# Patient Record
Sex: MALE | Race: WHITE | NOT HISPANIC OR LATINO | Employment: FULL TIME | URBAN - METROPOLITAN AREA
[De-identification: names, ages, dates, MRNs, and addresses within clinical notes are randomized per-mention and may not be internally consistent; named-entity substitution may affect disease eponyms.]

---

## 2018-02-12 ENCOUNTER — OFFICE VISIT (OUTPATIENT)
Dept: FAMILY MEDICINE | Facility: CLINIC | Age: 63
End: 2018-02-12
Payer: COMMERCIAL

## 2018-02-12 VITALS
BODY MASS INDEX: 31.66 KG/M2 | DIASTOLIC BLOOD PRESSURE: 91 MMHG | HEIGHT: 66 IN | OXYGEN SATURATION: 93 % | TEMPERATURE: 98.2 F | SYSTOLIC BLOOD PRESSURE: 190 MMHG | HEART RATE: 108 BPM | WEIGHT: 197 LBS

## 2018-02-12 DIAGNOSIS — S40.822A: Primary | ICD-10-CM

## 2018-02-12 DIAGNOSIS — L08.9: Primary | ICD-10-CM

## 2018-02-12 DIAGNOSIS — Z00.00 ENCOUNTER FOR MEDICAL EXAMINATION TO ESTABLISH CARE: ICD-10-CM

## 2018-02-12 LAB
ANION GAP SERPL CALCULATED.3IONS-SCNC: 17 MMOL/L (ref 5–18)
BUN SERPL-MCNC: 8 MG/DL (ref 8–22)
CALCIUM SERPL-MCNC: 8.7 MG/DL (ref 8.5–10.5)
CHLORIDE SERPL-SCNC: 100 MMOL/L (ref 98–107)
CHOLEST SERPL-MCNC: 146 MG/DL
CO2 SERPL-SCNC: 21 MMOL/L (ref 22–31)
CREAT SERPL-MCNC: 0.68 MG/DL (ref 0.7–1.3)
FASTING?: NO
GLUCOSE SERPL-MCNC: 99 MG/DL (ref 70–125)
HCT VFR BLD AUTO: 41 % (ref 40–53)
HDLC SERPL-MCNC: 35 MG/DL
HEMOGLOBIN: 13 G/DL (ref 13.3–17.7)
LDLC SERPL CALC-MCNC: 96 MG/DL
MCH RBC QN AUTO: 32.7 PG (ref 26.5–35)
MCHC RBC AUTO-ENTMCNC: 31.7 G/DL (ref 32–36)
MCV RBC AUTO: 103.4 FL (ref 78–100)
PLATELET # BLD AUTO: 266 K/UL (ref 150–450)
POTASSIUM SERPL-SCNC: 3.5 MMOL/L (ref 3.5–5)
RBC # BLD AUTO: 3.97 M/UL (ref 4.4–5.9)
SODIUM SERPL-SCNC: 138 MMOL/L (ref 136–145)
TRIGL SERPL-MCNC: 76 MG/DL
WBC # BLD AUTO: 9.2 K/UL (ref 4–11)

## 2018-02-12 NOTE — MR AVS SNAPSHOT
After Visit Summary   2018    Aayush García    MRN: [id number]           Patient Information     Date Of Birth          1955        Visit Information        Provider Department      2018 3:40 PM Palla, Misbah Yousuf, MD [facility]        Today's Diagnoses     Blister of left upper arm with infection, initial encounter    -  1    Encounter for medical examination to establish care           Follow-ups after your visit        Your next 10 appointments already scheduled     2018  3:40 PM CST   Return Visit with Misbah Yousuf Palla, MD   [facility] ([facility])    [address] [place] [address]   [phone]              Who to contact     Please call your clinic at [phone] to:    Ask questions about your health    Make or cancel appointments    Discuss your medicines    Learn about your test results    Speak to your doctor            Additional Information About Your Visit        MyChart Information     [employer] is an electronic gateway that provides easy, online access to your medical records. With Joost, you can request a clinic appointment, read your test results, renew a prescription or communicate with your care team.     To sign up for [employer] visit the website at [email or web address]   You will be asked to enter the access code listed below, as well as some personal information. Please follow the directions to create your username and password.     Your access code is: [email or web address]  Expires: 2018  3:18 PM     Your access code will  in 90 days. If you need help or a new code, please contact your [facility] or call [phone] for assistance.        Care EveryWhere ID     This is your Care EveryWhere ID. This could be used by other organizations to access your [place] medical records  [id number]        Your Vitals Were     Pulse Temperature Height Pulse Oximetry BMI (Body Mass  "Index)       108 98.2  F (36.8  C) (Oral) 5' 6\" (167.6 cm) 93% 31.8 kg/m2        Blood Pressure from Last 3 Encounters:   02/12/18 (!) 190/91    Weight from Last 3 Encounters:   02/12/18 197 lb (89.4 kg)              We Performed the Following     Basic Metabolic Profile ([facility])     CBC with Plt (P )     Lipid [place] ([facility]) - Results > 1 hr        Primary Care Provider Office Phone #    Jade Xie,  [phone]       [address] [address]        Equal Access to Services     SOURAV GUTIÉRREZ : Hadii aad suhail hadasho Soomaali, waaxda luqadaha, qaybta kaalmada adeegyada, kathe gibbs . So [facility] [phone].    ATENCIÓN: Si habla español, tiene a srivastava disposición servicios gratuitos de asistencia lingüística. Llame al [phone].    We comply with applicable federal civil rights laws and [place] laws. We do not discriminate on the basis of race, color, national origin, age, disability, sex, sexual orientation, or gender identity.            Thank you!     Thank you for choosing [facility]  for your care. Our goal is always to provide you with excellent care. Hearing back from our patients is one way we can continue to improve our services. Please take a few minutes to complete the written survey that you may receive in the mail after your visit with us. Thank you!             Your Updated Medication List - Protect others around you: Learn how to safely use, store and throw away your medicines at www.disposemymeds.org.      Notice  As of 2/12/2018 11:59 PM    You have not been prescribed any medications.      "

## 2018-02-12 NOTE — PROGRESS NOTES
"HPI    Aayush García is 62 year old yo male/female presenting for:    1. Concern for \"infection in left elbow\"  - has noticed swelling on his left elbow for the last 3 days  - no recent trauma/injuries   - no fevers, chills, nor night sweats  - no bleeding from the area, no purulent discharge  - no change in range of motion  - minor pain, 3/10 at its worst  - patient does endorsed mildly increased redness in his elbow extending down to his left forearm, but no other complaints     Of note:  - patient works in a warehouse pushing carts frequently   - rests both his elbows on the railing of the cart for several hours a day     OBJECTIVE    Vitals  BP (!) 190/91  Pulse 108  Temp 98.2  F (36.8  C) (Oral)  Ht 5' 6\" (167.6 cm)  Wt 197 lb (89.4 kg)  SpO2 93%  BMI 31.8 kg/m2      Physical Exam  General: No acute distress  CV: RRR  Respiratory: CTA bilaterally, no wheezes/rhonchi/rhales appreciated, no respiratory distress  Chest wall: No chest wall tenderness  Abdomen: soft, non-distended, non-tender, normoactive bowel sounds  Extremities:  capillary refill <2 seconds, well perfused   LUE: slight erythema/redness extending from elbow to wrist; LUE swelling from elbow to wrist > than on right  Neuro: no focal deficits noted, BUE reflexes within normal limits; 3 cm blister noted on lateral aspect of elbow, no purulent/bloody discharge, no breaks in skin noted    ASSESSMENT/PLAN    # Skin infection, left elbow  - no purulent discharge, no fluctuant mass noted  - keflex 500 mg BID for 10 days  - if symptoms worsen/do not improve, return to clinic  - ER precautions given to patient and wife  - advised to refrain from resting his elbows on his cart at work as this leads to increase risk of pressure ulcers/infections     #Health maintenance  - poor follow up, has not been seen in clinic in more than a decade  - schedule establish care appointment  - BMP, lipid panel, and CBC pending  - colonoscopy briefly discussed - will " discuss more at next appointment       Precepted with Dr. Patel

## 2018-02-12 NOTE — PROGRESS NOTES
Preceptor Attestation:  Patient's case reviewed and discussed with Misbah Y. Palla, MD Patient seen and discussed with the resident.. I agree with assessment and plan of care.  Supervising Physician:  Aayush Patel MD  [facility]

## 2018-02-13 ENCOUNTER — TELEPHONE (OUTPATIENT)
Dept: FAMILY MEDICINE | Facility: CLINIC | Age: 63
End: 2018-02-13

## 2018-02-13 NOTE — TELEPHONE ENCOUNTER
P Family Medicine phone call message- medication clarification/question:    Question: Patients partner calling in stating that an antibiotic was supposed to have been sent to pharmacy yesterday but nothing was sent. States is unsure of the name of antibiotic but it was for his arm. Would like this sent asap.     Pharmacy confirmed as [id number] - [place], [place] - [address]: Yes    OK to leave a message on voice mail? Yes    Primary language: English      needed? No    Call taken on February 13, 2018 at 3:29 PM by Ksenia Trammell

## 2018-02-13 NOTE — TELEPHONE ENCOUNTER
Pt is calling upset that the medication was never sent, states that they need this medication by tomorrow. She asked for a call once it is sent. Please call, and advise.

## 2018-02-14 RX ORDER — CEPHALEXIN 500 MG/1
500 CAPSULE ORAL 2 TIMES DAILY
Qty: 20 CAPSULE | Refills: 0 | Status: CANCELLED | OUTPATIENT
Start: 2018-02-14

## 2018-02-14 NOTE — TELEPHONE ENCOUNTER
Per Dr Palla's instructions, I have phoned into [facility], [address]- Keflex 500mg #20, take one capsule twice daily x 10 days, no refills. Called and left a detailed voice mail informing Aayush of the completed action. Yuko DING

## 2018-02-20 ENCOUNTER — OFFICE VISIT (OUTPATIENT)
Dept: FAMILY MEDICINE | Facility: CLINIC | Age: 63
End: 2018-02-20
Payer: COMMERCIAL

## 2018-02-20 VITALS
TEMPERATURE: 98 F | HEART RATE: 93 BPM | DIASTOLIC BLOOD PRESSURE: 88 MMHG | OXYGEN SATURATION: 98 % | WEIGHT: 190.6 LBS | RESPIRATION RATE: 16 BRPM | SYSTOLIC BLOOD PRESSURE: 167 MMHG | BODY MASS INDEX: 30.76 KG/M2

## 2018-02-20 DIAGNOSIS — D64.9 ANEMIA, UNSPECIFIED TYPE: ICD-10-CM

## 2018-02-20 DIAGNOSIS — I10 BENIGN ESSENTIAL HYPERTENSION: Primary | ICD-10-CM

## 2018-02-20 DIAGNOSIS — Z12.11 ENCOUNTER FOR SCREENING COLONOSCOPY: ICD-10-CM

## 2018-02-20 LAB
FOLATE SERPL-MCNC: 10.9 NG/ML
VIT B12 SERPL-MCNC: 566 PG/ML (ref 213–816)

## 2018-02-20 RX ORDER — LISINOPRIL 10 MG/1
10 TABLET ORAL DAILY
Qty: 90 TABLET | Refills: 3 | Status: SHIPPED | OUTPATIENT
Start: 2018-02-20 | End: 2019-03-28

## 2018-02-20 NOTE — PROGRESS NOTES
Preceptor Attestation:  Patient's case reviewed and discussed with Misbah Y. Palla, MD.  Patient seen and discussed with the resident.  I agree with assessment and plan of care.  Supervising Physician:  Shania Birmingham MD  [facility]

## 2018-03-06 ENCOUNTER — TELEPHONE (OUTPATIENT)
Dept: FAMILY MEDICINE | Facility: CLINIC | Age: 63
End: 2018-03-06

## 2018-03-06 ENCOUNTER — OFFICE VISIT (OUTPATIENT)
Dept: FAMILY MEDICINE | Facility: CLINIC | Age: 63
End: 2018-03-06
Payer: COMMERCIAL

## 2018-03-06 VITALS
TEMPERATURE: 97.8 F | RESPIRATION RATE: 20 BRPM | BODY MASS INDEX: 29.7 KG/M2 | OXYGEN SATURATION: 99 % | HEART RATE: 91 BPM | DIASTOLIC BLOOD PRESSURE: 76 MMHG | HEIGHT: 67 IN | SYSTOLIC BLOOD PRESSURE: 152 MMHG | WEIGHT: 189.2 LBS

## 2018-03-06 DIAGNOSIS — I10 BENIGN ESSENTIAL HYPERTENSION: Primary | ICD-10-CM

## 2018-03-06 DIAGNOSIS — Z00.00 PREVENTATIVE HEALTH CARE: ICD-10-CM

## 2018-03-06 LAB
BUN SERPL-MCNC: 4 MG/DL (ref 7–30)
CALCIUM SERPL-MCNC: 8.7 MG/DL (ref 8.5–10.4)
CHLORIDE SERPLBLD-SCNC: 99 MMOL/L (ref 94–109)
CO2 SERPL-SCNC: 27 MMOL/L (ref 20–32)
CREAT SERPL-MCNC: 0.6 MG/DL (ref 0.8–1.5)
EGFR CALCULATED (BLACK REFERENCE): >90 ML/MIN
EGFR CALCULATED (NON BLACK REFERENCE): >90 ML/MIN
GLUCOSE SERPL-MCNC: 93 MG/DL (ref 60–109)
POTASSIUM SERPL-SCNC: 4 MMOL/L (ref 3.4–5.3)
SODIUM SERPL-SCNC: 143 MMOL/L (ref 133–144)

## 2018-03-06 RX ORDER — ATORVASTATIN CALCIUM 40 MG/1
40 TABLET, FILM COATED ORAL DAILY
Qty: 90 TABLET | Refills: 1 | Status: SHIPPED | OUTPATIENT
Start: 2018-03-06 | End: 2018-04-12 | Stop reason: SINTOL

## 2018-03-06 RX ORDER — ATORVASTATIN CALCIUM 20 MG/1
20 TABLET, FILM COATED ORAL DAILY
Qty: 30 TABLET | Refills: 1 | Status: SHIPPED | OUTPATIENT
Start: 2018-03-06 | End: 2018-03-06 | Stop reason: DRUGHIGH

## 2018-03-06 NOTE — PROGRESS NOTES
"HPI    Aayush García is 62 year old yo male presenting for:    1. BP follow up  - established care on 2/20/2018  - diagnosed with benign essential hypertension and was started on lisinopril   - BP home measurements   - 139/71   - 156/67   - 121/72   - 121/74   - 122/69   - 132/73   - 133/62   - 134/71  - no hypotensive episodes/symptoms at home      The 10-year ASCVD risk score (Torie TELLEZ Jr, et al., 2013) is: 14.2%    Values used to calculate the score:      Age: 62 years      Sex: Male      Is Non- : No      Diabetic: No      Tobacco smoker: No      Systolic Blood Pressure: 152 mmHg      Is BP treated: Yes      HDL Cholesterol: 35 mg/dL      Total Cholesterol: 146 mg/dL      OBJECTIVE    Vitals  /76 (BP Location: Right arm, Patient Position: Sitting, Cuff Size: Adult Regular)  Pulse 91  Temp 97.8  F (36.6  C) (Oral)  Resp 20  Ht 5' 6.5\" (168.9 cm)  Wt 189 lb 3.2 oz (85.8 kg)  SpO2 99%  BMI 30.08 kg/m2      Physical Exam  General: No acute distress  CV: RRR  Respiratory: CTA bilaterally, no wheezes/rhonchi/rhales appreciated, no respiratory distress  Back: no paraspinal tenderness, no spinal tenderness, no vertebral step offs appreciated, no CVA tenderneses  Abdomen: soft, non-distended, non-tender, normoactive bowel sounds  Extremities: no cyanosis, no edema, capillary refill <2 seconds, well perfused  Neuro: no focal deficits noted  Psych: appropriate affect    ASSESSMENT/PLAN  # Hypertension  - most values at home have been below 140/90  - BMP wnl today  - SBP elevated today, but patient gets nervous when coming to doctor (likely white coat htn)  - continue lisinopril 10 mg  - follow up in 1 month, continue to check daily BP at home    # Preventive care 2/2 elevated ASCVD  - ASCVD of 14%  - >10% ASCVD + CV risk factor (hypertension) warrants starting moderate dose statin for primary prevention of CVD  - atorvastatin 40 mg daily   - asa 81 mg   - follow up in 1 month for CMP " check  - colonoscopy referral placed on last visit - patient will schedule today       Precepted with Dr. Spears

## 2018-03-06 NOTE — MR AVS SNAPSHOT
"              After Visit Summary   3/6/2018    Aayush García    MRN: [id number]           Patient Information     Date Of Birth          1955        Visit Information        Provider Department      3/6/2018 9:00 AM Palla, Misbah Yousuf, MD [facility]        Today's Diagnoses     Benign essential hypertension    -  1    Preventative health care           Follow-ups after your visit        Your next 10 appointments already scheduled     2018  4:00 PM CDT   Return Visit with Misbah Yousuf Palla, MD   [facility] ([facility])    [address] [place] [address]   [phone]              Who to contact     Please call your clinic at [phone] to:    Ask questions about your health    Make or cancel appointments    Discuss your medicines    Learn about your test results    Speak to your doctor            Additional Information About Your Visit        MyChart Information     [employer] is an electronic gateway that provides easy, online access to your medical records. With Runteq, you can request a clinic appointment, read your test results, renew a prescription or communicate with your care team.     To sign up for [employer] visit the website at [email or web address]   You will be asked to enter the access code listed below, as well as some personal information. Please follow the directions to create your username and password.     Your access code is: [email or web address]  Expires: 2018  4:18 PM     Your access code will  in 90 days. If you need help or a new code, please contact your [facility] or call [phone] for assistance.        Care EveryWhere ID     This is your Care EveryWhere ID. This could be used by other organizations to access your [place] medical records  [id number]        Your Vitals Were     Pulse Temperature Respirations Height Pulse Oximetry BMI (Body Mass Index)    91 97.8  F (36.6  C) (Oral) 20 5' 6.5\" " (168.9 cm) 99% 30.08 kg/m2       Blood Pressure from Last 3 Encounters:   03/06/18 152/76   02/20/18 167/88   02/12/18 (!) 190/91    Weight from Last 3 Encounters:   03/06/18 189 lb 3.2 oz (85.8 kg)   02/20/18 190 lb 9.6 oz (86.5 kg)   02/12/18 197 lb (89.4 kg)              We Performed the Following     Basic Metabolic Panel ([facility]) - Results < 1 hr          Today's Medication Changes          These changes are accurate as of 3/6/18 11:59 PM.  If you have any questions, ask your nurse or doctor.               Start taking these medicines.        Dose/Directions    atorvastatin 40 MG tablet   Commonly known as:  LIPITOR   Used for:  Benign essential hypertension, Preventative health care   Started by:  Palla, Misbah Yousuf, MD        Dose:  40 mg   Take 1 tablet (40 mg) by mouth daily   Quantity:  90 tablet   Refills:  1            Where to get your medicines      These medications were sent to [id number] - [place], [place] - [address] [place] [address] [address]  [address] [facility] [address], [place] [address]     Phone:  [phone]     atorvastatin 40 MG tablet                Primary Care Provider Office Phone # Fax #    Misbah Yousuf Palla, [phone]       [address] [address]        Equal Access to Services     JUANA GUTIÉRREZ AH: Hadii aad ku hadasho Soyamil, waaxda luqadaha, qaybta kaalmada adesuryayamaura, kathe gibbs . So [facility] [phone].    ATENCIÓN: Si habla español, tiene a srivastava disposición servicios gratuitos de asistencia lingüística. Olivier al [phone].    We comply with applicable federal civil rights laws and [place] laws. We do not discriminate on the basis of race, color, national origin, age, disability, sex, sexual orientation, or gender identity.            Thank you!     Thank you for choosing [facility]  for your care. Our goal is always to provide you with excellent care. Hearing back from our  patients is one way we can continue to improve our services. Please take a few minutes to complete the written survey that you may receive in the mail after your visit with us. Thank you!             Your Updated Medication List - Protect others around you: Learn how to safely use, store and throw away your medicines at www.disposemymeds.org.          This list is accurate as of 3/6/18 11:59 PM.  Always use your most recent med list.                   Brand Name Dispense Instructions for use Diagnosis    atorvastatin 40 MG tablet    LIPITOR    90 tablet    Take 1 tablet (40 mg) by mouth daily    Benign essential hypertension, Preventative health care       lisinopril 10 MG tablet    PRINIVIL/ZESTRIL    90 tablet    Take 1 tablet (10 mg) by mouth daily    Benign essential hypertension, Encounter for screening colonoscopy, Anemia, unspecified type       order for DME     1 each    Equipment being ordered: Blood pressure instrument and cuff    Benign essential hypertension, Encounter for screening colonoscopy, Anemia, unspecified type

## 2018-03-06 NOTE — TELEPHONE ENCOUNTER
[facility] phone call message- medication clarification/question:    Full Medication Name: atorvastatin   Strength: 40 mg    Have you contacted your pharmacy about this refill request?     If  Yes,  which pharmacy?    When did you contact the pharmacy?    Additional comments/concerns from call to pharmacy:    Reason for call to clinic: Calling to clarify if Patient is suppose to really be on medication above 40 mg or 20 mg, she states she takes 20 mg but Patient is on 40 mg so she wants to make sure. She states he is taking it for his heart only not something else. I looked and the medication sent was 40 mg and notes said that told her that but she would like to confirm and to take a message to ask if it is really 40 mg wondering why the dose is so high? Please call and advise.       Pharmacy confirmed as [id number] - [place], [place] - [address]: Yes    OK to leave a message on voice mail?     Primary language: English      needed? No    Call taken on March 6, 2018 at 2:16 PM by Raudel Barton

## 2018-03-07 NOTE — TELEPHONE ENCOUNTER
"called patient to clarify, left message on unidentified vmail asking to call back to discuss. Looking at yesterdays visit with Dr. Palla he prescribed 40mg of atorvastatin daily.     Per notes of increase dose per visit notes by Dr Palla  \"# Preventive care 2/2 elevated ASCVD  - ASCVD of 14%  - >10% ASCVD + CV risk factor (hypertension) warrants starting moderate dose statin for primary prevention of CVD  - atorvastatin 40 mg daily\"  "

## 2018-03-12 NOTE — PROGRESS NOTES
Preceptor Attestation:  Patient's case reviewed and discussed with  Patient seen and discussed with the resident..  I agree with written assessment and plan of care.  Supervising Physician:  Genia Spears MD  [facility]

## 2018-03-19 ASSESSMENT — MIFFLIN-ST. JEOR: SCORE: 1596.85

## 2018-03-20 ENCOUNTER — SURGERY - HEALTHEAST (OUTPATIENT)
Dept: SURGERY | Facility: AMBULATORY SURGERY CENTER | Age: 63
End: 2018-03-20

## 2018-04-02 ENCOUNTER — OFFICE VISIT (OUTPATIENT)
Dept: FAMILY MEDICINE | Facility: CLINIC | Age: 63
End: 2018-04-02
Payer: COMMERCIAL

## 2018-04-02 VITALS
HEIGHT: 67 IN | OXYGEN SATURATION: 100 % | SYSTOLIC BLOOD PRESSURE: 199 MMHG | WEIGHT: 192.4 LBS | RESPIRATION RATE: 18 BRPM | DIASTOLIC BLOOD PRESSURE: 93 MMHG | TEMPERATURE: 98.1 F | BODY MASS INDEX: 30.2 KG/M2 | HEART RATE: 100 BPM

## 2018-04-02 DIAGNOSIS — I10 BENIGN ESSENTIAL HYPERTENSION: Primary | ICD-10-CM

## 2018-04-02 DIAGNOSIS — Z72.0 TOBACCO ABUSE: ICD-10-CM

## 2018-04-02 NOTE — PROGRESS NOTES
"HPI    Aayush García is 62 year old yo male presenting for:    1. Hypertension follow up  - last seen on 3/6/2018 - refer to note for detail  - checking BP at home:   - SBP: 120s, occasional low 130s   - DBP: 70 - 80s  - no hypotensive symptoms    - no light headedness, no dizziness  - today:   - hypertensive (does have history of white coat htn)   - smoked a cigarette right before coming to clinic   - manual recheck: 190/85   - asymptomatic    OBJECTIVE    Vitals  BP (!) 199/93  Pulse 100  Temp 98.1  F (36.7  C)  Resp 18  Ht 5' 6.5\" (168.9 cm)  Wt 192 lb 6.4 oz (87.3 kg)  SpO2 100%  BMI 30.59 kg/m2      Physical Exam  General: No acute distress  Eyes: PERRLA, EOMI, fundoscopic exam wnl  CV: RRR  Respiratory: CTA bilaterally, no wheezes/rhonchi/rhales appreciated, no respiratory distress  Abdomen: soft, non-distended, non-tender, normoactive bowel sounds  Extremities: no cyanosis, no edema, capillary refill <2 seconds, well perfused    ASSESSMENT/PLAN    # Hypertension  - patient is hypertensive today   - has h/o white oat hypertension   - asymptomatic   - patient smoked a cigarette right before coming in  - BP has been in 120s at home  - concerned about increasing his home meds based off today's reading because it may make him hypotensive at home  - advised of serious need to stop smoking  - will have patient continue checking daily BP and return for BP check in 1 week    #Tobacco use  - still at 3-5 cigarettes/day  - patient states he is able to quit without prescription meds if need be  - will cut down to 1-2/day by next appointment      Precepted with Dr. James    Preceptor Attestation:  I saw and evaluated the patient.  I reviewed the resident physician's history, exam, and treatment plan; and I agree with the documentation by the resident physician.  Supervising Physician:  Abelino James MD    "

## 2018-04-02 NOTE — MR AVS SNAPSHOT
"              After Visit Summary   2018    Aayush García    MRN: [id number]           Patient Information     Date Of Birth          1955        Visit Information        Provider Department      2018 4:00 PM Palla, Misbah Yousuf, MD [facility]         Follow-ups after your visit        Who to contact     Please call your clinic at [phone] to:    Ask questions about your health    Make or cancel appointments    Discuss your medicines    Learn about your test results    Speak to your doctor            Additional Information About Your Visit        MyChart Information     [employer] is an electronic gateway that provides easy, online access to your medical records. With [employer], you can request a clinic appointment, read your test results, renew a prescription or communicate with your care team.     To sign up for [employer] visit the website at [email or web address]   You will be asked to enter the access code listed below, as well as some personal information. Please follow the directions to create your username and password.     Your access code is: [email or web address]  Expires: 2018  4:18 PM     Your access code will  in 90 days. If you need help or a new code, please contact your [facility] or call [phone] for assistance.        Care EveryWhere ID     This is your Care EveryWhere ID. This could be used by other organizations to access your [place] medical records  [id number]        Your Vitals Were     Pulse Temperature Respirations Height Pulse Oximetry BMI (Body Mass Index)    100 98.1  F (36.7  C) 18 5' 6.5\" (168.9 cm) 100% 30.59 kg/m2       Blood Pressure from Last 3 Encounters:   18 (!) 199/93   18 152/76   18 167/88    Weight from Last 3 Encounters:   18 192 lb 6.4 oz (87.3 kg)   18 189 lb 3.2 oz (85.8 kg)   18 190 lb 9.6 oz (86.5 kg)              Today, you had the following     No orders found for " display       Primary Care Provider Office Phone # Fax #    Carmine Yousuf Palla, [phone]       [place] [address]        Equal Access to Services     JUANA GUTIÉRREZ : Hadmichelle mildred ku rayo Soomaali, waaxda luqadaha, qaybta kaalmada adeegyada, kathe rowelln grey morel laKartikkrishna conner. So [facility] [phone].    ATENCIÓN: Si habla español, tiene a srivastava disposición servicios gratuitos de asistencia lingüística. Llame al [phone].    We comply with applicable federal civil rights laws and [place] laws. We do not discriminate on the basis of race, color, national origin, age, disability, sex, sexual orientation, or gender identity.            Thank you!     Thank you for choosing [facility]  for your care. Our goal is always to provide you with excellent care. Hearing back from our patients is one way we can continue to improve our services. Please take a few minutes to complete the written survey that you may receive in the mail after your visit with us. Thank you!             Your Updated Medication List - Protect others around you: Learn how to safely use, store and throw away your medicines at www.disposemymeds.org.          This list is accurate as of 4/2/18  4:26 PM.  Always use your most recent med list.                   Brand Name Dispense Instructions for use Diagnosis    atorvastatin 40 MG tablet    LIPITOR    90 tablet    Take 1 tablet (40 mg) by mouth daily    Benign essential hypertension, Preventative health care       lisinopril 10 MG tablet    PRINIVIL/ZESTRIL    90 tablet    Take 1 tablet (10 mg) by mouth daily    Benign essential hypertension, Encounter for screening colonoscopy, Anemia, unspecified type       order for DME     1 each    Equipment being ordered: Blood pressure instrument and cuff    Benign essential hypertension, Encounter for screening colonoscopy, Anemia, unspecified type

## 2018-04-12 ENCOUNTER — OFFICE VISIT (OUTPATIENT)
Dept: FAMILY MEDICINE | Facility: CLINIC | Age: 63
End: 2018-04-12
Payer: COMMERCIAL

## 2018-04-12 VITALS
BODY MASS INDEX: 30.32 KG/M2 | SYSTOLIC BLOOD PRESSURE: 177 MMHG | HEIGHT: 67 IN | RESPIRATION RATE: 22 BRPM | TEMPERATURE: 97.4 F | HEART RATE: 81 BPM | DIASTOLIC BLOOD PRESSURE: 91 MMHG | WEIGHT: 193.2 LBS | OXYGEN SATURATION: 96 %

## 2018-04-12 DIAGNOSIS — I10 BENIGN ESSENTIAL HYPERTENSION: Primary | ICD-10-CM

## 2018-04-12 DIAGNOSIS — T50.905A ADVERSE EFFECT OF DRUG, INITIAL ENCOUNTER: ICD-10-CM

## 2018-04-12 PROBLEM — E66.811 CLASS 1 OBESITY: Status: ACTIVE | Noted: 2018-04-12

## 2018-04-12 RX ORDER — ROSUVASTATIN CALCIUM 10 MG/1
10 TABLET, COATED ORAL DAILY
Qty: 30 TABLET | Refills: 0 | Status: SHIPPED | OUTPATIENT
Start: 2018-04-12 | End: 2018-05-17

## 2018-04-12 NOTE — PATIENT INSTRUCTIONS
1. Continue lisinopril 10 mg daily  2. Change atorvastatin to rosuvastatin (Crestor) 10 mg daily. If still having itching, then please stop medication and call.   3. Continue to bring home blood pressure readings to your visits. I would also recommend bringing in cuff to be checked at next visit.   4. Continue multivitamin and aspirin for prevention.

## 2018-04-12 NOTE — PROGRESS NOTES
[facility] - Family Medicine    Aayush García is a 62 year old  male with past medical history significant of hypertension presenting for chief complaint of:     Patient presents with:  Hypertension: List from home in folder  Results: Would like colonoscopy results  Med Rec: Needs Attention ADD 81mg ASA patient not taking atrovastatin      SUBJECTIVE:        HPI:    HYPERTENSION -   Hypertension diagnosed 1 month ago. Started on lisinopril 10 mg po daily. Is checking home blood pressures, majority SBP < 140 and DBP < 80. Checking a few times daily. Did not bring home glucometer here, but is consistent for wife between doctor and home.     No chest pain, no shortness of breath, no lightheadedness, no dizziness, no lower extremity edema.     The 10-year ASCVD risk score (Toriebroderick TELLEZ Jr, et al., 2013) is: 19%    Values used to calculate the score:      Age: 62 years      Sex: Male      Is Non- : No      Diabetic: No      Tobacco smoker: No      Systolic Blood Pressure: 182 mmHg      Is BP treated: Yes      HDL Cholesterol: 35 mg/dL      Total Cholesterol: 146 mg/dL    ASCVD > 7.5%  Stopped taking atorvastatin due to itching.   Walks 10-15 miles a day at work.     History provided by Aayush.     ROS:   See HPI above.     HISTORY:     Patient Active Problem List   Diagnosis     Benign essential hypertension       Past Medical History:   Diagnosis Date     NO ACTIVE PROBLEMS        Past Surgical History:   Procedure Laterality Date     NO HISTORY OF SURGERY          No Known Allergies    Current Outpatient Prescriptions   Medication     Multiple Vitamins-Minerals (MULTIVITAMIN & MINERAL PO)     lisinopril (PRINIVIL/ZESTRIL) 10 MG tablet     atorvastatin (LIPITOR) 40 MG tablet     order for DME     No current facility-administered medications for this visit.        OBJECTIVE:      PHYSICAL EXAM:  VITALS: /84 (BP Location: Right arm, Patient Position: Sitting, Cuff Size: Adult Regular)   "Pulse 81  Temp 97.4  F (36.3  C) (Oral)  Resp 22  Ht 5' 6.5\" (168.9 cm)  Wt 193 lb 3.2 oz (87.6 kg)  SpO2 96%  BMI 30.72 kg/m2  GENERAL: Aayush is an alert, obese white male, in no acute distress. He is accompanied by his wife today.   HEART: Regular rate and rhythm, soft systolic murmur heard over RUSB.   LUNGS: Clear to auscultation bilaterally, unlabored.   DERM: Venkat appearance.     ASSESSMENT & PLAN:      Aayush García is a 62 year old  male who presented for blood pressure follow up.     1. Benign essential hypertension  BP grossly elevated here in clinic today, asymptomatic. Home readings are in normal range. Will continue lisinopril 10 mg po daily. Continue with home monitoring. Bring log to next appointment. Also bring in home BP cuff so we can compare to home dose.   - Continue lisinopril 10 mg po daily   - Added aspirin 81 MG to medication list, takes daily    2. Adverse effect of drug, initial encounter  Due to elevated BP, ASCVD risk is elevated. Reports itching when taking atorvastatin. Will trial Crestor. If tolerating without side effects, can call and will renew Rx.   - rosuvastatin (CRESTOR) 10 MG tablet; Take 1 tablet (10 mg) by mouth daily  Dispense: 30 tablet; Refill: 0     Health Maintenance: Declines TDaP today.      RTC: 3 months.      Options for treatment and follow-up care were reviewed with the patient and/or guardian. Aayush García and/or guardian engaged in the decision making process and verbalized understanding of the options discussed and agreed with the final plan.    Precepted today with: Dr. Sarwat Kaminski DO   [facility] Resident, PGY-3    "

## 2018-04-12 NOTE — MR AVS SNAPSHOT
After Visit Summary   2018    Aayush García    MRN: [id number]           Patient Information     Date Of Birth          1955        Visit Information        Provider Department      2018 3:00 PM Vicki Kaminski, DO [facility]        Today's Diagnoses     Benign essential hypertension    -  1      Care Instructions    1. Continue lisinopril 10 mg daily  2. Change atorvastatin to rosuvastatin (Crestor) 10 mg daily. If still having itching, then please stop medication and call.   3. Continue to bring home blood pressure readings to your visits. I would also recommend bringing in cuff to be checked at next visit.   4. Continue multivitamin and aspirin for prevention.           Follow-ups after your visit        Who to contact     Please call your clinic at [phone] to:    Ask questions about your health    Make or cancel appointments    Discuss your medicines    Learn about your test results    Speak to your doctor            Additional Information About Your Visit        MyChart Information     Playmysong is an electronic gateway that provides easy, online access to your medical records. With Playmysong, you can request a clinic appointment, read your test results, renew a prescription or communicate with your care team.     To sign up for Playmysong visit the website at [email or web address]   You will be asked to enter the access code listed below, as well as some personal information. Please follow the directions to create your username and password.     Your access code is: [email or web address]  Expires: 2018  4:18 PM     Your access code will  in 90 days. If you need help or a new code, please contact your [facility] or call [phone] for assistance.        Care EveryWhere ID     This is your Care EveryWhere ID. This could be used by other organizations to access your [facility] medical records  [id number]        Your Vitals Were      "Pulse Temperature Respirations Height Pulse Oximetry BMI (Body Mass Index)    81 97.4  F (36.3  C) (Oral) 22 5' 6.5\" (168.9 cm) 96% 30.72 kg/m2       Blood Pressure from Last 3 Encounters:   04/12/18 (!) 177/91   04/02/18 (!) 199/93   03/06/18 152/76    Weight from Last 3 Encounters:   04/12/18 193 lb 3.2 oz (87.6 kg)   04/02/18 192 lb 6.4 oz (87.3 kg)   03/06/18 189 lb 3.2 oz (85.8 kg)              Today, you had the following     No orders found for display         Today's Medication Changes          These changes are accurate as of 4/12/18  3:44 PM.  If you have any questions, ask your nurse or doctor.               Start taking these medicines.        Dose/Directions    rosuvastatin 10 MG tablet   Commonly known as:  CRESTOR   Used for:  Benign essential hypertension   Started by:  Vicki Kaminski DO        Dose:  10 mg   Take 1 tablet (10 mg) by mouth daily   Quantity:  30 tablet   Refills:  0         Stop taking these medicines if you haven't already. Please contact your care team if you have questions.     atorvastatin 40 MG tablet   Commonly known as:  LIPITOR   Stopped by:  Vicki Kaminski DO                Where to get your medicines      These medications were sent to [facility] [id number] [facility] - [place], [place] - [address]  [address], [place] [address]     Phone:  [phone]     rosuvastatin 10 MG tablet                Primary Care Provider Office Phone # Fax #    Ushnmn Yousuf Palla, [phone]       [address] [address]        Equal Access to Services     [facility] MARLA AH: Hadmichelle Li, waannieda luqadaha, qaybta kaalmada adesuryayamaura, kathe conner. So [facility] [phone].    ATENCIÓN: Si habla español, tiene a srivastava disposición servicios gratuitos de asistencia lingüística. Llame al [phone].    We comply with applicable federal civil rights laws and [place] laws. We " do not discriminate on the basis of race, color, national origin, age, disability, sex, sexual orientation, or gender identity.            Thank you!     Thank you for choosing [facility]  for your care. Our goal is always to provide you with excellent care. Hearing back from our patients is one way we can continue to improve our services. Please take a few minutes to complete the written survey that you may receive in the mail after your visit with us. Thank you!             Your Updated Medication List - Protect others around you: Learn how to safely use, store and throw away your medicines at www.disposemymeds.org.          This list is accurate as of 4/12/18  3:44 PM.  Always use your most recent med list.                   Brand Name Dispense Instructions for use Diagnosis    aspirin 81 MG EC tablet     90 tablet    Take 1 tablet (81 mg) by mouth daily    Benign essential hypertension       lisinopril 10 MG tablet    PRINIVIL/ZESTRIL    90 tablet    Take 1 tablet (10 mg) by mouth daily    Benign essential hypertension, Encounter for screening colonoscopy, Anemia, unspecified type       MULTIVITAMIN & MINERAL PO      Take 1 tablet by mouth daily        order for DME     1 each    Equipment being ordered: Blood pressure instrument and cuff    Benign essential hypertension, Encounter for screening colonoscopy, Anemia, unspecified type       rosuvastatin 10 MG tablet    CRESTOR    30 tablet    Take 1 tablet (10 mg) by mouth daily    Benign essential hypertension

## 2018-04-13 NOTE — PROGRESS NOTES
Preceptor Attestation:   Patient seen, evaluated and discussed with the resident. I have verified the content of the note, which accurately reflects my assessment of the patient and the plan of care.  Supervising Physician:Sarwat Rutledge MD  [facility]

## 2018-05-11 DIAGNOSIS — D37.4 POLYP OF COLON, VILLOUS ADENOMA: Primary | ICD-10-CM

## 2018-05-11 NOTE — PROGRESS NOTES
1. Polyp of colon, villous adenoma  Needs repeat to verify complete removal of TVA. Referral placed.  - GASTROENTEROLOGY ADULT REF PROCEDURE ONLY Other; (MSC with Dr. Nicole)    Adam Nicole III, MD, FAAFP  [facility] [place] [facility] Residency Faculty  05/11/18 9:25 AM

## 2018-05-15 ENCOUNTER — TELEPHONE (OUTPATIENT)
Dept: FAMILY MEDICINE | Facility: CLINIC | Age: 63
End: 2018-05-15

## 2018-05-15 NOTE — TELEPHONE ENCOUNTER
[facility] phone call message- medication clarification/question:    Full Medication Name: lisinopril (PRINIVIL/ZESTRIL) 10 MG tablet    Question: Called to request for refill.      Pharmacy confirmed as [id number] [facility] - [place], [place] - [address]: Yes    OK to leave a message on voice mail? Yes    Primary language: English      needed? No    Call taken on May 15, 2018 at 9:56 AM by Yudy Mejia

## 2018-05-15 NOTE — TELEPHONE ENCOUNTER
Last rx was written on 2/20/2018 #90 with 3 refills.   Called to inform patient but no answer. Left message to call me back tomorrow 5/16/2018.  ~ Sergei CALABRESE CMA ([place])

## 2018-05-17 DIAGNOSIS — I10 BENIGN ESSENTIAL HYPERTENSION: ICD-10-CM

## 2018-05-17 RX ORDER — ROSUVASTATIN CALCIUM 10 MG/1
10 TABLET, COATED ORAL DAILY
Qty: 30 TABLET | Refills: 3 | Status: SHIPPED | OUTPATIENT
Start: 2018-05-17 | End: 2019-03-28

## 2018-05-24 ENCOUNTER — OFFICE VISIT (OUTPATIENT)
Dept: FAMILY MEDICINE | Facility: CLINIC | Age: 63
End: 2018-05-24
Payer: COMMERCIAL

## 2018-05-24 VITALS
RESPIRATION RATE: 20 BRPM | HEART RATE: 109 BPM | SYSTOLIC BLOOD PRESSURE: 136 MMHG | DIASTOLIC BLOOD PRESSURE: 83 MMHG | WEIGHT: 187.8 LBS | OXYGEN SATURATION: 97 % | BODY MASS INDEX: 29.47 KG/M2 | TEMPERATURE: 98.4 F | HEIGHT: 67 IN

## 2018-05-24 DIAGNOSIS — I10 WHITE COAT SYNDROME WITH DIAGNOSIS OF HYPERTENSION: ICD-10-CM

## 2018-05-24 DIAGNOSIS — I10 BENIGN ESSENTIAL HYPERTENSION: Primary | ICD-10-CM

## 2018-05-24 NOTE — Clinical Note
Blaine Lucero,   Dr. Nicole placed a referral for repeat colonoscopy for this patient. Not sure if this is scheduled yet or not, as patient does not have a date. Seems Dr. Nicole wanted a repeat one done relatively soon, though his order doesn't state when.  - Derick Coates

## 2018-05-24 NOTE — PROGRESS NOTES
Preceptor Attestation:   Patient seen, evaluated and discussed with the resident, Dr. Derick Coates. I have verified the content of the note, which accurately reflects my assessment of the patient and the plan of care.  Supervising Physician:Shania Birmingham MD  [facility]

## 2018-05-24 NOTE — MR AVS SNAPSHOT
"              After Visit Summary   2018    Aayush García    MRN: [id number]           Patient Information     Date Of Birth          1955        Visit Information        Provider Department      2018 11:00 AM Ramiro Coates MD [facility]        Today's Diagnoses     Benign essential hypertension    -  1    White coat syndrome with diagnosis of hypertension           Follow-ups after your visit        Follow-up notes from your care team     Return in about 1 year (around 2019) for BP Recheck.      Who to contact     Please call your clinic at [phone] to:    Ask questions about your health    Make or cancel appointments    Discuss your medicines    Learn about your test results    Speak to your doctor            Additional Information About Your Visit        MyChart Information     Content360t is an electronic gateway that provides easy, online access to your medical records. With BuzzStarter, you can request a clinic appointment, read your test results, renew a prescription or communicate with your care team.     To sign up for Content360t visit the website at [email or web address]   You will be asked to enter the access code listed below, as well as some personal information. Please follow the directions to create your username and password.     Your access code is: [email or web address]  Expires: 2018  2:35 PM     Your access code will  in 90 days. If you need help or a new code, please contact your [facility] or call [phone] for assistance.        Care EveryWhere ID     This is your Care EveryWhere ID. This could be used by other organizations to access your [facility] medical records  [id number]        Your Vitals Were     Pulse Temperature Respirations Height Pulse Oximetry BMI (Body Mass Index)    109 98.4  F (36.9  C) 20 5' 6.5\" (168.9 cm) 97% 29.86 kg/m2       Blood Pressure from Last 3 Encounters:   18 136/83   18 (!) " 177/91   04/02/18 (!) 199/93    Weight from Last 3 Encounters:   05/24/18 187 lb 12.8 oz (85.2 kg)   04/12/18 193 lb 3.2 oz (87.6 kg)   04/02/18 192 lb 6.4 oz (87.3 kg)              Today, you had the following     No orders found for display       Primary Care Provider Office Phone # Fax #    Carmine Yousuf Palla, [phone]       [address] [address]        Equal Access to Services     [facility]: Hadii aad ku hadasho Soomaali, waaxda luqadaha, qaybta kaalmada adeyesica, kathe gibbs . So [facility] [phone].    ATENCIÓN: Si habla español, tiene a srivastava disposición servicios gratuitos de asistencia lingüística. [facility] [phone].    We comply with applicable federal civil rights laws and [place] laws. We do not discriminate on the basis of race, color, national origin, age, disability, sex, sexual orientation, or gender identity.            Thank you!     Thank you for choosing [facility]  for your care. Our goal is always to provide you with excellent care. Hearing back from our patients is one way we can continue to improve our services. Please take a few minutes to complete the written survey that you may receive in the mail after your visit with us. Thank you!             Your Updated Medication List - Protect others around you: Learn how to safely use, store and throw away your medicines at www.disposemymeds.org.          This list is accurate as of 5/24/18  2:35 PM.  Always use your most recent med list.                   Brand Name Dispense Instructions for use Diagnosis    aspirin 81 MG EC tablet     90 tablet    Take 1 tablet (81 mg) by mouth daily    Benign essential hypertension       lisinopril 10 MG tablet    PRINIVIL/ZESTRIL    90 tablet    Take 1 tablet (10 mg) by mouth daily    Benign essential hypertension, Encounter for screening colonoscopy, Anemia, unspecified type       MULTIVITAMIN &  MINERAL PO      Take 1 tablet by mouth daily        order for DME     1 each    Equipment being ordered: Blood pressure instrument and cuff    Benign essential hypertension, Encounter for screening colonoscopy, Anemia, unspecified type       rosuvastatin 10 MG tablet    CRESTOR    30 tablet    Take 1 tablet (10 mg) by mouth daily    Benign essential hypertension

## 2018-05-24 NOTE — PROGRESS NOTES
"       Subjective       Aayush García is a 62 year old  male with a significant past medical history of hypertension who presents for    Chief Complaint   Patient presents with     RECHECK     BP       BP Check  - Brought in home BP cuff to compare to our BP cuffs  - BP cuffs are similar in their readings here today  - Brings in list of BP's over the past 1 week   - Systolic ranges from 100-135 average with 1 at 140   - Diastolic ranges from 65-85 average  - Denies headaches, blurry vision, chest pain, SOB, or abdominal pain  - No issues with taking his crestor (no itchy skin)    Pertinent ROS: 5-Point Review of Systems Negative -- Except as noted above.    Social History:   History   Smoking Status     Former Smoker     Quit date: 2/20/2018   Smokeless Tobacco     Never Used       Current Medications:   Current Outpatient Prescriptions   Medication Sig Dispense Refill     aspirin 81 MG EC tablet Take 1 tablet (81 mg) by mouth daily 90 tablet 3     lisinopril (PRINIVIL/ZESTRIL) 10 MG tablet Take 1 tablet (10 mg) by mouth daily 90 tablet 3     Multiple Vitamins-Minerals (MULTIVITAMIN & MINERAL PO) Take 1 tablet by mouth daily       order for DME Equipment being ordered: Blood pressure instrument and cuff 1 each 0     rosuvastatin (CRESTOR) 10 MG tablet Take 1 tablet (10 mg) by mouth daily 30 tablet 3            Objective     Vitals:    05/24/18 1100   BP: 136/83   Pulse: 109   Resp: 20   Temp: 98.4  F (36.9  C)   SpO2: 97%   Weight: 187 lb 12.8 oz (85.2 kg)   Height: 5' 6.5\" (168.9 cm)     Body mass index is 29.86 kg/(m^2).    GENERAL APPEARANCE: healthy, alert and no distress  RESP: lungs clear to auscultation - no rales, rhonchi or wheezes  CV: regular rates and rhythm, normal S1 S2, no S3 or S4, no murmur, click or rub, no peripheral edema and peripheral pulses strong         Assessment/Plan       (I10) Benign essential hypertension  (primary encounter diagnosis)  Comment:   Plan:   (I10) White coat syndrome with " diagnosis of hypertension  Comment:   Plan: BP cuff is accurate compared to our BPs. Since he has historically had normal BPs at home on lisinopril 10mg daily, but elevated BPs here in clinic, likely added component of white coat syndrome to his benign essential HTN. Continue treatment with lisinopril 10mg daily, crestor, and ASA 81mg. ENcouraged healthy eating and exercise for his weight and BP control. Discussed checking BP at the same time of day when he does check. I recommend yearly exams for BP control.    Did have abnormal colonoscopy. Will try to set this up.    Options for treatment and follow-up care were reviewed with the patient and/or guardian. Aayush García and/or guardian engaged in the decision making process and verbalized understanding of the options discussed and agreed with the final plan.    Ramiro Coates MD      Precepted today with: Shania Birmingham MD    This note was created using Dragon Dictation software. Any grammatical errors or word substitutions are unintentional, despite proofreading.

## 2018-06-01 ENCOUNTER — OFFICE VISIT (OUTPATIENT)
Dept: FAMILY MEDICINE | Facility: CLINIC | Age: 63
End: 2018-06-01
Payer: COMMERCIAL

## 2018-06-01 VITALS
OXYGEN SATURATION: 100 % | BODY MASS INDEX: 29.26 KG/M2 | HEIGHT: 67 IN | WEIGHT: 186.4 LBS | RESPIRATION RATE: 16 BRPM | DIASTOLIC BLOOD PRESSURE: 76 MMHG | TEMPERATURE: 97.9 F | SYSTOLIC BLOOD PRESSURE: 143 MMHG | HEART RATE: 57 BPM

## 2018-06-01 DIAGNOSIS — R19.5 DARK STOOLS: Primary | ICD-10-CM

## 2018-06-01 LAB
HCT VFR BLD AUTO: 34.8 % (ref 40–53)
HEMOGLOBIN: 11.1 G/DL (ref 13.3–17.7)
INR PPP: 1.1
MCH RBC QN AUTO: 33.5 PG (ref 26.5–35)
MCHC RBC AUTO-ENTMCNC: 31.9 G/DL (ref 32–36)
MCV RBC AUTO: 105 FL (ref 78–100)
PLATELET # BLD AUTO: 313 K/UL (ref 150–450)
RBC # BLD AUTO: 3.31 M/UL (ref 4.4–5.9)
WBC # BLD AUTO: 12.9 K/UL (ref 4–11)

## 2018-06-01 RX ORDER — OMEPRAZOLE 40 MG/1
40 CAPSULE, DELAYED RELEASE ORAL DAILY
Qty: 30 CAPSULE | Refills: 1 | Status: SHIPPED | OUTPATIENT
Start: 2018-06-01 | End: 2018-07-30

## 2018-06-01 NOTE — PATIENT INSTRUCTIONS
- omeprazole 40 mg daily before breakfast  - hold aspirin  - stop taking advil, alcohol  - avoid smoking, caffeine  - GO TO THE ED if you experience multiple more dark stools, you experience dizziness/lightheadednes, heart racing/palpitations, weakness, or look pale  - we will see you back on Monday and schedule an EGD

## 2018-06-01 NOTE — MR AVS SNAPSHOT
After Visit Summary   2018    Aayush García    MRN: [id number]           Patient Information     Date Of Birth          1955        Visit Information        Provider Department      2018 4:00 PM Stewart, Haley, DO [facility]        Today's Diagnoses     Dark stools    -  1      Care Instructions    - omeprazole 40 mg daily before breakfast  - hold aspirin  - stop taking advil, alcohol  - avoid smoking, caffeine  - GO TO THE ED if you experience multiple more dark stools, you experience dizziness/lightheadednes, heart racing/palpitations, weakness, or look pale  - we will see you back on Monday and schedule an EGD          Follow-ups after your visit        Follow-up notes from your care team     Return in about 3 days (around 2018).      Who to contact     Please call your clinic at [phone] to:    Ask questions about your health    Make or cancel appointments    Discuss your medicines    Learn about your test results    Speak to your doctor            Additional Information About Your Visit        MyChart Information     [employer] is an electronic gateway that provides easy, online access to your medical records. With [employer], you can request a clinic appointment, read your test results, renew a prescription or communicate with your care team.     To sign up for [employer] visit the website at [email or web address]   You will be asked to enter the access code listed below, as well as some personal information. Please follow the directions to create your username and password.     Your access code is: [email or web address]  Expires: 2018  2:35 PM     Your access code will  in 90 days. If you need help or a new code, please contact your [facility] or call [phone] for assistance.        Care EveryWhere ID     This is your Care EveryWhere ID. This could be used by other organizations to access your [facility]  "records  [id number]        Your Vitals Were     Pulse Temperature Respirations Height Pulse Oximetry BMI (Body Mass Index)    57 97.9  F (36.6  C) (Oral) 16 5' 7\" (170.2 cm) 100% 29.19 kg/m2       Blood Pressure from Last 3 Encounters:   06/01/18 143/76   05/24/18 136/83   04/12/18 (!) 177/91    Weight from Last 3 Encounters:   06/01/18 186 lb 6.4 oz (84.6 kg)   05/24/18 187 lb 12.8 oz (85.2 kg)   04/12/18 193 lb 3.2 oz (87.6 kg)              We Performed the Following     CBC with Plt ([facility])     INR ([facility])          Today's Medication Changes          These changes are accurate as of 6/1/18  4:39 PM.  If you have any questions, ask your nurse or doctor.               Start taking these medicines.        Dose/Directions    omeprazole 40 MG capsule   Commonly known as:  priLOSEC   Used for:  Dark stools   Started by:  María Elena Renee DO        Dose:  40 mg   Take 1 capsule (40 mg) by mouth daily Take 30-60 minutes before a meal.   Quantity:  30 capsule   Refills:  1            Where to get your medicines      These medications were sent to [place] [address] [facility] - [place], [place] - [address]  [address], [place] [address]     Phone:  [phone]     omeprazole 40 MG capsule                Primary Care Provider Office Phone # Fax #    Oucjmo Yousuf Palla, [phone]       [address] [address]        Equal Access to Services     [facility] MARLA AH: Hadmichelle Li, waaxda luqadaha, qaybta kaalmada loyda, kathe conner. So [facility] [phone].    ATENCIÓN: Si habla español, tiene a srivastava disposición servicios gratuitos de asistencia lingüística. Llame al [phone].    We comply with applicable federal civil rights laws and [place] laws. We do not discriminate on the basis of race, color, national origin, age, disability, sex, sexual orientation, or gender identity.            Thank " you!     Thank you for choosing [facility]  for your care. Our goal is always to provide you with excellent care. Hearing back from our patients is one way we can continue to improve our services. Please take a few minutes to complete the written survey that you may receive in the mail after your visit with us. Thank you!             Your Updated Medication List - Protect others around you: Learn how to safely use, store and throw away your medicines at www.disposemymeds.org.          This list is accurate as of 6/1/18  4:39 PM.  Always use your most recent med list.                   Brand Name Dispense Instructions for use Diagnosis    aspirin 81 MG EC tablet     90 tablet    Take 1 tablet (81 mg) by mouth daily    Benign essential hypertension       lisinopril 10 MG tablet    PRINIVIL/ZESTRIL    90 tablet    Take 1 tablet (10 mg) by mouth daily    Benign essential hypertension, Encounter for screening colonoscopy, Anemia, unspecified type       MULTIVITAMIN & MINERAL PO      Take 1 tablet by mouth daily        omeprazole 40 MG capsule    priLOSEC    30 capsule    Take 1 capsule (40 mg) by mouth daily Take 30-60 minutes before a meal.    Dark stools       order for DME     1 each    Equipment being ordered: Blood pressure instrument and cuff    Benign essential hypertension, Encounter for screening colonoscopy, Anemia, unspecified type       rosuvastatin 10 MG tablet    CRESTOR    30 tablet    Take 1 tablet (10 mg) by mouth daily    Benign essential hypertension

## 2018-06-01 NOTE — PROGRESS NOTES
"       HPI:   Aayush García is a 62 year old  male with PMH of gastric ulcer, HTN, and obesity who presents with dark stools:    Dark stools:  - black in color  - 5 yesterday, 5 today  - water, diarrhea consistency  - no red blood  - some abdominal prior to defecation  - no history of anemia  - no dizziness, lightheadedness, CP, SOB, palpitations  - no GERD sxs  - history of gastric ulcer, 15 years ago. Was in ICU and had ulcer cauterized and placed on a short course of medication. Unsure of the name.  - takes 4-6 ibuprofen a day for hip and knee pain for \"months\"  - drinks 7-8 beers per week  - takes a daily baby aspirin  - no other blood thinners  - denies pepto bismol use, iron         PMHX:     Patient Active Problem List   Diagnosis     Benign essential hypertension     Class 1 obesity     White coat syndrome with diagnosis of hypertension       Current Outpatient Prescriptions   Medication Sig Dispense Refill     omeprazole (PRILOSEC) 40 MG capsule Take 1 capsule (40 mg) by mouth daily Take 30-60 minutes before a meal. 30 capsule 1     aspirin 81 MG EC tablet Take 1 tablet (81 mg) by mouth daily 90 tablet 3     lisinopril (PRINIVIL/ZESTRIL) 10 MG tablet Take 1 tablet (10 mg) by mouth daily 90 tablet 3     Multiple Vitamins-Minerals (MULTIVITAMIN & MINERAL PO) Take 1 tablet by mouth daily       order for DME Equipment being ordered: Blood pressure instrument and cuff 1 each 0     rosuvastatin (CRESTOR) 10 MG tablet Take 1 tablet (10 mg) by mouth daily 30 tablet 3     Social History   Substance Use Topics     Smoking status: Current Every Day Smoker     Last attempt to quit: 2/20/2018     Smokeless tobacco: Never Used      Comment: About 2 cig/day     Alcohol use Yes      Comment: About 7 drinks/week     Allergies   Allergen Reactions     No Known Allergies      Results for orders placed or performed in visit on 06/01/18 (from the past 24 hour(s))   CBC with Plt ([facility])   Result Value Ref Range    WBC 12.9 (H) " "4.0 - 11.0 K/uL    RBC 3.31 (L) 4.40 - 5.90 M/uL    Hemoglobin 11.1 (L) 13.3 - 17.7 g/dL    Hematocrit 34.8 (L) 40.0 - 53.0 %    .0 (H) 78.0 - 100.0 fL    MCH 33.5 26.5 - 35.0 pg    MCHC 31.9 (L) 32.0 - 36.0 g/dL    Platelets 313.0 150.0 - 450.0 K/uL   INR ([facility])   Result Value Ref Range    INR 1.1             Review of Systems:   See HPI.          Physical Exam:     Vitals:    06/01/18 1556   BP: 143/76   Pulse: 57   Resp: 16   Temp: 97.9  F (36.6  C)   TempSrc: Oral   SpO2: 100%   Weight: 186 lb 6.4 oz (84.6 kg)   Height: 5' 7\" (170.2 cm)     Body mass index is 29.19 kg/(m^2).    General: Alert, well-appearing male in NAD, walks with limp due to left hip pain  Pulm: CTA BL, no tachypnea  CV: RRR, 2/6 systolic murmur  Abd: obese, soft, NTND, no masses, unable to appreciate fluid wave  Ext: Warm, well perfused, 2+ BL radial pulses  Skin: No rash or jaundice  Psych: Mood appropriate to visit content, full affect, rational thought content and process    Assessment and Plan   1. Dark stools  Suspect secondary to upper GI bleed due to history and symptoms. Vitally stable without hypotension or tachycardia. Hemoglobin in February was 13, today 11.1. Normal platelets and INR. Had a colonoscopy performed in 03/2018  Which showed mild diverticulitis and two polyps. One which was only partially removed secondary to size. Recommended follow up colonoscopy for complete removal.  Patient remains asymptomatic without dizziness or lightheadedness.   - ordered CBC with Plt ([facility])  - ordered INR ([facility])  - hold aspirin  - stop taking advil, alcohol  - avoid smoking, caffeine  - patient agreed to go to the ED if he experiences multiple more dark stools, you experience dizziness/lightheadednes, heart racing/palpitations, weakness, or look pale  - omeprazole (PRILOSEC) 40 MG capsule; Take 1 capsule (40 mg) by mouth daily Take 30-60 minutes before a meal.  Dispense: 30 capsule; Refill: 1    Follow up: Monday, 6/4/2018 to " reassess, check hemoglobin, and schedule EGD  Options for treatment and follow-up care were reviewed with the patient and/or guardian. Aayush García and/or guardian engaged in the decision making process and verbalized understanding of the options discussed and agreed with the final plan.    María Elena Renee DO  [facility] Family [facility] Resident    Precepted with: Chery Bell MD

## 2018-06-03 NOTE — PROGRESS NOTES
Preceptor Attestation:  Patient's case reviewed and discussed with María Elena Renee DO resident and I evaluated the patient. I agree with written assessment and plan of care.  Supervising Physician:  TIMOTHY BUENO MD  [facility]

## 2018-06-04 ENCOUNTER — OFFICE VISIT (OUTPATIENT)
Dept: FAMILY MEDICINE | Facility: CLINIC | Age: 63
End: 2018-06-04
Payer: COMMERCIAL

## 2018-06-04 VITALS
RESPIRATION RATE: 18 BRPM | WEIGHT: 187 LBS | DIASTOLIC BLOOD PRESSURE: 61 MMHG | BODY MASS INDEX: 29.35 KG/M2 | SYSTOLIC BLOOD PRESSURE: 129 MMHG | HEIGHT: 67 IN | HEART RATE: 50 BPM | TEMPERATURE: 98.5 F | OXYGEN SATURATION: 100 %

## 2018-06-04 DIAGNOSIS — I10 BENIGN ESSENTIAL HYPERTENSION: Primary | ICD-10-CM

## 2018-06-04 DIAGNOSIS — K92.2 ACUTE UPPER GI HEMORRHAGE: ICD-10-CM

## 2018-06-04 DIAGNOSIS — K92.1 HEMATOCHEZIA: ICD-10-CM

## 2018-06-04 DIAGNOSIS — M25.561 CHRONIC PAIN OF RIGHT KNEE: ICD-10-CM

## 2018-06-04 DIAGNOSIS — G89.29 CHRONIC PAIN OF RIGHT KNEE: ICD-10-CM

## 2018-06-04 DIAGNOSIS — R07.9 CHEST PAIN: ICD-10-CM

## 2018-06-04 LAB
HCT VFR BLD AUTO: 28.2 % (ref 40–53)
HCT VFR BLD AUTO: 29.4 % (ref 40–53)
HEMOGLOBIN: 9.1 G/DL (ref 13.3–17.7)
HEMOGLOBIN: 9.6 G/DL (ref 13.3–17.7)
MCH RBC QN AUTO: 34.1 PG (ref 26.5–35)
MCH RBC QN AUTO: 34.2 PG (ref 26.5–35)
MCHC RBC AUTO-ENTMCNC: 32.3 G/DL (ref 32–36)
MCHC RBC AUTO-ENTMCNC: 32.7 G/DL (ref 32–36)
MCV RBC AUTO: 104.7 FL (ref 78–100)
MCV RBC AUTO: 105.7 FL (ref 78–100)
PLATELET # BLD AUTO: 257 K/UL (ref 150–450)
PLATELET # BLD AUTO: 274 K/UL (ref 150–450)
RBC # BLD AUTO: 2.67 M/UL (ref 4.4–5.9)
RBC # BLD AUTO: 2.81 M/UL (ref 4.4–5.9)
WBC # BLD AUTO: 10 K/UL (ref 4–11)
WBC # BLD AUTO: 10.9 K/UL (ref 4–11)

## 2018-06-04 NOTE — LETTER
June 19, 2018      Aayush García  [address] [place] [address]        Dear Aayush,    Endoscopy showed a deep peptic ulcer caused the bleeding. Please talk to your doctor about the results.     Please see below for your test results.    Resulted Orders   CBC with Plt ([facility])   Result Value Ref Range    WBC 10.9 4.0 - 11.0 K/uL    WBC 10.0 4.0 - 11.0 K/uL    RBC 2.81 (L) 4.40 - 5.90 M/uL    RBC 2.67 (L) 4.40 - 5.90 M/uL    Hemoglobin 9.6 (L) 13.3 - 17.7 g/dL    Hemoglobin 9.1 (L) 13.3 - 17.7 g/dL    Hematocrit 29.4 (L) 40.0 - 53.0 %    Hematocrit 28.2 (L) 40.0 - 53.0 %    .7 (H) 78.0 - 100.0 fL    .7 (H) 78.0 - 100.0 fL    MCH 34.2 26.5 - 35.0 pg    MCH 34.1 26.5 - 35.0 pg    MCHC 32.7 32.0 - 36.0 g/dL    MCHC 32.3 32.0 - 36.0 g/dL    Platelets 274.0 150.0 - 450.0 K/uL    Platelets 257.0 150.0 - 450.0 K/uL    Narrative    Verbally informed Dr. Reynoso of low hgb. LK, CMA       If you have any questions, please call the clinic to make an appointment.    Sincerely,    Ramin Reynoso MD

## 2018-06-04 NOTE — PROGRESS NOTES
HPI:       Aayush García is a 62 year old  male who presents for followup of hematochezia.   Patient started omeprazole on Friday. Stopped black stools on Saturday. Stools have now returned to normal color.      Patient had been taking 5-6 ibuprofen per day due to left knee pain. No abdominal pain. Has had a recent colonoscopy, with an abnormal polyp. Due for repeat colonoscopy in about 2 months.     Reports he was drinking about 2 drinks per day, but subsequently reports he was drinking perhaps 6-8 drink's per day. Has had painful left knee causing chronic pain. Is now taking tylenol.       No liver function tests noted, no lipid. Patient would like to hold with these right now, but agreed to CBC to evaluate extent of bleeding.       Patient smoking - but say he is trying to quit. Last cigarette this AM.        Has an irregular pulse.  Will check EKG. Needs EGD scheduled - was unable to do this on Friday since the clinic had closed.                      PMHX:   Current Medications:   Current Outpatient Prescriptions   Medication Sig Dispense Refill     aspirin 81 MG EC tablet Take 1 tablet (81 mg) by mouth daily 90 tablet 3     lisinopril (PRINIVIL/ZESTRIL) 10 MG tablet Take 1 tablet (10 mg) by mouth daily 90 tablet 3     Multiple Vitamins-Minerals (MULTIVITAMIN & MINERAL PO) Take 1 tablet by mouth daily       omeprazole (PRILOSEC) 40 MG capsule Take 1 capsule (40 mg) by mouth daily Take 30-60 minutes before a meal. 30 capsule 1     rosuvastatin (CRESTOR) 10 MG tablet Take 1 tablet (10 mg) by mouth daily 30 tablet 3     order for DME Equipment being ordered: Blood pressure instrument and cuff 1 each 0       Existing Problems  Patient Active Problem List   Diagnosis     Benign essential hypertension     Class 1 obesity     White coat syndrome with diagnosis of hypertension       Allergies:  Allergies   Allergen Reactions     No Known Allergies        Previous labs:  Lab Results   Component Value Date    HGB  "11.1 (L) 06/01/2018    HCT 34.8 (L) 06/01/2018    CHOL 146 02/12/2018    HDL 35 (L) 02/12/2018    .0 03/06/2018    BUN 4.0 (L) 03/06/2018    CO2 27.0 03/06/2018    INR 1.1 06/01/2018               Review of Systems:    CONSTITUTIONAL: no fatigue, no unexpected change in weight  SKIN: no worrisome rashes, no worrisome moles, no worrisome lesions  EYES: no acute vision problems or changes  ENT: no ear problems, no mouth problems, no throat problems  RESP: no significant cough, no shortness of breath  CV: no chest pain, no palpitations, no new or worsening peripheral edema  GI: as above          Physical Exam:     Vitals:    06/04/18 1601   BP: 129/61   Pulse: 50   Resp: 18   Temp: 98.5  F (36.9  C)   TempSrc: Oral   SpO2: 100%   Weight: 187 lb (84.8 kg)   Height: 5' 7\" (170.2 cm)     Body mass index is 29.29 kg/(m^2).    GENERAL:alert, well hydrated, no distress  EYES: Eyes grossly normal to inspection, extraocular movements - intact, and PERRL  HENT:  Nose- normal; Mouth- no ulcers, no lesions  NECK: no tenderness, no adenopathy, no asymmetry, no masses, no stiffness  RESP: lungs clear to auscultation - no rales, no rhonchi, no wheezes  CV: irregular rate. normal S1 S2, no S3 or S4 and no murmur, no click or rub -               Labs and Procedures     Office Visit on 06/01/2018   Component Date Value Ref Range Status     WBC 06/01/2018 12.9* 4.0 - 11.0 K/uL Final     RBC 06/01/2018 3.31* 4.40 - 5.90 M/uL Final     Hemoglobin 06/01/2018 11.1* 13.3 - 17.7 g/dL Final     Hematocrit 06/01/2018 34.8* 40.0 - 53.0 % Final     MCV 06/01/2018 105.0* 78.0 - 100.0 fL Final     MCH 06/01/2018 33.5  26.5 - 35.0 pg Final     MCHC 06/01/2018 31.9* 32.0 - 36.0 g/dL Final     Platelets 06/01/2018 313.0  150.0 - 450.0 K/uL Final     INR 06/01/2018 1.1   Final    Comment: Adult Normal Range: 0.90 - 1.10  Therapeutic Range: 2.0 - 3.5                Assessment and Plan     1.Reent GI bleed, appears to have resolved. Normal stool " color at this time.  Have scheduled EGD for evaluation of upper GI bleed.  INR normal.   2. Recheck hemoglobin.   3. EGG done today - normal sinus with PACs.  Lateral ST-T changes consistent with ischemic change.   4. Patient refused lipids at this time since he said he has had them recently.  Would consider getting Liver function tests, cholesterol.  5. Consider dobutamine stress test for evaluation.  6. Chronic knee pain.   7. Possible ETOH abuse with elevated MCV.  8. Return for further evaluation (regular doctor) after completion of the EGD.   9. Hemoglobin now 9.1, consistent with history of bleeds.  No evidence for active bleeding at this time.      Options for treatment and follow-up care were reviewed with the patient and/or guardian. Aayush García and/or guardian engaged in the decision making process and verbalized understanding of the options discussed and agreed with the final plan.    Ramin Reynoso MD

## 2018-06-04 NOTE — PROGRESS NOTES
Please call patient and send results letter  I tried calling after clinic on 6/4/2018, but couldn't get ahold of patient. The hemoglobin was 9.1, but there is no evidence for ongoing bleeding.  The drop suggests that he bleed quite a lot.  If bleeding like this recurs, he should go to the ER..

## 2018-06-04 NOTE — MR AVS SNAPSHOT
After Visit Summary   2018    Aayush García    MRN: [id number]           Patient Information     Date Of Birth          1955        Visit Information        Provider Department      2018 4:00 PM Ramin Reynoso MD [facility]        Today's Diagnoses     Benign essential hypertension    -  1    Chronic pain of right knee        Hematochezia          Care Instructions    Referral for (Test): [facility]  Location/Place/Provider: [facility] [place] [address]   Date/Time:  @ 9:15 AM arrival   Phone: [phone]  Fax: [phone]  Additional information/prep.: Starting at 11:45 the night prior to the procedure (), clear liquids only.  The day of Procedure, nothing to eat or drink including water After 7:00 AM  Scheduled by: Liliya TINAJERO CMA                 Follow-ups after your visit        Who to contact     Please call your clinic at [phone] to:    Ask questions about your health    Make or cancel appointments    Discuss your medicines    Learn about your test results    Speak to your doctor            Additional Information About Your Visit        MyChart Information     [employer] is an electronic gateway that provides easy, online access to your medical records. With [employer], you can request a clinic appointment, read your test results, renew a prescription or communicate with your care team.     To sign up for [employer] visit the website at [email or web address]   You will be asked to enter the access code listed below, as well as some personal information. Please follow the directions to create your username and password.     Your access code is: [email or web address]  Expires: 2018  2:35 PM     Your access code will  in 90 days. If you need help or a new code, please contact your [facility] or call [phone] for assistance.        Care EveryWhere ID     This is your Care EveryWhere  "ID. This could be used by other organizations to access your [facility] medical records  [id number]        Your Vitals Were     Pulse Temperature Respirations Height Pulse Oximetry BMI (Body Mass Index)    50 98.5  F (36.9  C) (Oral) 18 5' 7\" (170.2 cm) 100% 29.29 kg/m2       Blood Pressure from Last 3 Encounters:   06/04/18 129/61   06/01/18 143/76   05/24/18 136/83    Weight from Last 3 Encounters:   06/04/18 187 lb (84.8 kg)   06/01/18 186 lb 6.4 oz (84.6 kg)   05/24/18 187 lb 12.8 oz (85.2 kg)              Today, you had the following     No orders found for display       Primary Care Provider Office Phone # Fax #    Carmine Yousuf Palla, [phone]       [place] [address]        Equal Access to Services     SOURAV GUTIÉRREZ : Hadii aad ku hadasho Soomaali, waaxda luqadaha, qaybta kaalmada adeegyada, waxay idiin haydamionn grey gibbs . So [facility] [phone].    ATENCIÓN: Si habla español, tiene a srivastava disposición servicios gratuitos de asistencia lingüística. Llame al [phone].    We comply with applicable federal civil rights laws and [place] laws. We do not discriminate on the basis of race, color, national origin, age, disability, sex, sexual orientation, or gender identity.            Thank you!     Thank you for choosing [facility]  for your care. Our goal is always to provide you with excellent care. Hearing back from our patients is one way we can continue to improve our services. Please take a few minutes to complete the written survey that you may receive in the mail after your visit with us. Thank you!             Your Updated Medication List - Protect others around you: Learn how to safely use, store and throw away your medicines at www.disposemymeds.org.          This list is accurate as of 6/4/18  4:53 PM.  Always use your most recent med list.                   Brand Name Dispense Instructions for use Diagnosis    " aspirin 81 MG EC tablet     90 tablet    Take 1 tablet (81 mg) by mouth daily    Benign essential hypertension       lisinopril 10 MG tablet    PRINIVIL/ZESTRIL    90 tablet    Take 1 tablet (10 mg) by mouth daily    Benign essential hypertension, Encounter for screening colonoscopy, Anemia, unspecified type       MULTIVITAMIN & MINERAL PO      Take 1 tablet by mouth daily        omeprazole 40 MG capsule    priLOSEC    30 capsule    Take 1 capsule (40 mg) by mouth daily Take 30-60 minutes before a meal.    Dark stools       order for DME     1 each    Equipment being ordered: Blood pressure instrument and cuff    Benign essential hypertension, Encounter for screening colonoscopy, Anemia, unspecified type       rosuvastatin 10 MG tablet    CRESTOR    30 tablet    Take 1 tablet (10 mg) by mouth daily    Benign essential hypertension

## 2018-06-04 NOTE — Clinical Note
Please give the patient a call and let him know: The hemoglobin has dropped to 9.1. This is low, but there is no evidence for active bleeding.  If bleeding recurs, go to Emergency Room.

## 2018-06-04 NOTE — LETTER
June 6, 2018      Aayush Grijalva[address] [place] [address]        Dear Aayush,    The hemoglobin has dropped to 9.1. This is low, but there is no evidence for active bleeding.  If bleeding recurs, go to Emergency Room.     Please see below for your test results.    Resulted Orders   CBC with Plt ([facility])   Result Value Ref Range    WBC 10.9 4.0 - 11.0 K/uL    WBC 10.0 4.0 - 11.0 K/uL    RBC 2.81 (L) 4.40 - 5.90 M/uL    RBC 2.67 (L) 4.40 - 5.90 M/uL    Hemoglobin 9.6 (L) 13.3 - 17.7 g/dL    Hemoglobin 9.1 (L) 13.3 - 17.7 g/dL    Hematocrit 29.4 (L) 40.0 - 53.0 %    Hematocrit 28.2 (L) 40.0 - 53.0 %    .7 (H) 78.0 - 100.0 fL    .7 (H) 78.0 - 100.0 fL    MCH 34.2 26.5 - 35.0 pg    MCH 34.1 26.5 - 35.0 pg    MCHC 32.7 32.0 - 36.0 g/dL    MCHC 32.3 32.0 - 36.0 g/dL    Platelets 274.0 150.0 - 450.0 K/uL    Platelets 257.0 150.0 - 450.0 K/uL    Narrative    Verbally informed Dr. Reynoso of low hgb. LK, CMA       If you have any questions, please call the clinic to make an appointment.    Sincerely,    Ramin Reynoso MD

## 2018-06-04 NOTE — PATIENT INSTRUCTIONS
Referral for (Test): [facility]  Location/Place/Provider: [facility] location [address] [place] [address]   Date/Time: June 7th @ 9:15 AM arrival   Phone: [phone]  Fax: [phone]  Additional information/prep.: Starting at 11:45 the night prior to the procedure (June 6th), clear liquids only.  The day of Procedure, nothing to eat or drink including water After 7:00 AM  Scheduled by: Liliya TINAJERO CMA

## 2018-06-07 ENCOUNTER — TRANSFERRED RECORDS (OUTPATIENT)
Dept: HEALTH INFORMATION MANAGEMENT | Facility: CLINIC | Age: 63
End: 2018-06-07

## 2018-06-14 NOTE — PROGRESS NOTES
Please call patient and send results letter  The endoscopy showed a deep peptic ulcer caused the bleeding. Please talk to your doctor about the results.

## 2018-07-18 ENCOUNTER — OFFICE VISIT (OUTPATIENT)
Dept: FAMILY MEDICINE | Facility: CLINIC | Age: 63
End: 2018-07-18
Payer: COMMERCIAL

## 2018-07-18 VITALS
DIASTOLIC BLOOD PRESSURE: 48 MMHG | RESPIRATION RATE: 18 BRPM | OXYGEN SATURATION: 98 % | HEART RATE: 131 BPM | SYSTOLIC BLOOD PRESSURE: 97 MMHG
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
## 2018-07-18 DIAGNOSIS — R25.1 TREMOR: ICD-10-CM

## 2018-07-18 NOTE — PROGRESS NOTES
"HPI    Aayush García is 62 year old yo male presenting for:    1. Fall at work  - date of the fall was Monday July 2, 2018  - patient was walking on the side walk and hit his toe on a crack on the side walk and lost his balance  - patient fell on the right side of his body   - patient's right arm, knee, and hip hit the floor first, followed by a bump on his head  - patient did not lose consciousness, did not have any AMS/confusion after the fall   - ambulance was called by patient's supervisor   - paramedics evaluated patient's vital signs, your EKG, and a mental status examination which, per patient, was all reported to be wnl  - patient was offered to be taken to ER - \"they told me it was up to me\" but patient decided not to go to ER  - before the fall, patient did not endorse any chest pain, shortness of breath, light headedness, dizziness, nor motor weakness  - today:   - \"feeling pretty good\"   - no headaches, no blurry vision   - no right sided arm, leg, nor hip pain   - no recent history of confusion or AMS     OBJECTIVE    Vitals  BP 97/48  Pulse 131  Resp 18  SpO2 98%    Physical Exam  General: No acute distress  Eyes: EOMI, PERRLA  Nose: nasal mucosa moist, no rhinorrhea  Neck: good ROM, supple, no apparent tracheal deviation  CV: RRR  Respiratory: CTA bilaterally, no wheezes/rhonchi/rhales appreciated, no respiratory distress  Chest wall: No chest wall tenderness  Back: no paraspinal tenderness, no spinal tenderness, no vertebral step offs appreciated  Abdomen: soft, non-distended, non-tender, normoactive bowel sounds  Extremities: no cyanosis, no edema, capillary refill <2 seconds, well perfused  Neuro: no focal deficits, mildly tremulous at baseline, was tremulous with gait as well, required wife to assist   Psych: appropriate affect    ASSESSMENT/PLAN    # Follow up after mechanical fall  - no further work up needed    # Tremors  - per patient, \"this is a bad day, highly unusual\"  - dad does have " history of Parkinson's   - patient wants return to work note today - advised I am concerned about giving return to work note   - follow up in 1 week and discuss tremors in further detail at that time        Precepted with Dr. Harry

## 2018-07-18 NOTE — MR AVS SNAPSHOT
After Visit Summary   2018    Aayush García    MRN: [id number]           Patient Information     Date Of Birth          1955        Visit Information        Provider Department      2018 9:40 AM Palla, Misbah Yousuf, MD [facility]        Today's Diagnoses     Accident due to mechanical fall without injury, initial encounter    -  1    Tremor           Follow-ups after your visit        Who to contact     Please call your clinic at [phone] to:    Ask questions about your health    Make or cancel appointments    Discuss your medicines    Learn about your test results    Speak to your doctor            Additional Information About Your Visit        MyChart Information     SaySwap is an electronic gateway that provides easy, online access to your medical records. With SaySwap, you can request a clinic appointment, read your test results, renew a prescription or communicate with your care team.     To sign up for [employer] visit the website at [email or web address]   You will be asked to enter the access code listed below, as well as some personal information. Please follow the directions to create your username and password.     Your access code is: [email or web address]  Expires: 2018  2:35 PM     Your access code will  in 90 days. If you need help or a new code, please contact your [facility] or call [phone] for assistance.        Care EveryWhere ID     This is your Care EveryWhere ID. This could be used by other organizations to access your [place] medical records  [id number]        Your Vitals Were     Pulse Respirations Pulse Oximetry             131 18 98%          Blood Pressure from Last 3 Encounters:   18 97/48   18 129/61   18 143/76    Weight from Last 3 Encounters:   18 187 lb (84.8 kg)   18 186 lb 6.4 oz (84.6 kg)   18 187 lb 12.8 oz (85.2 kg)              Today, you had the following      No orders found for display       Primary Care Provider Office Phone # Fax #    Carmine Yousuf Palla, [phone]       [address] [address]        Equal Access to Services     JUANA GUTIÉRREZ : Hadii mildred ku hadericho Soomaali, waaxda luqadaha, qaybta kaalmada adeegyada, waxjuice kovacsin sorinn merysurya morel sai conner. So [facility] [phone].    ATENCIÓN: Si habla español, tiene a srivastava disposición servicios gratuitos de asistencia lingüística. Llame al [phone].    We comply with applicable federal civil rights laws and [place] laws. We do not discriminate on the basis of race, color, national origin, age, disability, sex, sexual orientation, or gender identity.            Thank you!     Thank you for choosing [facility]  for your care. Our goal is always to provide you with excellent care. Hearing back from our patients is one way we can continue to improve our services. Please take a few minutes to complete the written survey that you may receive in the mail after your visit with us. Thank you!             Your Updated Medication List - Protect others around you: Learn how to safely use, store and throw away your medicines at www.disposemymeds.org.          This list is accurate as of 7/18/18 11:59 PM.  Always use your most recent med list.                   Brand Name Dispense Instructions for use Diagnosis    aspirin 81 MG EC tablet     90 tablet    Take 1 tablet (81 mg) by mouth daily    Benign essential hypertension       lisinopril 10 MG tablet    PRINIVIL/ZESTRIL    90 tablet    Take 1 tablet (10 mg) by mouth daily    Benign essential hypertension, Encounter for screening colonoscopy, Anemia, unspecified type       MULTIVITAMIN & MINERAL PO      Take 1 tablet by mouth daily        order for DME     1 each    Equipment being ordered: Blood pressure instrument and cuff    Benign essential hypertension, Encounter for screening colonoscopy,  Anemia, unspecified type       rosuvastatin 10 MG tablet    CRESTOR    30 tablet    Take 1 tablet (10 mg) by mouth daily    Benign essential hypertension

## 2018-07-18 NOTE — LETTER
[facility] [address] [place] [address]  Phone: [phone]  Fax: [phone]    July 18, 2018        Aayush García  [address] [place] [id number]          To whom it may concern:    RE: Aayush García    Patient was seen and treated today at our clinic.    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 This restriction applies until 7/25/2018 at which point his mobility will be re-evaluated.     Please contact me for questions or concerns.      Sincerely,        Misbah Y. Palla, MD

## 2018-07-18 NOTE — LETTER
[facility] [address] [place] [address]  Phone: [phone]  Fax: [phone]    July 18, 2018        Aayush García  [address] [place] [address]          To whom it may concern:    RE: Aayush García    Patient was seen and treated today at our clinic.    Patient may return to work on 7/19/2018 with the following. 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 He may ambulate short distances as needed with his walker. These restrictions apply until 7/25/2018 at which point his mobility will be re-assessed.     Please contact me for questions or concerns.      Sincerely,        Misbah Y. Palla, MD

## 2018-07-24 ENCOUNTER — HOSPITAL ENCOUNTER (INPATIENT)
Dept: MEDSURG UNIT | Facility: HOSPITAL | Age: 63
Discharge: NURSING, REHAB OR LONG-TERM CARE | End: 2018-08-15
Attending: EMERGENCY MEDICINE | Admitting: INTERNAL MEDICINE
Payer: COMMERCIAL

## 2018-07-24 DIAGNOSIS — R65.21 SEPTIC SHOCK (H): ICD-10-CM

## 2018-07-24 DIAGNOSIS — G93.41 ENCEPHALOPATHY, METABOLIC: ICD-10-CM

## 2018-07-24 DIAGNOSIS — A41.9 SEPTIC SHOCK (H): ICD-10-CM

## 2018-07-24 LAB
ALBUMIN SERPL-MCNC: 1.8 G/DL (ref 3.5–5)
ALP SERPL-CCNC: 907 U/L (ref 45–120)
ALT SERPL W P-5'-P-CCNC: 62 U/L (ref 0–45)
ANION GAP SERPL CALCULATED.3IONS-SCNC: 25 MMOL/L (ref 5–18)
APAP SERPL-MCNC: 10.9 UG/ML (ref 10–20)
AST SERPL W P-5'-P-CCNC: 292 U/L (ref 0–40)
BASE EXCESS BLDV CALC-SCNC: -14.4 MMOL/L
BASOPHILS # BLD AUTO: 0 THOU/UL (ref 0–0.2)
BASOPHILS NFR BLD AUTO: 0 % (ref 0–2)
BILIRUB SERPL-MCNC: 2.9 MG/DL (ref 0–1)
BUN SERPL-MCNC: 60 MG/DL (ref 8–22)
CALCIUM SERPL-MCNC: 8 MG/DL (ref 8.5–10.5)
CHLORIDE BLD-SCNC: 88 MMOL/L (ref 98–107)
CO2 SERPL-SCNC: 7 MMOL/L (ref 22–31)
CREAT SERPL-MCNC: 10.49 MG/DL (ref 0.7–1.3)
EOSINOPHIL # BLD AUTO: 0 THOU/UL (ref 0–0.4)
EOSINOPHIL NFR BLD AUTO: 0 % (ref 0–6)
ERYTHROCYTE [DISTWIDTH] IN BLOOD BY AUTOMATED COUNT: 16.5 % (ref 11–14.5)
ETHANOL SERPL-MCNC: 65 MG/DL
GFR SERPL CREATININE-BSD FRML MDRD: 5 ML/MIN/1.73M2
GLUCOSE BLD-MCNC: 104 MG/DL (ref 70–125)
HCO3, VENOUS, CALC - HISTORICAL: 12.7 MMOL/L (ref 24–30)
HCT VFR BLD AUTO: 26.5 % (ref 40–54)
HGB BLD-MCNC: 8.9 G/DL (ref 14–18)
INR PPP: 1.35 (ref 0.9–1.1)
LACTATE SERPL-SCNC: 7.1 MMOL/L (ref 0.5–2.2)
LIPASE SERPL-CCNC: 313 U/L (ref 0–52)
LYMPHOCYTES # BLD AUTO: 1.6 THOU/UL (ref 0.8–4.4)
LYMPHOCYTES NFR BLD AUTO: 8 % (ref 20–40)
MAGNESIUM SERPL-MCNC: 1.8 MG/DL (ref 1.8–2.6)
MCH RBC QN AUTO: 34.8 PG (ref 27–34)
MCHC RBC AUTO-ENTMCNC: 33.6 G/DL (ref 32–36)
MCV RBC AUTO: 104 FL (ref 80–100)
MONOCYTES # BLD AUTO: 1.7 THOU/UL (ref 0–0.9)
MONOCYTES NFR BLD AUTO: 8 % (ref 2–10)
NEUTROPHILS # BLD AUTO: 16.7 THOU/UL (ref 2–7.7)
NEUTROPHILS NFR BLD AUTO: 84 % (ref 50–70)
OXYHEMOGLOBIN (VBG) - HISTORICAL: 31.5 % (ref 70–75)
OXYHGB MFR BLDV: 32.3 % (ref 70–75)
PCO2 BLDV: 27 MM HG (ref 35–50)
PH BLDV: 7.24 [PH] (ref 7.35–7.45)
PLATELET # BLD AUTO: 174 THOU/UL (ref 140–440)
PMV BLD AUTO: 11 FL (ref 8.5–12.5)
PO2 BLDV: 20 MM HG (ref 25–47)
POTASSIUM BLD-SCNC: 5.7 MMOL/L (ref 3.5–5)
PROCALCITONIN SERPL-MCNC: 60.23 NG/ML (ref 0–0.49)
PROT SERPL-MCNC: 7.1 G/DL (ref 6–8)
RBC # BLD AUTO: 2.56 MILL/UL (ref 4.4–6.2)
SALICYLATE LEVEL - HISTORICAL: <8 MG/DL (ref 2–25)
SODIUM SERPL-SCNC: 120 MMOL/L (ref 136–145)
TROPONIN I SERPL-MCNC: 0.11 NG/ML (ref 0–0.29)
TSH SERPL DL<=0.005 MIU/L-ACNC: 1.45 UIU/ML (ref 0.3–5)
WBC: 20.2 THOU/UL (ref 4–11)

## 2018-07-24 ASSESSMENT — MIFFLIN-ST. JEOR: SCORE: 1612.73

## 2018-07-25 ENCOUNTER — ANESTHESIA - HEALTHEAST (OUTPATIENT)
Dept: INTENSIVE CARE | Facility: HOSPITAL | Age: 63
End: 2018-07-25

## 2018-07-25 ENCOUNTER — RECORDS - HEALTHEAST (OUTPATIENT)
Dept: INTENSIVE CARE | Facility: HOSPITAL | Age: 63
End: 2018-07-25

## 2018-07-25 LAB
ABO/RH(D): NORMAL
ALBUMIN SERPL-MCNC: 1.5 G/DL (ref 3.5–5)
ALBUMIN SERPL-MCNC: 1.6 G/DL (ref 3.5–5)
ALBUMIN SERPL-MCNC: 1.7 G/DL (ref 3.5–5)
ALBUMIN SERPL-MCNC: 2.1 G/DL (ref 3.5–5)
ALBUMIN UR-MCNC: ABNORMAL MG/DL
ALP SERPL-CCNC: 771 U/L (ref 45–120)
ALP SERPL-CCNC: 799 U/L (ref 45–120)
ALP SERPL-CCNC: 800 U/L (ref 45–120)
ALP SERPL-CCNC: 825 U/L (ref 45–120)
ALT SERPL W P-5'-P-CCNC: 54 U/L (ref 0–45)
ALT SERPL W P-5'-P-CCNC: 56 U/L (ref 0–45)
ALT SERPL W P-5'-P-CCNC: 59 U/L (ref 0–45)
ALT SERPL W P-5'-P-CCNC: 60 U/L (ref 0–45)
AMPHETAMINES UR QL SCN: NORMAL
ANION GAP SERPL CALCULATED.3IONS-SCNC: 15 MMOL/L (ref 5–18)
ANION GAP SERPL CALCULATED.3IONS-SCNC: 16 MMOL/L (ref 5–18)
ANION GAP SERPL CALCULATED.3IONS-SCNC: 20 MMOL/L (ref 5–18)
ANION GAP SERPL CALCULATED.3IONS-SCNC: 21 MMOL/L (ref 5–18)
ANTIBODY SCREEN: NEGATIVE
AORTIC VALVE MEAN VELOCITY: 177 CM/S
APAP SERPL-MCNC: 3.7 UG/ML (ref 10–20)
APPEARANCE UR: ABNORMAL
AST SERPL W P-5'-P-CCNC: 265 U/L (ref 0–40)
AST SERPL W P-5'-P-CCNC: 273 U/L (ref 0–40)
AST SERPL W P-5'-P-CCNC: 294 U/L (ref 0–40)
AST SERPL W P-5'-P-CCNC: 301 U/L (ref 0–40)
ATRIAL RATE - MUSE: 111 BPM
ATRIAL RATE - MUSE: 98 BPM
AV DIMENSIONLESS INDEX VTI: 0.4
AV MEAN GRADIENT: 15 MMHG
AV PEAK GRADIENT: 24.4 MMHG
AV VALVE AREA: 1.6 CM2
AV VELOCITY RATIO: 0.4
BACTERIA #/AREA URNS HPF: ABNORMAL HPF
BARBITURATES UR QL: NORMAL
BASE EXCESS BLDA CALC-SCNC: -10.1 MMOL/L
BASE EXCESS BLDA CALC-SCNC: -13.5 MMOL/L
BASE EXCESS BLDA CALC-SCNC: 3.2 MMOL/L
BASE EXCESS BLDV CALC-SCNC: -16 MMOL/L
BASOPHILS # BLD AUTO: 0 THOU/UL (ref 0–0.2)
BASOPHILS NFR BLD AUTO: 0 % (ref 0–2)
BENZODIAZ UR QL: NORMAL
BILIRUB SERPL-MCNC: 2.4 MG/DL (ref 0–1)
BILIRUB SERPL-MCNC: 2.6 MG/DL (ref 0–1)
BILIRUB SERPL-MCNC: 2.7 MG/DL (ref 0–1)
BILIRUB SERPL-MCNC: 2.7 MG/DL (ref 0–1)
BILIRUB UR QL STRIP: NEGATIVE
BSA FOR ECHO PROCEDURE: 2.02 M2
BUN SERPL-MCNC: 20 MG/DL (ref 8–22)
BUN SERPL-MCNC: 22 MG/DL (ref 8–22)
BUN SERPL-MCNC: 53 MG/DL (ref 8–22)
BUN SERPL-MCNC: 55 MG/DL (ref 8–22)
BUN SERPL-MCNC: 57 MG/DL (ref 8–22)
BUN SERPL-MCNC: 59 MG/DL (ref 8–22)
BUN SERPL-MCNC: 59 MG/DL (ref 8–22)
CALCIUM SERPL-MCNC: 7.3 MG/DL (ref 8.5–10.5)
CALCIUM SERPL-MCNC: 7.4 MG/DL (ref 8.5–10.5)
CALCIUM SERPL-MCNC: 7.4 MG/DL (ref 8.5–10.5)
CALCIUM SERPL-MCNC: 7.5 MG/DL (ref 8.5–10.5)
CALCIUM SERPL-MCNC: 7.5 MG/DL (ref 8.5–10.5)
CALCIUM SERPL-MCNC: 7.6 MG/DL (ref 8.5–10.5)
CALCIUM SERPL-MCNC: 7.7 MG/DL (ref 8.5–10.5)
CALCIUM, IONIZED MEASURED: 0.9 MMOL/L (ref 1.11–1.3)
CALCIUM, IONIZED MEASURED: 0.97 MMOL/L (ref 1.11–1.3)
CANNABINOIDS UR QL SCN: NORMAL
CHLORIDE BLD-SCNC: 85 MMOL/L (ref 98–107)
CHLORIDE BLD-SCNC: 86 MMOL/L (ref 98–107)
CHLORIDE BLD-SCNC: 86 MMOL/L (ref 98–107)
CHLORIDE BLD-SCNC: 91 MMOL/L (ref 98–107)
CHLORIDE BLD-SCNC: 92 MMOL/L (ref 98–107)
CHLORIDE BLD-SCNC: 92 MMOL/L (ref 98–107)
CHLORIDE BLD-SCNC: 93 MMOL/L (ref 98–107)
CK SERPL-CCNC: 606 U/L (ref 30–190)
CK SERPL-CCNC: 649 U/L (ref 30–190)
CK-MB: 24 NG/ML (ref 0–7)
CO2 SERPL-SCNC: 14 MMOL/L (ref 22–31)
CO2 SERPL-SCNC: 21 MMOL/L (ref 22–31)
CO2 SERPL-SCNC: 22 MMOL/L (ref 22–31)
CO2 SERPL-SCNC: 9 MMOL/L (ref 22–31)
COCAINE UR QL: NORMAL
COHGB MFR BLD: 98.9 % (ref 96–97)
COHGB MFR BLD: 99.7 % (ref 96–97)
COHGB MFR BLD: 99.9 % (ref 96–97)
COLOR UR AUTO: YELLOW
CREAT SERPL-MCNC: 10.18 MG/DL (ref 0.7–1.3)
CREAT SERPL-MCNC: 3.65 MG/DL (ref 0.7–1.3)
CREAT SERPL-MCNC: 4.35 MG/DL (ref 0.7–1.3)
CREAT SERPL-MCNC: 8.76 MG/DL (ref 0.7–1.3)
CREAT SERPL-MCNC: 9.44 MG/DL (ref 0.7–1.3)
CREAT SERPL-MCNC: 9.44 MG/DL (ref 0.7–1.3)
CREAT SERPL-MCNC: 9.67 MG/DL (ref 0.7–1.3)
CREAT UR-MCNC: 17.7 MG/DL
CV BLOOD PRESSURE: NORMAL MMHG
CV ECHO HEIGHT: 68 IN
CV ECHO WEIGHT: 193 LBS
DIASTOLIC BLOOD PRESSURE - MUSE: NORMAL MMHG
DIASTOLIC BLOOD PRESSURE - MUSE: NORMAL MMHG
DOP CALC AO PEAK VEL: 247 CM/S
DOP CALC AO VTI: 42.2 CM
DOP CALC LVOT AREA: 3.8 CM2
DOP CALC LVOT DIAMETER: 2.2 CM
DOP CALC LVOT PEAK VEL: 86.5 CM/S
DOP CALC LVOT STROKE VOLUME: 66.1 CM3
DOP CALCLVOT PEAK VEL VTI: 17.4 CM
EJECTION FRACTION: 43 % (ref 55–75)
EOSINOPHIL # BLD AUTO: 0 THOU/UL (ref 0–0.4)
EOSINOPHIL NFR BLD AUTO: 0 % (ref 0–6)
ERYTHROCYTE [DISTWIDTH] IN BLOOD BY AUTOMATED COUNT: 16.2 % (ref 11–14.5)
ERYTHROCYTE [DISTWIDTH] IN BLOOD BY AUTOMATED COUNT: 16.2 % (ref 11–14.5)
ETHANOL UR CFM-MCNC: <10 MG/DL
FOLATE SERPL-MCNC: 7.9 NG/ML
FRACTIONAL SHORTENING: 19.7 % (ref 28–44)
GFR SERPL CREATININE-BSD FRML MDRD: 14 ML/MIN/1.73M2
GFR SERPL CREATININE-BSD FRML MDRD: 17 ML/MIN/1.73M2
GFR SERPL CREATININE-BSD FRML MDRD: 5 ML/MIN/1.73M2
GFR SERPL CREATININE-BSD FRML MDRD: 6 ML/MIN/1.73M2
GLUCOSE BLD-MCNC: 113 MG/DL (ref 70–125)
GLUCOSE BLD-MCNC: 116 MG/DL (ref 70–125)
GLUCOSE BLD-MCNC: 132 MG/DL (ref 70–125)
GLUCOSE BLD-MCNC: 290 MG/DL (ref 70–125)
GLUCOSE BLD-MCNC: 290 MG/DL (ref 70–125)
GLUCOSE BLD-MCNC: 307 MG/DL (ref 70–125)
GLUCOSE BLD-MCNC: 84 MG/DL (ref 70–125)
GLUCOSE BLDC GLUCOMTR-MCNC: 103 MG/DL
GLUCOSE BLDC GLUCOMTR-MCNC: 109 MG/DL
GLUCOSE BLDC GLUCOMTR-MCNC: 116 MG/DL
GLUCOSE BLDC GLUCOMTR-MCNC: 120 MG/DL
GLUCOSE BLDC GLUCOMTR-MCNC: 124 MG/DL
GLUCOSE BLDC GLUCOMTR-MCNC: 132 MG/DL
GLUCOSE BLDC GLUCOMTR-MCNC: 136 MG/DL
GLUCOSE BLDC GLUCOMTR-MCNC: 142 MG/DL
GLUCOSE BLDC GLUCOMTR-MCNC: 156 MG/DL
GLUCOSE BLDC GLUCOMTR-MCNC: 180 MG/DL
GLUCOSE BLDC GLUCOMTR-MCNC: 194 MG/DL
GLUCOSE BLDC GLUCOMTR-MCNC: 216 MG/DL
GLUCOSE BLDC GLUCOMTR-MCNC: 223 MG/DL
GLUCOSE BLDC GLUCOMTR-MCNC: 224 MG/DL
GLUCOSE BLDC GLUCOMTR-MCNC: 244 MG/DL
GLUCOSE BLDC GLUCOMTR-MCNC: 279 MG/DL
GLUCOSE BLDC GLUCOMTR-MCNC: 331 MG/DL
GLUCOSE BLDC GLUCOMTR-MCNC: 332 MG/DL
GLUCOSE UR STRIP-MCNC: ABNORMAL MG/DL
HAPTOGLOB SERPL-MCNC: 189 MG/DL (ref 33–171)
HBV SURFACE AG SERPL QL IA: NEGATIVE
HCO3, ARTERIAL CALC - HISTORICAL: 14.4 MMOL/L (ref 23–29)
HCO3, ARTERIAL CALC - HISTORICAL: 17.1 MMOL/L (ref 23–29)
HCO3, ARTERIAL CALC - HISTORICAL: 27.5 MMOL/L (ref 23–29)
HCO3, VENOUS, CALC - HISTORICAL: 11.4 MMOL/L (ref 24–30)
HCT VFR BLD AUTO: 24.2 % (ref 40–54)
HCT VFR BLD AUTO: 24.2 % (ref 40–54)
HGB BLD-MCNC: 7.7 G/DL (ref 14–18)
HGB BLD-MCNC: 7.7 G/DL (ref 14–18)
HGB BLD-MCNC: 8 G/DL (ref 14–18)
HGB BLD-MCNC: 8 G/DL (ref 14–18)
HGB UR QL STRIP: ABNORMAL
INR PPP: 1.48 (ref 0.9–1.1)
INR PPP: 1.64 (ref 0.9–1.1)
INTERPRETATION ECG - MUSE: NORMAL
INTERPRETATION ECG - MUSE: NORMAL
INTERVENTRICULAR SEPTUM IN END DIASTOLE: 1.51 CM (ref 0.6–1)
ION CA PH 7.4: 0.93 MMOL/L (ref 1.11–1.3)
ION CA PH 7.4: 0.94 MMOL/L (ref 1.11–1.3)
IVS/PW RATIO: 1.1
KETONES UR STRIP-MCNC: NEGATIVE MG/DL
LA AREA 1: 22 CM2
LA AREA 2: 24.6 CM2
LAB AP CHARGES (HE HISTORICAL CONVERSION): NORMAL
LACTATE SERPL-SCNC: 3.7 MMOL/L (ref 0.5–2.2)
LACTATE SERPL-SCNC: 3.8 MMOL/L (ref 0.5–2.2)
LACTATE SERPL-SCNC: 6.8 MMOL/L (ref 0.5–2.2)
LACTATE SERPL-SCNC: 6.9 MMOL/L (ref 0.5–2.2)
LACTATE SERPL-SCNC: 8 MMOL/L (ref 0.5–2.2)
LDH SERPL L TO P-CCNC: 639 U/L (ref 125–220)
LEFT ATRIUM LENGTH: 5.8 CM
LEFT ATRIUM VOLUME INDEX: 39.3 ML/M2
LEFT ATRIUM VOLUME: 79.3 ML
LEFT VENTRICLE CARDIAC INDEX: 3 L/MIN/M2
LEFT VENTRICLE CARDIAC OUTPUT: 6.1 L/MIN
LEFT VENTRICLE DIASTOLIC VOLUME INDEX: 99 CM3/M2 (ref 34–74)
LEFT VENTRICLE DIASTOLIC VOLUME: 200 CM3 (ref 62–150)
LEFT VENTRICLE HEART RATE: 92 BPM
LEFT VENTRICLE MASS INDEX: 180.3 G/M2
LEFT VENTRICLE SYSTOLIC VOLUME INDEX: 56.9 CM3/M2 (ref 11–31)
LEFT VENTRICLE SYSTOLIC VOLUME: 115 CM3 (ref 21–61)
LEFT VENTRICULAR INTERNAL DIMENSION IN DIASTOLE: 5.57 CM (ref 4.2–5.8)
LEFT VENTRICULAR INTERNAL DIMENSION IN SYSTOLE: 4.47 CM (ref 2.5–4)
LEFT VENTRICULAR MASS: 364.2 G
LEFT VENTRICULAR OUTFLOW TRACT MEAN GRADIENT: 2 MMHG
LEFT VENTRICULAR OUTFLOW TRACT MEAN VELOCITY: 60.6 CM/S
LEFT VENTRICULAR OUTFLOW TRACT PEAK GRADIENT: 3 MMHG
LEFT VENTRICULAR POSTERIOR WALL IN END DIASTOLE: 1.4 CM (ref 0.6–1)
LEUKOCYTE ESTERASE UR QL STRIP: ABNORMAL
LIPASE SERPL-CCNC: 256 U/L (ref 0–52)
LV STROKE VOLUME INDEX: 32.7 ML/M2
LYMPHOCYTES # BLD AUTO: 1.4 THOU/UL (ref 0.8–4.4)
LYMPHOCYTES NFR BLD AUTO: 7 % (ref 20–40)
MAGNESIUM SERPL-MCNC: 1.4 MG/DL (ref 1.8–2.6)
MAGNESIUM SERPL-MCNC: 1.5 MG/DL (ref 1.8–2.6)
MCH RBC QN AUTO: 34.6 PG (ref 27–34)
MCH RBC QN AUTO: 34.6 PG (ref 27–34)
MCHC RBC AUTO-ENTMCNC: 33.1 G/DL (ref 32–36)
MCHC RBC AUTO-ENTMCNC: 33.1 G/DL (ref 32–36)
MCV RBC AUTO: 105 FL (ref 80–100)
MCV RBC AUTO: 105 FL (ref 80–100)
METHADONE UR QL SCN: NORMAL
MITRAL VALVE E/A RATIO: 0.6
MONOCYTES # BLD AUTO: 1.8 THOU/UL (ref 0–0.9)
MONOCYTES NFR BLD AUTO: 8 % (ref 2–10)
MV AVERAGE E/E' RATIO: 9.6 CM/S
MV DECELERATION TIME: 208 MS
MV E'TISSUE VEL-LAT: 6.67 CM/S
MV E'TISSUE VEL-MED: 6.67 CM/S
MV LATERAL E/E' RATIO: 9.6
MV MEDIAL E/E' RATIO: 9.6
MV PEAK A VELOCITY: 99 CM/S
MV PEAK E VELOCITY: 64 CM/S
NEUTROPHILS # BLD AUTO: 18.4 THOU/UL (ref 2–7.7)
NEUTROPHILS NFR BLD AUTO: 85 % (ref 50–70)
NITRATE UR QL: NEGATIVE
NUC REST DIASTOLIC VOLUME INDEX: 3088 LBS
NUC REST SYSTOLIC VOLUME INDEX: 68 IN
O2/TOTAL GAS SETTING VFR VENT: 0.25 %
O2/TOTAL GAS SETTING VFR VENT: 30 %
O2/TOTAL GAS SETTING VFR VENT: 40 %
OPIATES UR QL SCN: NORMAL
OSMOLALITY SERPL: 285 MOSM/KG (ref 270–300)
OXYCODONE UR QL: NORMAL
OXYHEMOGLOBIN (VBG) - HISTORICAL: 32 % (ref 70–75)
OXYHEMOGLOBIN - HISTORICAL: 96.5 % (ref 96–97)
OXYHEMOGLOBIN - HISTORICAL: 96.8 % (ref 96–97)
OXYHEMOGLOBIN - HISTORICAL: 97.4 % (ref 96–97)
OXYHGB MFR BLDV: 32.6 % (ref 70–75)
P AXIS - MUSE: 18 DEGREES
P AXIS - MUSE: 40 DEGREES
PATH REPORT.COMMENTS IMP SPEC: NORMAL
PATH REPORT.COMMENTS IMP SPEC: NORMAL
PATH REPORT.FINAL DX SPEC: NORMAL
PATH REPORT.MICROSCOPIC SPEC OTHER STN: ABNORMAL
PATH REPORT.MICROSCOPIC SPEC OTHER STN: NORMAL
PATH REPORT.RELEVANT HX SPEC: NORMAL
PCO2 BLD: 28 MM HG (ref 35–45)
PCO2 BLD: 30 MM HG (ref 35–45)
PCO2 BLD: 37 MM HG (ref 35–45)
PCO2 BLDV: 27 MM HG (ref 35–50)
PCP UR QL SCN: NORMAL
PEEP: 5 CM H2O
PH BLD: 7.18 [PH] (ref 7.37–7.44)
PH BLD: 7.33 [PH] (ref 7.37–7.44)
PH BLD: 7.54 [PH] (ref 7.37–7.44)
PH BLDV: 7.2 [PH] (ref 7.35–7.45)
PH UR STRIP: 7.5 [PH] (ref 4.5–8)
PH: 7.29 (ref 7.35–7.45)
PH: 7.5 (ref 7.35–7.45)
PLATELET # BLD AUTO: 169 THOU/UL (ref 140–440)
PLATELET # BLD AUTO: 169 THOU/UL (ref 140–440)
PMV BLD AUTO: 10.7 FL (ref 8.5–12.5)
PMV BLD AUTO: 10.7 FL (ref 8.5–12.5)
PO2 BLD: 102 MM HG (ref 80–90)
PO2 BLD: 93 MM HG (ref 80–90)
PO2 BLD: 97 MM HG (ref 80–90)
PO2 BLDV: 21 MM HG (ref 25–47)
POTASSIUM BLD-SCNC: 3.3 MMOL/L (ref 3.5–5)
POTASSIUM BLD-SCNC: 3.6 MMOL/L (ref 3.5–5)
POTASSIUM BLD-SCNC: 4.6 MMOL/L (ref 3.5–5)
POTASSIUM BLD-SCNC: 5 MMOL/L (ref 3.5–5)
POTASSIUM BLD-SCNC: 5 MMOL/L (ref 3.5–5)
POTASSIUM BLD-SCNC: 5.3 MMOL/L (ref 3.5–5)
POTASSIUM BLD-SCNC: 5.5 MMOL/L (ref 3.5–5)
POTASSIUM BLD-SCNC: 6.5 MMOL/L (ref 3.5–5)
PR INTERVAL - MUSE: 138 MS
PR INTERVAL - MUSE: 160 MS
PROT SERPL-MCNC: 6 G/DL (ref 6–8)
PROT SERPL-MCNC: 6.2 G/DL (ref 6–8)
PROT SERPL-MCNC: 6.4 G/DL (ref 6–8)
PROT SERPL-MCNC: 6.4 G/DL (ref 6–8)
PSV (LAB BLOOD GAS): 10 CM H2O
QRS DURATION - MUSE: 106 MS
QRS DURATION - MUSE: 94 MS
QT - MUSE: 360 MS
QT - MUSE: 382 MS
QTC - MUSE: 487 MS
QTC - MUSE: 489 MS
R AXIS - MUSE: -32 DEGREES
R AXIS - MUSE: -37 DEGREES
RATE: 16 RR/MIN
RATE: 24 RR/MIN
RATE: 28 RR/MIN
RBC # BLD AUTO: 2.31 MILL/UL (ref 4.4–6.2)
RBC # BLD AUTO: 2.31 MILL/UL (ref 4.4–6.2)
RBC #/AREA URNS AUTO: ABNORMAL HPF
SALICYLATE LEVEL - HISTORICAL: <8 MG/DL (ref 2–25)
SODIUM SERPL-SCNC: 120 MMOL/L (ref 136–145)
SODIUM SERPL-SCNC: 121 MMOL/L (ref 136–145)
SODIUM SERPL-SCNC: 121 MMOL/L (ref 136–145)
SODIUM SERPL-SCNC: 122 MMOL/L (ref 136–145)
SODIUM SERPL-SCNC: 125 MMOL/L (ref 136–145)
SODIUM SERPL-SCNC: 128 MMOL/L (ref 136–145)
SODIUM SERPL-SCNC: 131 MMOL/L (ref 136–145)
SP GR UR STRIP: 1.01 (ref 1–1.03)
SQUAMOUS #/AREA URNS AUTO: ABNORMAL LPF
SYSTOLIC BLOOD PRESSURE - MUSE: NORMAL MMHG
SYSTOLIC BLOOD PRESSURE - MUSE: NORMAL MMHG
T AXIS - MUSE: 35 DEGREES
T AXIS - MUSE: 46 DEGREES
TEMPERATURE: 37 DEGREES C
TRICUSPID VALVE ANULAR PLANE SYSTOLIC EXCURSION: 2.5 CM
TROPONIN I SERPL-MCNC: 0.51 NG/ML (ref 0–0.29)
TROPONIN I SERPL-MCNC: 4.2 NG/ML (ref 0–0.29)
TROPONIN I SERPL-MCNC: 5.17 NG/ML (ref 0–0.29)
TROPONIN I SERPL-MCNC: 8.47 NG/ML (ref 0–0.29)
UROBILINOGEN UR STRIP-ACNC: ABNORMAL
VENTILATION MODE: ABNORMAL
VENTILATOR TIDAL VOLUME: 550 ML
VENTILATOR TIDAL VOLUME: 550 ML
VENTRICULAR RATE- MUSE: 111 BPM
VENTRICULAR RATE- MUSE: 98 BPM
VIT B12 SERPL-MCNC: >2000 PG/ML (ref 213–816)
WBC #/AREA URNS AUTO: ABNORMAL HPF
WBC: 21.4 THOU/UL (ref 4–11)
WBC: 21.4 THOU/UL (ref 4–11)

## 2018-07-26 LAB
ALBUMIN SERPL-MCNC: 2.2 G/DL (ref 3.5–5)
ALBUMIN SERPL-MCNC: 2.5 G/DL (ref 3.5–5)
ALP SERPL-CCNC: 628 U/L (ref 45–120)
ALP SERPL-CCNC: 654 U/L (ref 45–120)
ALT SERPL W P-5'-P-CCNC: 44 U/L (ref 0–45)
ALT SERPL W P-5'-P-CCNC: 44 U/L (ref 0–45)
ANION GAP SERPL CALCULATED.3IONS-SCNC: 13 MMOL/L (ref 5–18)
ANION GAP SERPL CALCULATED.3IONS-SCNC: 14 MMOL/L (ref 5–18)
ANION GAP SERPL CALCULATED.3IONS-SCNC: 15 MMOL/L (ref 5–18)
AST SERPL W P-5'-P-CCNC: 218 U/L (ref 0–40)
AST SERPL W P-5'-P-CCNC: 239 U/L (ref 0–40)
BASE EXCESS BLDA CALC-SCNC: -1.5 MMOL/L
BASE EXCESS BLDA CALC-SCNC: -1.7 MMOL/L
BASOPHILS # BLD AUTO: 0 THOU/UL (ref 0–0.2)
BASOPHILS NFR BLD AUTO: 0 % (ref 0–2)
BILIRUB DIRECT SERPL-MCNC: 1.6 MG/DL
BILIRUB SERPL-MCNC: 2.7 MG/DL (ref 0–1)
BILIRUB SERPL-MCNC: 2.7 MG/DL (ref 0–1)
BUN SERPL-MCNC: 12 MG/DL (ref 8–22)
BUN SERPL-MCNC: 23 MG/DL (ref 8–22)
BUN SERPL-MCNC: 24 MG/DL (ref 8–22)
CALCIUM SERPL-MCNC: 7.5 MG/DL (ref 8.5–10.5)
CALCIUM SERPL-MCNC: 7.6 MG/DL (ref 8.5–10.5)
CALCIUM SERPL-MCNC: 8 MG/DL (ref 8.5–10.5)
CALCIUM, IONIZED MEASURED: 0.98 MMOL/L (ref 1.11–1.3)
CALCIUM, IONIZED MEASURED: 1 MMOL/L (ref 1.11–1.3)
CHLORIDE BLD-SCNC: 100 MMOL/L (ref 98–107)
CHLORIDE BLD-SCNC: 90 MMOL/L (ref 98–107)
CHLORIDE BLD-SCNC: 91 MMOL/L (ref 98–107)
CK SERPL-CCNC: 762 U/L (ref 30–190)
CO2 SERPL-SCNC: 20 MMOL/L (ref 22–31)
COHGB MFR BLD: 98.6 % (ref 96–97)
COHGB MFR BLD: 99 % (ref 96–97)
CREAT SERPL-MCNC: 2.91 MG/DL (ref 0.7–1.3)
CREAT SERPL-MCNC: 4.92 MG/DL (ref 0.7–1.3)
CREAT SERPL-MCNC: 5.09 MG/DL (ref 0.7–1.3)
EOSINOPHIL # BLD AUTO: 0 THOU/UL (ref 0–0.4)
EOSINOPHIL NFR BLD AUTO: 0 % (ref 0–6)
ERYTHROCYTE [DISTWIDTH] IN BLOOD BY AUTOMATED COUNT: 15.6 % (ref 11–14.5)
FLOW: 40 LPM
GFR SERPL CREATININE-BSD FRML MDRD: 12 ML/MIN/1.73M2
GFR SERPL CREATININE-BSD FRML MDRD: 12 ML/MIN/1.73M2
GFR SERPL CREATININE-BSD FRML MDRD: 22 ML/MIN/1.73M2
GLUCOSE BLD-MCNC: 121 MG/DL (ref 70–125)
GLUCOSE BLD-MCNC: 171 MG/DL (ref 70–125)
GLUCOSE BLD-MCNC: 175 MG/DL (ref 70–125)
GLUCOSE BLDC GLUCOMTR-MCNC: 119 MG/DL
GLUCOSE BLDC GLUCOMTR-MCNC: 127 MG/DL
GLUCOSE BLDC GLUCOMTR-MCNC: 136 MG/DL
GLUCOSE BLDC GLUCOMTR-MCNC: 136 MG/DL
GLUCOSE BLDC GLUCOMTR-MCNC: 148 MG/DL
GLUCOSE BLDC GLUCOMTR-MCNC: 166 MG/DL
GLUCOSE BLDC GLUCOMTR-MCNC: 182 MG/DL
GLUCOSE BLDC GLUCOMTR-MCNC: 189 MG/DL
GLUCOSE BLDC GLUCOMTR-MCNC: 190 MG/DL
GLUCOSE BLDC GLUCOMTR-MCNC: 192 MG/DL
GLUCOSE BLDC GLUCOMTR-MCNC: 194 MG/DL
GLUCOSE BLDC GLUCOMTR-MCNC: 205 MG/DL
HCO3, ARTERIAL CALC - HISTORICAL: 23.6 MMOL/L (ref 23–29)
HCO3, ARTERIAL CALC - HISTORICAL: 23.8 MMOL/L (ref 23–29)
HCT VFR BLD AUTO: 20.3 % (ref 40–54)
HGB BLD-MCNC: 7 G/DL (ref 14–18)
HGB BLD-MCNC: 7.1 G/DL (ref 14–18)
HGB BLD-MCNC: 7.1 G/DL (ref 14–18)
INR PPP: 1.69 (ref 0.9–1.1)
INR PPP: 1.84 (ref 0.9–1.1)
ION CA PH 7.4: 1 MMOL/L (ref 1.11–1.3)
ION CA PH 7.4: 1.01 MMOL/L (ref 1.11–1.3)
LACTATE SERPL-SCNC: 2 MMOL/L (ref 0.5–2.2)
LACTATE SERPL-SCNC: 2.7 MMOL/L (ref 0.5–2.2)
LYMPHOCYTES # BLD AUTO: 1 THOU/UL (ref 0.8–4.4)
LYMPHOCYTES NFR BLD AUTO: 8 % (ref 20–40)
MAGNESIUM SERPL-MCNC: 1.7 MG/DL (ref 1.8–2.6)
MAGNESIUM SERPL-MCNC: 2.4 MG/DL (ref 1.8–2.6)
MCH RBC QN AUTO: 34.6 PG (ref 27–34)
MCHC RBC AUTO-ENTMCNC: 35 G/DL (ref 32–36)
MCV RBC AUTO: 99 FL (ref 80–100)
MONOCYTES # BLD AUTO: 1 THOU/UL (ref 0–0.9)
MONOCYTES NFR BLD AUTO: 7 % (ref 2–10)
NEUTROPHILS # BLD AUTO: 11.6 THOU/UL (ref 2–7.7)
NEUTROPHILS NFR BLD AUTO: 85 % (ref 50–70)
O2/TOTAL GAS SETTING VFR VENT: 35 %
OXYHEMOGLOBIN - HISTORICAL: 96.1 % (ref 96–97)
OXYHEMOGLOBIN - HISTORICAL: 96.7 % (ref 96–97)
PCO2 BLD: 31 MM HG (ref 35–45)
PCO2 BLD: 36 MM HG (ref 35–45)
PEEP: 5 CM H2O
PH BLD: 7.41 [PH] (ref 7.37–7.44)
PH BLD: 7.46 [PH] (ref 7.37–7.44)
PH: 7.43 (ref 7.35–7.45)
PH: 7.45 (ref 7.35–7.45)
PLATELET # BLD AUTO: 122 THOU/UL (ref 140–440)
PMV BLD AUTO: 10.9 FL (ref 8.5–12.5)
PO2 BLD: 72 MM HG (ref 80–90)
PO2 BLD: 75 MM HG (ref 80–90)
POTASSIUM BLD-SCNC: 4 MMOL/L (ref 3.5–5)
POTASSIUM BLD-SCNC: 4 MMOL/L (ref 3.5–5)
POTASSIUM BLD-SCNC: 4.4 MMOL/L (ref 3.5–5)
PROT SERPL-MCNC: 5.7 G/DL (ref 6–8)
PROT SERPL-MCNC: 6 G/DL (ref 6–8)
RATE: 20 RR/MIN
RBC # BLD AUTO: 2.05 MILL/UL (ref 4.4–6.2)
SODIUM SERPL-SCNC: 125 MMOL/L (ref 136–145)
SODIUM SERPL-SCNC: 133 MMOL/L (ref 136–145)
TEMPERATURE: 37 DEGREES C
TEMPERATURE: 37 DEGREES C
TROPONIN I SERPL-MCNC: 7.61 NG/ML (ref 0–0.29)
VANCOMYCIN SERPL-MCNC: 20.2 UG/ML
VENTILATION MODE: ABNORMAL
VENTILATION MODE: ABNORMAL
VENTILATOR TIDAL VOLUME: 550 ML
WBC: 13.7 THOU/UL (ref 4–11)

## 2018-07-26 ASSESSMENT — MIFFLIN-ST. JEOR: SCORE: 1727.5

## 2018-07-27 LAB
ALBUMIN SERPL-MCNC: 2.9 G/DL (ref 3.5–5)
ALP SERPL-CCNC: 594 U/L (ref 45–120)
ALT SERPL W P-5'-P-CCNC: 45 U/L (ref 0–45)
ANION GAP SERPL CALCULATED.3IONS-SCNC: 12 MMOL/L (ref 5–18)
AST SERPL W P-5'-P-CCNC: 231 U/L (ref 0–40)
ATRIAL RATE - MUSE: 81 BPM
BACTERIA SPEC CULT: NO GROWTH
BACTERIA SPEC CULT: NORMAL
BASE EXCESS BLDA CALC-SCNC: -2.6 MMOL/L
BILIRUB SERPL-MCNC: 2.9 MG/DL (ref 0–1)
BUN SERPL-MCNC: 21 MG/DL (ref 8–22)
CALCIUM SERPL-MCNC: 7.8 MG/DL (ref 8.5–10.5)
CHLORIDE BLD-SCNC: 99 MMOL/L (ref 98–107)
CK SERPL-CCNC: 1612 U/L (ref 30–190)
CO2 SERPL-SCNC: 21 MMOL/L (ref 22–31)
COHGB MFR BLD: 96.1 % (ref 96–97)
CREAT SERPL-MCNC: 4.01 MG/DL (ref 0.7–1.3)
DIASTOLIC BLOOD PRESSURE - MUSE: NORMAL MMHG
ERYTHROCYTE [DISTWIDTH] IN BLOOD BY AUTOMATED COUNT: 16.4 % (ref 11–14.5)
GFR SERPL CREATININE-BSD FRML MDRD: 15 ML/MIN/1.73M2
GLUCOSE BLD-MCNC: 124 MG/DL (ref 70–125)
GLUCOSE BLDC GLUCOMTR-MCNC: 105 MG/DL
GLUCOSE BLDC GLUCOMTR-MCNC: 113 MG/DL
GLUCOSE BLDC GLUCOMTR-MCNC: 124 MG/DL
GLUCOSE BLDC GLUCOMTR-MCNC: 128 MG/DL
GLUCOSE BLDC GLUCOMTR-MCNC: 149 MG/DL
GRAM STAIN RESULT: NORMAL
HCO3, ARTERIAL CALC - HISTORICAL: 22.9 MMOL/L (ref 23–29)
HCT VFR BLD AUTO: 20.1 % (ref 40–54)
HEMOCCULT STL QL: POSITIVE
HGB BLD-MCNC: 6.8 G/DL (ref 14–18)
HGB BLD-MCNC: 8.2 G/DL (ref 14–18)
INR PPP: 1.65 (ref 0.9–1.1)
INTERPRETATION ECG - MUSE: NORMAL
MAGNESIUM SERPL-MCNC: 2.4 MG/DL (ref 1.8–2.6)
MCH RBC QN AUTO: 34.5 PG (ref 27–34)
MCHC RBC AUTO-ENTMCNC: 33.8 G/DL (ref 32–36)
MCV RBC AUTO: 102 FL (ref 80–100)
O2/TOTAL GAS SETTING VFR VENT: 80 %
OXYHEMOGLOBIN - HISTORICAL: 93.3 % (ref 96–97)
P AXIS - MUSE: 20 DEGREES
PCO2 BLD: 40 MM HG (ref 35–45)
PH BLD: 7.36 [PH] (ref 7.37–7.44)
PHOSPHATE SERPL-MCNC: 3.5 MG/DL (ref 2.5–4.5)
PLATELET # BLD AUTO: 127 THOU/UL (ref 140–440)
PLATELET # BLD AUTO: 133 THOU/UL (ref 140–440)
PMV BLD AUTO: 11.6 FL (ref 8.5–12.5)
PO2 BLD: 66 MM HG (ref 80–90)
POTASSIUM BLD-SCNC: 4.6 MMOL/L (ref 3.5–5)
PR INTERVAL - MUSE: 134 MS
PROT SERPL-MCNC: 6 G/DL (ref 6–8)
QRS DURATION - MUSE: 94 MS
QT - MUSE: 442 MS
QTC - MUSE: 513 MS
R AXIS - MUSE: -28 DEGREES
RBC # BLD AUTO: 1.97 MILL/UL (ref 4.4–6.2)
SODIUM SERPL-SCNC: 132 MMOL/L (ref 136–145)
SYSTOLIC BLOOD PRESSURE - MUSE: NORMAL MMHG
T AXIS - MUSE: 117 DEGREES
TEMPERATURE: 37 DEGREES C
VENTILATION MODE: ABNORMAL
VENTRICULAR RATE- MUSE: 81 BPM
WBC: 15.1 THOU/UL (ref 4–11)

## 2018-07-27 ASSESSMENT — MIFFLIN-ST. JEOR: SCORE: 1717.5

## 2018-07-28 LAB
ALBUMIN SERPL-MCNC: 2.9 G/DL (ref 3.5–5)
ALP SERPL-CCNC: 771 U/L (ref 45–120)
ALT SERPL W P-5'-P-CCNC: 72 U/L (ref 0–45)
ANION GAP SERPL CALCULATED.3IONS-SCNC: 14 MMOL/L (ref 5–18)
AST SERPL W P-5'-P-CCNC: 337 U/L (ref 0–40)
ATRIAL RATE - MUSE: 94 BPM
BASOPHILS # BLD AUTO: 0 THOU/UL (ref 0–0.2)
BASOPHILS NFR BLD AUTO: 0 % (ref 0–2)
BILIRUB SERPL-MCNC: 4.6 MG/DL (ref 0–1)
BLD PROD TYP BPU: NORMAL
BLOOD EXPIRATION DATE: NORMAL
BLOOD TYPE: 6200
BUN SERPL-MCNC: 25 MG/DL (ref 8–22)
CALCIUM SERPL-MCNC: 8.1 MG/DL (ref 8.5–10.5)
CHLORIDE BLD-SCNC: 98 MMOL/L (ref 98–107)
CO2 SERPL-SCNC: 24 MMOL/L (ref 22–31)
CODING SYSTEM: NORMAL
COMPONENT (HISTORICAL CONVERSION): NORMAL
CREAT SERPL-MCNC: 3.49 MG/DL (ref 0.7–1.3)
CROSSMATCH: NORMAL
DIASTOLIC BLOOD PRESSURE - MUSE: NORMAL MMHG
EOSINOPHIL # BLD AUTO: 0 THOU/UL (ref 0–0.4)
EOSINOPHIL NFR BLD AUTO: 0 % (ref 0–6)
ERYTHROCYTE [DISTWIDTH] IN BLOOD BY AUTOMATED COUNT: 16.5 % (ref 11–14.5)
GFR SERPL CREATININE-BSD FRML MDRD: 18 ML/MIN/1.73M2
GLUCOSE BLD-MCNC: 95 MG/DL (ref 70–125)
GLUCOSE BLDC GLUCOMTR-MCNC: 100 MG/DL
GLUCOSE BLDC GLUCOMTR-MCNC: 112 MG/DL
GLUCOSE BLDC GLUCOMTR-MCNC: 79 MG/DL
GLUCOSE BLDC GLUCOMTR-MCNC: 83 MG/DL
GLUCOSE BLDC GLUCOMTR-MCNC: 90 MG/DL
GLUCOSE BLDC GLUCOMTR-MCNC: 92 MG/DL
HCT VFR BLD AUTO: 22.9 % (ref 40–54)
HGB BLD-MCNC: 7.8 G/DL (ref 14–18)
HGB BLD-MCNC: 8 G/DL (ref 14–18)
HGB BLD-MCNC: 8.3 G/DL (ref 14–18)
INR PPP: 1.47 (ref 0.9–1.1)
INTERPRETATION ECG - MUSE: NORMAL
ISSUE DATE AND TIME: NORMAL
LYMPHOCYTES # BLD AUTO: 2.1 THOU/UL (ref 0.8–4.4)
LYMPHOCYTES NFR BLD AUTO: 12 % (ref 20–40)
MAGNESIUM SERPL-MCNC: 2 MG/DL (ref 1.8–2.6)
MCH RBC QN AUTO: 34.2 PG (ref 27–34)
MCHC RBC AUTO-ENTMCNC: 34.1 G/DL (ref 32–36)
MCV RBC AUTO: 100 FL (ref 80–100)
MONOCYTES # BLD AUTO: 1.7 THOU/UL (ref 0–0.9)
MONOCYTES NFR BLD AUTO: 10 % (ref 2–10)
NEUTROPHILS # BLD AUTO: 13.7 THOU/UL (ref 2–7.7)
NEUTROPHILS NFR BLD AUTO: 79 % (ref 50–70)
P AXIS - MUSE: 20 DEGREES
PLATELET # BLD AUTO: 154 THOU/UL (ref 140–440)
PMV BLD AUTO: 11.2 FL (ref 8.5–12.5)
POTASSIUM BLD-SCNC: 3.6 MMOL/L (ref 3.5–5)
PR INTERVAL - MUSE: 132 MS
PROT SERPL-MCNC: 6.3 G/DL (ref 6–8)
QRS DURATION - MUSE: 92 MS
QT - MUSE: 412 MS
QTC - MUSE: 515 MS
R AXIS - MUSE: -33 DEGREES
RBC # BLD AUTO: 2.28 MILL/UL (ref 4.4–6.2)
SODIUM SERPL-SCNC: 136 MMOL/L (ref 136–145)
STATUS (HISTORICAL CONVERSION): NORMAL
SYSTOLIC BLOOD PRESSURE - MUSE: NORMAL MMHG
T AXIS - MUSE: 126 DEGREES
UNIT ABO/RH (HISTORICAL CONVERSION): NORMAL
UNIT NUMBER: NORMAL
VENTRICULAR RATE- MUSE: 94 BPM
WBC: 18.2 THOU/UL (ref 4–11)

## 2018-07-28 ASSESSMENT — MIFFLIN-ST. JEOR: SCORE: 1724.31

## 2018-07-29 LAB
AEROBIC BLOOD CULTURE BOTTLE: ABNORMAL
ALBUMIN SERPL-MCNC: 2.5 G/DL (ref 3.5–5)
ALP SERPL-CCNC: 812 U/L (ref 45–120)
ANAEROBIC BLOOD CULTURE BOTTLE: ABNORMAL
ANION GAP SERPL CALCULATED.3IONS-SCNC: 12 MMOL/L (ref 5–18)
AST SERPL W P-5'-P-CCNC: 336 U/L (ref 0–40)
BILIRUB SERPL-MCNC: 5.5 MG/DL (ref 0–1)
BLD PROD TYP BPU: NORMAL
BLD PROD TYP BPU: NORMAL
BLOOD EXPIRATION DATE: NORMAL
BLOOD EXPIRATION DATE: NORMAL
BLOOD TYPE: 6200
BLOOD TYPE: 6200
BUN SERPL-MCNC: 23 MG/DL (ref 8–22)
BUN SERPL-MCNC: 30 MG/DL (ref 8–22)
CALCIUM SERPL-MCNC: 8.1 MG/DL (ref 8.5–10.5)
CALCIUM SERPL-MCNC: 9 MG/DL (ref 8.5–10.5)
CHLORIDE BLD-SCNC: 102 MMOL/L (ref 98–107)
CHLORIDE BLD-SCNC: 104 MMOL/L (ref 98–107)
CO2 SERPL-SCNC: 25 MMOL/L (ref 22–31)
CO2 SERPL-SCNC: 25 MMOL/L (ref 22–31)
CODING SYSTEM: NORMAL
CODING SYSTEM: NORMAL
COMPONENT (HISTORICAL CONVERSION): NORMAL
COMPONENT (HISTORICAL CONVERSION): NORMAL
CREAT SERPL-MCNC: 3.28 MG/DL (ref 0.7–1.3)
CREAT SERPL-MCNC: 3.75 MG/DL (ref 0.7–1.3)
ERYTHROCYTE [DISTWIDTH] IN BLOOD BY AUTOMATED COUNT: 16.8 % (ref 11–14.5)
GFR SERPL CREATININE-BSD FRML MDRD: 17 ML/MIN/1.73M2
GFR SERPL CREATININE-BSD FRML MDRD: 19 ML/MIN/1.73M2
GLUCOSE BLD-MCNC: 80 MG/DL (ref 70–125)
GLUCOSE BLD-MCNC: 89 MG/DL (ref 70–125)
GLUCOSE BLDC GLUCOMTR-MCNC: 89 MG/DL
GLUCOSE BLDC GLUCOMTR-MCNC: 89 MG/DL
HCT VFR BLD AUTO: 25.2 % (ref 40–54)
HGB BLD-MCNC: 8.3 G/DL (ref 14–18)
HGB BLD-MCNC: 8.4 G/DL (ref 14–18)
INR PPP: 1.68 (ref 0.9–1.1)
ISSUE DATE AND TIME: NORMAL
ISSUE DATE AND TIME: NORMAL
MAGNESIUM SERPL-MCNC: 1.5 MG/DL (ref 1.8–2.6)
MCH RBC QN AUTO: 33.7 PG (ref 27–34)
MCHC RBC AUTO-ENTMCNC: 33.3 G/DL (ref 32–36)
MCV RBC AUTO: 101 FL (ref 80–100)
PHOSPHATE SERPL-MCNC: 2 MG/DL (ref 2.5–4.5)
PLATELET # BLD AUTO: 160 THOU/UL (ref 140–440)
PMV BLD AUTO: 10.9 FL (ref 8.5–12.5)
POTASSIUM BLD-SCNC: 3.4 MMOL/L (ref 3.5–5)
POTASSIUM BLD-SCNC: 3.8 MMOL/L (ref 3.5–5)
PREALB SERPL-MCNC: 6.8 MG/DL (ref 19–38)
PROT SERPL-MCNC: 5.4 G/DL (ref 6–8)
RBC # BLD AUTO: 2.49 MILL/UL (ref 4.4–6.2)
SODIUM SERPL-SCNC: 139 MMOL/L (ref 136–145)
SODIUM SERPL-SCNC: 141 MMOL/L (ref 136–145)
STATUS (HISTORICAL CONVERSION): NORMAL
STATUS (HISTORICAL CONVERSION): NORMAL
TRIGL SERPL-MCNC: 126 MG/DL
UNIT ABO/RH (HISTORICAL CONVERSION): NORMAL
UNIT ABO/RH (HISTORICAL CONVERSION): NORMAL
UNIT NUMBER: NORMAL
UNIT NUMBER: NORMAL
WBC: 17.4 THOU/UL (ref 4–11)

## 2018-07-29 ASSESSMENT — MIFFLIN-ST. JEOR: SCORE: 1706.17

## 2018-07-30 DIAGNOSIS — R19.5 DARK STOOLS: ICD-10-CM

## 2018-07-30 LAB
AEROBIC BLOOD CULTURE BOTTLE: ABNORMAL
ALBUMIN SERPL-MCNC: 2.2 G/DL (ref 3.5–5)
ALBUMIN SERPL-MCNC: 2.3 G/DL (ref 3.5–5)
ALP SERPL-CCNC: 805 U/L (ref 45–120)
ALT SERPL W P-5'-P-CCNC: 82 U/L (ref 0–45)
ANAEROBIC BLOOD CULTURE BOTTLE: NO GROWTH
ANION GAP SERPL CALCULATED.3IONS-SCNC: 11 MMOL/L (ref 5–18)
AST SERPL W P-5'-P-CCNC: 299 U/L (ref 0–40)
BACTERIA SPEC CULT: ABNORMAL
BACTERIA SPEC CULT: ABNORMAL
BILIRUB DIRECT SERPL-MCNC: 3.8 MG/DL
BILIRUB SERPL-MCNC: 5.9 MG/DL (ref 0–1)
BUN SERPL-MCNC: 38 MG/DL (ref 8–22)
CALCIUM SERPL-MCNC: 8.7 MG/DL (ref 8.5–10.5)
CHLORIDE BLD-SCNC: 97 MMOL/L (ref 98–107)
CO2 SERPL-SCNC: 25 MMOL/L (ref 22–31)
CREAT SERPL-MCNC: 4.67 MG/DL (ref 0.7–1.3)
ERYTHROCYTE [DISTWIDTH] IN BLOOD BY AUTOMATED COUNT: 17.2 % (ref 11–14.5)
GFR SERPL CREATININE-BSD FRML MDRD: 13 ML/MIN/1.73M2
GLUCOSE BLD-MCNC: 95 MG/DL (ref 70–125)
GRAM STAIN RESULT: ABNORMAL
GRAM STAIN RESULT: ABNORMAL
HCT VFR BLD AUTO: 25.1 % (ref 40–54)
HGB BLD-MCNC: 8.5 G/DL (ref 14–18)
INR PPP: 1.48 (ref 0.9–1.1)
MAGNESIUM SERPL-MCNC: 2.1 MG/DL (ref 1.8–2.6)
MCH RBC QN AUTO: 34.3 PG (ref 27–34)
MCHC RBC AUTO-ENTMCNC: 33.9 G/DL (ref 32–36)
MCV RBC AUTO: 101 FL (ref 80–100)
PHOSPHATE SERPL-MCNC: 2.9 MG/DL (ref 2.5–4.5)
PLATELET # BLD AUTO: 165 THOU/UL (ref 140–440)
PMV BLD AUTO: 11.2 FL (ref 8.5–12.5)
POTASSIUM BLD-SCNC: 3.7 MMOL/L (ref 3.5–5)
PROT SERPL-MCNC: 6.1 G/DL (ref 6–8)
RBC # BLD AUTO: 2.48 MILL/UL (ref 4.4–6.2)
SODIUM SERPL-SCNC: 133 MMOL/L (ref 136–145)
WBC: 20.8 THOU/UL (ref 4–11)

## 2018-07-30 ASSESSMENT — MIFFLIN-ST. JEOR: SCORE: 1728.39

## 2018-07-31 LAB
ALBUMIN SERPL-MCNC: 2.1 G/DL (ref 3.5–5)
ALBUMIN SERPL-MCNC: 2.1 G/DL (ref 3.5–5)
ALP SERPL-CCNC: 886 U/L (ref 45–120)
ALT SERPL W P-5'-P-CCNC: 79 U/L (ref 0–45)
ANION GAP SERPL CALCULATED.3IONS-SCNC: 11 MMOL/L (ref 5–18)
ANION GAP SERPL CALCULATED.3IONS-SCNC: 11 MMOL/L (ref 5–18)
ANION GAP SERPL CALCULATED.3IONS-SCNC: 15 MMOL/L (ref 5–18)
AST SERPL W P-5'-P-CCNC: 304 U/L (ref 0–40)
BASE EXCESS BLDA CALC-SCNC: -2.1 MMOL/L
BILIRUB SERPL-MCNC: 5.9 MG/DL (ref 0–1)
BSA FOR ECHO PROCEDURE: 2.02 M2
BUN SERPL-MCNC: 42 MG/DL (ref 8–22)
BUN SERPL-MCNC: 42 MG/DL (ref 8–22)
BUN SERPL-MCNC: 53 MG/DL (ref 8–22)
CALCIUM SERPL-MCNC: 8.4 MG/DL (ref 8.5–10.5)
CALCIUM SERPL-MCNC: 8.5 MG/DL (ref 8.5–10.5)
CALCIUM SERPL-MCNC: 8.5 MG/DL (ref 8.5–10.5)
CHLORIDE BLD-SCNC: 97 MMOL/L (ref 98–107)
CHLORIDE BLD-SCNC: 97 MMOL/L (ref 98–107)
CHLORIDE BLD-SCNC: 98 MMOL/L (ref 98–107)
CO2 SERPL-SCNC: 22 MMOL/L (ref 22–31)
CO2 SERPL-SCNC: 26 MMOL/L (ref 22–31)
CO2 SERPL-SCNC: 26 MMOL/L (ref 22–31)
COHGB MFR BLD: 95.9 % (ref 96–97)
CREAT SERPL-MCNC: 4.63 MG/DL (ref 0.7–1.3)
CREAT SERPL-MCNC: 4.63 MG/DL (ref 0.7–1.3)
CREAT SERPL-MCNC: 5.34 MG/DL (ref 0.7–1.3)
CV BLOOD PRESSURE: NORMAL MMHG
CV ECHO HEIGHT: 68 IN
CV ECHO WEIGHT: 213 LBS
EJECTION FRACTION: 61 % (ref 55–75)
ERYTHROCYTE [DISTWIDTH] IN BLOOD BY AUTOMATED COUNT: 17.5 % (ref 11–14.5)
FLOW: 1 LPM
GFR SERPL CREATININE-BSD FRML MDRD: 11 ML/MIN/1.73M2
GFR SERPL CREATININE-BSD FRML MDRD: 13 ML/MIN/1.73M2
GFR SERPL CREATININE-BSD FRML MDRD: 13 ML/MIN/1.73M2
GLUCOSE BLD-MCNC: 78 MG/DL (ref 70–125)
GLUCOSE BLD-MCNC: 87 MG/DL (ref 70–125)
GLUCOSE BLD-MCNC: 87 MG/DL (ref 70–125)
HCO3, ARTERIAL CALC - HISTORICAL: 23.2 MMOL/L (ref 23–29)
HCT VFR BLD AUTO: 26.1 % (ref 40–54)
HGB BLD-MCNC: 8.6 G/DL (ref 14–18)
INR PPP: 1.41 (ref 0.9–1.1)
LACTATE SERPL-SCNC: 1.4 MMOL/L (ref 0.5–2.2)
LEFT VENTRICLE DIASTOLIC VOLUME INDEX: 54 CM3/M2 (ref 34–74)
LEFT VENTRICLE DIASTOLIC VOLUME: 109 CM3 (ref 62–150)
LEFT VENTRICLE HEART RATE: 88 BPM
LEFT VENTRICLE SYSTOLIC VOLUME INDEX: 21 CM3/M2 (ref 11–31)
LEFT VENTRICLE SYSTOLIC VOLUME: 42.5 CM3 (ref 21–61)
MAGNESIUM SERPL-MCNC: 1.7 MG/DL (ref 1.8–2.6)
MCH RBC QN AUTO: 33.5 PG (ref 27–34)
MCHC RBC AUTO-ENTMCNC: 33 G/DL (ref 32–36)
MCV RBC AUTO: 102 FL (ref 80–100)
NUC REST DIASTOLIC VOLUME INDEX: 3400 LBS
NUC REST SYSTOLIC VOLUME INDEX: 68 IN
OXYHEMOGLOBIN - HISTORICAL: 92.6 % (ref 96–97)
PCO2 BLD: 35 MM HG (ref 35–45)
PH BLD: 7.41 [PH] (ref 7.37–7.44)
PHOSPHATE SERPL-MCNC: 3.5 MG/DL (ref 2.5–4.5)
PLATELET # BLD AUTO: 173 THOU/UL (ref 140–440)
PMV BLD AUTO: 11 FL (ref 8.5–12.5)
PO2 BLD: 62 MM HG (ref 80–90)
POTASSIUM BLD-SCNC: 3.5 MMOL/L (ref 3.5–5)
POTASSIUM BLD-SCNC: 3.5 MMOL/L (ref 3.5–5)
POTASSIUM BLD-SCNC: 3.6 MMOL/L (ref 3.5–5)
PROT SERPL-MCNC: 5.9 G/DL (ref 6–8)
RBC # BLD AUTO: 2.57 MILL/UL (ref 4.4–6.2)
SODIUM SERPL-SCNC: 134 MMOL/L (ref 136–145)
SODIUM SERPL-SCNC: 134 MMOL/L (ref 136–145)
SODIUM SERPL-SCNC: 135 MMOL/L (ref 136–145)
TEMPERATURE: 37 DEGREES C
TRICUSPID VALVE ANULAR PLANE SYSTOLIC EXCURSION: 1.6 CM
WBC: 18.5 THOU/UL (ref 4–11)

## 2018-08-01 LAB
ALBUMIN SERPL-MCNC: 1.8 G/DL (ref 3.5–5)
AMMONIA PLAS-SCNC: 37 UMOL/L (ref 11–35)
ANION GAP SERPL CALCULATED.3IONS-SCNC: 14 MMOL/L (ref 5–18)
ATRIAL RATE - MUSE: 96 BPM
BUN SERPL-MCNC: 56 MG/DL (ref 8–22)
CALCIUM SERPL-MCNC: 8.5 MG/DL (ref 8.5–10.5)
CHLORIDE BLD-SCNC: 98 MMOL/L (ref 98–107)
CO2 SERPL-SCNC: 22 MMOL/L (ref 22–31)
CREAT SERPL-MCNC: 5.57 MG/DL (ref 0.7–1.3)
DIASTOLIC BLOOD PRESSURE - MUSE: NORMAL MMHG
GFR SERPL CREATININE-BSD FRML MDRD: 10 ML/MIN/1.73M2
GLUCOSE BLD-MCNC: 77 MG/DL (ref 70–125)
INR PPP: 1.47 (ref 0.9–1.1)
INTERPRETATION ECG - MUSE: NORMAL
P AXIS - MUSE: 12 DEGREES
PHOSPHATE SERPL-MCNC: 5 MG/DL (ref 2.5–4.5)
POTASSIUM BLD-SCNC: 3.6 MMOL/L (ref 3.5–5)
PR INTERVAL - MUSE: 138 MS
QRS DURATION - MUSE: 92 MS
QT - MUSE: 420 MS
QTC - MUSE: 530 MS
R AXIS - MUSE: -23 DEGREES
SODIUM SERPL-SCNC: 134 MMOL/L (ref 136–145)
SYSTOLIC BLOOD PRESSURE - MUSE: NORMAL MMHG
T AXIS - MUSE: 65 DEGREES
VENTRICULAR RATE- MUSE: 96 BPM

## 2018-08-01 ASSESSMENT — MIFFLIN-ST. JEOR
SCORE: 1736.56
SCORE: 1736.5
SCORE: 1736.56
SCORE: 1736.5

## 2018-08-02 LAB
ALBUMIN SERPL-MCNC: 1.8 G/DL (ref 3.5–5)
ALP SERPL-CCNC: 846 U/L (ref 45–120)
ALT SERPL W P-5'-P-CCNC: 85 U/L (ref 0–45)
ANION GAP SERPL CALCULATED.3IONS-SCNC: 12 MMOL/L (ref 5–18)
AST SERPL W P-5'-P-CCNC: 287 U/L (ref 0–40)
BILIRUB DIRECT SERPL-MCNC: 3.7 MG/DL
BILIRUB SERPL-MCNC: 5.8 MG/DL (ref 0–1)
BUN SERPL-MCNC: 39 MG/DL (ref 8–22)
CALCIUM SERPL-MCNC: 8.7 MG/DL (ref 8.5–10.5)
CHLORIDE BLD-SCNC: 102 MMOL/L (ref 98–107)
CO2 SERPL-SCNC: 26 MMOL/L (ref 22–31)
CREAT SERPL-MCNC: 4.24 MG/DL (ref 0.7–1.3)
ERYTHROCYTE [DISTWIDTH] IN BLOOD BY AUTOMATED COUNT: 18.6 % (ref 11–14.5)
GFR SERPL CREATININE-BSD FRML MDRD: 14 ML/MIN/1.73M2
GLUCOSE BLD-MCNC: 88 MG/DL (ref 70–125)
HCT VFR BLD AUTO: 25.2 % (ref 40–54)
HGB BLD-MCNC: 8.4 G/DL (ref 14–18)
INR PPP: 1.43 (ref 0.9–1.1)
MCH RBC QN AUTO: 34 PG (ref 27–34)
MCHC RBC AUTO-ENTMCNC: 33.3 G/DL (ref 32–36)
MCV RBC AUTO: 102 FL (ref 80–100)
PLATELET # BLD AUTO: 219 THOU/UL (ref 140–440)
PMV BLD AUTO: 11.2 FL (ref 8.5–12.5)
POTASSIUM BLD-SCNC: 3.7 MMOL/L (ref 3.5–5)
PROT SERPL-MCNC: 5.8 G/DL (ref 6–8)
RBC # BLD AUTO: 2.47 MILL/UL (ref 4.4–6.2)
SODIUM SERPL-SCNC: 140 MMOL/L (ref 136–145)
WBC: 19.6 THOU/UL (ref 4–11)

## 2018-08-02 ASSESSMENT — MIFFLIN-ST. JEOR: SCORE: 1719.32

## 2018-08-03 LAB
ALBUMIN SERPL-MCNC: 1.8 G/DL (ref 3.5–5)
ALP SERPL-CCNC: 783 U/L (ref 45–120)
ALT SERPL W P-5'-P-CCNC: 87 U/L (ref 0–45)
ANION GAP SERPL CALCULATED.3IONS-SCNC: 14 MMOL/L (ref 5–18)
AST SERPL W P-5'-P-CCNC: 274 U/L (ref 0–40)
BILIRUB SERPL-MCNC: 5.6 MG/DL (ref 0–1)
BUN SERPL-MCNC: 49 MG/DL (ref 8–22)
CALCIUM SERPL-MCNC: 8.6 MG/DL (ref 8.5–10.5)
CHLORIDE BLD-SCNC: 103 MMOL/L (ref 98–107)
CO2 SERPL-SCNC: 22 MMOL/L (ref 22–31)
CREAT SERPL-MCNC: 5.51 MG/DL (ref 0.7–1.3)
ERYTHROCYTE [DISTWIDTH] IN BLOOD BY AUTOMATED COUNT: 19.4 % (ref 11–14.5)
GFR SERPL CREATININE-BSD FRML MDRD: 11 ML/MIN/1.73M2
GLUCOSE BLD-MCNC: 87 MG/DL (ref 70–125)
HCT VFR BLD AUTO: 25.5 % (ref 40–54)
HGB BLD-MCNC: 8.2 G/DL (ref 14–18)
INR PPP: 1.39 (ref 0.9–1.1)
LACTATE SERPL-SCNC: 0.9 MMOL/L (ref 0.5–2.2)
MCH RBC QN AUTO: 34.2 PG (ref 27–34)
MCHC RBC AUTO-ENTMCNC: 32.2 G/DL (ref 32–36)
MCV RBC AUTO: 106 FL (ref 80–100)
PLATELET # BLD AUTO: 235 THOU/UL (ref 140–440)
PMV BLD AUTO: 11.2 FL (ref 8.5–12.5)
POTASSIUM BLD-SCNC: 4 MMOL/L (ref 3.5–5)
PROT SERPL-MCNC: 5.7 G/DL (ref 6–8)
RBC # BLD AUTO: 2.4 MILL/UL (ref 4.4–6.2)
SODIUM SERPL-SCNC: 139 MMOL/L (ref 136–145)
WBC: 20.5 THOU/UL (ref 4–11)

## 2018-08-03 ASSESSMENT — MIFFLIN-ST. JEOR: SCORE: 1727.49

## 2018-08-04 LAB
ALBUMIN SERPL-MCNC: 1.9 G/DL (ref 3.5–5)
ALP SERPL-CCNC: 691 U/L (ref 45–120)
ALT SERPL W P-5'-P-CCNC: 76 U/L (ref 0–45)
ANION GAP SERPL CALCULATED.3IONS-SCNC: 10 MMOL/L (ref 5–18)
AST SERPL W P-5'-P-CCNC: 222 U/L (ref 0–40)
BILIRUB SERPL-MCNC: 6.3 MG/DL (ref 0–1)
BUN SERPL-MCNC: 31 MG/DL (ref 8–22)
CALCIUM SERPL-MCNC: 8.1 MG/DL (ref 8.5–10.5)
CHLORIDE BLD-SCNC: 103 MMOL/L (ref 98–107)
CO2 SERPL-SCNC: 27 MMOL/L (ref 22–31)
CREAT SERPL-MCNC: 4.15 MG/DL (ref 0.7–1.3)
ERYTHROCYTE [DISTWIDTH] IN BLOOD BY AUTOMATED COUNT: 20 % (ref 11–14.5)
GFR SERPL CREATININE-BSD FRML MDRD: 15 ML/MIN/1.73M2
GLUCOSE BLD-MCNC: 91 MG/DL (ref 70–125)
HCT VFR BLD AUTO: 23.7 % (ref 40–54)
HGB BLD-MCNC: 7.7 G/DL (ref 14–18)
INR PPP: 1.34 (ref 0.9–1.1)
MCH RBC QN AUTO: 34.1 PG (ref 27–34)
MCHC RBC AUTO-ENTMCNC: 32.5 G/DL (ref 32–36)
MCV RBC AUTO: 105 FL (ref 80–100)
PLATELET # BLD AUTO: 275 THOU/UL (ref 140–440)
PMV BLD AUTO: 10.9 FL (ref 8.5–12.5)
POTASSIUM BLD-SCNC: 3.6 MMOL/L (ref 3.5–5)
PROT SERPL-MCNC: 5.7 G/DL (ref 6–8)
RBC # BLD AUTO: 2.26 MILL/UL (ref 4.4–6.2)
SODIUM SERPL-SCNC: 140 MMOL/L (ref 136–145)
WBC: 19.2 THOU/UL (ref 4–11)

## 2018-08-05 LAB
ALBUMIN SERPL-MCNC: 1.6 G/DL (ref 3.5–5)
ALBUMIN UR-MCNC: ABNORMAL MG/DL
ALP SERPL-CCNC: 557 U/L (ref 45–120)
ALT SERPL W P-5'-P-CCNC: 62 U/L (ref 0–45)
AMMONIA PLAS-SCNC: 28 UMOL/L (ref 11–35)
ANION GAP SERPL CALCULATED.3IONS-SCNC: 13 MMOL/L (ref 5–18)
ANION GAP SERPL CALCULATED.3IONS-SCNC: 9 MMOL/L (ref 5–18)
APPEARANCE UR: ABNORMAL
AST SERPL W P-5'-P-CCNC: 165 U/L (ref 0–40)
BACTERIA #/AREA URNS HPF: ABNORMAL HPF
BASO STIPL BLD QL SMEAR: ABNORMAL
BASOPHILS # BLD AUTO: 0.1 THOU/UL (ref 0–0.2)
BASOPHILS NFR BLD AUTO: 1 % (ref 0–2)
BILIRUB DIRECT SERPL-MCNC: 3.6 MG/DL
BILIRUB SERPL-MCNC: 5.6 MG/DL (ref 0–1)
BILIRUB UR QL STRIP: ABNORMAL
BUN SERPL-MCNC: 35 MG/DL (ref 8–22)
BUN SERPL-MCNC: 39 MG/DL (ref 8–22)
CALCIUM SERPL-MCNC: 7.5 MG/DL (ref 8.5–10.5)
CALCIUM SERPL-MCNC: 8.3 MG/DL (ref 8.5–10.5)
CALCIUM, IONIZED MEASURED: 1.11 MMOL/L (ref 1.11–1.3)
CHLORIDE BLD-SCNC: 101 MMOL/L (ref 98–107)
CHLORIDE BLD-SCNC: 107 MMOL/L (ref 98–107)
CO2 SERPL-SCNC: 23 MMOL/L (ref 22–31)
CO2 SERPL-SCNC: 24 MMOL/L (ref 22–31)
COLOR UR AUTO: YELLOW
CORTIS SERPL-MCNC: 7.3 UG/DL
CREAT SERPL-MCNC: 4.87 MG/DL (ref 0.7–1.3)
CREAT SERPL-MCNC: 5.95 MG/DL (ref 0.7–1.3)
EOSINOPHIL # BLD AUTO: 0.1 THOU/UL (ref 0–0.4)
EOSINOPHIL NFR BLD AUTO: 1 % (ref 0–6)
ERYTHROCYTE [DISTWIDTH] IN BLOOD BY AUTOMATED COUNT: 20.8 % (ref 11–14.5)
GFR SERPL CREATININE-BSD FRML MDRD: 10 ML/MIN/1.73M2
GFR SERPL CREATININE-BSD FRML MDRD: 12 ML/MIN/1.73M2
GLUCOSE BLD-MCNC: 104 MG/DL (ref 70–125)
GLUCOSE BLD-MCNC: 79 MG/DL (ref 70–125)
GLUCOSE UR STRIP-MCNC: NEGATIVE MG/DL
HCT VFR BLD AUTO: 23.1 % (ref 40–54)
HGB BLD-MCNC: 7.3 G/DL (ref 14–18)
HGB BLD-MCNC: 7.6 G/DL (ref 14–18)
HGB UR QL STRIP: ABNORMAL
HYALINE CASTS #/AREA URNS LPF: ABNORMAL LPF
INR PPP: 1.39 (ref 0.9–1.1)
ION CA PH 7.4: 1.09 MMOL/L (ref 1.11–1.3)
KETONES UR STRIP-MCNC: NEGATIVE MG/DL
LACTATE SERPL-SCNC: 1 MMOL/L (ref 0.5–2.2)
LEUKOCYTE ESTERASE UR QL STRIP: ABNORMAL
LYMPHOCYTES # BLD AUTO: 2.7 THOU/UL (ref 0.8–4.4)
LYMPHOCYTES NFR BLD AUTO: 14 % (ref 20–40)
MAGNESIUM SERPL-MCNC: 1.4 MG/DL (ref 1.8–2.6)
MCH RBC QN AUTO: 34.4 PG (ref 27–34)
MCHC RBC AUTO-ENTMCNC: 32.9 G/DL (ref 32–36)
MCV RBC AUTO: 105 FL (ref 80–100)
MONOCYTES # BLD AUTO: 1.4 THOU/UL (ref 0–0.9)
MONOCYTES NFR BLD AUTO: 7 % (ref 2–10)
NEUTROPHILS # BLD AUTO: 14.8 THOU/UL (ref 2–7.7)
NEUTROPHILS NFR BLD AUTO: 78 % (ref 50–70)
NITRATE UR QL: NEGATIVE
PH UR STRIP: 6 [PH] (ref 4.5–8)
PH: 7.36 (ref 7.35–7.45)
PHOSPHATE SERPL-MCNC: 4.9 MG/DL (ref 2.5–4.5)
PLAT MORPH BLD: NORMAL
PLATELET # BLD AUTO: 274 THOU/UL (ref 140–440)
PMV BLD AUTO: 11 FL (ref 8.5–12.5)
POLYCHROMASIA BLD QL SMEAR: ABNORMAL
POTASSIUM BLD-SCNC: 3.4 MMOL/L (ref 3.5–5)
POTASSIUM BLD-SCNC: 3.7 MMOL/L (ref 3.5–5)
PROCALCITONIN SERPL-MCNC: 3.24 NG/ML (ref 0–0.49)
PROT SERPL-MCNC: 5.2 G/DL (ref 6–8)
RBC # BLD AUTO: 2.21 MILL/UL (ref 4.4–6.2)
RBC #/AREA URNS AUTO: >100 HPF
SODIUM SERPL-SCNC: 137 MMOL/L (ref 136–145)
SODIUM SERPL-SCNC: 140 MMOL/L (ref 136–145)
SP GR UR STRIP: 1.02 (ref 1–1.03)
SQUAMOUS #/AREA URNS AUTO: ABNORMAL LPF
TARGETS BLD QL SMEAR: ABNORMAL
TRANS CELLS #/AREA URNS HPF: ABNORMAL LPF
TROPONIN I SERPL-MCNC: 0.14 NG/ML (ref 0–0.29)
UROBILINOGEN UR STRIP-ACNC: ABNORMAL
WBC #/AREA URNS AUTO: >100 HPF
WBC CLUMPS #/AREA URNS HPF: PRESENT /[HPF]
WBC: 19.4 THOU/UL (ref 4–11)

## 2018-08-05 ASSESSMENT — MIFFLIN-ST. JEOR
SCORE: 1710.25
SCORE: 1690.75

## 2018-08-06 LAB
ABO/RH(D): NORMAL
ALBUMIN SERPL-MCNC: 2.1 G/DL (ref 3.5–5)
ANION GAP SERPL CALCULATED.3IONS-SCNC: 11 MMOL/L (ref 5–18)
ANTIBODY SCREEN: NEGATIVE
ATRIAL RATE - MUSE: 78 BPM
BASOPHILS # BLD AUTO: 0.1 THOU/UL (ref 0–0.2)
BASOPHILS NFR BLD AUTO: 0 % (ref 0–2)
BUN SERPL-MCNC: 41 MG/DL (ref 8–22)
CALCIUM SERPL-MCNC: 8.2 MG/DL (ref 8.5–10.5)
CHLORIDE BLD-SCNC: 104 MMOL/L (ref 98–107)
CO2 SERPL-SCNC: 21 MMOL/L (ref 22–31)
CREAT SERPL-MCNC: 5.87 MG/DL (ref 0.7–1.3)
DIASTOLIC BLOOD PRESSURE - MUSE: NORMAL MMHG
EOSINOPHIL # BLD AUTO: 0.1 THOU/UL (ref 0–0.4)
EOSINOPHIL NFR BLD AUTO: 1 % (ref 0–6)
ERYTHROCYTE [DISTWIDTH] IN BLOOD BY AUTOMATED COUNT: 20.9 % (ref 11–14.5)
GFR SERPL CREATININE-BSD FRML MDRD: 10 ML/MIN/1.73M2
GLUCOSE BLD-MCNC: 98 MG/DL (ref 70–125)
HCT VFR BLD AUTO: 21.3 % (ref 40–54)
HGB BLD-MCNC: 6.9 G/DL (ref 14–18)
INTERPRETATION ECG - MUSE: NORMAL
LYMPHOCYTES # BLD AUTO: 2.3 THOU/UL (ref 0.8–4.4)
LYMPHOCYTES NFR BLD AUTO: 13 % (ref 20–40)
MAGNESIUM SERPL-MCNC: 1.9 MG/DL (ref 1.8–2.6)
MCH RBC QN AUTO: 34.2 PG (ref 27–34)
MCHC RBC AUTO-ENTMCNC: 32.4 G/DL (ref 32–36)
MCV RBC AUTO: 105 FL (ref 80–100)
MONOCYTES # BLD AUTO: 1.4 THOU/UL (ref 0–0.9)
MONOCYTES NFR BLD AUTO: 8 % (ref 2–10)
NEUTROPHILS # BLD AUTO: 13.8 THOU/UL (ref 2–7.7)
NEUTROPHILS NFR BLD AUTO: 79 % (ref 50–70)
P AXIS - MUSE: 38 DEGREES
PHOSPHATE SERPL-MCNC: 5.3 MG/DL (ref 2.5–4.5)
PLATELET # BLD AUTO: 270 THOU/UL (ref 140–440)
PMV BLD AUTO: 10.9 FL (ref 8.5–12.5)
POTASSIUM BLD-SCNC: 3.6 MMOL/L (ref 3.5–5)
PR INTERVAL - MUSE: 140 MS
QRS DURATION - MUSE: 98 MS
QT - MUSE: 478 MS
QTC - MUSE: 544 MS
R AXIS - MUSE: -28 DEGREES
RBC # BLD AUTO: 2.02 MILL/UL (ref 4.4–6.2)
SODIUM SERPL-SCNC: 136 MMOL/L (ref 136–145)
SYSTOLIC BLOOD PRESSURE - MUSE: NORMAL MMHG
T AXIS - MUSE: 134 DEGREES
VENTRICULAR RATE- MUSE: 78 BPM
WBC: 17.9 THOU/UL (ref 4–11)

## 2018-08-06 ASSESSMENT — MIFFLIN-ST. JEOR
SCORE: 1670.5
SCORE: 1690.5

## 2018-08-07 LAB
ALBUMIN SERPL-MCNC: 2.4 G/DL (ref 3.5–5)
ALP SERPL-CCNC: 395 U/L (ref 45–120)
ALT SERPL W P-5'-P-CCNC: 47 U/L (ref 0–45)
ANION GAP SERPL CALCULATED.3IONS-SCNC: 12 MMOL/L (ref 5–18)
AST SERPL W P-5'-P-CCNC: 109 U/L (ref 0–40)
BACTERIA SPEC CULT: NO GROWTH
BILIRUB DIRECT SERPL-MCNC: 4.7 MG/DL
BILIRUB SERPL-MCNC: 7.4 MG/DL (ref 0–1)
BLD PROD TYP BPU: NORMAL
BLOOD EXPIRATION DATE: NORMAL
BLOOD TYPE: 6200
BUN SERPL-MCNC: 24 MG/DL (ref 8–22)
CALCIUM SERPL-MCNC: 8.4 MG/DL (ref 8.5–10.5)
CHLORIDE BLD-SCNC: 101 MMOL/L (ref 98–107)
CO2 SERPL-SCNC: 25 MMOL/L (ref 22–31)
CODING SYSTEM: NORMAL
COMPONENT (HISTORICAL CONVERSION): NORMAL
CREAT SERPL-MCNC: 4.39 MG/DL (ref 0.7–1.3)
CROSSMATCH: NORMAL
ERYTHROCYTE [DISTWIDTH] IN BLOOD BY AUTOMATED COUNT: 23.4 % (ref 11–14.5)
GFR SERPL CREATININE-BSD FRML MDRD: 14 ML/MIN/1.73M2
GLUCOSE BLD-MCNC: 91 MG/DL (ref 70–125)
HCT VFR BLD AUTO: 23.4 % (ref 40–54)
HGB BLD-MCNC: 7.8 G/DL (ref 14–18)
ISSUE DATE AND TIME: NORMAL
MAGNESIUM SERPL-MCNC: 1.6 MG/DL (ref 1.8–2.6)
MCH RBC QN AUTO: 33.5 PG (ref 27–34)
MCHC RBC AUTO-ENTMCNC: 33.3 G/DL (ref 32–36)
MCV RBC AUTO: 100 FL (ref 80–100)
PLATELET # BLD AUTO: 245 THOU/UL (ref 140–440)
PMV BLD AUTO: 10.6 FL (ref 8.5–12.5)
POTASSIUM BLD-SCNC: 3.7 MMOL/L (ref 3.5–5)
POTASSIUM BLD-SCNC: 3.8 MMOL/L (ref 3.5–5)
PROT SERPL-MCNC: 5.7 G/DL (ref 6–8)
RBC # BLD AUTO: 2.33 MILL/UL (ref 4.4–6.2)
SODIUM SERPL-SCNC: 138 MMOL/L (ref 136–145)
STATUS (HISTORICAL CONVERSION): NORMAL
UNIT ABO/RH (HISTORICAL CONVERSION): NORMAL
UNIT NUMBER: NORMAL
WBC: 18.1 THOU/UL (ref 4–11)

## 2018-08-07 ASSESSMENT — MIFFLIN-ST. JEOR: SCORE: 1687.57

## 2018-08-08 LAB
AEROBIC BLOOD CULTURE BOTTLE: NO GROWTH
AEROBIC BLOOD CULTURE BOTTLE: NO GROWTH
ALBUMIN SERPL-MCNC: 2.1 G/DL (ref 3.5–5)
ALP SERPL-CCNC: 394 U/L (ref 45–120)
ALT SERPL W P-5'-P-CCNC: 46 U/L (ref 0–45)
ANAEROBIC BLOOD CULTURE BOTTLE: NO GROWTH
ANAEROBIC BLOOD CULTURE BOTTLE: NO GROWTH
ANION GAP SERPL CALCULATED.3IONS-SCNC: 13 MMOL/L (ref 5–18)
AST SERPL W P-5'-P-CCNC: 100 U/L (ref 0–40)
BILIRUB DIRECT SERPL-MCNC: 4.2 MG/DL
BILIRUB SERPL-MCNC: 6.7 MG/DL (ref 0–1)
BUN SERPL-MCNC: 30 MG/DL (ref 8–22)
CALCIUM SERPL-MCNC: 8.4 MG/DL (ref 8.5–10.5)
CHLORIDE BLD-SCNC: 99 MMOL/L (ref 98–107)
CO2 SERPL-SCNC: 22 MMOL/L (ref 22–31)
CREAT SERPL-MCNC: 5.37 MG/DL (ref 0.7–1.3)
GFR SERPL CREATININE-BSD FRML MDRD: 11 ML/MIN/1.73M2
GLUCOSE BLD-MCNC: 87 MG/DL (ref 70–125)
HGB BLD-MCNC: 8.3 G/DL (ref 14–18)
INR PPP: 1.37 (ref 0.9–1.1)
MAGNESIUM SERPL-MCNC: 1.7 MG/DL (ref 1.8–2.6)
POTASSIUM BLD-SCNC: 3.7 MMOL/L (ref 3.5–5)
PROT SERPL-MCNC: 5.8 G/DL (ref 6–8)
SODIUM SERPL-SCNC: 134 MMOL/L (ref 136–145)

## 2018-08-08 ASSESSMENT — MIFFLIN-ST. JEOR: SCORE: 1695.73

## 2018-08-09 LAB
ALBUMIN SERPL-MCNC: 2.6 G/DL (ref 3.5–5)
ALP SERPL-CCNC: 368 U/L (ref 45–120)
ALT SERPL W P-5'-P-CCNC: 41 U/L (ref 0–45)
ANION GAP SERPL CALCULATED.3IONS-SCNC: 11 MMOL/L (ref 5–18)
AST SERPL W P-5'-P-CCNC: 90 U/L (ref 0–40)
BILIRUB DIRECT SERPL-MCNC: 4.4 MG/DL
BILIRUB SERPL-MCNC: 6.8 MG/DL (ref 0–1)
BUN SERPL-MCNC: 19 MG/DL (ref 8–22)
CALCIUM SERPL-MCNC: 8.2 MG/DL (ref 8.5–10.5)
CHLORIDE BLD-SCNC: 98 MMOL/L (ref 98–107)
CO2 SERPL-SCNC: 25 MMOL/L (ref 22–31)
CREAT SERPL-MCNC: 4.07 MG/DL (ref 0.7–1.3)
GFR SERPL CREATININE-BSD FRML MDRD: 15 ML/MIN/1.73M2
GLUCOSE BLD-MCNC: 104 MG/DL (ref 70–125)
MAGNESIUM SERPL-MCNC: 1.7 MG/DL (ref 1.8–2.6)
POTASSIUM BLD-SCNC: 3.3 MMOL/L (ref 3.5–5)
PROT SERPL-MCNC: 6.2 G/DL (ref 6–8)
SODIUM SERPL-SCNC: 134 MMOL/L (ref 136–145)

## 2018-08-09 ASSESSMENT — MIFFLIN-ST. JEOR: SCORE: 1677.59

## 2018-08-10 LAB
AEROBIC BLOOD CULTURE BOTTLE: NO GROWTH
AEROBIC BLOOD CULTURE BOTTLE: NO GROWTH
ALBUMIN SERPL-MCNC: 2.2 G/DL (ref 3.5–5)
ALP SERPL-CCNC: 325 U/L (ref 45–120)
ALT SERPL W P-5'-P-CCNC: 38 U/L (ref 0–45)
ANAEROBIC BLOOD CULTURE BOTTLE: NO GROWTH
ANAEROBIC BLOOD CULTURE BOTTLE: NO GROWTH
ANION GAP SERPL CALCULATED.3IONS-SCNC: 12 MMOL/L (ref 5–18)
AST SERPL W P-5'-P-CCNC: 83 U/L (ref 0–40)
BILIRUB DIRECT SERPL-MCNC: 3.5 MG/DL
BILIRUB SERPL-MCNC: 5.5 MG/DL (ref 0–1)
BUN SERPL-MCNC: 22 MG/DL (ref 8–22)
CALCIUM SERPL-MCNC: 8.3 MG/DL (ref 8.5–10.5)
CHLORIDE BLD-SCNC: 95 MMOL/L (ref 98–107)
CO2 SERPL-SCNC: 22 MMOL/L (ref 22–31)
CREAT SERPL-MCNC: 4.8 MG/DL (ref 0.7–1.3)
ERYTHROCYTE [DISTWIDTH] IN BLOOD BY AUTOMATED COUNT: 22.2 % (ref 11–14.5)
GFR SERPL CREATININE-BSD FRML MDRD: 12 ML/MIN/1.73M2
GLUCOSE BLD-MCNC: 95 MG/DL (ref 70–125)
HCT VFR BLD AUTO: 24.7 % (ref 40–54)
HGB BLD-MCNC: 8.1 G/DL (ref 14–18)
INR PPP: 1.4 (ref 0.9–1.1)
MAGNESIUM SERPL-MCNC: 1.6 MG/DL (ref 1.8–2.6)
MCH RBC QN AUTO: 33.1 PG (ref 27–34)
MCHC RBC AUTO-ENTMCNC: 32.8 G/DL (ref 32–36)
MCV RBC AUTO: 101 FL (ref 80–100)
PLATELET # BLD AUTO: 220 THOU/UL (ref 140–440)
PMV BLD AUTO: 10.2 FL (ref 8.5–12.5)
POTASSIUM BLD-SCNC: 3.4 MMOL/L (ref 3.5–5)
PROT SERPL-MCNC: 5.8 G/DL (ref 6–8)
RBC # BLD AUTO: 2.45 MILL/UL (ref 4.4–6.2)
SODIUM SERPL-SCNC: 129 MMOL/L (ref 136–145)
WBC: 15.6 THOU/UL (ref 4–11)

## 2018-08-10 RX ORDER — OMEPRAZOLE 40 MG/1
40 CAPSULE, DELAYED RELEASE ORAL DAILY
Qty: 30 CAPSULE | Refills: 0 | Status: SHIPPED | OUTPATIENT
Start: 2018-08-10 | End: 2018-10-29

## 2018-08-10 ASSESSMENT — MIFFLIN-ST. JEOR
SCORE: 1685.5
SCORE: 1685.5

## 2018-08-11 LAB
AEROBIC BLOOD CULTURE BOTTLE: NO GROWTH
ALBUMIN SERPL-MCNC: 2.5 G/DL (ref 3.5–5)
ALP SERPL-CCNC: 353 U/L (ref 45–120)
ALT SERPL W P-5'-P-CCNC: 33 U/L (ref 0–45)
ANAEROBIC BLOOD CULTURE BOTTLE: NO GROWTH
ANION GAP SERPL CALCULATED.3IONS-SCNC: 11 MMOL/L (ref 5–18)
AST SERPL W P-5'-P-CCNC: 79 U/L (ref 0–40)
BILIRUB DIRECT SERPL-MCNC: 3.6 MG/DL
BILIRUB SERPL-MCNC: 5.7 MG/DL (ref 0–1)
BUN SERPL-MCNC: 15 MG/DL (ref 8–22)
CALCIUM SERPL-MCNC: 8.6 MG/DL (ref 8.5–10.5)
CHLORIDE BLD-SCNC: 94 MMOL/L (ref 98–107)
CO2 SERPL-SCNC: 24 MMOL/L (ref 22–31)
CREAT SERPL-MCNC: 3.59 MG/DL (ref 0.7–1.3)
ERYTHROCYTE [DISTWIDTH] IN BLOOD BY AUTOMATED COUNT: 22.4 % (ref 11–14.5)
GFR SERPL CREATININE-BSD FRML MDRD: 17 ML/MIN/1.73M2
GLUCOSE BLD-MCNC: 94 MG/DL (ref 70–125)
GLUCOSE BLDC GLUCOMTR-MCNC: 109 MG/DL
HCT VFR BLD AUTO: 25.2 % (ref 40–54)
HGB BLD-MCNC: 8.2 G/DL (ref 14–18)
MAGNESIUM SERPL-MCNC: 1.3 MG/DL (ref 1.8–2.6)
MCH RBC QN AUTO: 33.1 PG (ref 27–34)
MCHC RBC AUTO-ENTMCNC: 32.5 G/DL (ref 32–36)
MCV RBC AUTO: 102 FL (ref 80–100)
PLATELET # BLD AUTO: 202 THOU/UL (ref 140–440)
PMV BLD AUTO: 10.3 FL (ref 8.5–12.5)
POTASSIUM BLD-SCNC: 3.1 MMOL/L (ref 3.5–5)
POTASSIUM BLD-SCNC: 3.5 MMOL/L (ref 3.5–5)
PROT SERPL-MCNC: 6.2 G/DL (ref 6–8)
RBC # BLD AUTO: 2.48 MILL/UL (ref 4.4–6.2)
SODIUM SERPL-SCNC: 129 MMOL/L (ref 136–145)
WBC: 14.6 THOU/UL (ref 4–11)

## 2018-08-11 ASSESSMENT — MIFFLIN-ST. JEOR
SCORE: 1677.59
SCORE: 1673.5

## 2018-08-12 LAB
ALBUMIN SERPL-MCNC: 2.4 G/DL (ref 3.5–5)
ALP SERPL-CCNC: 365 U/L (ref 45–120)
ALT SERPL W P-5'-P-CCNC: 34 U/L (ref 0–45)
ANION GAP SERPL CALCULATED.3IONS-SCNC: 12 MMOL/L (ref 5–18)
AST SERPL W P-5'-P-CCNC: 86 U/L (ref 0–40)
BILIRUB DIRECT SERPL-MCNC: 3.6 MG/DL
BILIRUB SERPL-MCNC: 5.6 MG/DL (ref 0–1)
BUN SERPL-MCNC: 20 MG/DL (ref 8–22)
C DIFF TOX B STL QL: NEGATIVE
CALCIUM SERPL-MCNC: 8.6 MG/DL (ref 8.5–10.5)
CHLORIDE BLD-SCNC: 91 MMOL/L (ref 98–107)
CO2 SERPL-SCNC: 21 MMOL/L (ref 22–31)
CREAT SERPL-MCNC: 4.12 MG/DL (ref 0.7–1.3)
ERYTHROCYTE [DISTWIDTH] IN BLOOD BY AUTOMATED COUNT: 20.9 % (ref 11–14.5)
GFR SERPL CREATININE-BSD FRML MDRD: 15 ML/MIN/1.73M2
GLUCOSE BLD-MCNC: 86 MG/DL (ref 70–125)
HCT VFR BLD AUTO: 25.5 % (ref 40–54)
HGB BLD-MCNC: 8.3 G/DL (ref 14–18)
MAGNESIUM SERPL-MCNC: 1.4 MG/DL (ref 1.8–2.6)
MCH RBC QN AUTO: 32.7 PG (ref 27–34)
MCHC RBC AUTO-ENTMCNC: 32.5 G/DL (ref 32–36)
MCV RBC AUTO: 100 FL (ref 80–100)
PLATELET # BLD AUTO: 205 THOU/UL (ref 140–440)
PMV BLD AUTO: 10.4 FL (ref 8.5–12.5)
POTASSIUM BLD-SCNC: 3.8 MMOL/L (ref 3.5–5)
POTASSIUM BLD-SCNC: 3.9 MMOL/L (ref 3.5–5)
PROT SERPL-MCNC: 6.3 G/DL (ref 6–8)
RBC # BLD AUTO: 2.54 MILL/UL (ref 4.4–6.2)
RIBOTYPE 027/NAP1/BI: NORMAL
SODIUM SERPL-SCNC: 123 MMOL/L (ref 136–145)
SODIUM SERPL-SCNC: 124 MMOL/L (ref 136–145)
WBC: 16.2 THOU/UL (ref 4–11)

## 2018-08-12 ASSESSMENT — MIFFLIN-ST. JEOR: SCORE: 1668.52

## 2018-08-13 LAB
ALBUMIN SERPL-MCNC: 2.4 G/DL (ref 3.5–5)
ALP SERPL-CCNC: 380 U/L (ref 45–120)
ALT SERPL W P-5'-P-CCNC: 33 U/L (ref 0–45)
ANION GAP SERPL CALCULATED.3IONS-SCNC: 11 MMOL/L (ref 5–18)
AST SERPL W P-5'-P-CCNC: 86 U/L (ref 0–40)
BILIRUB DIRECT SERPL-MCNC: 3.4 MG/DL
BILIRUB SERPL-MCNC: 5.3 MG/DL (ref 0–1)
BUN SERPL-MCNC: 24 MG/DL (ref 8–22)
CALCIUM SERPL-MCNC: 8.7 MG/DL (ref 8.5–10.5)
CHLORIDE BLD-SCNC: 88 MMOL/L (ref 98–107)
CO2 SERPL-SCNC: 22 MMOL/L (ref 22–31)
CREAT SERPL-MCNC: 4.26 MG/DL (ref 0.7–1.3)
ERYTHROCYTE [DISTWIDTH] IN BLOOD BY AUTOMATED COUNT: 20.3 % (ref 11–14.5)
GFR SERPL CREATININE-BSD FRML MDRD: 14 ML/MIN/1.73M2
GLUCOSE BLD-MCNC: 84 MG/DL (ref 70–125)
HCT VFR BLD AUTO: 25.5 % (ref 40–54)
HGB BLD-MCNC: 8.4 G/DL (ref 14–18)
MAGNESIUM SERPL-MCNC: 1.7 MG/DL (ref 1.8–2.6)
MCH RBC QN AUTO: 33.1 PG (ref 27–34)
MCHC RBC AUTO-ENTMCNC: 32.9 G/DL (ref 32–36)
MCV RBC AUTO: 100 FL (ref 80–100)
PLATELET # BLD AUTO: 214 THOU/UL (ref 140–440)
PMV BLD AUTO: 9.9 FL (ref 8.5–12.5)
POTASSIUM BLD-SCNC: 3.7 MMOL/L (ref 3.5–5)
PROT SERPL-MCNC: 6.4 G/DL (ref 6–8)
RBC # BLD AUTO: 2.54 MILL/UL (ref 4.4–6.2)
SODIUM SERPL-SCNC: 121 MMOL/L (ref 136–145)
WBC: 15.5 THOU/UL (ref 4–11)

## 2018-08-14 LAB
ALBUMIN SERPL-MCNC: 2.3 G/DL (ref 3.5–5)
ALP SERPL-CCNC: 376 U/L (ref 45–120)
ALT SERPL W P-5'-P-CCNC: 32 U/L (ref 0–45)
ANION GAP SERPL CALCULATED.3IONS-SCNC: 10 MMOL/L (ref 5–18)
AST SERPL W P-5'-P-CCNC: 86 U/L (ref 0–40)
BILIRUB DIRECT SERPL-MCNC: 2.9 MG/DL
BILIRUB SERPL-MCNC: 4.6 MG/DL (ref 0–1)
BUN SERPL-MCNC: 14 MG/DL (ref 8–22)
CALCIUM SERPL-MCNC: 8.3 MG/DL (ref 8.5–10.5)
CHLORIDE BLD-SCNC: 93 MMOL/L (ref 98–107)
CO2 SERPL-SCNC: 25 MMOL/L (ref 22–31)
CREAT SERPL-MCNC: 2.51 MG/DL (ref 0.7–1.3)
ERYTHROCYTE [DISTWIDTH] IN BLOOD BY AUTOMATED COUNT: 20.3 % (ref 11–14.5)
GFR SERPL CREATININE-BSD FRML MDRD: 26 ML/MIN/1.73M2
GGT SERPL-CCNC: 390 U/L (ref 0–50)
GLUCOSE BLD-MCNC: 84 MG/DL (ref 70–125)
HCT VFR BLD AUTO: 24 % (ref 40–54)
HGB BLD-MCNC: 7.9 G/DL (ref 14–18)
MAGNESIUM SERPL-MCNC: 1.4 MG/DL (ref 1.8–2.6)
MCH RBC QN AUTO: 32.9 PG (ref 27–34)
MCHC RBC AUTO-ENTMCNC: 32.9 G/DL (ref 32–36)
MCV RBC AUTO: 100 FL (ref 80–100)
PLATELET # BLD AUTO: 209 THOU/UL (ref 140–440)
PMV BLD AUTO: 9.8 FL (ref 8.5–12.5)
POTASSIUM BLD-SCNC: 3.1 MMOL/L (ref 3.5–5)
POTASSIUM BLD-SCNC: 3.5 MMOL/L (ref 3.5–5)
PROT SERPL-MCNC: 6.3 G/DL (ref 6–8)
RBC # BLD AUTO: 2.4 MILL/UL (ref 4.4–6.2)
SODIUM SERPL-SCNC: 128 MMOL/L (ref 136–145)
WBC: 15 THOU/UL (ref 4–11)

## 2018-08-14 ASSESSMENT — MIFFLIN-ST. JEOR
SCORE: 1697.1
SCORE: 1697.1

## 2018-08-15 LAB
ALBUMIN SERPL-MCNC: 2.4 G/DL (ref 3.5–5)
ALP SERPL-CCNC: 476 U/L (ref 45–120)
ALT SERPL W P-5'-P-CCNC: 30 U/L (ref 0–45)
ANION GAP SERPL CALCULATED.3IONS-SCNC: 9 MMOL/L (ref 5–18)
AST SERPL W P-5'-P-CCNC: 100 U/L (ref 0–40)
BILIRUB DIRECT SERPL-MCNC: 2.7 MG/DL
BILIRUB SERPL-MCNC: 4.4 MG/DL (ref 0–1)
BUN SERPL-MCNC: 10 MG/DL (ref 8–22)
CALCIUM SERPL-MCNC: 8.7 MG/DL (ref 8.5–10.5)
CHLORIDE BLD-SCNC: 98 MMOL/L (ref 98–107)
CO2 SERPL-SCNC: 27 MMOL/L (ref 22–31)
CREAT SERPL-MCNC: 1.81 MG/DL (ref 0.7–1.3)
ERYTHROCYTE [DISTWIDTH] IN BLOOD BY AUTOMATED COUNT: 20.6 % (ref 11–14.5)
GFR SERPL CREATININE-BSD FRML MDRD: 38 ML/MIN/1.73M2
GLUCOSE BLD-MCNC: 83 MG/DL (ref 70–125)
HCT VFR BLD AUTO: 24.7 % (ref 40–54)
HGB BLD-MCNC: 7.8 G/DL (ref 14–18)
MAGNESIUM SERPL-MCNC: 1.1 MG/DL (ref 1.8–2.6)
MCH RBC QN AUTO: 32 PG (ref 27–34)
MCHC RBC AUTO-ENTMCNC: 31.6 G/DL (ref 32–36)
MCV RBC AUTO: 101 FL (ref 80–100)
PLATELET # BLD AUTO: 207 THOU/UL (ref 140–440)
PMV BLD AUTO: 9.9 FL (ref 8.5–12.5)
POTASSIUM BLD-SCNC: 3.8 MMOL/L (ref 3.5–5)
POTASSIUM BLD-SCNC: 4 MMOL/L (ref 3.5–5)
PROT SERPL-MCNC: 6.5 G/DL (ref 6–8)
RBC # BLD AUTO: 2.44 MILL/UL (ref 4.4–6.2)
SODIUM SERPL-SCNC: 134 MMOL/L (ref 136–145)
WBC: 13.7 THOU/UL (ref 4–11)

## 2018-08-15 NOTE — PROGRESS NOTES
Preceptor Attestation:  Patient's case reviewed and discussed with Misbah Y. Palla, MD resident and I evaluated the patient. I agree with written assessment and plan of care.  Supervising Physician:  Anuj Harry MD MD  [facility]

## 2018-09-11 ENCOUNTER — OFFICE VISIT - HEALTHEAST (OUTPATIENT)
Dept: GERIATRICS | Facility: CLINIC | Age: 63
End: 2018-09-11

## 2018-09-11 DIAGNOSIS — R41.89 COGNITIVE AND BEHAVIORAL CHANGES: ICD-10-CM

## 2018-09-11 DIAGNOSIS — K76.82 HEPATIC ENCEPHALOPATHY (H): ICD-10-CM

## 2018-09-11 DIAGNOSIS — K70.11 ALCOHOLIC HEPATITIS WITH ASCITES (H): ICD-10-CM

## 2018-09-11 DIAGNOSIS — J96.01 ACUTE RESPIRATORY FAILURE WITH HYPOXIA (H): ICD-10-CM

## 2018-09-11 DIAGNOSIS — R46.89 COGNITIVE AND BEHAVIORAL CHANGES: ICD-10-CM

## 2018-09-11 DIAGNOSIS — G93.40 ACUTE ENCEPHALOPATHY: ICD-10-CM

## 2018-09-12 ENCOUNTER — OFFICE VISIT - HEALTHEAST (OUTPATIENT)
Dept: GERIATRICS | Facility: CLINIC | Age: 63
End: 2018-09-12

## 2018-09-12 DIAGNOSIS — J96.01 ACUTE RESPIRATORY FAILURE WITH HYPOXIA (H): ICD-10-CM

## 2018-09-12 DIAGNOSIS — K70.11 ALCOHOLIC HEPATITIS WITH ASCITES (H): ICD-10-CM

## 2018-09-12 DIAGNOSIS — I24.89 DEMAND ISCHEMIA (H): ICD-10-CM

## 2018-09-12 DIAGNOSIS — A41.9 SEPSIS (H): ICD-10-CM

## 2018-09-12 DIAGNOSIS — R78.81 BACTEREMIA DUE TO KLEBSIELLA PNEUMONIAE: ICD-10-CM

## 2018-09-12 DIAGNOSIS — E83.42 HYPOMAGNESEMIA: ICD-10-CM

## 2018-09-12 DIAGNOSIS — E87.1 HYPONATREMIA: ICD-10-CM

## 2018-09-12 DIAGNOSIS — N17.9 ACUTE RENAL FAILURE (ARF) (H): ICD-10-CM

## 2018-09-12 DIAGNOSIS — K76.82 HEPATIC ENCEPHALOPATHY (H): ICD-10-CM

## 2018-09-12 DIAGNOSIS — B96.1 BACTEREMIA DUE TO KLEBSIELLA PNEUMONIAE: ICD-10-CM

## 2018-09-13 ENCOUNTER — OFFICE VISIT - HEALTHEAST (OUTPATIENT)
Dept: GERIATRICS | Facility: CLINIC | Age: 63
End: 2018-09-13

## 2018-09-13 ENCOUNTER — RECORDS - HEALTHEAST (OUTPATIENT)
Dept: LAB | Facility: CLINIC | Age: 63
End: 2018-09-13

## 2018-09-13 DIAGNOSIS — R46.89 COGNITIVE AND BEHAVIORAL CHANGES: ICD-10-CM

## 2018-09-13 DIAGNOSIS — M25.562 LEFT KNEE PAIN: ICD-10-CM

## 2018-09-13 DIAGNOSIS — R41.89 COGNITIVE AND BEHAVIORAL CHANGES: ICD-10-CM

## 2018-09-13 DIAGNOSIS — E87.1 HYPONATREMIA: ICD-10-CM

## 2018-09-13 DIAGNOSIS — N17.9 ACUTE RENAL FAILURE, UNSPECIFIED ACUTE RENAL FAILURE TYPE (H): ICD-10-CM

## 2018-09-13 DIAGNOSIS — K72.00 ACUTE LIVER FAILURE WITHOUT HEPATIC COMA: ICD-10-CM

## 2018-09-13 LAB
ALBUMIN SERPL-MCNC: 2.9 G/DL (ref 3.5–5)
ALBUMIN SERPL-MCNC: 2.9 G/DL (ref 3.5–5)
ALP SERPL-CCNC: 219 U/L (ref 45–120)
ALT SERPL W P-5'-P-CCNC: 25 U/L (ref 0–45)
AMMONIA PLAS-SCNC: 55 UMOL/L (ref 11–35)
ANION GAP SERPL CALCULATED.3IONS-SCNC: 12 MMOL/L (ref 5–18)
AST SERPL W P-5'-P-CCNC: 42 U/L (ref 0–40)
BASOPHILS # BLD AUTO: 0.1 THOU/UL (ref 0–0.2)
BASOPHILS NFR BLD AUTO: 1 % (ref 0–2)
BILIRUB SERPL-MCNC: 1.5 MG/DL (ref 0–1)
BNP SERPL-MCNC: 167 PG/ML (ref 0–55)
BUN SERPL-MCNC: 45 MG/DL (ref 8–22)
CALCIUM SERPL-MCNC: 11.2 MG/DL (ref 8.5–10.5)
CHLORIDE BLD-SCNC: 97 MMOL/L (ref 98–107)
CO2 SERPL-SCNC: 24 MMOL/L (ref 22–31)
CREAT SERPL-MCNC: 1.53 MG/DL (ref 0.7–1.3)
EOSINOPHIL # BLD AUTO: 0.6 THOU/UL (ref 0–0.4)
EOSINOPHIL NFR BLD AUTO: 6 % (ref 0–6)
ERYTHROCYTE [DISTWIDTH] IN BLOOD BY AUTOMATED COUNT: 15.3 % (ref 11–14.5)
GFR SERPL CREATININE-BSD FRML MDRD: 46 ML/MIN/1.73M2
GLUCOSE BLD-MCNC: 88 MG/DL (ref 70–125)
HCT VFR BLD AUTO: 27.1 % (ref 40–54)
HGB BLD-MCNC: 9 G/DL (ref 14–18)
LYMPHOCYTES # BLD AUTO: 2.1 THOU/UL (ref 0.8–4.4)
LYMPHOCYTES NFR BLD AUTO: 20 % (ref 20–40)
MAGNESIUM SERPL-MCNC: 2.2 MG/DL (ref 1.8–2.6)
MCH RBC QN AUTO: 29.6 PG (ref 27–34)
MCHC RBC AUTO-ENTMCNC: 33.2 G/DL (ref 32–36)
MCV RBC AUTO: 89 FL (ref 80–100)
MONOCYTES # BLD AUTO: 1.4 THOU/UL (ref 0–0.9)
MONOCYTES NFR BLD AUTO: 13 % (ref 2–10)
NEUTROPHILS # BLD AUTO: 6.4 THOU/UL (ref 2–7.7)
NEUTROPHILS NFR BLD AUTO: 60 % (ref 50–70)
PLATELET # BLD AUTO: 425 THOU/UL (ref 140–440)
PMV BLD AUTO: 9.8 FL (ref 8.5–12.5)
POTASSIUM BLD-SCNC: 4.7 MMOL/L (ref 3.5–5)
PROT SERPL-MCNC: 9.1 G/DL (ref 6–8)
RBC # BLD AUTO: 3.04 MILL/UL (ref 4.4–6.2)
SODIUM SERPL-SCNC: 133 MMOL/L (ref 136–145)
WBC: 10.6 THOU/UL (ref 4–11)

## 2018-09-15 ENCOUNTER — RECORDS - HEALTHEAST (OUTPATIENT)
Dept: LAB | Facility: CLINIC | Age: 63
End: 2018-09-15

## 2018-09-15 LAB
ANION GAP SERPL CALCULATED.3IONS-SCNC: 10 MMOL/L (ref 5–18)
BUN SERPL-MCNC: 46 MG/DL (ref 8–22)
CALCIUM SERPL-MCNC: 11.6 MG/DL (ref 8.5–10.5)
CHLORIDE BLD-SCNC: 96 MMOL/L (ref 98–107)
CO2 SERPL-SCNC: 24 MMOL/L (ref 22–31)
CREAT SERPL-MCNC: 1.79 MG/DL (ref 0.7–1.3)
GFR SERPL CREATININE-BSD FRML MDRD: 39 ML/MIN/1.73M2
GLUCOSE BLD-MCNC: 81 MG/DL (ref 70–125)
POTASSIUM BLD-SCNC: 4.8 MMOL/L (ref 3.5–5)
SODIUM SERPL-SCNC: 130 MMOL/L (ref 136–145)

## 2018-09-18 ENCOUNTER — OFFICE VISIT - HEALTHEAST (OUTPATIENT)
Dept: GERIATRICS | Facility: CLINIC | Age: 63
End: 2018-09-18

## 2018-09-18 DIAGNOSIS — K72.00 ACUTE LIVER FAILURE WITHOUT HEPATIC COMA: ICD-10-CM

## 2018-09-18 DIAGNOSIS — K76.82 HEPATIC ENCEPHALOPATHY (H): ICD-10-CM

## 2018-09-18 DIAGNOSIS — R41.89 COGNITIVE AND BEHAVIORAL CHANGES: ICD-10-CM

## 2018-09-18 DIAGNOSIS — R46.89 COGNITIVE AND BEHAVIORAL CHANGES: ICD-10-CM

## 2018-09-18 DIAGNOSIS — M25.562 LEFT KNEE PAIN: ICD-10-CM

## 2018-09-19 ENCOUNTER — RECORDS - HEALTHEAST (OUTPATIENT)
Dept: LAB | Facility: CLINIC | Age: 63
End: 2018-09-19

## 2018-09-19 ENCOUNTER — OFFICE VISIT - HEALTHEAST (OUTPATIENT)
Dept: GERIATRICS | Facility: CLINIC | Age: 63
End: 2018-09-19

## 2018-09-19 DIAGNOSIS — K76.82 HEPATIC ENCEPHALOPATHY (H): ICD-10-CM

## 2018-09-19 DIAGNOSIS — K72.00 ACUTE LIVER FAILURE WITHOUT HEPATIC COMA: ICD-10-CM

## 2018-09-19 DIAGNOSIS — E83.52 HYPERCALCEMIA: ICD-10-CM

## 2018-09-19 DIAGNOSIS — N17.9 ACUTE RENAL FAILURE, UNSPECIFIED ACUTE RENAL FAILURE TYPE (H): ICD-10-CM

## 2018-09-19 LAB
AMMONIA PLAS-SCNC: 71 UMOL/L (ref 11–35)
ANION GAP SERPL CALCULATED.3IONS-SCNC: 10 MMOL/L (ref 5–18)
BUN SERPL-MCNC: 35 MG/DL (ref 8–22)
CALCIUM SERPL-MCNC: 11.9 MG/DL (ref 8.5–10.5)
CHLORIDE BLD-SCNC: 97 MMOL/L (ref 98–107)
CO2 SERPL-SCNC: 23 MMOL/L (ref 22–31)
CREAT SERPL-MCNC: 1.41 MG/DL (ref 0.7–1.3)
GFR SERPL CREATININE-BSD FRML MDRD: 51 ML/MIN/1.73M2
GLUCOSE BLD-MCNC: 91 MG/DL (ref 70–125)
POTASSIUM BLD-SCNC: 4.4 MMOL/L (ref 3.5–5)
SODIUM SERPL-SCNC: 130 MMOL/L (ref 136–145)

## 2018-09-25 ENCOUNTER — OFFICE VISIT - HEALTHEAST (OUTPATIENT)
Dept: GERIATRICS | Facility: CLINIC | Age: 63
End: 2018-09-25

## 2018-09-25 DIAGNOSIS — K72.00 ACUTE LIVER FAILURE WITHOUT HEPATIC COMA: ICD-10-CM

## 2018-09-25 DIAGNOSIS — N17.9 ACUTE RENAL FAILURE, UNSPECIFIED ACUTE RENAL FAILURE TYPE (H): ICD-10-CM

## 2018-09-25 DIAGNOSIS — R44.3 HALLUCINATIONS: ICD-10-CM

## 2018-09-25 DIAGNOSIS — F10.10 ALCOHOL ABUSE: ICD-10-CM

## 2018-09-26 ENCOUNTER — RECORDS - HEALTHEAST (OUTPATIENT)
Dept: LAB | Facility: CLINIC | Age: 63
End: 2018-09-26

## 2018-09-27 LAB
AMMONIA PLAS-SCNC: 33 UMOL/L (ref 11–35)
ANION GAP SERPL CALCULATED.3IONS-SCNC: 10 MMOL/L (ref 5–18)
BUN SERPL-MCNC: 25 MG/DL (ref 8–22)
CALCIUM SERPL-MCNC: 11.7 MG/DL (ref 8.5–10.5)
CHLORIDE BLD-SCNC: 95 MMOL/L (ref 98–107)
CO2 SERPL-SCNC: 25 MMOL/L (ref 22–31)
CREAT SERPL-MCNC: 1.13 MG/DL (ref 0.7–1.3)
GFR SERPL CREATININE-BSD FRML MDRD: >60 ML/MIN/1.73M2
GLUCOSE BLD-MCNC: 84 MG/DL (ref 70–125)
POTASSIUM BLD-SCNC: 4 MMOL/L (ref 3.5–5)
SODIUM SERPL-SCNC: 130 MMOL/L (ref 136–145)

## 2018-10-02 ENCOUNTER — OFFICE VISIT - HEALTHEAST (OUTPATIENT)
Dept: GERIATRICS | Facility: CLINIC | Age: 63
End: 2018-10-02

## 2018-10-02 ENCOUNTER — MEDICAL CORRESPONDENCE (OUTPATIENT)
Dept: HEALTH INFORMATION MANAGEMENT | Facility: CLINIC | Age: 63
End: 2018-10-02

## 2018-10-02 DIAGNOSIS — F10.10 ALCOHOL ABUSE: ICD-10-CM

## 2018-10-02 DIAGNOSIS — M25.562 LEFT KNEE PAIN: ICD-10-CM

## 2018-10-02 DIAGNOSIS — N17.9 ACUTE RENAL FAILURE, UNSPECIFIED ACUTE RENAL FAILURE TYPE (H): ICD-10-CM

## 2018-10-02 DIAGNOSIS — K72.00 ACUTE LIVER FAILURE WITHOUT HEPATIC COMA: ICD-10-CM

## 2018-10-03 ENCOUNTER — TELEPHONE (OUTPATIENT)
Dept: FAMILY MEDICINE | Facility: CLINIC | Age: 63
End: 2018-10-03

## 2018-10-03 ENCOUNTER — AMBULATORY - HEALTHEAST (OUTPATIENT)
Dept: GERIATRICS | Facility: CLINIC | Age: 63
End: 2018-10-03

## 2018-10-03 NOTE — TELEPHONE ENCOUNTER
Thank you. Message noted and records reviewed for visit tomorrow. Thank you!    Zachary Lewis MD  October 3, 2018  10:34 AM

## 2018-10-03 NOTE — TELEPHONE ENCOUNTER
Pt has been released from [facility] at [facility] and in need of labs- bmp, to measure levels    Also in need of a release for work.  Pt reports doing well and using a walker but able to navigate stairs and be at home now.      Scheduled appt for Thursday at 3:40pm with Dr Lewis, notes on his desk along with last med list for review    lbetz

## 2018-10-04 ENCOUNTER — OFFICE VISIT (OUTPATIENT)
Dept: FAMILY MEDICINE | Facility: CLINIC | Age: 63
End: 2018-10-04
Payer: COMMERCIAL

## 2018-10-04 VITALS
HEIGHT: 66 IN | TEMPERATURE: 97.5 F | WEIGHT: 169 LBS | DIASTOLIC BLOOD PRESSURE: 80 MMHG | RESPIRATION RATE: 16 BRPM | SYSTOLIC BLOOD PRESSURE: 128 MMHG | HEART RATE: 113 BPM | BODY MASS INDEX: 27.16 KG/M2 | OXYGEN SATURATION: 100 %

## 2018-10-04 DIAGNOSIS — K76.82 HEPATIC ENCEPHALOPATHY (H): Primary | ICD-10-CM

## 2018-10-04 DIAGNOSIS — D64.9 ANEMIA, UNSPECIFIED TYPE: ICD-10-CM

## 2018-10-04 DIAGNOSIS — Z23 NEEDS FLU SHOT: ICD-10-CM

## 2018-10-04 DIAGNOSIS — N17.9 ACUTE KIDNEY INJURY (H): ICD-10-CM

## 2018-10-04 DIAGNOSIS — Z23 IMMUNIZATION DUE: ICD-10-CM

## 2018-10-04 LAB
% GRANULOCYTES: 75 %G (ref 40–75)
ALBUMIN SERPL-MCNC: 3.3 G/DL (ref 3.2–4.5)
ALP SERPL-CCNC: 149 U/L (ref 40–150)
ALT SERPL-CCNC: 29 U/L (ref 0–45)
AST SERPL-CCNC: 48 U/L (ref 0–55)
BILIRUB SERPL-MCNC: 0.9 MG/DL (ref 0.2–1.3)
BUN SERPL-MCNC: 20 MG/DL (ref 7–30)
CALCIUM SERPL-MCNC: 11.7 MG/DL (ref 8.5–10.4)
CHLORIDE SERPLBLD-SCNC: 99 MMOL/L (ref 94–109)
CO2 SERPL-SCNC: 24 MMOL/L (ref 20–32)
CREAT SERPL-MCNC: 1.3 MG/DL (ref 0.8–1.5)
EGFR CALCULATED (BLACK REFERENCE): 71.9 ML/MIN
EGFR CALCULATED (NON BLACK REFERENCE): 59.5 ML/MIN
GLUCOSE SERPL-MCNC: 97 MG/DL (ref 60–109)
GRANULOCYTES #: 8.3 K/UL (ref 1.6–8.3)
HCT VFR BLD AUTO: 33.7 % (ref 40–53)
HEMOGLOBIN: 10.5 G/DL (ref 13.3–17.7)
LYMPHOCYTES # BLD AUTO: 1.9 K/UL (ref 0.8–5.3)
LYMPHOCYTES NFR BLD AUTO: 17.2 %L (ref 20–48)
MAGNESIUM SERPL-MCNC: 2.1 MG/DL (ref 1.8–2.6)
MCH RBC QN AUTO: 27.3 PG (ref 26.5–35)
MCHC RBC AUTO-ENTMCNC: 31.2 G/DL (ref 32–36)
MCV RBC AUTO: 87.5 FL (ref 78–100)
MID #: 0.9 K/UL (ref 0–2.2)
MID %: 7.8 %M (ref 0–20)
PHOSPHATE SERPL-MCNC: 4.4 MG/DL (ref 2.5–4.5)
PLATELET # BLD AUTO: 390 K/UL (ref 150–450)
POTASSIUM SERPL-SCNC: 5 MMOL/L (ref 3.4–5.3)
PROT SERPL-MCNC: 9.2 G/DL (ref 6.6–8.8)
RBC # BLD AUTO: 3.85 M/UL (ref 4.4–5.9)
SODIUM SERPL-SCNC: 135 MMOL/L (ref 133–144)
WBC # BLD AUTO: 11.1 K/UL (ref 4–11)

## 2018-10-04 NOTE — LETTER
RETURN TO WORK/SCHOOL FORM    10/4/2018    Re: Aayush García  1955      To Whom It May Concern:     Aayush García was seen in clinic today..  He may return to work with restrictions on 10/15/18.     Restrictions:  limited activities -> please allow to remain seated 45 min every hour with a maximum of 15 minutes of standing per hour.      Please allow an additional 1-2 unscheduled breaks of 10 minutes per day    Please contact me with any additional questions or concerns.         Zachary Lewis MD  October 4, 2018  4:24 PM

## 2018-10-04 NOTE — NURSING NOTE
Injectable influenza vaccine documentation    1. Has the patient received the information for the influenza vaccine? YES    2. Does the patient have a severe allergy to eggs (Patients with a severe egg allergy should be assessed by a medical provider, RN, or clinical pharmacist. If they receive the influenza vaccine, please have them observed for 15 minutes.)? No    3. Has the patient had an allergic reaction to previous influenza vaccines? No    4. Has the patient had any severe allergic reactions to past influenza vaccines ? No       5. Does patient have a history of Guillain-[place] syndrome? No      Based on responses above, I administered the influenza vaccine.  China Day MA     Ok with tdap

## 2018-10-04 NOTE — PROGRESS NOTES
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  Would like to return to work w/ restrictions -> needs to be sitting 45 min each hour; max 15 min standing per hr      Past Medical History:   Diagnosis Date     Chronic liver disease       Past Surgical History:   Procedure Laterality Date     NO HISTORY OF SURGERY        Current Outpatient Prescriptions   Medication Sig Dispense Refill     aspirin 81 MG EC tablet Take 1 tablet (81 mg) by mouth daily 90 tablet 3     lisinopril (PRINIVIL/ZESTRIL) 10 MG tablet Take 1 tablet (10 mg) by mouth daily (Patient not taking: Reported on 10/4/2018) 90 tablet 3     Multiple Vitamins-Minerals (MULTIVITAMIN & MINERAL PO) Take 1 tablet by mouth daily       omeprazole (PRILOSEC) 40 MG capsule Take 1 capsule (40 mg) by mouth daily Take 30-60 minutes before a meal. 30 capsule 0     order for DME Equipment being ordered: Blood pressure instrument and cuff 1 each 0     rosuvastatin (CRESTOR) 10 MG tablet Take 1 tablet (10 mg) by mouth daily 30 tablet 3      Allergies   Allergen Reactions     No Known Allergies       Social History   Substance Use Topics     Smoking status: Current Every Day Smoker     Last attempt to quit: 2/20/2018     Smokeless tobacco: Never Used      Comment: About 2 cig/day     Alcohol use Yes      Comment: About 7 drinks/week      Family History   Problem Relation Age of Onset     HEART DISEASE Father      Diabetes No family hx of      Coronary Artery Disease Early Onset No family hx of      Cancer No family hx of  " "     ROS:   Gen- no weight change, no fevers/chills   Cardiac - no palpitations, no chest pain   Respiratory - no shortness of breath , no wheezing   GI - no nausea, no vomiting, no diarrhea, no constipation   Urinary - no dysuria, no polyuria; no urinary retention  Neuro - no numbness, no tingling   Remainder of ROS negative.     Exam:   /80  Pulse 113  Temp 97.5  F (36.4  C) (Oral)  Resp 16  Ht 5' 5.95\" (167.5 cm)  Wt 169 lb (76.7 kg)  SpO2 100%  BMI 27.32 kg/m2   Alert and oriented x 3; No acute distress   Neck: no masses, no lymphadenopathy   Resp: clear to auscultation bilaterally, no wheezing/ronchi   CV: rate/rhythm regular, no murmurs/rubs/gallops   ABd: soft, + bowel sounds,+distended, no rebound/guarding   Neuro: no focal deficits         (K72.90) Hepatic encephalopathy (H)  (primary encounter diagnosis)  Comment: stable now s/p hosp; letter written to return to work w/ requested restrictions  Plan: Comprehensive Metabolic Panel ([facility]) -         results <1hr Protocol, Magnesium ([facility]),         Phosphorus ([facility])            (N17.9) Acute kidney injury (H)  Comment: will repeat labs  Plan: Comprehensive Metabolic Panel ([facility]) -         results <1hr Protocol, Magnesium ([facility]),         Phosphorus ([facility])            (D64.9) Anemia, unspecified type  Comment: will check CBC as last check was in 6/2018  Plan: CBC with Diff Plt ([facility])            Zachary Lewis MD  October 4, 2018  4:25 PM    "

## 2018-10-04 NOTE — MR AVS SNAPSHOT
"              After Visit Summary   10/4/2018    Aayush García    MRN: [id number]           Patient Information     Date Of Birth          1955        Visit Information        Provider Department      10/4/2018 3:40 PM Zachary Lewis MD [facility]        Today's Diagnoses     Hepatic encephalopathy (H)    -  1    Acute kidney injury (H)        Anemia, unspecified type           Follow-ups after your visit        Follow-up notes from your care team     Return in about 1 month (around 11/4/2018).      Who to contact     Please call your clinic at [phone] to:    Ask questions about your health    Make or cancel appointments    Discuss your medicines    Learn about your test results    Speak to your doctor            Additional Information About Your Visit        Care EveryWhere ID     This is your Care EveryWhere ID. This could be used by other organizations to access your [place] medical records  [id number]        Your Vitals Were     Pulse Temperature Respirations Height Pulse Oximetry BMI (Body Mass Index)    113 97.5  F (36.4  C) (Oral) 16 5' 5.95\" (167.5 cm) 100% 27.32 kg/m2       Blood Pressure from Last 3 Encounters:   10/04/18 128/80   07/18/18 97/48   06/04/18 129/61    Weight from Last 3 Encounters:   10/04/18 169 lb (76.7 kg)   06/04/18 187 lb (84.8 kg)   06/01/18 186 lb 6.4 oz (84.6 kg)              We Performed the Following     CBC with Diff Plt ([facility])     Comprehensive Metabolic Panel ([facility]) - results <1hr Protocol     Magnesium ([facility])     Phosphorus ([facility])        Primary Care Provider Office Phone # Fax #    Benjamin Rosenstein, [phone]       [facility] [place] [address]        Equal Access to Services     [facility] : Hadii mildred Li, mervat resendiz, kathe alarcon. So [facility] [phone].    ATENCIÓN: Si habla español, tiene a srivastava disposición servicios gratuitos " de asistencia lingüística. Olivier roberts [phone].    We comply with applicable federal civil rights laws and [place] laws. We do not discriminate on the basis of race, color, national origin, age, disability, sex, sexual orientation, or gender identity.            Thank you!     Thank you for choosing [facility]  for your care. Our goal is always to provide you with excellent care. Hearing back from our patients is one way we can continue to improve our services. Please take a few minutes to complete the written survey that you may receive in the mail after your visit with us. Thank you!             Your Updated Medication List - Protect others around you: Learn how to safely use, store and throw away your medicines at www.disposemymeds.org.          This list is accurate as of 10/4/18  4:27 PM.  Always use your most recent med list.                   Brand Name Dispense Instructions for use Diagnosis    aspirin 81 MG EC tablet     90 tablet    Take 1 tablet (81 mg) by mouth daily    Benign essential hypertension       lisinopril 10 MG tablet    PRINIVIL/ZESTRIL    90 tablet    Take 1 tablet (10 mg) by mouth daily    Benign essential hypertension, Encounter for screening colonoscopy, Anemia, unspecified type       MULTIVITAMIN & MINERAL PO      Take 1 tablet by mouth daily        omeprazole 40 MG capsule    priLOSEC    30 capsule    Take 1 capsule (40 mg) by mouth daily Take 30-60 minutes before a meal.    Dark stools       order for DME     1 each    Equipment being ordered: Blood pressure instrument and cuff    Benign essential hypertension, Encounter for screening colonoscopy, Anemia, unspecified type       rosuvastatin 10 MG tablet    CRESTOR    30 tablet    Take 1 tablet (10 mg) by mouth daily    Benign essential hypertension

## 2018-10-08 ENCOUNTER — TELEPHONE (OUTPATIENT)
Dept: FAMILY MEDICINE | Facility: CLINIC | Age: 63
End: 2018-10-08

## 2018-10-08 DIAGNOSIS — E83.52 HYPERCALCEMIA: Primary | ICD-10-CM

## 2018-10-08 NOTE — TELEPHONE ENCOUNTER
Spoke to pt.  Will check follow-up labs for hypercalcemia.      Pt also requesting an appt for knee pain/ eval of hip pain.  I am able to see him this Wed AM.      Team - please contact pt and schedule on 10/10 am for follow-up. Thank you!    Zachary Lewis MD  October 8, 2018  9:26 AM

## 2018-10-10 ENCOUNTER — OFFICE VISIT (OUTPATIENT)
Dept: FAMILY MEDICINE | Facility: CLINIC | Age: 63
End: 2018-10-10
Payer: COMMERCIAL

## 2018-10-10 VITALS
HEART RATE: 104 BPM | TEMPERATURE: 97.9 F | WEIGHT: 174 LBS | OXYGEN SATURATION: 100 % | DIASTOLIC BLOOD PRESSURE: 71 MMHG | RESPIRATION RATE: 16 BRPM | BODY MASS INDEX: 28.13 KG/M2 | SYSTOLIC BLOOD PRESSURE: 113 MMHG

## 2018-10-10 DIAGNOSIS — M17.12 PRIMARY OSTEOARTHRITIS OF LEFT KNEE: Primary | ICD-10-CM

## 2018-10-10 DIAGNOSIS — M70.61 TROCHANTERIC BURSITIS OF RIGHT HIP: ICD-10-CM

## 2018-10-10 DIAGNOSIS — E83.52 HYPERCALCEMIA: ICD-10-CM

## 2018-10-10 LAB — PARATHYROID HORMONE: 14 PG/ML (ref 10–86)

## 2018-10-10 RX ORDER — POTASSIUM CHLORIDE 1500 MG/1
20 TABLET, EXTENDED RELEASE ORAL
COMMUNITY
Start: 2018-09-07 | End: 2018-10-30

## 2018-10-10 RX ORDER — MAGNESIUM OXIDE 400 MG/1
400 TABLET ORAL
COMMUNITY
Start: 2018-09-07 | End: 2018-10-31

## 2018-10-10 RX ORDER — FOLIC ACID/VIT B COMPLEX AND C 0.8 MG
1 TABLET ORAL DAILY
Status: ON HOLD | COMMUNITY
Start: 2018-09-08 | End: 2023-09-05

## 2018-10-10 RX ORDER — SPIRONOLACTONE 50 MG/1
50 TABLET, FILM COATED ORAL
COMMUNITY
Start: 2018-09-07 | End: 2018-10-30

## 2018-10-10 RX ORDER — TRIAMCINOLONE ACETONIDE 40 MG/ML
40 INJECTION, SUSPENSION INTRA-ARTICULAR; INTRAMUSCULAR ONCE
Qty: 1 ML | Refills: 0 | OUTPATIENT
Start: 2018-10-10 | End: 2018-10-10

## 2018-10-10 RX ORDER — BUMETANIDE 2 MG/1
2 TABLET ORAL
COMMUNITY
Start: 2018-09-07 | End: 2018-10-30

## 2018-10-10 RX ORDER — ACETAMINOPHEN 325 MG/1
650 TABLET ORAL
COMMUNITY
Start: 2018-09-07 | End: 2020-06-03

## 2018-10-10 NOTE — NURSING NOTE
Chief Complaint   Patient presents with     Knee Pain     Left. Would like a cortisone injection.      Medication Reconciliation     completed. BUT needs attention. Question about Aspirin and MVI.        /71  Pulse 104  Temp 97.9  F (36.6  C) (Oral)  Resp 16  Wt 174 lb (78.9 kg)  SpO2 100%  BMI 28.13 kg/m2        Left knee Kenalog 40 mg/ml injection performed by Dr. Zachary Lewis today. NONE wasted.  Lot # [id number]  EX: 01/2020  NDC:[address]-8

## 2018-10-10 NOTE — MR AVS SNAPSHOT
After Visit Summary   10/10/2018    Aayush García    MRN: [id number]           Patient Information     Date Of Birth          1955        Visit Information        Provider Department      10/10/2018 8:40 AM Zachary Lewis MD [facility]        Today's Diagnoses     Primary osteoarthritis of left knee    -  1    Trochanteric bursitis of right hip           Follow-ups after your visit        Additional Services     PHYSICAL THERAPY REFERRAL       PT/OT REFERRAL  Aayush García  1955  Phone #: [phone] (home)    needed: No  Language: English    PT/OT  Facility:   Other: will coordinate w/ referrals    History: L knee OA/ R hip bursitis    Precautions/Contraindications: Unsteadiness at baseline    Imaging Studies: X-Ray    Surgical Procedure/Test Results: None    Treatment Goals:   Increase: Flexibility, Strength and ROM    Prognosis: good    Visits: Up to 12    Evaluate: Evaluate and treat    Plan: Manual Therapy, Flexibility Exercise and Strength Exercise                  Future tests that were ordered for you today     Open Future Orders        Priority Expected Expires Ordered    PHYSICAL THERAPY REFERRAL Routine  10/10/2019 10/10/2018            Who to contact     Please call your clinic at [phone] to:    Ask questions about your health    Make or cancel appointments    Discuss your medicines    Learn about your test results    Speak to your doctor            Additional Information About Your Visit        MyChart Information     Trapster is an electronic gateway that provides easy, online access to your medical records. With Trapster, you can request a clinic appointment, read your test results, renew a prescription or communicate with your care team.     To sign up for Backchannel[employer]at visit the website at [email or web address]   You will be asked to enter the access code listed below, as well as some personal information. Please follow the directions to create your  username and password.     Your access code is: [email or web address]  Expires: 2019  9:33 AM     Your access code will  in 90 days. If you need help or a new code, please contact your [facility] or call [phone] for assistance.        Care EveryWhere ID     This is your Care EveryWhere ID. This could be used by other organizations to access your [facility] medical records  [id number]        Your Vitals Were     Pulse Temperature Respirations Pulse Oximetry BMI (Body Mass Index)       104 97.9  F (36.6  C) (Oral) 16 100% 28.13 kg/m2        Blood Pressure from Last 3 Encounters:   10/10/18 113/71   10/04/18 128/80   18 97/48    Weight from Last 3 Encounters:   10/10/18 174 lb (78.9 kg)   10/04/18 169 lb (76.7 kg)   18 187 lb (84.8 kg)              We Performed the Following     DRAIN LARGE JOINT/BURSA          Today's Medication Changes          These changes are accurate as of 10/10/18  9:35 AM.  If you have any questions, ask your nurse or doctor.               Start taking these medicines.        Dose/Directions    diclofenac 1 % Gel topical gel   Commonly known as:  VOLTAREN   Used for:  Primary osteoarthritis of left knee, Trochanteric bursitis of right hip   Started by:  Zachary Lewis MD        Apply 4 grams to knees or lateral hip four times daily using enclosed dosing card.   Quantity:  100 g   Refills:  1            Where to get your medicines      These medications were sent to [id number] [facility] - [place], [place] - [address]  [address] [place] [address] [address], [place] [address]     Phone:  [phone]     diclofenac 1 % Gel topical gel                Primary Care Provider Office Phone # Fax #    Zachary Lewis [phone]       [facility] [place] [address]        Equal Access to Services     JUANA GUTIÉRREZ AH: Sabrina bello Soomaali, waaxda luqadaha, qaybta kaalkathe cruz.  So [facility] [phone].    ATENCIÓN: Si raudel platt, tiene a srivastava disposición servicios gratuitos de asistencia lingüística. Olivier roberts [phone].    We comply with applicable federal civil rights laws and [place] laws. We do not discriminate on the basis of race, color, national origin, age, disability, sex, sexual orientation, or gender identity.            Thank you!     Thank you for choosing [facility]  for your care. Our goal is always to provide you with excellent care. Hearing back from our patients is one way we can continue to improve our services. Please take a few minutes to complete the written survey that you may receive in the mail after your visit with us. Thank you!             Your Updated Medication List - Protect others around you: Learn how to safely use, store and throw away your medicines at www.disposemymeds.org.          This list is accurate as of 10/10/18  9:35 AM.  Always use your most recent med list.                   Brand Name Dispense Instructions for use Diagnosis    acetaminophen 325 MG tablet    TYLENOL     Take 650 mg by mouth        aspirin 81 MG EC tablet     90 tablet    Take 1 tablet (81 mg) by mouth daily    Benign essential hypertension       bumetanide 2 MG tablet    BUMEX     Take 2 mg by mouth        diclofenac 1 % Gel topical gel    VOLTAREN    100 g    Apply 4 grams to knees or lateral hip four times daily using enclosed dosing card.    Primary osteoarthritis of left knee, Trochanteric bursitis of right hip       lisinopril 10 MG tablet    PRINIVIL/ZESTRIL    90 tablet    Take 1 tablet (10 mg) by mouth daily    Benign essential hypertension, Encounter for screening colonoscopy, Anemia, unspecified type       magnesium oxide 400 MG tablet    MAG-OX     Take 400 mg by mouth        MULTIVITAMIN & MINERAL PO      Take 1 tablet by mouth daily        NEPHRO-FRANCISCO 0.8 MG Tabs      Take 1 tablet by mouth daily        omeprazole 40 MG capsule    priLOSEC    30 capsule     Take 1 capsule (40 mg) by mouth daily Take 30-60 minutes before a meal.    Dark stools       order for DME     1 each    Equipment being ordered: Blood pressure instrument and cuff    Benign essential hypertension, Encounter for screening colonoscopy, Anemia, unspecified type       potassium chloride SA 20 MEQ CR tablet    K-DUR/KLOR-CON M     Take 20 mEq by mouth        rifaximin 550 MG Tabs tablet    XIFAXAN     Take 550 mg by mouth        rosuvastatin 10 MG tablet    CRESTOR    30 tablet    Take 1 tablet (10 mg) by mouth daily    Benign essential hypertension       spironolactone 50 MG tablet    ALDACTONE     Take 50 mg by mouth

## 2018-10-10 NOTE — PATIENT INSTRUCTIONS
Referral for PT and Daughter took the numbers for both [facility] and will call to schedule appointment after she contacts his insurance company to see what is covered and which might be within network.   Jazmine

## 2018-10-10 NOTE — PROGRESS NOTES
"Pt is a 63 year old male here today for:     HPI:   R Hip pain :   Location? Lateral; no radiation to groin or down the leg  Duration? \"long time\" - several yrs   Injury/ Inciting activity? No   Pop? No   Swelling/Bruising? 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 NO   Giving way/ instability? No   Numbness/Tingling? YES - in toes and around his hip; intermittent  Imaging? No   Treatment? No     HPI:   L Knee pain :   Duration? \"quite a while\" -> couple yrs   Injury/ Inciting activity? No   Pop? No   Swelling? No   Limited motion? YES- intermittent   Locking/ Catching? YES - intermittent   Giving way/ instability? \"Once in a while\"  Imaging? YES   Treatment? PT/ brace -> wears brace all the time; last PT course was completed Oct 1       Past Medical History:   Diagnosis Date     Chronic liver disease       Past Surgical History:   Procedure Laterality Date     NO HISTORY OF SURGERY        Current Outpatient Prescriptions   Medication Sig Dispense Refill     acetaminophen (TYLENOL) 325 MG tablet Take 650 mg by mouth       B Complex-C-Folic Acid (NEPHRO-FRANCISCO) 0.8 MG TABS Take 1 tablet by mouth daily       bumetanide (BUMEX) 2 MG tablet Take 2 mg by mouth       magnesium oxide (MAG-OX) 400 MG tablet Take 400 mg by mouth       omeprazole (PRILOSEC) 40 MG capsule Take 1 capsule (40 mg) by mouth daily Take 30-60 minutes before a meal. 30 capsule 0     potassium chloride SA (K-DUR/KLOR-CON M) 20 MEQ CR tablet Take 20 mEq by mouth       rifaximin (XIFAXAN) 550 MG TABS tablet Take 550 mg by mouth       spironolactone (ALDACTONE) 50 MG tablet Take 50 mg by mouth       aspirin 81 MG EC tablet Take 1 tablet (81 mg) by mouth daily 90 tablet 3     lisinopril (PRINIVIL/ZESTRIL) 10 MG tablet Take 1 tablet (10 mg) by mouth daily (Patient not taking: Reported on 10/4/2018) 90 tablet 3     Multiple Vitamins-Minerals (MULTIVITAMIN & MINERAL PO) Take 1 tablet by mouth daily       order for DME " Equipment being ordered: Blood pressure instrument and cuff 1 each 0     rosuvastatin (CRESTOR) 10 MG tablet Take 1 tablet (10 mg) by mouth daily 30 tablet 3      Allergies   Allergen Reactions     No Known Allergies       Social History   Substance Use Topics     Smoking status: Current Every Day Smoker     Last attempt to quit: 2/20/2018     Smokeless tobacco: Never Used      Comment: About 2 cig/day     Alcohol use Yes      Comment: About 7 drinks/week      Family History   Problem Relation Age of Onset     HEART DISEASE Father      Diabetes No family hx of      Coronary Artery Disease Early Onset No family hx of      Cancer No family hx of       ROS:   Gen- no weight change, no fevers/chills   Neuro - see HPI   Remainder of ROS negative.     Exam:   /71  Pulse 104  Temp 97.9  F (36.6  C) (Oral)  Resp 16  Wt 174 lb (78.9 kg)  SpO2 100%  BMI 28.13 kg/m2     L Knee:   ROM: 0-100; Crepitus: YES   Effusion: No ; Swelling: No   Strength: Full in flexion/ extension   Tenderness: Patella - No Medial joint line - YES; Lateral joint line - NO; Quad tendon - NO; Patellar tendon- NO; Hamstring - No.   Patella: patellar compression - neg   Meniscus: Sherin - deferred    L Hip:   Inspection: Swelling - No; Bruising - NO  ROM: Flexion -full; Extension - full; ER - full; IR -full; Abduction -full; Adduction full  Strength: Full in all planes  Tenderness: Trochanter - YES ASIS - NO; Inguinal Ligament - NO; Pubic Symphysis - NO; Ischial Tuberosity- No; Hamstring - No; SI Joint - No.   Maneuvers: CLAUDIA - NEG; FADIR - NEG;    Procedure Note:  The pt was verbally consented. The L knee was sterilely prepped in usual fashion. 1cc of 40mg/mL Kenalog and 4 cc of 1% lidocaine was injected via lateral approach without complication. Pt tolerated procedure well.     (M17.12) Primary osteoarthritis of left knee  (primary encounter diagnosis)  Comment: cortisone today; will cont bracing and restart PT; voltaren prn; f/u in 1  month  Plan: DRAIN LARGE JOINT/BURSA, PHYSICAL THERAPY         REFERRAL, diclofenac (VOLTAREN) 1 % GEL topical gel            (M70.61) Trochanteric bursitis of right hip  Comment: exam consistent w/ bursitis and not hip OA; deferred injection; will have him rehab; precautions given; f/u in 1 month  Plan: PHYSICAL THERAPY REFERRAL, diclofenac         (VOLTAREN) 1 % GEL topical gel           Zachary Lewis MD  October 10, 2018  9:31 AM

## 2018-10-12 ENCOUNTER — TELEPHONE (OUTPATIENT)
Dept: FAMILY MEDICINE | Facility: CLINIC | Age: 63
End: 2018-10-12

## 2018-10-12 LAB — 25(OH)D3 SERPL-MCNC: 21.5 NG/ML (ref 30–80)

## 2018-10-12 NOTE — TELEPHONE ENCOUNTER
[facility] phone call message- general phone call:    Reason for call: Calling in stating they have a letter of restrictions from Dr. Lewis but it doesn't include what the max. weight that he can carry is. Would like another letter with that included in it. She also stated that the patient was interested in a handicap sticker until he's able to be back on his feet. Would like to know if he can get that form filled out or would he need to come in for another visit. Please call and advise.     Return call needed: Yes    OK to leave a message on voice mail? Yes    Primary language: English      needed? No    Call taken on October 12, 2018 at 9:07 AM by Ksenia Trammell

## 2018-10-12 NOTE — TELEPHONE ENCOUNTER
Spoke to wife. New letter written as requested and printed for pick-up.    Zachary Lewis MD  October 12, 2018  3:35 PM

## 2018-10-12 NOTE — LETTER
October 12, 2018      Aayush García  [address] [facility] [address]        RETURN TO WORK/SCHOOL FORM    10/12/2018    Re: Aayush García  1955      To Whom It May Concern:     Aayush García was seen in clinic on 10/4/18.  He may return to work with restrictions on 10/15/18.     Restrictions:      1) limited activities -> please allow to remain seated 45 min every hour with a maximum of 15 minutes of standing per hour.      2) Please allow him to use a walker    3) Lifting maximum: 20 lbs      Please allow an additional 1-2 unscheduled breaks of 10 minutes per day    Please contact me with any additional questions or concerns.         Sincerely,        Zachary Lewis MD  October 12, 2018  3:34 PM

## 2018-10-17 ENCOUNTER — TELEPHONE (OUTPATIENT)
Dept: DERMATOLOGY | Facility: CLINIC | Age: 63
End: 2018-10-17

## 2018-10-17 NOTE — TELEPHONE ENCOUNTER
[facility]    Phone Message    May a detailed message be left on voicemail: yes    Reason for Call: Other: Please fax Physical therapy orders to [facility] in [place] at [phone].     Action Taken: Message routed to:  [facility] Sports med

## 2018-10-30 DIAGNOSIS — K70.9 LIVER DISEASE, CHRONIC, DUE TO ALCOHOL (H): Primary | ICD-10-CM

## 2018-10-30 DIAGNOSIS — B35.6 TINEA CRURIS: ICD-10-CM

## 2018-10-30 DIAGNOSIS — G47.00 INSOMNIA, UNSPECIFIED TYPE: ICD-10-CM

## 2018-10-30 DIAGNOSIS — K59.00 CONSTIPATION, UNSPECIFIED CONSTIPATION TYPE: ICD-10-CM

## 2018-10-30 DIAGNOSIS — K21.9 GASTROESOPHAGEAL REFLUX DISEASE WITHOUT ESOPHAGITIS: ICD-10-CM

## 2018-10-30 RX ORDER — SPIRONOLACTONE 50 MG/1
50 TABLET, FILM COATED ORAL DAILY
Qty: 30 TABLET | Refills: 11 | Status: SHIPPED | OUTPATIENT
Start: 2018-10-30 | End: 2018-10-31

## 2018-10-30 RX ORDER — POTASSIUM CHLORIDE 1500 MG/1
20 TABLET, EXTENDED RELEASE ORAL DAILY
Qty: 30 TABLET | Refills: 11 | Status: SHIPPED | OUTPATIENT
Start: 2018-10-30 | End: 2019-09-30

## 2018-10-30 RX ORDER — BUMETANIDE 2 MG/1
2 TABLET ORAL DAILY
Qty: 30 TABLET | Refills: 11 | Status: SHIPPED | OUTPATIENT
Start: 2018-10-30 | End: 2018-10-31

## 2018-10-30 NOTE — TELEPHONE ENCOUNTER
Message to physician: Please verify these medication storage in historical only    Date of last visit: 10/10/2018     Date of next visit if scheduled: Visit date 11/07/18    Last Comprehensive Metabolic Panel:  Sodium   Date Value Ref Range Status   10/04/2018 135.0 133.0 - 144.0 mmol/L Final     Potassium   Date Value Ref Range Status   10/04/2018 5.0 3.4 - 5.3 mmol/L Final     Chloride   Date Value Ref Range Status   10/04/2018 99.0 94.0 - 109.0 mmol/L Final     Carbon Dioxide   Date Value Ref Range Status   10/04/2018 24.0 20.0 - 32.0 mmol/L Final     Glucose   Date Value Ref Range Status   10/04/2018 97.0 60.0 - 109.0 mg/dL Final     Urea Nitrogen   Date Value Ref Range Status   10/04/2018 20.0 7.0 - 30.0 mg/dL Final     Creatinine   Date Value Ref Range Status   10/04/2018 1.3 0.8 - 1.5 mg/dL Final     GFR Estimate   Date Value Ref Range Status   02/12/2018 >60 >60 mL/min/1.73m2 Final     Calcium   Date Value Ref Range Status   10/04/2018 11.7 (H) 8.5 - 10.4 mg/dL Final       BP Readings from Last 3 Encounters:   10/10/18 113/71   10/04/18 128/80   07/18/18 97/48       No results found for: A1C             Please complete refill and CLOSE ENCOUNTER.  Closing the encounter signifies the refill is complete.

## 2018-10-30 NOTE — TELEPHONE ENCOUNTER
[facility] Family Medicine phone call message- patient requesting a refill:    Full Medication Name: bumetanide (BUMEX) 2 MG/  potassium chloride SA (K-DUR/KLOR-CON M) 20 MEQ/  rifaximin (XIFAXAN) 550 MG/   spironolactone (ALDACTONE) 50 MG    Dose:     Pharmacy confirmed as   [id number] - [place], [place] - [address] [facility] [address]  [address]  [place] [address]  Phone: [phone] Fax: [phone]  : Yes    Additional Comments: Caller stated pt will be out of the medications listed above tomorrow 10/31/18. She stated they only received a refill for omeprazole, but still needs refills for the four medications listed above. Notified it can take up to 2 business days. Please call and advise.     OK to leave a message on voice mail? Yes    Primary language: English      needed? No    Call taken on October 30, 2018 at 1:57 PM by Ada Gonzalez

## 2018-10-31 RX ORDER — MAGNESIUM OXIDE 400 MG/1
400 TABLET ORAL DAILY
Qty: 60 TABLET | Refills: 11 | COMMUNITY
Start: 2018-10-31

## 2018-10-31 RX ORDER — BUMETANIDE 2 MG/1
2 TABLET ORAL 2 TIMES DAILY
Qty: 60 TABLET | Refills: 11 | Status: SHIPPED | OUTPATIENT
Start: 2018-10-31 | End: 2018-11-09

## 2018-10-31 RX ORDER — GAUZE BANDAGE 2" X 2"
100 BANDAGE TOPICAL DAILY
Qty: 30 TABLET | Refills: 11 | COMMUNITY
Start: 2018-10-31 | End: 2019-03-28

## 2018-10-31 RX ORDER — LANOLIN ALCOHOL/MO/W.PET/CERES
3 CREAM (GRAM) TOPICAL
Qty: 30 TABLET | Refills: 11 | COMMUNITY
Start: 2018-10-31 | End: 2020-01-14

## 2018-10-31 RX ORDER — SPIRONOLACTONE 50 MG/1
50 TABLET, FILM COATED ORAL 2 TIMES DAILY
Qty: 60 TABLET | Refills: 11 | Status: SHIPPED | OUTPATIENT
Start: 2018-10-31 | End: 2018-11-13

## 2018-10-31 RX ORDER — RAMELTEON 8 MG/1
8 TABLET ORAL AT BEDTIME
Qty: 30 TABLET | Refills: 11 | COMMUNITY
Start: 2018-10-31 | End: 2019-03-28

## 2018-10-31 RX ORDER — BISACODYL 10 MG
10 SUPPOSITORY, RECTAL RECTAL DAILY PRN
Qty: 25 SUPPOSITORY | Refills: 1 | COMMUNITY
Start: 2018-10-31 | End: 2020-01-14

## 2018-11-01 ENCOUNTER — TELEPHONE (OUTPATIENT)
Dept: FAMILY MEDICINE | Facility: CLINIC | Age: 63
End: 2018-11-01

## 2018-11-01 NOTE — TELEPHONE ENCOUNTER
[facility] Family Medicine phone call message- medication clarification/question:    Full Medication Name:    Strength:     Have you contacted your pharmacy about this refill request?     If  Yes,  which pharmacy?    When did you contact the pharmacy?    Additional comments/concerns from call to pharmacy:    Reason for call to clinic: Calling wanting to speak to someone about Patient's medications on dosages and directions on all of his medications. She would like to know soon so that she can give it to him to take his medications tomorrow. Told her it could take up to two business days for a response. Please call and advise.       OK to leave a message on voice mail?     Primary language: English      needed? No    Call taken on November 1, 2018 at 1:26 PM by Raudel Barton

## 2018-11-01 NOTE — TELEPHONE ENCOUNTER
Medication list printed from [facility] and given to Dr Lewis to review again. Dr Lewis had sent yesterday to patient's local pharmacy according to TCU medication discharge list. Informed Karlie of this information and verified for Aayush to take:    Bumetanide 2 mg twice a day,   Rifaximin 550 mg twice a day,  Spironolactone 50 mg twice a day (if more comfortable ok to take once a day until seen Wednesday, 11/7/2018 in clinic),  Potassium 20 meq every day.    Please bring her copy of the TCU discharge medications, all medication bottles in for the follow up appt with Dr Lewis on 11/7/2018. Yuko DING

## 2018-11-09 ENCOUNTER — OFFICE VISIT (OUTPATIENT)
Dept: FAMILY MEDICINE | Facility: CLINIC | Age: 63
End: 2018-11-09
Payer: COMMERCIAL

## 2018-11-09 VITALS
BODY MASS INDEX: 27.81 KG/M2 | OXYGEN SATURATION: 100 % | TEMPERATURE: 97.9 F | HEART RATE: 102 BPM | RESPIRATION RATE: 18 BRPM | SYSTOLIC BLOOD PRESSURE: 130 MMHG | DIASTOLIC BLOOD PRESSURE: 74 MMHG | WEIGHT: 172 LBS

## 2018-11-09 DIAGNOSIS — E83.52 HYPERCALCEMIA: Primary | ICD-10-CM

## 2018-11-09 DIAGNOSIS — K70.9 LIVER DISEASE, CHRONIC, DUE TO ALCOHOL (H): ICD-10-CM

## 2018-11-09 DIAGNOSIS — E55.9 VITAMIN D DEFICIENCY: ICD-10-CM

## 2018-11-09 LAB
ALBUMIN SERPL-MCNC: 3.4 G/DL (ref 3.2–4.5)
ALP SERPL-CCNC: 135 U/L (ref 40–150)
ALT SERPL-CCNC: 25 U/L (ref 0–45)
AST SERPL-CCNC: 36 U/L (ref 0–55)
BILIRUB SERPL-MCNC: 0.6 MG/DL (ref 0.2–1.3)
BUN SERPL-MCNC: 25 MG/DL (ref 7–30)
CALCIUM SERPL-MCNC: 10.2 MG/DL (ref 8.5–10.4)
CHLORIDE SERPLBLD-SCNC: 98 MMOL/L (ref 94–109)
CO2 SERPL-SCNC: 28 MMOL/L (ref 20–32)
CREAT SERPL-MCNC: 1.1 MG/DL (ref 0.8–1.5)
EGFR CALCULATED (BLACK REFERENCE): 87.2 ML/MIN
EGFR CALCULATED (NON BLACK REFERENCE): 72.1 ML/MIN
GLUCOSE SERPL-MCNC: 103 MG/DL (ref 60–109)
POTASSIUM SERPL-SCNC: 4.9 MMOL/L (ref 3.4–5.3)
PROT SERPL-MCNC: 8.4 G/DL (ref 6.6–8.8)
SODIUM SERPL-SCNC: 144 MMOL/L (ref 133–144)

## 2018-11-09 RX ORDER — BUMETANIDE 2 MG/1
2 TABLET ORAL DAILY
Qty: 30 TABLET | Refills: 11 | COMMUNITY
Start: 2018-11-09 | End: 2019-03-28

## 2018-11-09 NOTE — MR AVS SNAPSHOT
After Visit Summary   11/9/2018    Aayush García    MRN: [id number]           Patient Information     Date Of Birth          1955        Visit Information        Provider Department      11/9/2018 10:20 AM Zachary Lewis MD [facility]        Today's Diagnoses     Hypercalcemia    -  1    Liver disease, chronic, due to alcohol (H)        Vitamin D deficiency           Follow-ups after your visit        Follow-up notes from your care team     Return in about 3 months (around 2/9/2019).      Who to contact     Please call your clinic at [phone] to:    Ask questions about your health    Make or cancel appointments    Discuss your medicines    Learn about your test results    Speak to your doctor            Additional Information About Your Visit        [employer] Information     N[employer]rino gives you secure access to your electronic health record. If you see a primary care provider, you can also send messages to your care team and make appointments. If you have questions, please call your primary care clinic.  If you do not have a primary care provider, please call [phone] and they will assist you.      N[employer]rino is an electronic gateway that provides easy, online access to your medical records. With [employer], you can request a clinic appointment, read your test results, renew a prescription or communicate with your care team.     To access your existing account, please contact your [facility] or call [phone] for assistance.        Care EveryWhere ID     This is your Care EveryWhere ID. This could be used by other organizations to access your [facility] medical records  [id number]        Your Vitals Were     Pulse Temperature Respirations Pulse Oximetry BMI (Body Mass Index)       102 97.9  F (36.6  C) (Oral) 18 100% 27.81 kg/m2        Blood Pressure from Last 3 Encounters:   11/09/18 130/74   10/10/18 113/71   10/04/18 128/80    Weight from Last 3  Encounters:   11/09/18 172 lb (78 kg)   10/10/18 174 lb (78.9 kg)   10/04/18 169 lb (76.7 kg)              We Performed the Following     Calcium Ionized ([facility])     [facility] ([facility] - results <1hr Protocol          Today's Medication Changes          These changes are accurate as of 11/9/18 11:08 AM.  If you have any questions, ask your nurse or doctor.               These medicines have changed or have updated prescriptions.        Dose/Directions    bumetanide 2 MG tablet   Commonly known as:  BUMEX   This may have changed:  when to take this   Used for:  Liver disease, chronic, due to alcohol (H)        Dose:  2 mg   Take 1 tablet (2 mg) by mouth daily   Quantity:  30 tablet   Refills:  11                Primary Care Provider Office Phone # Fax #    Zachary Lewis [phone]       [facility] [place] [address]        Equal Access to Services     SOURAV [facility] AH: Hadii mildred bello Soyamil, waaxda luqadaha, qaybta kaalmada adesuryayada, kathe gibbs . So [facility] [phone].    ATENCIÓN: Si habla español, tiene a srivastava disposición servicios gratuitos de asistencia lingüística. Llame al [phone].    We comply with applicable federal civil rights laws and [place] laws. We do not discriminate on the basis of race, color, national origin, age, disability, sex, sexual orientation, or gender identity.            Thank you!     Thank you for choosing [facility]  for your care. Our goal is always to provide you with excellent care. Hearing back from our patients is one way we can continue to improve our services. Please take a few minutes to complete the written survey that you may receive in the mail after your visit with us. Thank you!             Your Updated Medication List - Protect others around you: Learn how to safely use, store and throw away your medicines at www.disposemymeds.org.          This list is accurate as of  11/9/18 11:08 AM.  Always use your most recent med list.                   Brand Name Dispense Instructions for use Diagnosis    acetaminophen 325 MG tablet    TYLENOL     Take 650 mg by mouth        aspirin 81 MG EC tablet     90 tablet    Take 1 tablet (81 mg) by mouth daily    Benign essential hypertension       bisacodyl 10 MG Suppository    DULCOLAX    25 suppository    Place 1 suppository (10 mg) rectally daily as needed for constipation    Constipation, unspecified constipation type       bumetanide 2 MG tablet    BUMEX    30 tablet    Take 1 tablet (2 mg) by mouth daily    Liver disease, chronic, due to alcohol (H)       diclofenac 1 % Gel topical gel    VOLTAREN    100 g    Apply 4 grams to knees or lateral hip four times daily using enclosed dosing card.    Primary osteoarthritis of left knee, Trochanteric bursitis of right hip       lisinopril 10 MG tablet    PRINIVIL/ZESTRIL    90 tablet    Take 1 tablet (10 mg) by mouth daily    Benign essential hypertension, Encounter for screening colonoscopy, Anemia, unspecified type       magnesium oxide 400 MG tablet    MAG-OX    60 tablet    Take 1 tablet (400 mg) by mouth 2 times daily    Gastroesophageal reflux disease without esophagitis       melatonin 3 MG tablet     30 tablet    Take 1 tablet (3 mg) by mouth nightly as needed for sleep        Miconazole Nitrate 2 % ointment     56 g    Apply topically 2 times daily To perinuem    Tinea cruris       MULTIVITAMIN & MINERAL PO      Take 1 tablet by mouth daily        NEPHRO-FRANCISCO 0.8 MG Tabs      Take 1 tablet by mouth daily        omeprazole 40 MG capsule    priLOSEC    30 capsule    Take 1 capsule (40 mg) by mouth daily Take 30-60 minutes before a meal.    Dark stools       order for DME     1 each    Equipment being ordered: Blood pressure instrument and cuff    Benign essential hypertension, Encounter for screening colonoscopy, Anemia, unspecified type       potassium chloride SA 20 MEQ CR tablet     K-DUR/KLOR-CON M    30 tablet    Take 1 tablet (20 mEq) by mouth daily    Liver disease, chronic, due to alcohol (H)       ramelteon 8 MG tablet    ROZEREM    30 tablet    Take 1 tablet (8 mg) by mouth At Bedtime    Insomnia, unspecified type       rifaximin 550 MG Tabs tablet    XIFAXAN    60 tablet    Take 1 tablet (550 mg) by mouth 2 times daily    Liver disease, chronic, due to alcohol (H)       rosuvastatin 10 MG tablet    CRESTOR    30 tablet    Take 1 tablet (10 mg) by mouth daily    Benign essential hypertension       spironolactone 50 MG tablet    ALDACTONE    60 tablet    Take 1 tablet (50 mg) by mouth 2 times daily    Liver disease, chronic, due to alcohol (H)       thiamine mononitrate 100 MG Tabs     30 tablet    Take 100 mg by mouth daily    Liver disease, chronic, due to alcohol (H)

## 2018-11-09 NOTE — PROGRESS NOTES
Pt is a 63 year old male last seen 10/10/18 here today for:     1) Follow-up - much confusion over which meds and what dosages  Itchy and tingling once in a while  No abd pain   No nausea/ vomiting  No blood in stool, no bloody emesis  No swelling in legs  No CP/SOB    Wt Readings from Last 3 Encounters:   11/09/18 172 lb (78 kg)   10/10/18 174 lb (78.9 kg)   10/04/18 169 lb (76.7 kg)     2) low Vit D    Past Medical History:   Diagnosis Date     Chronic liver disease       Past Surgical History:   Procedure Laterality Date     NO HISTORY OF SURGERY        Current Outpatient Prescriptions   Medication Sig Dispense Refill     aspirin 81 MG EC tablet Take 1 tablet (81 mg) by mouth daily 90 tablet 3     B Complex-C-Folic Acid (NEPHRO-FRANCISCO) 0.8 MG TABS Take 1 tablet by mouth daily       bumetanide (BUMEX) 2 MG tablet Take 1 tablet (2 mg) by mouth 2 times daily 60 tablet 11     magnesium oxide (MAG-OX) 400 MG tablet Take 1 tablet (400 mg) by mouth 2 times daily 60 tablet 11     omeprazole (PRILOSEC) 40 MG capsule Take 1 capsule (40 mg) by mouth daily Take 30-60 minutes before a meal. 30 capsule 0     potassium chloride SA (K-DUR/KLOR-CON M) 20 MEQ CR tablet Take 1 tablet (20 mEq) by mouth daily 30 tablet 11     rifaximin (XIFAXAN) 550 MG TABS tablet Take 1 tablet (550 mg) by mouth 2 times daily 60 tablet 11     acetaminophen (TYLENOL) 325 MG tablet Take 650 mg by mouth       bisacodyl (DULCOLAX) 10 MG Suppository Place 1 suppository (10 mg) rectally daily as needed for constipation 25 suppository 1     diclofenac (VOLTAREN) 1 % GEL topical gel Apply 4 grams to knees or lateral hip four times daily using enclosed dosing card. 100 g 1     lisinopril (PRINIVIL/ZESTRIL) 10 MG tablet Take 1 tablet (10 mg) by mouth daily (Patient not taking: Reported on 10/4/2018) 90 tablet 3     melatonin 3 MG tablet Take 1 tablet (3 mg) by mouth nightly as needed for sleep 30 tablet 11     Miconazole Nitrate 2 % ointment Apply topically 2  times daily To perinuem 56 g 0     Multiple Vitamins-Minerals (MULTIVITAMIN & MINERAL PO) Take 1 tablet by mouth daily       order for DME Equipment being ordered: Blood pressure instrument and cuff 1 each 0     ramelteon (ROZEREM) 8 MG tablet Take 1 tablet (8 mg) by mouth At Bedtime 30 tablet 11     rosuvastatin (CRESTOR) 10 MG tablet Take 1 tablet (10 mg) by mouth daily 30 tablet 3     spironolactone (ALDACTONE) 50 MG tablet Take 1 tablet (50 mg) by mouth 2 times daily 60 tablet 11     thiamine mononitrate 100 MG TABS Take 100 mg by mouth daily 30 tablet 11      Allergies   Allergen Reactions     No Known Allergies       Social History   Substance Use Topics     Smoking status: Current Every Day Smoker     Last attempt to quit: 2/20/2018     Smokeless tobacco: Never Used      Comment: About 2 cig/day     Alcohol use Yes      Comment: About 7 drinks/week      Family History   Problem Relation Age of Onset     HEART DISEASE Father      Diabetes No family hx of      Coronary Artery Disease Early Onset No family hx of      Cancer No family hx of       ROS:   Gen- no weight change, no fevers/chills   Head/ Eyes- no blurred vision, no headaches   ENT- no cough, no congestion, no URI symptoms   Cardiac - no palpitations, no chest pain   Respiratory - no shortness of breath , no wheezing   GI - no nausea, no vomiting, no diarrhea, no constipation   Urinary - no dysuria, no polyuria   Neuro - no numbness, no tingling   Remainder of ROS negative.     Exam:   /74  Pulse 102  Temp 97.9  F (36.6  C) (Oral)  Resp 18  Wt 172 lb (78 kg)  SpO2 100%  BMI 27.81 kg/m2     Alert and oriented x 3; No acute distress   ABd: soft, + bowel sounds, nontender/+distended, no rebound/guarding   Extrem: no clubbing/cyanosis/trace edema b/l  Neuro: no focal deficits   Derm: no rashes       (E83.52) Hypercalcemia  (primary encounter diagnosis)  Comment: last lab never resulted; PTH was normal, vit D mildly low; will supplement vit D    Plan: Calcium Ionized ([facility])           (K70.9) Liver disease, chronic, due to alcohol (H)  Comment: will check labs and likely dec his diuretics to once daily as he is doing well; will discuss need for continued rifaxamin w/ our clinical pharmacist   Plan: bumetanide (BUMEX) 2 MG tablet, Comprehensive         Metabolic Panel ([facility]) - results <1hr Protocol            (E55.9) Vitamin D deficiency  Comment:  Plan: will take supplement     Zachary Lewis MD  November 9, 2018  11:05 AM

## 2018-11-09 NOTE — Clinical Note
Julia yeh for the delay in sending you this chart. This is the pt w/ chronic liver dz who was started on rifaxamin for SBP prophylaxis and is still on it.  Was wondering how long to continue. Thank you! Zachary Lweis MD November 13, 2018 8:43 AM

## 2018-11-13 RX ORDER — SPIRONOLACTONE 50 MG/1
50 TABLET, FILM COATED ORAL DAILY
Qty: 30 TABLET | Refills: 11 | COMMUNITY
Start: 2018-11-13 | End: 2019-03-28

## 2018-11-29 DIAGNOSIS — R19.5 DARK STOOLS: ICD-10-CM

## 2018-11-29 RX ORDER — OMEPRAZOLE 40 MG/1
40 CAPSULE, DELAYED RELEASE ORAL DAILY
Qty: 30 CAPSULE | Refills: 11 | Status: SHIPPED | OUTPATIENT
Start: 2018-11-29 | End: 2019-12-03 | Stop reason: SINTOL

## 2018-11-29 NOTE — TELEPHONE ENCOUNTER
Message to physician:N/A    Date of last visit: 11/9/2018     Date of next visit if scheduled: Visit date not found      Last Comprehensive Metabolic Panel:  Sodium   Date Value Ref Range Status   11/09/2018 144.0 133.0 - 144.0 mmol/L Final     Potassium   Date Value Ref Range Status   11/09/2018 4.9 3.4 - 5.3 mmol/L Final     Chloride   Date Value Ref Range Status   11/09/2018 98.0 94.0 - 109.0 mmol/L Final     Carbon Dioxide   Date Value Ref Range Status   11/09/2018 28.0 20.0 - 32.0 mmol/L Final     Glucose   Date Value Ref Range Status   11/09/2018 103.0 60.0 - 109.0 mg/dL Final     Urea Nitrogen   Date Value Ref Range Status   11/09/2018 25.0 7.0 - 30.0 mg/dL Final     Creatinine   Date Value Ref Range Status   11/09/2018 1.1 0.8 - 1.5 mg/dL Final     GFR Estimate   Date Value Ref Range Status   02/12/2018 >60 >60 mL/min/1.73m2 Final     Calcium   Date Value Ref Range Status   11/09/2018 10.2 8.5 - 10.4 mg/dL Final       BP Readings from Last 3 Encounters:   11/09/18 130/74   10/10/18 113/71   10/04/18 128/80       No results found for: A1C             Please complete refill and CLOSE ENCOUNTER.  Closing the encounter signifies the refill is complete.

## 2019-01-07 ENCOUNTER — TELEPHONE (OUTPATIENT)
Dept: ORTHOPEDICS | Facility: CLINIC | Age: 64
End: 2019-01-07

## 2019-01-07 NOTE — TELEPHONE ENCOUNTER
Prior Authorization needed on:  1/7/19    Medication:  Xifaxan Dose:  550MG    Pharmacy confirmed as   [id number] - [place], [place] - [address]  [address]  [place] [address]  Phone: [phone] Fax: [phone]  : Yes    Insurance Name:  BCBS [place] of [place]  Insurance Phone: [phone]  Insurance Patient ID: [id number]    Alternatives Suggested:  Next fill w/o PA is 8/7/19, Is medication discontinued or is pt still on medication?    Maryann Amaya January 7, 2019 at 4:34 PM

## 2019-01-08 NOTE — TELEPHONE ENCOUNTER
Reviewed his case at length with our clinical pharmacist Dr Redmond and reviewed literature on Rifaxamin for prophylaxis. He has been on this for over 3 months w/ no recurrence of hepatic encephalopathy. Will discontinue at this time.  Left voicemail recommending they discontinue this medication.    Rosa - no need for prior auth. Thank you!    Zachary Lewis MD  January 8, 2019  7:58 AM

## 2019-03-18 ENCOUNTER — SURGERY - HEALTHEAST (OUTPATIENT)
Dept: CARDIOLOGY | Facility: CLINIC | Age: 64
End: 2019-03-18

## 2019-03-18 ASSESSMENT — MIFFLIN-ST. JEOR
SCORE: 1580.98
SCORE: 1580.98

## 2019-03-19 ENCOUNTER — TELEPHONE (OUTPATIENT)
Dept: FAMILY MEDICINE | Facility: CLINIC | Age: 64
End: 2019-03-19

## 2019-03-19 ASSESSMENT — MIFFLIN-ST. JEOR
SCORE: 1583.25
SCORE: 1583.7

## 2019-03-19 NOTE — TELEPHONE ENCOUNTER
I called to check up on the pt and help the pt setup a hospital follow up.  The pt did not answer his phone, so I left a vm for the pt to give me a call back.

## 2019-03-20 ENCOUNTER — TELEPHONE (OUTPATIENT)
Dept: FAMILY MEDICINE | Facility: CLINIC | Age: 64
End: 2019-03-20

## 2019-03-20 ASSESSMENT — MIFFLIN-ST. JEOR: SCORE: 1592.6

## 2019-03-20 NOTE — TELEPHONE ENCOUNTER
I got a call from the pt significant other, she stated that the pt went to [facility].  She stated that he there for a heart attack.  She stated that she is not sure when he will be discharged.

## 2019-03-26 ENCOUNTER — AMBULATORY - HEALTHEAST (OUTPATIENT)
Dept: CARDIAC REHAB | Facility: HOSPITAL | Age: 64
End: 2019-03-26

## 2019-03-26 DIAGNOSIS — I42.9 CARDIOMYOPATHY, UNSPECIFIED TYPE (H): ICD-10-CM

## 2019-03-26 DIAGNOSIS — I47.29 NONSUSTAINED VENTRICULAR TACHYCARDIA (H): ICD-10-CM

## 2019-03-26 DIAGNOSIS — I10 ESSENTIAL HYPERTENSION: ICD-10-CM

## 2019-03-26 DIAGNOSIS — I21.4 ACUTE NON-ST ELEVATION MYOCARDIAL INFARCTION (NSTEMI) (H): ICD-10-CM

## 2019-03-26 DIAGNOSIS — Z95.5 STENTED CORONARY ARTERY: ICD-10-CM

## 2019-03-26 DIAGNOSIS — I50.21 ACUTE SYSTOLIC HEART FAILURE (H): ICD-10-CM

## 2019-03-26 DIAGNOSIS — I21.4 NON-ST ELEVATION MI (NSTEMI) (H): ICD-10-CM

## 2019-03-26 DIAGNOSIS — E66.811 CLASS 1 OBESITY: ICD-10-CM

## 2019-03-26 DIAGNOSIS — I50.1 PULMONARY EDEMA CARDIAC CAUSE (H): ICD-10-CM

## 2019-03-28 ENCOUNTER — OFFICE VISIT (OUTPATIENT)
Dept: FAMILY MEDICINE | Facility: CLINIC | Age: 64
End: 2019-03-28
Payer: COMMERCIAL

## 2019-03-28 VITALS
SYSTOLIC BLOOD PRESSURE: 109 MMHG | HEART RATE: 76 BPM | WEIGHT: 180 LBS | RESPIRATION RATE: 16 BRPM | OXYGEN SATURATION: 100 % | BODY MASS INDEX: 29.1 KG/M2 | TEMPERATURE: 97.7 F | DIASTOLIC BLOOD PRESSURE: 66 MMHG

## 2019-03-28 DIAGNOSIS — I10 BENIGN ESSENTIAL HYPERTENSION: ICD-10-CM

## 2019-03-28 DIAGNOSIS — K70.9 LIVER DISEASE, CHRONIC, DUE TO ALCOHOL (H): ICD-10-CM

## 2019-03-28 DIAGNOSIS — I21.4 NSTEMI (NON-ST ELEVATED MYOCARDIAL INFARCTION) (H): Primary | ICD-10-CM

## 2019-03-28 RX ORDER — TICAGRELOR 90 MG/1
90 TABLET ORAL 2 TIMES DAILY
Refills: 0 | COMMUNITY
Start: 2019-03-21 | End: 2019-11-21

## 2019-03-28 RX ORDER — FUROSEMIDE 20 MG
20 TABLET ORAL DAILY
Refills: 0 | COMMUNITY
Start: 2019-03-21 | End: 2020-06-03

## 2019-03-28 RX ORDER — FERROUS SULFATE 325(65) MG
325 TABLET ORAL DAILY
Refills: 0 | COMMUNITY
Start: 2019-03-21

## 2019-03-28 RX ORDER — CARVEDILOL 3.12 MG/1
3.12 TABLET ORAL 2 TIMES DAILY
Refills: 0 | COMMUNITY
Start: 2019-03-21 | End: 2019-04-05

## 2019-03-28 RX ORDER — FOLIC ACID 1 MG/1
1000 TABLET ORAL DAILY
Refills: 0 | COMMUNITY
Start: 2019-03-21 | End: 2020-06-03

## 2019-03-28 RX ORDER — GAUZE BANDAGE 2" X 2"
100 BANDAGE TOPICAL DAILY
Qty: 30 TABLET | Refills: 11 | COMMUNITY
Start: 2019-03-28 | End: 2020-01-14

## 2019-03-28 RX ORDER — ATORVASTATIN CALCIUM 80 MG/1
80 TABLET, FILM COATED ORAL DAILY
Refills: 0 | COMMUNITY
Start: 2019-03-21 | End: 2021-12-20

## 2019-03-28 RX ORDER — ASPIRIN 81 MG/1
81 TABLET ORAL DAILY
COMMUNITY
End: 2019-03-28

## 2019-03-28 SDOH — HEALTH STABILITY: MENTAL HEALTH: HOW OFTEN DO YOU HAVE A DRINK CONTAINING ALCOHOL?: NEVER

## 2019-03-28 NOTE — PROGRESS NOTES
Pt is a 63 year old male last seen on 11/9/18 here today for:     Hospital follow-up - went to [facility], then transferred to [facility] for NSTEMI   Went out to dinner, went to bed, felt a little strange  2:30-3am, developed shortness of breath and went to the ED  Stent placed  Is on dual plt therapy  Med rec performed today    Feels pretty good  Went in for initial cardiac rehab visit  Starts next week, 2x/week  Using walker at home -> L knee OA  Breathing is pretty good  No shortness of breath w/ ADL's  No orthopnea/ PND  No chest pain   No leg swelling   Has follow-up w/ cardiology pending/ repeat echo      Msk - Tried cortisone at previous visit w/o relief for L knee OA  Never went to PT for R hip  Would like to hold off on xray or PT and see how his cardiac rehab goes    EtOH - no drinks since admission  Is planning on quitting  Mood has been great  Home life is great     Anemia - is on iron  At some point will need a colonoscopy    Past Medical History:   Diagnosis Date     Chronic liver disease      NSTEMI (non-ST elevated myocardial infarction) (H) 03/2019    s/p stent placement      Past Surgical History:   Procedure Laterality Date     NO HISTORY OF SURGERY        Current Outpatient Medications   Medication Sig Dispense Refill     aspirin (ASA) 81 MG EC tablet Take 1 tablet (81 mg) by mouth daily 90 tablet 3     acetaminophen (TYLENOL) 325 MG tablet Take 650 mg by mouth       atorvastatin (LIPITOR) 80 MG tablet Take 80 mg by mouth daily  0     B Complex-C-Folic Acid (NEPHRO-FRANCISCO) 0.8 MG TABS Take 1 tablet by mouth daily       bisacodyl (DULCOLAX) 10 MG Suppository Place 1 suppository (10 mg) rectally daily as needed for constipation 25 suppository 1     BRILINTA 90 MG tablet Take 90 mg by mouth 2 times daily  0     carvedilol (COREG) 3.125 MG tablet Take 3.125 mg by mouth 2 times daily  0     diclofenac (VOLTAREN) 1 % GEL topical gel Apply 4 grams to knees or lateral hip four times daily using enclosed  dosing card. 100 g 1     ferrous sulfate (FEROSUL) 325 (65 Fe) MG tablet Take 325 mg by mouth daily  0     folic acid (FOLVITE) 1 MG tablet Take 1,000 mcg by mouth daily  0     furosemide (LASIX) 20 MG tablet Take 20 mg by mouth daily  0     magnesium oxide (MAG-OX) 400 MG tablet Take 1 tablet (400 mg) by mouth 2 times daily 60 tablet 11     melatonin 3 MG tablet Take 1 tablet (3 mg) by mouth nightly as needed for sleep 30 tablet 11     Miconazole Nitrate 2 % ointment Apply topically 2 times daily To perinuem 56 g 0     Multiple Vitamins-Minerals (MULTIVITAMIN & MINERAL PO) Take 1 tablet by mouth daily       omeprazole (PRILOSEC) 40 MG DR capsule Take 1 capsule (40 mg) by mouth daily Take 30-60 minutes before a meal. 30 capsule 11     order for DME Equipment being ordered: Blood pressure instrument and cuff 1 each 0     potassium chloride SA (K-DUR/KLOR-CON M) 20 MEQ CR tablet Take 1 tablet (20 mEq) by mouth daily 30 tablet 11      Allergies   Allergen Reactions     No Known Allergies       ROS:   Gen- no weight change, no fevers/chills   Cardiac - no palpitations, no chest pain   Respiratory - no shortness of breath , no wheezing   GI - no nausea, no vomiting, no diarrhea, no constipation   Remainder of ROS negative.     Exam:   /66   Pulse 76   Temp 97.7  F (36.5  C) (Oral)   Resp 16   Wt 81.6 kg (180 lb)   SpO2 100%   BMI 29.10 kg/m     Alert and oriented x 3; No acute distress   Resp: clear to auscultation bilaterally, no wheezing/ronchi   CV: rate/rhythm regular, no murmurs/rubs/gallops   Extrem: no clubbing/cyanosis/edema   MSK: deferred  Neuro: no focal deficits   Derm: no rashes     (I21.4) NSTEMI (non-ST elevated myocardial infarction) (H)  (primary encounter diagnosis)  Comment:   Plan: lengthy visit w/ review of hospital course, med reconciliation, and tx plan moving forward; has cardiology appts pending; precautions given; f/u in 2 months    (I10) Benign essential hypertension  Comment:  stable; cont new regimen   Plan: aspirin (ASA) 81 MG EC tablet           (K70.9) Liver disease, chronic, due to alcohol (H)  Comment: no EtOH since last admission; encouraged abstinence;   Plan: thiamine mononitrate 100 MG TABS          Will address msk issues and timing of colonoscopy at next visit    Zachary Lewis MD  March 28, 2019  4:30 PM

## 2019-04-04 ENCOUNTER — AMBULATORY - HEALTHEAST (OUTPATIENT)
Dept: CARDIOLOGY | Facility: CLINIC | Age: 64
End: 2019-04-04

## 2019-04-04 ENCOUNTER — OFFICE VISIT - HEALTHEAST (OUTPATIENT)
Dept: CARDIOLOGY | Facility: CLINIC | Age: 64
End: 2019-04-04

## 2019-04-04 DIAGNOSIS — I50.21 ACUTE SYSTOLIC HEART FAILURE (H): ICD-10-CM

## 2019-04-04 DIAGNOSIS — I50.22 CHRONIC SYSTOLIC HEART FAILURE (H): ICD-10-CM

## 2019-04-04 DIAGNOSIS — I21.4 NON-ST ELEVATION MI (NSTEMI) (H): ICD-10-CM

## 2019-04-04 DIAGNOSIS — I42.9 CARDIOMYOPATHY, UNSPECIFIED TYPE (H): ICD-10-CM

## 2019-04-04 DIAGNOSIS — I10 ESSENTIAL HYPERTENSION: ICD-10-CM

## 2019-04-04 LAB
ANION GAP SERPL CALCULATED.3IONS-SCNC: 6 MMOL/L (ref 5–18)
BUN SERPL-MCNC: 13 MG/DL (ref 8–22)
CALCIUM SERPL-MCNC: 9.4 MG/DL (ref 8.5–10.5)
CHLORIDE BLD-SCNC: 108 MMOL/L (ref 98–107)
CO2 SERPL-SCNC: 25 MMOL/L (ref 22–31)
CREAT SERPL-MCNC: 0.84 MG/DL (ref 0.7–1.3)
GFR SERPL CREATININE-BSD FRML MDRD: >60 ML/MIN/1.73M2
GLUCOSE BLD-MCNC: 129 MG/DL (ref 70–125)
POTASSIUM BLD-SCNC: 4.8 MMOL/L (ref 3.5–5)
SODIUM SERPL-SCNC: 139 MMOL/L (ref 136–145)

## 2019-04-04 ASSESSMENT — MIFFLIN-ST. JEOR: SCORE: 1624.52

## 2019-04-05 ENCOUNTER — TELEPHONE (OUTPATIENT)
Dept: ORTHOPEDICS | Facility: CLINIC | Age: 64
End: 2019-04-05

## 2019-04-05 DIAGNOSIS — I25.10 CORONARY ARTERY DISEASE INVOLVING NATIVE HEART WITHOUT ANGINA PECTORIS, UNSPECIFIED VESSEL OR LESION TYPE: Primary | ICD-10-CM

## 2019-04-05 RX ORDER — CARVEDILOL 3.12 MG/1
3.12 TABLET ORAL 2 TIMES DAILY
Qty: 60 TABLET | Refills: 11 | Status: SHIPPED | OUTPATIENT
Start: 2019-04-05 | End: 2019-11-21

## 2019-04-05 NOTE — TELEPHONE ENCOUNTER
Refill sent as requested.  Please inform pt. Thanks!    Zachary Lewis MD  April 5, 2019  3:22 PM

## 2019-04-05 NOTE — TELEPHONE ENCOUNTER
[facility] phone call message- patient requesting a refill:    Full Medication Name: carvedilol (COREG)     Dose: 3.125 MG tablet    Pharmacy confirmed as   [id number] - [place], [place] - [address]  [address]  [place] [address]  Phone: [phone] Fax: [phone]  : Yes    Additional Comments: Caller is requesting a refill for the medication listed above. Patient is out of medication and needs it refilled as soon as possible. Caller states patient needs this medication for his heart. She states she spoke with pharmacy on Tuesday and refill is still not ready. Please call and advise.     OK to leave a message on voice mail? Yes    Primary language: English      needed? No    Call taken on April 5, 2019 at 1:38 PM by Ada Gonzalez

## 2019-04-09 ENCOUNTER — OFFICE VISIT (OUTPATIENT)
Dept: FAMILY MEDICINE | Facility: CLINIC | Age: 64
End: 2019-04-09
Payer: COMMERCIAL

## 2019-04-09 VITALS
OXYGEN SATURATION: 97 % | RESPIRATION RATE: 20 BRPM | WEIGHT: 184.4 LBS | SYSTOLIC BLOOD PRESSURE: 107 MMHG | TEMPERATURE: 98 F | DIASTOLIC BLOOD PRESSURE: 70 MMHG | HEART RATE: 69 BPM | BODY MASS INDEX: 29.81 KG/M2

## 2019-04-09 DIAGNOSIS — M17.12 PRIMARY LOCALIZED OSTEOARTHROSIS OF LEFT LOWER LEG: Primary | ICD-10-CM

## 2019-04-09 PROBLEM — I42.9 CARDIOMYOPATHY, UNSPECIFIED TYPE (H): Status: ACTIVE | Noted: 2019-04-09

## 2019-04-09 PROBLEM — D50.0 IRON DEFICIENCY ANEMIA DUE TO CHRONIC BLOOD LOSS: Status: ACTIVE | Noted: 2019-04-09

## 2019-04-09 PROBLEM — I50.20 HEART FAILURE WITH REDUCED EJECTION FRACTION (H): Status: ACTIVE | Noted: 2019-04-04

## 2019-04-09 PROBLEM — I21.4 NON-ST ELEVATION MI (NSTEMI) (H): Status: ACTIVE | Noted: 2019-03-18

## 2019-04-09 RX ORDER — TRIAMCINOLONE ACETONIDE 40 MG/ML
40 INJECTION, SUSPENSION INTRA-ARTICULAR; INTRAMUSCULAR ONCE
Status: COMPLETED | OUTPATIENT
Start: 2019-04-09 | End: 2019-04-09

## 2019-04-09 RX ADMIN — TRIAMCINOLONE ACETONIDE 40 MG: 40 INJECTION, SUSPENSION INTRA-ARTICULAR; INTRAMUSCULAR at 08:43

## 2019-04-09 NOTE — NURSING NOTE
Medication given by provider: Triamcinolone Acetonide Inj 1 mL  NDC:[address]-9  Lot:[id number]  Exp:9/2020    RAZ Melo

## 2019-04-09 NOTE — PATIENT INSTRUCTIONS
Your knee may feel worse for a couple of days.  It may require another injection in the future.  You may be headed for a knee replacement.  Keep up you PT.  Weight loss will help your knee pain.  Your health will be better with weight loss. To lose weight, you will need to increase your walking slowly (a minute or 2 a day) until you are walking 60 minutes every day. To reduce your total calories you will need to stop eating rice, potatos, breads, cookies, cakes, and sweets. You can get your carbohydrates from fruits and vegetables. Your meal portions will have to be small.

## 2019-04-09 NOTE — PROGRESS NOTES
Imm/Inj and Medication Reconciliation    /70   Pulse 69   Temp 98  F (36.7  C) (Oral)   Resp 20   Wt 83.6 kg (184 lb 6.4 oz)   SpO2 97%   BMI 29.81 kg/m      SUBJECTIVE:  This is a 63-year-old male recently hospitalized for an MI in for pain in the left knee.  He is requesting an injection.  He has had them in the past.  He has a degenerative process in his knee.  He is wearing a neoprene sleeve for comfort, and he walks with a walker.      He is overweight.      OBJECTIVE:   APPEARANCE:  In no acute distress, overweight.     KNEE:  There is effusion present in the left knee.  It is boggy.  It is not particularly tender.  There is no redness.  It is not hot to touch.  There is a Mcrae cyst present.      ASSESSMENT:  Osteoarthritis of the left knee.      PLAN:  We discussed options of injection, including Kenalog and hyaluronic acid.  He chose the steroid injection because we do not have the hyaluronic acid here in the clinic.        He is in cardiac rehab for a recent MI, and we urged him to continue that.  He is overweight, and we talked about diet, exercise and weight loss.      PROCEDURE:  The knee was prepped and injected with 40 mg of Kenalog and 5 mL of 1% lidocaine.  The patient tolerated the procedure well, though he did have some bleeding because he is on blood thinners.      I have asked him to continue with his cardiac rehab and his physical therapy for his knee.  We discussed weight loss and exercise.      Consent form was signed.      The patient to follow up as needed.

## 2019-04-10 ENCOUNTER — AMBULATORY - HEALTHEAST (OUTPATIENT)
Dept: CARDIAC REHAB | Facility: HOSPITAL | Age: 64
End: 2019-04-10

## 2019-04-10 DIAGNOSIS — Z95.5 STENTED CORONARY ARTERY: ICD-10-CM

## 2019-04-15 ENCOUNTER — AMBULATORY - HEALTHEAST (OUTPATIENT)
Dept: CARDIAC REHAB | Facility: HOSPITAL | Age: 64
End: 2019-04-15

## 2019-04-15 DIAGNOSIS — Z95.5 STENTED CORONARY ARTERY: ICD-10-CM

## 2019-04-17 ENCOUNTER — AMBULATORY - HEALTHEAST (OUTPATIENT)
Dept: CARDIAC REHAB | Facility: HOSPITAL | Age: 64
End: 2019-04-17

## 2019-04-17 DIAGNOSIS — I21.4 NON-ST ELEVATION MI (NSTEMI) (H): ICD-10-CM

## 2019-04-17 DIAGNOSIS — Z95.5 STENTED CORONARY ARTERY: ICD-10-CM

## 2019-04-18 ENCOUNTER — OFFICE VISIT - HEALTHEAST (OUTPATIENT)
Dept: CARDIOLOGY | Facility: CLINIC | Age: 64
End: 2019-04-18

## 2019-04-18 DIAGNOSIS — I21.4 NON-ST ELEVATION MI (NSTEMI) (H): ICD-10-CM

## 2019-04-18 DIAGNOSIS — I50.22 CHRONIC SYSTOLIC HEART FAILURE (H): ICD-10-CM

## 2019-04-18 DIAGNOSIS — I25.5 ISCHEMIC CARDIOMYOPATHY: ICD-10-CM

## 2019-04-18 ASSESSMENT — MIFFLIN-ST. JEOR: SCORE: 1633.14

## 2019-04-29 ENCOUNTER — AMBULATORY - HEALTHEAST (OUTPATIENT)
Dept: CARDIAC REHAB | Facility: HOSPITAL | Age: 64
End: 2019-04-29

## 2019-04-29 DIAGNOSIS — Z95.5 STENTED CORONARY ARTERY: ICD-10-CM

## 2019-04-29 DIAGNOSIS — I21.4 NON-ST ELEVATION MI (NSTEMI) (H): ICD-10-CM

## 2019-05-02 ENCOUNTER — OFFICE VISIT - HEALTHEAST (OUTPATIENT)
Dept: CARDIOLOGY | Facility: CLINIC | Age: 64
End: 2019-05-02

## 2019-05-02 DIAGNOSIS — I50.22 CHRONIC SYSTOLIC HEART FAILURE (H): ICD-10-CM

## 2019-05-02 DIAGNOSIS — I25.5 ISCHEMIC CARDIOMYOPATHY: ICD-10-CM

## 2019-05-02 DIAGNOSIS — I21.4 NON-ST ELEVATION MI (NSTEMI) (H): ICD-10-CM

## 2019-05-02 ASSESSMENT — MIFFLIN-ST. JEOR: SCORE: 1578.71

## 2019-05-07 ENCOUNTER — AMBULATORY - HEALTHEAST (OUTPATIENT)
Dept: CARDIAC REHAB | Facility: HOSPITAL | Age: 64
End: 2019-05-07

## 2019-05-07 DIAGNOSIS — Z95.5 STENTED CORONARY ARTERY: ICD-10-CM

## 2019-05-07 DIAGNOSIS — I21.4 NON-ST ELEVATION MI (NSTEMI) (H): ICD-10-CM

## 2019-05-24 ENCOUNTER — OFFICE VISIT - HEALTHEAST (OUTPATIENT)
Dept: CARDIOLOGY | Facility: CLINIC | Age: 64
End: 2019-05-24

## 2019-05-24 DIAGNOSIS — I25.5 ISCHEMIC CARDIOMYOPATHY: ICD-10-CM

## 2019-05-24 DIAGNOSIS — I21.4 NON-ST ELEVATION MI (NSTEMI) (H): ICD-10-CM

## 2019-05-24 DIAGNOSIS — I10 ESSENTIAL HYPERTENSION: ICD-10-CM

## 2019-05-24 DIAGNOSIS — I50.22 CHRONIC SYSTOLIC HEART FAILURE (H): ICD-10-CM

## 2019-05-24 ASSESSMENT — MIFFLIN-ST. JEOR: SCORE: 1587.78

## 2019-06-04 ENCOUNTER — HOSPITAL ENCOUNTER (OUTPATIENT)
Dept: CARDIOLOGY | Facility: HOSPITAL | Age: 64
Discharge: HOME | End: 2019-06-04
Attending: INTERNAL MEDICINE

## 2019-06-04 DIAGNOSIS — I25.5 ISCHEMIC CARDIOMYOPATHY: ICD-10-CM

## 2019-06-04 DIAGNOSIS — I50.22 CHRONIC SYSTOLIC HEART FAILURE (H): ICD-10-CM

## 2019-06-04 DIAGNOSIS — I21.4 NON-ST ELEVATION MI (NSTEMI) (H): ICD-10-CM

## 2019-06-04 LAB
AORTIC ROOT: 4 CM
AORTIC VALVE MEAN VELOCITY: 220 CM/S
ASCENDING AORTA: 4.4 CM
ASCENDING AORTA: 4.4 CM
AV DIMENSIONLESS INDEX VTI: 0.3
AV MEAN GRADIENT: 21 MMHG
AV PEAK GRADIENT: 35.3 MMHG
AV VALVE AREA: 1.1 CM2
AV VELOCITY RATIO: 0.3
BSA FOR ECHO PROCEDURE: 1.98 M2
CV BLOOD PRESSURE: ABNORMAL MMHG
CV ECHO HEIGHT: 69 IN
CV ECHO WEIGHT: 178 LBS
DOP CALC AO PEAK VEL: 297 CM/S
DOP CALC AO VTI: 69.4 CM
DOP CALC LVOT AREA: 3.8 CM2
DOP CALC LVOT DIAMETER: 2.2 CM
DOP CALC LVOT PEAK VEL: 79.6 CM/S
DOP CALC LVOT STROKE VOLUME: 76.4 CM3
DOP CALCLVOT PEAK VEL VTI: 20.1 CM
EJECTION FRACTION: 48 % (ref 55–75)
FRACTIONAL SHORTENING: 27.1 % (ref 28–44)
INTERVENTRICULAR SEPTUM IN END DIASTOLE: 1.33 CM (ref 0.6–1)
IVS/PW RATIO: 0.9
LA AREA 1: 24.2 CM2
LA AREA 2: 22.5 CM2
LEFT ATRIUM LENGTH: 5.7 CM
LEFT ATRIUM SIZE: 4.3 CM
LEFT ATRIUM TO AORTIC ROOT RATIO: 1.08 NO UNITS
LEFT ATRIUM VOLUME INDEX: 41 ML/M2
LEFT ATRIUM VOLUME: 81.2 ML
LEFT VENTRICLE CARDIAC INDEX: 2.6 L/MIN/M2
LEFT VENTRICLE CARDIAC OUTPUT: 5.1 L/MIN
LEFT VENTRICLE DIASTOLIC VOLUME INDEX: 60.1 CM3/M2 (ref 34–74)
LEFT VENTRICLE DIASTOLIC VOLUME: 119 CM3 (ref 62–150)
LEFT VENTRICLE HEART RATE: 67 BPM
LEFT VENTRICLE MASS INDEX: 189 G/M2
LEFT VENTRICLE SYSTOLIC VOLUME INDEX: 31.2 CM3/M2 (ref 11–31)
LEFT VENTRICLE SYSTOLIC VOLUME: 61.7 CM3 (ref 21–61)
LEFT VENTRICULAR INTERNAL DIMENSION IN DIASTOLE: 5.83 CM (ref 4.2–5.8)
LEFT VENTRICULAR INTERNAL DIMENSION IN SYSTOLE: 4.25 CM (ref 2.5–4)
LEFT VENTRICULAR MASS: 374.2 G
LEFT VENTRICULAR OUTFLOW TRACT MEAN GRADIENT: 2 MMHG
LEFT VENTRICULAR OUTFLOW TRACT MEAN VELOCITY: 59.6 CM/S
LEFT VENTRICULAR OUTFLOW TRACT PEAK GRADIENT: 3 MMHG
LEFT VENTRICULAR POSTERIOR WALL IN END DIASTOLE: 1.49 CM (ref 0.6–1)
LV STROKE VOLUME INDEX: 38.6 ML/M2
MITRAL VALVE E/A RATIO: 1
MV AVERAGE E/E' RATIO: 11 CM/S
MV DECELERATION TIME: 257 MS
MV E'TISSUE VEL-LAT: 7.94 CM/S
MV E'TISSUE VEL-MED: 5.22 CM/S
MV LATERAL E/E' RATIO: 9.1
MV MEDIAL E/E' RATIO: 13.8
MV PEAK A VELOCITY: 72.8 CM/S
MV PEAK E VELOCITY: 72.1 CM/S
NUC REST DIASTOLIC VOLUME INDEX: 2848 LBS
NUC REST SYSTOLIC VOLUME INDEX: 69 IN
RIGHT VENTRICULAR INTERNAL DIMENSION IN DYSTOLE: 3.41 CM
TRICUSPID REGURGITATION PEAK PRESSURE GRADIENT: 27.5 MMHG
TRICUSPID VALVE ANULAR PLANE SYSTOLIC EXCURSION: 1.6 CM
TRICUSPID VALVE PEAK REGURGITANT VELOCITY: 262 CM/S

## 2019-06-04 ASSESSMENT — MIFFLIN-ST. JEOR: SCORE: 1587.78

## 2019-06-13 ENCOUNTER — TRANSFERRED RECORDS (OUTPATIENT)
Dept: HEALTH INFORMATION MANAGEMENT | Facility: CLINIC | Age: 64
End: 2019-06-13

## 2019-06-13 ENCOUNTER — OFFICE VISIT - HEALTHEAST (OUTPATIENT)
Dept: CARDIOLOGY | Facility: CLINIC | Age: 64
End: 2019-06-13

## 2019-06-13 DIAGNOSIS — I21.4 NON-ST ELEVATION MI (NSTEMI) (H): ICD-10-CM

## 2019-06-13 DIAGNOSIS — I50.22 CHRONIC SYSTOLIC HEART FAILURE (H): ICD-10-CM

## 2019-06-13 DIAGNOSIS — I25.5 ISCHEMIC CARDIOMYOPATHY: ICD-10-CM

## 2019-06-13 DIAGNOSIS — I10 ESSENTIAL HYPERTENSION: ICD-10-CM

## 2019-06-13 ASSESSMENT — MIFFLIN-ST. JEOR: SCORE: 1578.71

## 2019-07-16 ENCOUNTER — TRANSFERRED RECORDS (OUTPATIENT)
Dept: HEALTH INFORMATION MANAGEMENT | Facility: CLINIC | Age: 64
End: 2019-07-16

## 2019-07-16 ENCOUNTER — OFFICE VISIT - HEALTHEAST (OUTPATIENT)
Dept: CARDIOLOGY | Facility: CLINIC | Age: 64
End: 2019-07-16

## 2019-07-16 DIAGNOSIS — I21.4 NON-ST ELEVATION MI (NSTEMI) (H): ICD-10-CM

## 2019-07-16 DIAGNOSIS — I10 ESSENTIAL HYPERTENSION: ICD-10-CM

## 2019-07-16 DIAGNOSIS — I25.5 ISCHEMIC CARDIOMYOPATHY: ICD-10-CM

## 2019-07-16 DIAGNOSIS — I50.22 CHRONIC SYSTOLIC HEART FAILURE (H): ICD-10-CM

## 2019-07-16 ASSESSMENT — MIFFLIN-ST. JEOR: SCORE: 1565.1

## 2019-07-29 ENCOUNTER — COMMUNICATION - HEALTHEAST (OUTPATIENT)
Dept: TELEHEALTH | Facility: CLINIC | Age: 64
End: 2019-07-29

## 2019-07-29 ENCOUNTER — TRANSFERRED RECORDS (OUTPATIENT)
Dept: HEALTH INFORMATION MANAGEMENT | Facility: CLINIC | Age: 64
End: 2019-07-29

## 2019-07-29 ENCOUNTER — OFFICE VISIT - HEALTHEAST (OUTPATIENT)
Dept: CARDIOLOGY | Facility: CLINIC | Age: 64
End: 2019-07-29

## 2019-07-29 DIAGNOSIS — I25.5 ISCHEMIC CARDIOMYOPATHY: ICD-10-CM

## 2019-07-29 DIAGNOSIS — I35.0 AORTIC STENOSIS, MODERATE: ICD-10-CM

## 2019-07-29 ASSESSMENT — MIFFLIN-ST. JEOR: SCORE: 1569.64

## 2019-08-27 ENCOUNTER — TRANSFERRED RECORDS (OUTPATIENT)
Dept: HEALTH INFORMATION MANAGEMENT | Facility: CLINIC | Age: 64
End: 2019-08-27

## 2019-08-27 ENCOUNTER — OFFICE VISIT - HEALTHEAST (OUTPATIENT)
Dept: CARDIOLOGY | Facility: CLINIC | Age: 64
End: 2019-08-27

## 2019-08-27 DIAGNOSIS — I25.5 ISCHEMIC CARDIOMYOPATHY: ICD-10-CM

## 2019-08-27 DIAGNOSIS — I10 ESSENTIAL HYPERTENSION: ICD-10-CM

## 2019-08-27 DIAGNOSIS — I50.22 CHRONIC SYSTOLIC HEART FAILURE (H): ICD-10-CM

## 2019-08-27 ASSESSMENT — MIFFLIN-ST. JEOR: SCORE: 1583.25

## 2019-09-09 ENCOUNTER — COMMUNICATION - HEALTHEAST (OUTPATIENT)
Dept: CARDIOLOGY | Facility: CLINIC | Age: 64
End: 2019-09-09

## 2019-09-09 DIAGNOSIS — I25.5 ISCHEMIC CARDIOMYOPATHY: ICD-10-CM

## 2019-09-24 ENCOUNTER — TRANSFERRED RECORDS (OUTPATIENT)
Dept: HEALTH INFORMATION MANAGEMENT | Facility: CLINIC | Age: 64
End: 2019-09-24

## 2019-09-24 ENCOUNTER — AMBULATORY - HEALTHEAST (OUTPATIENT)
Dept: CARDIOLOGY | Facility: CLINIC | Age: 64
End: 2019-09-24

## 2019-09-24 ENCOUNTER — OFFICE VISIT - HEALTHEAST (OUTPATIENT)
Dept: CARDIOLOGY | Facility: CLINIC | Age: 64
End: 2019-09-24

## 2019-09-24 DIAGNOSIS — I50.22 CHRONIC SYSTOLIC HEART FAILURE (H): ICD-10-CM

## 2019-09-24 DIAGNOSIS — I10 ESSENTIAL HYPERTENSION: ICD-10-CM

## 2019-09-24 DIAGNOSIS — I25.5 ISCHEMIC CARDIOMYOPATHY: ICD-10-CM

## 2019-09-24 DIAGNOSIS — I25.10 CORONARY ARTERY DISEASE INVOLVING NATIVE CORONARY ARTERY OF NATIVE HEART WITHOUT ANGINA PECTORIS: ICD-10-CM

## 2019-09-24 LAB
ANION GAP SERPL CALCULATED.3IONS-SCNC: 10 MMOL/L (ref 5–18)
BUN SERPL-MCNC: 26 MG/DL (ref 8–22)
CALCIUM SERPL-MCNC: 10 MG/DL (ref 8.5–10.5)
CHLORIDE BLD-SCNC: 102 MMOL/L (ref 98–107)
CO2 SERPL-SCNC: 20 MMOL/L (ref 22–31)
CREAT SERPL-MCNC: 1.08 MG/DL (ref 0.7–1.3)
GFR SERPL CREATININE-BSD FRML MDRD: >60 ML/MIN/1.73M2
GLUCOSE BLD-MCNC: 107 MG/DL (ref 70–125)
POTASSIUM BLD-SCNC: 4.3 MMOL/L (ref 3.5–5)
SODIUM SERPL-SCNC: 132 MMOL/L (ref 136–145)

## 2019-09-24 ASSESSMENT — MIFFLIN-ST. JEOR: SCORE: 1578.71

## 2019-09-30 DIAGNOSIS — K70.9 LIVER DISEASE, CHRONIC, DUE TO ALCOHOL (H): ICD-10-CM

## 2019-10-01 RX ORDER — POTASSIUM CHLORIDE 1500 MG/1
20 TABLET, EXTENDED RELEASE ORAL DAILY
Qty: 30 TABLET | Refills: 11 | Status: SHIPPED | OUTPATIENT
Start: 2019-10-01 | End: 2020-02-13

## 2019-10-07 ENCOUNTER — COMMUNICATION - HEALTHEAST (OUTPATIENT)
Dept: CARDIOLOGY | Facility: CLINIC | Age: 64
End: 2019-10-07

## 2019-10-08 ENCOUNTER — COMMUNICATION - HEALTHEAST (OUTPATIENT)
Dept: CARDIOLOGY | Facility: CLINIC | Age: 64
End: 2019-10-08

## 2019-10-08 DIAGNOSIS — I25.5 ISCHEMIC CARDIOMYOPATHY: ICD-10-CM

## 2019-10-09 ENCOUNTER — COMMUNICATION - HEALTHEAST (OUTPATIENT)
Dept: CARDIOLOGY | Facility: CLINIC | Age: 64
End: 2019-10-09

## 2019-10-10 ENCOUNTER — COMMUNICATION - HEALTHEAST (OUTPATIENT)
Dept: CARDIOLOGY | Facility: CLINIC | Age: 64
End: 2019-10-10

## 2019-10-15 ENCOUNTER — COMMUNICATION - HEALTHEAST (OUTPATIENT)
Dept: CARDIOLOGY | Facility: CLINIC | Age: 64
End: 2019-10-15

## 2019-10-16 ENCOUNTER — AMBULATORY - HEALTHEAST (OUTPATIENT)
Dept: CARDIOLOGY | Facility: CLINIC | Age: 64
End: 2019-10-16

## 2019-10-16 ENCOUNTER — COMMUNICATION - HEALTHEAST (OUTPATIENT)
Dept: CARDIOLOGY | Facility: CLINIC | Age: 64
End: 2019-10-16

## 2019-10-18 ENCOUNTER — COMMUNICATION - HEALTHEAST (OUTPATIENT)
Dept: CARDIOLOGY | Facility: CLINIC | Age: 64
End: 2019-10-18

## 2019-10-21 ENCOUNTER — TELEPHONE (OUTPATIENT)
Dept: FAMILY MEDICINE | Facility: CLINIC | Age: 64
End: 2019-10-21

## 2019-10-21 ENCOUNTER — AMBULATORY - HEALTHEAST (OUTPATIENT)
Dept: CARDIOLOGY | Facility: CLINIC | Age: 64
End: 2019-10-21

## 2019-10-21 DIAGNOSIS — I42.9 CARDIOMYOPATHY, UNSPECIFIED TYPE (H): ICD-10-CM

## 2019-10-21 NOTE — TELEPHONE ENCOUNTER
Triage was asked to further assess prior to scheduling a clinic appointment. Wife called to help schedule an appointment for tomorrow for further assessment. She informs me, patient has already contacted Cardiology and they do not feel symptomrs are cardiac related.  C/o x 2 weeks has experienced intermittent SOB with physical exertion, dizziness and right ear plugged sensation along with rhinorrhea. Denies wheezing or chest discomfort. Offered an appt for today in clinic, deferred. Wife states he has an echocardiogram scheduled to be done 8am  tomorrow and would prefer to also be seen in clinic tomorrow following echo. Schedule an appt, in the meantime, if symptoms should worsen to present into ED. Wife states understanding.  Yuko DING

## 2019-10-23 ENCOUNTER — COMMUNICATION - HEALTHEAST (OUTPATIENT)
Dept: CARDIOLOGY | Facility: CLINIC | Age: 64
End: 2019-10-23

## 2019-11-06 ENCOUNTER — HEALTH MAINTENANCE LETTER (OUTPATIENT)
Age: 64
End: 2019-11-06

## 2019-11-06 ENCOUNTER — COMMUNICATION - HEALTHEAST (OUTPATIENT)
Dept: CARDIOLOGY | Facility: CLINIC | Age: 64
End: 2019-11-06

## 2019-11-06 DIAGNOSIS — I42.9 CARDIOMYOPATHY, UNSPECIFIED TYPE (H): ICD-10-CM

## 2019-11-07 ENCOUNTER — OFFICE VISIT - HEALTHEAST (OUTPATIENT)
Dept: GERIATRICS | Facility: CLINIC | Age: 64
End: 2019-11-07

## 2019-11-07 ENCOUNTER — TRANSFERRED RECORDS (OUTPATIENT)
Dept: HEALTH INFORMATION MANAGEMENT | Facility: CLINIC | Age: 64
End: 2019-11-07

## 2019-11-07 DIAGNOSIS — R11.0 NAUSEA: ICD-10-CM

## 2019-11-07 DIAGNOSIS — J90 PLEURAL EFFUSION: ICD-10-CM

## 2019-11-07 DIAGNOSIS — I25.5 ISCHEMIC CARDIOMYOPATHY: ICD-10-CM

## 2019-11-07 DIAGNOSIS — K72.00 ACUTE LIVER FAILURE WITHOUT HEPATIC COMA: ICD-10-CM

## 2019-11-07 DIAGNOSIS — K70.11 ALCOHOLIC HEPATITIS WITH ASCITES (H): ICD-10-CM

## 2019-11-07 DIAGNOSIS — R31.9 HEMATURIA, UNSPECIFIED TYPE: ICD-10-CM

## 2019-11-07 DIAGNOSIS — I50.22 CHRONIC SYSTOLIC HEART FAILURE (H): ICD-10-CM

## 2019-11-07 DIAGNOSIS — I25.10 CORONARY ARTERY DISEASE INVOLVING NATIVE CORONARY ARTERY OF NATIVE HEART WITHOUT ANGINA PECTORIS: ICD-10-CM

## 2019-11-07 DIAGNOSIS — D52.0 DIETARY FOLATE DEFICIENCY ANEMIA: ICD-10-CM

## 2019-11-07 DIAGNOSIS — E83.42 HYPOMAGNESEMIA: ICD-10-CM

## 2019-11-07 DIAGNOSIS — I10 ESSENTIAL HYPERTENSION: ICD-10-CM

## 2019-11-07 DIAGNOSIS — I35.0 AORTIC STENOSIS, MODERATE: ICD-10-CM

## 2019-11-08 ENCOUNTER — RECORDS - HEALTHEAST (OUTPATIENT)
Dept: LAB | Facility: CLINIC | Age: 64
End: 2019-11-08

## 2019-11-08 LAB
C DIFF TOX B STL QL: NEGATIVE
RIBOTYPE 027/NAP1/BI: NORMAL

## 2019-11-11 ENCOUNTER — RECORDS - HEALTHEAST (OUTPATIENT)
Dept: LAB | Facility: HOSPITAL | Age: 64
End: 2019-11-11

## 2019-11-11 ENCOUNTER — RECORDS - HEALTHEAST (OUTPATIENT)
Dept: LAB | Facility: CLINIC | Age: 64
End: 2019-11-11

## 2019-11-11 ENCOUNTER — OFFICE VISIT - HEALTHEAST (OUTPATIENT)
Dept: GERIATRICS | Facility: CLINIC | Age: 64
End: 2019-11-11

## 2019-11-11 DIAGNOSIS — E83.42 HYPOMAGNESEMIA: ICD-10-CM

## 2019-11-11 DIAGNOSIS — N17.9 ACUTE RENAL FAILURE, UNSPECIFIED ACUTE RENAL FAILURE TYPE (H): ICD-10-CM

## 2019-11-11 DIAGNOSIS — I50.22 CHRONIC SYSTOLIC HEART FAILURE (H): ICD-10-CM

## 2019-11-11 DIAGNOSIS — E87.6 HYPOKALEMIA DUE TO EXCESSIVE GASTROINTESTINAL LOSS OF POTASSIUM: ICD-10-CM

## 2019-11-11 DIAGNOSIS — K70.11 ALCOHOLIC HEPATITIS WITH ASCITES (H): ICD-10-CM

## 2019-11-11 DIAGNOSIS — K76.82 HEPATIC ENCEPHALOPATHY (H): ICD-10-CM

## 2019-11-11 LAB
ALBUMIN SERPL-MCNC: 2.5 G/DL (ref 3.5–5)
ALBUMIN UR-MCNC: ABNORMAL MG/DL
ALP SERPL-CCNC: 365 U/L (ref 45–120)
ALT SERPL W P-5'-P-CCNC: 33 U/L (ref 0–45)
AMMONIA PLAS-SCNC: 26 UMOL/L (ref 11–35)
ANION GAP SERPL CALCULATED.3IONS-SCNC: 8 MMOL/L (ref 5–18)
APPEARANCE UR: CLEAR
AST SERPL W P-5'-P-CCNC: 49 U/L (ref 0–40)
BACTERIA #/AREA URNS HPF: ABNORMAL HPF
BILIRUB SERPL-MCNC: 0.5 MG/DL (ref 0–1)
BILIRUB UR QL STRIP: NEGATIVE
BUN SERPL-MCNC: 7 MG/DL (ref 8–22)
CALCIUM SERPL-MCNC: 8.1 MG/DL (ref 8.5–10.5)
CHLORIDE BLD-SCNC: 107 MMOL/L (ref 98–107)
CO2 SERPL-SCNC: 21 MMOL/L (ref 22–31)
COLOR UR AUTO: YELLOW
CREAT SERPL-MCNC: 0.67 MG/DL (ref 0.7–1.3)
GFR SERPL CREATININE-BSD FRML MDRD: >60 ML/MIN/1.73M2
GLUCOSE BLD-MCNC: 67 MG/DL (ref 70–125)
GLUCOSE UR STRIP-MCNC: NEGATIVE MG/DL
HGB BLD-MCNC: 9.1 G/DL (ref 14–18)
HGB UR QL STRIP: NEGATIVE
HYALINE CASTS #/AREA URNS LPF: ABNORMAL LPF
KETONES UR STRIP-MCNC: ABNORMAL MG/DL
LEUKOCYTE ESTERASE UR QL STRIP: NEGATIVE
MAGNESIUM SERPL-MCNC: 1.6 MG/DL (ref 1.8–2.6)
MUCOUS THREADS #/AREA URNS LPF: ABNORMAL LPF
NITRATE UR QL: NEGATIVE
PH UR STRIP: 5.5 [PH] (ref 4.5–8)
POTASSIUM BLD-SCNC: 2.6 MMOL/L (ref 3.5–5)
PROT SERPL-MCNC: 7 G/DL (ref 6–8)
RBC #/AREA URNS AUTO: ABNORMAL HPF
SODIUM SERPL-SCNC: 136 MMOL/L (ref 136–145)
SP GR UR STRIP: 1.03 (ref 1–1.03)
SQUAMOUS #/AREA URNS AUTO: ABNORMAL LPF
UROBILINOGEN UR STRIP-ACNC: ABNORMAL
WBC #/AREA URNS AUTO: ABNORMAL HPF

## 2019-11-12 ENCOUNTER — RECORDS - HEALTHEAST (OUTPATIENT)
Dept: LAB | Facility: CLINIC | Age: 64
End: 2019-11-12

## 2019-11-12 LAB — POTASSIUM BLD-SCNC: 2.7 MMOL/L (ref 3.5–5)

## 2019-11-13 ENCOUNTER — TELEPHONE (OUTPATIENT)
Dept: FAMILY MEDICINE | Facility: CLINIC | Age: 64
End: 2019-11-13

## 2019-11-13 ENCOUNTER — RECORDS - HEALTHEAST (OUTPATIENT)
Dept: LAB | Facility: CLINIC | Age: 64
End: 2019-11-13

## 2019-11-13 LAB — POTASSIUM BLD-SCNC: 3.4 MMOL/L (ref 3.5–5)

## 2019-11-14 ENCOUNTER — TRANSFERRED RECORDS (OUTPATIENT)
Dept: HEALTH INFORMATION MANAGEMENT | Facility: CLINIC | Age: 64
End: 2019-11-14

## 2019-11-14 ENCOUNTER — OFFICE VISIT - HEALTHEAST (OUTPATIENT)
Dept: GERIATRICS | Facility: CLINIC | Age: 64
End: 2019-11-14

## 2019-11-14 DIAGNOSIS — E87.6 HYPOKALEMIA DUE TO EXCESSIVE GASTROINTESTINAL LOSS OF POTASSIUM: ICD-10-CM

## 2019-11-14 DIAGNOSIS — I50.22 CHRONIC SYSTOLIC HEART FAILURE (H): ICD-10-CM

## 2019-11-14 DIAGNOSIS — K76.82 HEPATIC ENCEPHALOPATHY (H): ICD-10-CM

## 2019-11-14 DIAGNOSIS — K70.11 ALCOHOLIC HEPATITIS WITH ASCITES (H): ICD-10-CM

## 2019-11-18 ENCOUNTER — RECORDS - HEALTHEAST (OUTPATIENT)
Dept: LAB | Facility: CLINIC | Age: 64
End: 2019-11-18

## 2019-11-18 ENCOUNTER — RECORDS - HEALTHEAST (OUTPATIENT)
Dept: LAB | Facility: HOSPITAL | Age: 64
End: 2019-11-18

## 2019-11-18 ENCOUNTER — OFFICE VISIT - HEALTHEAST (OUTPATIENT)
Dept: GERIATRICS | Facility: CLINIC | Age: 64
End: 2019-11-18

## 2019-11-18 ENCOUNTER — OFFICE VISIT - HEALTHEAST (OUTPATIENT)
Dept: CARDIOLOGY | Facility: CLINIC | Age: 64
End: 2019-11-18

## 2019-11-18 ENCOUNTER — TRANSFERRED RECORDS (OUTPATIENT)
Dept: HEALTH INFORMATION MANAGEMENT | Facility: CLINIC | Age: 64
End: 2019-11-18

## 2019-11-18 DIAGNOSIS — I25.5 ISCHEMIC CARDIOMYOPATHY: ICD-10-CM

## 2019-11-18 DIAGNOSIS — I50.20 HEART FAILURE WITH REDUCED EJECTION FRACTION (H): ICD-10-CM

## 2019-11-18 DIAGNOSIS — E87.6 HYPOKALEMIA DUE TO EXCESSIVE GASTROINTESTINAL LOSS OF POTASSIUM: ICD-10-CM

## 2019-11-18 DIAGNOSIS — K76.82 HEPATIC ENCEPHALOPATHY (H): ICD-10-CM

## 2019-11-18 DIAGNOSIS — I35.0 AORTIC STENOSIS, MODERATE: ICD-10-CM

## 2019-11-18 DIAGNOSIS — I25.10 CORONARY ARTERY DISEASE INVOLVING NATIVE CORONARY ARTERY OF NATIVE HEART WITHOUT ANGINA PECTORIS: ICD-10-CM

## 2019-11-18 DIAGNOSIS — I47.29 NONSUSTAINED VENTRICULAR TACHYCARDIA (H): ICD-10-CM

## 2019-11-18 DIAGNOSIS — K70.11 ALCOHOLIC HEPATITIS WITH ASCITES (H): ICD-10-CM

## 2019-11-18 DIAGNOSIS — L85.3 XEROSIS CUTIS: ICD-10-CM

## 2019-11-18 DIAGNOSIS — I21.4 NON-ST ELEVATION MI (NSTEMI) (H): ICD-10-CM

## 2019-11-18 DIAGNOSIS — E83.42 HYPOMAGNESEMIA: ICD-10-CM

## 2019-11-18 DIAGNOSIS — I50.22 CHRONIC SYSTOLIC HEART FAILURE (H): ICD-10-CM

## 2019-11-18 LAB
AMMONIA PLAS-SCNC: 26 UMOL/L (ref 11–35)
MAGNESIUM SERPL-MCNC: 1.5 MG/DL (ref 1.8–2.6)
MAGNESIUM SERPL-MCNC: 1.5 MG/DL (ref 1.8–2.6)
POTASSIUM BLD-SCNC: 3.6 MMOL/L (ref 3.5–5)

## 2019-11-18 ASSESSMENT — MIFFLIN-ST. JEOR: SCORE: 1576.44

## 2019-11-21 ENCOUNTER — OFFICE VISIT (OUTPATIENT)
Dept: FAMILY MEDICINE | Facility: CLINIC | Age: 64
End: 2019-11-21
Payer: COMMERCIAL

## 2019-11-21 VITALS
BODY MASS INDEX: 27.81 KG/M2 | RESPIRATION RATE: 18 BRPM | SYSTOLIC BLOOD PRESSURE: 103 MMHG | DIASTOLIC BLOOD PRESSURE: 66 MMHG | WEIGHT: 172 LBS | OXYGEN SATURATION: 98 % | TEMPERATURE: 97.7 F | HEART RATE: 91 BPM

## 2019-11-21 DIAGNOSIS — Z76.89 RETURN TO WORK EVALUATION: Primary | ICD-10-CM

## 2019-11-21 RX ORDER — CARVEDILOL 12.5 MG/1
12.5 TABLET ORAL 2 TIMES DAILY
COMMUNITY
End: 2020-06-03

## 2019-11-21 RX ORDER — CLOPIDOGREL BISULFATE 75 MG/1
75 TABLET ORAL DAILY
COMMUNITY
Start: 2019-11-18 | End: 2020-06-03

## 2019-11-21 NOTE — PROGRESS NOTES
Preceptor Attestation:   Patient seen, evaluated and discussed with the resident. I have verified the content of the note, which accurately reflects my assessment of the patient and the plan of care.  Supervising Physician:Brea Rogers MD  [facility]

## 2019-11-21 NOTE — PROGRESS NOTES
HPI:       Aayush García is a 64 year old male with a significant past medical history of alcohol use disorder, coronary artery disease with recent MI 3/2019 s/p stenting, GERD, cardiomyopathy, heart failure who presents for the new concern(s) of:    1. Completion of letter: Reviewed patient's recent hospitalization for GI bleed and encephalopathy, thought to be related to prolonged alcohol use and liver disease. Patient is currently residing in a transitional care unit and will be discharging in the next week. He is here today to discuss returning to work after going home. 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 He does not move inventory around (no lifting). He does ambulate with a walker and has access to sitting at work whenever needed.          History:     Patient Active Problem List   Diagnosis     Benign essential hypertension     Overweight (BMI 25.0-29.9)     White coat syndrome with diagnosis of hypertension     Gastroesophageal reflux disease without esophagitis     Vitamin D deficiency     Alcoholism /alcohol abuse (H)     Heart failure with reduced ejection fraction (H)     Iron deficiency anemia due to chronic blood loss     Non-ST elevation MI (NSTEMI) (H)     Primary localized osteoarthrosis of left lower leg     Aortic stenosis, moderate     Coronary artery disease involving native coronary artery of native heart without angina pectoris     Ischemic cardiomyopathy     Pleural effusion       Current Outpatient Medications   Medication Sig Dispense Refill     acetaminophen (TYLENOL) 325 MG tablet Take 650 mg by mouth       aspirin (ASA) 81 MG EC tablet Take 1 tablet (81 mg) by mouth daily 90 tablet 3     atorvastatin (LIPITOR) 80 MG tablet Take 80 mg by mouth daily  0     B Complex-C-Folic Acid (NEPHRO-FRANCISCO) 0.8 MG TABS Take 1 tablet by mouth daily       bisacodyl (DULCOLAX) 10 MG Suppository Place 1  suppository (10 mg) rectally daily as needed for constipation 25 suppository 1     clopidogrel (PLAVIX) 75 MG tablet Take 75 mg by mouth daily       ferrous sulfate (FEROSUL) 325 (65 Fe) MG tablet Take 325 mg by mouth daily  0     folic acid (FOLVITE) 1 MG tablet Take 1,000 mcg by mouth daily  0     furosemide (LASIX) 20 MG tablet Take 20 mg by mouth daily  0     magnesium oxide (MAG-OX) 400 MG tablet Take 1 tablet (400 mg) by mouth 2 times daily 60 tablet 11     Miconazole Nitrate 2 % ointment Apply topically 2 times daily To perinuem 56 g 0     Multiple Vitamins-Minerals (MULTIVITAMIN & MINERAL PO) Take 1 tablet by mouth daily       omeprazole (PRILOSEC) 40 MG DR capsule Take 1 capsule (40 mg) by mouth daily Take 30-60 minutes before a meal. 30 capsule 11     order for DME Equipment being ordered: Blood pressure instrument and cuff 1 each 0     potassium chloride ER (K-DUR/KLOR-CON M) 20 MEQ CR tablet Take 1 tablet (20 mEq) by mouth daily 30 tablet 11     thiamine mononitrate 100 MG TABS Take 100 mg by mouth daily 30 tablet 11     carvedilol (COREG) 12.5 MG tablet Take 12.5 mg by mouth 2 times daily       melatonin 3 MG tablet Take 1 tablet (3 mg) by mouth nightly as needed for sleep 30 tablet 11      Allergies   Allergen Reactions     No Known Allergies           Review of Systems:     ROS including constitutional, GI, Resp, CV, , Neuro, MSK neg other than the symptoms noted above in the HPI.          Physical Exam:     Vitals:    11/21/19 1042   BP: 103/66   Pulse: 91   Resp: 18   Temp: 97.7  F (36.5  C)   TempSrc: Oral   SpO2: 98%   Weight: 78 kg (172 lb)     Body mass index is 27.81 kg/m .    GENERAL APPEARANCE: healthy, alert and no distress  EYES: Eyes grossly normal to inspection,  PERRL  RESP: lungs clear to auscultation - no rales, rhonchi or wheezes  CV: regular rate and rhythm, II/VI systolic murmur RUSB without radiation  ABDOMEN: soft, nontender, without hepatosplenomegaly or masses  SKIN: no  suspicious lesions or rashes  PSYCH: mood and affect normal/bright         Assessment and Plan:     Patient is a 64 year old male seen for:    1. Return to work evaluation  Letter provided for patient to return to light duty only - absolutely no lifting and easy access to chair for sitting when needed.     Options for treatment and follow-up care were reviewed with the patient and/or guardian. Aayush García and/or guardian engaged in the decision making process and verbalized understanding of the options discussed and agreed with the final plan.      Veena Moore MD  [facility] Medicine Resident      Precepted with: Brea Rogers MD

## 2019-11-21 NOTE — LETTER
RETURN TO WORK/SCHOOL FORM    11/21/2019    Re: Aayush García  1955      To Whom It May Concern:     Aayush García was seen in clinic today.  He may return to work with the following restrictions on 11/29/19.        Restrictions: limited to light duty - no lifting. Access to chair to sit down when needed. These are long-term restrictions.       If you have any questions, please contact our clinic.       Veena Moore MD  11/21/2019 11:12 AM

## 2019-11-23 PROBLEM — S22.41XA CLOSED FRACTURE OF MULTIPLE RIBS OF RIGHT SIDE, INITIAL ENCOUNTER: Status: ACTIVE | Noted: 2019-11-23

## 2019-11-23 PROBLEM — S22.41XA CLOSED FRACTURE OF MULTIPLE RIBS OF RIGHT SIDE, INITIAL ENCOUNTER: Status: RESOLVED | Noted: 2019-11-23 | Resolved: 2019-11-23

## 2019-11-23 PROBLEM — K92.2 GI BLEEDING: Status: ACTIVE | Noted: 2019-10-27

## 2019-11-23 PROBLEM — J90 PLEURAL EFFUSION: Status: ACTIVE | Noted: 2019-11-23

## 2019-11-23 PROBLEM — K92.2 GI BLEEDING: Status: RESOLVED | Noted: 2019-10-27 | Resolved: 2019-11-23

## 2019-11-25 ENCOUNTER — OFFICE VISIT - HEALTHEAST (OUTPATIENT)
Dept: GERIATRICS | Facility: CLINIC | Age: 64
End: 2019-11-25

## 2019-11-25 ENCOUNTER — TRANSFERRED RECORDS (OUTPATIENT)
Dept: HEALTH INFORMATION MANAGEMENT | Facility: CLINIC | Age: 64
End: 2019-11-25

## 2019-11-25 DIAGNOSIS — I25.10 CORONARY ARTERY DISEASE INVOLVING NATIVE CORONARY ARTERY OF NATIVE HEART WITHOUT ANGINA PECTORIS: ICD-10-CM

## 2019-11-25 DIAGNOSIS — F10.10 ALCOHOL ABUSE: ICD-10-CM

## 2019-11-25 DIAGNOSIS — I50.22 CHRONIC SYSTOLIC HEART FAILURE (H): ICD-10-CM

## 2019-11-25 DIAGNOSIS — K76.82 HEPATIC ENCEPHALOPATHY (H): ICD-10-CM

## 2019-11-25 DIAGNOSIS — K70.11 ALCOHOLIC HEPATITIS WITH ASCITES (H): ICD-10-CM

## 2019-11-25 DIAGNOSIS — I25.5 ISCHEMIC CARDIOMYOPATHY: ICD-10-CM

## 2019-11-25 DIAGNOSIS — E87.6 HYPOKALEMIA DUE TO EXCESSIVE GASTROINTESTINAL LOSS OF POTASSIUM: ICD-10-CM

## 2019-11-25 DIAGNOSIS — E83.42 HYPOMAGNESEMIA: ICD-10-CM

## 2019-11-26 ENCOUNTER — RECORDS - HEALTHEAST (OUTPATIENT)
Dept: LAB | Facility: CLINIC | Age: 64
End: 2019-11-26

## 2019-11-26 LAB
POTASSIUM BLD-SCNC: 3.5 MMOL/L (ref 3.5–5)
SODIUM SERPL-SCNC: 138 MMOL/L (ref 136–145)

## 2019-11-29 ENCOUNTER — AMBULATORY - HEALTHEAST (OUTPATIENT)
Dept: GERIATRICS | Facility: CLINIC | Age: 64
End: 2019-11-29

## 2019-12-02 ENCOUNTER — TELEPHONE (OUTPATIENT)
Dept: FAMILY MEDICINE | Facility: CLINIC | Age: 64
End: 2019-12-02

## 2019-12-02 DIAGNOSIS — K21.9 GASTROESOPHAGEAL REFLUX DISEASE WITHOUT ESOPHAGITIS: Primary | ICD-10-CM

## 2019-12-03 ENCOUNTER — HOSPITAL ENCOUNTER (OUTPATIENT)
Dept: CARDIOLOGY | Facility: HOSPITAL | Age: 64
Discharge: HOME | End: 2019-12-03
Attending: INTERNAL MEDICINE

## 2019-12-03 DIAGNOSIS — I25.5 ISCHEMIC CARDIOMYOPATHY: ICD-10-CM

## 2019-12-03 DIAGNOSIS — I35.0 AORTIC STENOSIS, MODERATE: ICD-10-CM

## 2019-12-03 DIAGNOSIS — I50.20 HEART FAILURE WITH REDUCED EJECTION FRACTION (H): ICD-10-CM

## 2019-12-03 RX ORDER — PANTOPRAZOLE SODIUM 40 MG/1
40 TABLET, DELAYED RELEASE ORAL DAILY
Qty: 30 TABLET | Refills: 11 | Status: SHIPPED | OUTPATIENT
Start: 2019-12-03 | End: 2020-01-14

## 2019-12-03 ASSESSMENT — MIFFLIN-ST. JEOR: SCORE: 1567.36

## 2019-12-04 LAB
AORTIC ROOT: 3.9 CM
AORTIC VALVE MEAN VELOCITY: 256 CM/S
AR DECEL SLOPE: 2170 MM/S2
AR PEAK VELOCITY: 331 CM/S
AV DIMENSIONLESS INDEX VTI: 0.2
AV MEAN GRADIENT: 29 MMHG
AV PEAK GRADIENT: 42.3 MMHG
AV REGURGITANT PEAK GRADIENT: 43.8 MMHG
AV REGURGITATION PRESSURE HALF TIME: 446 MS
AV VALVE AREA: 0.7 CM2
AV VELOCITY RATIO: 0.2
BSA FOR ECHO PROCEDURE: 1.95 M2
CV ECHO HEIGHT: 70 IN
CV ECHO WEIGHT: 170 LBS
DOP CALC AO PEAK VEL: 325 CM/S
DOP CALC AO VTI: 81.8 CM
DOP CALC LVOT AREA: 3.8 CM2
DOP CALC LVOT DIAMETER: 2.2 CM
DOP CALC LVOT PEAK VEL: 60.6 CM/S
DOP CALC LVOT STROKE VOLUME: 58.9 CM3
DOP CALCLVOT PEAK VEL VTI: 15.5 CM
EJECTION FRACTION: 30 % (ref 55–75)
FRACTIONAL SHORTENING: 2.8 % (ref 28–44)
INTERVENTRICULAR SEPTUM IN END DIASTOLE: 0.74 CM (ref 0.6–1)
IVS/PW RATIO: 0.7
LA AREA 1: 25 CM2
LA AREA 2: 27 CM2
LEFT ATRIUM LENGTH: 6 CM
LEFT ATRIUM SIZE: 4.3 CM
LEFT ATRIUM TO AORTIC ROOT RATIO: 1.1 NO UNITS
LEFT ATRIUM VOLUME INDEX: 49 ML/M2
LEFT ATRIUM VOLUME: 95.6 ML
LEFT VENTRICLE DIASTOLIC VOLUME INDEX: 69.2 CM3/M2 (ref 34–74)
LEFT VENTRICLE DIASTOLIC VOLUME: 135 CM3 (ref 62–150)
LEFT VENTRICLE MASS INDEX: 127 G/M2
LEFT VENTRICLE SYSTOLIC VOLUME INDEX: 48.2 CM3/M2 (ref 11–31)
LEFT VENTRICLE SYSTOLIC VOLUME: 94 CM3 (ref 21–61)
LEFT VENTRICULAR INTERNAL DIMENSION IN DIASTOLE: 6.4 CM (ref 4.2–5.8)
LEFT VENTRICULAR INTERNAL DIMENSION IN SYSTOLE: 6.22 CM (ref 2.5–4)
LEFT VENTRICULAR MASS: 247.6 G
LEFT VENTRICULAR OUTFLOW TRACT MEAN GRADIENT: 1 MMHG
LEFT VENTRICULAR OUTFLOW TRACT MEAN VELOCITY: 47.9 CM/S
LEFT VENTRICULAR OUTFLOW TRACT PEAK GRADIENT: 1 MMHG
LEFT VENTRICULAR POSTERIOR WALL IN END DIASTOLE: 1.1 CM (ref 0.6–1)
LV STROKE VOLUME INDEX: 30.2 ML/M2
MITRAL VALVE E/A RATIO: 1.6
MV AVERAGE E/E' RATIO: 16.5 CM/S
MV E'TISSUE VEL-LAT: 6 CM/S
MV E'TISSUE VEL-MED: 6.87 CM/S
MV LATERAL E/E' RATIO: 17.7
MV MEDIAL E/E' RATIO: 15.4
MV PEAK A VELOCITY: 66.5 CM/S
MV PEAK E VELOCITY: 106 CM/S
NUC REST DIASTOLIC VOLUME INDEX: 2720 LBS
NUC REST SYSTOLIC VOLUME INDEX: 70 IN
TRICUSPID REGURGITATION PEAK PRESSURE GRADIENT: 38.9 MMHG
TRICUSPID VALVE ANULAR PLANE SYSTOLIC EXCURSION: 2.5 CM
TRICUSPID VALVE PEAK REGURGITANT VELOCITY: 312 CM/S

## 2019-12-16 ENCOUNTER — TRANSFERRED RECORDS (OUTPATIENT)
Dept: HEALTH INFORMATION MANAGEMENT | Facility: CLINIC | Age: 64
End: 2019-12-16

## 2019-12-18 ENCOUNTER — TELEPHONE (OUTPATIENT)
Dept: FAMILY MEDICINE | Facility: CLINIC | Age: 64
End: 2019-12-18

## 2019-12-18 NOTE — TELEPHONE ENCOUNTER
I called to check up on the pt, and help the pt setup a hospital follow up.  The pt did not answer his phone, so I left a vm for the pt to give me a call back.

## 2019-12-18 NOTE — TELEPHONE ENCOUNTER
Date of discharge: 12/17/2019  Facility of discharge: [facility]  Patient concerns about condition: No concerns at this time.  Patient concerns about medications: Pt wanted to know if the pantoprazole, was prescribe in place of the omeprazole.  Full med reconciliation will be completed at clinic visit.  Patient concerns about transitioning: No concerns at this time.  Clinic office visit appointment date: 12/31/2019  Patient reminded to bring all medications (prescription and over-the-counter) to clinic appointment: Yes    Using the date of discharge as day 1, the 30th day post discharge is 01/17/2020.

## 2020-01-14 ENCOUNTER — OFFICE VISIT (OUTPATIENT)
Dept: FAMILY MEDICINE | Facility: CLINIC | Age: 65
End: 2020-01-14
Payer: COMMERCIAL

## 2020-01-14 VITALS
HEIGHT: 66 IN | OXYGEN SATURATION: 99 % | HEART RATE: 76 BPM | BODY MASS INDEX: 28.28 KG/M2 | RESPIRATION RATE: 20 BRPM | WEIGHT: 176 LBS | DIASTOLIC BLOOD PRESSURE: 64 MMHG | SYSTOLIC BLOOD PRESSURE: 117 MMHG | TEMPERATURE: 98.2 F

## 2020-01-14 DIAGNOSIS — E66.3 OVERWEIGHT (BMI 25.0-29.9): ICD-10-CM

## 2020-01-14 DIAGNOSIS — I10 BENIGN ESSENTIAL HYPERTENSION: ICD-10-CM

## 2020-01-14 DIAGNOSIS — I25.10 CORONARY ARTERY DISEASE INVOLVING NATIVE CORONARY ARTERY OF NATIVE HEART WITHOUT ANGINA PECTORIS: ICD-10-CM

## 2020-01-14 DIAGNOSIS — I50.22 CHRONIC SYSTOLIC HEART FAILURE (H): Primary | ICD-10-CM

## 2020-01-14 DIAGNOSIS — M17.12 PRIMARY LOCALIZED OSTEOARTHROSIS OF LEFT LOWER LEG: ICD-10-CM

## 2020-01-14 PROBLEM — J96.01 ACUTE RESPIRATORY FAILURE WITH HYPOXIA (H): Status: ACTIVE | Noted: 2019-12-16

## 2020-01-14 PROBLEM — I50.23 ACUTE ON CHRONIC SYSTOLIC CONGESTIVE HEART FAILURE (H): Status: RESOLVED | Noted: 2020-01-14 | Resolved: 2020-01-14

## 2020-01-14 PROBLEM — J96.01 ACUTE RESPIRATORY FAILURE WITH HYPOXIA (H): Status: RESOLVED | Noted: 2019-12-16 | Resolved: 2020-01-14

## 2020-01-14 PROBLEM — I21.4 NON-ST ELEVATION MI (NSTEMI) (H): Status: RESOLVED | Noted: 2019-03-18 | Resolved: 2020-01-14

## 2020-01-14 PROBLEM — I50.20 HEART FAILURE WITH REDUCED EJECTION FRACTION (H): Status: RESOLVED | Noted: 2019-04-04 | Resolved: 2020-01-14

## 2020-01-14 PROBLEM — I50.23 ACUTE ON CHRONIC SYSTOLIC CONGESTIVE HEART FAILURE (H): Status: ACTIVE | Noted: 2020-01-14

## 2020-01-14 LAB
BUN SERPL-MCNC: 14 MG/DL (ref 7–30)
CALCIUM SERPL-MCNC: 9.9 MG/DL (ref 8.5–10.4)
CHLORIDE SERPLBLD-SCNC: 101 MMOL/L (ref 94–109)
CO2 SERPL-SCNC: 26 MMOL/L (ref 20–32)
CREAT SERPL-MCNC: 0.5 MG/DL (ref 0.8–1.5)
EGFR CALCULATED (BLACK REFERENCE): >90 ML/MIN
EGFR CALCULATED (NON BLACK REFERENCE): >90 ML/MIN
GLUCOSE SERPL-MCNC: 98 MG/DL (ref 60–109)
MAGNESIUM SERPL-MCNC: 1.7 MG/DL (ref 1.8–2.6)
POTASSIUM SERPL-SCNC: 4.6 MMOL/L (ref 3.4–5.3)
SODIUM SERPL-SCNC: 140 MMOL/L (ref 133–144)

## 2020-01-14 ASSESSMENT — MIFFLIN-ST. JEOR: SCORE: 1536.08

## 2020-01-14 NOTE — LETTER
January 15, 2020      Aayush García  [address] [place] [id number]        Dear Aayush,    Your magnesium is low and you still need supplementation.  Increase your magnesium tabs to 3 times daily for 2 weeks.  We will recheck your level at your next visit.  Your potassium and sodium are OK.    Please see below for your test results.    Resulted Orders   Basic Metabolic Panel ([facility]) - Results < 1 hr   Result Value Ref Range    Glucose 98.0 60.0 - 109.0 mg/dL    Urea Nitrogen 14.0 7.0 - 30.0 mg/dL    Creatinine 0.5 (L) 0.8 - 1.5 mg/dL    Sodium 140.0 133.0 - 144.0 mmol/L    Potassium 4.6 3.4 - 5.3 mmol/L    Chloride 101.0 94.0 - 109.0 mmol/L    Carbon Dioxide 26.0 20.0 - 32.0 mmol/L    Calcium 9.9 8.5 - 10.4 mg/dL    eGFR Calculated (Non Black Reference) >90 >60.0 mL/min    eGFR Calculated (Black Reference) >90 >60.0 mL/min   Magnesium ([facility])   Result Value Ref Range    Magnesium 1.7 (L) 1.8 - 2.6 mg/dL    Narrative    Test performed by:  [facility]  [address] [place] [address]       If you have any questions, please call the clinic to make an appointment.    Sincerely,    Sarwat Rutledge MD

## 2020-01-14 NOTE — PATIENT INSTRUCTIONS
Will get lab results to you and adjust your medications if needed.  Your health will be better with weight loss. To lose weight, you will need to increase your walking slowly (a minute or 2 a day) until you are walking 60 minutes every day. To reduce your total calories you will need to stop eating rice, potatos, breads, cookies, cakes, and sweets. You can get your carbohydrates from fruits and vegetables. Your meal portions will have to be small.  Recheck in 2 weeks.

## 2020-01-14 NOTE — PROGRESS NOTES
"Patient presents with:  Hospital F/U: Pt. was seen at [place] - no concerns  Medication Reconciliation: Needs attention    /64   Pulse 76   Temp 98.2  F (36.8  C) (Oral)   Resp 20   Ht 1.676 m (5' 6\")   Wt 79.8 kg (176 lb)   SpO2 99%   BMI 28.41 kg/m      Admit 12-16  Discharge 12-18    SUBJECTIVE:  A 64-year-old male in for hospital followup after admission on 12/16/2019 to [facility] and discharged 2 days later.  He was treated for fluid overload from congestive failure.  He is much better now.  Swelling is down.  His breathing is good.  He is having no chest pain.      He has a complex medical history of heart attack, heart failure, aortic stenosis, recent GI bleed and iron deficiency anemia due to blood loss from ulceration due to medication.  He is overweight.  His blood pressure is probably a white coat hypertension issue.  His blood pressure is good today.  He also has joint pains in the hip and knee, which reduce his ability to walk and exercise.      We reconciled his medication list and I think it is current now.  He is not having any abdominal discomfort and would like to try going off his PPI.  If he has any discomfort or signs of rebleeding we will get him back on the omeprazole.      REVIEW OF SYMPTOMS:  He is not having any cold or sore throat.  He has had no cough or chest pain.  He does not have any swelling in his ankles.  He has no sensory motor deficits in the arms or legs.  He is having no vision or swallowing complaints.      OBJECTIVE:   APPEARANCE:  Appears older than stated age with abdominal obesity.   NECK:  No bruits.  No jugular venous distention.   CHEST:  Clear to auscultation.   CARDIOVASCULAR:  Regular rhythm with a systolic ejection murmur.   LUNGS:  Few crackles at the bases, otherwise clear.   ABDOMEN:  Soft, nontender, a thick fat layer.   EXTREMITIES:  No edema.      ASSESSMENT:   1. Congestive heart failure in remission.   2. Coronary artery disease. "   3. Hypertension, controlled.   4. Overweight.      PLAN:  We will check a BMP and magnesium today and adjust medications as indicated.  We talked about diet, exercise and losing weight with his cardiac health and also to improve his arthritis.  We will get the lab results to him as soon as they are available.  He is doing better now.      The patient and wife involved in medical decision making throughout the visit.      We will reevaluate in 2 weeks, earlier if worsening course.

## 2020-01-15 NOTE — RESULT ENCOUNTER NOTE
Aayush,   Your magnesium is low and you still need supplementation.  Increase your magnesium tabs to 3 times daily for 2 weeks.  We will recheck your level at your next visit.  Your potassium and sodium are OK.  WR

## 2020-02-13 ENCOUNTER — MYC REFILL (OUTPATIENT)
Dept: ORTHOPEDICS | Facility: CLINIC | Age: 65
End: 2020-02-13

## 2020-02-13 DIAGNOSIS — K70.9 LIVER DISEASE, CHRONIC, DUE TO ALCOHOL (H): ICD-10-CM

## 2020-02-17 RX ORDER — POTASSIUM CHLORIDE 1500 MG/1
20 TABLET, EXTENDED RELEASE ORAL DAILY
Qty: 30 TABLET | Refills: 11 | Status: SHIPPED | OUTPATIENT
Start: 2020-02-17 | End: 2021-02-15

## 2020-02-19 ENCOUNTER — DOCUMENTATION ONLY (OUTPATIENT)
Dept: FAMILY MEDICINE | Facility: CLINIC | Age: 65
End: 2020-02-19

## 2020-02-19 DIAGNOSIS — K21.9 GASTROESOPHAGEAL REFLUX DISEASE WITHOUT ESOPHAGITIS: Primary | ICD-10-CM

## 2020-02-19 RX ORDER — OMEPRAZOLE 40 MG/1
40 CAPSULE, DELAYED RELEASE ORAL DAILY
Qty: 30 CAPSULE | Status: CANCELLED | OUTPATIENT
Start: 2020-02-19

## 2020-02-19 NOTE — PROGRESS NOTES
Called in verbal order for Potassium Chloride ER r/t pharmacy not receiving electronic order. Gianluca DING

## 2020-02-20 RX ORDER — PANTOPRAZOLE SODIUM 20 MG/1
20 TABLET, DELAYED RELEASE ORAL DAILY
Qty: 90 TABLET | Refills: 3 | Status: SHIPPED | OUTPATIENT
Start: 2020-02-20 | End: 2021-04-06

## 2020-02-20 NOTE — TELEPHONE ENCOUNTER
Patient started on Plavix back in November. Should change his PPI to pantoprazole.    Completed.    Adam Nicole III, MD, FAAFP  [facility]  02/20/20 3:48 PM

## 2020-03-26 ENCOUNTER — OFFICE VISIT - HEALTHEAST (OUTPATIENT)
Dept: CARDIOLOGY | Facility: CLINIC | Age: 65
End: 2020-03-26

## 2020-03-26 DIAGNOSIS — I73.9 PERIPHERAL VASCULAR DISEASE (H): ICD-10-CM

## 2020-03-26 DIAGNOSIS — I25.10 CORONARY ARTERY DISEASE INVOLVING NATIVE CORONARY ARTERY OF NATIVE HEART WITHOUT ANGINA PECTORIS: ICD-10-CM

## 2020-03-26 DIAGNOSIS — E78.5 DYSLIPIDEMIA: ICD-10-CM

## 2020-03-26 DIAGNOSIS — I25.5 ISCHEMIC CARDIOMYOPATHY: ICD-10-CM

## 2020-03-26 DIAGNOSIS — I35.0 AORTIC VALVE STENOSIS, ETIOLOGY OF CARDIAC VALVE DISEASE UNSPECIFIED: ICD-10-CM

## 2020-03-26 DIAGNOSIS — I47.29 NONSUSTAINED VENTRICULAR TACHYCARDIA (H): ICD-10-CM

## 2020-04-11 ENCOUNTER — COMMUNICATION - HEALTHEAST (OUTPATIENT)
Dept: CARDIOLOGY | Facility: CLINIC | Age: 65
End: 2020-04-11

## 2020-05-16 ENCOUNTER — TRANSFERRED RECORDS (OUTPATIENT)
Dept: HEALTH INFORMATION MANAGEMENT | Facility: CLINIC | Age: 65
End: 2020-05-16

## 2020-05-17 ENCOUNTER — COMMUNICATION - HEALTHEAST (OUTPATIENT)
Dept: CARDIOLOGY | Facility: CLINIC | Age: 65
End: 2020-05-17

## 2020-05-18 ENCOUNTER — COMMUNICATION - HEALTHEAST (OUTPATIENT)
Dept: CARDIOLOGY | Facility: CLINIC | Age: 65
End: 2020-05-18

## 2020-05-18 ENCOUNTER — TELEPHONE (OUTPATIENT)
Dept: FAMILY MEDICINE | Facility: CLINIC | Age: 65
End: 2020-05-18

## 2020-05-18 DIAGNOSIS — I42.9 CARDIOMYOPATHY, UNSPECIFIED TYPE (H): ICD-10-CM

## 2020-05-19 ENCOUNTER — TELEPHONE (OUTPATIENT)
Dept: FAMILY MEDICINE | Facility: CLINIC | Age: 65
End: 2020-05-19

## 2020-05-19 NOTE — TELEPHONE ENCOUNTER
I called to check up on the pt, and help the pt setup a hospital follow up. The pt did not answer his phone, so I left a vm for the pt to give me a call back. This was my third attempt to contact the pt but was not able to reach him.

## 2020-06-02 NOTE — PROGRESS NOTES
Admitted to [facility] 5/14/20-5/16/20 for falls secondary to intoxication  Neg Head CT/ C-spine CT/ facial bones CT  Was recommended TCU and home services as well as rehab for his drinking - declined all

## 2020-06-03 ENCOUNTER — DOCUMENTATION ONLY (OUTPATIENT)
Dept: FAMILY MEDICINE | Facility: CLINIC | Age: 65
End: 2020-06-03

## 2020-06-03 ENCOUNTER — ALLIED HEALTH/NURSE VISIT (OUTPATIENT)
Dept: PHARMACY | Facility: PHYSICIAN GROUP | Age: 65
End: 2020-06-03
Payer: COMMERCIAL

## 2020-06-03 ENCOUNTER — VIRTUAL VISIT (OUTPATIENT)
Dept: FAMILY MEDICINE | Facility: CLINIC | Age: 65
End: 2020-06-03
Payer: COMMERCIAL

## 2020-06-03 VITALS — SYSTOLIC BLOOD PRESSURE: 136 MMHG | HEART RATE: 56 BPM | DIASTOLIC BLOOD PRESSURE: 72 MMHG

## 2020-06-03 DIAGNOSIS — K21.9 GASTROESOPHAGEAL REFLUX DISEASE WITHOUT ESOPHAGITIS: ICD-10-CM

## 2020-06-03 DIAGNOSIS — I50.22 CHRONIC SYSTOLIC HEART FAILURE (H): ICD-10-CM

## 2020-06-03 DIAGNOSIS — I25.10 CORONARY ARTERY DISEASE INVOLVING NATIVE CORONARY ARTERY OF NATIVE HEART WITHOUT ANGINA PECTORIS: ICD-10-CM

## 2020-06-03 DIAGNOSIS — D50.0 IRON DEFICIENCY ANEMIA DUE TO CHRONIC BLOOD LOSS: ICD-10-CM

## 2020-06-03 DIAGNOSIS — Z87.19 HISTORY OF DUODENAL ULCER: Primary | ICD-10-CM

## 2020-06-03 DIAGNOSIS — Z87.898 HISTORY OF ALCOHOL USE DISORDER: ICD-10-CM

## 2020-06-03 DIAGNOSIS — Z09 HOSPITAL DISCHARGE FOLLOW-UP: Primary | ICD-10-CM

## 2020-06-03 PROCEDURE — 99607 MTMS BY PHARM ADDL 15 MIN: CPT | Performed by: PHARMACIST

## 2020-06-03 PROCEDURE — 99605 MTMS BY PHARM NP 15 MIN: CPT | Performed by: PHARMACIST

## 2020-06-03 RX ORDER — CARVEDILOL 25 MG/1
25 TABLET ORAL 2 TIMES DAILY
COMMUNITY
Start: 2020-06-03 | End: 2021-12-06

## 2020-06-03 RX ORDER — FUROSEMIDE 20 MG
20 TABLET ORAL PRN
Refills: 0 | Status: ON HOLD | COMMUNITY
Start: 2020-06-03 | End: 2021-09-22

## 2020-06-03 RX ORDER — MULTIVIT-MIN/IRON/FOLIC ACID/K 18-600-40
1 CAPSULE ORAL DAILY
COMMUNITY
Start: 2020-06-03

## 2020-06-03 RX ORDER — LANOLIN ALCOHOL/MO/W.PET/CERES
400 CREAM (GRAM) TOPICAL DAILY
COMMUNITY
Start: 2020-06-03

## 2020-06-03 RX ORDER — SENNOSIDES 8.6 MG
650 CAPSULE ORAL EVERY 8 HOURS PRN
COMMUNITY
Start: 2020-06-03

## 2020-06-03 NOTE — PATIENT INSTRUCTIONS
Aayush - please come to our office for a lab visit in 2 weeks   I will talk to you again in 3 weeks. Your phone follow-up will be on 6/24 at 8AM - Dr Lewis

## 2020-06-03 NOTE — PROGRESS NOTES
"Family Medicine Telephone Visit Note           Telephone Visit Consent   Patient was verbally read the following and verbal consent was obtained.    \"Telephone visits are billed at different rates depending on your insurance coverage. During this emergency period, for some insurers they may be billed the same as an in-person visit.  Please reach out to your insurance provider with any questions.  If during the course of the call the physician/provider feels a telephone visit is not appropriate, you will not be charged for this service.\"    Name person giving consent:  Patient   Date verbal consent given:  6/3/2020  Time verbal consent given:  9:03 AM       Hospital follow-up         HPI   Patients name: Aayush  Appointment start time:  9:33 AM    Admitted to [facility] 5/14/20-5/16/20 for falls secondary to intoxication  Neg Head CT/ C-spine CT/ facial bones CT  Was recommended TCU and home services as well as rehab for his drinking - declined all    Med reconciliation -> performed by our clinical pharmacist  Will discontinue Plavix  Will continue pantoprazole  Check labs for lasix prn use    No questions/ concerns  Working out everyday; lifting weights  Admits too much to drink prior to admission -> plans to quit cold turkey   Has good support structure at home  Eating \"like a champion\"  Sleep is well  Mood is \"fantastic\"    Joint pains - has hammertoes and knee pain  Does elliptical every day       Current Outpatient Medications   Medication Sig Dispense Refill     acetaminophen (TYLENOL) 325 MG tablet Take 650 mg by mouth       aspirin (ASA) 81 MG EC tablet Take 1 tablet (81 mg) by mouth daily 90 tablet 3     atorvastatin (LIPITOR) 80 MG tablet Take 80 mg by mouth daily  0     B Complex-C-Folic Acid (NEPHRO-FRANCISCO) 0.8 MG TABS Take 1 tablet by mouth daily       carvedilol (COREG) 12.5 MG tablet Take 12.5 mg by mouth 2 times daily       clopidogrel (PLAVIX) 75 MG tablet Take 75 mg by mouth daily       ferrous sulfate " "(FEROSUL) 325 (65 Fe) MG tablet Take 325 mg by mouth daily  0     folic acid (FOLVITE) 1 MG tablet Take 1,000 mcg by mouth daily  0     furosemide (LASIX) 20 MG tablet Take 20 mg by mouth daily  0     magnesium oxide (MAG-OX) 400 MG tablet Take 1 tablet (400 mg) by mouth 2 times daily 60 tablet 11     order for DME Equipment being ordered: Blood pressure instrument and cuff 1 each 0     pantoprazole 20 MG PO EC tablet Take 1 tablet (20 mg) by mouth daily 90 tablet 3     potassium chloride ER (KLOR-CON M) 20 MEQ CR tablet Take 1 tablet (20 mEq) by mouth daily 30 tablet 11     Allergies   Allergen Reactions     No Known Allergies             Review of Systems:     Constitutional, HEENT, cardiovascular, pulmonary, GI, , musculoskeletal, neuro, skin, endocrine and psych systems are negative, except as otherwise noted.         Physical Exam:     There were no vitals taken for this visit.  Estimated body mass index is 28.41 kg/m  as calculated from the following:    Height as of 1/14/20: 1.676 m (5' 6\").    Weight as of 1/14/20: 79.8 kg (176 lb).    Exam:  Constitutional: healthy, alert and no distress  Psychiatric: mentation appears normal and affect normal/bright          Assessment and Plan       ICD-10-CM    1. Hospital discharge follow-up  Z09    2. History of alcohol use disorder  Z87.898    3. Gastroesophageal reflux disease without esophagitis  K21.9    4. Coronary artery disease involving native coronary artery of native heart without angina pectoris  I25.10 Basic Metabolic Panel ([facility]) - Results < 1 hr     Magnesium ([facility])     Pt is stable s/p hospital discharge  Pt has abstained from alcohol use since discharge and declined additional resources at this time  Appreciate med reconciliation from Dr Redmond  Will have him return for labs in 2 weeks  Will have him follow-up for phone visit in 3 weeks     Refilled medications that would be required in the next 3 months.     After Visit " Information:  Will print and mail AVS       Appointment end time: 9:45 AM  This is a telephone visit that took 12 minutes.      Clinician location:  [facility] Rahul Kapur, MD  Jannette 3, 2020  9:50 AM

## 2020-06-03 NOTE — PROGRESS NOTES
Attempted to call patient twice to schedule lab appt per Dr. Lewis. Unable to reach. Did leave a voicemail for patient to return call. Lab should be schedule in 2 weeks.    Avs also mailed out to pt

## 2020-06-03 NOTE — PROGRESS NOTES
"MT ENCOUNTER  SUBJECTIVE/OBJECTIVE:                           Aayush García is a 64 year old male called for a transitions of care visit. He was discharged from [facility] on 5/16/2020 for fall 2/2 alcohol intoxication. Today's visit is a co-visit with Dr. Lewis. .     Patient consented to a telehealth visit: yes  Telemedicine Visit Details  Type of service:  Telephone visit  Start Time: 905 am  End Time: 926 am  Originating Location (pt. Location): Home  Distant Location (provider location):  [facility] [place]  Mode of Communication:  Telephone     Medication Adherence/Access: Medicines reviewed today. Requested patient have bottles in front of him. Zahra is a nurse. Sets up in pillbox. No trouble remembering doses, but reminders. No concerns for missed doses remaining in pillbox.     CAD/HFrEF: Aspirin daily. No concerns for bleeding. Atorvastatin daily. No concern for muscle aches pains.   Carvedilol 12.5 x2 = 25 mg BID. When run out, will change to 25 mg tablets. Potassium one daily. Furosemide only PRN (fluid overload, \"If I got puffy\"), have not needed. Magnesium 400 mg BID. No concerns for diarrhea. Not taking Clopidogrel.     Blood pressure at home: usually 120/70, HR ~60. Sometimes lower HR.     History of GIB: Pantoprazole, unclear why taking, 20 mg daily. No concerns for bleeding as above.     RAJENDRA: Iron once per day. No concern for constipation.       Today's Vitals: There were no vitals taken for this visit.    ASSESSMENT:                            Medication Adherence/Access: No concerns.     CAD/HFrEF: Appropriate solo antiplatelet given history of stenting 3/2019. Appropriate high intensity statin. Beta blocker dose higher than we had documented, but patient tolerating without concern and no concerning vitals per home monitoring. Unclear if needs continued potassium/magnesium supplementation as no longer using diuretic, however hypomagnesemia could be 2/2 PPI.     History " "of GIB: Per discharge summary 10/27/2019, \" Per GI, last EGD was 6/2019 and showed patchy gastritis and a 10mm duodenal bulb ulcer with gastric biopsies negative for H.pylori.\"     RAJENDRA: No concerns for adverse effects.     PLAN:                          Post Discharge Medication Reconciliation Status: discharge medications reconciled, continue medications without change.  1. Given history of ulcer and need for continued antiplatelet for secondary prevention, continue PPI.   2. Plan for repeat BMP and Magnesium level at upcoming lab visit/follow-up with Dr. Lewis.     I spent 20 minutes with this patient today. All changes were made via collaborative practice agreement with Dr. Lewis. A copy of the visit note was provided to the patient's primary care provider.    Will follow up in PRN.    The patient was mailed a summary of these recommendations. See Provider note/AVS from today.     Julia Alvarado, PharmD, CDE  [facility]  Phone: [phone]  June 12, 2020 at 4:06 PM          "

## 2020-06-09 ENCOUNTER — OFFICE VISIT (OUTPATIENT)
Dept: FAMILY MEDICINE | Facility: CLINIC | Age: 65
End: 2020-06-09
Payer: COMMERCIAL

## 2020-06-09 VITALS
DIASTOLIC BLOOD PRESSURE: 72 MMHG | BODY MASS INDEX: 27.83 KG/M2 | WEIGHT: 172.4 LBS | RESPIRATION RATE: 16 BRPM | TEMPERATURE: 98.3 F | HEART RATE: 58 BPM | OXYGEN SATURATION: 99 % | SYSTOLIC BLOOD PRESSURE: 113 MMHG

## 2020-06-09 DIAGNOSIS — E87.1 HYPONATREMIA: ICD-10-CM

## 2020-06-09 DIAGNOSIS — E83.42 HYPOMAGNESEMIA: ICD-10-CM

## 2020-06-09 DIAGNOSIS — H61.23 BILATERAL IMPACTED CERUMEN: Primary | ICD-10-CM

## 2020-06-09 LAB
ANION GAP SERPL CALCULATED.3IONS-SCNC: 11 MMOL/L (ref 5–18)
BUN SERPL-MCNC: 15 MG/DL (ref 8–22)
CALCIUM SERPL-MCNC: 8.9 MG/DL (ref 8.5–10.5)
CHLORIDE SERPL-SCNC: 107 MMOL/L (ref 98–107)
CO2 SERPL-SCNC: 21 MMOL/L (ref 22–31)
CREAT SERPL-MCNC: 0.66 MG/DL (ref 0.7–1.3)
GLUCOSE SERPL-MCNC: 88 MG/DL (ref 70–125)
MAGNESIUM SERPL-MCNC: 1.7 MG/DL (ref 1.8–2.6)
POTASSIUM SERPL-SCNC: 4.1 MMOL/L (ref 3.5–5)
SODIUM SERPL-SCNC: 139 MMOL/L (ref 136–145)

## 2020-06-09 NOTE — PROGRESS NOTES
Assessment and Plan   1. Bilateral impacted cerumen  Minimal cerumen, but we will clean out what is there. He denied major change in his hearing, I told him if he does notice change we could have a more formal hearing evaluation through Audiology. Recommended he continue Debrox use and that he could purchase at home cleaning kit as well if desired.  - REMOVE IMPACTED CERUMEN    Cerumen was removed by my medical assistant using irrigation.    2. Alcoholism /alcohol abuse (H)  Remains abstinent, encouraged continued abstinence.    3. Hypomagnesemia  Last level 1.7 in 1/2020, is on a Mg supplement. Recheck today.  - Magnesium ([facility])    4. Hyponatremia  Last BMP in mid-May showed Na of 133, repeat today.  - Basic Metabolic Profile ([facility])    Follow up with phone visit with Dr. Lewis as scheduled.    Options for treatment and follow-up care were reviewed with the patient and/or guardian. Aayush García and/or guardian engaged in the decision making process and verbalized understanding of the options discussed and agreed with the final plan.    Marin Corea MD  [facility], PGY-3    Precepted patient with Dr. Brea Rogers       HPI:   Aayush García is a 64 year old male who presents to clinic today for   Chief Complaint   Patient presents with     Blood Draw     labs per pt     Ear Lavage     ear cleaning     Patient reports he has been doing well as of late. Lives at home with his fiance. No home services in place currently. No alcohol use since hospital discharge in mid-May.    He has been having some difficulty hearing and feels that he has impacted cerumen. He has been using Debrox at home but this has not been helping.         Review of Systems:     Constitutional, HEENT, cardiovascular, pulmonary, GI, musculoskeletal, neuro, skin, and psych systems are negative, except as otherwise noted.          PMHX:   Active Problems  List  Patient Active Problem List   Diagnosis     Overweight (BMI 25.0-29.9)     White coat syndrome with diagnosis of hypertension     Gastroesophageal reflux disease without esophagitis     Vitamin D deficiency     Alcoholism /alcohol abuse (H)     Iron deficiency anemia due to chronic blood loss     Primary localized osteoarthrosis of left lower leg     Aortic stenosis, moderate     Coronary artery disease involving native coronary artery of native heart without angina pectoris     Ischemic cardiomyopathy     Pleural effusion     Active problem list reviewed and updated.    Current Medications  Current Outpatient Medications   Medication Sig Dispense Refill     acetaminophen (TYLENOL 8 HOUR ARTHRITIS PAIN) 650 MG CR tablet Take 2 tablets (1,300 mg) by mouth every 8 hours as needed for pain       aspirin (ASA) 81 MG EC tablet Take 1 tablet (81 mg) by mouth daily 90 tablet 3     atorvastatin (LIPITOR) 80 MG tablet Take 80 mg by mouth daily  0     B Complex-C-Folic Acid (NEPHRO-FRANCISCO) 0.8 MG TABS Take 1 tablet by mouth daily       carvedilol (COREG) 12.5 MG tablet Take 2 tablets (25 mg) by mouth 2 times daily       Cholecalciferol (VITAMIN D) 50 MCG (2000 UT) CAPS Take 1 capsule by mouth daily       ferrous sulfate (FEROSUL) 325 (65 Fe) MG tablet Take 325 mg by mouth daily  0     folic acid (FOLVITE) 400 MCG tablet Take 1 tablet (400 mcg) by mouth daily       furosemide (LASIX) 20 MG tablet Take 1 tablet (20 mg) by mouth as needed (fluid retention)  0     magnesium oxide (MAG-OX) 400 MG tablet Take 1 tablet (400 mg) by mouth 2 times daily 60 tablet 11     order for DME Equipment being ordered: Blood pressure instrument and cuff 1 each 0     pantoprazole 20 MG PO EC tablet Take 1 tablet (20 mg) by mouth daily 90 tablet 3     potassium chloride ER (KLOR-CON M) 20 MEQ CR tablet Take 1 tablet (20 mEq) by mouth daily 30 tablet 11     Medication list reviewed and updated.    Social History  Social History     Tobacco Use      Smoking status: Former Smoker     Last attempt to quit: 2018     Years since quittin.3     Smokeless tobacco: Never Used     Tobacco comment: 3 Cig/Week   Substance Use Topics     Alcohol use: No     Frequency: Never     Comment: About 7 drinks/week     Drug use: No     History   Drug Use No     Family History  Family History   Problem Relation Age of Onset     Heart Disease Father      Diabetes No family hx of      Coronary Artery Disease Early Onset No family hx of      Cancer No family hx of      Allergies  Allergies   Allergen Reactions     No Known Allergies           Physical Exam:     Vitals:    20 0854   BP: 113/72   Pulse: 58   Resp: 16   Temp: 98.3  F (36.8  C)   TempSrc: Oral   SpO2: 99%   Weight: 78.2 kg (172 lb 6.4 oz)     Body mass index is 27.83 kg/m .    GENERAL APPEARANCE: alert, appears stated age, no acute distress  HEENT: Eyes grossly normal to inspection, nares normal, and mouth and throat without erythema, ulcers, or lesions, bilateral ear canals and TM's normal with minimal cerumen  RESP: lungs clear to auscultation - no rales, rhonchi, or wheezes  CV: regular rate and rhythm, no murmur, click, rub, or gallop  ABDOMEN: soft, obese, nontender   MSK: extremities normal, no gross deformities noted, no lower extremity edema  NEURO: sensory exam grossly normal, mentation appears intact and speech normal  PSYCH: mood and affect normal/bright

## 2020-06-09 NOTE — LETTER
Jannette 10, 2020      Aayush García  [address] [place] [id number]        Dear ,    We are writing to inform you of your test results.    Below are your lab results from your recent visit. Your Basic Metabolic Panel showed that your kidney function and electrolytes were normal (the abnormalities noted are just barely in the abnormal range and are nothing to worry about).     Your Magnesium continues to be mildly low, please continue to take your Magnesium supplement.     If you have any further questions or concerns, please do not hesitate to reach out to my office.     Resulted Orders   Basic Metabolic Profile ([facility])   Result Value Ref Range    Sodium 139 136 - 145 mmol/L    Potassium 4.1 3.5 - 5.0 mmol/L    Chloride 107 98 - 107 mmol/L    CO2, Total 21 (L) 22 - 31 mmol/L    Anion Gap 11 5 - 18 mmol/L    Glucose 88 70 - 125 mg/dL    Calcium 8.9 8.5 - 10.5 mg/dL    Urea Nitrogen 15 8 - 22 mg/dL    Creatinine 0.66 (L) 0.70 - 1.30 mg/dL    GFR Estimate If Black >60 >60 mL/min/1.73m2    GFR Estimate >60 >60 mL/min/1.73m2    Narrative    Test performed by:  [facility] [address] [place] [address]  Fasting Glucose reference range is 70-99 mg/dL per  American Diabetes Association (ADA) guidelines.       If you have any questions or concerns, please call the clinic at the number listed above.       Sincerely,        Marin Corea MD

## 2020-06-12 PROBLEM — I50.22 CHRONIC SYSTOLIC HEART FAILURE (H): Status: ACTIVE | Noted: 2019-04-04

## 2020-06-22 NOTE — PROGRESS NOTES
"Family Medicine Telephone Visit Note         Telephone Visit Consent   Patient was verbally read the following and verbal consent was obtained.    \"Telephone visits are billed at different rates depending on your insurance coverage. During this emergency period, for some insurers they may be billed the same as an in-person visit.  Please reach out to your insurance provider with any questions.  If during the course of the call the physician/provider feels a telephone visit is not appropriate, you will not be charged for this service.\"    Name person giving consent:  Patient   Date verbal consent given:  6/24/2020  Time verbal consent given:  8:01 AM      CC: Knee pain            HPI   Patients name: Aayush  Appointment start time:  8:07 AM    HPI:   L Knee pain :   Duration? X years   Injury/ Inciting activity? No   Pop? No   Swelling? No   Limited motion? YES - better when he warms up    Locking/ Catching? rarely   Giving way/ instability? No   Imaging? Not recent    Treatment? Elliptical 1 hour/day; squats/ stretches; asking about voltaren gel; tylenol as needed  No relief from cortisone       Current Outpatient Medications   Medication Sig Dispense Refill     acetaminophen (TYLENOL 8 HOUR ARTHRITIS PAIN) 650 MG CR tablet Take 2 tablets (1,300 mg) by mouth every 8 hours as needed for pain       aspirin (ASA) 81 MG EC tablet Take 1 tablet (81 mg) by mouth daily 90 tablet 3     atorvastatin (LIPITOR) 80 MG tablet Take 80 mg by mouth daily  0     B Complex-C-Folic Acid (NEPHRO-FRANCISCO) 0.8 MG TABS Take 1 tablet by mouth daily       carvedilol (COREG) 12.5 MG tablet Take 2 tablets (25 mg) by mouth 2 times daily       Cholecalciferol (VITAMIN D) 50 MCG (2000 UT) CAPS Take 1 capsule by mouth daily       ferrous sulfate (FEROSUL) 325 (65 Fe) MG tablet Take 325 mg by mouth daily  0     folic acid (FOLVITE) 400 MCG tablet Take 1 tablet (400 mcg) by mouth daily       furosemide (LASIX) 20 MG tablet Take 1 tablet (20 mg) by " "mouth as needed (fluid retention)  0     magnesium oxide (MAG-OX) 400 MG tablet Take 1 tablet (400 mg) by mouth 2 times daily 60 tablet 11     order for DME Equipment being ordered: Blood pressure instrument and cuff 1 each 0     pantoprazole 20 MG PO EC tablet Take 1 tablet (20 mg) by mouth daily 90 tablet 3     potassium chloride ER (KLOR-CON M) 20 MEQ CR tablet Take 1 tablet (20 mEq) by mouth daily 30 tablet 11     Allergies   Allergen Reactions     No Known Allergies               Review of Systems:     CONSTITUTIONAL: NEGATIVE for fever, chills, change in weight  RESP: NEGATIVE for significant cough or SOB  CV: NEGATIVE for chest pain, palpitations or peripheral edema  MUSCULOSKELETAL: see HPI         Physical Exam:     There were no vitals taken for this visit.  Estimated body mass index is 27.83 kg/m  as calculated from the following:    Height as of 1/14/20: 1.676 m (5' 6\").    Weight as of 6/9/20: 78.2 kg (172 lb 6.4 oz).    Exam:  Constitutional: healthy, alert and no distress  Psychiatric: mentation appears normal and affect normal/bright          Assessment and Plan     (M17.12) Primary osteoarthritis of left knee  (primary encounter diagnosis)  Comment:   Plan: reviewed tx options including meds, PT, injections, bracing, surgery; pt is diligent w/ home program. Main question he had was whether he could try voltaren gel. I said this would be ok and reviewed precautions; he will follow-up w/ me prn for an xray and consideration for injection. He declined formal PT.       After Visit Information:  Patient declined AVS     Follow-up prn    Appointment end time: 8:14 AM  This is a telephone visit that took 7 minutes.      Clinician location:  [facility] Rahul Kapur, MD  June 24, 2020  8:16 AM        "

## 2020-06-24 ENCOUNTER — VIRTUAL VISIT (OUTPATIENT)
Dept: FAMILY MEDICINE | Facility: CLINIC | Age: 65
End: 2020-06-24
Payer: COMMERCIAL

## 2020-06-24 VITALS — HEART RATE: 56 BPM | DIASTOLIC BLOOD PRESSURE: 78 MMHG | TEMPERATURE: 97.2 F | SYSTOLIC BLOOD PRESSURE: 134 MMHG

## 2020-06-24 DIAGNOSIS — M17.12 PRIMARY OSTEOARTHRITIS OF LEFT KNEE: Primary | ICD-10-CM

## 2020-07-14 NOTE — MR AVS SNAPSHOT
After Visit Summary   2/20/2018    Aayush García    MRN: [id number]           Patient Information     Date Of Birth          1955        Visit Information        Provider Department      2/20/2018 3:40 PM Palla, Misbah Yousuf, MD [facility]        Today's Diagnoses     Benign essential hypertension    -  1    Encounter for screening colonoscopy        Anemia, unspecified type          Care Instructions    Pt did not want to schedule colonoscopy at this time.          Follow-ups after your visit        Additional Services     GASTROENTEROLOGY ADULT REF PROCEDURE ONLY       Last Lab Result: Creatinine (mg/dL)       Date                     Value                 02/12/2018               0.68 (L)         ----------  Body mass index is 30.76 kg/(m^2).     Needed:  No  Language:  English    Patient will be contacted to schedule procedure.     Please be aware that coverage of these services is subject to the terms and limitations of your health insurance plan.  Call member services at your health plan with any benefit or coverage questions.  Any procedures must be performed at a [facility] facility OR coordinated by your clinic's referral office.    Please bring the following with you to your appointment:    (1) Any X-Rays, CTs or MRIs which have been performed.  Contact the facility where they were done to arrange for  prior to your scheduled appointment.    (2) List of current medications   (3) This referral request   (4) Any documents/labs given to you for this referral                  Your next 10 appointments already scheduled     Mar 06, 2018  9:00 AM CST   Return Visit with Misbah Yousuf Palla, MD   [facility] ([facility])    [address] [place] [address]   [phone]              Who to contact     Please call your clinic at [phone] to:    Ask questions about your health    Make or cancel appointments    Discuss your  medicines    Learn about your test results    Speak to your doctor            Additional Information About Your Visit        [employer] Information     [employer] is an electronic gateway that provides easy, online access to your medical records. With [employer], you can request a clinic appointment, read your test results, renew a prescription or communicate with your care team.     To sign up for [employer] visit the website at [email or web address]   You will be asked to enter the access code listed below, as well as some personal information. Please follow the directions to create your username and password.     Your access code is: [email or web address]  Expires: 2018  3:18 PM     Your access code will  in 90 days. If you need help or a new code, please contact your [facility] or call [phone] for assistance.        Care EveryWhere ID     This is your Care EveryWhere ID. This could be used by other organizations to access your [facility] medical records  [id number]        Your Vitals Were     Pulse Temperature Respirations Pulse Oximetry BMI (Body Mass Index)       93 98  F (36.7  C) (Oral) 16 98% 30.76 kg/m2        Blood Pressure from Last 3 Encounters:   18 167/88   18 (!) 190/91    Weight from Last 3 Encounters:   18 190 lb 9.6 oz (86.5 kg)   18 197 lb (89.4 kg)              We Performed the Following     Folate  Serum ([facility])     GASTROENTEROLOGY ADULT REF PROCEDURE ONLY     Vitamin B12 ([facility])          Today's Medication Changes          These changes are accurate as of 18 11:59 PM.  If you have any questions, ask your nurse or doctor.               Start taking these medicines.        Dose/Directions    lisinopril 10 MG tablet   Commonly known as:  PRINIVIL/ZESTRIL   Used for:  Benign essential hypertension, Encounter for screening colonoscopy, Anemia, unspecified type        Dose:  10 mg   Take 1 tablet (10 mg) by mouth daily    Quantity:  90 tablet   Refills:  3       order for DME   Used for:  Benign essential hypertension, Encounter for screening colonoscopy, Anemia, unspecified type        Equipment being ordered: Blood pressure instrument and cuff   Quantity:  1 each   Refills:  0            Where to get your medicines      These medications were sent to [facility] [id number] [facility] - [place], [place] - [address]  [address], [place] [address]     Phone:  [phone]     lisinopril 10 MG tablet         Some of these will need a paper prescription and others can be bought over the counter.  Ask your nurse if you have questions.     Bring a paper prescription for each of these medications     order for DME                Primary Care Provider Office Phone # Fax #    Carmine Yousuf Palla, [phone]       [address] [address]        Equal Access to Services     JUANA GUTIÉRREZ : Hadii mildred jang hadasho Soomaali, waaxda luqadaha, qaybta kaalmada adeegyada, kathe armendariz haykrishna gibbs . So [facility] [phone].    ATENCIÓN: Si habla español, tiene a srivastava disposición servicios gratuitos de asistencia lingüística. Llame al [phone].    We comply with applicable federal civil rights laws and [place] laws. We do not discriminate on the basis of race, color, national origin, age, disability, sex, sexual orientation, or gender identity.            Thank you!     Thank you for choosing [facility]  for your care. Our goal is always to provide you with excellent care. Hearing back from our patients is one way we can continue to improve our services. Please take a few minutes to complete the written survey that you may receive in the mail after your visit with us. Thank you!             Your Updated Medication List - Protect others around you: Learn how to safely use, store and throw away your medicines at www.disposemymeds.org.          This  list is accurate as of 2/20/18 11:59 PM.  Always use your most recent med list.                   Brand Name Dispense Instructions for use Diagnosis    lisinopril 10 MG tablet    PRINIVIL/ZESTRIL    90 tablet    Take 1 tablet (10 mg) by mouth daily    Benign essential hypertension, Encounter for screening colonoscopy, Anemia, unspecified type       order for DME     1 each    Equipment being ordered: Blood pressure instrument and cuff    Benign essential hypertension, Encounter for screening colonoscopy, Anemia, unspecified type          Bcc Infiltrative Histology Text: There were numerous aggregates of basaloid cells demonstrating an infiltrative pattern.

## 2020-10-07 ENCOUNTER — COMMUNICATION - HEALTHEAST (OUTPATIENT)
Dept: CARDIOLOGY | Facility: CLINIC | Age: 65
End: 2020-10-07

## 2020-10-07 DIAGNOSIS — I21.4 NON-ST ELEVATION MI (NSTEMI) (H): ICD-10-CM

## 2020-10-08 ENCOUNTER — COMMUNICATION - HEALTHEAST (OUTPATIENT)
Dept: ADMINISTRATIVE | Facility: CLINIC | Age: 65
End: 2020-10-08

## 2020-10-15 ENCOUNTER — COMMUNICATION - HEALTHEAST (OUTPATIENT)
Dept: ADMINISTRATIVE | Facility: CLINIC | Age: 65
End: 2020-10-15

## 2020-11-29 ENCOUNTER — HEALTH MAINTENANCE LETTER (OUTPATIENT)
Age: 65
End: 2020-11-29

## 2021-01-09 ENCOUNTER — COMMUNICATION - HEALTHEAST (OUTPATIENT)
Dept: CARDIOLOGY | Facility: CLINIC | Age: 66
End: 2021-01-09

## 2021-01-09 DIAGNOSIS — I21.4 NON-ST ELEVATION MI (NSTEMI) (H): ICD-10-CM

## 2021-02-15 DIAGNOSIS — K70.9 LIVER DISEASE, CHRONIC, DUE TO ALCOHOL (H): ICD-10-CM

## 2021-02-16 RX ORDER — POTASSIUM CHLORIDE 1500 MG/1
20 TABLET, EXTENDED RELEASE ORAL DAILY
Qty: 30 TABLET | Refills: 11 | Status: SHIPPED | OUTPATIENT
Start: 2021-02-16 | End: 2021-02-18

## 2021-02-17 ENCOUNTER — COMMUNICATION - HEALTHEAST (OUTPATIENT)
Dept: CARDIOLOGY | Facility: CLINIC | Age: 66
End: 2021-02-17

## 2021-02-17 DIAGNOSIS — I42.9 CARDIOMYOPATHY, UNSPECIFIED TYPE (H): ICD-10-CM

## 2021-02-18 DIAGNOSIS — K70.9 LIVER DISEASE, CHRONIC, DUE TO ALCOHOL (H): ICD-10-CM

## 2021-02-19 RX ORDER — POTASSIUM CHLORIDE 1500 MG/1
20 TABLET, EXTENDED RELEASE ORAL DAILY
Qty: 30 TABLET | Refills: 11 | Status: SHIPPED | OUTPATIENT
Start: 2021-02-19 | End: 2021-12-23

## 2021-02-22 ENCOUNTER — COMMUNICATION - HEALTHEAST (OUTPATIENT)
Dept: CARDIOLOGY | Facility: CLINIC | Age: 66
End: 2021-02-22

## 2021-02-22 DIAGNOSIS — I42.9 CARDIOMYOPATHY, UNSPECIFIED TYPE (H): ICD-10-CM

## 2021-04-06 DIAGNOSIS — K21.9 GASTROESOPHAGEAL REFLUX DISEASE WITHOUT ESOPHAGITIS: ICD-10-CM

## 2021-04-06 RX ORDER — PANTOPRAZOLE SODIUM 20 MG/1
TABLET, DELAYED RELEASE ORAL
Qty: 90 TABLET | Refills: 3 | Status: SHIPPED | OUTPATIENT
Start: 2021-04-06 | End: 2022-03-21

## 2021-04-19 ENCOUNTER — COMMUNICATION - HEALTHEAST (OUTPATIENT)
Dept: CARDIOLOGY | Facility: CLINIC | Age: 66
End: 2021-04-19

## 2021-04-22 ENCOUNTER — OFFICE VISIT - HEALTHEAST (OUTPATIENT)
Dept: CARDIOLOGY | Facility: CLINIC | Age: 66
End: 2021-04-22

## 2021-04-22 DIAGNOSIS — I35.0 NONRHEUMATIC AORTIC VALVE STENOSIS: ICD-10-CM

## 2021-04-22 DIAGNOSIS — I73.9 PVD (PERIPHERAL VASCULAR DISEASE) (H): ICD-10-CM

## 2021-04-22 DIAGNOSIS — I25.10 CORONARY ARTERY DISEASE INVOLVING NATIVE CORONARY ARTERY OF NATIVE HEART WITHOUT ANGINA PECTORIS: ICD-10-CM

## 2021-04-22 DIAGNOSIS — I47.29 NSVT (NONSUSTAINED VENTRICULAR TACHYCARDIA) (H): ICD-10-CM

## 2021-04-22 DIAGNOSIS — I25.5 ISCHEMIC CARDIOMYOPATHY: ICD-10-CM

## 2021-04-22 DIAGNOSIS — E78.5 DYSLIPIDEMIA: ICD-10-CM

## 2021-05-27 NOTE — PROGRESS NOTES
ITP ASSESSMENT   Assessment Day: Initial    Session Number: 1  Precautions: standard post NSTEMI/stent    Diagnosis: MI;Stent    Risk Stratification: High    Referring Provider: Timi Rodriguez MD  EXERCISE  Exercise Assessment: Initial    6 Minute Walk Test- Not used as evaluation tool due to use of FWW for left knee problems. Instead used Nustep for 20 min. Tolerated well on knee. Has been scheduled to participate 2x.week starting next week.                         Exercise Plan  Goals Next 30 days  ADL'S: use stationary bike 3-4x/week 5-10 minutes    Leisure: Use walker less around the house.    Work: lose 2-3 lbs.    Education Goals: All goals in this section met    Education Goals Met: Has system for taking medication.;Patient can state cardiac s/s and appropriate emergency response.;Medication review.      Exercise Prescription  Exercise Mode: Bike;Nustep;Arm Erg.;Hallway Walking    Frequency: 2x/week    Duration: 20-40 minutes    Intensity / THR: 20-30 beats above resting heart rate    RPE 11-14  Progression / Met level: 2.5-3.5    Resistive Training?: Yes      Current Exercise (mins/week): 10      Interventions  Home Exercise:  Mode: stationary bike/walking    Frequency: 3-4x/week    Duration: 10 minutes      Education Material : Individual education and counseling;Offer educational classes      Education Completed  Exercise Education Completed: Cardiac Anatomy;Signs and Symptoms;Medication review;RPE;Emergency Plan;Home Exercise;Warm up/cool down;BP/HR Reponse to exercise;Benefits of Exercise;End point of exercise              Exercise Follow-up/Discharge  Follow up/Discharge: encouraged pat to incorporate some aerobic exercise with his knee exercise.   NUTRITION  Nutrition Assessment: Initial      Nutrition Risk Factors:  Nutrition Risk Factors: Dyslipidemia;Overweight  Cholesterol: 146  LDL: 96  HDL: 35  Triglycerides: 76      Nutrition Plan  Interventions  Other Nutrition Intervention: Therapist/Pt  "Discussion      Education Completed  Nutrition Education Completed: Risk factor overview      Goals  Nutrition Goals (Next 30 days): Patient will follow a low sodium diet;Patient will follow a low saturated fat diet;Patient will lose weight;Patient knows appropriate portion size      Goals Met  Nutrition Goals Met: Patient can identify their risk factors for CAD;Completed Nutritional Risk Screen;Provided Rate your Plate Survey;Reviewed Dietitian schedule      Height, Weight, and  BMI  Weight: 178 lb (80.7 kg)  Height: 5' 9\" (1.753 m)  BMI: 26.27      Nutrition Follow-up  Follow-up/Discharge: Pt reports that he follows somewhat of a heart healthy diet. Will meet with department dietician soon.          Other Risk Factors  Other Risk Factor Assessment: Initial      HTN Risk Factor: Hypertension      Pre Exercise BP: 108/67  Post Exercise BP: 104/60      Hypertension Plan  Goals  HTN Goals: Follow low sodium diet;Take medication as prescribed;Exercises regularly      Goals Met  HTN Goals Met: Take medication as prescribed      HTN Interventions  HTN Interventions: Therapist/patient discussion;Offer educational classes      HTN Education Completed  HTN Education Completed: Medication review;Risk factor overview      Tobacco Risk Factor: Tobacco      Initial Use:: 1 ppd  Current Use:: Hasn't smoked in a week, but is finding it difficult.       Tobacco Plan  Tobacco Goals  Tobacco Goals: Patient to set quite date      Goals Met  Tobacco Goals Met: Patient to reduce tobacco use (see comments for amount)      Tobacco Interventions  Tobacco Interventions: Therapist/patient discussion;Offer class on risk factor modification      Tobacco Education Completed  Tobacco Education Completed: Identifies triggers;Health benefits of tobacco cessation;Risk factor overview      Risk Factor Follow-up   Follow-up/Discharge: Pt declined any further written material on cessation. He is trying, but struggles at times.      " PSYCHOSOCIAL  Psychosocial Assessment: Initial       [facility] Q of L Summary Score: 20      LAURA-D Score: 6      Psychosocial Risk Factor: Stress      Psychosocial Plan  Interventions  If LAURA-D > 15 send letter to MD  Interventions: Offer educational videos and classes;Individual education and counseling      Education Completed  Education Completed: Effects of stress on body      Goals  Goals (Next 30 days): Practicing stress management skills      Goals Met  Goals Met: Identified Support system;Identify stressors      Psychosocial Follow-up  Follow-up/Discharge: rough year medically for pt which has caused insurance and financial stress.             Patient involved in Goal setting?: Yes      Signature: _____________________________________________________________    Date: __________________    Time: __________________See Doc Flowsheet

## 2021-05-27 NOTE — PROGRESS NOTES
Cardiac Rehab  Phase II Assessment    Assessment Date: 3/26/2019    Diagnosis: CAD  Date of Onset: 3/17/2019  Procedure: PCI/EESx1  Date of Onset: 3/17/2019  ICD/Pacemaker: No Parameters:    Post-op Complications: none  ECG History: SR/NSVT on admission EF%:22  Past Medical History:   Past Medical History:   Diagnosis Date     Acute liver failure 8/5/2018     Acute renal failure, unspecified acute renal failure type (H) 08/05/2018     Acute respiratory failure with hypoxia (H) 08/05/2018     Alcoholic hepatitis with ascites 08/05/2018     Bacteremia due to Klebsiella pneumoniae      Essential hypertension      Gastroesophageal reflux disease without esophagitis 10/31/2018     Hepatic encephalopathy (H) 08/05/2018     Macrocytic anemia      Stress-induced cardiomyopathy 08/05/2018       Physical Assessment  Precautions/ Physical Limitations: standard post NSTEMI/stent, Low EF  Oxygen: No  O2 Sats: 97 Lung Sounds: clear Edema: none  Incisions: none  Sleeping Pattern: good   Appetite: good   Nutrition Risk Screen: Weight loss    Pain  Location: none    Psychosocial/ Emotional Health  1. In the past 12 months, have you been in a relationship where you have been abused physically, emotionally, sexually or financially? No  notified: NA  2. Who do you turn to for emotional support?: wife  3. Do you have cultural or spiritual needs? No  4. Have there been any major life changes in the past 12 months? Yes multiple health issues, long hospital and TCU stays    Referral Information  Primary Physician: [facility]  Cardiologist: Dr. Monroe  Surgeon:    Home exercise/Equipment: has a stationary bike    Patient's long-term goal(s): get rid of his walker    1. Living Accommodations: [facility] Steps: Yes      Support people at home: wife   2. Marital Status:   3. Family is able to assist with cares      Denominational/Community involvement:  Works FT at [employer]  4. Recreation/Hobbies: woodworking        See  Doc Flowsheet

## 2021-05-27 NOTE — PATIENT INSTRUCTIONS - HE
Aayush García,    It was a pleasure to see you today at the [facility].     My recommendations after this visit include:  - No changes to your medication.    - You will be called with the results of your labs.  - Limit salt in your diet.   - Do not use tobacco or alcohol  - Continue to monitor for signs of retaining fluid (increasing weights, shortness of breath, swelling) and call with any concerns. [phone]  - Follow up in 2 weeks.     Loyda Bradshaw CNP    What is the [facility]?     The [facility] is a heart failure specialty clinic within [facility].  You will work with your cardiologist, nurse practitioner, and nurses to carefully adjust medications and learn how to live with heart failure.  The Heart Failure Program will help you:      Better understand your chronic heart condition    Feel better and avoid hospital stays    Monitoring for Symptoms      Call the Heart Failure Phone Line ([phone]) if you have any of these symptoms:     Increased shortness of breath/shortness of breath at rest    Waking up at night with difficulty breathing    Unable to lie down for sleep due to symptoms or needing to sit upright for sleep    Weight gain of 2 pounds a day for 2 days in a row OR 5 pounds in 1 week    Increased swelling in your ankles or legs    Dizziness or lightheadedness    Medications       Take your medications as prescribed    Bring all your medications in their original bottles to every appointment    Avoid non-steroidal anti-inflammatory medications (Advil, Aleve, Ibuprofen, Naprosyn, Naproxen, Celebrex)    Do not stop taking your medications or begin taking over-the-counter or herbal medications without first talking to your doctor or nurse practitioner    Diet and Lifestyle       Limit sodium/salt to 2000 mg daily   o Read food labels for sodium content  o Do not add salt when cooking or add salt at the table    Weigh  yourself every day and record in your daily weight log   o Call if you gain 2 pounds a day for 2 days in a row OR 5 pounds in 1 week  o Bring daily weight log to every appointment    Stay active, pace yourself, listen to your body, and rest when tired    Elevate your legs if they are swollen. Ask about using compression/support stockings    Stop smoking    Lose weight if you are overweight    Avoid drinking alcohol or limit amount    Stay updated on your immunizations including flu and pneumonia vaccines      Medication     o Take all your medications as prescribed  o Do not stop any medications without talking with a healthcare provider    Exercise      o Physical activity is important for overall health  o Set a goal of 150 minutes of exercise each week  o For example, 30 minutes of exercise 5 days each week.    o These 30 minutes can be broken into shorter periods of 15 minutes twice daily or 10 minutes three times daily  o Start any exercise program slowly and work towards the goal of 150 minutes each week  o For example, you may start with 10 minutes and plan to add a few minutes each week as you get stronger   o Examples of exercise include walking, swimming, or biking  o Remember to stretch and stay hydrated with exercise    Diet     o A heart healthy diet includes:  o A variety of fruits and vegetables  o Whole grains  o Low-fat dairy (fat-free, 1% fat, and low-fat)  o Lean meats and poultry without skin   o Fish (eat fish 2 times each week)  o Nuts  o Limit saturated fat to about 13 grams each day (based on a 2000 calorie diet)  o Limit red meat  o Limit sugars (sweets and sugary beverages)  o Limit your portion sizes  o Do not add salt to your food when cooking or at the table  o Limit alcohol intake (no more than 1 drink each day for women or 2 drinks each day for men)    Weight Loss     o Work on losing weight with diet and exercise  o You BMI (body mass index) should be between 18.5-24.9  o This is a  calculation of your weight and height  o Please ask your healthcare provider for your BMI    Manage Other Chronic Health Conditions     o Control cholesterol  o Eat a diet low in saturated fat  o Exercise   o Take a statin medication as prescribed  o Manage blood pressure  o Eat a diet low in sodium  o Exercise  o Reduce stress  o Lose weight   o Take blood pressure medications as prescribed  o Control blood sugars if diabetic  o Monitor sugars and carbohydrates in your diet  o Lose weight   o Take diabetes medications as prescribed  o Follow-up with your primary care provider to make sure your blood sugars are well controlled    Stress Reduction     o Find time each day to relax  o Reading, listening to music, yoga, meditation, exercise, spending time with friends and family, volunteering   o Get 6-8 hours of sleep each night    Smoking Cessation     o Smoking causes numerous health problems including coronary artery disease  o It is never too late to quit  o Set realistic goals for quitting  o Decrease the number of cigarettes used each week  o Use nicotine gum or patches to help you quit    Information from the American Heart Association.  Please visit their website at www.heart.org

## 2021-05-27 NOTE — PATIENT INSTRUCTIONS - HE
Aayush García,    It was a pleasure to see you today at the [facility].     My recommendations after this visit include:  - Refills were sent to your pharmacy.  Please call if there are any problems with picking them up.    - Continue to monitor for signs of retaining fluid (increasing weights, shortness of breath, swelling) and call with any concerns. [phone]  - Follow up with Leo in the end of May.     Loyda Bradshaw, CNP

## 2021-05-27 NOTE — PROGRESS NOTES
Patient seen in clinic for HF education s/p recent hospital discharge 3/21/19.  Reviewed HF Binder that includes the  HF Sx Awareness & Action plan  handout and  A Stronger Pump  booklet and Weight log booklet highlighting :  __X_patient s type of heart failure _X__Na management in diet  __X_importance of daily wts  _X__Fluid Guidelines, if applicable  __X_medication review and importance of compliance     Instructed patient in signs and sx of heart failure, reiterated when to call clinic - reviewed HF hotline # [phone] and after hours call # [phone].  Majority of time was spent reviewing: signs and symptoms of worsening heart failure. Low sodium diet.  Patient verbalized understanding of HF discussion.  Plan for f/u with continued HF education reviewed.  No formal f/u scheduled with nurse clinician for continued education - will continue to reinforce HF management education.

## 2021-05-28 NOTE — PROGRESS NOTES
[facility] Office Note    Assessment / Plan:    1.  Dilated cardiomyopathy, likely combination of ischemic and toxic from alcohol.  Discussed the rationale for up titration of his medications.  Will increase carvedilol to 6.25 mg twice daily and add losartan 25 mg daily.  Return to heart failure clinic in 2 weeks for further up titration of medication regimen.  Plan recheck echocardiogram in 1 month's time, with ICD plan if left ventricular function remains severely impaired.  This was also discussed with the patient.  2.  Coronary artery disease status post coronary intervention with no anginal symptoms.  Continues on dual antiplatelet therapy  3.  Nonsustained ventricular tachycardia.  No evidence of recurrence  4.  Alcohol abuse.  Advised optimal treatment would be alcohol cessation  5.  Tobacco abuse.  Congratulated on abstinence.  6.  Aortic stenosis.  Mild to moderate by echo.  Will reassess in 1 month    Plan follow-up in 3 months with me, 2 weeks with heart failure clinic      ______________________________________________________________________    Subjective:    I had the opportunity to see Aayush García at the [facility]. Aayush García is a 63 y.o. male with a history of alcohol abuse and, hypertension admitted with cardiogenic shock and non-ST elevation myocardial infarction in March.  He subsequently underwent complex coronary intervention with intra-aortic balloon pump.  His left ventricular ejection fraction at that time was 22%.  His course was complicated by alcoholic hepatitis.  He was started on low-dose carvedilol at that time, but has declined any up titration of his medications in follow-up heart failure clinic.  He returns today for routine follow-up, accompanied by his significant other Karlie.    His weight is down 12 pounds over the last month    He reports that since his hospital discharge he tried nicotine gum for 2 doses and then quit cold turkey.  He has  not had a cigarette since.  He does report greatly reducing his alcohol intake, but still has 1 or 2 drinks, usually beers during a week.  Feels considerably improved and has not had chest pain or palpitations.  He denies paroxysmal nocturnal dyspnea or orthopnea.  His sleep is restorative flat.  He is attending cardiac rehab and plans in getting some type of exercise machine for his home.  He has had no syncope or near syncopal spells.  His knees and hips continue to cause significant pain and he continues to use a walker but has had no falls.  He is able to use the NuStep at rehab without much trouble.    ______________________________________________________________________    Problem List:  Patient Active Problem List   Diagnosis     Alcoholism /alcohol abuse (H)     Class 1 obesity     Nonsustained ventricular tachycardia (H)     Essential hypertension     Cardiomyopathy, unspecified type (H)     Non-ST elevation MI (NSTEMI) (H)     Iron deficiency anemia due to chronic blood loss     Heart failure with reduced ejection fraction (H)     Ischemic cardiomyopathy     Medical History:  Past Medical History:   Diagnosis Date     Acute liver failure 8/5/2018     Acute renal failure, unspecified acute renal failure type (H) 08/05/2018     Acute respiratory failure with hypoxia (H) 08/05/2018     Alcoholic hepatitis with ascites 08/05/2018     Bacteremia due to Klebsiella pneumoniae      Essential hypertension      Gastroesophageal reflux disease without esophagitis 10/31/2018     Hepatic encephalopathy (H) 08/05/2018     Macrocytic anemia      Stress-induced cardiomyopathy 08/05/2018     Surgical History:  Past Surgical History:   Procedure Laterality Date     COLONOSCOPY N/A 3/20/2018    Procedure: COLONOSCOPY;  Surgeon: Adam Nicole III, MD;  Location: [facility];  Service:      CV CORONARY ANGIOGRAM N/A 3/18/2019    Procedure: Coronary Angiogram;  Surgeon: Timi Rodriguez MD;  Location: [facility]  Lab;  Service: Cardiology     CV LEFT HEART CATHETERIZATION WO LEFT VETRICULOGRAM Left 3/18/2019    Procedure: Left Heart Catheterization Without Left Ventriculogram;  Surgeon: Timi Rodriguez MD;  Location: [facility] Cath Lab;  Service: Cardiology     PICC  2018          PICC  3/18/2019          Social History:  Social History     Socioeconomic History     Marital status: Domestic Partner     Spouse name: Karlie Chisholm (АННА)     Number of children: Not on file     Years of education: Not on file     Highest education level: Not on file   Occupational History     Employer: [employer]   Social Needs     Financial resource strain: Not on file     Food insecurity:     Worry: Not on file     Inability: Not on file     Transportation needs:     Medical: Not on file     Non-medical: Not on file   Tobacco Use     Smoking status: Former Smoker     Types: Cigarettes     Last attempt to quit: 2019     Years since quittin.0     Smokeless tobacco: Never Used     Tobacco comment: pack a week   Substance and Sexual Activity     Alcohol use: Yes     Frequency: 4 or more times a week     Drinks per session: 1 or 2     Comment: 350 ml of peppermint schnapps daily per 2018 H&P per report of Karlie JJ to Dr. Valle     Drug use: No     Sexual activity: Not on file   Lifestyle     Physical activity:     Days per week: Not on file     Minutes per session: Not on file     Stress: Not on file   Relationships     Social connections:     Talks on phone: Not on file     Gets together: Not on file     Attends Christianity service: Not on file     Active member of club or organization: Not on file     Attends meetings of clubs or organizations: Not on file     Relationship status: Not on file     Intimate partner violence:     Fear of current or ex partner: Not on file     Emotionally abused: Not on file     Physically abused: Not on file     Forced sexual activity: Not on file   Other Topics Concern     Not on file    Social History Narrative    Works in [employer]. Lives with his SO, Karlie Chisholm.     Sleep History:  Restorative flat  Exercise History:  Cardiac rehab twice a week, otherwise walks with his walker.  Plans to get an exercise machine for his home.    Review of Systems:   General: WNL  Eyes: WNL  Ears/Nose/Throat: WNL  Lungs: WNL  Heart: WNL  Stomach: WNL  Bladder: WNL  Muscle/Joints: WNL  Skin: WNL  Nervous System: WNL  Mental Health: WNL     Blood: WNL          Family History:  Family History   Problem Relation Age of Onset     Heart disease Father 76        type unknown to Aayush; his father  at home     Alzheimer's disease Mother 86        lives in long term care     No Medical Problems Son      No Medical Problems Son          Allergies:  No Known Allergies  Medications:  Current Outpatient Medications   Medication Sig Dispense Refill     acetaminophen (TYLENOL) 325 MG tablet Take 2 tablets (650 mg total) by mouth every 6 (six) hours as needed.  0     aspirin 81 MG EC tablet Take 81 mg by mouth daily.       atorvastatin (LIPITOR) 80 MG tablet Take 1 tablet (80 mg total) by mouth daily. 30 tablet 11     b complex vitamins tablet Take 1 tablet by mouth daily.       carvedilol (COREG) 3.125 MG tablet Take 1 tablet (3.125 mg total) by mouth 2 (two) times a day. 60 tablet 11     cholecalciferol, vitamin D3, (VITAMIN D3) 2,000 unit Tab Take 2,000 Units by mouth daily.       ferrous sulfate 325 (65 FE) MG tablet Take 1 tablet (325 mg total) by mouth 2 (two) times a day with meals. 60 tablet 0     folic acid (FOLVITE) 1 MG tablet Take 1 tablet (1 mg total) by mouth daily. 30 tablet 0     furosemide (LASIX) 20 MG tablet Take 1 tablet (20 mg total) by mouth daily. 30 tablet 11     magnesium oxide (MAG-OX) 400 mg (241.3 mg magnesium) tablet Take 1 tablet (400 mg total) by mouth 2 (two) times a day.  0     multivitamin therapeutic tablet Take 1 tablet by mouth daily.       omeprazole (PRILOSEC) 40 MG capsule Take 40  "mg by mouth daily before breakfast.       potassium chloride (K-DUR,KLOR-CON) 20 MEQ tablet Take 20 mEq by mouth daily.       thiamine (VITAMIN B-1) 100 MG tablet Take 100 mg by mouth daily.       ticagrelor (BRILINTA) 90 mg Tab Take 1 tablet (90 mg total) by mouth 2 (two) times a day. 60 tablet 11     No current facility-administered medications for this visit.        Objective:   Wt Readings from Last 3 Encounters:   05/02/19 176 lb (79.8 kg)   04/29/19 181 lb (82.1 kg)   04/18/19 188 lb (85.3 kg)     Vital signs:  /64 (Patient Site: Left Arm, Patient Position: Sitting, Cuff Size: Adult Regular)   Pulse 84   Resp 16   Ht 5' 9\" (1.753 m)   Wt 176 lb (79.8 kg)   BMI 25.99 kg/m        Physical Exam:    GENERAL APPEARANCE: Alert, cooperative and in no acute distress.  HEENT: Conjunctivae slightly injected.  No scleral icterus. No Xanthelasma. Oral mucous membranes pink and moist.  NECK: No JVD.  No Hepatojugular reflux. Thyroid not enlarged.  CHEST: clear to auscultation  CARDIOVASCULAR: Nonpalpable point of maximal impulse.  Regular S1, S2 with 2/6 harsh systolic murmur upper right sternal border without radiation. Brachial, radial and posterior tibial pulses are intact and symmetric. No carotid bruits noted.  ABDOMEN: Nontender. BS+. No bruits.  EXTREMITIES: No cyanosis, clubbing or edema.  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 OM1 is " subtotally occluded v.  w/ bridging collaterals  RCA:dominant, mildly irregular  LVEDP:39  AV gradient: P/M 35/25 simultaneous    Echocardiogram 3/2019:    Mild left atrial enlargement    Left ventricle ejection fraction is severely decreased. The calculated left ventricular ejection fraction is 22% with segmental wall motion abnormalities as outlined below.    Normal right ventricular size and systolic function.    Aortic valve sclerosis without stenosis    When compared to the previous study dated 7/31/2018, there has been a significant fall in left ventricular function with regional wall motion abnormality.      HUMAIRA HUGHES MD [facility]  [phone]    This note created using Dragon voice recognition software.  Sound alike errors may have escaped editing.

## 2021-05-28 NOTE — PROGRESS NOTES
ITP ASSESSMENT   Assessment Day: 60 Day    Session Number: 6  Precautions: Falls-walker use    Diagnosis: MI;Stent    Risk Stratification: High    Referring Provider: Timi Rodriguez MD  EXERCISE  Exercise Assessment: Reassessment  Pt has been very inconsistent in attending outpatient cardiac rehab. We have been unable to review current goals/and or future goals as he has not shown up.                           Exercise Plan  Goals Next 30 days  ADL'S: Strengthening exercises for knee    Leisure: lose 2-3lbs    Work: Tolerate FT work      Education Goals: All goals in this section met    Education Goals Met: Patient can state cardiac s/s and appropriate emergency response.;Has system for taking medication.;Medication review.                          Goals Met  Initial ADL's goals met: Will evaluate goals next session    No data recorded  Intial Work goals met: Pt did not lose wt - goal not met    No data recorded    Exercise Prescription  Exercise Mode: Nustep;Arm Erg.    Frequency: 2x/week    Duration: 30-40 min    Intensity / THR: 20-30 beats above resting heart rate    RPE 11-14  Progression / Met level: 2.2-2.7    Resistive Training?: Yes      No data recorded    Interventions  Home Exercise:  Mode: Stationary bike    Frequency: 3-5 x week    Duration: 10-15'      Education Material : Individual education and counseling;Offer educational classes      Education Completed  Exercise Education Completed: Cardiac Anatomy;Signs and Symptoms;Medication review;RPE;Emergency Plan;Home Exercise;Warm up/cool down;BP/HR Reponse to exercise;Benefits of Exercise;End point of exercise              Exercise Follow-up/Discharge  Follow up/Discharge: Pt has not had consistant attendance in outpatient rehab.    NUTRITION  Nutrition Assessment: Reassessment      Nutrition Risk Factors:  Nutrition Risk Factors: Dyslipidemia;Overweight  Cholesterol: 146  LDL: 96  HDL: 35  Triglycerides: 76      Nutrition Plan  Interventions  Other  "Nutrition Intervention: Diet Class;Therapist/Pt Discussion      Education Completed  Nutrition Education Completed: Risk factor overview      Goals  Nutrition Goals (Next 30 days): Patient will follow a low sodium diet;Patient will follow a low saturated fat diet;Patient will lose weight;Patient knows appropriate portion size      Goals Met  Nutrition Goals Met: Patient can identify their risk factors for CAD;Completed Nutritional Risk Screen;Provided Rate your Plate Survey;Reviewed Dietitian schedule      Height, Weight, and  BMI  Weight: 178 lb (80.7 kg)  Height: 5' 9\" (1.753 m)  BMI: 26.27      Nutrition Follow-up  Follow-up/Discharge: Pt reports that he follows somewhat of a heart healthy diet. Will meet with department dietician soon.          Other Risk Factors  Other Risk Factor Assessment: Reassessment      HTN Risk Factor: Hypertension      Pre Exercise BP: 120/68  Post Exercise BP: 120/70      Hypertension Plan  Goals  HTN Goals: Follow low sodium diet;Take medication as prescribed;Exercises regularly      Goals Met  HTN Goals Met: Take medication as prescribed      HTN Interventions  HTN Interventions: Therapist/patient discussion;Offer educational classes      HTN Education Completed  HTN Education Completed: Medication review;Risk factor overview      Tobacco Risk Factor: Tobacco      Initial Use:: 1 ppd  Current Use:: Smoke free      Tobacco Plan  Tobacco Goals  Tobacco Goals: Patient to set quite date      Goals Met  Tobacco Goals Met: Patient to reduce tobacco use (see comments for amount)      Tobacco Interventions  Tobacco Interventions: Therapist/patient discussion;Offer class on risk factor modification      Tobacco Education Completed  Tobacco Education Completed: Identifies triggers;Health benefits of tobacco cessation;Risk factor overview      Risk Factor Follow-up   Follow-up/Discharge: will review risk factor goals if/when he returns to rehab.     PSYCHOSOCIAL  Psychosocial Assessment: " Reassessment       [facility] Q [facility] L Summary Score: 20      LAURA-D Score: 6      Psychosocial Risk Factor: Stress      Psychosocial Plan  Interventions  Interventions: Offer educational videos and classes;Individual education and counseling      Education Completed  Education Completed: Effects of stress on body      Goals  Goals (Next 30 days): Practicing stress management skills      Goals Met  Goals Met: Identified Support system;Identify stressors      Psychosocial Follow-up  Follow-up/Discharge: Will review stress as a risk factor if/when he returns to rehab.             Patient involved in Goal setting?: No      Signature: _____________________________________________________________    Date: __________________    Time: __________________See Doc Flowsheet

## 2021-05-28 NOTE — PATIENT INSTRUCTIONS - HE
1. Increase carvedilol to 6.25 mg twice daily  2. Start losartan 25 mg daily  3. We will check an echocardiogram in 1 month  4. Follow-up in heart failure clinic in 2 weeks for further medication titration  5. Hold the losartan if your blood pressure drops below 90  6. Look forward to seeing you again in 6 months  7. Continue your efforts at limiting your sodium intake  Congratulations on quitting cigarettes!  Try to eliminate your alcohol intake entirely

## 2021-05-28 NOTE — PROGRESS NOTES
ITP ASSESSMENT   Assessment Day: 30 Day    Session Number: 4  Precautions: Falls Precautions    Diagnosis: MI;Stent    Risk Stratification: High    Referring Provider: Timi Rodriguez MD  EXERCISE  Exercise Assessment: Reassessment    Tolerates 30' of exercise at 2.2 Mets                              Exercise Plan  Goals Next 30 days  ADL'S: Will update goals next session    Leisure: Use walker less around the house.    Work: lose 2-3 lbs.      Education Goals: All goals in this section met    Education Goals Met: Has system for taking medication.;Patient can state cardiac s/s and appropriate emergency response.;Medication review.                          Goals Met  Initial ADL's goals met: Will evaluate goals next session    No data recorded  No data recorded  No data recorded    Exercise Prescription  Exercise Mode: Nustep;Arm Erg.    Frequency: 2 x week    Duration: 30-40'    Intensity / THR: 20-30 beats above resting heart rate    RPE 11-14  Progression / Met level: 2.2-2.7    Resistive Training?: Yes      Current Exercise (mins/week): 10      Interventions  Home Exercise:  Mode: Stationary bike    Frequency: 3-5 x week    Duration: 10-15'      Education Material : Individual education and counseling;Offer educational classes      Education Completed  Exercise Education Completed: Cardiac Anatomy;Signs and Symptoms;Medication review;RPE;Emergency Plan;Home Exercise;Warm up/cool down;BP/HR Reponse to exercise;Benefits of Exercise;End point of exercise              Exercise Follow-up/Discharge  Follow up/Discharge: Will review home exercise next session           NUTRITION  Nutrition Assessment: Reassessment      Nutrition Risk Factors:  Nutrition Risk Factors: Dyslipidemia;Overweight  Cholesterol: 146  LDL: 96  HDL: 35  Triglycerides: 76      Nutrition Plan  Interventions  No data recorded  Other Nutrition Intervention: Diet Class;Therapist/Pt Discussion;Educational Videos;Provide with Written  "Material      Education Completed  Nutrition Education Completed: Risk factor overview      Goals  Nutrition Goals (Next 30 days): Patient will follow a low sodium diet;Patient will follow a low saturated fat diet;Patient will lose weight;Patient knows appropriate portion size      Goals Met  Nutrition Goals Met: Patient can identify their risk factors for CAD;Completed Nutritional Risk Screen;Provided Rate your Plate Survey;Reviewed Dietitian schedule      Height, Weight, and  BMI  Weight: 184 lb (83.5 kg)  Height: 5' 9\" (1.753 m)  BMI: 27.16      Nutrition Follow-up  Follow-up/Discharge: Pt reports that he follows somewhat of a heart healthy diet. Will meet with department dietician soon.        Other Risk Factors  Other Risk Factor Assessment: Reassessment      HTN Risk Factor: Hypertension      Pre Exercise BP: 130/76  Post Exercise BP: 104/66      Hypertension Plan  Goals  HTN Goals: Follow low sodium diet;Take medication as prescribed;Exercises regularly      Goals Met  HTN Goals Met: Take medication as prescribed      HTN Interventions  HTN Interventions: Therapist/patient discussion;Offer educational classes      HTN Education Completed  HTN Education Completed: Medication review;Risk factor overview      Tobacco Risk Factor: Tobacco      Initial Use:: 1 ppd  Current Use:: Will evaluate with pt next time      Tobacco Plan  Tobacco Goals  Tobacco Goals: Patient to set quite date      Goals Met  Tobacco Goals Met: Patient to reduce tobacco use (see comments for amount)      Tobacco Interventions  Tobacco Interventions: Therapist/patient discussion;Offer class on risk factor modification      Tobacco Education Completed  Tobacco Education Completed: Identifies triggers;Health benefits of tobacco cessation;Risk factor overview      Risk Factor Follow-up   Follow-up/Discharge: Will provide risk factor education as needed         PSYCHOSOCIAL  Psychosocial Assessment: Reassessment       [place] [employer] Q of L " Summary Score: 20      LAURA-D Score: 6      Psychosocial Risk Factor: Stress      Psychosocial Plan  Interventions  Interventions: Offer educational videos and classes;Individual education and counseling      Education Completed  Education Completed: Effects of stress on body      Goals  Goals (Next 30 days): Practicing stress management skills      Goals Met  Goals Met: Identified Support system;Identify stressors      Psychosocial Follow-up  Follow-up/Discharge: Will review Stress/Relaxation with pt           Patient involved in Goal setting?: Yes      Signature: _____________________________________________________________    Date: __________________    Time: __________________

## 2021-05-29 NOTE — PATIENT INSTRUCTIONS - HE
Aayush García,    It was a pleasure to see you today at the [facility].     My recommendations after this visit include:  - Please follow up with Dr Cordova July 29   - Please follow up with Madeline Avila in 4 weeks  - I have increased your carvedilol to 12.5 mg twice a day    Madeline Avila, CNP

## 2021-05-29 NOTE — PATIENT INSTRUCTIONS - HE
Aayush García,    It was a pleasure to see you today at the [facility].     My recommendations after this visit include:  - Please follow up with Dr Cordova in August   - Please follow up with Madeline Avila in 3 weeks  - Echocardiogram scheduled for June 4  - I have increased your carvedilol to 6.25 mg twice a day    Madeline Avila, CNP

## 2021-05-29 NOTE — PROGRESS NOTES
ITP ASSESSMENT   Assessment Day: 90 Day - Discharge Note    Session Number: 6  Precautions: Falls-walker use    Diagnosis: MI;Stent    Risk Stratification: High    Referring Provider: Tiim Rodriguez MD  EXERCISE  Exercise Assessment: Discharge    Pt had inconsistent attendance to rehab. After several consecutive missed appts without notice, pt was called. LVM stating if pt did not return next session he would be removed from schedule and put on hold. Per cancellation procedure signed by pt, chart discharged.                          Exercise Plan    Education Goals Met: Patient can state cardiac s/s and appropriate emergency response.;Has system for taking medication.;Medication review.                          Goals Met  30 Day Progression: Pt has not attended rehab since 5/7/19. see note      Initial ADL's goals met: Will evaluate goals next session    Intial Work goals met: Pt did not lose wt - goal not met      Interventions  Home Exercise:    Education Material : Individual education and counseling;Offer educational classes      Education Completed  Exercise Education Completed: Cardiac Anatomy;Signs and Symptoms;Medication review;RPE;Emergency Plan;Home Exercise;Warm up/cool down;BP/HR Reponse to exercise;Benefits of Exercise;End point of exercise              Exercise Follow-up/Discharge  Follow up/Discharge: Pt has not had consistant attendance in outpatient rehab.    NUTRITION  Nutrition Assessment: Discharge      Nutrition Risk Factors:  Nutrition Risk Factors: Dyslipidemia;Overweight  Cholesterol: 146  LDL: 96  HDL: 35  Triglycerides: 76      Nutrition Plan  Interventions  Other Nutrition Intervention: Diet Class;Therapist/Pt Discussion      Education Completed  Nutrition Education Completed: Risk factor overview      Goals Met  Nutrition Goals Met: Patient can identify their risk factors for CAD;Completed Nutritional Risk Screen;Provided Rate your Plate Survey;Reviewed Dietitian schedule      Height,  "Weight, and  BMI  Weight: 178 lb (80.7 kg)  Height: 5' 9\" (1.753 m)  BMI: 26.27      Nutrition Follow-up  Follow-up/Discharge: Pt reports that he follows somewhat of a heart healthy diet. Will meet with department dietician soon.          Other Risk Factors  Other Risk Factor Assessment: Discharge      HTN Risk Factor: Hypertension      Pre Exercise BP: 120/68  Post Exercise BP: 120/70      Hypertension Plan      Goals Met  HTN Goals Met: Take medication as prescribed      HTN Interventions  HTN Interventions: Therapist/patient discussion;Offer educational classes      HTN Education Completed  HTN Education Completed: Medication review;Risk factor overview      Tobacco Risk Factor: Tobacco      Initial Use:: 1 ppd  Current Use:: Smoke free      Tobacco Plan      Goals Met  Tobacco Goals Met: Patient to reduce tobacco use (see comments for amount)      Tobacco Interventions  Tobacco Interventions: Therapist/patient discussion;Offer class on risk factor modification      Tobacco Education Completed  Tobacco Education Completed: Identifies triggers;Health benefits of tobacco cessation;Risk factor overview      Risk Factor Follow-up   Follow-up/Discharge: will review risk factor goals if/when he returns to rehab.     PSYCHOSOCIAL  Psychosocial Assessment: Discharge       [place] COOP Q of L Summary Score: 20      Psychosocial Risk Factor: Stress      Psychosocial Plan  Interventions  Interventions: Offer educational videos and classes;Individual education and counseling      Education Completed  Education Completed: Effects of stress on body      Goals Met  Goals Met: Identified Support system;Identify stressors      Psychosocial Follow-up  Follow-up/Discharge: Will review stress as a risk factor if/when he returns to rehab.             Patient involved in Goal setting?: No      Signature: _____________________________________________________________    Date: __________________    Time: __________________  "

## 2021-05-30 NOTE — PROGRESS NOTES
[facility] Office Note    Assessment / Plan:    1.  Dilated cardiomyopathy, likely combination of ischemic and toxic from alcohol.  Discussed the rationale for up titration of his medications.  Will increase carvedilol to 25 mg twice daily.  Return to heart failure clinic in 2 weeks for further up titration of medication regimen.  Plan recheck echocardiogram in 3 month's time, with follow-up afterwards.  2.  Coronary artery disease status post coronary intervention with no anginal symptoms.  Continues on dual antiplatelet therapy for 1 year  3.  Nonsustained ventricular tachycardia.  No evidence of recurrence  4.  Alcohol abuse.  Reports cessation at this time.  5.  Tobacco abuse.  Congratulated on abstinence.  6.  Aortic stenosis.   moderate to severe by echo.  Will reassess in 3 month    Plan follow-up in 3 months with me, 2 weeks with heart failure clinic      ______________________________________________________________________    Subjective:    I had the opportunity to see Aayush Garcaí at the [facility]. Aayush García is a 63 y.o. male with a history of alcohol abuse and, hypertension admitted with cardiogenic shock and non-ST elevation myocardial infarction in March.  He subsequently underwent complex coronary intervention with intra-aortic balloon pump.  His left ventricular ejection fraction at that time was 22%.  His course was complicated by alcoholic hepatitis.  He has been seen in heart failure clinic with gradual up titration of his medication regimen.  Follow-up echocardiogram earlier this month showed left ventricular ejection fraction improved at 30 to 35%.  Moderate to severe aortic stenosis was also suggested.  He returns today for routine follow-up, accompanied by his significant other Karlie.    Since I saw him last, he is feeling stronger.  He has remained off of alcohol and cigarettes and is surprised at how easy it is.  His stamina is improving.  Systolic blood  pressures are consistently above 100 and frequently in the 1 20-1 30 range.  He has had no syncope or near syncopal spells.  He has declined placement of an ICD, but is reconsidering.      ______________________________________________________________________    Problem List:  Patient Active Problem List   Diagnosis     Alcoholism /alcohol abuse (H)     Class 1 obesity     Nonsustained ventricular tachycardia (H)     Essential hypertension     Cardiomyopathy, unspecified type (H)     Non-ST elevation MI (NSTEMI) (H)     Iron deficiency anemia due to chronic blood loss     Heart failure with reduced ejection fraction (H)     Ischemic cardiomyopathy     Medical History:  Past Medical History:   Diagnosis Date     Acute liver failure 8/5/2018     Acute renal failure, unspecified acute renal failure type (H) 08/05/2018     Acute respiratory failure with hypoxia (H) 08/05/2018     Alcoholic hepatitis with ascites 08/05/2018     Bacteremia due to Klebsiella pneumoniae      Essential hypertension      Gastroesophageal reflux disease without esophagitis 10/31/2018     Hepatic encephalopathy (H) 08/05/2018     Macrocytic anemia      Stress-induced cardiomyopathy 08/05/2018     Surgical History:  Past Surgical History:   Procedure Laterality Date     COLONOSCOPY N/A 3/20/2018    Procedure: COLONOSCOPY;  Surgeon: Adam Nicole III, MD;  Location: [facility];  Service:      CV CORONARY ANGIOGRAM N/A 3/18/2019    Procedure: Coronary Angiogram;  Surgeon: Timi Rodriguez MD;  Location: [facility];  Service: Cardiology     CV LEFT HEART CATHETERIZATION WO LEFT VETRICULOGRAM Left 3/18/2019    Procedure: Left Heart Catheterization Without Left Ventriculogram;  Surgeon: Timi Rodriguez MD;  Location: [facility];  Service: Cardiology     PICC  7/24/2018          PICC  3/18/2019          Social History:  Social History     Socioeconomic History     Marital status: Domestic Partner     Spouse name: Karlie  Phan (SO)     Number of children: Not on file     Years of education: Not on file     Highest education level: Not on file   Occupational History     Employer: [employer]   Social Needs     Financial resource strain: Not on file     Food insecurity:     Worry: Not on file     Inability: Not on file     Transportation needs:     Medical: Not on file     Non-medical: Not on file   Tobacco Use     Smoking status: Former Smoker     Types: Cigarettes     Last attempt to quit: 2019     Years since quittin.2     Smokeless tobacco: Never Used     Tobacco comment: pack a week   Substance and Sexual Activity     Alcohol use: Yes     Frequency: 4 or more times a week     Drinks per session: 1 or 2     Comment: 350 ml of peppermint schnapps daily per [facility] h,  H&P per report of Karlie JJ to Dr. Valle     Drug use: No     Sexual activity: Not on file   Lifestyle     Physical activity:     Days per week: Not on file     Minutes per session: Not on file     Stress: Not on file   Relationships     Social connections:     Talks on phone: Not on file     Gets together: Not on file     Attends [employer] service: Not on file     Active member of club or organization: Not on file     Attends meetings of clubs or organizations: Not on file     Relationship status: Not on file     Intimate partner violence:     Fear of current or ex partner: Not on file     Emotionally abused: Not on file     Physically abused: Not on file     Forced sexual activity: Not on file   Other Topics Concern     Not on file   Social History Narrative    Works in [employer] sales. Lives with his SO, Karlie Chisholm.     Sleep History:  Restorative flat  Exercise History:  Uses the elliptical  in his home 20 minutes 3 days a week.  Breaks a sweat but is limited by knee pain.    Review of Systems:   General: WNL  Eyes: WNL  Ears/Nose/Throat: WNL  Lungs: WNL  Heart: WNL  Stomach: WNL  Bladder: WNL  Muscle/Joints: WNL  Skin: WNL  Nervous System:  WNL  Mental Health: WNL     Blood: WNL          Family History:  Family History   Problem Relation Age of Onset     Heart disease Father 76        type unknown to Aayush; his father  at home     Alzheimer's disease Mother 86        lives in long term care     No Medical Problems Son      No Medical Problems Son          Allergies:  No Known Allergies  Medications:  Current Outpatient Medications   Medication Sig Dispense Refill     acetaminophen (TYLENOL) 325 MG tablet Take 2 tablets (650 mg total) by mouth every 6 (six) hours as needed.  0     aspirin 81 MG EC tablet Take 81 mg by mouth daily.       atorvastatin (LIPITOR) 80 MG tablet Take 1 tablet (80 mg total) by mouth daily. 90 tablet 3     b complex vitamins tablet Take 1 tablet by mouth daily.       carvedilol (COREG) 12.5 MG tablet Take 15.625 mg twice a day. Take one 12.5 mg with one 3.125 mg twice a day 180 tablet 3     carvedilol (COREG) 3.125 MG tablet Take 15.625 mg twice a day, take one 12.5 mg with one 3.125 mg twice a day 180 tablet 3     cholecalciferol, vitamin D3, (VITAMIN D3) 2,000 unit Tab Take 2,000 Units by mouth daily.       ferrous sulfate 325 (65 FE) MG tablet Take 1 tablet (325 mg total) by mouth 2 (two) times a day with meals. 60 tablet 0     folic acid (FOLVITE) 1 MG tablet Take 1 tablet (1 mg total) by mouth daily. 30 tablet 0     furosemide (LASIX) 20 MG tablet Take 1 tablet (20 mg total) by mouth daily. 30 tablet 11     losartan (COZAAR) 50 MG tablet Take 0.5 tablets (25 mg total) by mouth daily. 90 tablet 3     magnesium oxide (MAG-OX) 400 mg (241.3 mg magnesium) tablet Take 1 tablet (400 mg total) by mouth 2 (two) times a day.  0     multivitamin therapeutic tablet Take 1 tablet by mouth daily.       omeprazole (PRILOSEC) 40 MG capsule Take 40 mg by mouth daily before breakfast.       potassium chloride (K-DUR,KLOR-CON) 20 MEQ tablet Take 20 mEq by mouth daily.       thiamine (VITAMIN B-1) 100 MG tablet Take 100 mg by mouth daily.  "      ticagrelor (BRILINTA) 90 mg Tab Take 1 tablet (90 mg total) by mouth 2 (two) times a day. 180 tablet 3     No current facility-administered medications for this visit.        Objective:   Wt Readings from Last 3 Encounters:   07/29/19 174 lb (78.9 kg)   07/16/19 173 lb (78.5 kg)   06/13/19 176 lb (79.8 kg)     Vital signs:  /60 (Patient Site: Left Arm, Patient Position: Sitting, Cuff Size: Adult Regular)   Pulse 77   Resp 14   Ht 5' 9\" (1.753 m)   Wt 174 lb (78.9 kg)   BMI 25.70 kg/m        Physical Exam:    GENERAL APPEARANCE: Alert, cooperative and in no acute distress.  HEENT: Conjunctivae slightly injected.  No scleral icterus. No Xanthelasma. Oral mucous membranes pink and moist.  NECK: No JVD.  No Hepatojugular reflux. Thyroid not enlarged.  CHEST: clear to auscultation  CARDIOVASCULAR: Nonpalpable point of maximal impulse.  Regular S1, S2 with 3/6 harsh systolic murmur upper right sternal border without radiation. Brachial, radial and posterior tibial pulses are intact and symmetric. No carotid bruits noted.  ABDOMEN: Nontender. BS+. No bruits.  EXTREMITIES: No cyanosis, clubbing or edema.  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 OM1 is subtotally occluded v.  w/ bridging collaterals  RCA:dominant, mildly irregular  LVEDP:39  AV gradient: P/M 35/25 simultaneous    Echocardiogram 6/2019  1. The left ventricle is mildly " enlarged. Left ventricular systolic performance is moderately reduced. The ejection fraction is estimated to be 30-35%.   2. There is moderate global reduction in left ventricular systolic performance. There is mild concentric increase in left ventricular wall thickness.  3. There is moderate to severe aortic stenosis.    The mean gradient is measured at 27 mmHg with peak velocity of 3.2 m/s.  Calculated valve area 1.1 cm . VRVTI = 0.3. VRvel = 0.3.   4. There is mild aortic insufficiency.   5. Normal right ventricular size and systolic performance.   6. There is mild left atrial enlargement.   When compared to the prior real-time echocardiogram dated 17 March 2019, the ejection fraction is somewhat higher on the current examination.  Moderate to severe aortic stenosis is now detected.      HUMAIRA HUGHES MD [facility]  [phone]    This note created using Dragon voice recognition software.  Sound alike errors may have escaped editing.

## 2021-05-30 NOTE — PATIENT INSTRUCTIONS - HE
1. Increase carvedilol to 25 mg twice a day  2. Try to use your elliptical machine  3. We will repeat an echocardiogram in 3 months.  I look forward to seeing you then.  4. Call if you change your mind and wish to pursue a defibrillator placement sooner

## 2021-05-30 NOTE — PATIENT INSTRUCTIONS - HE
Aayush García,    It was a pleasure to see you today at the [facility].     My recommendations after this visit include:  - Please follow up with Dr Cordova July 29   - Please follow up with Madeline Avila in 6 weeks  - I have increased your carvedilol to 15.625 mg twice a day. Please take one 12.5 mg tablet with one 3.125 mg tablet twice a day    Madeline Avila, CNP

## 2021-05-31 NOTE — PATIENT INSTRUCTIONS - HE
Aayush García,    It was a pleasure to see you today at the [facility].     My recommendations after this visit include:  - Please follow up with Dr Cordova October 25   - Please follow up with Madeline Avila in 4 weeks  - You are scheduled for an echocardiogram October 22  - I have stopped your furosemide. Please monitor for signs or symptoms of fluid retention including weight gain, shortness of breath, or swelling    Madeline Avila, CNP

## 2021-06-01 VITALS — BODY MASS INDEX: 25.76 KG/M2 | WEIGHT: 169.4 LBS

## 2021-06-01 VITALS — BODY MASS INDEX: 25.19 KG/M2 | WEIGHT: 165.7 LBS

## 2021-06-01 VITALS — HEIGHT: 69 IN | BODY MASS INDEX: 26.66 KG/M2 | WEIGHT: 180 LBS

## 2021-06-01 VITALS — BODY MASS INDEX: 31.16 KG/M2 | WEIGHT: 205.6 LBS | HEIGHT: 68 IN

## 2021-06-01 VITALS — WEIGHT: 176.4 LBS | BODY MASS INDEX: 26.82 KG/M2

## 2021-06-01 VITALS — WEIGHT: 178 LBS | BODY MASS INDEX: 27.06 KG/M2

## 2021-06-01 VITALS — BODY MASS INDEX: 25.71 KG/M2 | WEIGHT: 169.1 LBS

## 2021-06-01 VITALS — BODY MASS INDEX: 24.45 KG/M2 | WEIGHT: 160.8 LBS

## 2021-06-01 NOTE — PATIENT INSTRUCTIONS - HE
Aayush García,    It was a pleasure to see you today at the [facility].     My recommendations after this visit include:  - Please follow up with Dr Cordova October 25   - Please follow up with Madeline Avila in 2 months  - Echocardiogram scheduled for October 22  - I have checked labs and will contact you with results on MyChart  - I have stopped your losartan. Please continue to monitor your blood pressure. If still low please send me a note on MyChart in a week or so.     Madeline Avila, CNP

## 2021-06-01 NOTE — TELEPHONE ENCOUNTER
----- Message from Gray Duvall sent at 9/9/2019 11:03 AM CDT -----  Contact: Pts wife  General phone call:    Caller: Karlie    Primary cardiologist: Madeline Avila    Detailed reason for call: [facility] had updated the Rx for carvedilol (COREG) 12.5 MG tablet  Pt now taking 25mg - their insurance company stated it was too soon to do a refill - Pt's dose had changed so asking if we can resubmit an Rx for 25mg tabs or if not clarify with the Pts insurance that this refill will still be needed?      Ph[facility]: [id number] - [place], [place] - [address]    New or active symptoms? na  Best phone number: home  Best time to contact: any  Ok to leave a detailed message? yes  Device? no    Additional Info:

## 2021-06-01 NOTE — TELEPHONE ENCOUNTER
"Noted request. Last rx was put in \"no print\" mode, so [facility] not [facility] but rather medication list updated. [facility]. CHASITY Ziegler that this was done. -[facility]  "

## 2021-06-02 VITALS — WEIGHT: 184 LBS | BODY MASS INDEX: 27.17 KG/M2

## 2021-06-02 VITALS — BODY MASS INDEX: 26.07 KG/M2 | HEIGHT: 69 IN | WEIGHT: 176 LBS

## 2021-06-02 VITALS — HEIGHT: 69 IN | WEIGHT: 179.06 LBS | BODY MASS INDEX: 26.52 KG/M2

## 2021-06-02 VITALS — BODY MASS INDEX: 27.56 KG/M2 | HEIGHT: 69 IN | WEIGHT: 186.1 LBS

## 2021-06-02 VITALS — BODY MASS INDEX: 26.29 KG/M2 | WEIGHT: 178 LBS

## 2021-06-02 VITALS — BODY MASS INDEX: 27.02 KG/M2 | WEIGHT: 183 LBS

## 2021-06-02 VITALS — BODY MASS INDEX: 27.85 KG/M2 | HEIGHT: 69 IN | WEIGHT: 188 LBS

## 2021-06-02 VITALS — BODY MASS INDEX: 26.73 KG/M2 | WEIGHT: 181 LBS

## 2021-06-02 VITALS — HEIGHT: 69 IN | WEIGHT: 178 LBS | BODY MASS INDEX: 26.36 KG/M2

## 2021-06-02 VITALS — WEIGHT: 178 LBS | BODY MASS INDEX: 26.36 KG/M2 | HEIGHT: 69 IN

## 2021-06-02 VITALS — BODY MASS INDEX: 27.17 KG/M2 | WEIGHT: 184 LBS

## 2021-06-02 VITALS — WEIGHT: 178 LBS | BODY MASS INDEX: 26.29 KG/M2

## 2021-06-02 NOTE — TELEPHONE ENCOUNTER
Rec'd return call from patient - informed him of Madeline's recommendations - patient confirmed he presently is taking Coreg 2-12.5mg tabs two times a day and verbalized understanding that dose will be decreased to 18.75mg two times a day after writing instructions down - patient stated he has been cutting 2 other pills in half but uncertain as to which ones because his fiancee helps him - patient agreed to have keyure call back when she returns home.  mg

## 2021-06-02 NOTE — TELEPHONE ENCOUNTER
Rec'd voicemail from patient's parvez Ziegler requesting another letter from Madeline to be faxed to patient's employer because he missed work again today due to episode of dizziness.    Update and request forwarded to Madeline alonso 10-22-19 - f/u with Dr. Cordova 10-25-19 - f/u with you 11-19-19.  mg

## 2021-06-02 NOTE — TELEPHONE ENCOUNTER
Rec'd call from patient's parvez Ziegler requesting another letter from Madeline to excuse patient from work today because he was not feeling well - informed Karlie that request would be forwarded.    Please address Karlie's request for another work letter.  mg

## 2021-06-02 NOTE — TELEPHONE ENCOUNTER
Request from parvez was that letter be faxed to patient's employer [employer] rep Susan - fax# [phone].  mg

## 2021-06-02 NOTE — TELEPHONE ENCOUNTER
Letter faxed successfully to Susan at [facility]. If patient continues to ask for letter for work he needs to be seen in clinic. Thank you

## 2021-06-02 NOTE — TELEPHONE ENCOUNTER
Please have him reduce his carvedilol to 18.75 mg twice a day.  New prescription will be sent to pharmacy.  I will order a 12.5 mg tablet and a 6.25 mg tablet.  Please make sure he is not splitting his Brilinta or any other medications.  If still having symptoms of dizziness with reduction of carvedilol please contact the clinic.

## 2021-06-02 NOTE — TELEPHONE ENCOUNTER
Rec'd return call from patient's parvez Ziegler - informed her of Madeline's response/recommenations - patient verbalized understanding of Coreg dose changes and stated they had been cutting Brilinta in half and staggering doses in am and pm so patient was not taking full tab at once - Karlie explained that first half tab taken @ 0500, then 2nd half an hour later, same sequence repeated with evening dose.    Explained to Karlie risk of impacting effectiveness of Brilinta by cutting tabs in half and informed her that patient should be taking full 90mg tab two times a day as prescribed - Karlie verbalized understanding and requested Drs note for patient to give employer because he missed work today because he was not feeling well.    Informed Karlie that request would be forwarded to Madeline to address and confirmed echo sched 10-22-19, f/u with Dr. Cordova 10-25-19.    Please review update and address request for Drs note for patient due to illness.  mg

## 2021-06-03 VITALS
SYSTOLIC BLOOD PRESSURE: 92 MMHG | HEIGHT: 70 IN | BODY MASS INDEX: 24.62 KG/M2 | WEIGHT: 172 LBS | HEART RATE: 60 BPM | DIASTOLIC BLOOD PRESSURE: 62 MMHG | RESPIRATION RATE: 16 BRPM

## 2021-06-03 VITALS — BODY MASS INDEX: 25.77 KG/M2 | WEIGHT: 174 LBS | HEIGHT: 69 IN

## 2021-06-03 VITALS — BODY MASS INDEX: 26.22 KG/M2 | HEIGHT: 69 IN | WEIGHT: 177 LBS

## 2021-06-03 VITALS — HEIGHT: 70 IN | BODY MASS INDEX: 24.34 KG/M2 | WEIGHT: 170 LBS

## 2021-06-03 VITALS — HEIGHT: 69 IN | BODY MASS INDEX: 25.62 KG/M2 | WEIGHT: 173 LBS

## 2021-06-03 VITALS
DIASTOLIC BLOOD PRESSURE: 46 MMHG | WEIGHT: 176 LBS | SYSTOLIC BLOOD PRESSURE: 80 MMHG | RESPIRATION RATE: 16 BRPM | HEART RATE: 68 BPM | BODY MASS INDEX: 26.07 KG/M2 | HEIGHT: 69 IN

## 2021-06-03 VITALS — WEIGHT: 176 LBS | HEIGHT: 69 IN | BODY MASS INDEX: 26.07 KG/M2

## 2021-06-03 NOTE — PROGRESS NOTES
Code Status:  FULL CODE  Visit Type: Follow Up (Ammonia, lab results, lactulose with increased diarrheal stools.)     Facility:  [facility] [[id number]]      Facility Type: SNF (Skilled Nursing Facility, TCU)    History of Present Illness:   Hospital Admission Date: 10/27/2019 Hospital Discharge Date: 11/6/2019       Aayush García is a 64 y.o. male with a past medical history for CAD with a recent MI and stenting 9/2019, systolic heart failure and an EF of 30 to 35%, chronic anemia on iron, current alcohol abuse, liver disease, GERD.  He was recently hospitalized at [facility] for altered mental status and lethargy for 3 weeks.  He had fallen off of his couch while he was sleeping and sustained rib fractures and became lethargic over 3 weeks.  Upon admission he was found to have a hemoglobin of 5.9 and thought it was secondary to his hematoma from his fall.  During his admission it was complicated by encephalopathy with which is likely due to prolonged alcohol withdrawal and possible hepatic encephalopathy.  He does have a history of daily alcohol use of 0.5 to 1 pints of hard liquor per day.  His blood alcohol level on admission was 271.  His ammonia level was 14, TSH was 1.54.  Negative for an infectious process and negative lactic acid.  He was he had a negative UA and a negative chest x-ray.  His ammonia level did rise up to 39 on 10/31.  He was started on lactulose 20 mg 2 times a day and was adjusted per his stooling.  His mental status did improve with the lactulose.    For his anemia he was found to have macrocytic anemia likely due to chronic EtOH abuse and was on aspirin and Brilinta.  He was consulted by GI which indicated his last EGD in 6/2019 showed patchy gastritis and a 10 mm duodenal bulb ulcer with gastric biopsies negative for H. pylori.  His last colonoscopy 3/2019 showed polyp without hemorrhoids.  He did have a thoracentesis on 10/28 in which 900 mL of nonbloody fluid  "was removed he did need 2 units of packed red blood cells.  His hemoglobin was stable at around 8 at discharge.  GI recommends that he have an outpatient colonoscopy.  Eventually his aspirin and Brilinta were restarted and he had no issues.  Because of the issue of acute hematuria it is recommended that he have a follow-up UA as an outpatient.    He did have issues with hypokalemia and hypomagnesemia and these were supplemented and thought to be likely related to his Lasix.  He did have also issues with attention and so it is recommended to closely monitor his weights, electrolytes and blood pressures.    Ring his hospitalization he had an episode in which he had \"staring spell\" that lasted approximately 30 seconds in which he bit his tongue.  He did have a follow-up head CT which showed no skull fracture intracranial bleed and EEG which was unremarkable.  He has no history of seizures and so this was felt to be uneventful.    Last week, nursing had approached me stating that he had had significant amounts of diarrheal stools approximately 6-8 today.  I have given a verbal order to decrease his lactulose to 10 mg twice daily.  However, today I am noting that his lactulose was not decreased over the weekend.  He complains today of having 4 watery stools last night and about the span of an hour.  His CMP today shows dehydration with potassium of 2.6, BUN of 7, creatinine of 0.61, calcium of 8.1, alk phos elevated at 365 and AST at 49.  His blood pressures are showing a bit softer in the low 100s.  He did go out to [facility] lab for pneumonia level and this was 26 today which is within normal limits.  He reports his only complaint is the frequent loose stools and just feeling weak and fatigued.  He does have ongoing nonpitting lower extremity edema.  Weight is down 3 pounds this week.    Past Medical History:   Diagnosis Date     Acute liver failure 8/5/2018     Acute renal failure, unspecified acute renal failure " type (H) 2018     Acute respiratory failure with hypoxia (H) 2018     Alcoholic hepatitis with ascites 2018     Bacteremia due to Klebsiella pneumoniae      Coronary artery disease involving native coronary artery of native heart without angina pectoris 2019     Essential hypertension      Gastroesophageal reflux disease without esophagitis 10/31/2018     Hepatic encephalopathy (H) 2018     Macrocytic anemia      Stress-induced cardiomyopathy 2018     Past Surgical History:   Procedure Laterality Date     COLONOSCOPY N/A 3/20/2018    Procedure: COLONOSCOPY;  Surgeon: Adam Nicole III, MD;  Location: [facility];  Service:      CV CORONARY ANGIOGRAM N/A 3/18/2019    Procedure: Coronary Angiogram;  Surgeon: Timi Rodriguez MD;  Location: [facility];  Service: Cardiology     CV LEFT HEART CATHETERIZATION WO LEFT VETRICULOGRAM Left 3/18/2019    Procedure: Left Heart Catheterization Without Left Ventriculogram;  Surgeon: Timi Rodriguez MD;  Location: [facility];  Service: Cardiology     PICC  2018          PICC  3/18/2019          US THORACENTESIS  10/28/2019     Family History   Problem Relation Age of Onset     Heart disease Father 76        type unknown to Aayush; his father  at home     Alzheimer's disease Mother 86        lives in long term care     No Medical Problems Son      No Medical Problems Son      Social History     Socioeconomic History     Marital status: Domestic Partner     Spouse name: Karlie Chisholm WALI)     Number of children: Not on file     Years of education: Not on file     Highest education level: Not on file   Occupational History     Occupation: Manager of building material department     Employer: [employer]   Social Needs     Financial resource strain: Not on file     Food insecurity:     Worry: Not on file     Inability: Not on file     Transportation needs:     Medical: Not on file     Non-medical: Not on file   Tobacco  Use     Smoking status: Former Smoker     Packs/day: 1.00     Years: 40.00     Pack years: 40.00     Types: Cigarettes     Last attempt to quit: 2019     Years since quittin.5     Smokeless tobacco: Never Used   Substance and Sexual Activity     Alcohol use: Yes     Frequency: 4 or more times a week     Drinks per session: 1 or 2     Comment: 350 ml of peppermint schnapps daily per [facility] h,  H&P per report of Karlie JJ to Dr. Valle     Drug use: No     Sexual activity: Not on file   Lifestyle     Physical activity:     Days per week: Not on file     Minutes per session: Not on file     Stress: Not on file   Relationships     Social connections:     Talks on phone: Not on file     Gets together: Not on file     Attends Taoism service: Not on file     Active member of club or organization: Not on file     Attends meetings of clubs or organizations: Not on file     Relationship status: Not on file     Intimate partner violence:     Fear of current or ex partner: Not on file     Emotionally abused: Not on file     Physically abused: Not on file     Forced sexual activity: Not on file   Other Topics Concern     Not on file   Social History Narrative    Works in [employer]. Lives with his SOKarlie.       Current Outpatient Medications   Medication Sig Dispense Refill     acetaminophen (TYLENOL) 325 MG tablet Take 2 tablets (650 mg total) by mouth every 6 (six) hours as needed.  0     aspirin 81 MG EC tablet Take 81 mg by mouth daily.       atorvastatin (LIPITOR) 80 MG tablet Take 1 tablet (80 mg total) by mouth daily. 90 tablet 3     b complex vitamins tablet Take 1 tablet by mouth daily.       carvedilol (COREG) 12.5 MG tablet Take 12.5 mg by mouth 2 (two) times a day with meals.       cholecalciferol, vitamin D3, (VITAMIN D3) 2,000 unit Tab Take 2,000 Units by mouth daily.       ferrous sulfate 325 (65 FE) MG tablet Take 1 tablet by mouth daily with breakfast.       folic acid (FOLVITE) 1 MG  tablet Take 1 tablet (1 mg total) by mouth daily. 30 tablet 0     furosemide (LASIX) 20 MG tablet Take 1 tablet (20 mg total) by mouth daily. 30 tablet 0     furosemide (LASIX) 20 MG tablet TAKE 1 TABLET BY MOTUH AS NEEDED FOR WEIGHT GAIN OF 3 LBS IN ONE DAY (Patient taking differently: 10 mg. TAKE 1 TABLET BY MOTUH AS NEEDED FOR WEIGHT GAIN OF 3 LBS IN ONE DAY      ) 90 tablet 1     lactulose (ENULOSE) 20 gram/30 mL Soln solution Take 30 mL (20 g total) by mouth 2 (two) times a day. (Patient taking differently: Take 10 g by mouth 2 (two) times a day.       ) 1800 mL 0     magnesium oxide (MAG-OX) 400 mg (241.3 mg magnesium) tablet Take 1 tablet (400 mg total) by mouth 2 (two) times a day.  0     multivitamin therapeutic tablet Take 1 tablet by mouth daily.       omeprazole (PRILOSEC) 40 MG capsule Take 40 mg by mouth daily before breakfast.       thiamine (VITAMIN B-1) 100 MG tablet Take 100 mg by mouth daily.       ticagrelor (BRILINTA) 90 mg Tab Take 1 tablet (90 mg total) by mouth 2 (two) times a day. 180 tablet 3     No current facility-administered medications for this visit.      No Known Allergies  Immunization History   Administered Date(s) Administered     INFLUENZA,RECOMBINANT,INJ,PF QUADRIVALENT 18+YRS 10/29/2019     Influenza,seasonal quad, PF, =/> 6months 10/04/2018     Tdap 10/04/2018         Review of Systems   Patient denies fever, chills, headache, lightheadedness, dizziness, rhinorrhea, cough, congestion, shortness of breath, chest pain, palpitations, abdominal pain, constipation, change in appetite, dysuria, frequency, burning or pain with urination.  Other than stated in HPI all other review of systems is negative.         Physical Exam     Vital signs: /54, heart rate 82, respiratory 18, temp 96.8.    GENERAL APPEARANCE: Well developed, well nourished, in no acute distress.  HEENT: normocephalic, atraumatic  PERRL, sclerae anicteric, conjunctivae clear and moist, EOM intact  LUNGS: Lung  sounds CTA, no adventitious sounds, respiratory effort normal.  CARD: RRR, S1, S2, harsh systolic murmur, no gallops, rubs,  ABD: Soft and nontender with normal bowel sounds.   MSK: Muscle strength and tone were normal.  EXTREMITIES: Nonpitting pedal edema bilateral lower extremities  NEURO: Alert and oriented x 3.  Face is symmetric.  SKIN: Multiple purpura of upper extremities with skin tears.  PSYCH: euthymic            Labs:   Recent Results (from the past 240 hour(s))   Potassium   Result Value Ref Range    Potassium 3.2 (L) 3.5 - 5.0 mmol/L   Basic Metabolic Panel   Result Value Ref Range    Sodium 138 136 - 145 mmol/L    Potassium 2.8 (LL) 3.5 - 5.0 mmol/L    Chloride 102 98 - 107 mmol/L    CO2 25 22 - 31 mmol/L    Anion Gap, Calculation 11 5 - 18 mmol/L    Glucose 101 70 - 125 mg/dL    Calcium 7.7 (L) 8.5 - 10.5 mg/dL    BUN 9 8 - 22 mg/dL    Creatinine 0.70 0.70 - 1.30 mg/dL    GFR MDRD Af Amer >60 >60 mL/min/1.73m2    GFR MDRD Non Af Amer >60 >60 mL/min/1.73m2   Magnesium   Result Value Ref Range    Magnesium 1.2 (L) 1.8 - 2.6 mg/dL   HM2(CBC w/o Differential)   Result Value Ref Range    WBC 8.3 4.0 - 11.0 thou/uL    RBC 2.97 (L) 4.40 - 6.20 mill/uL    Hemoglobin 8.5 (L) 14.0 - 18.0 g/dL    Hematocrit 29.0 (L) 40.0 - 54.0 %    MCV 98 80 - 100 fL    MCH 28.6 27.0 - 34.0 pg    MCHC 29.3 (L) 32.0 - 36.0 g/dL    RDW 18.3 (H) 11.0 - 14.5 %    Platelets 157 140 - 440 thou/uL    MPV 8.8 8.5 - 12.5 fL   Potassium   Result Value Ref Range    Potassium 3.7 3.5 - 5.0 mmol/L   Basic Metabolic Panel   Result Value Ref Range    Sodium 139 136 - 145 mmol/L    Potassium 3.6 3.5 - 5.0 mmol/L    Chloride 102 98 - 107 mmol/L    CO2 28 22 - 31 mmol/L    Anion Gap, Calculation 9 5 - 18 mmol/L    Glucose 94 70 - 125 mg/dL    Calcium 7.8 (L) 8.5 - 10.5 mg/dL    BUN 11 8 - 22 mg/dL    Creatinine 0.75 0.70 - 1.30 mg/dL    GFR MDRD Af Amer >60 >60 mL/min/1.73m2    GFR MDRD Non Af Amer >60 >60 mL/min/1.73m2   HM2(CBC w/o  Differential)   Result Value Ref Range    WBC 7.8 4.0 - 11.0 thou/uL    RBC 2.92 (L) 4.40 - 6.20 mill/uL    Hemoglobin 8.3 (L) 14.0 - 18.0 g/dL    Hematocrit 28.5 (L) 40.0 - 54.0 %    MCV 98 80 - 100 fL    MCH 28.4 27.0 - 34.0 pg    MCHC 29.1 (L) 32.0 - 36.0 g/dL    RDW 18.1 (H) 11.0 - 14.5 %    Platelets 222 140 - 440 thou/uL    MPV 9.6 8.5 - 12.5 fL   Basic Metabolic Panel   Result Value Ref Range    Sodium 136 136 - 145 mmol/L    Potassium 3.7 3.5 - 5.0 mmol/L    Chloride 99 98 - 107 mmol/L    CO2 28 22 - 31 mmol/L    Anion Gap, Calculation 9 5 - 18 mmol/L    Glucose 100 70 - 125 mg/dL    Calcium 7.7 (L) 8.5 - 10.5 mg/dL    BUN 13 8 - 22 mg/dL    Creatinine 0.84 0.70 - 1.30 mg/dL    GFR MDRD Af Amer >60 >60 mL/min/1.73m2    GFR MDRD Non Af Amer >60 >60 mL/min/1.73m2   Magnesium   Result Value Ref Range    Magnesium 1.3 (L) 1.8 - 2.6 mg/dL   HM2(CBC W/O DIFF)   Result Value Ref Range    WBC 6.7 4.0 - 11.0 thou/uL    RBC 2.95 (L) 4.40 - 6.20 mill/uL    Hemoglobin 8.3 (L) 14.0 - 18.0 g/dL    Hematocrit 28.9 (L) 40.0 - 54.0 %    MCV 98 80 - 100 fL    MCH 28.1 27.0 - 34.0 pg    MCHC 28.7 (L) 32.0 - 36.0 g/dL    RDW 18.3 (H) 11.0 - 14.5 %    Platelets 326 140 - 440 thou/uL    MPV 9.3 8.5 - 12.5 fL   Basic Metabolic Panel   Result Value Ref Range    Sodium 135 (L) 136 - 145 mmol/L    Potassium 3.8 3.5 - 5.0 mmol/L    Chloride 100 98 - 107 mmol/L    CO2 26 22 - 31 mmol/L    Anion Gap, Calculation 9 5 - 18 mmol/L    Glucose 87 70 - 125 mg/dL    Calcium 7.8 (L) 8.5 - 10.5 mg/dL    BUN 11 8 - 22 mg/dL    Creatinine 0.75 0.70 - 1.30 mg/dL    GFR MDRD Af Amer >60 >60 mL/min/1.73m2    GFR MDRD Non Af Amer >60 >60 mL/min/1.73m2   HM2(CBC w/o Differential)   Result Value Ref Range    WBC 6.3 4.0 - 11.0 thou/uL    RBC 2.91 (L) 4.40 - 6.20 mill/uL    Hemoglobin 8.1 (L) 14.0 - 18.0 g/dL    Hematocrit 28.1 (L) 40.0 - 54.0 %    MCV 97 80 - 100 fL    MCH 27.8 27.0 - 34.0 pg    MCHC 28.8 (L) 32.0 - 36.0 g/dL    RDW 18.4 (H) 11.0 - 14.5  %    Platelets 389 140 - 440 thou/uL    MPV 9.7 8.5 - 12.5 fL   Basic Metabolic Panel   Result Value Ref Range    Sodium 134 (L) 136 - 145 mmol/L    Potassium 3.7 3.5 - 5.0 mmol/L    Chloride 101 98 - 107 mmol/L    CO2 27 22 - 31 mmol/L    Anion Gap, Calculation 6 5 - 18 mmol/L    Glucose 87 70 - 125 mg/dL    Calcium 7.8 (L) 8.5 - 10.5 mg/dL    BUN 9 8 - 22 mg/dL    Creatinine 0.73 0.70 - 1.30 mg/dL    GFR MDRD Af Amer >60 >60 mL/min/1.73m2    GFR MDRD Non Af Amer >60 >60 mL/min/1.73m2   Magnesium   Result Value Ref Range    Magnesium 1.6 (L) 1.8 - 2.6 mg/dL   C. Diff Toxin By PCR   Result Value Ref Range    C.Difficile Toxigenic by PCR Negative Negative    Ribotype 027/NAP1/B1 Presumptive Negative Presumptive Negative   Comprehensive Metabolic Panel   Result Value Ref Range    Sodium 136 136 - 145 mmol/L    Potassium 2.6 (LL) 3.5 - 5.0 mmol/L    Chloride 107 98 - 107 mmol/L    CO2 21 (L) 22 - 31 mmol/L    Anion Gap, Calculation 8 5 - 18 mmol/L    Glucose 67 (L) 70 - 125 mg/dL    BUN 7 (L) 8 - 22 mg/dL    Creatinine 0.67 (L) 0.70 - 1.30 mg/dL    GFR MDRD Af Amer >60 >60 mL/min/1.73m2    GFR MDRD Non Af Amer >60 >60 mL/min/1.73m2    Bilirubin, Total 0.5 0.0 - 1.0 mg/dL    Calcium 8.1 (L) 8.5 - 10.5 mg/dL    Protein, Total 7.0 6.0 - 8.0 g/dL    Albumin 2.5 (L) 3.5 - 5.0 g/dL    Alkaline Phosphatase 365 (H) 45 - 120 U/L    AST 49 (H) 0 - 40 U/L    ALT 33 0 - 45 U/L   Magnesium   Result Value Ref Range    Magnesium 1.6 (L) 1.8 - 2.6 mg/dL   Hemoglobin   Result Value Ref Range    Hemoglobin 9.1 (L) 14.0 - 18.0 g/dL   Ammonia   Result Value Ref Range    Ammonia 26 11 - 35 umol/L         Assessment:  1. Alcoholic hepatitis with ascites     2. Hepatic encephalopathy (H)     3. Chronic systolic heart failure (H)     4. Acute renal failure, unspecified acute renal failure type (H)     5. Hypokalemia due to excessive gastrointestinal loss of potassium     6. Hypomagnesemia         Plan:   Alcohol hepatitis with hepatic  encephalopathy: Soft lap as he is cleared.  His ammonia level is within normal limits and he is having way more stools than 3-4 a day so we will back down on his lactulose to 10 mg daily for a few days and likely will need to go up to twice daily based on stooling.  Stooling has caused significant dehydration will have nursing encourage fluids and back down on his Lasix 10 mg daily and monitor his weights and blood pressures.  This will likely improve his labs.    Hypokalemia: We will give him 40 mEq of KCl now and 40 mEq at at bedtime.  Recheck a potassium tomorrow continue with KCl 40 mEq daily and will likely be able to back down to 20 mEq daily once within good range.    Hypomagnesemia: Magnesium level is 1.6 today which is slightly low and so we will start him on Slow-Mag daily.      Continue to monitor electrolytes.    Electronically signed by: Corrina Blanca, CNP

## 2021-06-03 NOTE — PROGRESS NOTES
Code Status:  FULL CODE  Visit Type: Follow Up (Hospital follow-up, fall, anemia, pleural effusion, rib fracture, electrolyte imbalance.)     Facility:  [facility] [[id number]]      Facility Type: SNF (Skilled Nursing Facility, TCU)    History of Present Illness:   Hospital Admission Date: 10/27/2019 Hospital Discharge Date: 11/6/2019       Aayush García is a 64 y.o. male with a past medical history for CAD with a recent MI and stenting 9/2019, systolic heart failure and an EF of 30 to 35%, chronic anemia on iron, current alcohol abuse, liver disease, GERD.  He was recently hospitalized at [facility] for altered mental status and lethargy for 3 weeks.  He had fallen off of his couch while he was sleeping and sustained rib fractures and became lethargic over 3 weeks.  Upon admission he was found to have a hemoglobin of 5.9 and thought it was secondary to his hematoma from his fall.  During his admission it was complicated by encephalopathy with which is likely due to prolonged alcohol withdrawal and possible hepatic encephalopathy.  He does have a history of daily alcohol use of 0.5 to 1 pints of hard liquor per day.  His blood alcohol level on admission was 271.  His ammonia level was 14, TSH was 1.54.  Negative for an infectious process and negative lactic acid.  He was he had a negative UA and a negative chest x-ray.  His ammonia level did rise up to 39 on 10/31.  He was started on lactulose 20 mg 2 times a day and was adjusted per his stooling.  His mental status did improve with the lactulose.    For his anemia he was found to have macrocytic anemia likely due to chronic EtOH abuse and was on aspirin and Brilinta.  He was consulted by GI which indicated his last EGD in 6/2019 showed patchy gastritis and a 10 mm duodenal bulb ulcer with gastric biopsies negative for H. pylori.  His last colonoscopy 3/2019 showed polyp without hemorrhoids.  He did have a thoracentesis on 10/28 in which 900  "mL of nonbloody fluid was removed he did need 2 units of packed red blood cells.  His hemoglobin was stable at around 8 at discharge.  GI recommends that he have an outpatient colonoscopy.  Eventually his aspirin and Brilinta were restarted and he had no issues.  Because of the issue of acute hematuria it is recommended that he have a follow-up UA as an outpatient.    He did have issues with hypokalemia and hypomagnesemia and these were supplemented and thought to be likely related to his Lasix.  He did have also issues with attention and so it is recommended to closely monitor his weights, electrolytes and blood pressures.    Ring his hospitalization he had an episode in which he had \"staring spell\" that lasted approximately 30 seconds in which he bit his tongue.  He did have a follow-up head CT which showed no skull fracture intracranial bleed and EEG which was unremarkable.  He has no history of seizures and so this was felt to be uneventful.    Today, he is sitting in a wheelchair and reports feeling generally well.  He does complain of 5-6 loose stools daily.  It is mostly bothersome due to his decreased function.  He states he is below his baseline for function.  He does wear to knee immobilizers to help with stability as he does have some osteoarthritis of his knees.  He reports his breathing is well and denies any chest pain or breathing issues.  He reports his appetite is returning slowly however has somewhat nausea.  He does have soft nonpitting pedal edema bilaterally.  He states this has just increased today.    Past Medical History:   Diagnosis Date     Acute liver failure 8/5/2018     Acute renal failure, unspecified acute renal failure type (H) 08/05/2018     Acute respiratory failure with hypoxia (H) 08/05/2018     Alcoholic hepatitis with ascites 08/05/2018     Bacteremia due to Klebsiella pneumoniae      Coronary artery disease involving native coronary artery of native heart without angina " pectoris 2019     Essential hypertension      Gastroesophageal reflux disease without esophagitis 10/31/2018     Hepatic encephalopathy (H) 2018     Macrocytic anemia      Stress-induced cardiomyopathy 2018     Past Surgical History:   Procedure Laterality Date     COLONOSCOPY N/A 3/20/2018    Procedure: COLONOSCOPY;  Surgeon: Adam Nicole III, MD;  Location: [facility] OR;  Service:      CV CORONARY ANGIOGRAM N/A 3/18/2019    Procedure: Coronary Angiogram;  Surgeon: Timi Rodriguez MD;  Location: [facility] Cath Lab;  Service: Cardiology     CV LEFT HEART CATHETERIZATION WO LEFT VETRICULOGRAM Left 3/18/2019    Procedure: Left Heart Catheterization Without Left Ventriculogram;  Surgeon: Timi Rodriguez MD;  Location: [facility] Cath Lab;  Service: Cardiology     PICC  2018          PICC  3/18/2019          US THORACENTESIS  10/28/2019     Family History   Problem Relation Age of Onset     Heart disease Father 76        type unknown to Aayush; his father  at home     Alzheimer's disease Mother 86        lives in long term care     No Medical Problems Son      No Medical Problems Son      Social History     Socioeconomic History     Marital status: Domestic Partner     Spouse name: Karlie KEYES)     Number of children: Not on file     Years of education: Not on file     Highest education level: Not on file   Occupational History     Occupation: Manager of building material department     Employer: [employer]   Social Needs     Financial resource strain: Not on file     Food insecurity:     Worry: Not on file     Inability: Not on file     Transportation needs:     Medical: Not on file     Non-medical: Not on file   Tobacco Use     Smoking status: Former Smoker     Packs/day: 1.00     Years: 40.00     Pack years: 40.00     Types: Cigarettes     Last attempt to quit: 2019     Years since quittin.5     Smokeless tobacco: Never Used   Substance and Sexual Activity     Alcohol  use: Yes     Frequency: 4 or more times a week     Drinks per session: 1 or 2     Comment: 350 ml of peppermint schnapps daily per Marc h, 2018 H&P per report of Karlie JJ to Dr. Valle     Drug use: No     Sexual activity: Not on file   Lifestyle     Physical activity:     Days per week: Not on file     Minutes per session: Not on file     Stress: Not on file   Relationships     Social connections:     Talks on phone: Not on file     Gets together: Not on file     Attends Confucianism service: Not on file     Active member of club or organization: Not on file     Attends meetings of clubs or organizations: Not on file     Relationship status: Not on file     Intimate partner violence:     Fear of current or ex partner: Not on file     Emotionally abused: Not on file     Physically abused: Not on file     Forced sexual activity: Not on file   Other Topics Concern     Not on file   Social History Narrative    Works in [employer]. Lives with his SOKarlie.       Current Outpatient Medications   Medication Sig Dispense Refill     acetaminophen (TYLENOL) 325 MG tablet Take 2 tablets (650 mg total) by mouth every 6 (six) hours as needed.  0     aspirin 81 MG EC tablet Take 81 mg by mouth daily.       atorvastatin (LIPITOR) 80 MG tablet Take 1 tablet (80 mg total) by mouth daily. 90 tablet 3     b complex vitamins tablet Take 1 tablet by mouth daily.       carvedilol (COREG) 12.5 MG tablet Take 12.5 mg by mouth 2 (two) times a day with meals.       cholecalciferol, vitamin D3, (VITAMIN D3) 2,000 unit Tab Take 2,000 Units by mouth daily.       ferrous sulfate 325 (65 FE) MG tablet Take 1 tablet by mouth daily with breakfast.       folic acid (FOLVITE) 1 MG tablet Take 1 tablet (1 mg total) by mouth daily. 30 tablet 0     furosemide (LASIX) 20 MG tablet Take 1 tablet (20 mg total) by mouth daily. 30 tablet 0     furosemide (LASIX) 20 MG tablet TAKE 1 TABLET BY [facility] AS NEEDED FOR WEIGHT GAIN OF 3 LBS IN ONE DAY 90  tablet 1     lactulose (ENULOSE) 20 gram/30 mL Soln solution Take 30 mL (20 g total) by mouth 2 (two) times a day. 1800 mL 0     magnesium oxide (MAG-OX) 400 mg (241.3 mg magnesium) tablet Take 1 tablet (400 mg total) by mouth 2 (two) times a day.  0     multivitamin therapeutic tablet Take 1 tablet by mouth daily.       omeprazole (PRILOSEC) 40 MG capsule Take 40 mg by mouth daily before breakfast.       thiamine (VITAMIN B-1) 100 MG tablet Take 100 mg by mouth daily.       ticagrelor (BRILINTA) 90 mg Tab Take 1 tablet (90 mg total) by mouth 2 (two) times a day. 180 tablet 3     No current facility-administered medications for this visit.      No Known Allergies  Immunization History   Administered Date(s) Administered     INFLUENZA,RECOMBINANT,INJ,PF QUADRIVALENT 18+YRS 10/29/2019     Influenza,seasonal quad, PF, =/> 6months 10/04/2018     Tdap 10/04/2018         Review of Systems   Patient denies fever, chills, headache, lightheadedness, dizziness, rhinorrhea, cough, congestion, shortness of breath, chest pain, palpitations, abdominal pain, constipation, change in appetite, dysuria, frequency, burning or pain with urination.  Other than stated in HPI all other review of systems is negative.         Physical Exam     Vital signs: /87, heart rate 72, respiratory 18, temp 98.0, 94% on room air, 179.6 pounds.  GENERAL APPEARANCE: Well developed, well nourished, in no acute distress.  HEENT: normocephalic, atraumatic  PERRL, sclerae anicteric, conjunctivae clear and moist, EOM intact  External inspection of ears and nose showed no scars, lesions or masses.  Lips, teeth, and gums showed normal mucosa. Throat showed no erythema or edema.  NECK: Supple and symmetric. Trachea is midline, no thyromegaly, no adenopathy, and no tenderness  LUNGS: Lung sounds CTA, no adventitious sounds, respiratory effort normal.  CARD: RRR, S1, S2, harsh systolic murmur, no gallops, rubs,  ABD: Soft and nontender with normal bowel  sounds.   MSK: Muscle strength and tone were normal.  EXTREMITIES: Nonpitting pedal edema bilateral lower extremities  NEURO: Alert and oriented x 3.  Face is symmetric.  SKIN: Inspection of the skin reveals no rashes, ulcerations or petechiae.  PSYCH: euthymic            Labs:   Recent Results (from the past 240 hour(s))   Urinalysis   Result Value Ref Range    Color, UA Red (!) Colorless, Yellow, Straw, Light Yellow    Clarity, UA Cloudy (!) Clear    Glucose, UA Negative Negative    Bilirubin, UA Negative Negative    Ketones, UA Trace (!) Negative, 60 mg/dL    Specific Gravity, UA 1.025 1.001 - 1.030    Blood, UA Large (!) Negative    pH, UA 6.0 4.5 - 8.0    Protein, UA 70 mg/dL (!) Negative mg/dL    Urobilinogen, UA <2.0 E.U./dL <2.0 E.U./dL, 2.0 E.U./dL    Nitrite, UA Negative Negative    Leukocytes, UA Large (!) Negative    Bacteria, UA None Seen None Seen hpf    RBC, UA >100 (!) None Seen, 0-2 hpf    WBC, UA >100 (!) None Seen, 0-5 hpf    Squam Epithel, UA 0-5 None Seen, 0-5 lpf    WBC Clumps Present (!) None Seen   Medical cytology   Result Value Ref Range    Case Report       Medical Cytology                                  Case: [id number]                                   Authorizing Provider:  Vítcor Styles MD   Collected:           10/28/2019 1614              Ordering Location:     [facility] ICU    Received:            10/29/2019 [address] [place] [address]                                                                         Pathologist:           Stephen Lucio MD                                                        Specimen:    Fluid, Pleural, right                                                                      Final Diagnosis       RIGHT PLEURAL FLUID, ASPIRATION WITH CELL BLOCK PREPARATION:    - MIXED MESOTHELIAL AND INFLAMMATORY CELLS WITH RARE ATYPICAL CELLS, FAVOR REACTIVE    Comment       The clinical history has been reviewed.  Additional  studies can certainly be performed on the current specimen if requested.    Microscopic Description       Microscopic examination performed, substantiating the above diagnosis.    Clinical Information Right pleural effusion     Specimen Description       950 ml cloudy red fluid   1 air dried slide   1 SurePath slide   1 cell block  Are prepared    Specimen Processing      Charges CPT:  75368, 65649  ICD-10:  J90     General Path Interpretation Atypical cells present (!) Negative for malignant cells, Non-Diagnostic   Hemoglobin   Result Value Ref Range    Hemoglobin 8.4 (L) 14.0 - 18.0 g/dL   Procalcitonin   Result Value Ref Range    Procalcitonin 0.08 0.00 - 0.49 ng/mL   Basic Metabolic Panel   Result Value Ref Range    Sodium 131 (L) 136 - 145 mmol/L    Potassium 3.8 3.5 - 5.0 mmol/L    Chloride 100 98 - 107 mmol/L    CO2 18 (L) 22 - 31 mmol/L    Anion Gap, Calculation 13 5 - 18 mmol/L    Glucose 76 70 - 125 mg/dL    Calcium 7.2 (L) 8.5 - 10.5 mg/dL    BUN 14 8 - 22 mg/dL    Creatinine 0.76 0.70 - 1.30 mg/dL    GFR MDRD Af Amer >60 >60 mL/min/1.73m2    GFR MDRD Non Af Amer >60 >60 mL/min/1.73m2   Crossmatch   Result Value Ref Range    Crossmatch Compatible     Blood Expiration Date [id number]     Unit Type A Pos     Unit Number [id number]     Status Transfused     Component Red Blood Cells     PRODUCT CODE Z9244S58     Issue Date and Time [id number]     Blood Type 6200     CODING SYSTEM [id number]    Crossmatch   Result Value Ref Range    Crossmatch Compatible     Blood Expiration Date [id number]     Unit Type A Pos     Unit Number [id number]     Status Transfused     Component Red Blood Cells     PRODUCT CODE O1673G01     Issue Date and Time [id number]     Blood Type 6200     CODING SYSTEM [id number]    Magnesium   Result Value Ref Range    Magnesium 1.8 1.8 - 2.6 mg/dL   Basic Metabolic Panel   Result Value Ref Range    Sodium 132 (L) 136 - 145 mmol/L    Potassium 4.2 3.5 - 5.0 mmol/L    Chloride  103 98 - 107 mmol/L    CO2 19 (L) 22 - 31 mmol/L    Anion Gap, Calculation 10 5 - 18 mmol/L    Glucose 100 70 - 125 mg/dL    Calcium 7.1 (L) 8.5 - 10.5 mg/dL    BUN 14 8 - 22 mg/dL    Creatinine 0.82 0.70 - 1.30 mg/dL    GFR MDRD Af Amer >60 >60 mL/min/1.73m2    GFR MDRD Non Af Amer >60 >60 mL/min/1.73m2   HM1 (CBC with Diff)   Result Value Ref Range    WBC 8.5 4.0 - 11.0 thou/uL    RBC 2.86 (L) 4.40 - 6.20 mill/uL    Hemoglobin 8.3 (L) 14.0 - 18.0 g/dL    Hematocrit 28.4 (L) 40.0 - 54.0 %    MCV 99 80 - 100 fL    MCH 29.0 27.0 - 34.0 pg    MCHC 29.2 (L) 32.0 - 36.0 g/dL    RDW 20.1 (H) 11.0 - 14.5 %    Platelets 166 140 - 440 thou/uL    MPV 9.1 8.5 - 12.5 fL    Neutrophils % 82 (H) 50 - 70 %    Lymphocytes % 5 (L) 20 - 40 %    Monocytes % 11 (H) 2 - 10 %    Eosinophils % 2 0 - 6 %    Basophils % 1 0 - 2 %    Neutrophils Absolute 7.0 2.0 - 7.7 thou/uL    Lymphocytes Absolute 0.4 (L) 0.8 - 4.4 thou/uL    Monocytes Absolute 0.9 0.0 - 0.9 thou/uL    Eosinophils Absolute 0.2 0.0 - 0.4 thou/uL    Basophils Absolute 0.0 0.0 - 0.2 thou/uL   Manual Differential   Result Value Ref Range    Platelet Estimate Normal Normal    Ovalocytes 1+ (!) Negative    Polychromasia 1+ (!) Negative    Tear Drop Cells 1+ (!) Negative   BNP(B-type Natriuretic Peptide)   Result Value Ref Range    BNP >5,000 (H) 0 - 56 pg/mL   POCT Glucose   Result Value Ref Range    Glucose 102 70 - 139 mg/dL   Basic Metabolic Panel   Result Value Ref Range    Sodium 133 (L) 136 - 145 mmol/L    Potassium 3.9 3.5 - 5.0 mmol/L    Chloride 105 98 - 107 mmol/L    CO2 18 (L) 22 - 31 mmol/L    Anion Gap, Calculation 10 5 - 18 mmol/L    Glucose 95 70 - 125 mg/dL    Calcium 7.0 (L) 8.5 - 10.5 mg/dL    BUN 13 8 - 22 mg/dL    Creatinine 0.78 0.70 - 1.30 mg/dL    GFR MDRD Af Amer >60 >60 mL/min/1.73m2    GFR MDRD Non Af Amer >60 >60 mL/min/1.73m2   Magnesium   Result Value Ref Range    Magnesium 1.8 1.8 - 2.6 mg/dL   HM2(CBC w/o Differential)   Result Value Ref Range     WBC 6.5 4.0 - 11.0 thou/uL    RBC 2.64 (L) 4.40 - 6.20 mill/uL    Hemoglobin 7.5 (L) 14.0 - 18.0 g/dL    Hematocrit 27.3 (L) 40.0 - 54.0 %     (H) 80 - 100 fL    MCH 28.4 27.0 - 34.0 pg    MCHC 27.5 (L) 32.0 - 36.0 g/dL    RDW 19.9 (H) 11.0 - 14.5 %    Platelets 145 140 - 440 thou/uL    MPV 9.3 8.5 - 12.5 fL   POCT Glucose   Result Value Ref Range    Glucose 94 70 - 139 mg/dL   Type and Screen   Result Value Ref Range    ABORh A POS     Antibody Screen Negative Negative   HM1 (CBC with Diff)   Result Value Ref Range    WBC 6.5 4.0 - 11.0 thou/uL    RBC 2.72 (L) 4.40 - 6.20 mill/uL    Hemoglobin 7.8 (L) 14.0 - 18.0 g/dL    Hematocrit 27.6 (L) 40.0 - 54.0 %     (H) 80 - 100 fL    MCH 28.7 27.0 - 34.0 pg    MCHC 28.3 (L) 32.0 - 36.0 g/dL    RDW 19.2 (H) 11.0 - 14.5 %    Platelets 147 140 - 440 thou/uL    MPV 9.4 8.5 - 12.5 fL    Neutrophils % 74 (H) 50 - 70 %    Lymphocytes % 9 (L) 20 - 40 %    Monocytes % 12 (H) 2 - 10 %    Eosinophils % 5 0 - 6 %    Basophils % 1 0 - 2 %    Neutrophils Absolute 4.8 2.0 - 7.7 thou/uL    Lymphocytes Absolute 0.6 (L) 0.8 - 4.4 thou/uL    Monocytes Absolute 0.8 0.0 - 0.9 thou/uL    Eosinophils Absolute 0.3 0.0 - 0.4 thou/uL    Basophils Absolute 0.1 0.0 - 0.2 thou/uL   Basic Metabolic Panel   Result Value Ref Range    Sodium 135 (L) 136 - 145 mmol/L    Potassium 3.0 (L) 3.5 - 5.0 mmol/L    Chloride 103 98 - 107 mmol/L    CO2 24 22 - 31 mmol/L    Anion Gap, Calculation 8 5 - 18 mmol/L    Glucose 91 70 - 125 mg/dL    Calcium 7.4 (L) 8.5 - 10.5 mg/dL    BUN 10 8 - 22 mg/dL    Creatinine 0.82 0.70 - 1.30 mg/dL    GFR MDRD Af Amer >60 >60 mL/min/1.73m2    GFR MDRD Non Af Amer >60 >60 mL/min/1.73m2   Magnesium   Result Value Ref Range    Magnesium 1.5 (L) 1.8 - 2.6 mg/dL   HM1 (CBC with Diff)   Result Value Ref Range    WBC 5.2 4.0 - 11.0 thou/uL    RBC 2.77 (L) 4.40 - 6.20 mill/uL    Hemoglobin 8.0 (L) 14.0 - 18.0 g/dL    Hematocrit 30.6 (L) 40.0 - 54.0 %     (H) 80 - 100  fL    MCH 28.9 27.0 - 34.0 pg    MCHC 26.1 (L) 32.0 - 36.0 g/dL    RDW 19.1 (H) 11.0 - 14.5 %    Platelets 168 140 - 440 thou/uL    MPV 9.2 8.5 - 12.5 fL    Neutrophils % 71 (H) 50 - 70 %    Lymphocytes % 10 (L) 20 - 40 %    Monocytes % 13 (H) 2 - 10 %    Eosinophils % 5 0 - 6 %    Basophils % 1 0 - 2 %    Neutrophils Absolute 3.7 2.0 - 7.7 thou/uL    Lymphocytes Absolute 0.5 (L) 0.8 - 4.4 thou/uL    Monocytes Absolute 0.7 0.0 - 0.9 thou/uL    Eosinophils Absolute 0.3 0.0 - 0.4 thou/uL    Basophils Absolute 0.1 0.0 - 0.2 thou/uL   Ammonia   Result Value Ref Range    Ammonia 39 (H) 11 - 35 umol/L   Potassium   Result Value Ref Range    Potassium 4.0 3.5 - 5.0 mmol/L   Basic Metabolic Panel   Result Value Ref Range    Sodium 136 136 - 145 mmol/L    Potassium 3.1 (L) 3.5 - 5.0 mmol/L    Chloride 103 98 - 107 mmol/L    CO2 27 22 - 31 mmol/L    Anion Gap, Calculation 6 5 - 18 mmol/L    Glucose 102 70 - 125 mg/dL    Calcium 7.3 (L) 8.5 - 10.5 mg/dL    BUN 8 8 - 22 mg/dL    Creatinine 0.71 0.70 - 1.30 mg/dL    GFR MDRD Af Amer >60 >60 mL/min/1.73m2    GFR MDRD Non Af Amer >60 >60 mL/min/1.73m2   Hemoglobin   Result Value Ref Range    Hemoglobin 8.0 (L) 14.0 - 18.0 g/dL   Potassium   Result Value Ref Range    Potassium 3.2 (L) 3.5 - 5.0 mmol/L   Basic Metabolic Panel   Result Value Ref Range    Sodium 138 136 - 145 mmol/L    Potassium 2.8 (LL) 3.5 - 5.0 mmol/L    Chloride 102 98 - 107 mmol/L    CO2 25 22 - 31 mmol/L    Anion Gap, Calculation 11 5 - 18 mmol/L    Glucose 101 70 - 125 mg/dL    Calcium 7.7 (L) 8.5 - 10.5 mg/dL    BUN 9 8 - 22 mg/dL    Creatinine 0.70 0.70 - 1.30 mg/dL    GFR MDRD Af Amer >60 >60 mL/min/1.73m2    GFR MDRD Non Af Amer >60 >60 mL/min/1.73m2   Magnesium   Result Value Ref Range    Magnesium 1.2 (L) 1.8 - 2.6 mg/dL   HM2(CBC w/o Differential)   Result Value Ref Range    WBC 8.3 4.0 - 11.0 thou/uL    RBC 2.97 (L) 4.40 - 6.20 mill/uL    Hemoglobin 8.5 (L) 14.0 - 18.0 g/dL    Hematocrit 29.0 (L) 40.0 -  54.0 %    MCV 98 80 - 100 fL    MCH 28.6 27.0 - 34.0 pg    MCHC 29.3 (L) 32.0 - 36.0 g/dL    RDW 18.3 (H) 11.0 - 14.5 %    Platelets 157 140 - 440 thou/uL    MPV 8.8 8.5 - 12.5 fL   Potassium   Result Value Ref Range    Potassium 3.7 3.5 - 5.0 mmol/L   Basic Metabolic Panel   Result Value Ref Range    Sodium 139 136 - 145 mmol/L    Potassium 3.6 3.5 - 5.0 mmol/L    Chloride 102 98 - 107 mmol/L    CO2 28 22 - 31 mmol/L    Anion Gap, Calculation 9 5 - 18 mmol/L    Glucose 94 70 - 125 mg/dL    Calcium 7.8 (L) 8.5 - 10.5 mg/dL    BUN 11 8 - 22 mg/dL    Creatinine 0.75 0.70 - 1.30 mg/dL    GFR MDRD Af Amer >60 >60 mL/min/1.73m2    GFR MDRD Non Af Amer >60 >60 mL/min/1.73m2   HM2(CBC w/o Differential)   Result Value Ref Range    WBC 7.8 4.0 - 11.0 thou/uL    RBC 2.92 (L) 4.40 - 6.20 mill/uL    Hemoglobin 8.3 (L) 14.0 - 18.0 g/dL    Hematocrit 28.5 (L) 40.0 - 54.0 %    MCV 98 80 - 100 fL    MCH 28.4 27.0 - 34.0 pg    MCHC 29.1 (L) 32.0 - 36.0 g/dL    RDW 18.1 (H) 11.0 - 14.5 %    Platelets 222 140 - 440 thou/uL    MPV 9.6 8.5 - 12.5 fL   Basic Metabolic Panel   Result Value Ref Range    Sodium 136 136 - 145 mmol/L    Potassium 3.7 3.5 - 5.0 mmol/L    Chloride 99 98 - 107 mmol/L    CO2 28 22 - 31 mmol/L    Anion Gap, Calculation 9 5 - 18 mmol/L    Glucose 100 70 - 125 mg/dL    Calcium 7.7 (L) 8.5 - 10.5 mg/dL    BUN 13 8 - 22 mg/dL    Creatinine 0.84 0.70 - 1.30 mg/dL    GFR MDRD Af Amer >60 >60 mL/min/1.73m2    GFR MDRD Non Af Amer >60 >60 mL/min/1.73m2   Magnesium   Result Value Ref Range    Magnesium 1.3 (L) 1.8 - 2.6 mg/dL   HM2(CBC W/O DIFF)   Result Value Ref Range    WBC 6.7 4.0 - 11.0 thou/uL    RBC 2.95 (L) 4.40 - 6.20 mill/uL    Hemoglobin 8.3 (L) 14.0 - 18.0 g/dL    Hematocrit 28.9 (L) 40.0 - 54.0 %    MCV 98 80 - 100 fL    MCH 28.1 27.0 - 34.0 pg    MCHC 28.7 (L) 32.0 - 36.0 g/dL    RDW 18.3 (H) 11.0 - 14.5 %    Platelets 326 140 - 440 thou/uL    MPV 9.3 8.5 - 12.5 fL   Basic Metabolic Panel   Result Value Ref  Range    Sodium 135 (L) 136 - 145 mmol/L    Potassium 3.8 3.5 - 5.0 mmol/L    Chloride 100 98 - 107 mmol/L    CO2 26 22 - 31 mmol/L    Anion Gap, Calculation 9 5 - 18 mmol/L    Glucose 87 70 - 125 mg/dL    Calcium 7.8 (L) 8.5 - 10.5 mg/dL    BUN 11 8 - 22 mg/dL    Creatinine 0.75 0.70 - 1.30 mg/dL    GFR MDRD Af Amer >60 >60 mL/min/1.73m2    GFR MDRD Non Af Amer >60 >60 mL/min/1.73m2   HM2(CBC w/o Differential)   Result Value Ref Range    WBC 6.3 4.0 - 11.0 thou/uL    RBC 2.91 (L) 4.40 - 6.20 mill/uL    Hemoglobin 8.1 (L) 14.0 - 18.0 g/dL    Hematocrit 28.1 (L) 40.0 - 54.0 %    MCV 97 80 - 100 fL    MCH 27.8 27.0 - 34.0 pg    MCHC 28.8 (L) 32.0 - 36.0 g/dL    RDW 18.4 (H) 11.0 - 14.5 %    Platelets 389 140 - 440 thou/uL    MPV 9.7 8.5 - 12.5 fL   Basic Metabolic Panel   Result Value Ref Range    Sodium 134 (L) 136 - 145 mmol/L    Potassium 3.7 3.5 - 5.0 mmol/L    Chloride 101 98 - 107 mmol/L    CO2 27 22 - 31 mmol/L    Anion Gap, Calculation 6 5 - 18 mmol/L    Glucose 87 70 - 125 mg/dL    Calcium 7.8 (L) 8.5 - 10.5 mg/dL    BUN 9 8 - 22 mg/dL    Creatinine 0.73 0.70 - 1.30 mg/dL    GFR MDRD Af Amer >60 >60 mL/min/1.73m2    GFR MDRD Non Af Amer >60 >60 mL/min/1.73m2   Magnesium   Result Value Ref Range    Magnesium 1.6 (L) 1.8 - 2.6 mg/dL         Assessment:  1. Alcoholic hepatitis with ascites     2. Acute liver failure without hepatic coma     3. Hypomagnesemia     4. Pleural effusion     5. Coronary artery disease involving native coronary artery of native heart without angina pectoris     6. Ischemic cardiomyopathy     7. Chronic systolic heart failure (H)     8. Essential hypertension     9. Aortic stenosis, moderate     10. Alcoholism /alcohol abuse (H)     11. Dietary folate deficiency anemia     12. Nausea     13. Hematuria, unspecified type         Plan:   Hepatitis: Continue with lactulose twice daily explained to patient that we are unable to check ammonia levels here and so we will need to base it on him  symptoms.  If he was encephalopathic we would need to send him in for an ammonia level to be checked.  I would like him to have at least 4 stools a day.  At this time he is having 5 we will continue with this at this time and have him follow-up with hepatology in the future.  Check a CMP.    Hypomagnesemia: We will check a magnesium next week along with a CMP.  Continue Mag-Ox.    Pleural effusion: Breathing well continue to monitor for any signs and symptoms of breathing issues.  Continue with Lasix for his CHF.    CAD: Continue with Brilinta for his stent, Lipitor and ASA.    Ischemic cardiomyopathy: Continue Lasix will need to monitor his weights daily Lasix 20 mg daily and has the option to increase another 20 mg if weight gain of 3 pounds.    CHF: Stable at this time, continue carvedilol 12.5 mg twice daily.  JEANINE hose to lower extremities for edema.    Hypertension: Continue blood pressure monitoring and management.  Continue Lasix and carvedilol.    Aortic stenosis: I did  patient that he will need to follow-up with his cardiologist in regards to his aortic stenosis and recommendations if he would need a valve replacement the future.    Alcohol abuse: I did  patient to abstain from alcohol due to its effects on his heart disease and liver disease.  He will need to continue on lactulose.  At this time patient declines any resources and feels that he will abstain during his rehab.  Also explained the effects of alcohol on duodenal ulcers and esophageal varices causing GI bleeds.    Anemia: Continue folate, vitamin B, thiamine, multivitamin.    Nausea: Likely related to lactulose and multiple medications.  Continue to monitor nursing will support nonpharmacologically at this time dietitian to consult for appetite and nausea.    Hematuria: Will get a UA next week.    35 total minutes spent with 20 minutes spent face-to-face with patient and his significant other in counseling and coordination of the  above plan of care.    Electronically signed by: Corrina Blanca, CNP

## 2021-06-03 NOTE — PROGRESS NOTES
Code Status:  FULL CODE  Visit Type: Follow Up (hepatic encephalopathy, elevated ammonia and electrolyte imbalance)     Facility:  [facility] [[id number]]      Facility Type: SNF (Skilled Nursing Facility, TCU)    History of Present Illness:   Hospital Admission Date: 10/27/2019 Hospital Discharge Date: 11/6/2019       Aayush García is a 64 y.o. male with a past medical history for CAD with a recent MI and stenting 9/2019, systolic heart failure and an EF of 30 to 35%, chronic anemia on iron, current alcohol abuse, liver disease, GERD.  He was recently hospitalized at [facility] for altered mental status and lethargy for 3 weeks.  He had fallen off of his couch while he was sleeping and sustained rib fractures and became lethargic over 3 weeks.  Upon admission he was found to have a hemoglobin of 5.9 and thought it was secondary to his hematoma from his fall.  During his admission it was complicated by encephalopathy with which is likely due to prolonged alcohol withdrawal and possible hepatic encephalopathy.  He does have a history of daily alcohol use of 0.5 to 1 pints of hard liquor per day.  His blood alcohol level on admission was 271.  His ammonia level was 14, TSH was 1.54.  Negative for an infectious process and negative lactic acid.  He was he had a negative UA and a negative chest x-ray.  His ammonia level did rise up to 39 on 10/31.  He was started on lactulose 20 mg 2 times a day and was adjusted per his stooling.  His mental status did improve with the lactulose.    For his anemia he was found to have macrocytic anemia likely due to chronic EtOH abuse and was on aspirin and Brilinta.  He was consulted by GI which indicated his last EGD in 6/2019 showed patchy gastritis and a 10 mm duodenal bulb ulcer with gastric biopsies negative for H. pylori.  His last colonoscopy 3/2019 showed polyp without hemorrhoids.  He did have a thoracentesis on 10/28 in which 900 mL of nonbloody fluid  "was removed he did need 2 units of packed red blood cells.  His hemoglobin was stable at around 8 at discharge.  GI recommends that he have an outpatient colonoscopy.  Eventually his aspirin and Brilinta were restarted and he had no issues.  Because of the issue of acute hematuria it is recommended that he have a follow-up UA as an outpatient.    He did have issues with hypokalemia and hypomagnesemia and these were supplemented and thought to be likely related to his Lasix.  He did have also issues with attention and so it is recommended to closely monitor his weights, electrolytes and blood pressures.    During his hospitalization he had an episode in which he had \"staring spell\" that lasted approximately 30 seconds in which he bit his tongue.  He did have a follow-up head CT which showed no skull fracture intracranial bleed and EEG which was unremarkable.  He has no history of seizures and so this was felt to be uneventful.    Today, nursing reports that he had refused his lactulose a couple doses over the weekend.  Nursing also reports that he have significant dry skin and multiple open abrasions with bleeding due to scratching.  Upon exam, he states he continues to have significant diarrhea, for the good part of the morning.  He endorses significant frustration and is concerned about being able to live his life with this ongoing watery diarrhea.  His magnesium today remains low at 1.5 and his potassium today is in good range on KCl 20 mEq daily.  His ammonia that was checked today continues at the same level at 26.  He is slotted to be discharged home later this week.  He reports therapy is going well and he feels that he is getting stronger.  His weight is down 3 pounds since his admission however he states he has a good appetite and eating is eating well.  His lower extremities have less edema and he continues to wear JEANINE hose.  He is to see cardiology today.    Past Medical History:   Diagnosis Date     Acute " liver failure 2018     Acute renal failure, unspecified acute renal failure type (H) 2018     Acute respiratory failure with hypoxia (H) 2018     Alcoholic hepatitis with ascites 2018     Bacteremia due to Klebsiella pneumoniae      Coronary artery disease involving native coronary artery of native heart without angina pectoris 2019     Essential hypertension      Gastroesophageal reflux disease without esophagitis 10/31/2018     Hepatic encephalopathy (H) 2018     Macrocytic anemia      Stress-induced cardiomyopathy 2018     Past Surgical History:   Procedure Laterality Date     COLONOSCOPY N/A 3/20/2018    Procedure: COLONOSCOPY;  Surgeon: Adam Nicole III, MD;  Location: [facility] OR;  Service:      CV CORONARY ANGIOGRAM N/A 3/18/2019    Procedure: Coronary Angiogram;  Surgeon: Timi Rodriguez MD;  Location: [facility] Cath Lab;  Service: Cardiology     CV LEFT HEART CATHETERIZATION WO LEFT VETRICULOGRAM Left 3/18/2019    Procedure: Left Heart Catheterization Without Left Ventriculogram;  Surgeon: Timi Rodriguez MD;  Location: [facility] Cath Lab;  Service: Cardiology     PICC  2018          PICC  3/18/2019          US THORACENTESIS  10/28/2019     Family History   Problem Relation Age of Onset     Heart disease Father 76        type unknown to Aayush; his father  at home     Alzheimer's disease Mother 86        lives in long term care     No Medical Problems Son      No Medical Problems Son      Social History     Socioeconomic History     Marital status: Domestic Partner     Spouse name: Karlie Chisholm (АННА)     Number of children: Not on file     Years of education: Not on file     Highest education level: Not on file   Occupational History     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   Social [employer]     Financial resource strain: Not on file     Food insecurity:     Worry: Not on file     Inability: Not on file      Transportation needs:     Medical: Not on file     Non-medical: Not on file   Tobacco Use     Smoking status: Former Smoker     Packs/day: 1.00     Years: 40.00     Pack years: 40.00     Types: Cigarettes     Last attempt to quit: 2019     Years since quittin.5     Smokeless tobacco: Never Used   Substance and Sexual Activity     Alcohol use: Yes     Frequency: 4 or more times a week     Drinks per session: 1 or 2     Comment: 350 ml of peppermint schnapps daily per [facility] h,  H&P per report of Karlie JJ to Dr. Valle     Drug use: No     Sexual activity: Not on file   Lifestyle     Physical activity:     Days per week: Not on file     Minutes per session: Not on file     Stress: Not on file   Relationships     Social connections:     Talks on phone: Not on file     Gets together: Not on file     Attends [employer] service: Not on file     Active member of club or organization: Not on file     Attends meetings of clubs or organizations: Not on file     Relationship status: Not on file     Intimate partner violence:     Fear of current or ex partner: Not on file     Emotionally abused: Not on file     Physically abused: Not on file     Forced sexual activity: Not on file   Other Topics Concern     Not on file   Social History Narrative    Works in [employer]. Lives with his SOKarlie.       Current Outpatient Medications   Medication Sig Dispense Refill     acetaminophen (TYLENOL) 325 MG tablet Take 2 tablets (650 mg total) by mouth every 6 (six) hours as needed.  0     aspirin 81 MG EC tablet Take 81 mg by mouth daily.       atorvastatin (LIPITOR) 80 MG tablet Take 1 tablet (80 mg total) by mouth daily. 90 tablet 3     b complex vitamins tablet Take 1 tablet by mouth daily.       carvedilol (COREG) 12.5 MG tablet Take 12.5 mg by mouth 2 (two) times a day with meals.       cholecalciferol, vitamin D3, (VITAMIN D3) 2,000 unit Tab Take 2,000 Units by mouth daily.       clopidogrel (PLAVIX) 75 mg  tablet Take 1 tablet (75 mg total) by mouth daily. 90 tablet 3     ferrous sulfate 325 (65 FE) MG tablet Take 1 tablet by mouth daily with breakfast.       folic acid (FOLVITE) 1 MG tablet Take 1 tablet (1 mg total) by mouth daily. 30 tablet 0     furosemide (LASIX) 20 MG tablet Take 1 tablet (20 mg total) by mouth daily. (Patient taking differently: Take 10 mg by mouth daily.       ) 30 tablet 0     furosemide (LASIX) 20 MG tablet TAKE 1 TABLET BY MOTUH AS NEEDED FOR WEIGHT GAIN OF 3 LBS IN ONE DAY (Patient taking differently: 20 mg. TAKE 1 TABLET BY MOTUH AS NEEDED FOR WEIGHT GAIN OF 3 LBS IN ONE DAY      ) 90 tablet 1     magnesium oxide (MAG-OX) 400 mg (241.3 mg magnesium) tablet Take 1 tablet (400 mg total) by mouth 2 (two) times a day.  0     multivitamin therapeutic tablet Take 1 tablet by mouth daily.       omeprazole (PRILOSEC) 40 MG capsule Take 40 mg by mouth daily before breakfast.       thiamine (VITAMIN B-1) 100 MG tablet Take 100 mg by mouth daily.       No current facility-administered medications for this visit.      No Known Allergies  Immunization History   Administered Date(s) Administered     INFLUENZA,RECOMBINANT,INJ,PF QUADRIVALENT 18+YRS 10/29/2019     Influenza,seasonal quad, PF, =/> 6months 10/04/2018     Tdap 10/04/2018         Review of Systems   Patient denies fever, chills, headache, lightheadedness, dizziness, rhinorrhea, cough, congestion, shortness of breath, chest pain, palpitations, abdominal pain, constipation, change in appetite, dysuria, frequency, burning or pain with urination.  Other than stated in HPI all other review of systems is negative.         Physical Exam     Vital signs: /67, heart rate 99, respiratory 18, temp 97.9, weight 170.3 pounds..  GENERAL APPEARANCE: Well developed, well nourished, in no acute distress.  HEENT: normocephalic, atraumatic  PERRL, sclerae anicteric, conjunctivae clear and moist, EOM intact  LUNGS: Lung sounds CTA, no adventitious sounds,  respiratory effort normal.  CARD: RRR, S1, S2, harsh systolic murmur, no gallops, rubs,  ABD: Soft and nontender with normal bowel sounds.   MSK: Muscle strength and tone were normal.  EXTREMITIES: Decreased and nonpitting pedal edema bilateral lower extremities  NEURO: Alert and oriented x 3.  Face is symmetric.  SKIN: Multiple purpura of upper extremities with skin tears.  Abrasions from scratching on his upper extremities  PSYCH: euthymic            Labs:   Recent Results (from the past 240 hour(s))   Comprehensive Metabolic Panel   Result Value Ref Range    Sodium 136 136 - 145 mmol/L    Potassium 2.6 (LL) 3.5 - 5.0 mmol/L    Chloride 107 98 - 107 mmol/L    CO2 21 (L) 22 - 31 mmol/L    Anion Gap, Calculation 8 5 - 18 mmol/L    Glucose 67 (L) 70 - 125 mg/dL    BUN 7 (L) 8 - 22 mg/dL    Creatinine 0.67 (L) 0.70 - 1.30 mg/dL    GFR MDRD Af Amer >60 >60 mL/min/1.73m2    GFR MDRD Non Af Amer >60 >60 mL/min/1.73m2    Bilirubin, Total 0.5 0.0 - 1.0 mg/dL    Calcium 8.1 (L) 8.5 - 10.5 mg/dL    Protein, Total 7.0 6.0 - 8.0 g/dL    Albumin 2.5 (L) 3.5 - 5.0 g/dL    Alkaline Phosphatase 365 (H) 45 - 120 U/L    AST 49 (H) 0 - 40 U/L    ALT 33 0 - 45 U/L   Magnesium   Result Value Ref Range    Magnesium 1.6 (L) 1.8 - 2.6 mg/dL   Hemoglobin   Result Value Ref Range    Hemoglobin 9.1 (L) 14.0 - 18.0 g/dL   Ammonia   Result Value Ref Range    Ammonia 26 11 - 35 umol/L   Urinalysis   Result Value Ref Range    Color, UA Yellow Colorless, Yellow, Straw, Light Yellow    Clarity, UA Clear Clear    Glucose, UA Negative Negative    Bilirubin, UA Negative Negative    Ketones, UA Trace (!) Negative    Specific Gravity, UA 1.026 1.001 - 1.030    Blood, UA Negative Negative    pH, UA 5.5 4.5 - 8.0    Protein, UA 30 mg/dL (!) Negative mg/dL    Urobilinogen, UA 2.0 E.U./dL <2.0 E.U./dL, 2.0 E.U./dL    Nitrite, UA Negative Negative    Leukocytes, UA Negative Negative    Bacteria, UA None Seen None Seen hpf    RBC, UA 0-2 None Seen, 0-2 hpf     WBC, UA 0-5 None Seen, 0-5 hpf    Squam Epithel, UA 0-5 None Seen, 0-5 lpf    Mucus, UA Moderate (!) None Seen lpf    Hyaline Casts, UA 25-50 (!) 0-5, None Seen lpf   Potassium   Result Value Ref Range    Potassium 2.7 (LL) 3.5 - 5.0 mmol/L   Potassium   Result Value Ref Range    Potassium 3.4 (L) 3.5 - 5.0 mmol/L   Magnesium   Result Value Ref Range    Magnesium 1.5 (L) 1.8 - 2.6 mg/dL   Potassium   Result Value Ref Range    Potassium 3.6 3.5 - 5.0 mmol/L   Magnesium   Result Value Ref Range    Magnesium 1.5 (L) 1.8 - 2.6 mg/dL   Ammonia   Result Value Ref Range    Ammonia 26 11 - 35 umol/L         Assessment:  1. Alcoholic hepatitis with ascites     2. Hepatic encephalopathy (H)     3. Chronic systolic heart failure (H)     4. Hypokalemia due to excessive gastrointestinal loss of potassium     5. Hypomagnesemia     6. Coronary artery disease involving native coronary artery of native heart without angina pectoris     7. Ischemic cardiomyopathy     8. Xerosis cutis         Plan:   Alcoholic hepatitis with ascites and hepatic encephalopathy: Again I did discussed with him the need to abstain from any alcohol products due to his significant liver disease.  His ammonia level is in good range and so at this time I will discontinue his lactulose as he is only getting 10 g daily.  I will recheck an ammonia level and have requested that nurses monitor his cognitive status.  Ordered follow-up with a hematologist at [facility].    hypokalemia: Resolved continue KCl 20 mEq daily.  It is likely we could reduce this more as his Lasix has been reduced however will wait until improvement with his watery stools.    Hypomagnesemia: Continues to have low magnesium we will continue with Slow-Mag 2 tabs daily.  Will observe his electrolytes as his watery diarrhea improves.    CAD and ischemic cardiomyopathy: Seeing cardiology today.    Xerosis cutis: Will add Leelee cream for dry skin twice daily.    35 total minutes spent with 20  minutes spent face-to-face with patient and his significant other in counseling and coordination of the above plan of care.      Electronically signed by: Corrina Blanca CNP

## 2021-06-03 NOTE — PATIENT INSTRUCTIONS - HE
1. Stop ticagrelor (Brilinta)  2. Start clopidogrel 75 mg daily.  Take 2 pills today.  3. Continue abstinence of alcohol and cigarettes  4. We will recheck an echocardiogram to look for progression in your aortic valve disease as well as reassess your heart muscle pumping function.  5. If your heart muscle remains weak, placement of a defibrillator will decrease your risk of sudden death.

## 2021-06-03 NOTE — PROGRESS NOTES
Code Status:  FULL CODE  Visit Type: Follow Up (loose stools, lab results)     Facility:  [facility] [[id number]]      Facility Type: SNF (Skilled Nursing Facility, TCU)    History of Present Illness:   Hospital Admission Date: 10/27/2019 Hospital Discharge Date: 11/6/2019       Aayush García is a 64 y.o. male with a past medical history for CAD with a recent MI and stenting 9/2019, systolic heart failure and an EF of 30 to 35%, chronic anemia on iron, current alcohol abuse, liver disease, GERD.  He was recently hospitalized at [facility] for altered mental status and lethargy for 3 weeks.  He had fallen off of his couch while he was sleeping and sustained rib fractures and became lethargic over 3 weeks.  Upon admission he was found to have a hemoglobin of 5.9 and thought it was secondary to his hematoma from his fall.  During his admission it was complicated by encephalopathy with which is likely due to prolonged alcohol withdrawal and possible hepatic encephalopathy.  He does have a history of daily alcohol use of 0.5 to 1 pints of hard liquor per day.  His blood alcohol level on admission was 271.  His ammonia level was 14, TSH was 1.54.  Negative for an infectious process and negative lactic acid.  He was he had a negative UA and a negative chest x-ray.  His ammonia level did rise up to 39 on 10/31.  He was started on lactulose 20 mg 2 times a day and was adjusted per his stooling.  His mental status did improve with the lactulose.    For his anemia he was found to have macrocytic anemia likely due to chronic EtOH abuse and was on aspirin and Brilinta.  He was consulted by GI which indicated his last EGD in 6/2019 showed patchy gastritis and a 10 mm duodenal bulb ulcer with gastric biopsies negative for H. pylori.  His last colonoscopy 3/2019 showed polyp without hemorrhoids.  He did have a thoracentesis on 10/28 in which 900 mL of nonbloody fluid was removed he did need 2 units of packed  "red blood cells.  His hemoglobin was stable at around 8 at discharge.  GI recommends that he have an outpatient colonoscopy.  Eventually his aspirin and Brilinta were restarted and he had no issues.  Because of the issue of acute hematuria it is recommended that he have a follow-up UA as an outpatient.    He did have issues with hypokalemia and hypomagnesemia and these were supplemented and thought to be likely related to his Lasix.  He did have also issues with attention and so it is recommended to closely monitor his weights, electrolytes and blood pressures.    During his hospitalization he had an episode in which he had \"staring spell\" that lasted approximately 30 seconds in which he bit his tongue.  He did have a follow-up head CT which showed no skull fracture intracranial bleed and EEG which was unremarkable.  He has no history of seizures and so this was felt to be uneventful.    Today, patient reports that his loose stools have lessened since decreasing the lactulose.  He reports that he has significant watery stools 5-8 times approximately up to 2 hours after taking his lactulose in the morning and then it gradually decreases throughout the day and he is able to sleep without issues.  His ammonia level came back within normal limits at 29.  His recheck potassium came back at 3.4 and I reduced his potassium to KCl 20 mEq daily.  He reports eating well and his wife is bringing in a daily banana.  He has a follow-up with cardiology next week.  Edema of his lower extremities are slightly better than earlier this week.  I did explain to him that his liver labs were slightly elevated likely to dehydration from his diarrhea and diuretics.    Past Medical History:   Diagnosis Date     Acute liver failure 8/5/2018     Acute renal failure, unspecified acute renal failure type (H) 08/05/2018     Acute respiratory failure with hypoxia (H) 08/05/2018     Alcoholic hepatitis with ascites 08/05/2018     Bacteremia due to " Klebsiella pneumoniae      Coronary artery disease involving native coronary artery of native heart without angina pectoris 2019     Essential hypertension      Gastroesophageal reflux disease without esophagitis 10/31/2018     Hepatic encephalopathy (H) 2018     Macrocytic anemia      Stress-induced cardiomyopathy 2018     Past Surgical History:   Procedure Laterality Date     COLONOSCOPY N/A 3/20/2018    Procedure: COLONOSCOPY;  Surgeon: Adam Nicole III, MD;  Location: [facility] OR;  Service:      CV CORONARY ANGIOGRAM N/A 3/18/2019    Procedure: Coronary Angiogram;  Surgeon: Timi Rodriguez MD;  Location: [facility];  Service: Cardiology     CV LEFT HEART CATHETERIZATION WO LEFT VETRICULOGRAM Left 3/18/2019    Procedure: Left Heart Catheterization Without Left Ventriculogram;  Surgeon: Timi Rodriguez MD;  Location: [facility];  Service: Cardiology     PICC  2018          PICC  3/18/2019          US THORACENTESIS  10/28/2019     Family History   Problem Relation Age of Onset     Heart disease Father 76        type unknown to Aayush; his father  at home     Alzheimer's disease Mother 86        lives in long term care     No Medical Problems Son      No Medical Problems Son      Social History     Socioeconomic History     Marital status: Domestic Partner     Spouse name: Karlie Chisholm (АННА)     Number of children: Not on file     Years of education: Not on file     Highest education level: Not on file   Occupational History     Occupation: Manager of building material department     Employer: [employer]   Social Needs     Financial resource strain: Not on file     Food insecurity:     Worry: Not on file     Inability: Not on file     Transportation needs:     Medical: Not on file     Non-medical: Not on file   Tobacco Use     Smoking status: Former Smoker     Packs/day: 1.00     Years: 40.00     Pack years: 40.00     Types: Cigarettes     Last attempt to quit:  2019     Years since quittin.5     Smokeless tobacco: Never Used   Substance and Sexual Activity     Alcohol use: Yes     Frequency: 4 or more times a week     Drinks per session: 1 or 2     Comment: 350 ml of peppermint schnapps daily per 2018 H&P per report of Karlie JJ to Dr. Valle     Drug use: No     Sexual activity: Not on file   Lifestyle     Physical activity:     Days per week: Not on file     Minutes per session: Not on file     Stress: Not on file   Relationships     Social connections:     Talks on phone: Not on file     Gets together: Not on file     Attends Rastafari service: Not on file     Active member of club or organization: Not on file     Attends meetings of clubs or organizations: Not on file     Relationship status: Not on file     Intimate partner violence:     Fear of current or ex partner: Not on file     Emotionally abused: Not on file     Physically abused: Not on file     Forced sexual activity: Not on file   Other Topics Concern     Not on file   Social History Narrative    Works in [employer]. Lives with his SOKarlie.       Current Outpatient Medications   Medication Sig Dispense Refill     acetaminophen (TYLENOL) 325 MG tablet Take 2 tablets (650 mg total) by mouth every 6 (six) hours as needed.  0     aspirin 81 MG EC tablet Take 81 mg by mouth daily.       atorvastatin (LIPITOR) 80 MG tablet Take 1 tablet (80 mg total) by mouth daily. 90 tablet 3     b complex vitamins tablet Take 1 tablet by mouth daily.       carvedilol (COREG) 12.5 MG tablet Take 12.5 mg by mouth 2 (two) times a day with meals.       cholecalciferol, vitamin D3, (VITAMIN D3) 2,000 unit Tab Take 2,000 Units by mouth daily.       ferrous sulfate 325 (65 FE) MG tablet Take 1 tablet by mouth daily with breakfast.       folic acid (FOLVITE) 1 MG tablet Take 1 tablet (1 mg total) by mouth daily. 30 tablet 0     furosemide (LASIX) 20 MG tablet Take 1 tablet (20 mg total) by mouth daily.  (Patient taking differently: Take 10 mg by mouth daily.       ) 30 tablet 0     furosemide (LASIX) 20 MG tablet TAKE 1 TABLET BY MOTUH AS NEEDED FOR WEIGHT GAIN OF 3 LBS IN ONE DAY (Patient taking differently: 20 mg. TAKE 1 TABLET BY MOTUH AS NEEDED FOR WEIGHT GAIN OF 3 LBS IN ONE DAY      ) 90 tablet 1     lactulose (ENULOSE) 20 gram/30 mL Soln solution Take 30 mL (20 g total) by mouth 2 (two) times a day. (Patient taking differently: Take 10 g by mouth 2 (two) times a day.       ) 1800 mL 0     magnesium oxide (MAG-OX) 400 mg (241.3 mg magnesium) tablet Take 1 tablet (400 mg total) by mouth 2 (two) times a day.  0     multivitamin therapeutic tablet Take 1 tablet by mouth daily.       omeprazole (PRILOSEC) 40 MG capsule Take 40 mg by mouth daily before breakfast.       thiamine (VITAMIN B-1) 100 MG tablet Take 100 mg by mouth daily.       ticagrelor (BRILINTA) 90 mg Tab Take 1 tablet (90 mg total) by mouth 2 (two) times a day. 180 tablet 3     No current facility-administered medications for this visit.      No Known Allergies  Immunization History   Administered Date(s) Administered     INFLUENZA,RECOMBINANT,INJ,PF QUADRIVALENT 18+YRS 10/29/2019     Influenza,seasonal quad, PF, =/> 6months 10/04/2018     Tdap 10/04/2018         Review of Systems   Patient denies fever, chills, headache, lightheadedness, dizziness, rhinorrhea, cough, congestion, shortness of breath, chest pain, palpitations, abdominal pain, constipation, change in appetite, dysuria, frequency, burning or pain with urination.  Other than stated in HPI all other review of systems is negative.         Physical Exam     Vital signs: /69, heart rate 91, respiratory 18, temp 98.4.  GENERAL APPEARANCE: Well developed, well nourished, in no acute distress.  HEENT: normocephalic, atraumatic  PERRL, sclerae anicteric, conjunctivae clear and moist, EOM intact  LUNGS: Lung sounds CTA, no adventitious sounds, respiratory effort normal.  CARD: RRR, S1,  S2, harsh systolic murmur, no gallops, rubs,  ABD: Soft and nontender with normal bowel sounds.   MSK: Muscle strength and tone were normal.  EXTREMITIES: Nonpitting pedal edema bilateral lower extremities  NEURO: Alert and oriented x 3.  Face is symmetric.  SKIN: Multiple purpura of upper extremities with skin tears.  PSYCH: euthymic            Labs:   Recent Results (from the past 240 hour(s))   Basic Metabolic Panel   Result Value Ref Range    Sodium 135 (L) 136 - 145 mmol/L    Potassium 3.8 3.5 - 5.0 mmol/L    Chloride 100 98 - 107 mmol/L    CO2 26 22 - 31 mmol/L    Anion Gap, Calculation 9 5 - 18 mmol/L    Glucose 87 70 - 125 mg/dL    Calcium 7.8 (L) 8.5 - 10.5 mg/dL    BUN 11 8 - 22 mg/dL    Creatinine 0.75 0.70 - 1.30 mg/dL    GFR MDRD Af Amer >60 >60 mL/min/1.73m2    GFR MDRD Non Af Amer >60 >60 mL/min/1.73m2   HM2(CBC w/o Differential)   Result Value Ref Range    WBC 6.3 4.0 - 11.0 thou/uL    RBC 2.91 (L) 4.40 - 6.20 mill/uL    Hemoglobin 8.1 (L) 14.0 - 18.0 g/dL    Hematocrit 28.1 (L) 40.0 - 54.0 %    MCV 97 80 - 100 fL    MCH 27.8 27.0 - 34.0 pg    MCHC 28.8 (L) 32.0 - 36.0 g/dL    RDW 18.4 (H) 11.0 - 14.5 %    Platelets 389 140 - 440 thou/uL    MPV 9.7 8.5 - 12.5 fL   Basic Metabolic Panel   Result Value Ref Range    Sodium 134 (L) 136 - 145 mmol/L    Potassium 3.7 3.5 - 5.0 mmol/L    Chloride 101 98 - 107 mmol/L    CO2 27 22 - 31 mmol/L    Anion Gap, Calculation 6 5 - 18 mmol/L    Glucose 87 70 - 125 mg/dL    Calcium 7.8 (L) 8.5 - 10.5 mg/dL    BUN 9 8 - 22 mg/dL    Creatinine 0.73 0.70 - 1.30 mg/dL    GFR MDRD Af Amer >60 >60 mL/min/1.73m2    GFR MDRD Non Af Amer >60 >60 mL/min/1.73m2   Magnesium   Result Value Ref Range    Magnesium 1.6 (L) 1.8 - 2.6 mg/dL   C. Diff Toxin By PCR   Result Value Ref Range    C.Difficile Toxigenic by PCR Negative Negative    Ribotype 027/NAP1/B1 Presumptive Negative Presumptive Negative   Comprehensive Metabolic Panel   Result Value Ref Range    Sodium 136 136 - 145  mmol/L    Potassium 2.6 (LL) 3.5 - 5.0 mmol/L    Chloride 107 98 - 107 mmol/L    CO2 21 (L) 22 - 31 mmol/L    Anion Gap, Calculation 8 5 - 18 mmol/L    Glucose 67 (L) 70 - 125 mg/dL    BUN 7 (L) 8 - 22 mg/dL    Creatinine 0.67 (L) 0.70 - 1.30 mg/dL    GFR MDRD Af Amer >60 >60 mL/min/1.73m2    GFR MDRD Non Af Amer >60 >60 mL/min/1.73m2    Bilirubin, Total 0.5 0.0 - 1.0 mg/dL    Calcium 8.1 (L) 8.5 - 10.5 mg/dL    Protein, Total 7.0 6.0 - 8.0 g/dL    Albumin 2.5 (L) 3.5 - 5.0 g/dL    Alkaline Phosphatase 365 (H) 45 - 120 U/L    AST 49 (H) 0 - 40 U/L    ALT 33 0 - 45 U/L   Magnesium   Result Value Ref Range    Magnesium 1.6 (L) 1.8 - 2.6 mg/dL   Hemoglobin   Result Value Ref Range    Hemoglobin 9.1 (L) 14.0 - 18.0 g/dL   Ammonia   Result Value Ref Range    Ammonia 26 11 - 35 umol/L   Urinalysis   Result Value Ref Range    Color, UA Yellow Colorless, Yellow, Straw, Light Yellow    Clarity, UA Clear Clear    Glucose, UA Negative Negative    Bilirubin, UA Negative Negative    Ketones, UA Trace (!) Negative    Specific Gravity, UA 1.026 1.001 - 1.030    Blood, UA Negative Negative    pH, UA 5.5 4.5 - 8.0    Protein, UA 30 mg/dL (!) Negative mg/dL    Urobilinogen, UA 2.0 E.U./dL <2.0 E.U./dL, 2.0 E.U./dL    Nitrite, UA Negative Negative    Leukocytes, UA Negative Negative    Bacteria, UA None Seen None Seen hpf    RBC, UA 0-2 None Seen, 0-2 hpf    WBC, UA 0-5 None Seen, 0-5 hpf    Squam Epithel, UA 0-5 None Seen, 0-5 lpf    Mucus, UA Moderate (!) None Seen lpf    Hyaline Casts, UA 25-50 (!) 0-5, None Seen lpf   Potassium   Result Value Ref Range    Potassium 2.7 (LL) 3.5 - 5.0 mmol/L   Potassium   Result Value Ref Range    Potassium 3.4 (L) 3.5 - 5.0 mmol/L         Assessment:  1. Alcoholic hepatitis with ascites     2. Hepatic encephalopathy (H)     3. Chronic systolic heart failure (H)     4. Hypokalemia due to excessive gastrointestinal loss of potassium         Plan:   His plan is to discharged home late next week.  He  is concerned that his ongoing persistent liquid stools will decrease his ability to go back to work.  For his hepatic encephalopathy we will continue to monitor his ammonia and will have him check this next Monday.  Continue with lactulose 10 mg daily and monitor for any confusion.    Heart failure: Continue Lasix at 10 mg daily and monitor his weights and edema.    Hypokalemia: Continue KCl 20 mEq daily and check potassium next Monday.    Electronically signed by: Corrina Blanca CNP

## 2021-06-03 NOTE — PROGRESS NOTES
Code Status:  FULL CODE  Visit Type: Discharge Summary     Facility:  [facility] [[id number]]          PCP:  Zachary Lewis MD  [phone]       Admission Date to our Facility: 11/6/2019 discharge Date from our Facility: 11/27/2019    Discharge Diagnosis:    1. Alcoholic hepatitis with ascites     2. Hepatic encephalopathy (H)     3. Chronic systolic heart failure (H)     4. Hypokalemia due to excessive gastrointestinal loss of potassium     5. Hypomagnesemia     6. Coronary artery disease involving native coronary artery of native heart without angina pectoris     7. Ischemic cardiomyopathy     8. Alcohol abuse          History of Present Illness: Aayush García is a 64 y.o. male with a past medical history for CAD with a recent MI and stenting 9/2019, systolic heart failure and an EF of 30 to 35%, chronic anemia on iron, current alcohol abuse, liver disease, GERD.  He was recently hospitalized at [facility] for altered mental status and lethargy for 3 weeks.  He had fallen off of his couch while he was sleeping and sustained rib fractures and became lethargic over 3 weeks.  Upon admission he was found to have a hemoglobin of 5.9 and thought it was secondary to his hematoma from his fall.  During his admission it was complicated by encephalopathy with which is likely due to prolonged alcohol withdrawal and possible hepatic encephalopathy.  He does have a history of daily alcohol use of 0.5 to 1 pints of hard liquor per day.  His blood alcohol level on admission was 271.  His ammonia level was 14, TSH was 1.54.  Negative for an infectious process and negative lactic acid.  He was he had a negative UA and a negative chest x-ray.  His ammonia level did rise up to 39 on 10/31.  He was started on lactulose 20 mg 2 times a day and was adjusted per his stooling.  His mental status did improve with the lactulose.     For his anemia he was found to have macrocytic anemia likely due to chronic EtOH  "abuse and was on aspirin and Brilinta.  He was consulted by GI which indicated his last EGD in 6/2019 showed patchy gastritis and a 10 mm duodenal bulb ulcer with gastric biopsies negative for H. pylori.  His last colonoscopy 3/2019 showed polyp without hemorrhoids.  He did have a thoracentesis on 10/28 in which 900 mL of nonbloody fluid was removed he did need 2 units of packed red blood cells.  His hemoglobin was stable at around 8 at discharge.  GI recommends that he have an outpatient colonoscopy.  Eventually his aspirin and Brilinta were restarted and he had no issues.  Because of the issue of acute hematuria it is recommended that he have a follow-up UA as an outpatient.     He did have issues with hypokalemia and hypomagnesemia and these were supplemented and thought to be likely related to his Lasix.  He did have also issues with attention and so it is recommended to closely monitor his weights, electrolytes and blood pressures.     During his hospitalization he had an episode in which he had \"staring spell\" that lasted approximately 30 seconds in which he bit his tongue.  He did have a follow-up head CT which showed no skull fracture intracranial bleed and EEG which was unremarkable.  He has no history of seizures and so this was felt to be uneventful.    Skilled Nursing Facility Course: While at the TCU he did progress with therapy to ambulating with a walker and standby assistance.  His ammonia levels did improve and was 26 on a redraw.  He did have lots of issues with excessive watery stools after taking lactulose.  At first, I did decrease this to a small dose of lactulose and recheck his ammonia and it remained at 26 and so I discontinued the lactulose altogether.  He continues to have good bowel movements and his cognition seems to be at baseline.  I did recommend that he follow-up with GI however at this time he declines it would like his liver failure management performed by his primary care " provider.  I did encourage him to follow-up with his primary care in the next 10 days in order to evaluate his liver enzymes.  During his TCU stay his AST did become slightly elevated at 49 however this was likely related to his dehydration status.  I did  him on abstaining from alcohol as to not add insult to his already injured liver.  He reports he will abstain however declines any resources to help with this.    He did have issues with electrolytes including hypokalemia and hyponatremia which was likely related to dehydration due to high doses of Lasix and increased watery stools due to lactulose.  Lactulose was discontinued as stated above and I did decrease his Lasix down to 10 mg daily.  His weight is been stable and his lower extremity edema has improved with compression h his magnesium level will need to be follow-up as an outpatient. His potassium redraw last week was 3.6.  I will draw his sodium and potassium one more time before discharge.  He also had low magnesium and was started on a magnesium supplement.    Discharge Medications:    Current Outpatient Medications   Medication Sig Dispense Refill     acetaminophen (TYLENOL) 325 MG tablet Take 2 tablets (650 mg total) by mouth every 6 (six) hours as needed.  0     aspirin 81 MG EC tablet Take 81 mg by mouth daily.       atorvastatin (LIPITOR) 80 MG tablet Take 1 tablet (80 mg total) by mouth daily. 90 tablet 3     b complex vitamins tablet Take 1 tablet by mouth daily.       carvedilol (COREG) 12.5 MG tablet Take 12.5 mg by mouth 2 (two) times a day with meals.       cholecalciferol, vitamin D3, (VITAMIN D3) 2,000 unit Tab Take 2,000 Units by mouth daily.       clopidogrel (PLAVIX) 75 mg tablet Take 1 tablet (75 mg total) by mouth daily. 90 tablet 3     ferrous sulfate 325 (65 FE) MG tablet Take 1 tablet by mouth daily with breakfast.       folic acid (FOLVITE) 1 MG tablet Take 1 tablet (1 mg total) by mouth daily. 30 tablet 0     furosemide  (LASIX) 20 MG tablet Take 1 tablet (20 mg total) by mouth daily. (Patient taking differently: Take 10 mg by mouth daily.       ) 30 tablet 0     furosemide (LASIX) 20 MG tablet TAKE 1 TABLET BY MOT[facility] AS NEEDED FOR WEIGHT GAIN OF 3 LBS IN ONE DAY (Patient taking differently: 20 mg. TAKE 1 TABLET BY [facility] AS NEEDED FOR WEIGHT GAIN OF 3 LBS IN ONE DAY      ) 90 tablet 1     magnesium oxide (MAG-OX) 400 mg (241.3 mg magnesium) tablet Take 1 tablet (400 mg total) by mouth 2 (two) times a day.  0     multivitamin therapeutic tablet Take 1 tablet by mouth daily.       omeprazole (PRILOSEC) 40 MG capsule Take 40 mg by mouth daily before breakfast.       thiamine (VITAMIN B-1) 100 MG tablet Take 100 mg by mouth daily.       No current facility-administered medications for this visit.        For most current and accurate medication list, please contact the skilled nursing facility that this patient visit took place at.      Discharge Plan: Patient is stable to discharge to home with family support and follow-up with his primary provider regarding his liver failure, electrolyte imbalances.  He will discharge with home care services.    Review of Systems   Patient denies fever, chills, headache, lightheadedness, dizziness, rhinorrhea, cough, congestion, shortness of breath, chest pain, palpitations, abdominal pain, n/v, diarrhea, constipation, change in appetite, dysuria, frequency, burning or pain with urination.  Other than stated in HPI all other review of systems is negative.       Physical Exam   Vital signs: /78, heart rate 88, respiratory 18, temp 97.4.  GENERAL APPEARANCE: Well developed, well nourished, in no acute distress.  HEENT: normocephalic, atraumatic  PERRL, sclerae anicteric, conjunctivae clear and moist, EOM intact  LUNGS: Lung sounds CTA, no adventitious sounds, respiratory effort normal.  CARD: RRR, S1, S2, without murmurs, gallops, rubs,   ABD: Soft and nontender with normal bowel sounds.   MSK:  Muscle strength and tone were normal.  EXTREMITIES: Trace nonpitting edema of his lower extremities bilaterally  NEURO: Alert and oriented x 3.  Face is symmetric  SKIN: Inspection of the skin reveals no rashes, ulcerations or petechiae.  PSYCH: euthymic          Labs:    Recent Results (from the past 240 hour(s))   Magnesium   Result Value Ref Range    Magnesium 1.5 (L) 1.8 - 2.6 mg/dL   Potassium   Result Value Ref Range    Potassium 3.6 3.5 - 5.0 mmol/L   Magnesium   Result Value Ref Range    Magnesium 1.5 (L) 1.8 - 2.6 mg/dL   Ammonia   Result Value Ref Range    Ammonia 26 11 - 35 umol/L         Assessment:  1. Alcoholic hepatitis with ascites     2. Hepatic encephalopathy (H)     3. Chronic systolic heart failure (H)     4. Hypokalemia due to excessive gastrointestinal loss of potassium     5. Hypomagnesemia     6. Coronary artery disease involving native coronary artery of native heart without angina pectoris     7. Ischemic cardiomyopathy     8. Alcohol abuse         MEDICAL EQUIPMENT NEEDS:  NA      DISCHARGE PLAN/FACE TO FACE:  I certify that services are/were furnished while this patient was under the care of a physician and that a physician or an allowed non-physician practitioner (NPP), had a face-to-face encounter that meets the physician face-to-face encounter requirements. The encounter was in whole, or in part, related to the primary reason for home health. The patient is confined to his/her home and needs intermittent skilled nursing, physical therapy, speech-language pathology, or the continued need for occupational therapy. A plan of care has been established by a physician and is periodically reviewed by a physician.    I certify that this patient is under my care and that I, or a nurse practitioner or physician's assistant working with me, had a face-to-face encounter that meets the physician face-to-face encounter requirements with this patient.   Date of Face-to-Face Encounter: 11/25/2019    I  certify that, based on my findings, the following services are medically necessary home health services: PT/OT    My clinical findings support the need for the above skilled services because: (Please write a brief narrative summary that describes what the RN, PT, SLP, or other services will be doing in the home. A list of diagnoses in this section does not meet the CMS requirements.)  PT/OT for ongoing endurance and strengthening with home safety evaluation    This patient is homebound because: (Please write a brief narrative summary describing the functional limitations as to why this patient is homebound and specifically what makes this patient homebound.)  Patient requires maximum effort in order to get out into the community on a regular basis.    The patient is, or has been, under my care and I have initiated the establishment of the plan of care. This patient will be followed by a physician who will periodically review the plan of care.        Electronically signed by: Corrina Blanca CNP

## 2021-06-04 VITALS
DIASTOLIC BLOOD PRESSURE: 79 MMHG | HEART RATE: 68 BPM | WEIGHT: 172 LBS | SYSTOLIC BLOOD PRESSURE: 130 MMHG | BODY MASS INDEX: 27.76 KG/M2

## 2021-06-05 VITALS
DIASTOLIC BLOOD PRESSURE: 80 MMHG | SYSTOLIC BLOOD PRESSURE: 114 MMHG | WEIGHT: 186 LBS | RESPIRATION RATE: 16 BRPM | BODY MASS INDEX: 27.47 KG/M2 | HEART RATE: 56 BPM

## 2021-06-07 NOTE — PATIENT INSTRUCTIONS - HE
At this time, you may discontinue clopidogrel (Plavix).  Continue to take 81 mg aspirin daily, however.    We will make arrangements for [facility] visit in approximately 3 months.

## 2021-06-16 NOTE — TELEPHONE ENCOUNTER
Telephone Encounter by Wendy Kitchen RN at 10/8/2019  8:40 AM     Author: Wendy Kitchen RN Service: -- Author Type: Registered Nurse    Filed: 10/8/2019  8:58 AM Encounter Date: 10/8/2019 Status: Signed    : Wendy Kitchen RN (Registered Nurse)       Madeline Avila CNP Caswell, Mallory J RN   Phone Number: [phone]             Mal,     Can you please call patient and get more details of how long symptoms have been going on (shortness of breath, dizzy, etc), any weight gain? I will advise further recs after more info. Thank you.     Madeline Ricketts Messages            Updates about my health      Madeline Avila CNP  You 20 minutes ago (8:18 AM)      Mal,     Can you please call patient and get more details of how long symptoms have been going on (shortness of breath, dizzy, etc), any weight gain? I will advise further recs after more info. Thank you.     Madeline     Routing comment       Aayush García  Madeline Avila CNP 20 hours ago (11:42 AM)         Aayush García has been experiencing shortness of breath, dizziness, fratigue. Today's blood pressure is 84/52 and heart rate 90. Last night it was 92/57 heart rate 93.  Is this something we need to worry about. He did miss work today due to these symptoms. Please advise.        Called patient and LVM for him to call back to get an update on how he is doing based on message on [employer]. -[facility]

## 2021-06-16 NOTE — TELEPHONE ENCOUNTER
"Telephone Encounter by Wendy Kitchen, RN at 10/8/2019  4:10 PM     Author: Wendy Kitchen RN Service: -- Author Type: Registered Nurse    Filed: 10/8/2019  4:44 PM Encounter Date: 10/8/2019 Status: Signed    : Wendy Kitchen RN (Registered Nurse)       Aayush García  Wendy Kitchen, RN   Phone Number: [phone]             So Aayush had a bad day yesterday. He does have bouts of dizziness, fatigue and shortness of breath. We have split his carvedilol and brilinta pills in half so he can take through out the day to help with the dizziness. Mornings are the worst His pressure today is 94/51 and heart rate 85.          Called patient again based on the [employer] message received back to get further clarification. Pt reports that his blood pressure is usually low but yesterday he was pretty dizzy. He reports that he actually splits his coreg tablets in half and takes them over a 2 hour time period from about 5:30 am to 7:30 am. When asked if he does this with his other medications he stated \"He is not sure- he takes too many and gets confused\". Today he feels ok. Will pass along to TORI Correa,  Finally able to get ahold of Aayush. See above with how he is taking his coreg and possibly other medications. Thoughts or recommendations?  Thanks,  Mal        "

## 2021-06-19 NOTE — PROGRESS NOTES
PULMONARY / CRITICAL CARE PROGRESS NOTE    Date / Time of Admission:  7/24/2018  8:49 PM    Assessment:   1. Acute respiratory failure   Intubated from 7/25 thru 7/26.   Continue pulmonary toiletting, O2 supplementation.   High risk for aspiration due to altered mental status.   2. Sepsis  3. Klebsiella bacteremia  Unfortunately u/a was obtained after pt was started on Abx.   No infiltrates on admission CXR, sputum Cx grew normal rosi.   Abdomen is distended, negative Rogers sign, abdomen US showed no cholelithiasis, normal diameter of CBD. Narrow Abx to cefepime.   4. Elevated troponin level   NSTEMI vs demand ischemia.   Cardiology following  5. Cardiomyopathy   Stress induced cardiomyopathy per echocardiogram findings, global left ventricular hypokinesis , large area of akinesis involving the apex, anterior apical and apical lateral portions of LV, also normal RV size and function, mod AS.   6. Acute renal failure, anuric  Started on HD  7. ETOH user  8. Alcohol withdrawal   9. Encephalopathy  10. Anemia  Hx NSAID use in the past. No evidence of GI bleed at this moment  Slowly trending down H/H.  Patient is significant volume up, dilutional is an additional factor.   Continue PPI. RBC transfusion as needed.     Advance Directives: Full code    Plan:   1. Titrate FiO2  2. Sedation, discontinue ativan, decrease precedex drip to RASS goal 0  3. Pulmonary toiletting  4. Minimize IV fluids  5. Titrate BP meds to keep BP < 140/90  6. Add ASA  7. Abx cefepime IV  8. Discontinue stress dose steroids.   9. Nephrology is following, plan to start hemodialysis   10. Serial hemoglobin level  11. Blood transfusion as needed  12. NPO  13. PPI IV for GI prophylaxis   14. Glucose level monitoring   15. Thiamine , folic acid supplementation   16. DVT prophylaxis SCDs    Please contact me if you have any questions.  Total critical care time, not including separately billable procedure time: 45 minutes    Ambrocio Fox  [facility]  Pulmonary / Critical Care  7/27/2018   10:13 AM      ICU DAILY CHECKLIST                           Can patient transfer out of MICU? no    FAST HUG:    Feeding:  Feeding: No.  Patient is receiving NPO    Rahman:Yes  Analgesia/Sedation:Yes precedex  Thromboembolic prophylaxis: yes; Mode:  SCDs  HOB>30:  Yes  Stress Ulcer Protocol Active: yes; Mode: PPI  Glycemic Control: Any glucose > 180 no; Mode of Insulin Therapy: Sliding Scale Insulin    INTUBATED:  Can patient have daily waking:  no  Can patient have spontaneous breathing trial:  no    Restraints? no    PHYSICAL THERAPY AND MOBILITY:  Can patient have PT and mobility trial: yes  Activity: PT    Subjective:   HPI:  Aayush García is a 62 y.o. male with history of alcohol abuse, HTN and anemia.  Presents to ED on 7/25 for evaluation of confusion, and decrease urine output, pt was tachypneic, diaphoretic, hypotensive. Diagnosed with septic shock. Patient was intubated and extubated a day later.   Patient was anuric, nephrology consulted. Treated with broad Abx. Blood cultures grew G(-) rods. Urine culture was taken post abx. Hemodynamically stable.     Events overnight  - Patient was agitated, requiring ativan IV in addition to precedex drip.   - NPO  - Hemodynamically stable  - Afebrile.     Allergies: Review of patient's allergies indicates no known allergies.     MEDS:  Scheduled Meds:    aspirin  75 mg Rectal DAILY     cefepime (MAXIPIME) IV  500 mg Intravenous Q24H     chlorothiazide (DIURIL) IVPB  500 mg Intravenous Once     folic acid  1 mg Enteral Tube DAILY    Or     folic acid  1 mg Intramuscular DAILY     furosemide  160 mg Intravenous Once     hydrocortisone sod succ  50 mg Intravenous Q8H     insulin aspart (NovoLOG) injection   Subcutaneous Q4H FIXED TIMES     ipratropium-albuterol  3 mL Nebulization QID - RT     multivitamin therapeutic  1 tablet Enteral Tube DAILY     pantoprazole  40 mg Intravenous Q24H     senna-docusate  1 tablet Oral BID  "   Or     senna (SENOKOT) syrup  8.8 mg Enteral Tube BID     sodium chloride  10-30 mL Intravenous Q8H FIXED TIMES     sodium chloride  3 mL Intravenous Line Care     sodium chloride  3 mL Intravenous Line Care     thiamine  100 mg Enteral Tube DAILY    Or     thiamine  100 mg Intramuscular DAILY     Continuous Infusions:    dexmedetomidine 400 mcg/100 mL in NS (PRECEDEX) (4mcg/mL) 0.5 mcg/kg/hr (07/27/18 0908)     norepinephrine IV infusion in NS Stopped (07/25/18 1733)     vasopressin Stopped (07/25/18 2019)     PRN Meds:.acetaminophen **OR** acetaminophen **OR** acetaminophen, albuterol **AND** Nebulizer treatment intermittent, benzocaine-menthol, bisacodyl, dextrose 50 % (D50W), alcohol withdrawal fluid ETOHVITS, gabapentin, glucagon (human recombinant), LORazepam **OR** LORazepam **OR** LORazepam, magnesium hydroxide, naloxone **OR** naloxone, ondansetron **OR** ondansetron, polyvinyl alcohol, sodium chloride, sodium chloride, sodium chloride    Objective:   VITALS:  /61  Pulse 90  Temp 98.2  F (36.8  C) (Oral)   Resp 20  Ht 5' 8\" (1.727 m)  Wt 210 lb 1.6 oz (95.3 kg)  SpO2 94%  BMI 31.95 kg/m2  EXAM:   Gen: obese, sedated, difficult to arouse  HEENT: pink conjunctiva, moist mucosa, Mallampati II/IV  Neck: no thyromegaly, masses or JVD  Lungs: discrete ronchi both HT  CV: regular, no murmurs or gallops appreciated  Abdomen: soft, distended, NT, BS increase in frequency  Ext: edema upper and lower extremities   Neuro: sedated, grimaces with painful stimulus     I&O:    Intake/Output Summary (Last 24 hours) at 07/27/18 1013  Last data filed at 07/27/18 0600   Gross per 24 hour   Intake          2627.31 ml   Output             2093 ml   Net           534.31 ml       Data Review:    Results from last 7 days  Lab Units 07/27/18  0655 07/27/18  0411   LN-WHITE BLOOD CELL COUNT thou/uL  --  15.1*   LN-HEMOGLOBIN g/dL  --  6.8*   LN-HEMATOCRIT %  --  20.1*   LN-PLATELET COUNT thou/uL 133* 127* "       Results from last 7 days  Lab Units 07/27/18  0411   LN-SODIUM mmol/L 132*   LN-POTASSIUM mmol/L 4.6   LN-CHLORIDE mmol/L 99   LN-CO2 mmol/L 21*   LN-BLOOD UREA NITROGEN mg/dL 21   LN-CREATININE mg/dL 4.01*   LN-CALCIUM mg/dL 7.8*      Klebsiella pneumoniae (!)            Stain      Gram negative bacilli                        Resulting Agency: [facility]       Susceptibility       Klebsiella pneumoniae       GRACE       Amoxicillin + Clavulanate <=4/2  Sensitive       Ampicillin >16  Resistant       Cefazolin 2  Sensitive       Cefepime <=1  Sensitive       Ceftriaxone <=1  Sensitive       Ciprofloxacin <=0.5  Sensitive       Gentamicin <=2  Sensitive       Levofloxacin <=1  Sensitive       Meropenem <=0.25  Sensitive       Piperacillin + Tazobactam <=2/4  Sensitive       Tetracycline <=2  Sensitive       Tobramycin <=2  Sensitive       Trimethoprim + Sulfamethoxazole <=0.5  Sensitive             Culture      Usual Lesley            Stain      1+ Polymorphonuclear leukocytes          2+ Gram positive cocci in pairs          1+ Gram negative bacilli     XR CHEST 1 VIEW PORTABLE 7/25/2018 12:39 PM  INDICATION: Dialysis line placement   COMPARISON: 07/25/2018  FINDINGS:   Right IJ central venous catheter tip over the right IJ vein-brachiocephalic junction or proximal SVC. Right PICC tip over the right atrium. Appropriately positioned endotracheal tube. Enteric tube courses into the stomach and off the field of view. No pneumothorax. Mildly hypoexpanded lungs. No focal consolidation. No definitive pulmonary edema or pleural effusion. Stable mildly prominent cardiomediastinal silhouette.      By:  Ambrocio Montalvo, 7/27/2018, 10:13 AM    Primary Care Physician:  Misbah Y Palla, MD

## 2021-06-19 NOTE — PROGRESS NOTES
GI Progress Note  Aayush García  [id number]/[id number]    Subjective:   No new complaints. Sleepy this AM. No pain.       Objective:   Temp:  [97.9  F (36.6  C)-99.5  F (37.5  C)] 99.4  F (37.4  C)  Heart Rate:  [75-95] 92  Resp:  [17-32] 29  BP: ()/(50-78) 100/57  Body mass index is 32.01 kg/(m^2).     Gen: No acute distress  Cardio: RRR  GI: Soft, non-distended, non-tender without rebound or guarding    Laboratory  LAB DATA:    Results from last 7 days  Lab Units 08/02/18  0535 07/31/18  0634 07/30/18  0428   LN-ALKALINE PHOSPHATASE U/L 846* 886* 805*   LN-BILIRUBIN TOTAL mg/dL 5.8* 5.9* 5.9*   LN-BILIRUBIN DIRECT mg/dL 3.7*  --  3.8*   LN-PROTEIN TOTAL g/dL 5.8* 5.9* 6.1   LN-ALT (SGPT) U/L 85* 79* 82*   LN-AST (SGOT) U/L 287* 304* 299*        Results from last 7 days  Lab Units 08/02/18  0535 08/01/18  0508 07/31/18  0634   LN-INR  1.43* 1.47* 1.41*         Results from last 7 days  Lab Units 08/02/18  0535 07/31/18  0634 07/30/18  0428   LN-WHITE BLOOD CELL COUNT thou/uL 19.6* 18.5* 20.8*   LN-HEMOGLOBIN g/dL 8.4* 8.6* 8.5*   LN-HEMATOCRIT % 25.2* 26.1* 25.1*   LN-PLATELET COUNT thou/uL 219 173 165       Results from last 7 days  Lab Units 08/02/18  0535   LN-SODIUM mmol/L 140   LN-POTASSIUM mmol/L 3.7   LN-CHLORIDE mmol/L 102   LN-CO2 mmol/L 26   LN-BLOOD UREA NITROGEN mg/dL 39*   LN-CREATININE mg/dL 4.24*   LN-CALCIUM mg/dL 8.7     Lipase   Date Value Ref Range Status   07/25/2018 256 (H) 0 - 52 U/L Final   07/24/2018 313 (H) 0 - 52 U/L Final     Principal Problem:    Septic shock (H)  Active Problems:    Acute respiratory failure with hypoxia (H)    Acute renal failure, unspecified acute renal failure type (H)    Metabolic acidosis    Encephalopathy, metabolic    Elevated troponin    Stress-induced cardiomyopathy    Bacteremia due to Klebsiella pneumoniae    Anemia, unspecified type    Alcohol abuse      Assessment:   62 y.o. male who presented to the hospital with acute onset of lethargy and confusion.  On  evaluation the patient was found to have sepsis secondary to Klebsiella pneumonia and was admitted to the ICU. He continues to improve. LFTs elevated, probably related to sepsis or ETOH (AST> ALT). US showed small amount of sludge without biliary dilation. Urine output improved with no rise in creatinine.     Plan:   1. Continue supportive measures  2. Planning for discharge.       Favio Anthony PA-C  [facility]  [phone]

## 2021-06-19 NOTE — PROGRESS NOTES
RENAL Progress Note       Assessment/Plan  MICHAEL suspect hemodynamic ATN due to volume depletion, low bp, CM, ACE-I. Ongoing MICHAEL, minimal u/o despite diuretics, ongoing hyponatremia, azotemia, acidosis better.  +UA  obtained after golden and after long period of anuria.   Creat improved with HD yesterday, progressive volume overload still quite volume overloaded. Got 2 kg off on run. Did get 1 unit RBC's yesterday too.    -No non essential IVF, HD UF today to pull more fluid.    -On daily diuretic trial, only 57 ml out overnight, continue to monitor   - will evaluate in am if needs to run on Sunday.    Shock better, hypovolemia, acidosis +/- sepsis and +CM. Off pressors x2 day    Sepsis?, GNR in one of 2 BCX, on abx. No other +, klebsiella bacteremia. Urine obtained after long period of anuria and thus UC obtained after abx and cx neg    Anemia acute on chronic, recent GI bleed, hgb drifting down,  transfused Friday on run.    -still has black diarrhea, no bright red blood.   -hgb 6.8 yesterday and 8.2 after 1 unit PRBS  this am  7.8    Inc LFT's and coagulopathy. No documented chronic liver disease. Suspect mostly due to shock. NAC for tylenol tox. Leveling off    resp failure now extubated but needing high flow O2    -AM CXR:  Interval removal of ETT and NG tube. Right IJ catheter high SVC level. Right PICC catheter right atrial level. No pneumothorax. Interval worsening with bilateral airspace opacities with upper lobe predominance greatest on the left. Small left  effusion.     ETOH ism, at risk for W/D on precedex    CM ?all stress induced CM. Has RF for CAD and ETOH CM. Cards following     Encephalopathy, sepsis and ETOH on admit. Now sedated, ?w//d    Hypotension improved, bp normal, tolerated UF yesterday.    Mild low plts stable ?liver dz/ sepsis    Hyponatremia, HD on low Na bath to prevent rapid correction, 130 today.       Plan HD / UF today   Daily diuretic trial       Principal Problem:    Septic shock  (H)  Active Problems:    Acute respiratory failure with hypoxia (H)    Acute renal failure, unspecified acute renal failure type (H)    Metabolic acidosis    Encephalopathy, metabolic    Elevated troponin    Other cardiomyopathy (H)    Bacteremia due to Klebsiella pneumoniae    Anemia, unspecified type    Alcohol abuse      Subjective  Sedated, 80% high flow O2. Appears comfortable. No arousal on exam.  Fiance here.     Objective    Vital signs in last 24 hours  Temp:  [98.4  F (36.9  C)-100.7  F (38.2  C)] (P) 99.1  F (37.3  C)  Heart Rate:  [] 100  Resp:  [11-40] 33  BP: (101-142)/(48-71) (P) 127/82  Arterial Line BP: (112-148)/(50-71) 138/62  FiO2 (%):  [60 %-75 %] (P) 60 %  Weight:   211 lb 9.6 oz (96 kg)    Intake/Output last 3 shifts  I/O last 3 completed shifts:  In: 1203.2 [I.V.:164.2; Blood:989; IV Piggyback:50]  Out: 2092 [Urine:62; Other:2000; Stool:30]  Intake/Output this shift:       Review of Systems   Review of systems not obtained due to inability to communicate with the patient.     Physical Exam  Sedated appears comfortable  HEENT AT NC  Neck supple  Lungs clear  Cor RRR (60)  abd soft +enlarged liver  Ext warm  ++ general edema   Skin palmar erythema no other stigmata chronic liver dz.    Pertinent Labs   Lab Results: personally reviewed.   Lab Results   Component Value Date     07/28/2018     (L) 07/27/2018     (L) 07/26/2018    K 3.6 07/28/2018    K 4.6 07/27/2018    K 4.0 07/26/2018    CO2 24 07/28/2018    CO2 21 (L) 07/27/2018    CO2 20 (L) 07/26/2018    BUN 25 (H) 07/28/2018    BUN 21 07/27/2018    BUN 12 07/26/2018    CREATININE 3.49 (H) 07/28/2018    CREATININE 4.01 (H) 07/27/2018    CREATININE 2.91 (H) 07/26/2018    CALCIUM 8.1 (L) 07/28/2018    CALCIUM 7.8 (L) 07/27/2018    CALCIUM 7.5 (L) 07/26/2018     Lab Results   Component Value Date    WBC 18.2 (H) 07/28/2018    WBC 15.1 (H) 07/27/2018    WBC 13.7 (H) 07/26/2018    HGB 7.8 (L) 07/28/2018    HGB 8.2 (L)  07/27/2018    HGB 6.8 (LL) 07/27/2018    HCT 22.9 (L) 07/28/2018    HCT 20.1 (L) 07/27/2018    HCT 20.3 (L) 07/26/2018     07/28/2018     (H) 07/27/2018    MCV 99 07/26/2018     07/28/2018     (L) 07/27/2018     (L) 07/27/2018     magnesium 2.0  's   Lact 2.0  Lft 's noted  INR 1.6    Pertinent Radiology   Radiology Results: Personally reviewed image/s and Personally reviewed impression/s  EKG Results: personally reviewed.     Advanced Care Planning  pending

## 2021-06-19 NOTE — PROGRESS NOTES
Duo nebs given x2.  Breath sounds coarse, good cough.  Patient removed from HFNC and currently on 2L standard NC  with sats 96%.

## 2021-06-19 NOTE — PROGRESS NOTES
RENAL Progress Note        Assessment/Plan  MICHAEL - severe ATN in setting of shock and prob unrecognized chronic liver disease. ?recovery potential mary with ongoing low bp. U/o has been low but now ?, as no golden but ok to observe w/o precise u/o as still has high Cr and azotemia and fluid overload so no signif recovery so far.continue HD with midodrine and alb for bp support. Run today went better, pulled 3.5 kg. Plan next run on Monday.      Hypotension - Not clearly cirrhotic but acting like chronic liver dz pt. Nothing acute on CT. CM better. UTI treated, s/p transfusion.   Using scheduled midodrine now and prn before dialysis, inc dose. Needs to get better to contemplate outpt HD(has to tolerate HD in chair and transport to from CDU)  Give albumin prn low bp.     CM  - likely stress induced CM as he has recovered quite a bit of his EF.       Sepsis - klebsiella bacteremia treated.   Ongoing leukocytosis.  ?uti, cx neg, zosyn stopped 8/8/18.     Anemia - acute on chronic, recent GI bleed earlier this summer  S/p transfusion again on Monday  Started epo    Inc LFT's and coagulopathy/hypotension, - No documented chronic liver disease but ?early cirrhosis. Bili is sl up.   Ascites, non tense/asymptomatic, and anasarca. Agree with GI that pt does not appear to need LVP currently and better strategy to cont to push UF on dialysis as he tolerates.     Resp failure -low level O2 needs    Encephalopathy - waxing and waning suspect hepatic encephalopathy?  Getting lactulose.     Malnutrition - encourage oral intake, liberalize diet to just sodium restriction    [place] planned but ins hold up, so here prob over w/e. We will cont to follow and do next HD Monday     Brie Oliver MD    Principal Problem:    Septic shock (H)  Active Problems:    Acute respiratory failure with hypoxia (H)    Acute renal failure, unspecified acute renal failure type (H)    Metabolic acidosis    Encephalopathy, metabolic    Elevated  troponin    Stress-induced cardiomyopathy    Bacteremia due to Klebsiella pneumoniae    Anemia, unspecified type    Alcohol abuse    Acute encephalopathy    Acute liver failure    Cognitive and behavioral changes    Sleep difficulties    Hallucinations      Subjective:   Ran earlier, bp init low but better thru run and 140's now. Pulled 3.5 kg and cvc worked well  Pt remembers me.   No shortness of breath  No abd discomfort  Eating pretty well     Objective    Vital signs in last 24 hours  Temp:  [97.6  F (36.4  C)-98.3  F (36.8  C)] 98.1  F (36.7  C)  Heart Rate:  [68-98] 86  Resp:  [20-24] 24  BP: ()/(50-76) 145/72  Weight:   203 lb 0.7 oz (92.1 kg)    Intake/Output last 3 shifts  I/O last 3 completed shifts:  In: 1200 [P.O.:1200]  Out: -   Intake/Output this shift:       Review of Systems   A 12 point comprehensive review of systems was negative except as noted.    Physical Exam  Awake jaundiced  HEENT NC in place, more spider angiomata on chest and face  Neck supple  Lungs fairly clear anteriorally  Cor RRR, 2-3+ edema up to hips  abd soft, obese, more distended  Ext warm  CVC R IJ exit ok today     Pertinent Labs   Lab Results: personally reviewed.   Lab Results   Component Value Date     (L) 08/10/2018     (L) 08/09/2018     (L) 08/08/2018    K 3.4 (L) 08/10/2018    K 3.3 (L) 08/09/2018    K 3.7 08/08/2018    CO2 22 08/10/2018    CO2 25 08/09/2018    CO2 22 08/08/2018    BUN 22 08/10/2018    BUN 19 08/09/2018    BUN 30 (H) 08/08/2018    CREATININE 4.80 (H) 08/10/2018    CREATININE 4.07 (H) 08/09/2018    CREATININE 5.37 (H) 08/08/2018    CALCIUM 8.3 (L) 08/10/2018    CALCIUM 8.2 (L) 08/09/2018    CALCIUM 8.4 (L) 08/08/2018     Lab Results   Component Value Date    WBC 15.6 (H) 08/10/2018    WBC 18.1 (H) 08/07/2018    WBC 17.9 (H) 08/06/2018    HGB 8.1 (L) 08/10/2018    HGB 8.3 (L) 08/08/2018    HGB 7.8 (L) 08/07/2018    HCT 24.7 (L) 08/10/2018    HCT 23.4 (L) 08/07/2018    HCT 21.3 (L)  08/06/2018     (H) 08/10/2018     08/07/2018     (H) 08/06/2018     08/10/2018     08/07/2018     08/06/2018       Pertinent Radiology   Radiology Results: Personally reviewed impression/s    Brie Oliver MD  [facility]  [phone]

## 2021-06-19 NOTE — PROGRESS NOTES
"Paged House officer, Dr. Akhtar regarding Pt's BP (!) 83/49 (Patient Position: Semi-chew)  Pulse 80  Temp 98.9  F (37.2  C) (Oral)   Resp 20  Ht 5' 8\" (1.727 m)  Wt 210 lb 8 oz (95.5 kg)  SpO2 95%  BMI 32.01 kg/m2   Received a verbal order to give PRN midodrine 10 mg.        Yvette Foy RN     "

## 2021-06-19 NOTE — PROGRESS NOTES
RENAL Progress Note       Assessment/Plan  MICHAEL - suspect hemodynamic ATN due to volume depletion, low bp, CM, ACE-I. Ongoing MICHAEL, low UOP despite boluses and Cr worse.  Continue dialysis MWF for now via tunneled cath.  I'm worried his ongoing hypotension is going to impair his renal recovery, he's acting a bit like he has more significant liver disease.  Plan to give contrast today, will impair recovery but pretty oliguric at this point  Recs:  - would liberalize diet to sodium restriction alone to encourage better intake  - dialysis MWF  - attempt at isolated UF run on Saturday to see if this helps with UF  - follow for recovery although I worry about this with ongoing hypotension, recommend d/c carvedilol  - schedule 10mg midodrine three times a day and prn for dialysis  - contrast administration okay by me, balancing risks/benefits of diagnosing persistent abdominal pathology    Hypotension - persistent and not improving.  Not clearly cirrhotic but he looks cirrhotic to some degree.  Agree with reimaging abdomen to get a better sense of this.  Using scheduled midodrine now and prn before dialysis    CM  - likely stress induced CM as he has recovered quite a bit of his EF.  Clearly volume up but hard to shift fluid     Sepsis - klebsiella bacteremia.  Finished course.  WBC still elevated, CT with contrast to evaluate today, if ascites, consider tap to rule out infection    Anemia - acute on chronic, recent GI bleed, hgb drifting down, transfused Friday on run. Stable in the 8s.  May need EPO if MICHAEL persists    Inc LFT's and coagulopathy - No documented chronic liver disease but he looks cirrhotic to some extent.  Imaging today    Resp failure - on 1-2L NC, very volume up but difficult to shift fluid off him.    Encephalopathy - waxing and waning.  Remains delirious.  ?Hepatic encephalopathy?  Getting lactulose    Malnutrition - encourage oral intake, liberalize diet to just sodium restriction       Principal  Problem:    Septic shock (H)  Active Problems:    Acute respiratory failure with hypoxia (H)    Acute renal failure, unspecified acute renal failure type (H)    Metabolic acidosis    Encephalopathy, metabolic    Elevated troponin    Stress-induced cardiomyopathy    Bacteremia due to Klebsiella pneumoniae    Anemia, unspecified type    Alcohol abuse      Subjective  Seen on dialysis.  Lines reversed but running easily at 450 with this.  BPs dipping constantly on the run.  Midodrine, albumin help but not able to achieve significant UF. Apparently eating a bit more per RD and RN.  Needs more caloric intake though.         Discussed at length with SO and with Resident service.    Objective    Vital signs in last 24 hours  Temp:  [97.6  F (36.4  C)-98.9  F (37.2  C)] 98.3  F (36.8  C)  Heart Rate:  [76-84] 80  Resp:  [20-22] 22  BP: ()/(34-58) 97/35  Weight:   212 lb 4.8 oz (96.3 kg)    Intake/Output last 3 shifts  I/O last 3 completed shifts:  In: 100 [P.O.:100]  Out: 300 [Urine:300]  Intake/Output this shift:       Review of Systems   A 12 point comprehensive review of systems was negative except as noted.    Physical Exam  Awake but confused  HEENT NC in place  Neck supple  Lungs fairly clear anteriorally  Cor RRR, 2-3+ edema up to hips  abd soft, obese, mildly distended  Ext warm    Pertinent Labs   Lab Results: personally reviewed.   Lab Results   Component Value Date     08/03/2018     08/02/2018     (L) 08/01/2018    K 4.0 08/03/2018    K 3.7 08/02/2018    K 3.6 08/01/2018    CO2 22 08/03/2018    CO2 26 08/02/2018    CO2 22 08/01/2018    BUN 49 (H) 08/03/2018    BUN 39 (H) 08/02/2018    BUN 56 (H) 08/01/2018    CREATININE 5.51 (H) 08/03/2018    CREATININE 4.24 (H) 08/02/2018    CREATININE 5.57 (H) 08/01/2018    CALCIUM 8.6 08/03/2018    CALCIUM 8.7 08/02/2018    CALCIUM 8.5 08/01/2018     Lab Results   Component Value Date    WBC 20.5 (H) 08/03/2018    WBC 19.6 (H) 08/02/2018    WBC 18.5  (H) 07/31/2018    HGB 8.2 (L) 08/03/2018    HGB 8.4 (L) 08/02/2018    HGB 8.6 (L) 07/31/2018    HCT 25.5 (L) 08/03/2018    HCT 25.2 (L) 08/02/2018    HCT 26.1 (L) 07/31/2018     (H) 08/03/2018     (H) 08/02/2018     (H) 07/31/2018     08/03/2018     08/02/2018     07/31/2018       Pertinent Radiology   Radiology Results: Personally reviewed impression/s    Manish Jaimes MD  [facility]  [phone]

## 2021-06-19 NOTE — PROGRESS NOTES
"[facility]    Assessment and Plan:    Encephalopathy, memory loss     He still has some hepatic lab abnormalities and there could still be some metabolic encephalopathy contributing to his current situation, however he does at times show fairly good attention and nonetheless is confabulating so there is likely some Wernicke's encephalopathy as well (likely related to EtOH abuse).  We should continue the thiamine indefinitely.    CT of the head shows diffuse atrophy including cerebellum--images personally reviewed  MRI scan would be difficult without significant sedation, and unlikely to show us any new information.      B12, thiamine and ammonia have all been checked and are now normal (initial ammonia was slightly elevated).  I suspect he will need a supervised setting on discharge--at times he still has significant confusion and is clearly not oriented    Subjective:     Pt without new complaints  Does not remember when he had CT scan (it was yesterday)      Objective:  /69 (Patient Position: Lying)  Pulse 86  Temp 98.5  F (36.9  C) (Oral)   Resp 18  Ht 5' 8\" (1.727 m)  Wt 205 lb 9.6 oz (93.3 kg)  SpO2 96%  BMI 31.26 kg/m2   Awake, alert, no aphasia, no dysarthria  Can't say where is is or whether it is a hospital, he thought [facility].    Cranial nerves II - XII tested and intact, no nystagmus  There is no focal or generalized weakness in the extremities        Scheduled Meds:    epoetin alie  20,000 Units Subcutaneous Weekly PM     heparin (PF)  5,000 Units Subcutaneous Q8H FIXED TIMES     Lactobacillus acidoph-L.bulgar  4 tablet Oral TID with meals     magnesium sulfate IVPB  4 g Intravenous Q8H     midodrine  15 mg Oral Once per day on Mon Wed Fri     midodrine  15 mg Oral Q8H FIXED TIMES     multivitamin therapeutic  1 tablet Enteral Tube DAILY     omeprazole  20 mg Oral BID AC     ramelteon  8 mg Oral QHS     rifAXIMin  550 mg Oral TID     sodium chloride  3 mL " Intravenous Q8H     thiamine  100 mg Oral DAILY     Continuous Infusions:  PRN Meds:.acetaminophen **OR** acetaminophen **OR** acetaminophen, albumin human, albuterol **AND** Nebulizer treatment intermittent, benzocaine-menthol, bisacodyl, dextrose 50 % (D50W), alcohol withdrawal fluid ETOHVITS, glucagon (human recombinant), ipratropium-albuterol **AND** Nebulizer treatment intermittent, lactulose, melatonin, menthol-zinc oxide, naloxone **OR** naloxone, polyvinyl alcohol, traZODone     Carrillo Corbett MD

## 2021-06-19 NOTE — PROGRESS NOTES
RESPIRATORY CARE NOTE     Patient Name: Aayush García  Today's Date: 7/26/2018       Vent Mode: VCV  FiO2 (%):  [35 %-40 %] 35 %  S RR:  [16-24] 16  S VT:  [550 mL] 550 mL  PEEP/CPAP (cm H2O):  [5 cm H2O] 5 cm H2O  Minute Ventilation (L/min):  [10.8 L/min-16.9 L/min] 12.7 L/min  PIP:  [27 cm H2O-35 cm H2O] 27 cm H2O  NC SUP:  [8 cm H20] (P) 8 cm H20  MAP (cm H2O):  [9-12] 9    Pt switched over to cpap +5, pressure 8 cm by Dr. Dailey. Pt 35 minutes.  HR 91, RR 31, Sp02 95%.RSBI 68. RT will continue to follow.  Cynthia Garcia

## 2021-06-19 NOTE — PROGRESS NOTES
Renal progress note  CC: ARF  Assessment and Plan:  62 y.o. yo male    1. ARF: severe ATN and continues to require dialysis; monitoring for sxs of recovery; dialysis later today then Tuesday Thursday Saturday  2. Chronic liver disease (probable) vs acute hepatitis secondary to ETOH abuse; unrecognized PTA; elevated bili and coagulopathy/hypotension and some enceph; on lactulose and rifaximin  3. HoTN; requiring midodrine for HD and also scheduled--still requiring to keep bp steady  4. CM: likely stress induced CM as he has recovered quite a bit of his EF.    5. Sepsis: s/p treatment of klebsiella bacteremia; has ongoing elevated WBC of unclear cause; off abx for now   6. Anemia: acute on chronic--recent GI bleed earlier this summer; started some epo; s/p prbcs earlier in week; hgb stable  7. Volume; has ascites and some edema; UF wih HD as tolerates  8. Malnutrition: encouraging oral intake  9. Mild hyperphos; binder if phos over 5.5-6  10. Dispo; plan eventual transfer to [place] for strengthening and ongoing cares; will be on TT HD schedule there  11. Hyponatremia-should be less of a problem now that he is on enforced fluid restriction        Subjective  Seems a little more confused today than yesterday  Modest extracellular fluid volume excess, but no pulmonary congestion or hypoxemia    Dialysis yesterday afternoon went okay    We will have dialysis later today; I reviewed with him our plan to convert to Tuesday Thursday Saturday schedule for his transfer to [place].  He seems to understand    Objective    Vital signs in last 24 hours  Temp:  [98  F (36.7  C)-99.6  F (37.6  C)] 98  F (36.7  C)  Heart Rate:  [] 98  Resp:  [18-26] 18  BP: ()/(51-94) 115/60  Weight:   199 lb 4.8 oz (90.4 kg)    Intake/Output last 3 shifts  I/O last 3 completed shifts:  In: 1360 [P.O.:1360]  Out: 2012 [Other:2012]  Intake/Output this shift:       Physical Exam  Conversant but a little confused  CV: RRR without murmur  or rub  Lung: clear and equal; no extra sounds  Ab: soft and NT; minimally distended; normal bs  Ext: + edema and well perfused  Skin; no rash    Pertinent Labs   Lab Results   Component Value Date    WBC 15.0 (H) 08/14/2018    HGB 7.9 (L) 08/14/2018    HCT 24.0 (L) 08/14/2018     08/14/2018     08/14/2018     Lab Results   Component Value Date    CREATININE 2.51 (H) 08/14/2018    BUN 14 08/14/2018     (L) 08/14/2018    K 3.1 (L) 08/14/2018    CL 93 (L) 08/14/2018    CO2 25 08/14/2018       Lab Results   Component Value Date    ALBUMIN 2.3 (L) 08/14/2018     Lab Results   Component Value Date    CALCIUM 8.3 (L) 08/14/2018    PHOS 5.3 (H) 08/06/2018     I reviewed all lab results  Parveen Jiménez

## 2021-06-19 NOTE — PROGRESS NOTES
Spiritual Care Note    Spiritual Assessment:      visited with patient and significant other, Karlie. Karlie shared about Aayush's medical condition and history; she is hopeful that patient will be able to transfer to [place] tomorrow.    Patient shared about family/life history.     Patient does not have current connection to lars tradition, but stated that he was raised Hinduism.     Care Provided:     Introduced self and role of Spiritual Care     Empathic listening and presence     Helped patient/family in processing of emotions     Plan of Care: A  will continue to visit as able or per request by patient/family/staff.       Simeon Vickers MDIV, [facility]

## 2021-06-19 NOTE — PROGRESS NOTES
" [facility] Progress Note    Subjective    Aayush García is feeling \"crummy\" today. He is unable to articulate in what way, but is feeling tires and lightheaded. States that he had an \"accident\" last night and pulled out his peripheral IV. Oriented to person only. Wife present at bedside. Will have additional dialysis run performed today     Objective    Vital signs in last 24 hours Temp:  [98.1  F (36.7  C)-99.6  F (37.6  C)] 98.5  F (36.9  C)  Heart Rate:  [] 84  Resp:  [18-26] 18  BP: ()/(51-97) 114/61   Weight: 199 lb 4.8 oz (90.4 kg)    Physical Exam   General appearance: Alert, conversational, confusion. Oriented to person only. Jaundiced, scleral icterus.  Lungs: Clear to auscultation bilaterally.  No wheezing or crackles auscultated.  Respirations unlabored  Heart: Regular rate and rhythm, normal S1 and S2, no murmur, rub or gallop.  Abdomen: Obese, distended, soft, nontender.  Extremities: +2 pitting edema up to knees  Skin: Jaundiced  Neurologic: Alert, oriented to person.  No gross neurologic deficits.  Moves all extremities spontaneously.    Pertinent Labs   Lab Results: personally reviewed.     Pertinent Radiology   Radiology Results: Personally reviewed.    Assessment/Plan  Principal Problem:    Septic shock (H)  Active Problems:    Acute respiratory failure with hypoxia (H)    Acute renal failure, unspecified acute renal failure type (H)    Metabolic acidosis    Encephalopathy, metabolic    Elevated troponin    Stress-induced cardiomyopathy    Bacteremia due to Klebsiella pneumoniae    Anemia, unspecified type    Alcohol abuse    Acute encephalopathy    Acute liver failure    Cognitive and behavioral changes    Sleep difficulties    Hallucinations    Aayush García is a 62 year old male with a history of alcohol abuse, HTN and anemia who was admitted with septic shock secondary to Klebsiella bacteremia and anuria.  Required intubation and pressor support in ICU, currently " extubated, moved out of the ICU 7/31, and was transferred back into the ICU 8/5 due to need for pressure support.  Transferred back out of the ICU 8/8. Continues to have issues with encephalopathy despite lactulose/Rifaximin treatment.       Acute renal injury: Most likely severe ATN due to klebsiella sepsis, though cause still not completely clear. May be component of volume depletion as well.  Abdominal US negative 7/28.  Has initiated dialysis and will continue MWF for the foreseen future. Dialysis catheter placed 8/1.  - Nephrology following  - Plan for an additional run of dialysis today per nephrology   -Plan to continue dialysis MWF. Will be on T,TH,S schedule at [facility] if accepted         AMS  Alcohol withdrawal, resolved  Unclear etiology of altered mental status, though may be multifactorial cause including hepatic encephalopathy, alcohol withdrawal, medication induced, delirium. Question possibility of Wernicke's encephalopathy given history of alcohol abuse? Ammonia level unremarkable, empirically treating with lactulose and rifaximin.  Some improvement after starting rifaximin on 8/5, though has remained unchanged since. Requiring a 1:1 due to agitation and confusion, though remains directable   - Delirium order set   -Avoid sedating medications  - PT/OT/SLP  -Lactulose, titrate as needed for 3-4 stools daily. Has been held a number of days due to loose stools (c diff. negative, likely secondary to recent antibiotics. Probiotic started)  - Rifaximin 400 mg three times a day  - Consult neurology for further evaluation of continued encephalopathy     Hyponatremia: Sodium 121 yesterday, now 128 after dialysis yesterday. Discussed with Dr. Jiménez who will be correcting with dialysis over the next week.  Also placed a 1.5 L water restriction. Correction has not changed mental status, so feel this is unlikely related to AMS above.   -Dialysis per nephrology. Will have additional run today.   -AM  BMP      Hypotension: Likely secondary to alcoholic hepatitis.  Also with severe hypoalbuminemia which is likely contributing. Was transferred to the ICU for pressure support 8/5 and transferred out 8/8 after increased minodrine.  Was given albumin 25 g IV every 6 hours for 8 doses 8/5. Evening of 8/8 needed additional dose of albumin secondary to episode of hypotension. Continues to have ongoing issues with hypotension, particularly with dialysis runs.    - Dialysis per nephrology  - Midodrine 15 mg Q8H.  Continue as needed midodrine. Wean as tolerated after discharge   -Avoid giving fluid bolus if possible given difficulty to remove fluid.  Consider 25% albumin if hypotensive not responding to midodrine      Alcoholic Hepatitis  Hepatic encephalopathy  Ascites: 350ml peppermint schnapps daily for years. Per wife, never has had withdrawal though never been more than 2 days without a drink that she knows of.  Elevated AST/ALT, bilirubin, alk phos, and INR with hypoalbuminemia. Decreased synthetic function of liver. US 7/25 showed fatty infiltration of liver which may be contributing but no concerning biliary tree disease. Repeat RUQ u/s 7/28 without acute findings. Paracentesis 8/3 with 1.95 L fluid removed, however unfortunately send for Gram stain or culture. 8/10, MELD score of 31 with a 53% estimated 3 month mortality.   - Continue to treat for possible hepatic encephalopathy as above  - GI following, appreciate their recommendations  -If paracentesis is performed again would recommending sending fluid for analysis.  -Continue folic acid, thiamine, multivitamin    Sepsis, resolved   Klebsiella bacteremia, resolved  Persistent leukocytosis: Admitted with leukocytosis, lactic acidosis, hypotension and tachycardia. Blood cultures growing klebsiella.  UA shows leukocytes, blood and few bacteria but may be unreliable as taken after period of anuria.  Most likely urinary source, though now question possible SBP given  hepatic encephalopathy.  Possibly GI source, did have ulcer 1-2 months ago.  Unlikely PNA given CXR without infiltrates. Persistent leukocytosis, though continuing to tend down. Blood cultures x2 drawn and no growth yet.  Repeat chest x-ray showing improvement of infiltrates and improvement of pulmonary edema. CT abdomen showed no abscess just ascites. Therapeutic paracentesis performed with 2L drawn, unfortunately fluid was not sent for culture or stain.  UA 8/5 suspicious for urinary tract infection, however urine culture with no growth. IV Zosyn initiated 8/5 and discontinued 8/8. C. Diff negative 8/12.   - Continue to monitor for signs of infection     Macrocytic anemia: Likely nutritional component due to alcohol use, and MICHAEL. Peripheral smear showed inflammatory changes only. LDH and haptoglobin only mildly elevated.  Was transfused 1uPRBCs on 8/6.  - AM CBC  - EPO per nephrology     NSTEMI: Type II myocardial infarction or demand related ischemia secondary to sepsis on presentation.  EF of 25-30% on 7/25/2018, significantly improved with echo 7/31/2018 with an EF of 61%.  Per cardiology not recommending ischemic workup at this time.  - Cardiology has signed off.  - Discontinued coreg 3.125 mg twice daily due to hypotension.  Consider restarting when/if hypotension has resolved.  - Telemetry monitoring      Melena, resolved: Episode of melanotic stool 7/27-28. Had downtrending hemoglobin months prior to admission.  Was seen in clinic recently for melena which was thought due to NSAID use.  Symptoms had resolved after he had stopped using NSAIDs.  He was on a PPI as well. Had melenic stools here, possibly gastritis from decreased perfusion versus worsening ulceration, has resolved.   - GI following as above  - PPI two times a day   - Transfuse for Hgb < 7      History of tobacco abuse  - Nicotine patch    Diet: 2mg Na  Fluids: No IVF  DVT Prophylaxis: Subcutaneous heparin  Code: full code      Disposition/Advanced Care Planning   Discharge Planning discussed with patient's wife  Barriers to discharge:  Difficult disposition  Dispo: anticipate discharge to [place]?   Anticipated discharge date:  unknown at this time        Precepted patient with Dr. Karen Xie DO (PGY2)  [facility] Resident  Pager: [phone]

## 2021-06-19 NOTE — PROGRESS NOTES
[facility] Admissions: Continuing to follow for LTACH needs at discharge. Insurance has denied LTACH at this time, citing patient can be managed at lower level of care. If unable to find an accepting TCU, will resubmit to insurance for LTACH approval. Will need to show insurance documentation of TCU denials (if denied) and reason for denial. Voicemail left for CM/SW explaining the above. Will continue to follow. Thank you.    Digna Celaya RN Referral Specialist [phone]

## 2021-06-19 NOTE — PROGRESS NOTES
[facility] Progress Note       Assessment/Plan  MICHAEL - suspect hemodynamic ATN due to volume depletion, low bp, CM, ACE-I. Ongoing MICHALE, low UOP despite diuretics.  Cr with about a 1 point rise without dialysis for a day and only 400 or so of urine volume.  Not indicative he's ready to stop dialysis at this point.  CVC functional incompatible with getting good dialysis.  Recs:  - abort dialysis today  - remove temp line  - consult to IR to place tunneled line tomorrow and dialysis to follow  - could cancel this if evidence of increased UOP overnight  - continue IV lasix BID    Shock - resolved.      CM ?all stress induced CM. Has risk factors for CAD and ETOH CM. Cards following, repeat echo in the future     Sepsis - klebsiella bacteremia.  No further positive cultures    Anemia - acute on chronic, recent GI bleed, hgb drifting down, transfused Friday on run. Stable in the 8s.  May need EPO if MICHAEL persists    Inc LFT's and coagulopathy. No documented chronic liver disease. Suspect mostly due to shock. STarting to improve    Resp failure - now extubated and on NC    ETOHism -  at risk for W/D, on CIWA protocol    Encephalopathy, sepsis and ETOH on admit - mentation improving.  Off precedex    Hypotension - improved, but not tolerating UF that well    Hyponatremia - down today with no UF.  Follow       Principal Problem:    Septic shock (H)  Active Problems:    Acute respiratory failure with hypoxia (H)    Acute renal failure, unspecified acute renal failure type (H)    Metabolic acidosis    Encephalopathy, metabolic    Elevated troponin    Other cardiomyopathy (H)    Bacteremia due to Klebsiella pneumoniae    Anemia, unspecified type    Alcohol abuse      Subjective  Seen on dialysis, very, very poor CVC function, needing to flush with saline constantly.  Best BFR is 200.  Asked nurse to abort as we're not accomplishing anything with this type of run.  Discussed with patient and family that we'll replace line, appears to  still need dialysis for some time    Objective    Vital signs in last 24 hours  Temp:  [97  F (36.1  C)-101.6  F (38.7  C)] 98.4  F (36.9  C)  Heart Rate:  [] 98  Resp:  [17-37] 20  BP: ()/(51-82) 106/62  Weight:   212 lb 8 oz (96.4 kg)    Intake/Output last 3 shifts  I/O last 3 completed shifts:  In: 1304.3 [P.O.:800; I.V.:103.1; IV Piggyback:401.2]  Out: 700 [Urine:460; Stool:240]  Intake/Output this shift:  I/O this shift:  In: -   Out: 37 [Urine:37]    Review of Systems   A 12 point comprehensive review of systems was negative except as noted.    Physical Exam  Awake and more interactive   HEENT NC in place  Neck supple, CVC in place  Lungs coarse  But fair air movement  Cor RRR   abd soft +enlarged liver  Ext warm  + general edema     Pertinent Labs   Lab Results: personally reviewed.   Lab Results   Component Value Date     (L) 07/30/2018     07/29/2018     07/29/2018    K 3.7 07/30/2018    K 3.8 07/29/2018    K 3.4 (L) 07/29/2018    CO2 25 07/30/2018    CO2 25 07/29/2018    CO2 25 07/29/2018    BUN 38 (H) 07/30/2018    BUN 30 (H) 07/29/2018    BUN 23 (H) 07/29/2018    CREATININE 4.67 (H) 07/30/2018    CREATININE 3.75 (H) 07/29/2018    CREATININE 3.28 (H) 07/29/2018    CALCIUM 8.7 07/30/2018    CALCIUM 9.0 07/29/2018    CALCIUM 8.1 (L) 07/29/2018     Lab Results   Component Value Date    WBC 20.8 (H) 07/30/2018    WBC 17.4 (H) 07/29/2018    WBC 18.2 (H) 07/28/2018    HGB 8.5 (L) 07/30/2018    HGB 8.3 (L) 07/29/2018    HGB 8.4 (L) 07/29/2018    HCT 25.1 (L) 07/30/2018    HCT 25.2 (L) 07/29/2018    HCT 22.9 (L) 07/28/2018     (H) 07/30/2018     (H) 07/29/2018     07/28/2018     07/30/2018     07/29/2018     07/28/2018       Pertinent Radiology   Radiology Results: Personally reviewed impression/s    Manish Jaimes MD  [facility]  [phone]

## 2021-06-19 NOTE — PROGRESS NOTES
"  Clinical Nutrition Therapy Assessment Note      Reason for Assessment:   Aayush García is a 62 y.o. male assessed by the registered Dietitian for consult and nutrition support.  Assess and recommend tube feeding.    Admitted for septic shock, acute respiratory failure, acute renal failure, metabolic acidosis, encephalopathy, elevated troponin, ETOH abuse    Pt receiving hemodialysis, recently started making urine.  Ventilated.    Nutrition History:  Information obtained from family/caregiver and chart.  Patients diet prior to admission has been regular. Recent food/fluid intake has been good, \"eats everything\". Drinks 1/2 bottle peppermint schnapps daily per Rounds.  Patient has been consuming the following supplements none.   Patient has the following cultural/Moravian food needs or preferences:none   Patient has the following food allergies or intolerances:nkfa    Current Nutrition Prescription:   Diet: NPO  Supplements and Modulars:     IV dextrose or Fluids:  dexmedetomidine 400 mcg/100 mL in NS (PRECEDEX) (4mcg/mL) Last Rate: 1.5 mcg/kg/hr (07/26/18 0859)   norepinephrine IV infusion in NS Last Rate: Stopped (07/25/18 1733)   propofol Last Rate: 10 mcg/kg/min (07/26/18 0848)   vasopressin Last Rate: Stopped (07/25/18 2019)       Current Nutrition Intake:  Total nutrient intake from all sources does not meet estimated nutritional needs.  140 kcal/d propofol    Anthropometrics:  Height: 5' 8\" (172.7 cm)  Admission weight:  Weight: 212 lb 4.9 oz (96.3 kg)  BMI (Calculated): 28.4  BMI indication: 25-29.9 overweight  Ideal body weight 154 lb  % Ideal body weight 138%  Usual body weight 190 lb  % Usual body weight 90%  Weight History:  Wt Readings from Last 3 Encounters:   07/26/18 212 lb 4.9 oz (96.3 kg)   03/19/18 180 lb (81.6 kg)     Physical Findings:  The patient has the following physical signs which could indicate malnutrition: edema       GI Status/Output:   The patient's GI symptoms include: abdominal " distention    Bowel Sounds hypoactive    Skin/Wound:  Edwin score Edwin Scale Score: 12    Medications:  Medications reviewed.  Iv albumin, thiamine, mvi,folic acid, cefapime, ssi, vancomycin, pericolace, hydrocortisone    Labs:  Na 125, K 4.4, BUN 24, Creat 5.09, , Glu 171, Hgb 7.1    Assessed Nutritional Needs:  Assessment weight is 86 kg, with a weight source of other, UBW    Estimated Energy Needs: 2150 - 2580 kcals daily per 25 - 30 kcal/kg     Estimated Protein Needs: 86 - 103 gdaily, 1.0 1.2 g/kg.    Estimated Fluid Needs: 2150 mls daily, 25 mls/kg, or per MD    Malnutrition: Not noted    Nutrition Risk Level: high risk    Nutrition dx:  Impaired swallowing related to vent evidenced by NPO    Goal:  Tube feeding to meet nutrition needs  Advance diet when safe    Intervention:  Isosource 1.5 at 10 mL/hr increase by 10 mL every 8 hours until goal of 60 mL/hr is achieved. Flush with 175 mL water every 4 hours.    Will provide: 1440 mL, 2160 kcal, 102 gm pro, 254 gm cho, 22 gm fiber, 1100 mL free water + 1050 mL water flushes = 2150 mL total water    Monitoring:  TF tolerance, diet advancement, weight, labs  See Care Plan for Problems, Goals, and Interventions.

## 2021-06-19 NOTE — PROGRESS NOTES
Faculty Supervision of Residents    I have examined Aayush García, : 1955, on 2018 and the medical care has been evaluated and discussed with the resident.   I agree with the medical care provided, confirm the findings after personally reviewing the images and labs, and agree with the plan documented in the note by Dr. Benjamin Rosenstein.    Adam Nicole III, MD, FAAFP  18 1:33 PM

## 2021-06-19 NOTE — PROGRESS NOTES
Renal progress note  CC: ARF  Assessment and Plan:  62 y.o. yo male    1. ARF: severe ATN and continues to require dialysis; monitoring for sxs of recovery; dialysis later today then Tuesday Thursday Saturday  2. Chronic liver disease (probable) vs acute hepatitis secondary to ETOH abuse; unrecognized PTA; elevated bili and coagulopathy/hypotension and some enceph; on lactulose and rifaximin  3. HoTN; requiring midodrine for HD and also scheduled--still requiring to keep bp steady  4. CM: likely stress induced CM as he has recovered quite a bit of his EF.    5. Sepsis: s/p treatment of klebsiella bacteremia; has ongoing elevated WBC of unclear cause; off abx for now   6. Anemia: acute on chronic--recent GI bleed earlier this summer; started some epo; s/p prbcs earlier in week; hgb stable  7. Volume; has ascites and some edema; UF wih HD as tolerates  8. Malnutrition: encouraging oral intake  9. Mild hyperphos; binder if phos over 5.5-6  10. Dispo; plan eventual transfer to [place] for strengthening and ongoing cares; will be on TT HD schedule there        Subjective  Reasonably interactive today  Modest extracellular fluid volume excess, but no pulmonary congestion or hypoxemia    We will have dialysis later today; I reviewed with him our plan to convert to Tuesday Thursday Saturday schedule for his transfer to [place].  He seems to understand    Objective    Vital signs in last 24 hours  Temp:  [97.8  F (36.6  C)-98.5  F (36.9  C)] 97.8  F (36.6  C)  Heart Rate:  [78-98] 78  Resp:  [20-28] 20  BP: (114-150)/(72-85) 142/85  Weight:   199 lb 4.8 oz (90.4 kg)    Intake/Output last 3 shifts  I/O last 3 completed shifts:  In: 908 [P.O.:800; I.V.:58; IV Piggyback:50]  Out: -   Intake/Output this shift:       Physical Exam  Working with OT and conversant with me  CV: RRR without murmur or rub  Lung: clear and equal; no extra sounds  Ab: soft and NT; not distended; normal bs  Ext: + edema and well perfused  Skin; no  rash    Pertinent Labs   Lab Results   Component Value Date    WBC 15.5 (H) 08/13/2018    HGB 8.4 (L) 08/13/2018    HCT 25.5 (L) 08/13/2018     08/13/2018     08/13/2018     Lab Results   Component Value Date    CREATININE 4.26 (H) 08/13/2018    BUN 24 (H) 08/13/2018     (L) 08/13/2018    K 3.7 08/13/2018    CL 88 (L) 08/13/2018    CO2 22 08/13/2018       Lab Results   Component Value Date    ALBUMIN 2.4 (L) 08/13/2018     Lab Results   Component Value Date    CALCIUM 8.7 08/13/2018    PHOS 5.3 (H) 08/06/2018     I reviewed all lab results  Parveen Jiménez

## 2021-06-19 NOTE — PROGRESS NOTES
[facility] Admissions: Continuing to wait for insurance approval for LTACH admission. Have called daily to check on status of approval. It is still under review by MD. Unsure at this time if a determination will be made by end of day today. Will update MD's and Care manager if approved and can discharge today. If not may need to wait until Saturday, or Monday. Please feel free to call me with any questions. Thank you.      11:26: Updated by insurance that patient has been denied for LTACH as they feel patient can be managed at a lower level of care. Did request the information for peer to peer or MD to MD review for possibility to overturn denial. Attending MD will need to call [phone] and give them the case ID# [id number], along with 3 call times that work for the MD to do peer to peer with the insurance MD. This may or may not be able to be done today. If not, then would need to contact insurance for peer to peer on Monday. All above information given to SW as well. As always, please feel free to call me with any further questions. Thank you.      Digna Celaya, TO[facility] Referral Specialist [phone]

## 2021-06-19 NOTE — PROGRESS NOTES
"Mr. García remains on the heated HFNC, currently on 30lpm and 60%; Sp02 low to mid 90s. Patient confortably sleeping; no issus noted overnight. Will continue to follow and titrate down the Fi02 and flow on the system.     /61  Pulse 100  Temp 100.5  F (38.1  C) (Axillary)   Resp (!) 33  Ht 5' 8\" (1.727 m)  Wt 211 lb 9.6 oz (96 kg)  SpO2 96%  BMI 32.17 kg/m2     Ran Avilez, RRT  "

## 2021-06-19 NOTE — PROGRESS NOTES
Cardiology Progress Note    Assessment:  1.  Elevated troponin with reduced left ventricular systolic function.  Findings consistent with non-ST segment elevation MI.  Possibly demand ischemia, possible stress-induced cardiopathy but need to be concerned about underlying coronary artery disease with multiple risk factors.  He had been on pressors during the earlier hospital course.  Discussed initiation of carvedilol but currently not taking p.o.  Swallow study as planned.  Continue to monitor closely on telemetry.  Would avoid medications that would exacerbate QT.  Would suggest repeating limited echocardiogram tomorrow to reevaluate left ventricular systolic function.  Short runs of nonsustained ventricular tachycardia on telemetry monitoring.    2.  Acute kidney injury.  Felt to be hemodynamic.  On hemodialysis/ultrafiltration.  Patient on lisinopril as an outpatient which has been on hold.    3.  Question sepsis.  Gram-negative rods in 1 blood culture.  Positive Klebsiella.  Followed by intensivist and primary care service.  Shock with elevated liver function tests and coagulopathy.  AST elevated but appears to be leveling off.  336 today compared to 337 yesterday.  ALT 72.  Him previously on Crestor which has been on hold.  Principal Problem:    Septic shock (H)  Active Problems:    Acute respiratory failure with hypoxia (H)    Acute renal failure, unspecified acute renal failure type (H)    Metabolic acidosis    Encephalopathy, metabolic    Elevated troponin    Other cardiomyopathy (H)    Bacteremia due to Klebsiella pneumoniae    Anemia, unspecified type    Alcohol abuse      Plan:  1.  Add carvedilol as able.  Awaiting swallow study.  2.  Would favor follow-up limited echocardiogram tomorrow to follow-up left ventricular systolic function.  3.  Eventual ischemic workup.  4.  Avoid medications that would further prolonged QT.  5.  Continue aspirin.    Subjective:   Aayush García is seen in follow-up.  Chart  records are reviewed.  First visit for this examiner yesterday.  Patient admitted with toxic shock and anuria with positive blood cultures for Klebsiella.  He was intubated and required pressors.  He has been sedated for alcohol withdrawal symptoms.  Cardiology saw the patient secondary to LV dysfunction was identified and echocardiography for troponin that peaked at 8.47 July 25, 2018.  The Rupal's notes are reviewed.    He is a little bit more alert today.  He sitting in the chair.  He denies specific complaints although has a little difficulty focusing.  His significant other is providing input.    Objective:   Vital signs in last 24 hours:  Temp:  [98.1  F (36.7  C)-101.2  F (38.4  C)] 100.2  F (37.9  C)  Heart Rate:  [] 94  Resp:  [20-40] 28  BP: ()/(46-74) 91/57  Arterial Line BP: (111-141)/(52-73) 127/57  FiO2 (%):  [45 %] 45 %  Weight:   207 lb 9.6 oz (94.2 kg)     Physical Exam:   Neck: Jugular venous pressure difficult to assess.  He sitting in the chair and has a catheter in his right IJ.   Lungs: His breath sounds with few crackles at the bases and rhonchi.   COR: RRR, distant heart tones, soft systolic murmur   Abd: Mildly distended.  No obvious tenderness to palpation.  Positive bowel sounds.   Extrem: Edema is improved.  There is trace edema.  Warm lower extremities.    Current Facility-Administered Medications   Medication Dose Route Frequency Provider Last Rate Last Dose     acetaminophen tablet 650 mg (TYLENOL)  650 mg Oral Q4H PRN Astrid Redd CNP        Or     acetaminophen solution 650 mg (TYLENOL)  650 mg Enteral Tube Q4H PRN Astrid Redd CNP        Or     acetaminophen suppository 650 mg (TYLENOL)  650 mg Rectal Q4H PRN Astrid Redd CNP         albuterol nebulizer solution 2.5 mg (PROVENTIL)  2.5 mg Nebulization Q4H PRN Astrid Redd CNP         aspirin suppository 75 mg  75 mg Rectal DAILY April Goldsmith MD   75 mg at 07/27/18 2120      benzocaine-menthol lozenge 1 lozenge (CEPACOL)  1 lozenge Oral Q1H PRN Astrid Redd CNP         bisacodyl suppository 10 mg (DULCOLAX)  10 mg Rectal Daily PRN Astrid Redd CNP         cefTRIAXone 1 g in NaCl 0.9 % 50 mL (MINI-BAG Plus) (ROCEPHIN)  1 g Intravenous Q24H Ambrocio Montalvo  mL/hr at 07/28/18 2047 1 g at 07/28/18 2047     dexmedetomidine 400 mcg/100 mL in NS (PRECEDEX) (4mcg/mL)  0.1-1.5 mcg/kg/hr Intravenous Continuous Astrid Redd CNP 15.3 mL/hr at 07/29/18 1045 0.7 mcg/kg/hr at 07/29/18 1045     dextrose 50 % (D50W) syringe 20-50 mL  20-50 mL Intravenous PRN Astrid Redd CNP         erythromycin 5 mg/gram (0.5 %) ophthalmic ointment   Both Eyes QID Astrid Redd CNP         folic acid 1 mg, thiamine 100 mg, multiple vitamin 3,300 unit- 150 mcg/10 mL 10 mL in sodium chloride 0.9% 1,000 mL   Intravenous Daily PRN Astrid Redd CNP         folic acid tablet 1 mg (FOLVITE)  1 mg Enteral Tube DAILY Lewis Farr MD        Or     folic acid injection 1 mg  1 mg Intramuscular DAILY Lewis Farr MD   1 mg at 07/29/18 1025     furosemide injection 160 mg (LASIX)  160 mg Intravenous QAM Brie Oliver MD   160 mg at 07/29/18 0959     glucagon (human recombinant) injection 1 mg  1 mg Subcutaneous PRN Astrid Redd CNP         haloperidol lactate injection 2.5-5 mg (HALDOL)  2.5-5 mg Intravenous Q4H PRN Bigg Medley DO   5 mg at 07/29/18 1025     ipratropium-albuterol 0.5-2.5 mg/3 mL nebulizer solution 3 mL (DUO-NEB)  3 mL Nebulization QID - RT Lewis Farr MD   3 mL at 07/29/18 1109     magnesium hydroxide suspension 30 mL (MILK OF MAG)  30 mL Oral Daily PRN Astrid Redd CNP         multivitamin therapeutic tablet 1 tablet  1 tablet Enteral Tube DAILY Lewis Farr MD         naloxone injection 0.2-0.4 mg (NARCAN)  0.2-0.4 mg Intravenous PRN Astrid Redd CNP        Or     naloxone  injection 0.2-0.4 mg (NARCAN)  0.2-0.4 mg Intramuscular PRN Astrid Redd CNP         nicotine 14 mg/24 hr 1 patch (NICODERM CQ)  1 patch Transdermal DAILY Benjamin E Rosenstein, MD   1 patch at 18 1022     ondansetron injection 4 mg (ZOFRAN)  4 mg Intravenous Q4H PRN Astrid Redd CNP        Or     ondansetron tablet 8 mg (ZOFRAN)  8 mg Oral Q8H PRN Astrid Redd CNP         pantoprazole 40 mg injection  40 mg Intravenous Q12H Bigg Medley, DO   40 mg at 18 1022     phytonadione (vitamin K) 10 mg in dextrose 5% 50 mL (AQUA-MEPHYTON)  10 mg Intravenous DAILY Bigg Medley,  mL/hr at 18 1012 10 mg at 18 1012     polyvinyl alcohol 1.4 % ophthalmic solution 1-2 drop (LIQUIFILM TEARS)  1-2 drop Both Eyes Q1H PRN Astrid Redd CNP   2 drop at 18 0556     senna-docusate 8.6-50 mg tablet 1 tablet (PERICOLACE)  1 tablet Oral BID Astrid Redd CNP   1 tablet at 18 0759    Or     sennosides syrup 8.8 mg (for SENOKOT)  8.8 mg Enteral Tube BID Astrid Redd CNP         sodium chloride flush 10-20 mL (NS)  10-20 mL Intravenous PRN Kota Mahoney MD         sodium chloride flush 10-30 mL (NS)  10-30 mL Intravenous PRN Kota Mahoney MD         sodium chloride flush 10-30 mL (NS)  10-30 mL Intravenous Q8H FIXED TIMES Kota Mahoney MD   10 mL at 18 0626     sodium chloride flush 20 mL (NS)  20 mL Intravenous PRN Kota Mahoney MD         sodium phosphate 12 mmol in dextrose 5% 250 mL  12 mmol Intravenous Once Cynthia Alexander CNP         thiamine tablet 100 mg  100 mg Enteral Tube DAILY Lewis Farr MD        Or     thiamine injection 100 mg (vitamin B1)  100 mg Intramuscular DAILY Lewis Farr MD   100 mg at 18 1023       Cardiographics:    EC2018 14:21:50 HE JOES/JOHNS/AKBAR/SARMAD  Normal sinus rhythm with sinus arrhythmia  Left axis deviation  Moderate voltage criteria for LVH,  may be normal variant  STand T wave abnormality consider anterior-lateral ischemia  Prolonged QT  Abnormal ECG  When compared with ECG of 2018 08:36,  Premature atrial complexes are no longer Present  Inverted T waves have replaced nonspecific T wave abnormality in Lateral leads  Confirmed by REMI HANEY MD LOC:[facility] ([id number]) on 2018 5:16:53 PM  25mm/s 10mm/mV 100Hz 8.0 SP2 12SL 237 EFRAIN: 1  Referred by: Confirmed By: REMI LOC:[facility] HANEY MD  Echocardiogram:   Patient Information      Patient Name MRN Sex              Age     Aayush García [id number] Male 1955 (62 y.o.)       Indications      Acute myocardial infarction       Summary        No previous study for comparison.    Technically challenging examination. Definity contrast utilized    Left ventricle ejection fraction is severely decreased. Left ventricular ejection fraction estimated 25-30%.    Global left ventricular hypokinesis with large area of akinesis involving the apex, anterior apical and apical lateral portions of left ventricle.    Normal right ventricular size and systolic function.    Mild to moderate aortic stenosis. Mean gradient of 15 mmHg. The degree of aortic stenosis potentially underrepresented in the setting of severe reduction left ventricular systolic function.    Left atrial enlargement    Moderate enlargement of the aortic root.    Results called to the floor            Study Result      US ABDOMEN LIMITED  2018 4:25 PM     INDICATION: Elevated alk phos, bilirubin  COMPARISON: Abdominal ultrasound 2018     FINDINGS: Technically challenging exam due to the patient's body habitus and bowel gas.  GALLBLADDER: Small amount sludge in the gallbladder. No wall thickening.  BILE DUCTS: No intrahepatic bile duct dilation. The common bile duct was not visualized due to bowel gas..  LIVER: Hepatomegaly measuring 20.1 cm. Diffusely echogenic. Portions difficult to penetrate.  RIGHT KIDNEY: No  hydronephrosis.  PANCREAS: Not well seen due to bowel gas.     Small amount of ascites in the right upper quadrant.     IMPRESSION:   CONCLUSION:  1.  Technically challenging exam.  2.  Gallbladder sludge. No findings for cholecystitis.  3.  Enlarged and fatty liver     Study Result      XR CHEST 1 VIEW PORTABLE  7/28/2018 6:04 AM     INDICATION: Dyspnea  COMPARISON: 07/25/2018.     FINDINGS: Interval removal of ETT and NG tube. Right IJ catheter high SVC level. Right PICC catheter right atrial level. No pneumothorax. Interval worsening with bilateral airspace opacities with upper lobe predominance greatest on the left. Small left   effusion. Previous left lower rib fractures. Monitor electrodes.           Telemetry: Sinus rhythm, sinus tachycardia, short runs of nonsustained ventricular tachycardia    Imaging:   Chest X-Ray:     Lab Results:   Sodium   Date Value Ref Range Status   07/29/2018 141 136 - 145 mmol/L Final   07/28/2018 136 136 - 145 mmol/L Final   07/27/2018 132 (L) 136 - 145 mmol/L Final     Potassium   Date Value Ref Range Status   07/29/2018 3.4 (L) 3.5 - 5.0 mmol/L Final   07/28/2018 3.6 3.5 - 5.0 mmol/L Final   07/27/2018 4.6 3.5 - 5.0 mmol/L Final     Chloride   Date Value Ref Range Status   07/29/2018 104 98 - 107 mmol/L Final   07/28/2018 98 98 - 107 mmol/L Final   07/27/2018 99 98 - 107 mmol/L Final     CO2   Date Value Ref Range Status   07/29/2018 25 22 - 31 mmol/L Final   07/28/2018 24 22 - 31 mmol/L Final   07/27/2018 21 (L) 22 - 31 mmol/L Final     BUN   Date Value Ref Range Status   07/29/2018 23 (H) 8 - 22 mg/dL Final   07/28/2018 25 (H) 8 - 22 mg/dL Final   07/27/2018 21 8 - 22 mg/dL Final     Creatinine   Date Value Ref Range Status   07/29/2018 3.28 (H) 0.70 - 1.30 mg/dL Final   07/28/2018 3.49 (H) 0.70 - 1.30 mg/dL Final   07/27/2018 4.01 (H) 0.70 - 1.30 mg/dL Final     Calcium   Date Value Ref Range Status   07/29/2018 8.1 (L) 8.5 - 10.5 mg/dL Final   07/28/2018 8.1 (L) 8.5 - 10.5  mg/dL Final   07/27/2018 7.8 (L) 8.5 - 10.5 mg/dL Final     WBC   Date Value Ref Range Status   07/29/2018 17.4 (H) 4.0 - 11.0 thou/uL Final   07/28/2018 18.2 (H) 4.0 - 11.0 thou/uL Final   07/27/2018 15.1 (H) 4.0 - 11.0 thou/uL Final     Hemoglobin   Date Value Ref Range Status   07/29/2018 8.3 (L) 14.0 - 18.0 g/dL Final   07/29/2018 8.4 (L) 14.0 - 18.0 g/dL Final   07/28/2018 8.3 (L) 14.0 - 18.0 g/dL Final     Hematocrit   Date Value Ref Range Status   07/29/2018 25.2 (L) 40.0 - 54.0 % Final   07/28/2018 22.9 (L) 40.0 - 54.0 % Final   07/27/2018 20.1 (L) 40.0 - 54.0 % Final     MCV   Date Value Ref Range Status   07/29/2018 101 (H) 80 - 100 fL Final   07/28/2018 100 80 - 100 fL Final   07/27/2018 102 (H) 80 - 100 fL Final     Platelets   Date Value Ref Range Status   07/29/2018 160 140 - 440 thou/uL Final   07/28/2018 154 140 - 440 thou/uL Final   07/27/2018 133 (L) 140 - 440 thou/uL Final     CK, Total   Date Value Ref Range Status   07/27/2018 1612 (HH) 30 - 190 U/L Final   07/26/2018 762 (HH) 30 - 190 U/L Final   07/25/2018 649 (HH) 30 - 190 U/L Final     CK-MB   Date Value Ref Range Status   07/25/2018 24 (HH) 0 - 7 ng/mL Final     Troponin I   Date Value Ref Range Status   07/26/2018 7.61 (HH) 0.00 - 0.29 ng/mL Final   07/25/2018 8.47 (HH) 0.00 - 0.29 ng/mL Final   07/25/2018 5.17 (HH) 0.00 - 0.29 ng/mL Final     INR   Date Value Ref Range Status   07/29/2018 1.68 (H) 0.90 - 1.10 Final   07/28/2018 1.47 (H) 0.90 - 1.10 Final   07/27/2018 1.65 (H) 0.90 - 1.10 Final

## 2021-06-19 NOTE — PROGRESS NOTES
"  Clinical Nutrition Therapy Follow Up Note    Current Nutrition Prescription:   Diet:  Cardiac, Renal    Pt busy with cares at time of interview, per care rounds pt is NPO, but unsure why. Diet advancing.     Current Nutrition Intake:  PO intake is poor 1-50% of meals. Pt is drinking some boost supplements. PO intake does not meet estimated nutrition needs    Anthropometrics:  Height: 5' 8\" (172.7 cm)  Admission weight: 193 lb 7/25  Weight: 204 lb 3.2 oz (92.6 kg) 8/5/18    Physical Findings:  The patient has the following physical signs which could indicate malnutrition:did not assess today     GI Status/Output:   The patient's GI symptoms include: loose stools yesterday    Skin/Wound:  Edwin Scale Score: 12  +2 generalized edema     Medications:  Medications reviewed.   thiamine, mvi, folic acid    Labs:   Reviewed.    Malnutrition: Not noted    Nutrition Risk Level: moderate risk    Nutrition dx:  Altered nutrition-related lab values d/t MICHAEL and septic shock as evidenced by labs - progressing     Goal:  Meet estimated nutrition needs - not progressing     Intervention:  Encourage PO intake   Continue current supplement order    Monitoring:  Weight, po intake, labs, need for renal diet education if pt likely to continue on dialysis, supplement acceptance, if appetite does not improve may need TF to supplement oral intake until able to eat enough to meet needs.    See Care Plan for Problems, Goals, and Interventions.  "

## 2021-06-19 NOTE — PROGRESS NOTES
RESPIRATORY CARE NOTE     Patient Name: Aayush García  Today's Date: 7/31/2018       Pt continues to receive DuoNeb as scheduled. BS are coarse/crackles/rhonchi bilaterally. Pt is on 1 lpm of oxygen via NC, SpO2 is  97%. Post treatment there is increased aeration. Pt also perceives improvement.  RT encouraged deep breathing and coughing techniques .  RT will continue to monitor and assess.     PACHECO BrushT

## 2021-06-19 NOTE — PROGRESS NOTES
RENAL Progress Note       Assessment/Plan  MICHAEL - suspect hemodynamic ATN due to volume depletion, low bp, CM, ACE-I. Ongoing MICHAEL, low UOP despite boluses and Cr worse.  Appears that he does not have enough clearance to avoid dialysis in the short term.  Recs:  - move forward with tunneled line today  - will dialyze this afternoon after CVC placed  - continue 3x weekly dialysis for now and watch for recovery  - with improved EF can use midodrine prior to dialysis to help support BP, however as his BP is volume responsive I think we need to be gentle with UF    Hypotension - BP seems to be orthostatic and to respond to fluids.  Minimal UF with dialysis    CM  - likely stress induced CM as he has recovered quite a bit of his EF. Has risk factors for CAD and ETOH CM. Overall volume up but responds to fluid boluses so doubt we can UF much currently     Sepsis - klebsiella bacteremia.  No further positive cultures.  On ceftriaxone    Anemia - acute on chronic, recent GI bleed, hgb drifting down, transfused Friday on run. Stable in the 8s.  May need EPO if MICHAEL persists    Inc LFT's and coagulopathy - No documented chronic liver disease. Suspect mostly due to shock.  They have stabilized    Resp failure - on 1L NC    ETOHism -  at risk for W/D, on CIWA protocol    Encephalopathy, sepsis and ETOH on admit - mentation improving although waxes and wanes quite a bit with delirium    Hyponatremia - stable       Principal Problem:    Septic shock (H)  Active Problems:    Acute respiratory failure with hypoxia (H)    Acute renal failure, unspecified acute renal failure type (H)    Metabolic acidosis    Encephalopathy, metabolic    Elevated troponin    Other cardiomyopathy (H)    Bacteremia due to Klebsiella pneumoniae    Anemia, unspecified type    Alcohol abuse      Subjective  Reviewed overnight events.  Ongoing issues with hypotension at times.  Responded to another fluid bolus and BPs more stable.  Despite fluid given back, Cr  has worsened, minimal UOP.  Aayush is awake but doesn't make a lot of sense.  Reviewed his Echo, his EF has improved markedly.       Objective    Vital signs in last 24 hours  Temp:  [97.3  F (36.3  C)-99.5  F (37.5  C)] (P) 98.5  F (36.9  C)  Heart Rate:  [77-99] 79  Resp:  [15-29] 15  BP: ()/(43-66) (P) 95/51  Weight:   214 lb 4.8 oz (97.2 kg)    Intake/Output last 3 shifts  I/O last 3 completed shifts:  In: 858.5 [P.O.:240; I.V.:520; IV Piggyback:98.5]  Out: 455 [Urine:455]  Intake/Output this shift:  I/O this shift:  In: -   Out: 60 [Urine:60]    Review of Systems   A 12 point comprehensive review of systems was negative except as noted.    Physical Exam  Awake but nonsensical  HEENT NC in place  Neck supple  Lungs fairly clear anteriorally  Cor RRR, 1-2+ edema in legs  abd soft, obese  Ext warm    Pertinent Labs   Lab Results: personally reviewed.   Lab Results   Component Value Date     (L) 08/01/2018     (L) 07/31/2018     (L) 07/31/2018     (L) 07/31/2018    K 3.6 08/01/2018    K 3.6 07/31/2018    K 3.5 07/31/2018    K 3.5 07/31/2018    CO2 22 08/01/2018    CO2 22 07/31/2018    CO2 26 07/31/2018    CO2 26 07/31/2018    BUN 56 (H) 08/01/2018    BUN 53 (H) 07/31/2018    BUN 42 (H) 07/31/2018    BUN 42 (H) 07/31/2018    CREATININE 5.57 (H) 08/01/2018    CREATININE 5.34 (H) 07/31/2018    CREATININE 4.63 (H) 07/31/2018    CREATININE 4.63 (H) 07/31/2018    CALCIUM 8.5 08/01/2018    CALCIUM 8.4 (L) 07/31/2018    CALCIUM 8.5 07/31/2018    CALCIUM 8.5 07/31/2018     Lab Results   Component Value Date    WBC 18.5 (H) 07/31/2018    WBC 20.8 (H) 07/30/2018    WBC 17.4 (H) 07/29/2018    HGB 8.6 (L) 07/31/2018    HGB 8.5 (L) 07/30/2018    HGB 8.3 (L) 07/29/2018    HCT 26.1 (L) 07/31/2018    HCT 25.1 (L) 07/30/2018    HCT 25.2 (L) 07/29/2018     (H) 07/31/2018     (H) 07/30/2018     (H) 07/29/2018     07/31/2018     07/30/2018     07/29/2018        Pertinent Radiology   Radiology Results: Personally reviewed impression/s    Manish Jaimes MD  [facility]  [phone]

## 2021-06-19 NOTE — PROGRESS NOTES
Progress Note  Assessment/Plan  Principal Problem:    Septic shock (H)  Active Problems:    Acute respiratory failure with hypoxia (H)    Acute renal failure, unspecified acute renal failure type (H)    Metabolic acidosis    Encephalopathy, metabolic    Aayush García is a 62 year old male with a history of significant alcohol use, HTN, and anemia (thought due to GI bleed) who was admitted with shock and anuria due to volume depletion and possibly sepsis.  No obvious source of infection at this time.  Patient is currently intubated with pressor support in the ICU.    Neuro  AMS: Likely related to underlying process, particularly sepsis. Component of alcohol use and possible withdrawal. Currently sedated while on ventilation.   - On thiamine and folate  - Salicylated and Tylenol levels low  - Unablee to collect Utox with anuria  - Monitor for withdrawal    RESPIRATORY:  Respiratory fatigue - Intubated due to respiratory fatigue and worsening AMS, currently with VCV at rate 24, tidal volume 550 and PEEP 5.0, 30% FiO2.  - ventilation management per ICU    CV:  Elevated Troponin: Troponin elevated to 4.20.  Cardiac echo showed LV EF of 25-30% with global hypokinesia.  EKG showed non-specific ST changes.  Trop elevation most likely due to demand ischemia with shock and overall illness, but concern for NSTEMI with echo results.  Will continue to monitor clinically  - Cardiology is following - appreciate recs  --Continue supportive measures    RENAL:  Lactic acidosis  MICHAEL - anuric renal failure  Unclear etiology at this time.  Most likely severe ATN due to sepsis but infection source is not obvious - see below. Abdominal US did not show any changes to kidneys. CPK slightly elevated, unlikely to cause this level of renal injury. Will continue to support with fluids and treat with broad coverage antibiotics. Blood cultures pending.   If continues to be anuric, will need dialysis later today  - Nephrology following - appreciate  recs  - Fluid management per ICU  - Continue cefepime and vancomycin  - F/u blood and urine cultures  - Telemetry monitoring    GI:  Hepatic injury: Patient with elevated AST/ALT (AST >> ALT), elevated bilirubin, elevated alk phos, elevated CK, hypoalbuminemia, and elevated INR indicating decreased synthetic function of liver.  Most likely due to decreased perfusion.  US showed fatty infiltration of liver, this may be contributing. Less likely alcoholic hepatitis, though alcoholic liver injury contributing. With severe disease, consider DIC as cause of elevated INR. Unlikely tylenol injury with low levels and history of little use.   - Continue fluid management and pressor support as needed  - Continue to follow liver labs  - LDH and Haptoglobin (see below)      FEN:  Hyponatremia  Hyperkalemia  Hypochloremia  Hypocalcemia  Most likely due to shock state, renal failure. Consider adrenal insufficiency with HypoNa and HyperK. Will continue to treat with fluids and antibiotics.  Hyponatremia and hyperkalemia raises concern for adrenal insufficiency - see below.   - Fluid management  - Frequent BMP monitoring    NPO status while intubated  Will continue to give fluids and assess nutritional support as needed    ID:  Concern for septic shock  Elevated WBC to 21.4 on admission with lactic acid of 8.0 and hypotension and tachycardia. Lactic acid decreased to 6.9 with fluid support.  Unclear origin of infection: no urinary retention although UTI still possible, no clear infiltrate on chest XR, no history of infectious symptoms, no known exposure to ticks or other insects. Will continue to treat empirically with broad spectrum antibiotics  - Continue fluid and pressor support  - Continue cefepime and vancomycin  - F/u blood and urine cultures    HEME/ONC:  Macrocytic anemia  Hb of 8.0 on admission with .  Per chart review, Hb of 13.0 in 2/2018 with steady decline until present. Consistently macrocytic.  Likely  "nutritional component due to alcohol use but continuous decline is concerning for underlying cause. Has been seen in clinic for melena though due to PUD 2/2 to NSAID use. Had resolved per last clinic visit with decreased NSAID use. Also consider hemolysis with elevated bilirubin and INR. Will start anemia work up  - Peripheral smear  - LDH, Haptoglobin    ENDOCRINE:   Concern for adrenal insufficiency  Acute illness and combination of hyperkalemia and hyponatremia is concerning for adrenal insufficiency.  Plan to cover with steroids for now and consider further work up as clinical course progresses  - Continue hydrocortisone    Hyperglycemia  Elevated glucose this morning, likely due to hydrocortisone.   - Insulin gtt ICU protocol    PSYCH:   History of alcohol use: 350ml peppermint schnapps daily for years. Per wife, never gone into withdrawal though never been more than 2 days without a drink she knows of.   - Monitor for signs of withdrawal    DVT ppx: SCDs  Diet: NPO  Code: full code      Dispo: TBD  Barriers to discharge: intubation, pressor support, IV antibiotics      Subjective    Patient currently intubated.  History was confirmed by his wife who is at bedside.  Reports that he is currently on vacation from work as of last Monday 7/16/17.  They have been at home this entire time.  He has been tired and mostly laying on the couch the whole time.  He drinks about 325ml of peppermint schnapps per day - never gone through withdrawal and hasn't gone days without drinking in her memory.  Works as a  for [employer] and was previous working before vacation started. Walks extensively at job.  No chemical exposures.  No known tick or insect exposures. No recent travel.  Has had increasing tremor over the last few weeks and did get sent home from work for this a couple times. Was more confused than normal prior to admission with seeing things and \"delusions\" per wife.     Has chronic hip pain and had " been taking tylenol and advil. Says he took a couple tylenol and 6 total tabs of advil. Denies any other substance or supplement use. Says he usually has a container for urination at bedside because he is up frequently. His anuria seemed abrupt to her without a progressive decrease in urination over the past weeks or month.     Denies that he had any fevers, chills, nausea prior to coming in. Had both diarrhea and constipation which was not unusual for him.     Objective    Vital signs in last 24 hours Temp:  [97.9  F (36.6  C)-99.3  F (37.4  C)] 98.5  F (36.9  C)  Heart Rate:  [] 97  Resp:  [24-51] 24  BP: ()/(37-97) 122/66  Arterial Line BP: ()/(46-66) 127/57  FiO2 (%):  [25 %-40 %] 40 %   Weight: 192 lb 14.4 oz (87.5 kg)    Intake/Output last 3 shift I/O last 3 completed shifts:  In: 4508 [I.V.:3891; IV Piggyback:617]  Out: 0     Intake/Output this shift:I/O this shift:  In: 3766 [I.V.:2575; Other:1000; IV Piggyback:191]  Out: 100 [Emesis/NG output:100]      Physical Exam    GENERAL: intubated and sedated  HEENT: Eyes anicteric  CV: RRR, no murmurs   RESP:  Scattered coarse rhonchi with vent, bases clear  ABDOMEN: soft, non-distended, BS hypoactive  EXTREMITIES: no edema, warm and well-perfused  NEURO: Sedated, PERRL though small       Pertinent Labs and Pertinent Radiology   Lab Results: personally reviewed.     Results for orders placed or performed during the hospital encounter of 07/24/18   Comprehensive Metabolic Panel   Result Value Ref Range    Sodium 120 (L) 136 - 145 mmol/L    Potassium 4.6 3.5 - 5.0 mmol/L    Chloride 85 (L) 98 - 107 mmol/L    CO2 14 (L) 22 - 31 mmol/L    Anion Gap, Calculation 21 (H) 5 - 18 mmol/L    Glucose 307 (H) 70 - 125 mg/dL    BUN 55 (H) 8 - 22 mg/dL    Creatinine 8.76 (HH) 0.70 - 1.30 mg/dL    GFR MDRD Af Amer 8 (L) >60 mL/min/1.73m2    GFR MDRD Non Af Amer 6 (L) >60 mL/min/1.73m2    Bilirubin, Total 2.4 (H) 0.0 - 1.0 mg/dL    Calcium 7.5 (L) 8.5 - 10.5 mg/dL     Protein, Total 6.0 6.0 - 8.0 g/dL    Albumin 1.7 (L) 3.5 - 5.0 g/dL    Alkaline Phosphatase 771 (H) 45 - 120 U/L     (H) 0 - 40 U/L    ALT 54 (H) 0 - 45 U/L     Lab Results   Component Value Date    WBC 21.4 (H) 07/25/2018    WBC 21.4 (H) 07/25/2018    HGB 7.7 (L) 07/25/2018    HCT 24.2 (L) 07/25/2018    HCT 24.2 (L) 07/25/2018     (H) 07/25/2018     (H) 07/25/2018     07/25/2018     07/25/2018     Lab Results   Component Value Date    CALCIUM 7.5 (L) 07/25/2018     Lab Results   Component Value Date    CKTOTAL 649 (HH) 07/25/2018    CKMB 24 (HH) 07/25/2018    TROPONINI 5.17 (HH) 07/25/2018       Results from last 7 days  Lab Units 07/25/18  0814   LN-LACTIC ACID mmol/L 6.9*           Radiology Results: Personally reviewed images    Cardiac Echo:    No previous study for comparison.    Technically challenging examination. Definity contrast utilized    Left ventricle ejection fraction is severely decreased. Left ventricular ejection fraction estimated 25-30%.    Global left ventricular hypokinesis with large area of akinesis involving the apex, anterior apical and apical lateral portions of left ventricle.    Normal right ventricular size and systolic function.    Mild to moderate aortic stenosis. Mean gradient of 15 mmHg. The degree of aortic stenosis potentially underrepresented in the setting of severe reduction left ventricular systolic function.    Left atrial enlargement    Moderate enlargement of the aortic root.    Abdominal US:    Impression:         CONCLUSION:  1.  Sludge in the gallbladder.  2.  No gallbladder wall thickening.  3.  No evidence of cholelithiasis.  4.  No evidence of ductal dilatation.       Gaviota Falk, MS4    I was present with the medical student who participated in the service and in the documentation of the note. I have verified the history and personally performed the physical exam and medical decision making. I agree with the assessment and  plan of care as documented in the note.    Benjamin Rosenstein, MD, MA   [facility] [place]Y2  P: [phone]    Precepted patient with Dr. Cali Styles

## 2021-06-19 NOTE — PROGRESS NOTES
"Critical Care Progress Note  7/28/2018      Admit Date: 7/24/2018  ICU Day: 4   Mechanical Ventilation Day: NA. Extubated 7/26  CODE: Full Code    -7/24 Family called EMS for confusion and no uo for 3 days.  On arrival patient confused, hypotensive, tachypneic.  BC + for Klebsiella pneumoniae   -7/25 intubated  -7/26 extubated    Critical Care Chief Complaint: none      Problem List/Hospital Course:   Principal Problem:    Septic shock (H)  Active Problems:    Acute respiratory failure with hypoxia (H)    Acute renal failure, unspecified acute renal failure type (H)    Metabolic acidosis    Encephalopathy, metabolic    Elevated troponin    Other cardiomyopathy (H)    Bacteremia due to Klebsiella pneumoniae    Anemia, unspecified type    Alcohol abuse          Interim Events:   I/O + 12 liters  On high flow oxygen 50%, 35 lpm  Remains confused  Received vitamin k, 2 units FFP, 1 unit PRBC    Medications:       dexmedetomidine 400 mcg/100 mL in NS (PRECEDEX) (4mcg/mL) 0.4 mcg/kg/hr (07/28/18 0127)     pantoprozole (PROTONIX) infusion 8 mg/hr (07/27/18 2336)       aspirin  75 mg Rectal DAILY     cefTRIAXone (ROCEPHIN)  IV  1 g Intravenous Q24H     folic acid  1 mg Enteral Tube DAILY    Or     folic acid  1 mg Intramuscular DAILY     furosemide  160 mg Intravenous QAM     insulin aspart (NovoLOG) injection   Subcutaneous Q4H FIXED TIMES     ipratropium-albuterol  3 mL Nebulization QID - RT     multivitamin therapeutic  1 tablet Enteral Tube DAILY     senna-docusate  1 tablet Oral BID    Or     senna (SENOKOT) syrup  8.8 mg Enteral Tube BID     sodium chloride  10-30 mL Intravenous Q8H FIXED TIMES     sodium chloride  3 mL Intravenous Line Care     sodium chloride  3 mL Intravenous Line Care     thiamine  100 mg Enteral Tube DAILY    Or     thiamine  100 mg Intramuscular DAILY         Exam/Data:   Vitals  /61  Pulse 100  Temp 100.5  F (38.1  C) (Axillary)   Resp (!) 33  Ht 5' 8\" (1.727 m)  Wt 211 lb 9.6 oz (96 " kg)  SpO2 96%  BMI 32.17 kg/m2     I/O last 3 completed shifts:  In: 1203.2 [I.V.:164.2; Blood:989; IV Piggyback:50]  Out:  [Urine:62; Other:2000; Stool:30]  Weight change: 1 lb 8 oz (0.681 kg)  Wt Readings from Last 3 Encounters:   18 211 lb 9.6 oz (96 kg)   18 180 lb (81.6 kg)     FiO2 (%):  [60 %-80 %] 60 %    EXAM:  GEN: RASS -1, CAM-ICU +, he is imagining he is smoking a cigarette  HEENT: PERRL, EOMS, OP patent, trachea midline  PULM: cta   CV: RRR, S1, S2, no murmurs, rubs, gallops, bilateral symmetric pulse  ABD: soft nontender, non distended, normal active bowel sound, no HSM  EXT : warm well perfused, no cyanosis, clubbing, no edema  NEURO: normal except for mental status  SKIN: scattered ecchymoses  Lymphatics: no adenopathy    DATA  All laboratory and radiology has been personally reviewed by myself today.    Results from last 7 days  Lab Units 18  0509   LN-WHITE BLOOD CELL COUNT thou/uL 18.2*   LN-HEMOGLOBIN g/dL 7.8*   LN-HEMATOCRIT % 22.9*   LN-PLATELET COUNT thou/uL 154       Results from last 7 days  Lab Units 18  0509 18  0411 18  1411   LN-SODIUM mmol/L 136 132* 133*   LN-POTASSIUM mmol/L 3.6 4.6 4.0   LN-CHLORIDE mmol/L 98 99 100   LN-CO2 mmol/L 24 21* 20*   LN-BLOOD UREA NITROGEN mg/dL 25* 21 12   LN-CREATININE mg/dL 3.49* 4.01* 2.91*   LN-CALCIUM mg/dL 8.1* 7.8* 7.5*   LN-PROTEIN TOTAL g/dL 6.3 6.0 5.7*   LN-BILIRUBIN TOTAL mg/dL 4.6* 2.9* 2.7*   LN-ALKALINE PHOSPHATASE U/L 771* 594* 628*   LN-ALT (SGPT) U/L 72* 45 44   LN-AST (SGOT) U/L 337* 231* 218*       IMAGIN/28 CXR: Interval removal of ETT and NG tube. Right IJ catheter high SVC level. Right PICC catheter right atrial level. No pneumothorax. Interval worsening with bilateral airspace opacities with upper lobe predominance greatest on the left. Small left   effusion. Previous left lower rib fractures. Monitor electrodes.    Echocardiogram:    Technically challenging examination. Definity  contrast utilized    Left ventricle ejection fraction is severely decreased. Left ventricular ejection fraction estimated 25-30%.    Global left ventricular hypokinesis with large area of akinesis involving the apex, anterior apical and apical lateral portions of left ventricle.    Normal right ventricular size and systolic function.    Mild to moderate aortic stenosis. Mean gradient of 15 mmHg. The degree of aortic stenosis potentially underrepresented in the setting of severe reduction left ventricular systolic function.    Left atrial enlargement    Moderate enlargement of the aortic root.    Results called to the floor    Abdominal US:  CONCLUSION:  1.  Sludge in the gallbladder.  2.  No gallbladder wall thickening.  3.  No evidence of cholelithiasis.  4.  No evidence of ductal dilatation.    Micro:  Blood culture 7/24 Klebsiella pneumoniae   Urine culture 7/25 negative    IMPRESSION:  Gram negative bacteremia with MODS  Pulmonary edema-cardiogenic/fluid overload vs non cardiogenic (ARDS)  Cardiomyopathy (EF 25%)  MICHAEL  Alcohol withdrawal  delerium  Macrocytosis  Blood loss anemia  GI bleed  Abnormal LFT  Tobacco abuse    PLAN:  precedex infusion  PPI  Transfuse for HGB </= 7  Thiamine adminstered  Correct coagulopathy if present  Renal dosing of medications  Avoid nephrotoxins  Requires UF again today  Complete 10 days abx     ICU Prophylaxis  -  HOB 30 degrees  Stress Ulcer   - PPI  DVT   - mechanical with SCD  Lines required and necessary for continued patient cares  Feeding: TPN    Patient is critically ill with total CCT spent 65 minutes     Bigg Medley DO  Pulmonary/Critical Care  [phone]

## 2021-06-19 NOTE — PROGRESS NOTES
Attempted to complete metaneb treatment. Patient not alert enough to perform. Duoneb given via face mask. BS coarse crackles and rhonchi. No change after neb. HR 91-92, RR 28-32, oxygen saturation 93-94% on 70% HFNC running at 35 LPM.

## 2021-06-19 NOTE — PROGRESS NOTES
"Extubated PT to HFNC 40L 80%.  PT tolerating well at this time.    /51  Pulse 86  Temp 98.3  F (36.8  C) (Oral)   Resp (!) 30  Ht 5' 8\" (1.727 m)  Wt 212 lb 4.9 oz (96.3 kg)  SpO2 98%  BMI 32.28 kg/m2  Baljeet Schaffer  7/26/2018    "

## 2021-06-19 NOTE — PROGRESS NOTES
Pt continues to be hypotensive despite receiving 2 boluses of 250 NS & at trendelenburg (see vs flowsheet).  Pt has no c/o lightheadedness, however only oriented to self.  House officer Dr. Madhav poon & pt transferred to ICU for higher acuity of care.

## 2021-06-19 NOTE — LETTER
Letter by Madeline Avila CNP at      Author: Madeline Avila CNP Service: -- Author Type: --    Filed:  Encounter Date: 10/16/2019 Status: Signed         October 16, 2019     Patient: Aayush García   YOB: 1955   Date of Visit: 10/16/2019       To Whom It May Concern:    It is my medical opinion that Aayush García should be excused from work 10/16/2019 for health issues.    If you have any questions or concerns, please don't hesitate to call.    Sincerely,        Electronically signed by Madeline Avila CNP

## 2021-06-19 NOTE — CONSULTS
"    INITIAL PSYCHIATRIC CONSULTATION  This note was created with the help of Dragon dictation system. Grammatical and typing errors are not intentional.                REASON FOR REQUEST: delirium    ASSESSMENT/RECOMMENDATIONS/PLAN :     Encephalopathy mild intermittent multifactorial, hepatic encephalopathy likely in the case of medical condition, hospitalization, ICU.  Cognitive and behavioral changes due to #1.  Visual hallucinations likely in the context of #1, medical condition.  Sleep difficulties due to #1.    Recommendations:    Rozerem 8 mg at bedtime for sleep.  Trazodone 12.5 mg 3 times daily as needed for anxiety and agitation.    MENTAL STATUS EXAMINATION:   Appearance: Stated age,fluctuating levels of consciousness this morning, not restless.  Cooperative with cares.  Speech: Slow. Increased latency.   Thought Process: Disorganized, disoriented and mildly confused.Increased latency.  Thought content: Does not appear to respond to internally generated stimuli, no acute visual or auditory hallucination; No manic symptoms, no paranoia no delusional thoughts.  Thought Formation: No loosening of association or flight of ideas.  Judgment: Depressed.  Insight : Fair.  Attention : Sufficient.  Memory: Depressed, has difficulty recalling events from last few days.  Fund Of Knowledge: Depressed  Affect: Blunted.  Mood: Pleasant.    Alert and Oriented: Awake, oriented ×2 (self and hospital).  Comprehension: Depressed at present  Generative thought content: Reduced  Language: Intact  Gait and Ambulation: With assist .  Gait and ambulation not tested.  Patient is requiring assistance when moving in and out of bed.  Exhibits difficulties with problem solving, processing of complex cognitive information and cognitive set shifting  Inattention, disorientation and mild confusion noted.    Vital Signs: /59 (Patient Position: Sitting)  Pulse 70  Temp 98.4  F (36.9  C) (Oral)   Resp 28  Ht 5' 8\" (1.727 m)  Wt " 201 lb 4.8 oz (91.3 kg)  SpO2 99%  BMI 30.61 kg/m2    HISTORY OF PRESENT ILLNESS:   Presenting history to include: Per HMS/Specialists:   Aayush García is a 62 y.o. old male with history of hypertension and hyperlipidemia accompanied with wife for urinary retention and confusion.  History is provided by patient and patient's wife.  Patient's wife notes increased confusion over the past 2 days.  Today he was unable to recall her name and in the ED he was unable to provide the correct year.  She also states he has been experiencing hallucinations and has been unable to sleep.  He states he has not urinated for the past 3 days and has felt more fatigued than normal.  He denies chest pain, shortness of breath, fevers, chills, nausea, or vomiting.    Upon assessment patient was pleasant, seated upright in his hospital room chair, cooperative and mildly confused. History is limited due to poor cognition. RN said that he was doing well this morning and had not exhibited any concerning behaviors nor any hallucinations.  He could not recall events from last night.  He was not sure if he was having visual hallucinations , did not recall if he was having any at home. He could not tell if he ever had psychosis or paranoia at home.    He had poor sleep for last few days while in the hospital.  He could not tell me how long he had been in the hospital.  He denied any hallucinations, delusions, paranoia this morning.  Denied any matthew or hypomania.  Denied any premorbid psychiatric illness.  Collateral information was obtained from review of chart.  His wife had reported that he was having visual hallucinations and was not able to sleep.    He had required 1-1 and restraints while in the ICU while confused for his safety.  Labs sodium 134 potassium 3.3 chloride 98 CO2 25 BUN 19 AST 90 ALT 41 creatinine 4.07  on 8/5/2018  Review of Systems:As per HPI. Remainders of 12 point review of systems negative.  Psychiatric ROS:  "  Change of Interest/Anhedonia:  No  Appetite/Weight Changes: No  Concentration Changes:No  Impaired Energy:No  Impaired Sleep:No  Anxiety/Panic:No  Tearfulness:No  Depression:No  Psychosis: No  Irritability:No  SI/VI/HI: No,No,No  Li: No              /59 (Patient Position: Sitting)  Pulse 70  Temp 98.4  F (36.9  C) (Oral)   Resp 28  Ht 5' 8\" (1.727 m)  Wt 201 lb 4.8 oz (91.3 kg)  SpO2 99%  BMI 30.61 kg/m2  Amphetamines (no units)   Date Value   07/25/2018 Screen Negative     Phencyclidine (no units)   Date Value   07/25/2018 Screen Negative     Barbiturates (no units)   Date Value   07/25/2018 Screen Negative     Cocaine Metabolite (no units)   Date Value   07/25/2018 Screen Negative     Oxycodone (no units)   Date Value   07/25/2018 Screen Negative     Creatinine, Urine (mg/dL)   Date Value   07/25/2018 17.7     THC (no units)   Date Value   07/25/2018 Screen Negative     Alcohol, Blood (mg/dL)   Date Value   07/24/2018 65 (H)     Recent Results (from the past 24 hour(s))   Basic Metabolic Panel   Result Value Ref Range    Sodium 134 (L) 136 - 145 mmol/L    Potassium 3.3 (L) 3.5 - 5.0 mmol/L    Chloride 98 98 - 107 mmol/L    CO2 25 22 - 31 mmol/L    Anion Gap, Calculation 11 5 - 18 mmol/L    Glucose 104 70 - 125 mg/dL    Calcium 8.2 (L) 8.5 - 10.5 mg/dL    BUN 19 8 - 22 mg/dL    Creatinine 4.07 (H) 0.70 - 1.30 mg/dL    GFR MDRD Af Amer 18 (L) >60 mL/min/1.73m2    GFR MDRD Non Af Amer 15 (L) >60 mL/min/1.73m2   Magnesium   Result Value Ref Range    Magnesium 1.7 (L) 1.8 - 2.6 mg/dL   Hepatic Profile   Result Value Ref Range    Bilirubin, Total 6.8 (H) 0.0 - 1.0 mg/dL    Bilirubin, Direct 4.4 (H) <=0.5 mg/dL    Protein, Total 6.2 6.0 - 8.0 g/dL    Albumin 2.6 (L) 3.5 - 5.0 g/dL    Alkaline Phosphatase 368 (H) 45 - 120 U/L    AST 90 (H) 0 - 40 U/L    ALT 41 0 - 45 U/L     Recent Results (from the past 72 hour(s))   Crossmatch    Collection Time: 08/07/18 12:07 AM   Result Value Ref Range    Crossmatch " Compatible     Blood Expiration Date [id number]     Unit Type A Pos     Unit Number [id number]     Status Transfused     Component Red Blood Cells     PRODUCT CODE G8615U05     Issue Date and Time [id number]     Blood Type 6200     CODING SYSTEM [id number]    Basic Metabolic Panel    Collection Time: 08/07/18  4:08 AM   Result Value Ref Range    Sodium 138 136 - 145 mmol/L    Potassium 3.8 3.5 - 5.0 mmol/L    Chloride 101 98 - 107 mmol/L    CO2 25 22 - 31 mmol/L    Anion Gap, Calculation 12 5 - 18 mmol/L    Glucose 91 70 - 125 mg/dL    Calcium 8.4 (L) 8.5 - 10.5 mg/dL    BUN 24 (H) 8 - 22 mg/dL    Creatinine 4.39 (H) 0.70 - 1.30 mg/dL    GFR MDRD Af Amer 17 (L) >60 mL/min/1.73m2    GFR MDRD Non Af Amer 14 (L) >60 mL/min/1.73m2   Hepatic Profile    Collection Time: 08/07/18  4:08 AM   Result Value Ref Range    Bilirubin, Total 7.4 (H) 0.0 - 1.0 mg/dL    Bilirubin, Direct 4.7 (H) <=0.5 mg/dL    Protein, Total 5.7 (L) 6.0 - 8.0 g/dL    Albumin 2.4 (L) 3.5 - 5.0 g/dL    Alkaline Phosphatase 395 (H) 45 - 120 U/L     (H) 0 - 40 U/L    ALT 47 (H) 0 - 45 U/L   HM2(CBC w/o Differential)    Collection Time: 08/07/18  4:08 AM   Result Value Ref Range    WBC 18.1 (H) 4.0 - 11.0 thou/uL    RBC 2.33 (L) 4.40 - 6.20 mill/uL    Hemoglobin 7.8 (L) 14.0 - 18.0 g/dL    Hematocrit 23.4 (L) 40.0 - 54.0 %     80 - 100 fL    MCH 33.5 27.0 - 34.0 pg    MCHC 33.3 32.0 - 36.0 g/dL    RDW 23.4 (H) 11.0 - 14.5 %    Platelets 245 140 - 440 thou/uL    MPV 10.6 8.5 - 12.5 fL   Magnesium    Collection Time: 08/07/18  4:08 AM   Result Value Ref Range    Magnesium 1.6 (L) 1.8 - 2.6 mg/dL   Potassium    Collection Time: 08/07/18  9:45 PM   Result Value Ref Range    Potassium 3.7 3.5 - 5.0 mmol/L   Basic Metabolic Panel    Collection Time: 08/08/18  4:38 AM   Result Value Ref Range    Sodium 134 (L) 136 - 145 mmol/L    Potassium 3.7 3.5 - 5.0 mmol/L    Chloride 99 98 - 107 mmol/L    CO2 22 22 - 31 mmol/L    Anion Gap, Calculation  13 5 - 18 mmol/L    Glucose 87 70 - 125 mg/dL    Calcium 8.4 (L) 8.5 - 10.5 mg/dL    BUN 30 (H) 8 - 22 mg/dL    Creatinine 5.37 (H) 0.70 - 1.30 mg/dL    GFR MDRD Af Amer 13 (L) >60 mL/min/1.73m2    GFR MDRD Non Af Amer 11 (L) >60 mL/min/1.73m2   Magnesium    Collection Time: 08/08/18  4:38 AM   Result Value Ref Range    Magnesium 1.7 (L) 1.8 - 2.6 mg/dL   Hemoglobin    Collection Time: 08/08/18  4:38 AM   Result Value Ref Range    Hemoglobin 8.3 (L) 14.0 - 18.0 g/dL   Hepatic Profile    Collection Time: 08/08/18  4:38 AM   Result Value Ref Range    Bilirubin, Total 6.7 (H) 0.0 - 1.0 mg/dL    Bilirubin, Direct 4.2 (H) <=0.5 mg/dL    Protein, Total 5.8 (L) 6.0 - 8.0 g/dL    Albumin 2.1 (L) 3.5 - 5.0 g/dL    Alkaline Phosphatase 394 (H) 45 - 120 U/L     (H) 0 - 40 U/L    ALT 46 (H) 0 - 45 U/L   INR    Collection Time: 08/08/18  4:38 AM   Result Value Ref Range    INR 1.37 (H) 0.90 - 1.10   Basic Metabolic Panel    Collection Time: 08/09/18  4:35 AM   Result Value Ref Range    Sodium 134 (L) 136 - 145 mmol/L    Potassium 3.3 (L) 3.5 - 5.0 mmol/L    Chloride 98 98 - 107 mmol/L    CO2 25 22 - 31 mmol/L    Anion Gap, Calculation 11 5 - 18 mmol/L    Glucose 104 70 - 125 mg/dL    Calcium 8.2 (L) 8.5 - 10.5 mg/dL    BUN 19 8 - 22 mg/dL    Creatinine 4.07 (H) 0.70 - 1.30 mg/dL    GFR MDRD Af Amer 18 (L) >60 mL/min/1.73m2    GFR MDRD Non Af Amer 15 (L) >60 mL/min/1.73m2   Magnesium    Collection Time: 08/09/18  4:35 AM   Result Value Ref Range    Magnesium 1.7 (L) 1.8 - 2.6 mg/dL   Hepatic Profile    Collection Time: 08/09/18  4:35 AM   Result Value Ref Range    Bilirubin, Total 6.8 (H) 0.0 - 1.0 mg/dL    Bilirubin, Direct 4.4 (H) <=0.5 mg/dL    Protein, Total 6.2 6.0 - 8.0 g/dL    Albumin 2.6 (L) 3.5 - 5.0 g/dL    Alkaline Phosphatase 368 (H) 45 - 120 U/L    AST 90 (H) 0 - 40 U/L    ALT 41 0 - 45 U/L       PMH:   Past Medical History:   Diagnosis Date     Anemia, unspecified type      Hypertension            Current  Medications:Scheduled Meds:    epoetin alie  20,000 Units Subcutaneous Weekly PM     heparin (PF)  5,000 Units Subcutaneous Q8H FIXED TIMES     magnesium oxide  400 mg Oral BID     midodrine  15 mg Oral Once per day on Mon Wed Fri     midodrine  15 mg Oral Q8H FIXED TIMES     multivitamin therapeutic  1 tablet Enteral Tube DAILY     nicotine  1 patch Transdermal DAILY     omeprazole  20 mg Oral BID AC     rifAXIMin  550 mg Oral TID     senna-docusate  1 tablet Oral BID    Or     senna (SENOKOT) syrup  8.8 mg Enteral Tube BID     Continuous Infusions:  PRN Meds:.acetaminophen **OR** acetaminophen **OR** acetaminophen, albuterol **AND** Nebulizer treatment intermittent, benzocaine-menthol, bisacodyl, dextrose 50 % (D50W), alcohol withdrawal fluid ETOHVITS, glucagon (human recombinant), ipratropium-albuterol **AND** Nebulizer treatment intermittent, lactulose, melatonin, menthol-zinc oxide, naloxone **OR** naloxone, ondansetron **OR** ondansetron, polyvinyl alcohol, traZODone                Family History: PERSONALLY REVIEWED.No family history on file.  Pertinent Family hx not pertinent to Chief Complaint or reason for visit.     Social History:  PERSONALLY REVIEWED.  Social History     Social History     Marital status: Single     Spouse name: N/A     Number of children: N/A     Years of education: N/A     Occupational History     Not on file.     Social History Main Topics     Smoking status: Current Every Day Smoker     Smokeless tobacco: Never Used     Alcohol use Yes      Comment: Occasionally     Drug use: No     Sexual activity: Not on file     Other Topics Concern     Not on file     Social History Narrative             Allergies as of 06/01/2014 Reviewed     Review of Systems:As per HPI. Remainders of 12 point review of systems negative.    Review of Pertinent Laboratory:      PERSONALLY REVIEWED.    Physical Exam: Temp:  [97.6  F (36.4  C)-99.1  F (37.3  C)] 98.4  F (36.9  C)  Heart Rate:  [] 70  Resp:   [16-33] 28  BP: ()/(47-75) 100/59   Vitals: reviewed in chart     Physical exam as per medical team: reviewed in chart      diagnoses, risk and benefits of medications discussed with staff. Care coordination with care management team.   Thank you for this consultation.       Mayi Torres; NP  [facility]        This note was created with the help of Dragon dictation system. Grammatical and typing errors are not intentional.

## 2021-06-19 NOTE — PROGRESS NOTES
NURSING TRANSFER AND ARRIVAL NOTE  :    Patient Name: Aayush García  : 1955  MRN: [id number]  Patient Location: [facility]/14        The reason for patient transfer is change level of care and provider order.  The patient was transferred to [facility]   via bed  .  Equipment used for transport Oxygen  nasal cannula.     ICU Patient:  Sending Unit Action:   Patient and Family notified of room change.    Report given to Siri More RN   at 1230   via verbal by phone.  Belongings sent to receiving unit.  Medications were sent with patient yes.  Accompanied by Nurse and NA/PCA.        Ирина Wiley

## 2021-06-19 NOTE — PROGRESS NOTES
RENAL Progress Note       Assessment/Plan  MICHAEL suspect hemodynamic ATN due to volume depletion, low bp, CM, ACE-I. Ongoing MICHAEL, minimal u/o despite diuretics, ongoing hyponatremia, azotemia, acidosis better.  +UA  obtained after golden and after long period of anuria.   Creat improved with HD yesterday, progressive volume overload still quite vol up. Got 2 kg off on run 7/28   -No non essential IVF, HD UF yesterday with 2 kg removed   -On daily diuretic trial, though poor urine output at 150ml   - stable this am, will hold off on dialysis until tomorrow    Shock improving, hypovolemia, acidosis +/- sepsis and +CM.        Sepsis?, GNR in one of 2 BCX, on abx. No other +, klebsiella bacteremia. Urine obtained after long period of anuria and thus UC obtained after abx and cx neg    Anemia acute on chronic, recent GI bleed, hgb drifting down,  transfused Friday on run.    -still has black diarrhea, no bright red blood.   -hgb stable  after 1 unit PRBC 7/28  this am hgb  8.4    Inc LFT's and coagulopathy. No documented chronic liver disease. Suspect mostly due to shock. NAC for tylenol tox. Leveling off    resp failure now extubated    -AM CXR ,7/28:  Interval removal of ETT and NG tube. Right IJ catheter high SVC level. Right PICC catheter right atrial level. No pneumothorax. Interval worsening with bilateral airspace opacities with upper lobe predominance greatest on the left. Small left  effusion.     ETOH ism, at risk for W/D on precedex    CM ?all stress induced CM. Has RF for CAD and ETOH CM. Cards following     Encephalopathy, sepsis and ETOH on admit.   -mentation improving   -still on precedex for restlessness    Hypotension improved, bp normal, tolerated UF yesterday.    Mild low plts stable ?liver dz/ sepsis    Hyponatremia, HD on low Na bath to prevent rapid correction, 130 today.       Plan HD / UF today   On Daily diuretic trial with unimpressive results       Principal Problem:    Septic shock (H)  Active  Problems:    Acute respiratory failure with hypoxia (H)    Acute renal failure, unspecified acute renal failure type (H)    Metabolic acidosis    Encephalopathy, metabolic    Elevated troponin    Other cardiomyopathy (H)    Bacteremia due to Klebsiella pneumoniae    Anemia, unspecified type    Alcohol abuse      Subjective  Sedated, 80% high flow O2. Appears comfortable. No arousal on exam.  Fiance here.     Objective    Vital signs in last 24 hours  Temp:  [98.1  F (36.7  C)-101.2  F (38.4  C)] 100.2  F (37.9  C)  Heart Rate:  [] 94  Resp:  [20-40] 28  BP: ()/(46-85) 91/57  Arterial Line BP: (111-141)/(52-73) 127/57  FiO2 (%):  [45 %] 45 %  Weight:   207 lb 9.6 oz (94.2 kg)    Intake/Output last 3 shifts  I/O last 3 completed shifts:  In: 501.3 [I.V.:406.3; IV Piggyback:95]  Out: 2350 [Urine:150; Other:2000; Stool:200]  Intake/Output this shift:       Review of Systems   Review of systems not obtained due to inability to communicate with the patient.     Physical Exam  Sedated appears comfortable  HEENT AT NC  Neck supple  Lungs coarse anterior with posterior exp wheezes  Cor RRR (60)  abd soft +enlarged liver  Ext warm  + general edema     Pertinent Labs   Lab Results: personally reviewed.   Lab Results   Component Value Date     07/29/2018     07/28/2018     (L) 07/27/2018    K 3.4 (L) 07/29/2018    K 3.6 07/28/2018    K 4.6 07/27/2018    CO2 25 07/29/2018    CO2 24 07/28/2018    CO2 21 (L) 07/27/2018    BUN 23 (H) 07/29/2018    BUN 25 (H) 07/28/2018    BUN 21 07/27/2018    CREATININE 3.28 (H) 07/29/2018    CREATININE 3.49 (H) 07/28/2018    CREATININE 4.01 (H) 07/27/2018    CALCIUM 8.1 (L) 07/29/2018    CALCIUM 8.1 (L) 07/28/2018    CALCIUM 7.8 (L) 07/27/2018     Lab Results   Component Value Date    WBC 17.4 (H) 07/29/2018    WBC 18.2 (H) 07/28/2018    WBC 15.1 (H) 07/27/2018    HGB 8.4 (L) 07/29/2018    HGB 8.3 (L) 07/28/2018    HGB 8.0 (L) 07/28/2018    HCT 25.2 (L) 07/29/2018     HCT 22.9 (L) 07/28/2018    HCT 20.1 (L) 07/27/2018     (H) 07/29/2018     07/28/2018     (H) 07/27/2018     07/29/2018     07/28/2018     (L) 07/27/2018     magnesium 2.0  's   Lact 2.0  Lft 's noted  INR 1.6    Pertinent Radiology   Radiology Results: Personally reviewed image/s and Personally reviewed impression/s  EKG Results: personally reviewed.     Advanced Care Planning  pending

## 2021-06-19 NOTE — PROGRESS NOTES
RENAL Progress Note       Assessment/Plan  MICHAEL suspect hemodynamic ATN due to volume depletion, low bp, CM, ACE-I. Ongoing MICHAEL, minimal u/o despite diuretics, ongoing hyponatremia, azotemia, acidosis better.  +UA  obtained after golden and after long period of anuria.   Creat up today despite HD yesterday, progressive volume overload. Stop non essential IVF, HD UF today to pull more fluid. Give daily diuretic trial. Likely to need run tomorrow for additional chemistry and volume control.    Shock better, hypovolemia, acidosis +/- sepsis and +CM. Off pressors x2 day    Sepsis?, GNR in one of 2 BCX, on abx. No other +, klebsiella bacteremia. Urine obtained after long period of anuria and thus UC obtained after abx and cx neg    Anemia acute on chronic, recent GI bleed, hgb drifting down, agree with plans to transfuse on run today. No active bleeding.     Inc LFT's and coagulopathy. No documented chronic liver disease. Suspect mostly due to shock. NAC for tylenol tox. Leveling off    resp failure now extubated but needing high flow O2    ETOH ism, at risk for W/D on precedex    CM ?all stress induced CM. Has RF for CAD and ETOH CM. Cards following     Encephalopathy, sepsis and ETOH on admit. Now sedated, ?w//d    Hypotension improved, bp normal, tolerated UF yesterday.    Mild low plts stable ?liver dz/ sepsis    Hyponatremia, HD on low Na bath to prevent rapid correction, 130 today.       Plan HD / UF today   Give prbc  Daily diuretic trial       Principal Problem:    Septic shock (H)  Active Problems:    Acute respiratory failure with hypoxia (H)    Acute renal failure, unspecified acute renal failure type (H)    Metabolic acidosis    Encephalopathy, metabolic    Elevated troponin    Other cardiomyopathy (H)      Subjective  Sedated, 80% high flow O2. Appears comfortable. No arousal on exam.  Fiance here.     Objective    Vital signs in last 24 hours  Temp:  [97.4  F (36.3  C)-98.9  F (37.2  C)] 98.2  F (36.8   C)  Heart Rate:  [55-93] 90  Resp:  [2-39] 20  BP: (112-126)/(47-63) 121/61  Arterial Line BP: ()/(40-66) 123/66  FiO2 (%):  [35 %-80 %] 80 %  Weight:   210 lb 1.6 oz (95.3 kg)    Intake/Output last 3 shifts  I/O last 3 completed shifts:  In: 2906.3 [I.V.:2145.3; Other:31; NG/GT:80; IV Piggyback:650]  Out: 2093 [Urine:93; Other:2000]  Intake/Output this shift:       Review of Systems   Review of systems not obtained due to inability to communicate with the patient.     Physical Exam  Sedated appears comfortable  HEENT AT NC  Neck supple  Lungs clear  Cor RRR (60)  abd soft +enlarged liver  Ext warm  ++ general edema   Skin palmar erythema no other stigmata chronic liver dz.    Pertinent Labs   Lab Results: personally reviewed.   Lab Results   Component Value Date     (L) 07/27/2018     (L) 07/26/2018     (L) 07/26/2018     (L) 07/26/2018    K 4.6 07/27/2018    K 4.0 07/26/2018    K 4.4 07/26/2018    CO2 21 (L) 07/27/2018    CO2 20 (L) 07/26/2018    CO2 20 (L) 07/26/2018    BUN 21 07/27/2018    BUN 12 07/26/2018    BUN 24 (H) 07/26/2018    CREATININE 4.01 (H) 07/27/2018    CREATININE 2.91 (H) 07/26/2018    CREATININE 5.09 (H) 07/26/2018    CALCIUM 7.8 (L) 07/27/2018    CALCIUM 7.5 (L) 07/26/2018    CALCIUM 8.0 (L) 07/26/2018     Lab Results   Component Value Date    WBC 15.1 (H) 07/27/2018    WBC 13.7 (H) 07/26/2018    WBC 21.4 (H) 07/25/2018    WBC 21.4 (H) 07/25/2018    HGB 6.8 (LL) 07/27/2018    HGB 7.0 (L) 07/26/2018    HGB 7.1 (L) 07/26/2018    HCT 20.1 (L) 07/27/2018    HCT 20.3 (L) 07/26/2018    HCT 24.2 (L) 07/25/2018    HCT 24.2 (L) 07/25/2018     (H) 07/27/2018    MCV 99 07/26/2018     (H) 07/25/2018     (H) 07/25/2018     (L) 07/27/2018     (L) 07/27/2018     (L) 07/26/2018     's   Lact 2.0  Lft 's noted  INR 1.6    Pertinent Radiology   Radiology Results: Personally reviewed image/s and Personally reviewed impression/s  EKG  Results: personally reviewed.     Advanced Care Planning  pending

## 2021-06-19 NOTE — PROGRESS NOTES
Progress Note    Assessment/Plan  Principal Problem:    Septic shock (H)  Active Problems:    Acute respiratory failure with hypoxia (H)    Acute renal failure, unspecified acute renal failure type (H)    Metabolic acidosis    Encephalopathy, metabolic    Elevated troponin    Other cardiomyopathy (H)    Bacteremia due to Klebsiella pneumoniae    Anemia, unspecified type    Alcohol abuse    Aayush García is a 62 year old male with a history of alcohol abuse, HTN and anemia who was admitted with septic shock and anuria with a positive blood culture for Klebsiella.  Required intubation and pressor support in ICU, currently extubated without pressor support, but still sedated for alcohol withdrawal symptoms.  - Extubated, remains on sedation for alcohol withdrawal symptoms  - Blood cultures with Klebsiella - on ceftriaxone  - Melena overnight, GI following, restarted PPI gtt  - Hgb trending down, transfused overnight with melena   - LFTs, Alk phos, and Bili now elevating again -- Repeat RUQ u/s  - Renal function slowly improving, still requiring dialysis with low UOP and volume up    NEURO:   AMS - improving  Alcohol withdrawal  Likely related to underlying sepsis.  Showing signs of alcohol withdrawal, remains on precedex drip for management. Likely developing ICU delirium as as well.  - On thiamine and folate  - Withdrawal management per ICU     RESPIRATORY:   Respiratory fatigue - improving  Successfully extubated. Remains on HFNC   - Respiratory management per ICU     CV:   Elevated troponin and low EF  Likely ICM  Most likely due to stress cardiomyopathy and demand ischemia from acute illness, though with reduced LVEF may represent NSTEMI. EKG in early June at [facility] did show ST segment changes concerning for ischemia so he may have component of underlying cardiac disease, possibly alcoholic cardiomyopathy.  Cards is following, repeat echo in 3-4 months.  He will need a stress test eventually to  evaluate for underlying disease.  - Cardiology is following - appreciate recs  --Repeat EKG today  --Avoid QT prolong meds  --Low dose coreg when able  - Continue supportive measures as needed  - Telemetry monitoring     Sinus Arrhyhtmia: New with this admission.  Had previously been sinus tachycardic and now with arrhythmia.  Possibly related to overall disease process versus cardiac injury from shock.  Continue on telemetry. Cardiology following as above.     RENAL:  Lactic acidosis - resolved  MICHAEL - anuric renal failure  Most likely severe ATN due to sepsis. May be component of volume depletion as well.  Abdominal US did not show any changes to kidneys.  Still with minimal UOP.  Plan for dialysis and UF today  -Nephrology is following - appreciate recs  - Fluid management per ICU  - Continue ceftriaxone and vancomycin     Volume overload  Volume up on exam with b/l LE and UE edema and with dilutional changes to labs. Still producing minimal urine and getting large volumes of IVF for sepsis management.  Plan for HD today for volume management and minimize fluids as possible.  - Dialysis as above  - Limit IVF as possible      GI:  Hepatic injury   Patient with elevated AST/ALT, bilirubin, alk phos, CK and INR with hypoalbuminemia. Decreased synthetic function of liver most likely due to decreased perfusion. US showed fatty infiltration of liver which may be contributing but no concerning biliary tree disease.  Less likely alcoholic hepatitis although alcoholic liver injury likely contributing given AST/ALT ratio.  Peripheral smear relatively normal which is reassuring for DIC  - GI following, appreciate input  - Repeat RUQ u/s today  - Continue to follow liver labs    Melena: Episode of melanotic stool overnight.  Has had downtrending hemoglobin for months as well as while here.  Was seen in clinic recently for melanoma which was thought due to NSAID use.  Symptoms had resolved after he had stopped using NSAIDs.   He was on a PPI as well.  Again having melenic stools here, possibly gastritis from decreased perfusion versus worsening ulceration.  - GI following as above -- will avoid EGD/ERCP for now to avoid reintubation unless emergent  - Trend Hgb  - Transfuse for Hgb < 7     FEN:  Hyponatremia  Hyperkalemia - improved with dialysis  Hypochloremia - improved  Hypocalcemia  Most likely due to septic shock and renal failure.  Consider adrenal insufficiency with hyponatremia and hyperkalemia. Continue to treat with fluid and antibiotics  - Fluid management  - Frequent BMP monitoring     NPO status while sedated  Pulled out tube for tube feeds with ET tube, if able to lighten sedation soon can have swallow study, otherwise may need NG tube for feeds  - Start TPN  - AM Lipids     ID:   Sepsis  Admitted with elevated WBC, elevated lactic acid, hypotension and tachycardia. Blood cultures growing klebsiella.  UA shows leukocytes, blood and few bacteria but may be unreliable as taken after period of anuria.  Most likely urinary source.  Possibly GI source, did have ulcer 1-2 months ago.  Unlikely pna given CXR without infiltrates.  - Continue Ceftriaxone     HEME/ONC:   Macrocytic anemia  Hb currently 6.8, decrease likely due to dilution as he is very volume up. Per chart review, Hb of 13.0 in 2/2018 with steady decline until present. Consistently macrocytic.  Likely nutritional component due to alcohol use but continuous decline is concerning for underlying cause. Has been seen in clinic for melena thought due to PUD 2/2 to NSAID use. Had resolved per last clinic visit with decreased NSAID use. Also consider hemolysis with elevated bilirubin and INR. Peripheral smear showed inflammatory changes only.  LDH and haptoglobin only mildly elevated  - Hgb monitoring as above     ENDOCRINE:   Concern for adrenal insufficiency  Acute illness and combination of hyperkalemia and hyponatremia is concerning for adrenal insufficiency. Plan to  cover with steroids for now and consider further work up as clinical course progresses. Hydrocortisone d/c's.     Hyperglycemia  Elevated glucose, likely due to hydrocortisone  - sliding scale insulin      PSYCH:   History of alcohol use  350ml peppermint schnapps daily for years. Per wife, never has had withdrawal though never been more than 2 days without a drink that she knows of.  Currently with signs of withdrawal and continues on precedex drip  - Withdrawal treatment per ICU  - Nicotine patch    FEN: TPN  DVT ppx: SCDs  Lines: R PICC, Radial Art line, Dialysis Cath RIJ  Code: Full    Subjective  Patient more alert today. Able to answer few questions.  Does respond yes to having pain in his belly.  Wife and son present today.  Reviewed results and answered questions.  Continue to be hopeful.    Objective    Vital signs in last 24 hours Temp:  [98.1  F (36.7  C)-100.7  F (38.2  C)] 98.4  F (36.9  C)  Heart Rate:  [] 92  Resp:  [24-40] 28  BP: (101-142)/(48-85) 114/56  Arterial Line BP: (112-148)/(52-71) 123/56  FiO2 (%):  [45 %-60 %] 45 %   Weight: 211 lb 9.6 oz (96 kg)    Intake/Output last 3 shift I/O last 3 completed shifts:  In: 1203.2 [I.V.:164.2; Blood:989; IV Piggyback:50]  Out: 2092 [Urine:62; Other:2000; Stool:30]    Intake/Output this shift:I/O this shift:  In: -   Out: 30 [Urine:30]    Review of Systems   Unable to complete due to patient mental status    Physical Exam  General: Awake, mentally responsive, ill-appearing  HEENT: Mild subconjunctival hemorrhage of the right eye  CV: Irregular, normal S1/S2, no M/R/G  Pulm: Increased work of breathing with high flow nasal cannula, lungs mildly coarse in the bases   GI: Obese, soft, tender in right upper quadrant with deep palpation, bowel sounds active  Ext: Bilateral lower extremity edema, normal DP pulses   Neuro: Awake, significant confusion, imagines the examiner's finger is a cigarette tries to smoke it, able to answer a few simple  questions    Pertinent Labs and Pertinent Radiology   Lab Results: personally reviewed.     Disposition/Advanced Care Planning  Discharge Planning discussed with Family  Barriers to discharge: Critical Care  Anticipated discharge date: Multiple days  Disposition: Likely TCU    Benjamin Rosenstein, MD, MA   [facility]-PGY2  P: [phone]    Precepted patient with Dr. Adam Nicole III

## 2021-06-19 NOTE — PROGRESS NOTES
[facility] Progress Note    Subjective    Aayush García is sitting up in bed with wife present.  Is oriented to person and place and answering questions more appropriately.  No acute events overnight.  Blood pressures have remained stable off pressors.  No specific concerns.    Discussed with patient and wife possibility of discharging to [facility] tomorrow if blood pressure remains stable for 48 hours off pressors.    We will downgraded from ICU status today.    Objective    Vital signs in last 24 hours Temp:  [98.3  F (36.8  C)-98.9  F (37.2  C)] 98.7  F (37.1  C)  Heart Rate:  [69-91] 72  Resp:  [20-24] 20  BP: ()/(43-99) 97/59   Weight: 205 lb 4.8 oz (93.1 kg)    Physical Exam   General appearance: Alert, conversational, confusion improving, oriented to person and place.  Jaundiced, scleral icterus.  Lungs: Clear to auscultation bilaterally.  No wheezing or crackles auscultated.  Respirations unlabored  Heart: Regular rate and rhythm, normal S1 and S2, no murmur, rub or gallop.  Abdomen: Obese, distended, tympanic, soft, nontender.  Extremities: +3 pitting edema up to abdomen, though improved  Skin: Jaundiced  Neurologic: Alert, oriented to person and place.  No gross neurologic deficits.  Moves all extremities spontaneously.    Pertinent Labs   Lab Results: personally reviewed.     Pertinent Radiology   Radiology Results: Personally reviewed.    Assessment/Plan  Principal Problem:    Septic shock (H)  Active Problems:    Acute respiratory failure with hypoxia (H)    Acute renal failure, unspecified acute renal failure type (H)    Metabolic acidosis    Encephalopathy, metabolic    Elevated troponin    Stress-induced cardiomyopathy    Bacteremia due to Klebsiella pneumoniae    Anemia, unspecified type    Alcohol abuse    Acute encephalopathy    Acute liver failure    Aayush García is a 62 year old male with a history of alcohol abuse, HTN and anemia who was admitted with septic shock secondary  to Klebsiella bacteremia and anuria.  Required intubation and pressor support in ICU, currently extubated, moved out of the ICU 7/31, and was transferred back into the ICU 8/5 due to need for pressure support.  Will transfer out of the ICU today, 8/8.  Hypotension improved on increased midodrine.      Acute renal injury: Most likely severe ATN due to klebsiella sepsis, though cause still not completely clear. May be component of volume depletion as well.  Abdominal US negative 7/28.  Has initiated dialysis and will continue MWF for the foreseen future. Dialysis catheter placed 8/1.  - Nephrology following   -Plan to continue dialysis MWF.      Sepsis   Klebsiella bacteremia  Persistent leukocytosis: Admitted with leukocytosis, lactic acidosis, hypotension and tachycardia. Blood cultures growing klebsiella.  UA shows leukocytes, blood and few bacteria but may be unreliable as taken after period of anuria.  Most likely urinary source, though now question possible SBP given hepatic encephalopathy.  Possibly GI source, did have ulcer 1-2 months ago.  Unlikely PNA given CXR without infiltrates. Persistent leukocytosis of approximately 20 thousand, down trending today. Neutrophil predominance on the 28th. Repeat lactate normal. Blood cultures x2 drawn and no growth yet.  Repeat chest x-ray showing improvement of infiltrates and improvement of pulmonary edema. CT abdomen showed no abscess just ascites. Therapeutic paracentesis performed with 2L drawn, unfortunately fluid was not sent for culture or stain.  UA 8/5 suspicious for urinary tract infection, however urine culture with no growth. IV Zosyn initiated 8/5 and discontinued 8/8.   -Discontinue Zosyn due to negative cultures.  And observe patient for greater than 24 hours off antibiotics, prior to discharge      AMS  Alcohol withdrawal, resolving   Unclear etiology of altered mental status, though may be multifactorial cause including hepatic encephalopathy, underlying  sepsis, alcohol withdrawal, medication induced, ICU delirium. Ammonia level unremarkable, empirically treated with lactulose and rifaximin.  With some improvement after starting rifaximin on 8/5  - Delirium order set   -Avoid sedating medications  - PT/OT/SLP  -Lactulose, titrate as needed for 3-4 stools daily  - Rifaximin 400 mg TID      Hypotension: Likely multifactorial, given underlying sepsis, and possible cirrhosis.  Also with severe hypoalbuminemia which is likely contributing. Was transferred to the ICU for pressure support 8/5 and transferred out 8/8 after increased minodrine.  Was given albumin 25 g IV every 6 hours for 8 doses starting 8/5.   - Dialysis per nephrology  - Midodrine to 15 mg Q8H.  Continue as needed midodrine  -Avoid giving fluid bolus if possible given difficulty to remove fluid.  Consider albumin if hypotensive not responding to midodrine      Hepatic injury  Possible hepatic encephalopathy  Ascites: Elevated AST/ALT, bilirubin, alk phos, CK and INR with hypoalbuminemia. Decreased synthetic function of liver. US 7/25 showed fatty infiltration of liver which may be contributing but no concerning biliary tree disease. Peripheral smear relatively normal which is reassuring for DIC. Repeat RUQ u/s 7/28 without acute findings.  LFTs stabilized but remain elevated.  Paracentesis 8/3 with 1.95 L fluid removed, however unfortunately send for Gram stain or culture. MELD score of 31 with a 53% estimated 3 month mortality.   - Will treat possible hepatic encephalopathy, as above, though likely multiple causes of patient's altered mental status.   - GI following, appreciate their recommendations  -If paracentesis is performed again would recommending sending fluid for analysis.    Macrocytic anemia: Per chart review, Hb of 13.0 in 2/2018 with steady decline until present. Consistently macrocytic. Likely nutritional component due to alcohol use but continuous decline is concerning for underlying  cause. Was also having melena as above. Peripheral smear showed inflammatory changes only.  LDH and haptoglobin only mildly elevated.  Has been transfused 1uPRBCs on 8/6.  - AM CBC  - EPO per nephrology     NSTEMI: Type II myocardial infarction or demand related ischemia secondary to sepsis on presentation.  EF of 25-30% on 7/25/2018, significantly improved with echo 7/31/2018 with an EF of 61%.  Per cardiology not recommending ischemic workup at this time.  - Cardiology has signed off.  - Discontinued coreg 3.125 mg twice daily due to hypotension.  Consider restarting when hypotension has resolved.  - Avoid QT prolong meds  - Telemetry monitoring      Melena, resolved: Episode of melanotic stool 7/27-28.  Has had downtrending hemoglobin for months as well as while here.  Was seen in clinic recently for melena which was thought due to NSAID use.  Symptoms had resolved after he had stopped using NSAIDs.  He was on a PPI as well.  Again having melenic stools here, possibly gastritis from decreased perfusion versus worsening ulceration.   - GI following as above  - PPI two times a day   - Transfuse for Hgb < 7      Respiratory fatigue - resolving: Successfully extubated. Remains of supplemental O2 however, significantly improved status  - Supplemental O2 prn  -Continue duo nebs 4 times daily and albuterol as needed      Sinus Arrythmia: New with this admission with frequent PACs.  Had previously been sinus tachycardic and now with arrhythmia.  Possibly related to overall disease process versus cardiac injury from shock.    - Telemetry.      History of alcohol use  History of tobacco abuse: 350ml peppermint schnapps daily for years. Per wife, never has had withdrawal though never been more than 2 days without a drink that she knows of.    - Nicotine patch  - Continue to monitor for withdrawal symptoms  -Continue folic acid, thiamine, multivitamin      Diet: 2mg Na  Fluids: No IVF  DVT Prophylaxis: Subcutaneous  heparin  Code: full code     Disposition/Advanced Care Planning   Discharge Planning discussed with patient's wife  Barriers to discharge: Treatment of hypotensive episodes   Dispo: anticipate discharge to [place]   Anticipated discharge date:  Likely tomorrow if blood pressures are stable       Precepted patient with Dr. Víctor Xie DO (PGY2)  [facility] Resident  Pager: [phone]

## 2021-06-19 NOTE — LETTER
Letter by Madeline Avila CNP at      Author: Madeline Avila CNP Service: -- Author Type: --    Filed:  Encounter Date: 10/15/2019 Status: Signed         October 15, 2019     Patient: Aayush García   YOB: 1955   Date of Visit: 10/15/2019       To Whom It May Concern:    It is my medical opinion that Aayush García should be excused from work today 10/15/2019 due to health issues.    If you have any questions or concerns, please don't hesitate to call.    Sincerely,        Electronically signed by Madeline Avila CNP

## 2021-06-19 NOTE — LETTER
Letter by Corrina Blanca CNP at      Author: Corrina Blanca CNP Service: -- Author Type: --    Filed:  Encounter Date: 11/18/2019 Status: Signed         Patient: Aayush García   MR Number: [id number]   YOB: 1955   Date of Visit: 11/18/2019     Code Status:  FULL CODE  Visit Type: Follow Up (hepatic encephalopathy, elevated ammonia and electrolyte imbalance)     Facility:  [facility] [[id number]]      Facility Type: SNF (Skilled Nursing Facility, TCU)    History of Present Illness:   Hospital Admission Date: 10/27/2019 Hospital Discharge Date: 11/6/2019       Aayush García is a 64 y.o. male with a past medical history for CAD with a recent MI and stenting 9/2019, systolic heart failure and an EF of 30 to 35%, chronic anemia on iron, current alcohol abuse, liver disease, GERD.  He was recently hospitalized at [facility] for altered mental status and lethargy for 3 weeks.  He had fallen off of his couch while he was sleeping and sustained rib fractures and became lethargic over 3 weeks.  Upon admission he was found to have a hemoglobin of 5.9 and thought it was secondary to his hematoma from his fall.  During his admission it was complicated by encephalopathy with which is likely due to prolonged alcohol withdrawal and possible hepatic encephalopathy.  He does have a history of daily alcohol use of 0.5 to 1 pints of hard liquor per day.  His blood alcohol level on admission was 271.  His ammonia level was 14, TSH was 1.54.  Negative for an infectious process and negative lactic acid.  He was he had a negative UA and a negative chest x-ray.  His ammonia level did rise up to 39 on 10/31.  He was started on lactulose 20 mg 2 times a day and was adjusted per his stooling.  His mental status did improve with the lactulose.    For his anemia he was found to have macrocytic anemia likely due to chronic EtOH abuse and was on aspirin and Brilinta.  He was consulted by GI which  "indicated his last EGD in 6/2019 showed patchy gastritis and a 10 mm duodenal bulb ulcer with gastric biopsies negative for H. pylori.  His last colonoscopy 3/2019 showed polyp without hemorrhoids.  He did have a thoracentesis on 10/28 in which 900 mL of nonbloody fluid was removed he did need 2 units of packed red blood cells.  His hemoglobin was stable at around 8 at discharge.  GI recommends that he have an outpatient colonoscopy.  Eventually his aspirin and Brilinta were restarted and he had no issues.  Because of the issue of acute hematuria it is recommended that he have a follow-up UA as an outpatient.    He did have issues with hypokalemia and hypomagnesemia and these were supplemented and thought to be likely related to his Lasix.  He did have also issues with attention and so it is recommended to closely monitor his weights, electrolytes and blood pressures.    During his hospitalization he had an episode in which he had \"staring spell\" that lasted approximately 30 seconds in which he bit his tongue.  He did have a follow-up head CT which showed no skull fracture intracranial bleed and EEG which was unremarkable.  He has no history of seizures and so this was felt to be uneventful.    Today, nursing reports that he had refused his lactulose a couple doses over the weekend.  Nursing also reports that he have significant dry skin and multiple open abrasions with bleeding due to scratching.  Upon exam, he states he continues to have significant diarrhea, for the good part of the morning.  He endorses significant frustration and is concerned about being able to live his life with this ongoing watery diarrhea.  His magnesium today remains low at 1.5 and his potassium today is in good range on KCl 20 mEq daily.  His ammonia that was checked today continues at the same level at 26.  He is slotted to be discharged home later this week.  He reports therapy is going well and he feels that he is getting stronger.  " His weight is down 3 pounds since his admission however he states he has a good appetite and eating is eating well.  His lower extremities have less edema and he continues to wear JEANINE hose.  He is to see cardiology today.    Past Medical History:   Diagnosis Date   ? Acute liver failure 2018   ? Acute renal failure, unspecified acute renal failure type (H) 2018   ? Acute respiratory failure with hypoxia (H) 2018   ? Alcoholic hepatitis with ascites 2018   ? Bacteremia due to Klebsiella pneumoniae    ? Coronary artery disease involving native coronary artery of native heart without angina pectoris 2019   ? Essential hypertension    ? Gastroesophageal reflux disease without esophagitis 10/31/2018   ? Hepatic encephalopathy (H) 2018   ? Macrocytic anemia    ? Stress-induced cardiomyopathy 2018     Past Surgical History:   Procedure Laterality Date   ? COLONOSCOPY N/A 3/20/2018    Procedure: COLONOSCOPY;  Surgeon: Adam Nicole III, MD;  Location: [facility];  Service:    ? CV CORONARY ANGIOGRAM N/A 3/18/2019    Procedure: Coronary Angiogram;  Surgeon: Timi Rodriguez MD;  Location: [facility];  Service: Cardiology   ? CV LEFT HEART CATHETERIZATION WO LEFT VETRICULOGRAM Left 3/18/2019    Procedure: Left Heart Catheterization Without Left Ventriculogram;  Surgeon: Timi Rodriguez MD;  Location: [facility];  Service: Cardiology   ? PICC  2018        ? PICC  3/18/2019        ? US THORACENTESIS  10/28/2019     Family History   Problem Relation Age of Onset   ? Heart disease Father 76        type unknown to Aayush; his father  at home   ? Alzheimer's disease Mother 86        lives in long term care   ? No Medical Problems Son    ? No Medical Problems Son      Social History     Socioeconomic History   ? Marital status: Domestic Partner     Spouse name: Karlie Chisholm (АННА)   ? Number of children: Not on file   ? Years of education: Not on file   ?  Highest education level: Not on file   Occupational History   ? 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 Financial resource strain: Not on file   ? Food insecurity:     Worry: Not on file     Inability: Not on file   ? Transportation needs:     Medical: Not on file     Non-medical: Not on file   Tobacco Use   ? Smoking status: Former Smoker     Packs/day: 1.00     Years: 40.00     Pack years: 40.00     Types: Cigarettes     Last attempt to quit: 2019     Years since quittin.5   ? Smokeless tobacco: Never Used   Substance and Sexual Activity   ? Alcohol use: Yes     Frequency: 4 or more times a week     Drinks per session: 1 or 2     Comment: 350 ml of peppermint schnapps daily per [facility],  H&P per report of Karlie JJ to Dr. Valle   ? Drug use: No   ? Sexual activity: Not on file   Lifestyle   ? Physical activity:     Days per week: Not on file     Minutes per session: Not on file   ? Stress: Not on file   Relationships   ? Social connections:     Talks on phone: Not on file     Gets together: Not on file     Attends [employer] service: Not on file     Active member of club or organization: Not on file     Attends meetings of clubs or organizations: Not on file     Relationship status: Not on file   ? Intimate partner violence:     Fear of current or ex partner: Not on file     Emotionally abused: Not on file     Physically abused: Not on file     Forced sexual activity: Not on file   Other Topics Concern   ? Not on file   Social History Narrative    Works in [employer]. Lives with his SO, Karlie Chisholm.       Current Outpatient Medications   Medication Sig Dispense Refill   ? acetaminophen (TYLENOL) 325 MG tablet Take 2 tablets (650 mg total) by mouth every 6 (six) hours as needed.  0   ? aspirin 81 MG EC tablet Take 81 mg by mouth daily.     ? atorvastatin (LIPITOR) 80 MG tablet Take 1 tablet (80 mg total) by mouth daily. 90 tablet 3   ? b complex  vitamins tablet Take 1 tablet by mouth daily.     ? carvedilol (COREG) 12.5 MG tablet Take 12.5 mg by mouth 2 (two) times a day with meals.     ? cholecalciferol, vitamin D3, (VITAMIN D3) 2,000 unit Tab Take 2,000 Units by mouth daily.     ? clopidogrel (PLAVIX) 75 mg tablet Take 1 tablet (75 mg total) by mouth daily. 90 tablet 3   ? ferrous sulfate 325 (65 FE) MG tablet Take 1 tablet by mouth daily with breakfast.     ? folic acid (FOLVITE) 1 MG tablet Take 1 tablet (1 mg total) by mouth daily. 30 tablet 0   ? furosemide (LASIX) 20 MG tablet Take 1 tablet (20 mg total) by mouth daily. (Patient taking differently: Take 10 mg by mouth daily.       ) 30 tablet 0   ? furosemide (LASIX) 20 MG tablet TAKE 1 TABLET BY MOTUH AS NEEDED FOR WEIGHT GAIN OF 3 LBS IN ONE DAY (Patient taking differently: 20 mg. TAKE 1 TABLET BY MOTUH AS NEEDED FOR WEIGHT GAIN OF 3 LBS IN ONE DAY      ) 90 tablet 1   ? magnesium oxide (MAG-OX) 400 mg (241.3 mg magnesium) tablet Take 1 tablet (400 mg total) by mouth 2 (two) times a day.  0   ? multivitamin therapeutic tablet Take 1 tablet by mouth daily.     ? omeprazole (PRILOSEC) 40 MG capsule Take 40 mg by mouth daily before breakfast.     ? thiamine (VITAMIN B-1) 100 MG tablet Take 100 mg by mouth daily.       No current facility-administered medications for this visit.      No Known Allergies  Immunization History   Administered Date(s) Administered   ? INFLUENZA,RECOMBINANT,INJ,PF QUADRIVALENT 18+YRS 10/29/2019   ? Influenza,seasonal quad, PF, =/> 6months 10/04/2018   ? Tdap 10/04/2018         Review of Systems   Patient denies fever, chills, headache, lightheadedness, dizziness, rhinorrhea, cough, congestion, shortness of breath, chest pain, palpitations, abdominal pain, constipation, change in appetite, dysuria, frequency, burning or pain with urination.  Other than stated in HPI all other review of systems is negative.         Physical Exam     Vital signs: /67, heart rate 99,  respiratory 18, temp 97.9, weight 170.3 pounds..  GENERAL APPEARANCE: Well developed, well nourished, in no acute distress.  HEENT: normocephalic, atraumatic  PERRL, sclerae anicteric, conjunctivae clear and moist, EOM intact  LUNGS: Lung sounds CTA, no adventitious sounds, respiratory effort normal.  CARD: RRR, S1, S2, harsh systolic murmur, no gallops, rubs,  ABD: Soft and nontender with normal bowel sounds.   MSK: Muscle strength and tone were normal.  EXTREMITIES: Decreased and nonpitting pedal edema bilateral lower extremities  NEURO: Alert and oriented x 3.  Face is symmetric.  SKIN: Multiple purpura of upper extremities with skin tears.  Abrasions from scratching on his upper extremities  PSYCH: euthymic            Labs:   Recent Results (from the past 240 hour(s))   Comprehensive Metabolic Panel   Result Value Ref Range    Sodium 136 136 - 145 mmol/L    Potassium 2.6 (LL) 3.5 - 5.0 mmol/L    Chloride 107 98 - 107 mmol/L    CO2 21 (L) 22 - 31 mmol/L    Anion Gap, Calculation 8 5 - 18 mmol/L    Glucose 67 (L) 70 - 125 mg/dL    BUN 7 (L) 8 - 22 mg/dL    Creatinine 0.67 (L) 0.70 - 1.30 mg/dL    GFR MDRD Af Amer >60 >60 mL/min/1.73m2    GFR MDRD Non Af Amer >60 >60 mL/min/1.73m2    Bilirubin, Total 0.5 0.0 - 1.0 mg/dL    Calcium 8.1 (L) 8.5 - 10.5 mg/dL    Protein, Total 7.0 6.0 - 8.0 g/dL    Albumin 2.5 (L) 3.5 - 5.0 g/dL    Alkaline Phosphatase 365 (H) 45 - 120 U/L    AST 49 (H) 0 - 40 U/L    ALT 33 0 - 45 U/L   Magnesium   Result Value Ref Range    Magnesium 1.6 (L) 1.8 - 2.6 mg/dL   Hemoglobin   Result Value Ref Range    Hemoglobin 9.1 (L) 14.0 - 18.0 g/dL   Ammonia   Result Value Ref Range    Ammonia 26 11 - 35 umol/L   Urinalysis   Result Value Ref Range    Color, UA Yellow Colorless, Yellow, Straw, Light Yellow    Clarity, UA Clear Clear    Glucose, UA Negative Negative    Bilirubin, UA Negative Negative    Ketones, UA Trace (!) Negative    Specific Gravity, UA 1.026 1.001 - 1.030    Blood, UA Negative  Negative    pH, UA 5.5 4.5 - 8.0    Protein, UA 30 mg/dL (!) Negative mg/dL    Urobilinogen, UA 2.0 E.U./dL <2.0 E.U./dL, 2.0 E.U./dL    Nitrite, UA Negative Negative    Leukocytes, UA Negative Negative    Bacteria, UA None Seen None Seen hpf    RBC, UA 0-2 None Seen, 0-2 hpf    WBC, UA 0-5 None Seen, 0-5 hpf    Squam Epithel, UA 0-5 None Seen, 0-5 lpf    Mucus, UA Moderate (!) None Seen lpf    Hyaline Casts, UA 25-50 (!) 0-5, None Seen lpf   Potassium   Result Value Ref Range    Potassium 2.7 (LL) 3.5 - 5.0 mmol/L   Potassium   Result Value Ref Range    Potassium 3.4 (L) 3.5 - 5.0 mmol/L   Magnesium   Result Value Ref Range    Magnesium 1.5 (L) 1.8 - 2.6 mg/dL   Potassium   Result Value Ref Range    Potassium 3.6 3.5 - 5.0 mmol/L   Magnesium   Result Value Ref Range    Magnesium 1.5 (L) 1.8 - 2.6 mg/dL   Ammonia   Result Value Ref Range    Ammonia 26 11 - 35 umol/L         Assessment:  1. Alcoholic hepatitis with ascites     2. Hepatic encephalopathy (H)     3. Chronic systolic heart failure (H)     4. Hypokalemia due to excessive gastrointestinal loss of potassium     5. Hypomagnesemia     6. Coronary artery disease involving native coronary artery of native heart without angina pectoris     7. Ischemic cardiomyopathy     8. Xerosis cutis         Plan:   Alcoholic hepatitis with ascites and hepatic encephalopathy: Again I did discussed with him the need to abstain from any alcohol products due to his significant liver disease.  His ammonia level is in good range and so at this time I will discontinue his lactulose as he is only getting 10 g daily.  I will recheck an ammonia level and have requested that nurses monitor his cognitive status.  Ordered follow-up with a hematologist at [facility].    hypokalemia: Resolved continue KCl 20 mEq daily.  It is likely we could reduce this more as his Lasix has been reduced however will wait until improvement with his watery stools.    Hypomagnesemia: Continues to have low  magnesium we will continue with Slow-Mag 2 tabs daily.  Will observe his electrolytes as his watery diarrhea improves.    CAD and ischemic cardiomyopathy: Seeing cardiology today.    Xerosis cutis: Will add Leelee cream for dry skin twice daily.    35 total minutes spent with 20 minutes spent face-to-face with patient and his significant other in counseling and coordination of the above plan of care.      Electronically signed by: Corrina Blanca CNP

## 2021-06-19 NOTE — PROGRESS NOTES
GI Progress Note  Aayush García  [id number]    Subjective:   Awake and alert but still needs help in answering what hospital he is at.  Anticipating dialysis today.  Wife at bedside.  Pt denies shortness of breath.     Objective:     Vitals:    08/08/18 1000   BP: (!) 86/50   Pulse: 73   Resp: 22   Temp: 98.5  F (36.9  C)   SpO2: 95%     Body mass index is 31.22 kg/(m^2).   Gen: No acute distress  Cardio: RRR  GI: Distended, BS positive, firm, non-tender. No guarding.  Neuro: Still confused.  No asterixis flap but does not fully comply with exam for entire 20 seconds -- though hands much less shaky .      Laboratory    Results from last 7 days  Lab Units 08/08/18 0438 08/07/18 0408 08/06/18  0440  08/05/18  0548   LN-WHITE BLOOD CELL COUNT thou/uL  --  18.1* 17.9*  --  19.4*   LN-HEMOGLOBIN g/dL 8.3* 7.8* 6.9*  < > 7.6*   LN-HEMATOCRIT %  --  23.4* 21.3*  --  23.1*   LN-PLATELET COUNT thou/uL  --  245 270  --  274   < > = values in this interval not displayed.       Results from last 7 days  Lab Units 08/08/18  0438   LN-SODIUM mmol/L 134*   LN-POTASSIUM mmol/L 3.7   LN-CHLORIDE mmol/L 99   LN-CO2 mmol/L 22   LN-BLOOD UREA NITROGEN mg/dL 30*   LN-CREATININE mg/dL 5.37*   LN-CALCIUM mg/dL 8.4*       Results from last 7 days  Lab Units 08/08/18 0438 08/07/18  0408 08/05/18  0548   LN-BILIRUBIN TOTAL mg/dL 6.7* 7.4* 5.6*   LN-ALKALINE PHOSPHATASE U/L 394* 395* 557*   LN-AST (SGOT) U/L 100* 109* 165*   LN-ALT (SGPT) U/L 46* 47* 62*   ]    Results from last 7 days  Lab Units 08/08/18 0438 08/05/18  0548 08/04/18  0505   LN-INR  1.37* 1.39* 1.34*     Xr Chest 1 View Portable    Result Date: 8/5/2018  XR CHEST 1 VIEW PORTABLE 8/5/2018 5:14 PM INDICATION: Hypotension COMPARISON: Chest x-ray 08/02/2018 FINDINGS: The life-support devices are in stable positions. Persistent low lung volumes with associated hypoventilatory changes. Increased right basilar opacities most likely reflect subsegmental atelectasis. Interval  improved aeration at the left lung base. Persistent trace left-sided pleural effusion. Stable heart size and no overt vascular congestion. No definite pneumothorax.    Xr Chest 1 View Portable    Result Date: 7/28/2018  XR CHEST 1 VIEW PORTABLE 7/28/2018 6:04 AM INDICATION: Dyspnea COMPARISON: 07/25/2018. FINDINGS: Interval removal of ETT and NG tube. Right IJ catheter high SVC level. Right PICC catheter right atrial level. No pneumothorax. Interval worsening with bilateral airspace opacities with upper lobe predominance greatest on the left. Small left effusion. Previous left lower rib fractures. Monitor electrodes.    Xr Chest 1 View Portable    Result Date: 7/25/2018  XR CHEST 1 VIEW PORTABLE 7/25/2018 12:39 PM INDICATION: Dialysis line placement COMPARISON: 07/25/2018 FINDINGS: Right IJ central venous catheter tip over the right IJ vein-brachiocephalic junction or proximal SVC. Right PICC tip over the right atrium. Appropriately positioned endotracheal tube. Enteric tube courses into the stomach and off the field of view. No pneumothorax. Mildly hypoexpanded lungs. No focal consolidation. No definitive pulmonary edema or pleural effusion. Stable mildly prominent cardiomediastinal silhouette.     Xr Chest 1 View Portable    Result Date: 7/25/2018  XR CHEST 1 VIEW PORTABLE 7/25/2018 5:51 AM INDICATION: Confirm et tube position COMPARISON: 7/25/2018. FINDINGS: Heart is slightly enlarged. Tip of ETT located 3.2 cm above stacy. NG tube extends below left diaphragm. Right PICC catheter with tip right atrial level. No pneumothorax. Minimal amount of bilateral lower lung atelectasis. Monitor electrodes.    Xr Chest 1 View Portable    Result Date: 7/25/2018  XR CHEST 1 VIEW PORTABLE 7/25/2018 3:33 AM INDICATION: Sepsis. anuric. increasing tachypnea, hypoxia. COMPARISON: 7/24/2018. FINDINGS: Shallow inspiration. Stable heart size. Interval placement right PICC catheter with tip right atrial level. No pneumothorax.  Minimal amount of bilateral lower lung atelectasis with lung findings accentuated due to level of inspiration. Old healed left rib fractures. Monitor electrodes.    Xr Chest 1 View Portable    Result Date: 7/24/2018  XR CHEST 1 VIEW PORTABLE 7/24/2018 8:54 PM INDICATION: Sob COMPARISON: None. FINDINGS: Cardiomegaly. Pulmonary vascularity normal. The lungs are clear.    Xr Chest 2 Views    Result Date: 8/2/2018  XR CHEST 2 VIEWS 8/2/2018 5:20 PM INDICATION: Followup infilitrate COMPARISON: 7/28/2018 FINDINGS: Right PICC could be pulled back into the proximal SVC. It is also possible it is obscured by the right central line whose tip projects over the right atrium. Limited inspiratory volume. Stable heart size. Improved bilateral alveolar infiltrates most likely represents improved edema.    Us Abdomen Complete    Result Date: 7/25/2018  US ABDOMEN COMPLETE 7/25/2018 2:01 AM INDICATION: Anuric, septic, elevated alk phos, bili, lipase COMPARISON: None. FINDINGS: GALLBLADDER: Sludge in the gallbladder. Gallbladder wall thickness measures 3 mm. Negative Rogers sign. BILE DUCTS: Common bile duct not visualized. No evidence of ductal dilatation. LIVER: Measures 17.5 cm. Fatty infiltration. SPLEEN: Normal in size. RIGHT KIDNEY: Measures 10.1 x 5.1 x 5.3 cm. Normal. No hydronephrosis. LEFT KIDNEY: Measures 9.5 x 5.7 x 4.8 cm. Normal. No hydronephrosis. PANCREAS: Not visualized due to bowel gas. AORTA: Not visualized due to bowel gas. IVC: Poorly visualized.     CONCLUSION: 1.  Sludge in the gallbladder. 2.  No gallbladder wall thickening. 3.  No evidence of cholelithiasis. 4.  No evidence of ductal dilatation.    Ct Abdomen Pelvis Without Oral With Iv Contrast    Result Date: 8/3/2018  CT ABDOMEN PELVIS WO ORAL W IV CONTRAST 8/3/2018 1:37 PM     INDICATION: Infection, abdomen-pelvis leukocytosis, hypotension, evaluate for ascites and cirrhosis TECHNIQUE: CT abdomen and pelvis. Multiplanar reformation images (MPR). Dose  reduction techniques were used. IV CONTRAST: Iohexol (Omni) 100 mL COMPARISON: Ultrasound abdomen 7/28/2018 and 7/25/2018 FINDINGS: LUNG BASES: Small right-sided trace left-sided pleural effusion with adjacent consolidation likely reflecting compressive atelectasis. Extensive coronary artery calcifications. Mild cardiomegaly. ABDOMEN: Limited due to patient motion. Heterogeneous liver with no definite discrete measurable lesion. Prominent gallbladder without wall thickening or appreciable edema. No biliary ductal dilatation. Unremarkable spleen, pancreas and adrenals. Unremarkable kidneys without evidence of hydronephrosis. Unremarkable stomach. Normal caliber small bowel. Normal caliber appendix. The colon is tortuous but normal in caliber. Moderate body wall and mild mesenteric edema. Small amount of diffuse simple ascites. Left lower ventral abdominal wall air densities likely reflecting recent injection. Correlation is recommended. Similar findings in the right mid ventral abdominal wall. No free air. Patent central SMV and SMA. PELVIS: Urinary bladder decompressed by a Rahman catheter. Mild presacral edema. Small amount of simple free fluid. No adenopathy. MUSCULOSKELETAL: Age indeterminate but likely remote L1 compression deformity without significant osseous retropulsion or central canal stenosis.     CONCLUSION: 1. Small amount of diffuse simple ascites. Mesenteric and body wall edema are also noted. No loculated fluid collections in the abdomen or pelvis. 2. Small right-sided and trace left-sided pleural effusions with adjacent consolidation which may reflect atelectasis or pneumonic infiltrates. 3. Nonspecific heterogeneous liver. No discrete measurable liver lesion. Correlate with liver function tests.    Ir Tunneled Catheter Insert    Result Date: 8/1/2018  [facility] PROCEDURE: TUNNELED DIALYSIS CATHETER PLACEMENT 1.  Insertion of a tunneled central venous catheter. 2.  Ultrasound guidance for  vascular access. A permanent image was stored. 3.  Fluoroscopic guidance for central venous access device placement. INTERVENTIONAL RADIOLOGIST: Kenji Ortiz MD. INDICATION: 62-year-old male with renal insufficiency requiring hemodialysis. CONSENT: The risks, benefits and alternatives of tunneled dialysis catheter placement were discussed with the patient's guardian in detail. All questions were answered. Informed consent was given to proceed with the procedure. MODERATE SEDATION: None. ANTIBIOTICS: The patient is receiving antibiotic therapy. No additional antibiotic was administered for the procedure. ADDITIONAL MEDICATIONS: None. FLUOROSCOPIC TIME: 1.1 minutes. AIR KERMA:  6 mGy. CONTRAST: None. COMPLICATIONS: No immediate complications. STERILE TECHNIQUE: The procedure was performed using maximum sterile barrier technique. The interventionalist and assistants performed appropriate hand hygiene and wore a hat, mask, sterile gown, and sterile gloves during the entire procedure. PROCEDURE:  Using local anesthesia and real-time ultrasound guidance the right internal jugular vein was accessed. A subcutaneous tunnel was created requiring a second incision. Using this access, a 23 cm tip to cuff Duraflow dialysis catheter was advanced until the  tip was in the proximal right atrium.  The catheter was tested and found to flush and aspirate appropriately. FINDINGS: Ultrasound shows an anechoic and compressible jugular vein.  At the completion of the study, the tunneled dialysis catheter tip lies in the proximal right atrium.     1.  Successful tunneled dialysis catheter placement. 2.  The catheter is ready for use.     Us Abdomen Limited    Result Date: 7/28/2018  US ABDOMEN LIMITED 7/28/2018 4:25 PM INDICATION: Elevated alk phos, bilirubin COMPARISON: Abdominal ultrasound 07/25/2018 FINDINGS: Technically challenging exam due to the patient's body habitus and bowel gas. GALLBLADDER: Small amount sludge in the  gallbladder. No wall thickening. BILE DUCTS: No intrahepatic bile duct dilation. The common bile duct was not visualized due to bowel gas.. LIVER: Hepatomegaly measuring 20.1 cm. Diffusely echogenic. Portions difficult to penetrate. RIGHT KIDNEY: No hydronephrosis. PANCREAS: Not well seen due to bowel gas. Small amount of ascites in the right upper quadrant.     CONCLUSION: 1.  Technically challenging exam. 2.  Gallbladder sludge. No findings for cholecystitis. 3.  Enlarged and fatty liver.    Us Paracentesis    Result Date: 8/3/2018  1. PARACENTESIS 2. ULTRASOUND GUIDANCE 8/3/2018 9:43 PM INDICATION: Ascites. PROCEDURE: Informed consent obtained. Time out performed. The abdomen was prepped and draped in a sterile fashion. 10 mL of 1 percent lidocaine was infused into local soft tissues. A 5 [employer] catheter system was introduced into the abdominal ascites  under ultrasound guidance. 1.95 liters of clear yellow fluid was removed and sent to lab if requested. Estimated blood loss is minimal. No immediate complication. The patient tolerated the procedure well. RADIOLOGIC SUPERVISION AND INTERPRETATION: ULTRASOUND GUIDANCE: Images demonstrate ascites.     CONCLUSION: 1.  Status post ultrasound-guided paracentesis.    Assessment:   1. Elevated LFT's - likely related to sepsis and EtOH hepatitis.    MELD 30-31 today (>50% mortality risk at 90 days)   Bili down a bit today - hopefully has hit plateau.  US showing small amount of sludge but normal duct size.     Paracentesis 8/3/18 - 1.95L removed - fluid not sent for cell count/gram stain.    2. Sepsis secondary to Klebsiella pneumonia.    3. MICHAEL - ATN.  Nephrology following.  4. Anemia - macrocytic.  Brown stool.  5. Encephalopathy - unclear if hepatic encephalopathy.  Likely multifactorial, as detailed in resident note. On rifaximin; lactulose held due to frequent stools.      Patient Active Problem List   Diagnosis     Septic shock (H)     Acute respiratory failure  with hypoxia (H)     Acute renal failure, unspecified acute renal failure type (H)     Metabolic acidosis     Encephalopathy, metabolic     Elevated troponin     Stress-induced cardiomyopathy     Bacteremia due to Klebsiella pneumoniae     Anemia, unspecified type     Alcohol abuse     Acute encephalopathy     Acute liver failure     Plan:   1. Dialysis today.  Nephrology input appreciated.  Was able to have fluid taken off on last HD run.    Ascites not symptomatic and not requiring repeat paracentesis at this point.  Unfortunately, ascites fluid does not appear to have been sent for analysis on 8/3/18.  On Zosyn - which would cover SBP.  Fluid studies at next paracentesis.    2. Rifaximin 550mg po three times a day.    Thank you.  Wilmer Hernández PA-C  [facility]  [phone]

## 2021-06-19 NOTE — PROGRESS NOTES
Progress Note  Assessment/Plan  Principal Problem:    Septic shock (H)  Active Problems:    Acute respiratory failure with hypoxia (H)    Acute renal failure, unspecified acute renal failure type (H)    Metabolic acidosis    Encephalopathy, metabolic    Elevated troponin    Aayush García is a 62 year old male with history of alcohol abuse, HTN and anemia (thought to be due to GI bleed in clinic) who was admitted with septic shock and anuria and now with positive blood gram stain for G- bacilli.  Patient is currently intubated in ICU, but off pressor support.    NEURO:  AMS  Likely related to underlying sepsis.  May be component of alcohol use and possible withdrawal.  Currently sedated while on ventilation. U tox was negative  - On thiamine and folate  - Monitor for withdrawal    RESPIRATORY:   Respiratory fatigue  Intubated due to respiratory fatigue and worsening AMS. Currently on VCV with rate 16, tidal volume 550 and PEEP 5, 35% FiO2  - ventilation management per ICU, maybe weaning trial today    CV:  Elevated troponin and low EF  Troponin has plateaued around 7 this morning. Most likely due to stress cardiomyopathy and demand ischemia from acute illness.  However,  EKG in early June at [place] clinic did show ST segment changes concerning for ischemia so he may have underlying cardiac disease.  It is possible that a cardiac event precipitated his acute illness. Cards is following.  Will likely repeat echo after acute illness.  He will need a stress test eventually to evaluate for underlying disease  - Cardiology is following - appreciate recs  - Continue supportive measures as needed  - Telemetry monitoring    RENAL:  Lactic acidosis  MICHAEL - anuric renal failure  Most likely severe ATN due to sepsis.  May be component of volume depletion as well. Would expect earlier symptoms if cardiogenic shock precipitated renal failure.  Abdominal US did not show any changes to kidneys. CPK slightly elevated but  unlikely to cause this degree of injury.  Had second run of dialysis this AM  - Nephrology is following - appreciate recs  - Fluid management per ICU  - Abx treatment per ICU  - F/u blood and urine cultures    GI:   Hepatic injury  Patient with elevated AST/ALT, bilirubin, alk phos, CK and INR with hypoalbuminemia.  Some improvement today except for increase in alk phos.  Decreased synthetic function of liver most likely due to decreased perfusion.  US showed fatty infiltration of liver which may be contributing but no concerning biliary tree disease.  Less likely alcoholic hepatitis although alcoholic liver injury is likely contributing given AST: ALT relationship.  With severe disease consider DIC as cause of elevated INR, though smear relatively normal  - Continue fluid management and pressor support as needed  - Continue to follow liver labs    FEN:   Hyponatremia  Hyperkalemia - improved with dialysis  Hypochloremia  Hypocalcemia  Most likely due to septic shock and renal failure.  Consider adrenal insufficiency with hyponatremia and hyperkalemia. Continue to treat with fluid and antibiotics  - Fluid management  - Frequent BMP monitoring    NPO status while intubated  Will start tube feedings per dietician    ID:   Sepsis  Admitted with WBC 21.4, elevated lactic acid, hypotension and tachycardia.  Blood cultures growing G- bacilli.  UA shows leukocytes, blood and few bacteria. Urine culture pending.  Likely urinary source.  Could also be GI related, though less likely based on no significant imaging findings.  Unlikely to be aspiration pna as no infiltration on CXR.  - Continue fluids  - Continue vanc, flagyl, and cefepime. Narrow as speciation allows.  - F/u blood and urine cultures    HEME/ONC:  Macrocytic anemia  Hb currently 7.1  With elevated MCV.  Per chart review, Hb of 13.0 in 2/2018 with steady decline until present. Consistently macrocytic.  Likely nutritional component due to alcohol use but  continuous decline is concerning for underlying cause. Has been seen in clinic for melena thought due to PUD 2/2 to NSAID use. Had resolved per last clinic visit with decreased NSAID use. Also consider hemolysis with elevated bilirubin and INR. Peripheral smear showed inflammatory changes only.  LDH and haptoglobin only mildly elevated  - Will need further evaluation as OP after acute event  - Transfuse to maintain Hgb > 7    ENDOCRINE:   Concern for adrenal insufficiency  Acute illness and combination of hyperkalemia and hyponatremia is concerning for adrenal insufficiency. Plan to cover with steroids for now and consider further work up as clinical course progresses  - Continue hydrocortisone    Hyperglycemia  Elevated glucose, likely due to hydrocortisone  - sliding scale insulin    PSYCH:   History of alcohol use  350ml peppermint schnapps daily for years. Per wife, never gone into withdrawal though never been more than 2 days without a drink that she knows of.  - Monitor for signs of withdrawal      DVT ppx:  SCDs  Diet: NPO, tube feeding  Code: full code    Dispo: TBD  Barriers to discharge: intubation, IV antibiotics    Gaviota Falk, MS4    I was present with the medical student who participated in the service and in the documentation of the note. I have verified the history and personally performed the physical exam and medical decision making. I agree with the assessment and plan of care as documented in the note.    Benjamin Rosenstein, MD, MA   [facility]-PGY2  P: [phone]    Precepted patient with Dr. Cali Styles      Subjective  Patient still intubated this morning.  Talked with significant other.  She is feeling like things are going in the right direction.  She is hoping we will know what caused this soon.    Objective    Vital signs in last 24 hours Temp:  [98.2  F (36.8  C)-98.8  F (37.1  C)] 98.5  F (36.9  C)  Heart Rate:  [] 55  Resp:  [0-35] 23  BP: ()/(45-72)  119/47  Arterial Line BP: ()/(39-65) 119/48  FiO2 (%):  [30 %-40 %] 35 %   Weight: 212 lb 4.9 oz (96.3 kg)    Intake/Output last 3 shift I/O last 3 completed shifts:  In: 8546.6 [I.V.:5369.6; Other:1000; IV Piggyback:2177]  Out: 515 [Urine:115; Emesis/NG output:400]    Intake/Output this shift:I/O this shift:  In: 779 [I.V.:779]  Out: 0       Physical Exam    GENERAL: intubated and sedated  HEENT: eyes anicteric  CV: RRR, no murmurs  RESP: scattered coarse rhonchi with vent  ABDOMEN: soft, mildly distended, guarding with RUQ palpation, BS hypoactive  EXTREMITIES: no edema, warm and well perfused  NEURO: sedated, not following commands       Pertinent Labs and Pertinent Radiology   Lab Results: personally reviewed.   Results for orders placed or performed during the hospital encounter of 07/24/18   Basic Metabolic Panel   Result Value Ref Range    Sodium 125 (L) 136 - 145 mmol/L    Potassium 4.4 3.5 - 5.0 mmol/L    Chloride 91 (L) 98 - 107 mmol/L    CO2 20 (L) 22 - 31 mmol/L    Anion Gap, Calculation 14 5 - 18 mmol/L    Glucose 171 (H) 70 - 125 mg/dL    Calcium 8.0 (L) 8.5 - 10.5 mg/dL    BUN 24 (H) 8 - 22 mg/dL    Creatinine 5.09 (H) 0.70 - 1.30 mg/dL    GFR MDRD Af Amer 14 (L) >60 mL/min/1.73m2    GFR MDRD Non Af Amer 12 (L) >60 mL/min/1.73m2     Lab Results   Component Value Date    ALT 44 07/26/2018     (H) 07/26/2018    ALKPHOS 654 (H) 07/26/2018    BILITOT 2.7 (H) 07/26/2018     Lab Results   Component Value Date    ALBUMIN 2.2 (L) 07/26/2018     Lab Results   Component Value Date    CKTOTAL 762 (HH) 07/26/2018    CKMB 24 (HH) 07/25/2018    TROPONINI 7.61 (HH) 07/26/2018     Lab Results   Component Value Date    WBC 13.7 (H) 07/26/2018    HGB 7.1 (L) 07/26/2018    HCT 20.3 (L) 07/26/2018    MCV 99 07/26/2018     (L) 07/26/2018     Lab Results   Component Value Date    INR 1.69 (H) 07/26/2018    INR 1.64 (H) 07/25/2018    INR 1.48 (H) 07/25/2018     U-tox negative    Lab Results   Component  Value Date    COLORU Yellow 07/25/2018    CLARITYU Cloudy (!) 07/25/2018    GLUCOSEU 150 mg/dL (!) 07/25/2018    BILIRUBINUR Negative 07/25/2018    KETONESU Negative 07/25/2018    SPECGRAV 1.009 07/25/2018    HGBUA Large (!) 07/25/2018    PHUR 7.5 07/25/2018    PROTEINUA 300 mg/dL (!) 07/25/2018    UROBILINOGEN <2.0 E.U./dL 07/25/2018    NITRITE Negative 07/25/2018    LEUKOCYTESUR Large (!) 07/25/2018    BACTERIA Few (!) 07/25/2018    RBCUA 10-25 (!) 07/25/2018    WBCUA 25-50 (!) 07/25/2018    SQUAMEPI 0-5 07/25/2018     Blood culture growing G- bacilli  Sputum culture growing normal rosi    Radiology Results: No new results    EKG Results: No new results

## 2021-06-19 NOTE — PROGRESS NOTES
RENAL Progress Note        Assessment/Plan  MICHAEL - severe ATN in setting of shock and prob unrecognized chronic liver disease. ?recovery potential mary with ongoing low bp. U/o has been low but now ?, as no golden but ok to observe w/o precise u/o as still has high Cr and azotemia and fluid overload so no signif recovery so far.continue HD with midodrine and alb for bp support.      Hypotension - Not clearly cirrhotic but acting like chronic liver dz pt. Nothing acute on CT. CM better. UTI treated, s/p transfusion.   Using scheduled midodrine now and prn before dialysis, inc dose. Needs to get better to contemplate outpt HD.     CM  - likely stress induced CM as he has recovered quite a bit of his EF.       Sepsis - klebsiella bacteremia treated.   Ongoing leukocytosis.   new UTI, back on abx.     Anemia - acute on chronic, recent GI bleed earlier this summer  S/p transfusion again on Monday  Started epo    Inc LFT's and coagulopathy/hypotension, - No documented chronic liver disease but ?early cirrhosis. Bili is sl up.   Ascites, non tense/asymptomatic, and anasarca. Agree with GI that pt does not appear to need LVP currently and better strategy to cont to push UF on dialysis as he tolerates.     Resp failure -low level O2 needs    Encephalopathy - waxing and waning suspect hepatic encephalopathy?  Getting lactulose.     Malnutrition - encourage oral intake, liberalize diet to just sodium restriction       Give albumin 25% if needed for hypotension and try and avoid saline due to severe anasarca    ?[place] tomorrow after HD?  Principal Problem:    Septic shock (H)  Active Problems:    Acute respiratory failure with hypoxia (H)    Acute renal failure, unspecified acute renal failure type (H)    Metabolic acidosis    Encephalopathy, metabolic    Elevated troponin    Stress-induced cardiomyopathy    Bacteremia due to Klebsiella pneumoniae    Anemia, unspecified type    Alcohol abuse    Acute encephalopathy    Acute  liver failure      Subjective  Mild brief hypotension after HD/ UF yesterday. Got 1 unit 25% alb and bp ok since.  Pt w/o c/o.   SO at bedside.       Objective    Vital signs in last 24 hours  Temp:  [97.6  F (36.4  C)-99.1  F (37.3  C)] 97.7  F (36.5  C)  Heart Rate:  [] 63  Resp:  [16-33] 24  BP: ()/(44-75) 95/52  Weight:   201 lb 4.8 oz (91.3 kg)    Intake/Output last 3 shifts  I/O last 3 completed shifts:  In: 522 [P.O.:420; IV Piggyback:102]  Out: 3001 [Other:3001]  Intake/Output this shift:  I/O this shift:  In: 360 [P.O.:360]  Out: -     Review of Systems   A 12 point comprehensive review of systems was negative except as noted.    Physical Exam  Awake jaundiced  HEENT NC in place, more spider angiomata on chest and face  Neck supple  Lungs fairly clear anteriorally  Cor RRR, 3+ edema up to hips  abd soft, obese, more distended  Ext warm  CVC R IJ exit ok today     Pertinent Labs   Lab Results: personally reviewed.   Lab Results   Component Value Date     (L) 08/09/2018     (L) 08/08/2018     08/07/2018    K 3.3 (L) 08/09/2018    K 3.7 08/08/2018    K 3.7 08/07/2018    CO2 25 08/09/2018    CO2 22 08/08/2018    CO2 25 08/07/2018    BUN 19 08/09/2018    BUN 30 (H) 08/08/2018    BUN 24 (H) 08/07/2018    CREATININE 4.07 (H) 08/09/2018    CREATININE 5.37 (H) 08/08/2018    CREATININE 4.39 (H) 08/07/2018    CALCIUM 8.2 (L) 08/09/2018    CALCIUM 8.4 (L) 08/08/2018    CALCIUM 8.4 (L) 08/07/2018     Lab Results   Component Value Date    WBC 18.1 (H) 08/07/2018    WBC 17.9 (H) 08/06/2018    WBC 19.4 (H) 08/05/2018    HGB 8.3 (L) 08/08/2018    HGB 7.8 (L) 08/07/2018    HGB 6.9 (LL) 08/06/2018    HCT 23.4 (L) 08/07/2018    HCT 21.3 (L) 08/06/2018    HCT 23.1 (L) 08/05/2018     08/07/2018     (H) 08/06/2018     (H) 08/05/2018     08/07/2018     08/06/2018     08/05/2018       Pertinent Radiology   Radiology Results: Personally reviewed  impression/s    Brie Oliver MD  [facility]  [phone]

## 2021-06-19 NOTE — PROGRESS NOTES
GI Progress Note  Aayush García  [id number]    Subjective:   Says he's doing well, but can not answer what hospital he is at.  Knows his name and .  Tolerating solid foods.     Objective:     Vitals:    18 1600   BP: 120/82   Pulse: 98   Resp: 24   Temp: 99.3  F (37.4  C)   SpO2: 94%     Body mass index is 30.37 kg/(m^2) (pended).   Gen: No acute distress  Cardio: RRR  GI: Distended, BS positive, firm, but palpable.  Non-tender. No guarding.  Ext: anasarca.    Laboratory    Results from last 7 days  Lab Units 18  0440 18  1746 18  0548 18  0505   LN-WHITE BLOOD CELL COUNT thou/uL 17.9*  --  19.4* 19.2*   LN-HEMOGLOBIN g/dL 6.9* 7.3* 7.6* 7.7*   LN-HEMATOCRIT % 21.3*  --  23.1* 23.7*   LN-PLATELET COUNT thou/uL 270  --  274 275          Results from last 7 days  Lab Units 18  0440   LN-SODIUM mmol/L 136   LN-POTASSIUM mmol/L 3.6   LN-CHLORIDE mmol/L 104   LN-CO2 mmol/L 21*   LN-BLOOD UREA NITROGEN mg/dL 41*   LN-CREATININE mg/dL 5.87*   LN-CALCIUM mg/dL 8.2*       Results from last 7 days  Lab Units 18  0548 18  0505 18  0548   LN-BILIRUBIN TOTAL mg/dL 5.6* 6.3* 5.6*   LN-ALKALINE PHOSPHATASE U/L 557* 691* 783*   LN-AST (SGOT) U/L 165* 222* 274*   LN-ALT (SGPT) U/L 62* 76* 87*   ]    Results from last 7 days  Lab Units 18  0548 18  0505 18  0548   LN-INR  1.39* 1.34* 1.39*     Xr Chest 1 View Portable    Result Date: 2018  XR CHEST 1 VIEW PORTABLE 2018 5:14 PM INDICATION: Hypotension COMPARISON: Chest x-ray 2018 FINDINGS: The life-support devices are in stable positions. Persistent low lung volumes with associated hypoventilatory changes. Increased right basilar opacities most likely reflect subsegmental atelectasis. Interval improved aeration at the left lung base. Persistent trace left-sided pleural effusion. Stable heart size and no overt vascular congestion. No definite pneumothorax.    Xr Chest 1 View Portable    Result  Date: 7/28/2018  XR CHEST 1 VIEW PORTABLE 7/28/2018 6:04 AM INDICATION: Dyspnea COMPARISON: 07/25/2018. FINDINGS: Interval removal of ETT and NG tube. Right IJ catheter high SVC level. Right PICC catheter right atrial level. No pneumothorax. Interval worsening with bilateral airspace opacities with upper lobe predominance greatest on the left. Small left effusion. Previous left lower rib fractures. Monitor electrodes.    Xr Chest 1 View Portable    Result Date: 7/25/2018  XR CHEST 1 VIEW PORTABLE 7/25/2018 12:39 PM INDICATION: Dialysis line placement COMPARISON: 07/25/2018 FINDINGS: Right IJ central venous catheter tip over the right IJ vein-brachiocephalic junction or proximal SVC. Right PICC tip over the right atrium. Appropriately positioned endotracheal tube. Enteric tube courses into the stomach and off the field of view. No pneumothorax. Mildly hypoexpanded lungs. No focal consolidation. No definitive pulmonary edema or pleural effusion. Stable mildly prominent cardiomediastinal silhouette.     Xr Chest 1 View Portable    Result Date: 7/25/2018  XR CHEST 1 VIEW PORTABLE 7/25/2018 5:51 AM INDICATION: Confirm et tube position COMPARISON: 7/25/2018. FINDINGS: Heart is slightly enlarged. Tip of ETT located 3.2 cm above stacy. NG tube extends below left diaphragm. Right PICC catheter with tip right atrial level. No pneumothorax. Minimal amount of bilateral lower lung atelectasis. Monitor electrodes.    Xr Chest 1 View Portable    Result Date: 7/25/2018  XR CHEST 1 VIEW PORTABLE 7/25/2018 3:33 AM INDICATION: Sepsis. anuric. increasing tachypnea, hypoxia. COMPARISON: 7/24/2018. FINDINGS: Shallow inspiration. Stable heart size. Interval placement right PICC catheter with tip right atrial level. No pneumothorax. Minimal amount of bilateral lower lung atelectasis with lung findings accentuated due to level of inspiration. Old healed left rib fractures. Monitor electrodes.    Xr Chest 1 View Portable    Result Date:  7/24/2018  XR CHEST 1 VIEW PORTABLE 7/24/2018 8:54 PM INDICATION: Sob COMPARISON: None. FINDINGS: Cardiomegaly. Pulmonary vascularity normal. The lungs are clear.    Xr Chest 2 Views    Result Date: 8/2/2018  XR CHEST 2 VIEWS 8/2/2018 5:20 PM INDICATION: Followup infilitrate COMPARISON: 7/28/2018 FINDINGS: Right PICC could be pulled back into the proximal SVC. It is also possible it is obscured by the right central line whose tip projects over the right atrium. Limited inspiratory volume. Stable heart size. Improved bilateral alveolar infiltrates most likely represents improved edema.    Us Abdomen Complete    Result Date: 7/25/2018  US ABDOMEN COMPLETE 7/25/2018 2:01 AM INDICATION: Anuric, septic, elevated alk phos, bili, lipase COMPARISON: None. FINDINGS: GALLBLADDER: Sludge in the gallbladder. Gallbladder wall thickness measures 3 mm. Negative Rogers sign. BILE DUCTS: Common bile duct not visualized. No evidence of ductal dilatation. LIVER: Measures 17.5 cm. Fatty infiltration. SPLEEN: Normal in size. RIGHT KIDNEY: Measures 10.1 x 5.1 x 5.3 cm. Normal. No hydronephrosis. LEFT KIDNEY: Measures 9.5 x 5.7 x 4.8 cm. Normal. No hydronephrosis. PANCREAS: Not visualized due to bowel gas. AORTA: Not visualized due to bowel gas. IVC: Poorly visualized.     CONCLUSION: 1.  Sludge in the gallbladder. 2.  No gallbladder wall thickening. 3.  No evidence of cholelithiasis. 4.  No evidence of ductal dilatation.    Ct Abdomen Pelvis Without Oral With Iv Contrast    Result Date: 8/3/2018  CT ABDOMEN PELVIS WO ORAL W IV CONTRAST 8/3/2018 1:37 PM     INDICATION: Infection, abdomen-pelvis leukocytosis, hypotension, evaluate for ascites and cirrhosis TECHNIQUE: CT abdomen and pelvis. Multiplanar reformation images (MPR). Dose reduction techniques were used. IV CONTRAST: Iohexol (Omni) 100 mL COMPARISON: Ultrasound abdomen 7/28/2018 and 7/25/2018 FINDINGS: LUNG BASES: Small right-sided trace left-sided pleural effusion with adjacent  consolidation likely reflecting compressive atelectasis. Extensive coronary artery calcifications. Mild cardiomegaly. ABDOMEN: Limited due to patient motion. Heterogeneous liver with no definite discrete measurable lesion. Prominent gallbladder without wall thickening or appreciable edema. No biliary ductal dilatation. Unremarkable spleen, pancreas and adrenals. Unremarkable kidneys without evidence of hydronephrosis. Unremarkable stomach. Normal caliber small bowel. Normal caliber appendix. The colon is tortuous but normal in caliber. Moderate body wall and mild mesenteric edema. Small amount of diffuse simple ascites. Left lower ventral abdominal wall air densities likely reflecting recent injection. Correlation is recommended. Similar findings in the right mid ventral abdominal wall. No free air. Patent central SMV and SMA. PELVIS: Urinary bladder decompressed by a Rahman catheter. Mild presacral edema. Small amount of simple free fluid. No adenopathy. MUSCULOSKELETAL: Age indeterminate but likely remote L1 compression deformity without significant osseous retropulsion or central canal stenosis.     CONCLUSION: 1. Small amount of diffuse simple ascites. Mesenteric and body wall edema are also noted. No loculated fluid collections in the abdomen or pelvis. 2. Small right-sided and trace left-sided pleural effusions with adjacent consolidation which may reflect atelectasis or pneumonic infiltrates. 3. Nonspecific heterogeneous liver. No discrete measurable liver lesion. Correlate with liver function tests.    Ir Tunneled Catheter Insert    Result Date: 8/1/2018  [facility] PROCEDURE: TUNNELED DIALYSIS CATHETER PLACEMENT 1.  Insertion of a tunneled central venous catheter. 2.  Ultrasound guidance for vascular access. A permanent image was stored. 3.  Fluoroscopic guidance for central venous access device placement. INTERVENTIONAL RADIOLOGIST: Kenji Ortiz MD. INDICATION: 62-year-old male with renal  insufficiency requiring hemodialysis. CONSENT: The risks, benefits and alternatives of tunneled dialysis catheter placement were discussed with the patient's guardian in detail. All questions were answered. Informed consent was given to proceed with the procedure. MODERATE SEDATION: None. ANTIBIOTICS: The patient is receiving antibiotic therapy. No additional antibiotic was administered for the procedure. ADDITIONAL MEDICATIONS: None. FLUOROSCOPIC TIME: 1.1 minutes. AIR KERMA:  6 mGy. CONTRAST: None. COMPLICATIONS: No immediate complications. STERILE TECHNIQUE: The procedure was performed using maximum sterile barrier technique. The interventionalist and assistants performed appropriate hand hygiene and wore a hat, mask, sterile gown, and sterile gloves during the entire procedure. PROCEDURE:  Using local anesthesia and real-time ultrasound guidance the right internal jugular vein was accessed. A subcutaneous tunnel was created requiring a second incision. Using this access, a 23 cm tip to cuff Duraflow dialysis catheter was advanced until the  tip was in the proximal right atrium.  The catheter was tested and found to flush and aspirate appropriately. FINDINGS: Ultrasound shows an anechoic and compressible jugular vein.  At the completion of the study, the tunneled dialysis catheter tip lies in the proximal right atrium.     1.  Successful tunneled dialysis catheter placement. 2.  The catheter is ready for use.     Us Abdomen Limited    Result Date: 7/28/2018  US ABDOMEN LIMITED 7/28/2018 4:25 PM INDICATION: Elevated alk phos, bilirubin COMPARISON: Abdominal ultrasound 07/25/2018 FINDINGS: Technically challenging exam due to the patient's body habitus and bowel gas. GALLBLADDER: Small amount sludge in the gallbladder. No wall thickening. BILE DUCTS: No intrahepatic bile duct dilation. The common bile duct was not visualized due to bowel gas.. LIVER: Hepatomegaly measuring 20.1 cm. Diffusely echogenic. Portions  difficult to penetrate. RIGHT KIDNEY: No hydronephrosis. PANCREAS: Not well seen due to bowel gas. Small amount of ascites in the right upper quadrant.     CONCLUSION: 1.  Technically challenging exam. 2.  Gallbladder sludge. No findings for cholecystitis. 3.  Enlarged and fatty liver.    Us Paracentesis    Result Date: 8/3/2018  1. PARACENTESIS 2. ULTRASOUND GUIDANCE 8/3/2018 9:43 PM INDICATION: Ascites. PROCEDURE: Informed consent obtained. Time out performed. The abdomen was prepped and draped in a sterile fashion. 10 mL of 1 percent lidocaine was infused into local soft tissues. A 5 [employer] catheter system was introduced into the abdominal ascites  under ultrasound guidance. 1.95 liters of clear yellow fluid was removed and sent to lab if requested. Estimated blood loss is minimal. No immediate complication. The patient tolerated the procedure well. RADIOLOGIC SUPERVISION AND INTERPRETATION: ULTRASOUND GUIDANCE: Images demonstrate ascites.     CONCLUSION: 1.  Status post ultrasound-guided paracentesis.    Assessment:   1. Elevated LFT's - likely related to sepsis and EtOH.  LFT's showing recent improvement - will recheck tomorrow.  US showing small amount of sludge but normal duct size.  Paracentesis 8/3/18 - fluid not sent for cell count/gram stain.      2. Sepsis secondary to Klebsiella pneumonia.    3. MICHAEL - ATN.  Nephrology following.  4. Anemia - macrocytic.  Brown stool.  5. Encephalopathy - unclear if hepatic encephalopathy but on lactulose and more recently added on rifaximin.    Patient Active Problem List   Diagnosis     Septic shock (H)     Acute respiratory failure with hypoxia (H)     Acute renal failure, unspecified acute renal failure type (H)     Metabolic acidosis     Encephalopathy, metabolic     Elevated troponin     Stress-induced cardiomyopathy     Bacteremia due to Klebsiella pneumoniae     Anemia, unspecified type     Alcohol abuse     Acute encephalopathy     Acute liver failure      Plan:   1. Monitor LFT's, INR.  2. Lactulose - titrate to 3 BM per day.  3. Rifaximin 550mg three times a day.    Thank you.  Wilmer Hernández PA-C  [facility]  [phone]

## 2021-06-19 NOTE — PROCEDURES
Aayush García 62 y.o. male    Dialysis Treatment Details  ?  Length of Treatment: 3 Hours  ?  K+ Bath: 3  ?  Access: Right non-tunneled CVC  Access Complications: None  ?  Fluid Removed: 2 L  Intradialytic Complications : None  ?  Medications Given by Dialysis RN: None  Anticoagulant : Heparin 1500 units  Hepatitis B Surface Antigen result: Neg  Date Hepatitis B surface Antigen result date : 7/25/18  ?  Post Treatment report called to: RANDALL Barton RN?  ?  Derick Dimas RN  ?

## 2021-06-19 NOTE — PROGRESS NOTES
Speech Language/Pathology  Bedside Swallow Evaluation    Problem:  Patient Active Problem List   Diagnosis     Septic shock (H)     Acute respiratory failure with hypoxia (H)     Acute renal failure, unspecified acute renal failure type (H)     Metabolic acidosis     Encephalopathy, metabolic     Elevated troponin     Other cardiomyopathy (H)     Bacteremia due to Klebsiella pneumoniae     Anemia, unspecified type     Alcohol abuse       Onset Date: 7/24/2018  Reason for Evaluation: Assess for dysphagia  Pertinent History: As above. Acute respiratory failure requiring intubation from 7/25/18 to 7/26/18.  Current Diet: NPO  Baseline Diet: Regular textures and thin liquids    Patient presents as awake and cooperative during this session. His fiance, Karlie, was present.  An  was not applicable    Patient was given thin liquid, puree consistency, and a regular solid.    Dentition/Oral hygiene: Patient's teeth are in poor condition. He is missing several and many are broken. Oral hygiene was adequate.    Oral motor function was not impaired.     Bolus prep and oral control were not impaired. Mastication was mildly prolonged but functional, and the patient used rotary chewing.    Anterior-Posterior transit was not impaired.    Oral stasis did not occur with any texture.    Pharyngeal Phase:    Thin: Patient trialed 8 ounces by cup/straw and presented with no s/s of aspiration. Initiation of swallow appeared timely. Hyolaryngeal movement appeared intact .    Puree: Patient trialed 2 ounces and presented with no s/s of aspiration. Initiation of swallow appeared timely. Hyolaryngeal movement appeared intact .    Regular solid: Patient trialed 5 bites and presented with no s/s of aspiration. Initiation of swallow appeared timely. Hyolaryngeal movement appeared intact         Assessment:    Patient presents with no overt clinical signs or symptoms of aspiration with any texture trialed.    Patient demonstrated no  oral dysphagia.     Pharyngeal dysphagia is not suspected due to absence of clinical s/s of aspiration. Additionally, CXRs have not been suggestive of aspiration pneumonia.    Rehab potential is good based on prior level of function and evaluation results.    Recommendations:    Plan: Initiate oral diet of Regular textures and thin liquids    Strategies: Patient to follow standard safe swallowing precautions, including sitting upright 90 degrees (preferably in chair) for all intake, eating slowly, and taking small bites and sips.    1-2 more sessions of Speech Therapy to verify diet toleration    Direct handoff was given to patient's RN (Len Barton)    Referrals: N/A    30 dysphagia minutes       Liliya Khan MA, CCC-SLP

## 2021-06-19 NOTE — PROGRESS NOTES
Renal progress note  CC: ARF  Assessment and Plan:  62 y.o. yo male    1. ARF: severe ATN and continues to require dialysis; monitoring for sxs of recovery; next HD planned for Monday  2. Chronic liver disease (probable) vs acute hepatitis secondary to ETOH abuse; unrecognized PTA; elevated bili and coagulopathy/hypotension and some enceph; on lactulose and rifaximin  3. HoTN; requiring midodrine for HD and also scheduled; wean as able  4. CM: likely stress induced CM as he has recovered quite a bit of his EF.    5. Sepsis: s/p treatment of klebsiella bacteremia; has ongoing elevated WBC of unclear cause; off abx for now   6. Anemia: acute on chronic--recent GI bleed earlier this summer; started some epo; s/p prbcs earlier in week; hgb stable  7. Volume; has ascites and some edema; UF wih HD as tolerate  8. Malnutrition: encourage oral intake  9. Mild hyperphos; binder if phos over 5.5-6  10. Dispo; plan eventual transfer to [place] for strengthening and ongoing cares; will be on [facility] HD schedule there        Subjective  Pt new to me; chart and meds reviewed  [place] tired after HD yesterday; no nausea; no CP or shortness of breath   doing PT now; energy is low  Objective    Vital signs in last 24 hours  Temp:  [97.9  F (36.6  C)-98.7  F (37.1  C)] 98.7  F (37.1  C)  Heart Rate:  [74-84] 74  Resp:  [18-24] 18  BP: (110-124)/(56-66) 110/58  Weight:   200 lb 6.4 oz (90.9 kg)    Intake/Output last 3 shifts  I/O last 3 completed shifts:  In: 380 [P.O.:380]  Out: 74931 [Urine:5; Other:36499]  Intake/Output this shift:       Physical Exam  Alert/oriented x 3, awake, NAD  Up in chair  CV: RRR without murmur or rub  Lung: clear and equal; no extra sounds  Ab: soft and NT; not distended; normal bs  Ext: ++ edema and well perfused  Skin; no rash    Pertinent Labs   Lab Results   Component Value Date    WBC 14.6 (H) 08/11/2018    HGB 8.2 (L) 08/11/2018    HCT 25.2 (L) 08/11/2018     (H) 08/11/2018     08/11/2018      Lab Results   Component Value Date    CREATININE 3.59 (H) 08/11/2018    BUN 15 08/11/2018     (L) 08/11/2018    K 3.1 (L) 08/11/2018    CL 94 (L) 08/11/2018    CO2 24 08/11/2018       Lab Results   Component Value Date    ALBUMIN 2.5 (L) 08/11/2018     Lab Results   Component Value Date    CALCIUM 8.6 08/11/2018    PHOS 5.3 (H) 08/06/2018     I reviewed all lab results  Lillie Moscoso

## 2021-06-19 NOTE — PROGRESS NOTES
Progress Note    Assessment/Plan  Principal Problem:    Septic shock (H)  Active Problems:    Acute respiratory failure with hypoxia (H)    Acute renal failure, unspecified acute renal failure type (H)    Metabolic acidosis    Encephalopathy, metabolic    Elevated troponin    Other cardiomyopathy (H)    Bacteremia due to Klebsiella pneumoniae    Anemia, unspecified type    Alcohol abuse      Aayush García is a 62 year old male with a history of alcohol abuse, HTN and anemia who was admitted with septic shock and anuria with a positive blood culture for Klebsiella.  Required intubation and pressor support in ICU, currently extubated without pressor support, but still sedated for alcohol withdrawal symptoms. Significantly improved today, will move out of ICU.   - Extubated, off sedation  - Blood cultures with Klebsiella - on ceftriaxone (10 days total abx)  - Melena resolved, continue two times a day PPI  - Hgb stable  - LFTs, Alk phos, and Bili stabilizing -- likely due to sepsis, no finding of RUQ u/s  - Renal function remains poor. Trial lasix today - IR for tunneled cath tomorrow if still poor UOP  - PT/OT/SLP     NEURO:   AMS - resolving  Alcohol withdrawal - resolved  AMS likely related to underlying sepsis. Was also on precedex gtt for alcohol withdrawal. Likely developed ICU delirium as as well though this seems to be clearing  - PT/OT/SLP     RESPIRATORY:   Respiratory fatigue - resolving  Successfully extubated. Remains of supplemental O2 however, significantly improved status  - Downgrade to cardiac-tele  - Repeat Lasix 160mg today     CV:   Elevated troponin and low EF  Likely ICM  Most likely due to stress cardiomyopathy and demand ischemia from acute illness, though with reduced LVEF may represent NSTEMI. EKG in early June at [place] [facility] did show ST segment changes concerning for ischemia so he may have component of underlying cardiac disease, possibly alcoholic cardiomyopathy. He will need a  stress test eventually to evaluate for underlying disease.  - Cardiology is following - appreciate recs  --Repeat EKG with T-wave inversion in anterolateral leads, suggests ischemic event  --Avoid QT prolong meds  --Low dose coreg when able  --Repeat Echo when hemodynamically stable  - Continue supportive measures as needed  - Telemetry monitoring     Sinus Arrhyhtmia: New with this admission.  Had previously been sinus tachycardic and now with arrhythmia.  Possibly related to overall disease process versus cardiac injury from shock.  Continue on telemetry. Cardiology following as above.     RENAL:  Anuric renal failure  Most likely severe ATN due to klebsiella sepsis, though cause still not completely clear. May be component of volume depletion as well.  Abdominal US did not show any changes to kidneys. Still with minimal UOP - very slowly improving.    -Nephrology is following - appreciate recs  -Continue lasix today  -Tunneled cath for ongoing dialysis tomorrow if still limited UOP  -Continue ceftriaxone     Volume overload  Volume up on exam with b/l LE and UE edema and with dilutional changes to labs. Still producing minimal urine and getting large volumes of IVF for sepsis management.  Plan for HD today for volume management and minimize fluids as possible.  - Significant improvement after dialysis     GI:  Hepatic injury   Patient with elevated AST/ALT, bilirubin, alk phos, CK and INR with hypoalbuminemia. Decreased synthetic function of liver most likely due to decreased perfusion. US showed fatty infiltration of liver which may be contributing but no concerning biliary tree disease.  Less likely alcoholic hepatitis although alcoholic liver injury likely contributing given AST/ALT ratio.  Peripheral smear relatively normal which is reassuring for DIC. Ongoing elevation seems likely related to shock liver. Repeat RUQ u/s without acute findings  - GI following, appreciate input  - Continue to follow liver  labs     Melena: Episode of melanotic stool 7/27-28.  Has had downtrending hemoglobin for months as well as while here.  Was seen in clinic recently for melanoma which was thought due to NSAID use.  Symptoms had resolved after he had stopped using NSAIDs.  He was on a PPI as well.  Again having melenic stools here, possibly gastritis from decreased perfusion versus worsening ulceration.  - GI following as above -- will avoid EGD/ERCP for now to avoid reintubation unless emergent  - Trend Hgb  - PPI two times a day   - Transfuse for Hgb < 7     FEN:  Hyponatremia  Most likely due to septic shock and renal failure. Now likely hypervolemic hyponatremia  Consider adrenal insufficiency with hyponatremia and hyperkalemia. Continue to treat with fluid and antibiotics  - AM BMP  - Lasix -- replete potassium per protocol      ID:   Sepsis  Admitted with elevated WBC, elevated lactic acid, hypotension and tachycardia. Blood cultures growing klebsiella.  UA shows leukocytes, blood and few bacteria but may be unreliable as taken after period of anuria.  Most likely urinary source.  Possibly GI source, did have ulcer 1-2 months ago.  Unlikely PNA given CXR without infiltrates.  - Continue Ceftriaxone     HEME/ONC:   Macrocytic anemia  Per chart review, Hb of 13.0 in 2/2018 with steady decline until present. Consistently macrocytic.  Likely nutritional component due to alcohol use but continuous decline is concerning for underlying cause. Has been seen in clinic for melena thought due to PUD 2/2 to NSAID use. Had resolved per last clinic visit with decreased NSAID use. Also consider hemolysis with elevated bilirubin and INR. Peripheral smear showed inflammatory changes only.  LDH and haptoglobin only mildly elevated  - Hgb monitoring as above     ENDOCRINE:   Concern for adrenal insufficiency - resolved  Acute illness and combination of hyperkalemia and hyponatremia is concerning for adrenal insufficiency. Hydrocortisone was  discontinued.      Hyperglycemia - resolved  Elevated glucose, likely due to hydrocortisone  - sliding scale insulin     PSYCH:   History of alcohol use  350ml peppermint schnapps daily for years. Per wife, never has had withdrawal though never been more than 2 days without a drink that she knows of.  Currently with signs of withdrawal and continues on precedex drip  -Nicotine patch  -Continue to monitor for withdrawal symptoms     FEN: Renal, NPO at [facility]  DVT ppx: SCDs  Lines: R PICC, Radial Art line, Dialysis Cath RIJ  Code: Full      Subjective  Significantly more awake today. Talking with us today. Did note progressively decreasing UOP over the past weeks leading up to admission. Denies chest pain, abdominal pain, or shortness of breath.    Objective    Vital signs in last 24 hours Temp:  [97  F (36.1  C)-101.6  F (38.7  C)] 98.4  F (36.9  C)  Heart Rate:  [] 95  Resp:  [17-37] 28  BP: ()/(51-82) 128/56   Weight: 212 lb 8 oz (96.4 kg)    Intake/Output last 3 shift I/O last 3 completed shifts:  In: 1304.3 [P.O.:800; I.V.:103.1; IV Piggyback:401.2]  Out: 700 [Urine:460; Stool:240]    Intake/Output this shift:I/O this shift:  In: 360 [P.O.:360]  Out: 64 [Urine:64]    Review of Systems   Negative except as stated above    Physical Exam  General: Awake, NAD  CV: Difficult exam today with pulmonary coarseness  Pulm: Lungs coarse with stertor as well, on 2LNC  GI: Abdomen obese, soft, not tender, BS active  : Catheter with ~ 400ml clear-yellow urine  Ext: Trace LE edema  Neuro: Alert, answering questions appropriately, following conversation and commands    Pertinent Labs and Pertinent Radiology   Lab Results: personally reviewed.   Results for DUDLEY FOX (MRN [id number]) as of 7/30/2018 12:55   Ref. Range 7/30/2018 04:28   Sodium Latest Ref Range: 136 - 145 mmol/L 133 (L)   Potassium Latest Ref Range: 3.5 - 5.0 mmol/L 3.7   Chloride Latest Ref Range: 98 - 107 mmol/L 97 (L)   CO2 Latest Ref Range: 22 -  31 mmol/L 25   Anion Gap, Calculation Latest Ref Range: 5 - 18 mmol/L 11   BUN Latest Ref Range: 8 - 22 mg/dL 38 (H)   Creatinine Latest Ref Range: 0.70 - 1.30 mg/dL 4.67 (H)   GFR MDRD Af Amer Latest Ref Range: >60 mL/min/1.73m2 16 (L)   GFR MDRD Non Af Amer Latest Ref Range: >60 mL/min/1.73m2 13 (L)   Calcium Latest Ref Range: 8.5 - 10.5 mg/dL 8.7   AST Latest Ref Range: 0 - 40 U/L 299 (H)   ALT Latest Ref Range: 0 - 45 U/L 82 (H)   ALBUMIN Latest Ref Range: 3.5 - 5.0 g/dL 2.2 (L)   Protein, Total Latest Ref Range: 6.0 - 8.0 g/dL 6.1   Alkaline Phosphatase Latest Ref Range: 45 - 120 U/L 805 (H)   Bilirubin, Total Latest Ref Range: 0.0 - 1.0 mg/dL 5.9 (H)   Bilirubin, Direct Latest Ref Range: <=0.5 mg/dL 3.8 (H)   Magnesium Latest Ref Range: 1.8 - 2.6 mg/dL 2.1   Phosphorus Latest Ref Range: 2.5 - 4.5 mg/dL 2.9   Glucose Latest Ref Range: 70 - 125 mg/dL 95   INR Latest Ref Range: 0.90 - 1.10  1.48 (H)   WBC Latest Ref Range: 4.0 - 11.0 thou/uL 20.8 (H)   RBC Latest Ref Range: 4.40 - 6.20 mill/uL 2.48 (L)   Hemoglobin Latest Ref Range: 14.0 - 18.0 g/dL 8.5 (L)   Hematocrit Latest Ref Range: 40.0 - 54.0 % 25.1 (L)   MCV Latest Ref Range: 80 - 100 fL 101 (H)   MCH Latest Ref Range: 27.0 - 34.0 pg 34.3 (H)   MCHC Latest Ref Range: 32.0 - 36.0 g/dL 33.9   RDW Latest Ref Range: 11.0 - 14.5 % 17.2 (H)   Platelets Latest Ref Range: 140 - 440 thou/uL 165   MPV Latest Ref Range: 8.5 - 12.5 fL 11.2       Disposition/Advanced Care Planning  Discharge Planning discussed with Patient, wife  Barriers to discharge: IV abx, ongoing w/u  Anticipated discharge date: Multiple days  Disposition: Likely TCU    Benjamin Rosenstein, MD, MA   [facility] [id number]  P: [phone]    Precepted patient with Dr. Chery Bell

## 2021-06-19 NOTE — PROGRESS NOTES
"RESPIRATORY CARE NOTE     Patient Name: Aayush García  Today's Date: 7/25/2018     Pt continues on the following settings:  Vent Mode: VCV  FiO2 (%):  [25 %-40 %] 40 %  S RR:  [24-28] 24  S VT:  [550 mL] 550 mL  PEEP/CPAP (cm H2O):  [5 cm H2O] 5 cm H2O  Minute Ventilation (L/min):  [14.6 L/min-16.4 L/min] 15.8 L/min  PIP:  [26 cm H2O-44 cm H2O] 26 cm H2O  MAP (cm H2O):  [14-18] 16   Plateau pressure: 13 cm H2O     Pt is intubated with  # 8.0 ETT secured 24 cm at the teeth. Pt does not meet weaning parameters due to high minute ventilation/RR, hemodynamic instability. BS are coarse, clearing with sx. Pt has a strong cough with suction. RT suctioned pt for large amount of thick, yellow/white secretions. Pt's respiratory status is stable. RT will continue to follow per MD's orders.     /57  Pulse 91  Temp 98.2  F (36.8  C) (Oral)   Resp 24  Ht 5' 8\" (1.727 m)  Wt 192 lb 14.4 oz (87.5 kg)  SpO2 100%  BMI 29.33 kg/m2      PACHECO TorresT  "

## 2021-06-19 NOTE — PROGRESS NOTES
RENAL Progress Note -reviewed, known to me from earlier hosp stay.       Assessment/Plan  MICHAEL - suspect hemodynamic ATN due to volume depletion, low bp, CM, ACE-I. Ongoing MICHAEL, low UOP despite boluses and Cr worse.  Continue dialysis MWF for now via tunneled cath.  Concerned hypotension is going to impair his renal recovery, he's acting a bit like he has more significant liver disease. Got iv contrast yesterday. U/o remains poor, anuric rise in Cr btw HD runs. Cancelled UF run due to hypotension today, won't tolerate UF and no emergent need for UF..    Recs:  - would liberalize diet to sodium restriction alone to encourage better intake  - dialysis MWF  - follow for recovery although worry about this with ongoing hypotension, recommend d/c carvedilol(done)  - schedule 10mg midodrine three times a day and prn for dialysis- will increase       Hypotension - persistent and not improving.  Not clearly cirrhotic but he looks cirrhotic to some degree. Nothing acute on CT. CM better.  Using scheduled midodrine now and prn before dialysis    CM  - likely stress induced CM as he has recovered quite a bit of his EF.  Clearly volume up but hard to shift fluid     Sepsis - klebsiella bacteremia.  Finished course.  WBC still elevated but no acute findings on CT.    Anemia - acute on chronic, recent GI bleed, hgb drifting down, transfused Friday on run. Stable in the 8s.  May need EPO if MICHAEL persists    Inc LFT's and coagulopathy - No documented chronic liver disease but ?early cirrhosis    Resp failure - on 1-2L NC, very volume up but difficult to shift fluid off him.    Encephalopathy - waxing and waning.  Remains delirious.  ?Hepatic encephalopathy?  Getting lactulose    Malnutrition - encourage oral intake, liberalize diet to just sodium restriction       Principal Problem:    Septic shock (H)  Active Problems:    Acute respiratory failure with hypoxia (H)    Acute renal failure, unspecified acute renal failure type (H)     "Metabolic acidosis    Encephalopathy, metabolic    Elevated troponin    Stress-induced cardiomyopathy    Bacteremia due to Klebsiella pneumoniae    Anemia, unspecified type    Alcohol abuse      Subjective  Seen in room earlier, SO at bedside.   Awake, wants to go home \"just for the night tonight\"   Diarrhea today   No shortness of breath  No pain    Objective    Vital signs in last 24 hours  Temp:  [97.9  F (36.6  C)-99  F (37.2  C)] 98.6  F (37  C)  Heart Rate:  [76-87] 87  Resp:  [16-20] 16  BP: ()/(46-73) 70/46  Weight:   208 lb 8 oz (94.6 kg)    Intake/Output last 3 shifts  I/O last 3 completed shifts:  In: 150 [P.O.:150]  Out: 100 [Urine:100]  Intake/Output this shift:       Review of Systems   A 12 point comprehensive review of systems was negative except as noted.    Physical Exam  Asleep  HEENT NC in place  Neck supple  Lungs fairly clear anteriorally  Cor RRR, 2-3+ edema up to hips  abd soft, obese, mildly distended  Ext warm    Pertinent Labs   Lab Results: personally reviewed.   Lab Results   Component Value Date     08/05/2018     08/04/2018     08/03/2018    K 3.4 (L) 08/05/2018    K 3.6 08/04/2018    K 4.0 08/03/2018    CO2 24 08/05/2018    CO2 27 08/04/2018    CO2 22 08/03/2018    BUN 35 (H) 08/05/2018    BUN 31 (H) 08/04/2018    BUN 49 (H) 08/03/2018    CREATININE 4.87 (H) 08/05/2018    CREATININE 4.15 (H) 08/04/2018    CREATININE 5.51 (H) 08/03/2018    CALCIUM 7.5 (L) 08/05/2018    CALCIUM 8.1 (L) 08/04/2018    CALCIUM 8.6 08/03/2018     Lab Results   Component Value Date    WBC 19.4 (H) 08/05/2018    WBC 19.2 (H) 08/04/2018    WBC 20.5 (H) 08/03/2018    HGB 7.6 (L) 08/05/2018    HGB 7.7 (L) 08/04/2018    HGB 8.2 (L) 08/03/2018    HCT 23.1 (L) 08/05/2018    HCT 23.7 (L) 08/04/2018    HCT 25.5 (L) 08/03/2018     (H) 08/05/2018     (H) 08/04/2018     (H) 08/03/2018     08/05/2018     08/04/2018     08/03/2018       Pertinent Radiology "   Radiology Results: Personally reviewed impression/s    Brie Oliver MD  [facility]  [phone]

## 2021-06-19 NOTE — PROGRESS NOTES
"  Clinical Nutrition Therapy Reassessment Note      Reason for Assessment:   Aayush García is a 62 y.o. male assessed by the registered Dietitian for TPN, Dr. Man would like to start tomorrow per Rounds this am.    Admitted for septic shock, acute respiratory failure, acute renal failure, metabolic acidosis, encephalopathy, elevated troponin, ETOH abuse    Pt has had hemodialysis, making some urine. Extubated 7/26, OG pulled at that time.    Nutrition History:  Information obtained from family/caregiver and chart.  Patients diet prior to admission has been regular. Recent food/fluid intake has been good, \"eats everything\". Drinks 1/2 bottle peppermint schnapps daily per Rounds.  Patient has been consuming the following supplements none.   Patient has the following cultural/[employer] food needs or preferences:none   Patient has the following food allergies or intolerances:nkfa    Current Nutrition Prescription:   Diet: NPO  Supplements and Modulars:     IV dextrose or Fluids:    dexmedetomidine 400 mcg/100 mL in NS (PRECEDEX) (4mcg/mL) Last Rate: 0.4 mcg/kg/hr (07/28/18 0800)       Current Nutrition Intake:  Total nutrient intake from all sources does not meet estimated nutritional needs.      Anthropometrics:  Height: 5' 8\" (172.7 cm)  Admission weight: 193 lb 7/25  Weight: 211 lb 9.6 oz (96 kg)  BMI (Calculated): 28.4  BMI indication: 25-29.9 overweight  Ideal body weight 154 lb  % Ideal body weight 138%  Usual body weight 190 lb  % Usual body weight 90%  Weight History:  Wt Readings from Last 3 Encounters:   07/28/18 211 lb 9.6 oz (96 kg)   03/19/18 180 lb (81.6 kg)     Physical Findings:  The patient has the following physical signs which could indicate malnutrition: edema       GI Status/Output:   The patient's GI symptoms include: abdominal distention    Bowel Sounds hypoactive  Black loose stool - [facility]    Skin/Wound:  Edwin score Edwin Scale Score: 12    Medications:  Medications reviewed.   " thiamine, mvi,folic acid, cefapime, ssi,     Labs:  Na 136, K 4.4, BUN 25, Creat 3.49, Glu 95, Hgb 7.8, wbc 18.2    Assessed Nutritional Needs:  Assessment weight is 86 kg, with a weight source of other, UBW    Estimated Energy Needs: 2150 - 2580 kcals daily per 25 - 30 kcal/kg     Estimated Protein Needs: 86 - 103 gdaily, 1.0 1.2 g/kg.    Estimated Fluid Needs: 2150 mls daily, 25 mls/kg, or per MD    Malnutrition: Not noted    Nutrition Risk Level: high risk    Nutrition dx:  Impaired swallowing related to vent evidenced by NPO - old  Impaired swallowing due to current mental status and sedation    Goal:  TPN to meet nutrition needs   Advance diet when safe    Intervention:  7/29/18 Start TPN at 40 mL/hr x 24 hours, increase by 20 mL/hr every 12 hours to goal of 83 mL, slow progression due to possible refeeding syndrome,     Will request triglycerides prior to starting lipids    Goal will provide: 2 liters, 100 gm protein, 300 gm dextrose (1420 kcal, hope to add 500 kcal daily with lipids)      Monitoring:  TF tolerance, diet advancement, weight, labs  See Care Plan for Problems, Goals, and Interventions.

## 2021-06-19 NOTE — PROGRESS NOTES
[facility] Progress Note    Subjective    Aayush García is receiving dialysis currently.  Patient's wife is at bedside.  With dialysis run patient says he continues to get hot and cold.  No other concerns this morning and states he is feeling quite well otherwise.  No acute events overnight.    [facility] was denied by patient's insurance despite [id number] consultation insurance upheld the denial.     Objective    Vital signs in last 24 hours Temp:  [97.6  F (36.4  C)-98.3  F (36.8  C)] 98.1  F (36.7  C)  Heart Rate:  [68-98] 86  Resp:  [20-24] 24  BP: ()/(50-76) 145/72   Weight: 203 lb 0.7 oz (92.1 kg)    Physical Exam   General appearance: Alert, conversational, confusion.  Jaundiced, scleral icterus.  Lungs: Clear to auscultation bilaterally.  No wheezing or crackles auscultated.  Respirations unlabored  Heart: Regular rate and rhythm, normal S1 and S2, no murmur, rub or gallop.  Abdomen: Obese, distended, tympanic, soft, nontender.  Extremities: +3 pitting edema up to abdomen  Skin: Jaundiced  Neurologic: Alert, oriented to person.  No gross neurologic deficits.  Moves all extremities spontaneously.    Pertinent Labs   Lab Results: personally reviewed.     Pertinent Radiology   Radiology Results: Personally reviewed.    Assessment/Plan  Principal Problem:    Septic shock (H)  Active Problems:    Acute respiratory failure with hypoxia (H)    Acute renal failure, unspecified acute renal failure type (H)    Metabolic acidosis    Encephalopathy, metabolic    Elevated troponin    Stress-induced cardiomyopathy    Bacteremia due to Klebsiella pneumoniae    Anemia, unspecified type    Alcohol abuse    Acute encephalopathy    Acute liver failure    Cognitive and behavioral changes    Sleep difficulties    Hallucinations    Aayush García is a 62 year old male with a history of alcohol abuse, HTN and anemia who was admitted with septic shock secondary to Klebsiella bacteremia and anuria.  Required  intubation and pressor support in ICU, currently extubated, moved out of the ICU 7/31, and was transferred back into the ICU 8/5 due to need for pressure support.  Transferred back out of the ICU 8/8.        Acute renal injury: Most likely severe ATN due to klebsiella sepsis, though cause still not completely clear. May be component of volume depletion as well.  Abdominal US negative 7/28.  Has initiated dialysis and will continue MWF for the foreseen future. Dialysis catheter placed 8/1.  - Nephrology following   -Plan to continue dialysis MWF      Sepsis, resolved   Klebsiella bacteremia, resolved  Persistent leukocytosis: Admitted with leukocytosis, lactic acidosis, hypotension and tachycardia. Blood cultures growing klebsiella.  UA shows leukocytes, blood and few bacteria but may be unreliable as taken after period of anuria.  Most likely urinary source, though now question possible SBP given hepatic encephalopathy.  Possibly GI source, did have ulcer 1-2 months ago.  Unlikely PNA given CXR without infiltrates. Persistent leukocytosis, though continuing to tend down. Blood cultures x2 drawn and no growth yet.  Repeat chest x-ray showing improvement of infiltrates and improvement of pulmonary edema. CT abdomen showed no abscess just ascites. Therapeutic paracentesis performed with 2L drawn, unfortunately fluid was not sent for culture or stain.  UA 8/5 suspicious for urinary tract infection, however urine culture with no growth. IV Zosyn initiated 8/5 and discontinued 8/8.   - continue to monitor for signs of infection       AMS  Alcohol withdrawal, resolving   Unclear etiology of altered mental status, though may be multifactorial cause including hepatic encephalopathy, alcohol withdrawal, medication induced, ICU delirium. Ammonia level unremarkable, empirically treating with lactulose and rifaximin.  Some improvement after starting rifaximin on 8/5  - Delirium order set   -Avoid sedating medications  -  PT/OT/SLP  -Lactulose, titrate as needed for 3-4 stools daily  - Rifaximin 400 mg TID      Hypotension: Likely secondary to alcoholic hepatitis as below.  Also with severe hypoalbuminemia which is likely contributing. Was transferred to the ICU for pressure support 8/5 and transferred out 8/8 after increased minodrine.  Was given albumin 25 g IV every 6 hours for 8 doses starting 8/5, and has been receiving albumin with dialysis runs. Evening of 8/8 needed additional dose of albumin secondary to episode of hypotension. Continues to have ongoing issues with hypotension   - Dialysis per nephrology  - Midodrine 15 mg Q8H.  Continue as needed midodrine  -Avoid giving fluid bolus if possible given difficulty to remove fluid.  Consider 25% albumin if hypotensive not responding to midodrine      Alcoholic Hepatitis  Hepatic encephalopathy  Ascites: 350ml peppermint schnapps daily for years. Per wife, never has had withdrawal though never been more than 2 days without a drink that she knows of.  Elevated AST/ALT, bilirubin, alk phos, and INR with hypoalbuminemia. Decreased synthetic function of liver. US 7/25 showed fatty infiltration of liver which may be contributing but no concerning biliary tree disease. Repeat RUQ u/s 7/28 without acute findings. Paracentesis 8/3 with 1.95 L fluid removed, however unfortunately send for Gram stain or culture. MELD score of 31 with a 53% estimated 3 month mortality.   - Continue to treat for hepatic encephalopathy as above  - GI following, appreciate their recommendations  -If paracentesis is performed again would recommending sending fluid for analysis.  -Continue folic acid, thiamine, multivitamin    Macrocytic anemia: Likely nutritional component due to alcohol use, and MICHAEL. Peripheral smear showed inflammatory changes only. LDH and haptoglobin only mildly elevated.  Has been transfused 1uPRBCs on 8/6.  - AM CBC  - EPO per nephrology     NSTEMI: Type II myocardial infarction or  demand related ischemia secondary to sepsis on presentation.  EF of 25-30% on 7/25/2018, significantly improved with echo 7/31/2018 with an EF of 61%.  Per cardiology not recommending ischemic workup at this time.  - Cardiology has signed off.  - Discontinued coreg 3.125 mg twice daily due to hypotension.  Consider restarting when/if hypotension has resolved.  - Telemetry monitoring      Melena, resolved: Episode of melanotic stool 7/27-28. Had downtrending hemoglobin months prior to admission.  Was seen in clinic recently for melena which was thought due to NSAID use.  Symptoms had resolved after he had stopped using NSAIDs.  He was on a PPI as well. Had melenic stools here, possibly gastritis from decreased perfusion versus worsening ulceration, has resolved.   - GI following as above  - PPI two times a day   - Transfuse for Hgb < 7      History of tobacco abuse  - Nicotine patch    Diet: 2mg Na  Fluids: No IVF  DVT Prophylaxis: Subcutaneous heparin  Code: full code     Disposition/Advanced Care Planning   Discharge Planning discussed with patient's wife  Barriers to discharge: Treatment of hypotensive episodes, difficult disposition  Dispo: anticipate discharge to [facility]   Anticipated discharge date:  unknown at this time        Precepted patient with Dr. Genia Xie DO ([id number])  [facility] Medicine Resident  Pager: [phone]

## 2021-06-19 NOTE — DISCHARGE SUMMARY
Physician Discharge Summary    Patient Name: Aayush García  Patient  :  1955   Patient MRN:   [id number]     Primary Care Physician:  Misbah Y Palla, MD    Discharge Provider: Jade Xie     Admission Date: 2018.   Admission Diagnoses: Septic shock (H) [A41.9, R65.21]   Discharge Date: 8/15/2018   Disposition:  [place]   Condition at Discharge: Stable  Code Status: Full Code     Principal Diagnosis:  Septic shock (H)    Discharge Diagnoses:  Principal Problem:    Septic shock (H)  Active Problems:    Acute respiratory failure with hypoxia (H)    Acute renal failure, unspecified acute renal failure type (H)    Metabolic acidosis    Encephalopathy, metabolic    Elevated troponin    Stress-induced cardiomyopathy    Bacteremia due to Klebsiella pneumoniae    Anemia, unspecified type    Alcohol abuse    Acute encephalopathy    Acute liver failure    Cognitive and behavioral changes    Sleep difficulties    Hallucinations    Consult/s: cardiology, pulmonary/intensive care, GI, nephrology and neurology  Significant Diagnostic Studies:    Recent labs: (last 7 days)      08/09/18   0435  08/10/18   0358  18   0355  18   1949  18   0120  18   0547  18   1843  18   0521  18   0613  18   1831  08/15/18   0227  08/15/18   0621   NA  134*  129*  129*   --    --   124*  123*  121*  128*   --    --   134*   K  3.3*  3.4*  3.1*  3.5  3.9  3.8   --   3.7  3.1*  3.5  3.8  4.0   CL  98  95*  94*   --    --   91*   --   88*  93*   --    --   98   CO2  25  22  24   --    --   21*   --   22  25   --    --   27   BUN  19  22  15   --    --   20   --   24*  14   --    --   10   CREATININE  4.07*  4.80*  3.59*   --    --   4.12*   --   4.26*  2.51*   --    --   1.81*   GLU  104  95  94   --    --   86   --   84  84   --    --   83     Recent labs: (last 7 days)      18   0435  08/10/18   0358  18   0355  18   0547  18   0521  18   0613  08/15/18    0621   AST  90*  83*  79*  86*  86*  86*  100*   ALT  41  38  33  34  33  32  30   ALKPHOS  368*  325*  353*  365*  380*  376*  476*      No results for input(s): TROPONINI in the last 168 hours.   Recent labs: (last 7 days)      08/10/18   0358  08/11/18   0355  08/12/18   0547  08/13/18   0521  08/14/18   0613  08/15/18   0621   WBC  15.6*  14.6*  16.2*  15.5*  15.0*  13.7*   RBC  2.45*  2.48*  2.54*  2.54*  2.40*  2.44*   HGB  8.1*  8.2*  8.3*  8.4*  7.9*  7.8*   MCV  101*  102*  100  100  100  101*   PLT  220  202  205  214  209  207      Imaging  Xr Chest 1 View Portable    Result Date: 8/5/2018  XR CHEST 1 VIEW PORTABLE 8/5/2018 5:14 PM INDICATION: Hypotension COMPARISON: Chest x-ray 08/02/2018 FINDINGS: The life-support devices are in stable positions. Persistent low lung volumes with associated hypoventilatory changes. Increased right basilar opacities most likely reflect subsegmental atelectasis. Interval improved aeration at the left lung base. Persistent trace left-sided pleural effusion. Stable heart size and no overt vascular congestion. No definite pneumothorax.    Xr Chest 1 View Portable    Result Date: 7/28/2018  XR CHEST 1 VIEW PORTABLE 7/28/2018 6:04 AM INDICATION: Dyspnea COMPARISON: 07/25/2018. FINDINGS: Interval removal of ETT and NG tube. Right IJ catheter high SVC level. Right PICC catheter right atrial level. No pneumothorax. Interval worsening with bilateral airspace opacities with upper lobe predominance greatest on the left. Small left effusion. Previous left lower rib fractures. Monitor electrodes.    Xr Chest 1 View Portable    Result Date: 7/25/2018  XR CHEST 1 VIEW PORTABLE 7/25/2018 12:39 PM INDICATION: Dialysis line placement COMPARISON: 07/25/2018 FINDINGS: Right IJ central venous catheter tip over the right IJ vein-brachiocephalic junction or proximal SVC. Right PICC tip over the right atrium. Appropriately positioned endotracheal tube. Enteric tube courses into the stomach and  off the field of view. No pneumothorax. Mildly hypoexpanded lungs. No focal consolidation. No definitive pulmonary edema or pleural effusion. Stable mildly prominent cardiomediastinal silhouette.     Xr Chest 1 View Portable    Result Date: 7/25/2018  XR CHEST 1 VIEW PORTABLE 7/25/2018 5:51 AM INDICATION: Confirm et tube position COMPARISON: 7/25/2018. FINDINGS: Heart is slightly enlarged. Tip of ETT located 3.2 cm above stacy. NG tube extends below left diaphragm. Right PICC catheter with tip right atrial level. No pneumothorax. Minimal amount of bilateral lower lung atelectasis. Monitor electrodes.    Xr Chest 1 View Portable    Result Date: 7/25/2018  XR CHEST 1 VIEW PORTABLE 7/25/2018 3:33 AM INDICATION: Sepsis. anuric. increasing tachypnea, hypoxia. COMPARISON: 7/24/2018. FINDINGS: Shallow inspiration. Stable heart size. Interval placement right PICC catheter with tip right atrial level. No pneumothorax. Minimal amount of bilateral lower lung atelectasis with lung findings accentuated due to level of inspiration. Old healed left rib fractures. Monitor electrodes.    Xr Chest 1 View Portable    Result Date: 7/24/2018  XR CHEST 1 VIEW PORTABLE 7/24/2018 8:54 PM INDICATION: Sob COMPARISON: None. FINDINGS: Cardiomegaly. Pulmonary vascularity normal. The lungs are clear.    Xr Chest 2 Views    Result Date: 8/2/2018  XR CHEST 2 VIEWS 8/2/2018 5:20 PM INDICATION: Followup infilitrate COMPARISON: 7/28/2018 FINDINGS: Right PICC could be pulled back into the proximal SVC. It is also possible it is obscured by the right central line whose tip projects over the right atrium. Limited inspiratory volume. Stable heart size. Improved bilateral alveolar infiltrates most likely represents improved edema.    Ct Head Without Contrast    Result Date: 8/14/2018  [facility] CT HEAD WO CONTRAST 8/14/2018 3:32 PM INDICATION: Altered mental status TECHNIQUE: Routine without IV contrast. Multiplanar reformats. Dose reduction  techniques were used. COMPARISON: None FINDINGS: INTRACRANIAL CONTENTS: No intracranial hemorrhage, extraaxial collection, or mass effect.  No CT evidence of acute infarct. Mild presumed chronic small vessel ischemic changes. Mild generalized volume loss. No hydrocephalus. OSSEOUS STRUCTURES/SOFT TISSUES: No significant abnormality. VISUALIZED ORBITS/SINUSES/MASTOIDS: No significant orbital abnormality. Minor mucosal thickening scattered about the paranasal sinuses. Chronic appearing mural hyperostosis involving the left sphenoid sinus. Opacification of a few mastoid tip air cells bilaterally, right more so than left.     CONCLUSION: 1.  No CT finding of mass, infarct or hemorrhage. 2.  Age-related changes. 3.  Mild inflammatory changes of the paranasal sinuses and mastoid air cells.    Us Abdomen Complete    Result Date: 7/25/2018  US ABDOMEN COMPLETE 7/25/2018 2:01 AM INDICATION: Anuric, septic, elevated alk phos, bili, lipase COMPARISON: None. FINDINGS: GALLBLADDER: Sludge in the gallbladder. Gallbladder wall thickness measures 3 mm. Negative Rogers sign. BILE DUCTS: Common bile duct not visualized. No evidence of ductal dilatation. LIVER: Measures 17.5 cm. Fatty infiltration. SPLEEN: Normal in size. RIGHT KIDNEY: Measures 10.1 x 5.1 x 5.3 cm. Normal. No hydronephrosis. LEFT KIDNEY: Measures 9.5 x 5.7 x 4.8 cm. Normal. No hydronephrosis. PANCREAS: Not visualized due to bowel gas. AORTA: Not visualized due to bowel gas. IVC: Poorly visualized.     CONCLUSION: 1.  Sludge in the gallbladder. 2.  No gallbladder wall thickening. 3.  No evidence of cholelithiasis. 4.  No evidence of ductal dilatation.    Ct Abdomen Pelvis Without Oral With Iv Contrast    Result Date: 8/3/2018  CT ABDOMEN PELVIS WO ORAL W IV CONTRAST 8/3/2018 1:37 PM     INDICATION: Infection, abdomen-pelvis leukocytosis, hypotension, evaluate for ascites and cirrhosis TECHNIQUE: CT abdomen and pelvis. Multiplanar reformation images (MPR). Dose  reduction techniques were used. IV CONTRAST: Iohexol (Omni) 100 mL COMPARISON: Ultrasound abdomen 7/28/2018 and 7/25/2018 FINDINGS: LUNG BASES: Small right-sided trace left-sided pleural effusion with adjacent consolidation likely reflecting compressive atelectasis. Extensive coronary artery calcifications. Mild cardiomegaly. ABDOMEN: Limited due to patient motion. Heterogeneous liver with no definite discrete measurable lesion. Prominent gallbladder without wall thickening or appreciable edema. No biliary ductal dilatation. Unremarkable spleen, pancreas and adrenals. Unremarkable kidneys without evidence of hydronephrosis. Unremarkable stomach. Normal caliber small bowel. Normal caliber appendix. The colon is tortuous but normal in caliber. Moderate body wall and mild mesenteric edema. Small amount of diffuse simple ascites. Left lower ventral abdominal wall air densities likely reflecting recent injection. Correlation is recommended. Similar findings in the right mid ventral abdominal wall. No free air. Patent central SMV and SMA. PELVIS: Urinary bladder decompressed by a Rahman catheter. Mild presacral edema. Small amount of simple free fluid. No adenopathy. MUSCULOSKELETAL: Age indeterminate but likely remote L1 compression deformity without significant osseous retropulsion or central canal stenosis.     CONCLUSION: 1. Small amount of diffuse simple ascites. Mesenteric and body wall edema are also noted. No loculated fluid collections in the abdomen or pelvis. 2. Small right-sided and trace left-sided pleural effusions with adjacent consolidation which may reflect atelectasis or pneumonic infiltrates. 3. Nonspecific heterogeneous liver. No discrete measurable liver lesion. Correlate with liver function tests.    Ir Tunneled Catheter Insert    Result Date: 8/1/2018  [facility] PROCEDURE: TUNNELED DIALYSIS CATHETER PLACEMENT 1.  Insertion of a tunneled central venous catheter. 2.  Ultrasound guidance for  vascular access. A permanent image was stored. 3.  Fluoroscopic guidance for central venous access device placement. INTERVENTIONAL RADIOLOGIST: Kenji Ortiz MD. INDICATION: 62-year-old male with renal insufficiency requiring hemodialysis. CONSENT: The risks, benefits and alternatives of tunneled dialysis catheter placement were discussed with the patient's guardian in detail. All questions were answered. Informed consent was given to proceed with the procedure. MODERATE SEDATION: None. ANTIBIOTICS: The patient is receiving antibiotic therapy. No additional antibiotic was administered for the procedure. ADDITIONAL MEDICATIONS: None. FLUOROSCOPIC TIME: 1.1 minutes. AIR KERMA:  6 mGy. CONTRAST: None. COMPLICATIONS: No immediate complications. STERILE TECHNIQUE: The procedure was performed using maximum sterile barrier technique. The interventionalist and assistants performed appropriate hand hygiene and wore a hat, mask, sterile gown, and sterile gloves during the entire procedure. PROCEDURE:  Using local anesthesia and real-time ultrasound guidance the right internal jugular vein was accessed. A subcutaneous tunnel was created requiring a second incision. Using this access, a 23 cm tip to cuff Duraflow dialysis catheter was advanced until the  tip was in the proximal right atrium.  The catheter was tested and found to flush and aspirate appropriately. FINDINGS: Ultrasound shows an anechoic and compressible jugular vein.  At the completion of the study, the tunneled dialysis catheter tip lies in the proximal right atrium.     1.  Successful tunneled dialysis catheter placement. 2.  The catheter is ready for use.     Us Abdomen Limited    Result Date: 7/28/2018  US ABDOMEN LIMITED 7/28/2018 4:25 PM INDICATION: Elevated alk phos, bilirubin COMPARISON: Abdominal ultrasound 07/25/2018 FINDINGS: Technically challenging exam due to the patient's body habitus and bowel gas. GALLBLADDER: Small amount sludge in the  gallbladder. No wall thickening. BILE DUCTS: No intrahepatic bile duct dilation. The common bile duct was not visualized due to bowel gas.. LIVER: Hepatomegaly measuring 20.1 cm. Diffusely echogenic. Portions difficult to penetrate. RIGHT KIDNEY: No hydronephrosis. PANCREAS: Not well seen due to bowel gas. Small amount of ascites in the right upper quadrant.     CONCLUSION: 1.  Technically challenging exam. 2.  Gallbladder sludge. No findings for cholecystitis. 3.  Enlarged and fatty liver.    Us Paracentesis    Result Date: 8/3/2018  1. PARACENTESIS 2. ULTRASOUND GUIDANCE 8/3/2018 9:43 PM INDICATION: Ascites. PROCEDURE: Informed consent obtained. Time out performed. The abdomen was prepped and draped in a sterile fashion. 10 mL of 1 percent lidocaine was infused into local soft tissues. A 5 Kinyarwanda Brainient catheter system was introduced into the abdominal ascites  under ultrasound guidance. 1.95 liters of clear yellow fluid was removed and sent to lab if requested. Estimated blood loss is minimal. No immediate complication. The patient tolerated the procedure well. RADIOLOGIC SUPERVISION AND INTERPRETATION: ULTRASOUND GUIDANCE: Images demonstrate ascites.     CONCLUSION: 1.  Status post ultrasound-guided paracentesis.    Echocardiogram  7/25    No previous study for comparison.    Technically challenging examination. Definity contrast utilized    Left ventricle ejection fraction is severely decreased. Left ventricular ejection fraction estimated 25-30%.    Global left ventricular hypokinesis with large area of akinesis involving the apex, anterior apical and apical lateral portions of left ventricle.    Normal right ventricular size and systolic function.    Mild to moderate aortic stenosis. Mean gradient of 15 mmHg. The degree of aortic stenosis potentially underrepresented in the setting of severe reduction left ventricular systolic function.    Left atrial enlargement    Moderate enlargement of the aortic  root.    7/31    When compared to the previous study dated 7/25/2018, the EF has improved    Left ventricle ejection fraction is normal. The calculated left ventricular ejection fraction is 61%.    Mild concentric left ventricular hypertrophy    Normal right ventricular size and systolic function.    Surgery: Tunneled Catheter IR placement    Discharge Medications:     Current Facility-Administered Medications:      acetaminophen tablet 650 mg (TYLENOL), 650 mg, Oral, Q4H PRN, 650 mg at 08/15/18 0819 **OR** acetaminophen solution 650 mg (TYLENOL), 650 mg, Enteral Tube, Q4H PRN **OR** acetaminophen suppository 650 mg (TYLENOL), 650 mg, Rectal, Q4H PRN, Astrid Redd CNP     albumin human 25 % bottle 12.5-25 g, 12.5-25 g, Intravenous, Q8H PRN, Brie Oliver MD     albuterol nebulizer solution 2.5 mg (PROVENTIL), 2.5 mg, Nebulization, Q4H PRN **AND** Nebulizer treatment intermittent, , , Q4H PRN, Astrid Redd CNP     benzocaine-menthol lozenge 1 lozenge (CEPACOL), 1 lozenge, Oral, Q1H PRN, Astrid Redd CNP, 1 lozenge at 08/12/18 2158     bisacodyl suppository 10 mg (DULCOLAX), 10 mg, Rectal, Daily PRN, Astrid Redd CNP     dextrose 50 % (D50W) syringe 20-50 mL, 20-50 mL, Intravenous, PRN, Astrid Redd CNP     epoetin alie injection 20,000 Units (EPOGEN,PROCRIT), 20,000 Units, Subcutaneous, Weekly PM, Brie Oliver MD, 20,000 Units at 08/13/18 2039     folic acid 1 mg, thiamine 100 mg, multiple vitamin 3,300 unit- 150 mcg/10 mL 10 mL in sodium chloride 0.9% 1,000 mL, , Intravenous, Daily PRN, Astrid Redd CNP     glucagon (human recombinant) injection 1 mg, 1 mg, Subcutaneous, PRN, Astrid Redd CNP     heparin (PF) subcutaneous injection 5,000 Units, 5,000 Units, Subcutaneous, Q8H FIXED TIMES, Alvaro Eastman MD, 5,000 Units at 08/15/18 0652     ipratropium-albuterol 0.5-2.5 mg/3 mL nebulizer solution 3 mL (DUO-NEB), 3 mL, Nebulization, Q4H PRN **AND** Nebulizer treatment  intermittent, , , Q4H PRN, Astrid Leone CNP     Lactobacillus acidoph-L.bulgar tablet 4 tablet (FLORANEX), 4 tablet, Oral, TID with meals, Karen Montes MD, 4 tablet at 08/15/18 0819     lactulose 20 gram/30 mL solution 20 g (ENULOSE), 20 g, Oral, BID PRN, Get Mclain MD, 20 g at 08/05/18 2042     magnesium sulfate in water 4 gram/50 mL (8 %) IVPB 4 g, 4 g, Intravenous, Q8H, Jade Xie DO, Last Rate: 12.5 mL/hr at 08/15/18 0818, 4 g at 08/15/18 0818     melatonin tablet 3 mg, 3 mg, Oral, Bedtime PRN, Benjamin E Rosenstein, MD, 3 mg at 08/14/18 2152     menthol-zinc oxide 0.44-20.6 % ointment (CALMOSEPTINE), , Topical, QID PRN, Jade Xie DO     midodrine tablet 15 mg (PROAMATINE), 15 mg, Oral, Once per day on Mon Wed Fri, Brie Oliver MD, 15 mg at 08/13/18 1729     midodrine tablet 15 mg (PROAMATINE), 15 mg, Oral, Q8H FIXED TIMES, Astrid Leone CNP, 15 mg at 08/15/18 0652     multivitamin therapeutic tablet 1 tablet, 1 tablet, Enteral Tube, DAILY, Lewis Farr MD, 1 tablet at 08/15/18 0819     naloxone injection 0.2-0.4 mg (NARCAN), 0.2-0.4 mg, Intravenous, PRN **OR** naloxone injection 0.2-0.4 mg (NARCAN), 0.2-0.4 mg, Intramuscular, PRN, Astrid Redd CNP     omeprazole capsule 20 mg (PriLOSEC), 20 mg, Oral, BID AC, Bigg Medley DO, 20 mg at 08/15/18 0652     polyvinyl alcohol 1.4 % ophthalmic solution 1-2 drop (LIQUIFILM TEARS), 1-2 drop, Both Eyes, Q1H PRN, Astrid Redd CNP, 2 drop at 07/28/18 0556     ramelteon tablet 8 mg (ROZEREM), 8 mg, Oral, QHS, Mayi Torres CNP, 8 mg at 08/14/18 2152     rifAXIMin tablet 550 mg (XIFAXAN), 550 mg, Oral, TID, Corona Luna MD, 550 mg at 08/15/18 0819     sodium chloride flush 3 mL (NS), 3 mL, Intravenous, Q8H, Jade Xie DO, 10 mL at 08/15/18 0820     thiamine tablet 100 mg, 100 mg, Oral, DAILY, Jade Xie DO     traZODone tablet 12.5 mg (DESYREL), 12.5 mg, Oral, Bedtime PRN, Sarah Castaneda CNP, 12.5  "mg at 08/14/18 3338    Reasons for changes to above med list are described below in problem based summary of hospital stay.    Discharge Instructions:  Follow up appointment with Primary Care Physician: Misbah Y Palla, MD after discharge from [place]   Follow up appointment with Specialist: Nephrology after [place] discharge   Diet:  2g sodium and 1500mL fluid restriction   Activity: activity as tolerated  Restrictions: None  The following tests have been done with results pending: None     Hospital Summary:   Aayush García is a 62 y.o. male with hx of HTN, HLD, and alcohol use disorder who was brought to the hospital due to anuria and AMS and was found to be in septic shock with acute renal failure (creatinine >10) with positive blood cultures for klebsiella.     Per HPI  \"History is provided by patient and patient's wife.  Patient's wife notes increased confusion over the past 2 days.  Today he was unable to recall her name and in the ED he was unable to provide the correct year.  She also states he has been experiencing hallucinations and has been unable to sleep.  He states he has not urinated for the past 3 days and has felt more fatigued than normal.  He denies chest pain, shortness of breath, fevers, chills, nausea, or vomiting.\"    He was admitted to the ICU for ventilation and pressor support. Concern also for alcohol withdrawal and he was started on CIWA protocol with precedex sedation. With his anuria, he was started on dialysis. A urinary source for his sepsis was suspected and he was started on broad spectrum antibiotics with cefepime and vancomycin. Blood cultures revealed Klebsiella and he was started on ceftriaxone.  Has had persistent leukocytosis with repeat cultures and CT of the abdomen negative.  Was on Zosyn for a 3 day course and has remained off antibiotics since 8/8.  Clostridium difficile negative 8/12.    He had significant multi-organ involvement of his illness. AST and ALT were " significantly elevated related to alcoholic hepatitis. His troponins were significantly elevated, with maximum of 8.47. This was suspected to be type II myocardial infarction related to sepsis. Initial echo showed significantly decreased EF. Repeat echo showed significant improvement with an EF of 61%.  Coreg 3.125 mg twice daily was initiated, however discontinued after persistent hypotension.  If hypotension resolved, would consider restarting.    He also developed melena while in the ICU. Notably, he had recently been seen in clinic for this and stopped NSAIDs with resolution of dark stools. He was evaluated in the ICU by GI and started on IV PPI. EGD was avoided due to recent extubation and his melena resolved.  Remains on twice daily PPI and hemoglobin has remained stable, is receiving EPO per nephrology.    He was weaned off pressors and ventilation.  And has remained on room air.  Continues to have issues with encephalopathy.  History of drinking 350 mL peppermint schnapps daily for years.  Likely multifactorial possibly related to hepatic encephalopathy, being treated with lactulose and rifaximin, Wernicke's encephalopathy, treating with thiamine daily and should be continued indefinitely.  CT of the head negative and unable to perform MRI without significant sedation.  May consider initiating antidepressant for help with encephalopathy.    He continues to require dialysis due to ongoing poor UOP and elevated creatinine.  Thought to be secondary to severe ATN due to Klebsiella sepsis. He was started on high dose of lasix and continued to have low UOP despite this. A tunneled catheter was placed for ongoing dialysis needs.  Dialyzing T,TH,S    He had persistent issues with hypotension which is thought to be secondary to his alcoholic hepatitis as well as severe hypoalbuminemia.  Has remained off of pressors since 8/8 and receiving scheduled midodrine every 8 hours as well as with dialysis.  Recommend weaning  as tolerated after discharge.    Vital Signs in last 24 hours:    Temp:  [98.1  F (36.7  C)-99  F (37.2  C)] 98.5  F (36.9  C)  Heart Rate:  [] 97  Resp:  [17-34] 18  BP: ()/(54-78) 94/75    Physical Exam:    Pertinent exam findings on day of discharge include:     General appearance: Alert, conversational, confusion. Oriented to person only. Jaundiced, scleral icterus.  Lungs: Clear to auscultation bilaterally.  No wheezing or crackles auscultated.  Respirations unlabored  Heart: Regular rate and rhythm, normal S1 and S2, no murmur, rub or gallop.  Abdomen: Obese, distended, soft, nontender.  Extremities: +2 pitting edema up to knees  Skin: Jaundiced. Left lateral lower extremity erythema and edema which is improving, likely related to pressure and not consistent with cellulitis.   Neurologic: Alert, oriented to person only.  No gross neurologic deficits.  Moves all extremities spontaneously.    Precepted patient with  Dr. Cali Styles    Please contact me with questions or concerns about this summary or patient.    Jade Xie DO ([id number])  [facility] Resident  Pager: [phone]

## 2021-06-19 NOTE — CONSULTS
Consultation - RENAL   Aayush García,  1955, MRN [id number]    Admitting Dx: Septic shock (H) [A41.9, R65.21]    PCP: Misbah Y Palla, MD, [phone]   Code status:  Full Code       Extended Emergency Contact Information  Primary Emergency Contact: Karlie Chisholm  Address: [address] [place] [address] of [place]  Home Phone: [phone]  Mobile Phone: [phone]  Relation: Significant Other       Assessment and Plan   Pt with ETOHism and HTN now with severe shock due to volume depletion +/-sepsis (though no obvious source). Requiring high dose pressor but pressor need moderating with intubation and additional volume    MICHAEL presume severe hemodynamic ATN due to volume depletion, low bp and lisinopril. So far anuric but still appears volume contracted and low FIO2 needs. Low suspicion for toxic alcohol ingestion and osmol gap not elevated. CPK minimally elevated and time course of bad falls suggests we did not miss earlier rhabdo.     Severe lactic acidosis and acidosis from MICHAEL.  On bicarb drip and will give additional bolus bicarb    Hyperkalemia moderating.    Elevated LFT's not typical pattern for tylenol toxicity, level pending and starting NAC protocol.     Anemia acute on chronic, recent GI bleed, +chronic disease, ETOH, ?iron stores     Coagulopathy    Malnutrition     Plan: cont aggressive medical management and serial labs and hold off on dialysis for now.   But may need dialysis later today if remains anuric.   D/w SO and she would agree to dialysis if needed.   Will recheck pt later this afternoon.     D/w ICU MD Brie Oliver MD       Principal Problem:    Septic shock (H)  Active Problems:    Acute respiratory failure with hypoxia (H)    Acute renal failure, unspecified acute renal failure type (H)    Metabolic acidosis    Encephalopathy, metabolic       Chief Complaint Septic shock (H)  MICHAEL     HPI    We have been requested by Dr Redd to evaluate Aayush HARMAN  Raquel a 62 y.o. year old male for MICHAEL and metabolic acidosis.   Pt with hx of ETOHism, drinks daily 1/2 bottle of peppermint schnapps.hx of shakes but no severe w/d or seizure, no chem dep rx. No known liver dz.  HTN started on lisinopril earlier this year.  UGI bleed last month. Was taking heavy NSAIDS prior to this, none since.   Tobacco abuse    Hx of falls few weeks ago. Sore and recovering since. Taking lots of tylenol. Worked thru last Monday July 16, but then had week off and continued to drink and lay on the couch. Saw pcp last week Wednesday and bp was in 90's at appt. Continued to take all meds probably thru yesterday. Common law wife here, and she's been helping him to BR for days and notes no u/o x 3 days. He tried to void but nothing there. Some loose stools no vomiting. Very little appetite and po for a month.   No fevers. Bruises but no other skin lesions, however was treated for infected wound on arm after a fall a few weeks ago.   No cardiac history.     No illicit drug use.   No other alcohol other than above     Medical History  There are no active non-hospital problems to display for this patient.    Past Medical History:   Diagnosis Date     Hypertension     Surgical History  He  has a past surgical history that includes Colonoscopy (N/A, 3/20/2018) and PICC (7/24/2018).   Social History  Reviewed, and he  reports that he has been smoking.  He has never used smokeless tobacco. He reports that he drinks alcohol. He reports that he does not use illicit drugs.   Allergies  No Known Allergies Family History  Reviewed, and family history is not on file.   Psychosocial Needs  Social History     Social History Narrative     Additional psychosocial needs reviewed per nursing assessment.     Prior to Admission Medications   Prescriptions Prior to Admission   Medication Sig Dispense Refill Last Dose     aspirin 81 MG EC tablet Take 81 mg by mouth daily.   7/24/2018 at Unknown time     lisinopril  (PRINIVIL,ZESTRIL) 10 MG tablet Take 10 mg by mouth daily.   7/24/2018 at Unknown time     multivitamin therapeutic tablet Take 1 tablet by mouth daily.   7/23/2018 at Unknown time     omeprazole (PRILOSEC) 40 MG capsule Take 40 mg by mouth daily before breakfast.  1 7/24/2018 at am     rosuvastatin (CRESTOR) 10 MG tablet Take 10 mg by mouth daily.  3 7/24/2018 at Unknown time          Review of Systems:  Review of systems not obtained due to inability to communicate with the patient.  Physical Exam: intubated 6 am, levophed was at 30 mcg now down to 20 mcg    Temp:  [97.9  F (36.6  C)-99.3  F (37.4  C)] 97.9  F (36.6  C)  Heart Rate:  [] 88  Resp:  [26-51] 28  BP: ()/(37-97) 120/51  Arterial Line BP: (103-141)/(52-66) 141/66  FiO2 (%):  [25 %-30 %] 30 %    Chronically ill appearing man intubated on vent  Neck no obvious jvd   Lungs few rhonchi o/w clear  Cor RR tachy  abd very round but soft, liver enlarged, edge palpable below RCM  Ext warm no pitting   Skin bruising, +palmar erythema, no caput [place] no spider angiomata.  Muscles soft, none tense    's LFTs inc and lact 6.9   Osmol 285 and calc 275 Gap 10  ETOH level on admit    U/s liver enlarged  9.5 and 10.1 cm kidneys no hydro      abg 7.3/28/17     Pertinent Labs  Lab Results: personally reviewed.   Lab Results   Component Value Date     (L) 07/25/2018     (L) 07/25/2018     (L) 07/25/2018    K 5.0 07/25/2018    K 5.0 07/25/2018    K 5.3 (H) 07/25/2018    CO2 14 (L) 07/25/2018    CO2 14 (L) 07/25/2018    CO2 14 (L) 07/25/2018    BUN 59 (H) 07/25/2018    BUN 59 (H) 07/25/2018    BUN 57 (H) 07/25/2018    CREATININE 9.44 (HH) 07/25/2018    CREATININE 9.44 (HH) 07/25/2018    CREATININE 10.18 (HH) 07/25/2018    CALCIUM 7.4 (L) 07/25/2018    CALCIUM 7.4 (L) 07/25/2018    CALCIUM 7.7 (L) 07/25/2018     Lab Results   Component Value Date    WBC 21.4 (H) 07/25/2018    WBC 20.2 (H) 07/24/2018    HGB 7.7 (L) 07/25/2018    HGB 8.0  (L) 07/25/2018    HGB 8.9 (L) 07/24/2018    HCT 24.2 (L) 07/25/2018    HCT 26.5 (L) 07/24/2018     (H) 07/25/2018     (H) 07/24/2018     07/25/2018     07/24/2018        Pertinent Radiology  Radiology Results: Personally reviewed image/s and Personally reviewed impression/s  EKG Results: personally reviewed.

## 2021-06-19 NOTE — PROGRESS NOTES
Progress Note    Assessment/Plan  Principal Problem:    Septic shock (H)  Active Problems:    Acute respiratory failure with hypoxia (H)    Acute renal failure, unspecified acute renal failure type (H)    Metabolic acidosis    Encephalopathy, metabolic    Elevated troponin    Other cardiomyopathy (H)    Bacteremia due to Klebsiella pneumoniae    Anemia, unspecified type    Alcohol abuse      Aayush García is a 62 year old male with a history of alcohol abuse, HTN and anemia who was admitted with septic shock and anuria with a positive blood culture for Klebsiella.  Required intubation and pressor support in ICU, currently extubated without pressor support, but still sedated for alcohol withdrawal symptoms. Mental status slowly improving.   - Extubated, remains on sedation for alcohol withdrawal symptoms  - Blood cultures with Klebsiella - on ceftriaxone  - Melena resolved, continue two times a day PPI  - Hgb stable  - LFTs, Alk phos, and Bili up -- likely due to sepsis, no finding of RUQ u/s  - Renal function slowly improving, still with low UOP  - PT/OT/SLP     NEURO:   AMS - improving  Alcohol withdrawal  Likely related to underlying sepsis.  Showing signs of alcohol withdrawal, remains on precedex drip for management. Likely developing ICU delirium as as well.  - On thiamine and folate  - Withdrawal management per ICU  - PT/OT     RESPIRATORY:   Respiratory fatigue - improving  Successfully extubated. Remains of supplemental O2 however, significantly improved status  - Respiratory management per ICU  - Lasix 160mg today     CV:   Elevated troponin and low EF  Likely ICM  Most likely due to stress cardiomyopathy and demand ischemia from acute illness, though with reduced LVEF may represent NSTEMI. EKG in early June at [place] [facility] did show ST segment changes concerning for ischemia so he may have component of underlying cardiac disease, possibly alcoholic cardiomyopathy. He will need a stress test  eventually to evaluate for underlying disease.  - Cardiology is following - appreciate recs  --Repeat EKG with T-wave inversion in anterolateral leads, suggests ischemic event  --Limited echo tomorrow for EF  --Avoid QT prolong meds  --Low dose coreg when able  - Continue supportive measures as needed  - Telemetry monitoring     Sinus Arrhyhtmia: New with this admission.  Had previously been sinus tachycardic and now with arrhythmia.  Possibly related to overall disease process versus cardiac injury from shock.  Continue on telemetry. Cardiology following as above.     RENAL:  Lactic acidosis - resolved  Anuric renal failure  Most likely severe ATN due to sepsis. May be component of volume depletion as well.  Abdominal US did not show any changes to kidneys.  Still with minimal UOP - very slowly improving.    -Nephrology is following - appreciate recs  - Fluid management per ICU  - Continue ceftriaxone     Volume overload  Volume up on exam with b/l LE and UE edema and with dilutional changes to labs. Still producing minimal urine and getting large volumes of IVF for sepsis management.  Plan for HD today for volume management and minimize fluids as possible.  - Significant improvement after dialysis yesterday  - Lasix today  - Limit IVF as possible     GI:  Hepatic injury   Patient with elevated AST/ALT, bilirubin, alk phos, CK and INR with hypoalbuminemia. Decreased synthetic function of liver most likely due to decreased perfusion. US showed fatty infiltration of liver which may be contributing but no concerning biliary tree disease.  Less likely alcoholic hepatitis although alcoholic liver injury likely contributing given AST/ALT ratio.  Peripheral smear relatively normal which is reassuring for DIC. Ongoing elevation seems likely related to shock liver.   - GI following, appreciate input  - Repeat RUQ u/s without acute findings  - Continue to follow liver labs     Melena: Episode of melanotic stool 7/27-28.  Has  had downtrending hemoglobin for months as well as while here.  Was seen in clinic recently for melanoma which was thought due to NSAID use.  Symptoms had resolved after he had stopped using NSAIDs.  He was on a PPI as well.  Again having melenic stools here, possibly gastritis from decreased perfusion versus worsening ulceration.  - GI following as above -- will avoid EGD/ERCP for now to avoid reintubation unless emergent  - Trend Hgb  - PPI two times a day   - Transfuse for Hgb < 7     FEN:  Hyponatremia  Hyperkalemia - improved with dialysis  Hypochloremia - improved  Hypocalcemia  Most likely due to septic shock and renal failure.  Consider adrenal insufficiency with hyponatremia and hyperkalemia. Continue to treat with fluid and antibiotics  - Fluid management  - Frequent BMP monitoring     NPO status while sedated  Pulled out tube for tube feeds with ET tube, if able to lighten sedation soon can have swallow study, otherwise may need NG tube for feeds  - Start TPN  - AM Lipids  - Swallow study     ID:   Sepsis  Admitted with elevated WBC, elevated lactic acid, hypotension and tachycardia. Blood cultures growing klebsiella.  UA shows leukocytes, blood and few bacteria but may be unreliable as taken after period of anuria.  Most likely urinary source.  Possibly GI source, did have ulcer 1-2 months ago.  Unlikely PNA given CXR without infiltrates.  - Continue Ceftriaxone     HEME/ONC:   Macrocytic anemia  Per chart review, Hb of 13.0 in 2/2018 with steady decline until present. Consistently macrocytic.  Likely nutritional component due to alcohol use but continuous decline is concerning for underlying cause. Has been seen in clinic for melena thought due to PUD 2/2 to NSAID use. Had resolved per last clinic visit with decreased NSAID use. Also consider hemolysis with elevated bilirubin and INR. Peripheral smear showed inflammatory changes only.  LDH and haptoglobin only mildly elevated  - Hgb monitoring as above    "  ENDOCRINE:   Concern for adrenal insufficiency  Acute illness and combination of hyperkalemia and hyponatremia is concerning for adrenal insufficiency. Plan to cover with steroids for now and consider further work up as clinical course progresses. Hydrocortisone d/c's.      Hyperglycemia  Elevated glucose, likely due to hydrocortisone  - sliding scale insulin     PSYCH:   History of alcohol use  350ml peppermint schnapps daily for years. Per wife, never has had withdrawal though never been more than 2 days without a drink that she knows of.  Currently with signs of withdrawal and continues on precedex drip  - Withdrawal treatment per ICU  - Nicotine patch     FEN: TPN  DVT ppx: SCDs  Lines: R PICC, Radial Art line, Dialysis Cath RIJ  Code: Full    Subjective  Aayush is more alert today.  Able to answer a few questions today.  Denies chest pain or belly pain.  Says his breathing feels okay.  Answers most of his questions with \"thirsty.\"  Wife present in room today and answered her questions.    Reviewed cares with bedside RN.  Denies any further melanotic stools.  Has had small amount of urine output.  Still altered but following conversation and commands better than previously.    Objective    Vital signs in last 24 hours Temp:  [98.1  F (36.7  C)-101.2  F (38.4  C)] (P) 98.5  F (36.9  C)  Heart Rate:  [] 94  Resp:  [20-40] 28  BP: ()/(46-74) 91/57  Arterial Line BP: (111-141)/(52-73) 127/57  FiO2 (%):  [45 %] 45 %   Weight: 207 lb 9.6 oz (94.2 kg)    Intake/Output last 3 shift I/O last 3 completed shifts:  In: 501.3 [I.V.:406.3; IV Piggyback:95]  Out: 2350 [Urine:150; Other:2000; Stool:200]    Intake/Output this shift:I/O this shift:  In: 50 [IV Piggyback:50]  Out: -     Review of Systems   Limited: Denies pain in chest or abdomen. Feels breathing ok.     Physical Exam  General: Awake, NAD  CV: RRR, normal S1/S2, no m/r/g  Pulm: On 4LNC, improved respiratory effort from previous, lungs clear  GI: " Abdomen obese, soft, no tenderness today, bowel sounds active  Ext: Pitting LE edema b/l  Neuro: Alert -he is on mild sedation with Precedex, answering simple questions, following commands.  Purposeful movements.    Pertinent Labs and Pertinent Radiology   Lab Results: personally reviewed.     Radiology Results: Personally reviewed image/s and Personally reviewed impression/s   Us Abdomen Limited    Result Date: 7/28/2018  US ABDOMEN LIMITED 7/28/2018 4:25 PM INDICATION: Elevated alk phos, bilirubin COMPARISON: Abdominal ultrasound 07/25/2018 FINDINGS: Technically challenging exam due to the patient's body habitus and bowel gas. GALLBLADDER: Small amount sludge in the gallbladder. No wall thickening. BILE DUCTS: No intrahepatic bile duct dilation. The common bile duct was not visualized due to bowel gas.. LIVER: Hepatomegaly measuring 20.1 cm. Diffusely echogenic. Portions difficult to penetrate. RIGHT KIDNEY: No hydronephrosis. PANCREAS: Not well seen due to bowel gas. Small amount of ascites in the right upper quadrant.     CONCLUSION: 1.  Technically challenging exam. 2.  Gallbladder sludge. No findings for cholecystitis. 3.  Enlarged and fatty liver.    EKG Results: personally reviewed.     Disposition/Advanced Care Planning  Discharge Planning discussed with Wife  Barriers to discharge: Critical Care  Anticipated discharge date: Multiple days  Disposition: Likely TCU    Benjamin Rosenstein, MD, MA   [facility]  P: [phone]    Precepted patient with Dr. Adam Nicole III

## 2021-06-19 NOTE — PROGRESS NOTES
GI Progress Note  Aayush García  [id number]    Subjective:   Patient with multiple BMs yesterday and overnight. 2 stools this am so far. Lactulose decreased to daily yesterday due to frequent stools. Wife at bedside. Mental status reportedly starting to improve although remains somewhat confused at times. Denies abd pain. No bloody stools. Paracentesis done yesterday, 1.5L removed.       Objective:   Temp:  [97.5  F (36.4  C)-99.7  F (37.6  C)] 99.1  F (37.3  C)  Heart Rate:  [78-88] 84  Resp:  [18-20] 20  BP: ()/(50-58) 102/50  Body mass index is 32.28 kg/(m^2).     Gen: No acute distress  Cardio: RRR  GI: Soft, non-distended, non-tender without rebound or guarding    Laboratory  LAB DATA:    Results from last 7 days  Lab Units 08/04/18  0505 08/03/18  0548 08/02/18  0535  07/30/18  0428   LN-ALKALINE PHOSPHATASE U/L 691* 783* 846*  < > 805*   LN-BILIRUBIN TOTAL mg/dL 6.3* 5.6* 5.8*  < > 5.9*   LN-BILIRUBIN DIRECT mg/dL  --   --  3.7*  --  3.8*   LN-PROTEIN TOTAL g/dL 5.7* 5.7* 5.8*  < > 6.1   LN-ALT (SGPT) U/L 76* 87* 85*  < > 82*   LN-AST (SGOT) U/L 222* 274* 287*  < > 299*   < > = values in this interval not displayed.     Results from last 7 days  Lab Units 08/04/18  0505 08/03/18  0548 08/02/18  0535   LN-INR  1.34* 1.39* 1.43*         Results from last 7 days  Lab Units 08/04/18  0505 08/03/18  0548 08/02/18  0535   LN-WHITE BLOOD CELL COUNT thou/uL 19.2* 20.5* 19.6*   LN-HEMOGLOBIN g/dL 7.7* 8.2* 8.4*   LN-HEMATOCRIT % 23.7* 25.5* 25.2*   LN-PLATELET COUNT thou/uL 275 235 219       Results from last 7 days  Lab Units 08/04/18  0505   LN-SODIUM mmol/L 140   LN-POTASSIUM mmol/L 3.6   LN-CHLORIDE mmol/L 103   LN-CO2 mmol/L 27   LN-BLOOD UREA NITROGEN mg/dL 31*   LN-CREATININE mg/dL 4.15*   LN-CALCIUM mg/dL 8.1*     Lipase   Date Value Ref Range Status   07/25/2018 256 (H) 0 - 52 U/L Final   07/24/2018 313 (H) 0 - 52 U/L Final     Imaging:      Assessment:   62 y.o. male who presented to the hospital  with acute onset of lethargy and confusion.  On evaluation the patient was found to have sepsis secondary to Klebsiella pneumonia and was admitted to the ICU. LFTs elevated, probably related to sepsis or ETOH (AST> ALT). Now improving. Total bili stable 5.3-->6.3. US showed small amount of sludge without biliary dilation. CT A/P yesterday shows no concerning liver lesions or gall bladder pathology, small amount of ascites, possible PNA. 1.95 L fluid removed on paracentesis yesterday (fluid not sent for cell ct/gram stain). Nephrology following. On midodrine. Creatinine significantly elevated but improving today. Stooling with lactulose. Mental status starting to improve.      Plan:   1. PNA treatment per primary.  2. Trend LFTs.   3. Titrate lactulose for goal 3 loose BMs daily.   4. Add rifaximin if mental status deteriorates.     Petra Villarreal, PAC  [facility]  [phone]

## 2021-06-19 NOTE — PROGRESS NOTES
RENAL Progress Note        Assessment/Plan  MICHAEL - severe ATN in setting of shock and prob unrecognized chronic liver disease. ?recovery potential. U/o has been low but now ?as no golden but ok to observe w/o precise u/o as still has high Cr and azotemia and fluid overload so no signif recovery.continue HD with midodrine and alb for bp support.      Hypotension - Not clearly cirrhotic but acting like chronic liver dz pt. Nothing acute on CT. CM better. UTI treated, s/p transfusion.   Using scheduled midodrine now and prn before dialysis, inc dose. Needs to get better to contemplate outpt HD.     CM  - likely stress induced CM as he has recovered quite a bit of his EF.  Clearly volume up but hard to shift fluid     Sepsis - klebsiella bacteremia.  Finished course.  WBC still elevated but no acute findings on CT. Now ?new UTI, back on abx.     Anemia - acute on chronic, recent GI bleed, hgb drifting down, transfused Friday on run.  S/p transfusion, last yesterday.   Started epo    Inc LFT's and coagulopathy - No documented chronic liver disease but ?early cirrhosis. Bili is sl up today.     Resp failure - on 1-2L NC, very volume up but difficult to shift fluid off him.    Encephalopathy - waxing and waning.  Remains delirious.  ?Hepatic encephalopathy?  Getting lactulose.     Malnutrition - encourage oral intake, liberalize diet to just sodium restriction       Let's just give albumin 25% if needed for hypotension and try and avoid saline due to severe anasarca    Principal Problem:    Septic shock (H)  Active Problems:    Acute respiratory failure with hypoxia (H)    Acute renal failure, unspecified acute renal failure type (H)    Metabolic acidosis    Encephalopathy, metabolic    Elevated troponin    Stress-induced cardiomyopathy    Bacteremia due to Klebsiella pneumoniae    Anemia, unspecified type    Alcohol abuse    Acute encephalopathy    Acute liver failure      Subjective  Confused, asking to go home.   Vague  awareness of med probs  SO at bedside  No specific c/o.  Scratching at CVC earlier       Objective    Vital signs in last 24 hours  Temp:  [97.7  F (36.5  C)-99.3  F (37.4  C)] 98.4  F (36.9  C)  Heart Rate:  [72-98] 75  Resp:  [18-44] 24  BP: ()/(46-82) 109/57  Weight:   203 lb 8 oz (92.3 kg)    Intake/Output last 3 shifts  I/O last 3 completed shifts:  In: 665.3 [I.V.:165.3; Blood:350; IV Piggyback:150]  Out: 1940 [Urine:190; Other:1750]  Intake/Output this shift:  I/O this shift:  In: 246 [P.O.:180; I.V.:21; IV Piggyback:45]  Out: 27 [Urine:27]    Review of Systems   A 12 point comprehensive review of systems was negative except as noted.    Physical Exam  Awake jaundiced  HEENT NC in place, more spider angiomata on chest and face  Neck supple  Lungs fairly clear anteriorally  Cor RRR, 3+ edema up to hips  abd soft, obese, more distended  Ext warm    Pertinent Labs   Lab Results: personally reviewed.   Lab Results   Component Value Date     08/07/2018     08/06/2018     08/05/2018    K 3.8 08/07/2018    K 3.6 08/06/2018    K 3.7 08/05/2018    CO2 25 08/07/2018    CO2 21 (L) 08/06/2018    CO2 23 08/05/2018    BUN 24 (H) 08/07/2018    BUN 41 (H) 08/06/2018    BUN 39 (H) 08/05/2018    CREATININE 4.39 (H) 08/07/2018    CREATININE 5.87 (H) 08/06/2018    CREATININE 5.95 (H) 08/05/2018    CALCIUM 8.4 (L) 08/07/2018    CALCIUM 8.2 (L) 08/06/2018    CALCIUM 8.3 (L) 08/05/2018     Lab Results   Component Value Date    WBC 18.1 (H) 08/07/2018    WBC 17.9 (H) 08/06/2018    WBC 19.4 (H) 08/05/2018    HGB 7.8 (L) 08/07/2018    HGB 6.9 (LL) 08/06/2018    HGB 7.3 (L) 08/05/2018    HCT 23.4 (L) 08/07/2018    HCT 21.3 (L) 08/06/2018    HCT 23.1 (L) 08/05/2018     08/07/2018     (H) 08/06/2018     (H) 08/05/2018     08/07/2018     08/06/2018     08/05/2018       Pertinent Radiology   Radiology Results: Personally reviewed impression/s    Brie Oliver  MD  [facility]  [phone]

## 2021-06-19 NOTE — PROGRESS NOTES
Faculty Supervision of Residents    I have examined this patient on 8/3/2018 and the medical care has been evaluated and discussed with the resident.  See resident note to follow outlining our discussion.    Patient's blood pressure has been hard to maintain in normal range. Etiology likely that he is volume overloaded and intravascularly dry. His last echo showed EF of 60% and therefore not cardiogenic. Will hold carvedilol after discussion with nephrology- he likely recovered his EF outside of receiving this med and we will see if BP improves off it. He could potentially have vasodilation from infection given known infection and increasing WBC and therefore we are checking another culture and lactate today and we will evaluate for abscess with CT abdomen.     In terms of removing volume nephrology is having trouble pulling enough fluid on dialysis. We will reevaluate for ascites today with CT. If ascites present plan would be paracentesis + albumin.     Still confused but is having good bowel movements with lactulose. Continue current dose.     Wife is at bedside and is coping as well as can be expected.     GI: Unclear why his alk phos is so high. No biliary pathology other than sludge seen on ultrasound earlier in admission. Will reevaluate with CT today.     QUENTIN WILDER

## 2021-06-19 NOTE — PROGRESS NOTES
GI Progress Note  Aayush García  [id number]    Subjective:   Sitting up in chair.  Wife at bedside.  Still can not answer what hospital he is at.  Tolerating some solids.  Diarrhea better after stopping lactulose  Anticipating discharge to [place] in near future.     Objective:     Vitals:    08/09/18 0800   BP: 90/44   Pulse: 76   Resp: 28   Temp: 98.5  F (36.9  C)   SpO2: 99%     Body mass index is 30.61 kg/(m^2).   Gen: No acute distress  Cardio: RRR  GI: Distended. BS positive, soft, non-tender. No guarding.  Extremities: Anasarca  Neuro: No overt asterixis flap.  Completely complies with asterixis exam today.    Laboratory    Results from last 7 days  Lab Units 08/08/18 0438 08/07/18  0408 08/06/18  0440  08/05/18  0548   LN-WHITE BLOOD CELL COUNT thou/uL  --  18.1* 17.9*  --  19.4*   LN-HEMOGLOBIN g/dL 8.3* 7.8* 6.9*  < > 7.6*   LN-HEMATOCRIT %  --  23.4* 21.3*  --  23.1*   LN-PLATELET COUNT thou/uL  --  245 270  --  274   < > = values in this interval not displayed.       Results from last 7 days  Lab Units 08/09/18  0435   LN-SODIUM mmol/L 134*   LN-POTASSIUM mmol/L 3.3*   LN-CHLORIDE mmol/L 98   LN-CO2 mmol/L 25   LN-BLOOD UREA NITROGEN mg/dL 19   LN-CREATININE mg/dL 4.07*   LN-CALCIUM mg/dL 8.2*       Results from last 7 days  Lab Units 08/09/18  0435 08/08/18  0438 08/07/18  0408   LN-BILIRUBIN TOTAL mg/dL 6.8* 6.7* 7.4*   LN-ALKALINE PHOSPHATASE U/L 368* 394* 395*   LN-AST (SGOT) U/L 90* 100* 109*   LN-ALT (SGPT) U/L 41 46* 47*   ]    Results from last 7 days  Lab Units 08/08/18  0438 08/05/18  0548 08/04/18  0505   LN-INR  1.37* 1.39* 1.34*     Xr Chest 1 View Portable    Result Date: 8/5/2018  XR CHEST 1 VIEW PORTABLE 8/5/2018 5:14 PM INDICATION: Hypotension COMPARISON: Chest x-ray 08/02/2018 FINDINGS: The life-support devices are in stable positions. Persistent low lung volumes with associated hypoventilatory changes. Increased right basilar opacities most likely reflect subsegmental atelectasis.  Interval improved aeration at the left lung base. Persistent trace left-sided pleural effusion. Stable heart size and no overt vascular congestion. No definite pneumothorax.    Xr Chest 1 View Portable    Result Date: 7/28/2018  XR CHEST 1 VIEW PORTABLE 7/28/2018 6:04 AM INDICATION: Dyspnea COMPARISON: 07/25/2018. FINDINGS: Interval removal of ETT and NG tube. Right IJ catheter high SVC level. Right PICC catheter right atrial level. No pneumothorax. Interval worsening with bilateral airspace opacities with upper lobe predominance greatest on the left. Small left effusion. Previous left lower rib fractures. Monitor electrodes.    Xr Chest 1 View Portable    Result Date: 7/25/2018  XR CHEST 1 VIEW PORTABLE 7/25/2018 12:39 PM INDICATION: Dialysis line placement COMPARISON: 07/25/2018 FINDINGS: Right IJ central venous catheter tip over the right IJ vein-brachiocephalic junction or proximal SVC. Right PICC tip over the right atrium. Appropriately positioned endotracheal tube. Enteric tube courses into the stomach and off the field of view. No pneumothorax. Mildly hypoexpanded lungs. No focal consolidation. No definitive pulmonary edema or pleural effusion. Stable mildly prominent cardiomediastinal silhouette.     Xr Chest 1 View Portable    Result Date: 7/25/2018  XR CHEST 1 VIEW PORTABLE 7/25/2018 5:51 AM INDICATION: Confirm et tube position COMPARISON: 7/25/2018. FINDINGS: Heart is slightly enlarged. Tip of ETT located 3.2 cm above stacy. NG tube extends below left diaphragm. Right PICC catheter with tip right atrial level. No pneumothorax. Minimal amount of bilateral lower lung atelectasis. Monitor electrodes.    Xr Chest 1 View Portable    Result Date: 7/25/2018  XR CHEST 1 VIEW PORTABLE 7/25/2018 3:33 AM INDICATION: Sepsis. anuric. increasing tachypnea, hypoxia. COMPARISON: 7/24/2018. FINDINGS: Shallow inspiration. Stable heart size. Interval placement right PICC catheter with tip right atrial level. No  pneumothorax. Minimal amount of bilateral lower lung atelectasis with lung findings accentuated due to level of inspiration. Old healed left rib fractures. Monitor electrodes.    Xr Chest 1 View Portable    Result Date: 7/24/2018  XR CHEST 1 VIEW PORTABLE 7/24/2018 8:54 PM INDICATION: Sob COMPARISON: None. FINDINGS: Cardiomegaly. Pulmonary vascularity normal. The lungs are clear.    Xr Chest 2 Views    Result Date: 8/2/2018  XR CHEST 2 VIEWS 8/2/2018 5:20 PM INDICATION: Followup infilitrate COMPARISON: 7/28/2018 FINDINGS: Right PICC could be pulled back into the proximal SVC. It is also possible it is obscured by the right central line whose tip projects over the right atrium. Limited inspiratory volume. Stable heart size. Improved bilateral alveolar infiltrates most likely represents improved edema.    Us Abdomen Complete    Result Date: 7/25/2018  US ABDOMEN COMPLETE 7/25/2018 2:01 AM INDICATION: Anuric, septic, elevated alk phos, bili, lipase COMPARISON: None. FINDINGS: GALLBLADDER: Sludge in the gallbladder. Gallbladder wall thickness measures 3 mm. Negative Rogers sign. BILE DUCTS: Common bile duct not visualized. No evidence of ductal dilatation. LIVER: Measures 17.5 cm. Fatty infiltration. SPLEEN: Normal in size. RIGHT KIDNEY: Measures 10.1 x 5.1 x 5.3 cm. Normal. No hydronephrosis. LEFT KIDNEY: Measures 9.5 x 5.7 x 4.8 cm. Normal. No hydronephrosis. PANCREAS: Not visualized due to bowel gas. AORTA: Not visualized due to bowel gas. IVC: Poorly visualized.     CONCLUSION: 1.  Sludge in the gallbladder. 2.  No gallbladder wall thickening. 3.  No evidence of cholelithiasis. 4.  No evidence of ductal dilatation.    Ct Abdomen Pelvis Without Oral With Iv Contrast    Result Date: 8/3/2018  CT ABDOMEN PELVIS WO ORAL W IV CONTRAST 8/3/2018 1:37 PM     INDICATION: Infection, abdomen-pelvis leukocytosis, hypotension, evaluate for ascites and cirrhosis TECHNIQUE: CT abdomen and pelvis. Multiplanar reformation images  (MPR). Dose reduction techniques were used. IV CONTRAST: Iohexol (Omni) 100 mL COMPARISON: Ultrasound abdomen 7/28/2018 and 7/25/2018 FINDINGS: LUNG BASES: Small right-sided trace left-sided pleural effusion with adjacent consolidation likely reflecting compressive atelectasis. Extensive coronary artery calcifications. Mild cardiomegaly. ABDOMEN: Limited due to patient motion. Heterogeneous liver with no definite discrete measurable lesion. Prominent gallbladder without wall thickening or appreciable edema. No biliary ductal dilatation. Unremarkable spleen, pancreas and adrenals. Unremarkable kidneys without evidence of hydronephrosis. Unremarkable stomach. Normal caliber small bowel. Normal caliber appendix. The colon is tortuous but normal in caliber. Moderate body wall and mild mesenteric edema. Small amount of diffuse simple ascites. Left lower ventral abdominal wall air densities likely reflecting recent injection. Correlation is recommended. Similar findings in the right mid ventral abdominal wall. No free air. Patent central SMV and SMA. PELVIS: Urinary bladder decompressed by a Rahman catheter. Mild presacral edema. Small amount of simple free fluid. No adenopathy. MUSCULOSKELETAL: Age indeterminate but likely remote L1 compression deformity without significant osseous retropulsion or central canal stenosis.     CONCLUSION: 1. Small amount of diffuse simple ascites. Mesenteric and body wall edema are also noted. No loculated fluid collections in the abdomen or pelvis. 2. Small right-sided and trace left-sided pleural effusions with adjacent consolidation which may reflect atelectasis or pneumonic infiltrates. 3. Nonspecific heterogeneous liver. No discrete measurable liver lesion. Correlate with liver function tests.    Ir Tunneled Catheter Insert    Result Date: 8/1/2018  [facility] PROCEDURE: TUNNELED DIALYSIS CATHETER PLACEMENT 1.  Insertion of a tunneled central venous catheter. 2.  Ultrasound  guidance for vascular access. A permanent image was stored. 3.  Fluoroscopic guidance for central venous access device placement. INTERVENTIONAL RADIOLOGIST: Kenji Ortiz MD. INDICATION: 62-year-old male with renal insufficiency requiring hemodialysis. CONSENT: The risks, benefits and alternatives of tunneled dialysis catheter placement were discussed with the patient's guardian in detail. All questions were answered. Informed consent was given to proceed with the procedure. MODERATE SEDATION: None. ANTIBIOTICS: The patient is receiving antibiotic therapy. No additional antibiotic was administered for the procedure. ADDITIONAL MEDICATIONS: None. FLUOROSCOPIC TIME: 1.1 minutes. AIR KERMA:  6 mGy. CONTRAST: None. COMPLICATIONS: No immediate complications. STERILE TECHNIQUE: The procedure was performed using maximum sterile barrier technique. The interventionalist and assistants performed appropriate hand hygiene and wore a hat, mask, sterile gown, and sterile gloves during the entire procedure. PROCEDURE:  Using local anesthesia and real-time ultrasound guidance the right internal jugular vein was accessed. A subcutaneous tunnel was created requiring a second incision. Using this access, a 23 cm tip to cuff Duraflow dialysis catheter was advanced until the  tip was in the proximal right atrium.  The catheter was tested and found to flush and aspirate appropriately. FINDINGS: Ultrasound shows an anechoic and compressible jugular vein.  At the completion of the study, the tunneled dialysis catheter tip lies in the proximal right atrium.     1.  Successful tunneled dialysis catheter placement. 2.  The catheter is ready for use.     Us Abdomen Limited    Result Date: 7/28/2018  US ABDOMEN LIMITED 7/28/2018 4:25 PM INDICATION: Elevated alk phos, bilirubin COMPARISON: Abdominal ultrasound 07/25/2018 FINDINGS: Technically challenging exam due to the patient's body habitus and bowel gas. GALLBLADDER: Small amount sludge in  the gallbladder. No wall thickening. BILE DUCTS: No intrahepatic bile duct dilation. The common bile duct was not visualized due to bowel gas.. LIVER: Hepatomegaly measuring 20.1 cm. Diffusely echogenic. Portions difficult to penetrate. RIGHT KIDNEY: No hydronephrosis. PANCREAS: Not well seen due to bowel gas. Small amount of ascites in the right upper quadrant.     CONCLUSION: 1.  Technically challenging exam. 2.  Gallbladder sludge. No findings for cholecystitis. 3.  Enlarged and fatty liver.    Us Paracentesis    Result Date: 8/3/2018  1. PARACENTESIS 2. ULTRASOUND GUIDANCE 8/3/2018 9:43 PM INDICATION: Ascites. PROCEDURE: Informed consent obtained. Time out performed. The abdomen was prepped and draped in a sterile fashion. 10 mL of 1 percent lidocaine was infused into local soft tissues. A 5 Persian InMobi catheter system was introduced into the abdominal ascites  under ultrasound guidance. 1.95 liters of clear yellow fluid was removed and sent to lab if requested. Estimated blood loss is minimal. No immediate complication. The patient tolerated the procedure well. RADIOLOGIC SUPERVISION AND INTERPRETATION: ULTRASOUND GUIDANCE: Images demonstrate ascites.     CONCLUSION: 1.  Status post ultrasound-guided paracentesis.     Assessment:   1. Elevated LFT's - likely related to sepsis and EtOH hepatitis.    MELD 30-31 yesterday (>50% mortality risk at 90 days)   Bili looks to have hit plateau.    US showing small amount of sludge but normal duct size.     Paracentesis 8/3/18 - 1.95L removed - fluid not sent for cell count/gram stain.    2. Sepsis secondary to Klebsiella pneumonia.    3. MICHAEL - ATN.  Nephrology following.  4. Anemia - macrocytic.  Brown stool.  5. Encephalopathy - Likely multifactorial, as detailed in primary service note.   On rifaximin; lactulose held due to frequent stools; no asterixis on exam.    Patient Active Problem List   Diagnosis     Septic shock (H)     Acute respiratory failure with hypoxia  (H)     Acute renal failure, unspecified acute renal failure type (H)     Metabolic acidosis     Encephalopathy, metabolic     Elevated troponin     Stress-induced cardiomyopathy     Bacteremia due to Klebsiella pneumoniae     Anemia, unspecified type     Alcohol abuse     Acute encephalopathy     Acute liver failure     Plan:   1. No need for paracentesis at this point.  If ascites becomes symptomatic (i.e. shortness of breath, tight abdomen), then repeat paracentesis -- would send fluid studies for analysis at that time.  2. Rifaximin 550mg po three times a day.    3. Will have our office contact him for outpatient hepatology follow-up in clinic.     Thank you.  Wilmer Hernández PA-C  [facility]  [phone]

## 2021-06-19 NOTE — PROGRESS NOTES
Faculty Supervision of Residents    I have examined this patient on 7/31/2018 and the medical care has been evaluated and discussed with the resident.  The documentation has been reviewed.  I agree with the medical care provided and confirm the findings.       Víctor Styles

## 2021-06-19 NOTE — PROGRESS NOTES
Faculty Supervision of Residents    I have examined this patient on 8/1/2018 and the medical care has been evaluated and discussed with the resident.  See resident note to follow outlining our discussion.      Víctor Styles

## 2021-06-19 NOTE — CONSULTS
"[facility]      Assessment /Plan:    Encephalopathy, memory loss    He still has some lab abnormalities and there could still be some metabolic encephalopathy contributing to his current situation, however he does at times show fairly good attention and nonetheless is confabulating so there is likely some Wernicke's encephalopathy as well.  We should continue the thiamine indefinitely.  I agree MRI scan would be difficult without significant sedation, will check CT of the head for now.  If an MRI becomes truly crucial we will have to sedate him for that  B12, thiamine and ammonia have all been checked and are now normal (initial ammonia was slightly elevated).    I suspect he will need a supervised setting on discharge--at times he still has significant confusion and is clearly not oriented  I will follow-up tomorrow.      Subjective:    Aayush García is a 62-year-old man seen in neurologic consultation at [facility].  He was admitted on July 24 of this year with significant alcohol intoxication.  He was not able to urinate, he had severe confusion and did not recognize his wife.  He has had significant renal failure and is now getting hemodialysis.  Liver functions continue to be a bit abnormal as well with elevated bilirubin and elevated transaminases.  Albumin remains low.  Initially he had significant hypotension requiring pressors.  It does not look like he had significant renal dysfunction prior to this hospitalization.  He does agree that he has been confused, he is not able to tell me much meaningful history beyond that.  He makes general comments about current poor health.  Notes are reviewed, he has continued to be confused for the last 3 weeks and at times needs close supervision.  He has been on thiamine since admission.        Objective:      /60 (Patient Position: Semi-chew)  Pulse 98  Temp 98  F (36.7  C) (Oral)   Resp 18  Ht 5' 8\" (1.727 m)  Wt 199 lb 4.8 " oz (90.4 kg)  SpO2 99%  BMI 30.3 kg/m2   Awake, alert, no aphasia, no dysarthria  He knows he is at [facility] on the second floor.  He can tell me the month and the year with a little extra effort  He does fine on naming and repeating.  He can calculate nickels in $1 without much difficulty but could not calculate nickels in $1.25  Recall is 1 out of 3 at about 4 minutes, and he clearly confabulated the other 2.  For the most part he answered my questions appropriately but at the end of my visit he asked about his shoes outside the door to the right and then clearly had to be reoriented to the hospital.  Cranial nerves II - XII tested and intact, fundi are normal, no nystagmus  Neck is supple, no bruits  There is no focal or generalized weakness in the extremities  Reflexes are absent throughout  Finger nose finger testing is a little slow but not ataxic  There is no asterixis  Rapid alternating movements are okay on both sides  Tone is normal on both sides  Sensation is present on both sides  Gait testing is deferred for safety        Patient Active Problem List   Diagnosis     Septic shock (H)     Acute respiratory failure with hypoxia (H)     Acute renal failure, unspecified acute renal failure type (H)     Metabolic acidosis     Encephalopathy, metabolic     Elevated troponin     Stress-induced cardiomyopathy     Bacteremia due to Klebsiella pneumoniae     Anemia, unspecified type     Alcohol abuse     Acute encephalopathy     Acute liver failure     Cognitive and behavioral changes     Sleep difficulties     Hallucinations       Current Facility-Administered Medications   Medication Dose Route Frequency Provider Last Rate Last Dose     acetaminophen tablet 650 mg (TYLENOL)  650 mg Oral Q4H PRN Astrid Redd CNP   650 mg at 08/14/18 1033    Or     acetaminophen solution 650 mg (TYLENOL)  650 mg Enteral Tube Q4H PRN Astrid Redd CNP        Or     acetaminophen suppository 650 mg (TYLENOL)  650 mg  Rectal Q4H PRN Astrid Redd CNP         albumin human 25 % bottle 12.5-25 g  12.5-25 g Intravenous Q8H PRN Brie Oliver MD         albumin human 25 % bottle 12.5-37.5 g  12.5-37.5 g Intravenous PRN Parveen Jiménez MD         albuterol nebulizer solution 2.5 mg (PROVENTIL)  2.5 mg Nebulization Q4H PRN Astrid Redd CNP         benzocaine-menthol lozenge 1 lozenge (CEPACOL)  1 lozenge Oral Q1H PRN Astrid Redd CNP   1 lozenge at 08/12/18 2158     bisacodyl suppository 10 mg (DULCOLAX)  10 mg Rectal Daily PRN Astrid Redd CNP         dextrose 50 % (D50W) syringe 20-50 mL  20-50 mL Intravenous PRN Astrid Redd CNP         epoetin alie injection 20,000 Units (EPOGEN,PROCRIT)  20,000 Units Subcutaneous Weekly PM Brie Oliver MD   20,000 Units at 08/13/18 2039     folic acid 1 mg, thiamine 100 mg, multiple vitamin 3,300 unit- 150 mcg/10 mL 10 mL in sodium chloride 0.9% 1,000 mL   Intravenous Daily PRN Astrid Redd CNP         glucagon (human recombinant) injection 1 mg  1 mg Subcutaneous PRN Astrid Redd CNP         heparin (PF) subcutaneous injection 5,000 Units  5,000 Units Subcutaneous Q8H FIXED TIMES Alvaro Eastman MD   5,000 Units at 08/14/18 1405     heparin injection 1,000-6,000 Units  1,000-6,000 Units Intracatheter PRN for dialysis Parveen Jiménez MD         heparin injection 500 Units  500 Units Dialysis PRN for dialysis Parveen Jiménez MD         heparin injection  500 Units/hr Dialysis PRN for dialysis Parveen Jiménez MD         ipratropium-albuterol 0.5-2.5 mg/3 mL nebulizer solution 3 mL (DUO-NEB)  3 mL Nebulization Q4H PRN Astrid Leone CNP         Lactobacillus acidoph-L.bulgar tablet 4 tablet (FLORANEX)  4 tablet Oral TID with meals Karen Montes MD   4 tablet at 08/14/18 1221     lactulose 20 gram/30 mL solution 20 g (ENULOSE)  20 g Oral BID PRN Get Mclain MD   20 g at 08/05/18 2042     magnesium oxide tablet 400 mg (MAG-OX)  400 mg Oral BID  Jade Xie DO   400 mg at 08/14/18 1029     melatonin tablet 3 mg  3 mg Oral Bedtime PRN Benjamin E Rosenstein, MD   3 mg at 08/13/18 2102     menthol-zinc oxide 0.44-20.6 % ointment (CALMOSEPTINE)   Topical QID PRN Jade Xie DO         midodrine tablet 15 mg (PROAMATINE)  15 mg Oral Once per day on Mon Wed Fri Brie Oliver MD   15 mg at 08/13/18 1729     midodrine tablet 15 mg (PROAMATINE)  15 mg Oral Q8H FIXED TIMES Astrid Leone CNP   15 mg at 08/14/18 0542     multivitamin therapeutic tablet 1 tablet  1 tablet Enteral Tube DAILY Lewis Farr MD   1 tablet at 08/14/18 1028     naloxone injection 0.2-0.4 mg (NARCAN)  0.2-0.4 mg Intravenous PRN Astrid Redd CNP        Or     naloxone injection 0.2-0.4 mg (NARCAN)  0.2-0.4 mg Intramuscular PRN Astrid Redd CNP         nicotine 14 mg/24 hr 1 patch (NICODERM CQ)  1 patch Transdermal DAILY Benjamin E Rosenstein, MD   1 patch at 08/14/18 1029     omeprazole capsule 20 mg (PriLOSEC)  20 mg Oral BID AC Bigg Medley, DO   20 mg at 08/14/18 1029     polyvinyl alcohol 1.4 % ophthalmic solution 1-2 drop (LIQUIFILM TEARS)  1-2 drop Both Eyes Q1H PRN Astrid Redd CNP   2 drop at 07/28/18 0556     ramelteon tablet 8 mg (ROZEREM)  8 mg Oral QHS Mayi CORTNEY Torres CNP   8 mg at 08/13/18 2035     rifAXIMin tablet 550 mg (XIFAXAN)  550 mg Oral TID Corona Luna MD   550 mg at 08/14/18 1404     sodium chloride flush 3 mL (NS)  3 mL Intravenous Q8H Jade Xie DO   10 mL at 08/14/18 1030     traZODone tablet 12.5 mg (DESYREL)  12.5 mg Oral Bedtime PRN Sarah Castaneda CNP   12.5 mg at 08/12/18 0000       Past Medical History:   Diagnosis Date     Anemia, unspecified type      Hypertension        Social History     Social History     Marital status: Single     Spouse name: N/A     Number of children: N/A     Years of education: N/A     Occupational History     Not on file.     Social History Main Topics     Smoking status: Current  Every Day Smoker     Smokeless tobacco: Never Used     Alcohol use Yes      Comment: Occasionally     Drug use: No     Sexual activity: Not on file     Other Topics Concern     Not on file     Social History Narrative       No family history on file.   No family history of early dementia    No Known Allergies    Review of Systems - unable to obtain reliably

## 2021-06-19 NOTE — PROGRESS NOTES
Faculty Supervision of Residents   I have examined this patient and the medical care has been evaluated and discussed with the resident.  I am agreement with resident documentation on 8/14/2018 which reflects our discussion and decision making.     Pt has normal EOM with no nystagmus or LR palsy. He fails finger nose finger bilaterally.     He knows he is in the hospital but does not know year or season. He forgets his birthday.     He jokes throughout exam but never confabulates.     We will ask neurology to see patient to further evaluate for possible wernickes. I think it is unlikely pt would be able to tolerate MRI without being sedated.     Karen Montes MD

## 2021-06-19 NOTE — LETTER
Letter by Corrina Blanca CNP at      Author: Corrina Blanca CNP Service: -- Author Type: --    Filed:  Encounter Date: 11/7/2019 Status: Signed         Patient: Aayush García   MR Number: [id number]   YOB: 1955   Date of Visit: 11/7/2019     Code Status:  FULL CODE  Visit Type: Follow Up (Hospital follow-up, fall, anemia, pleural effusion, rib fracture, electrolyte imbalance.)     Facility:  [facility] [[id number]]      Facility Type: SNF (Skilled Nursing Facility, TCU)    History of Present Illness:   Hospital Admission Date: 10/27/2019 Hospital Discharge Date: 11/6/2019       Aayush García is a 64 y.o. male with a past medical history for CAD with a recent MI and stenting 9/2019, systolic heart failure and an EF of 30 to 35%, chronic anemia on iron, current alcohol abuse, liver disease, GERD.  He was recently hospitalized at [facility] for altered mental status and lethargy for 3 weeks.  He had fallen off of his couch while he was sleeping and sustained rib fractures and became lethargic over 3 weeks.  Upon admission he was found to have a hemoglobin of 5.9 and thought it was secondary to his hematoma from his fall.  During his admission it was complicated by encephalopathy with which is likely due to prolonged alcohol withdrawal and possible hepatic encephalopathy.  He does have a history of daily alcohol use of 0.5 to 1 pints of hard liquor per day.  His blood alcohol level on admission was 271.  His ammonia level was 14, TSH was 1.54.  Negative for an infectious process and negative lactic acid.  He was he had a negative UA and a negative chest x-ray.  His ammonia level did rise up to 39 on 10/31.  He was started on lactulose 20 mg 2 times a day and was adjusted per his stooling.  His mental status did improve with the lactulose.    For his anemia he was found to have macrocytic anemia likely due to chronic EtOH abuse and was on aspirin and Brilinta.  He was consulted  "by GI which indicated his last EGD in 6/2019 showed patchy gastritis and a 10 mm duodenal bulb ulcer with gastric biopsies negative for H. pylori.  His last colonoscopy 3/2019 showed polyp without hemorrhoids.  He did have a thoracentesis on 10/28 in which 900 mL of nonbloody fluid was removed he did need 2 units of packed red blood cells.  His hemoglobin was stable at around 8 at discharge.  GI recommends that he have an outpatient colonoscopy.  Eventually his aspirin and Brilinta were restarted and he had no issues.  Because of the issue of acute hematuria it is recommended that he have a follow-up UA as an outpatient.    He did have issues with hypokalemia and hypomagnesemia and these were supplemented and thought to be likely related to his Lasix.  He did have also issues with attention and so it is recommended to closely monitor his weights, electrolytes and blood pressures.    Ring his hospitalization he had an episode in which he had \"staring spell\" that lasted approximately 30 seconds in which he bit his tongue.  He did have a follow-up head CT which showed no skull fracture intracranial bleed and EEG which was unremarkable.  He has no history of seizures and so this was felt to be uneventful.    Today, he is sitting in a wheelchair and reports feeling generally well.  He does complain of 5-6 loose stools daily.  It is mostly bothersome due to his decreased function.  He states he is below his baseline for function.  He does wear to knee immobilizers to help with stability as he does have some osteoarthritis of his knees.  He reports his breathing is well and denies any chest pain or breathing issues.  He reports his appetite is returning slowly however has somewhat nausea.  He does have soft nonpitting pedal edema bilaterally.  He states this has just increased today.    Past Medical History:   Diagnosis Date   ? Acute liver failure 8/5/2018   ? Acute renal failure, unspecified acute renal failure type (H) " 2018   ? Acute respiratory failure with hypoxia (H) 2018   ? Alcoholic hepatitis with ascites 2018   ? Bacteremia due to Klebsiella pneumoniae    ? Coronary artery disease involving native coronary artery of native heart without angina pectoris 2019   ? Essential hypertension    ? Gastroesophageal reflux disease without esophagitis 10/31/2018   ? Hepatic encephalopathy (H) 2018   ? Macrocytic anemia    ? Stress-induced cardiomyopathy 2018     Past Surgical History:   Procedure Laterality Date   ? COLONOSCOPY N/A 3/20/2018    Procedure: COLONOSCOPY;  Surgeon: Adam Nicole III, MD;  Location: [facility];  Service:    ? CV CORONARY ANGIOGRAM N/A 3/18/2019    Procedure: Coronary Angiogram;  Surgeon: Timi Rodriguez MD;  Location: [facility];  Service: Cardiology   ? CV LEFT HEART CATHETERIZATION WO LEFT VETRICULOGRAM Left 3/18/2019    Procedure: Left Heart Catheterization Without Left Ventriculogram;  Surgeon: Timi Rodriguez MD;  Location: [facility];  Service: Cardiology   ? PICC  2018        ? PICC  3/18/2019        ? US THORACENTESIS  10/28/2019     Family History   Problem Relation Age of Onset   ? Heart disease Father 76        type unknown to Aayush; his father  at home   ? Alzheimer's disease Mother 86        lives in long term care   ? No Medical Problems Son    ? No Medical Problems Son      Social History     Socioeconomic History   ? Marital status: Domestic Partner     Spouse name: Karlie KEYES)   ? Number of children: Not on file   ? Years of education: Not on file   ? Highest education level: Not on file   Occupational History   ? Occupation: Manager of building material department     Employer: [employer]   Social Needs   ? Financial resource strain: Not on file   ? Food insecurity:     Worry: Not on file     Inability: Not on file   ? Transportation needs:     Medical: Not on file     Non-medical: Not on file   Tobacco Use   ?  Smoking status: Former Smoker     Packs/day: 1.00     Years: 40.00     Pack years: 40.00     Types: Cigarettes     Last attempt to quit: 2019     Years since quittin.5   ? Smokeless tobacco: Never Used   Substance and Sexual Activity   ? Alcohol use: Yes     Frequency: 4 or more times a week     Drinks per session: 1 or 2     Comment: 350 ml of peppermint schnapps daily per F[facility]se h,  H&P per report of aKrlie JJ to Dr. Valle   ? Drug use: No   ? Sexual activity: Not on file   Lifestyle   ? Physical activity:     Days per week: Not on file     Minutes per session: Not on file   ? Stress: Not on file   Relationships   ? Social connections:     Talks on phone: Not on file     Gets together: Not on file     Attends Yazidism service: Not on file     Active member of club or organization: Not on file     Attends meetings of clubs or organizations: Not on file     Relationship status: Not on file   ? Intimate partner violence:     Fear of current or ex partner: Not on file     Emotionally abused: Not on file     Physically abused: Not on file     Forced sexual activity: Not on file   Other Topics Concern   ? Not on file   Social History Narrative    Works in [employer]. Lives with his SOKarlie.       Current Outpatient Medications   Medication Sig Dispense Refill   ? acetaminophen (TYLENOL) 325 MG tablet Take 2 tablets (650 mg total) by mouth every 6 (six) hours as needed.  0   ? aspirin 81 MG EC tablet Take 81 mg by mouth daily.     ? atorvastatin (LIPITOR) 80 MG tablet Take 1 tablet (80 mg total) by mouth daily. 90 tablet 3   ? b complex vitamins tablet Take 1 tablet by mouth daily.     ? carvedilol (COREG) 12.5 MG tablet Take 12.5 mg by mouth 2 (two) times a day with meals.     ? cholecalciferol, vitamin D3, (VITAMIN D3) 2,000 unit Tab Take 2,000 Units by mouth daily.     ? ferrous sulfate 325 (65 FE) MG tablet Take 1 tablet by mouth daily with breakfast.     ? folic acid (FOLVITE) 1 MG tablet  Take 1 tablet (1 mg total) by mouth daily. 30 tablet 0   ? furosemide (LASIX) 20 MG tablet Take 1 tablet (20 mg total) by mouth daily. 30 tablet 0   ? furosemide (LASIX) 20 MG tablet TAKE 1 TABLET BY [facility] AS NEEDED FOR WEIGHT GAIN OF 3 LBS IN ONE DAY 90 tablet 1   ? lactulose (ENULOSE) 20 gram/30 mL Soln solution Take 30 mL (20 g total) by mouth 2 (two) times a day. 1800 mL 0   ? magnesium oxide (MAG-OX) 400 mg (241.3 mg magnesium) tablet Take 1 tablet (400 mg total) by mouth 2 (two) times a day.  0   ? multivitamin therapeutic tablet Take 1 tablet by mouth daily.     ? omeprazole (PRILOSEC) 40 MG capsule Take 40 mg by mouth daily before breakfast.     ? thiamine (VITAMIN B-1) 100 MG tablet Take 100 mg by mouth daily.     ? ticagrelor (BRILINTA) 90 mg Tab Take 1 tablet (90 mg total) by mouth 2 (two) times a day. 180 tablet 3     No current facility-administered medications for this visit.      No Known Allergies  Immunization History   Administered Date(s) Administered   ? INFLUENZA,RECOMBINANT,INJ,PF QUADRIVALENT 18+YRS 10/29/2019   ? Influenza,seasonal quad, PF, =/> 6months 10/04/2018   ? Tdap 10/04/2018         Review of Systems   Patient denies fever, chills, headache, lightheadedness, dizziness, rhinorrhea, cough, congestion, shortness of breath, chest pain, palpitations, abdominal pain, constipation, change in appetite, dysuria, frequency, burning or pain with urination.  Other than stated in HPI all other review of systems is negative.         Physical Exam     Vital signs: /87, heart rate 72, respiratory 18, temp 98.0, 94% on room air, 179.6 pounds.  GENERAL APPEARANCE: Well developed, well nourished, in no acute distress.  HEENT: normocephalic, atraumatic  PERRL, sclerae anicteric, conjunctivae clear and moist, EOM intact  External inspection of ears and nose showed no scars, lesions or masses.  Lips, teeth, and gums showed normal mucosa. Throat showed no erythema or edema.  NECK: Supple and  symmetric. Trachea is midline, no thyromegaly, no adenopathy, and no tenderness  LUNGS: Lung sounds CTA, no adventitious sounds, respiratory effort normal.  CARD: RRR, S1, S2, harsh systolic murmur, no gallops, rubs,  ABD: Soft and nontender with normal bowel sounds.   MSK: Muscle strength and tone were normal.  EXTREMITIES: Nonpitting pedal edema bilateral lower extremities  NEURO: Alert and oriented x 3.  Face is symmetric.  SKIN: Inspection of the skin reveals no rashes, ulcerations or petechiae.  PSYCH: euthymic            Labs:   Recent Results (from the past 240 hour(s))   Urinalysis   Result Value Ref Range    Color, UA Red (!) Colorless, Yellow, Straw, Light Yellow    Clarity, UA Cloudy (!) Clear    Glucose, UA Negative Negative    Bilirubin, UA Negative Negative    Ketones, UA Trace (!) Negative, 60 mg/dL    Specific Gravity, UA 1.025 1.001 - 1.030    Blood, UA Large (!) Negative    pH, UA 6.0 4.5 - 8.0    Protein, UA 70 mg/dL (!) Negative mg/dL    Urobilinogen, UA <2.0 E.U./dL <2.0 E.U./dL, 2.0 E.U./dL    Nitrite, UA Negative Negative    Leukocytes, UA Large (!) Negative    Bacteria, UA None Seen None Seen hpf    RBC, UA >100 (!) None Seen, 0-2 hpf    WBC, UA >100 (!) None Seen, 0-5 hpf    Squam Epithel, UA 0-5 None Seen, 0-5 lpf    WBC Clumps Present (!) None Seen   Medical cytology   Result Value Ref Range    Case Report       Medical Cytology                                  Case: [id number]                                   Authorizing Provider:  Víctor Styles MD   Collected:           10/28/2019 1614              Ordering Location:     [facility] ICU    Received:            10/29/2019 0734                                     [place]                                                                         Pathologist:           Stephen Lucio MD                                                        Specimen:    Fluid, Pleural, right                                                                       Final Diagnosis       RIGHT PLEURAL FLUID, ASPIRATION WITH CELL BLOCK PREPARATION:    - MIXED MESOTHELIAL AND INFLAMMATORY CELLS WITH RARE ATYPICAL CELLS, FAVOR REACTIVE    Comment       The clinical history has been reviewed.  Additional studies can certainly be performed on the current specimen if requested.    Microscopic Description       Microscopic examination performed, substantiating the above diagnosis.    Clinical Information Right pleural effusion     Specimen Description       950 ml cloudy red fluid   1 air dried slide   1 SurePath slide   1 cell block  Are prepared    Specimen Processing      Charges CPT:  61793, 03384  ICD-10:  J90     General Path Interpretation Atypical cells present (!) Negative for malignant cells, Non-Diagnostic   Hemoglobin   Result Value Ref Range    Hemoglobin 8.4 (L) 14.0 - 18.0 g/dL   Procalcitonin   Result Value Ref Range    Procalcitonin 0.08 0.00 - 0.49 ng/mL   Basic Metabolic Panel   Result Value Ref Range    Sodium 131 (L) 136 - 145 mmol/L    Potassium 3.8 3.5 - 5.0 mmol/L    Chloride 100 98 - 107 mmol/L    CO2 18 (L) 22 - 31 mmol/L    Anion Gap, Calculation 13 5 - 18 mmol/L    Glucose 76 70 - 125 mg/dL    Calcium 7.2 (L) 8.5 - 10.5 mg/dL    BUN 14 8 - 22 mg/dL    Creatinine 0.76 0.70 - 1.30 mg/dL    GFR MDRD Af Amer >60 >60 mL/min/1.73m2    GFR MDRD Non Af Amer >60 >60 mL/min/1.73m2   Crossmatch   Result Value Ref Range    Crossmatch Compatible     Blood Expiration Date [id number]     Unit Type A Pos     Unit Number [id number]     Status Transfused     Component Red Blood Cells     PRODUCT CODE B0029P31     Issue Date and Time [id number]     Blood Type 6200     CODING SYSTEM [id number]    Crossmatch   Result Value Ref Range    Crossmatch Compatible     Blood Expiration Date [id number]     Unit Type A Pos     Unit Number [id number]     Status Transfused     Component Red Blood Cells     PRODUCT CODE M6114D13     Issue Date and Time  [id number]     Blood Type 6200     CODING SYSTEM [id number]    Magnesium   Result Value Ref Range    Magnesium 1.8 1.8 - 2.6 mg/dL   Basic Metabolic Panel   Result Value Ref Range    Sodium 132 (L) 136 - 145 mmol/L    Potassium 4.2 3.5 - 5.0 mmol/L    Chloride 103 98 - 107 mmol/L    CO2 19 (L) 22 - 31 mmol/L    Anion Gap, Calculation 10 5 - 18 mmol/L    Glucose 100 70 - 125 mg/dL    Calcium 7.1 (L) 8.5 - 10.5 mg/dL    BUN 14 8 - 22 mg/dL    Creatinine 0.82 0.70 - 1.30 mg/dL    GFR MDRD Af Amer >60 >60 mL/min/1.73m2    GFR MDRD Non Af Amer >60 >60 mL/min/1.73m2   HM1 (CBC with Diff)   Result Value Ref Range    WBC 8.5 4.0 - 11.0 thou/uL    RBC 2.86 (L) 4.40 - 6.20 mill/uL    Hemoglobin 8.3 (L) 14.0 - 18.0 g/dL    Hematocrit 28.4 (L) 40.0 - 54.0 %    MCV 99 80 - 100 fL    MCH 29.0 27.0 - 34.0 pg    MCHC 29.2 (L) 32.0 - 36.0 g/dL    RDW 20.1 (H) 11.0 - 14.5 %    Platelets 166 140 - 440 thou/uL    MPV 9.1 8.5 - 12.5 fL    Neutrophils % 82 (H) 50 - 70 %    Lymphocytes % 5 (L) 20 - 40 %    Monocytes % 11 (H) 2 - 10 %    Eosinophils % 2 0 - 6 %    Basophils % 1 0 - 2 %    Neutrophils Absolute 7.0 2.0 - 7.7 thou/uL    Lymphocytes Absolute 0.4 (L) 0.8 - 4.4 thou/uL    Monocytes Absolute 0.9 0.0 - 0.9 thou/uL    Eosinophils Absolute 0.2 0.0 - 0.4 thou/uL    Basophils Absolute 0.0 0.0 - 0.2 thou/uL   Manual Differential   Result Value Ref Range    Platelet Estimate Normal Normal    Ovalocytes 1+ (!) Negative    Polychromasia 1+ (!) Negative    Tear Drop Cells 1+ (!) Negative   BNP(B-type Natriuretic Peptide)   Result Value Ref Range    BNP >5,000 (H) 0 - 56 pg/mL   POCT Glucose   Result Value Ref Range    Glucose 102 70 - 139 mg/dL   Basic Metabolic Panel   Result Value Ref Range    Sodium 133 (L) 136 - 145 mmol/L    Potassium 3.9 3.5 - 5.0 mmol/L    Chloride 105 98 - 107 mmol/L    CO2 18 (L) 22 - 31 mmol/L    Anion Gap, Calculation 10 5 - 18 mmol/L    Glucose 95 70 - 125 mg/dL    Calcium 7.0 (L) 8.5 - 10.5 mg/dL    BUN 13 8  - 22 mg/dL    Creatinine 0.78 0.70 - 1.30 mg/dL    GFR MDRD Af Amer >60 >60 mL/min/1.73m2    GFR MDRD Non Af Amer >60 >60 mL/min/1.73m2   Magnesium   Result Value Ref Range    Magnesium 1.8 1.8 - 2.6 mg/dL   HM2(CBC w/o Differential)   Result Value Ref Range    WBC 6.5 4.0 - 11.0 thou/uL    RBC 2.64 (L) 4.40 - 6.20 mill/uL    Hemoglobin 7.5 (L) 14.0 - 18.0 g/dL    Hematocrit 27.3 (L) 40.0 - 54.0 %     (H) 80 - 100 fL    MCH 28.4 27.0 - 34.0 pg    MCHC 27.5 (L) 32.0 - 36.0 g/dL    RDW 19.9 (H) 11.0 - 14.5 %    Platelets 145 140 - 440 thou/uL    MPV 9.3 8.5 - 12.5 fL   POCT Glucose   Result Value Ref Range    Glucose 94 70 - 139 mg/dL   Type and Screen   Result Value Ref Range    ABORh A POS     Antibody Screen Negative Negative   HM1 (CBC with Diff)   Result Value Ref Range    WBC 6.5 4.0 - 11.0 thou/uL    RBC 2.72 (L) 4.40 - 6.20 mill/uL    Hemoglobin 7.8 (L) 14.0 - 18.0 g/dL    Hematocrit 27.6 (L) 40.0 - 54.0 %     (H) 80 - 100 fL    MCH 28.7 27.0 - 34.0 pg    MCHC 28.3 (L) 32.0 - 36.0 g/dL    RDW 19.2 (H) 11.0 - 14.5 %    Platelets 147 140 - 440 thou/uL    MPV 9.4 8.5 - 12.5 fL    Neutrophils % 74 (H) 50 - 70 %    Lymphocytes % 9 (L) 20 - 40 %    Monocytes % 12 (H) 2 - 10 %    Eosinophils % 5 0 - 6 %    Basophils % 1 0 - 2 %    Neutrophils Absolute 4.8 2.0 - 7.7 thou/uL    Lymphocytes Absolute 0.6 (L) 0.8 - 4.4 thou/uL    Monocytes Absolute 0.8 0.0 - 0.9 thou/uL    Eosinophils Absolute 0.3 0.0 - 0.4 thou/uL    Basophils Absolute 0.1 0.0 - 0.2 thou/uL   Basic Metabolic Panel   Result Value Ref Range    Sodium 135 (L) 136 - 145 mmol/L    Potassium 3.0 (L) 3.5 - 5.0 mmol/L    Chloride 103 98 - 107 mmol/L    CO2 24 22 - 31 mmol/L    Anion Gap, Calculation 8 5 - 18 mmol/L    Glucose 91 70 - 125 mg/dL    Calcium 7.4 (L) 8.5 - 10.5 mg/dL    BUN 10 8 - 22 mg/dL    Creatinine 0.82 0.70 - 1.30 mg/dL    GFR MDRD Af Amer >60 >60 mL/min/1.73m2    GFR MDRD Non Af Amer >60 >60 mL/min/1.73m2   Magnesium   Result  Value Ref Range    Magnesium 1.5 (L) 1.8 - 2.6 mg/dL   HM1 (CBC with Diff)   Result Value Ref Range    WBC 5.2 4.0 - 11.0 thou/uL    RBC 2.77 (L) 4.40 - 6.20 mill/uL    Hemoglobin 8.0 (L) 14.0 - 18.0 g/dL    Hematocrit 30.6 (L) 40.0 - 54.0 %     (H) 80 - 100 fL    MCH 28.9 27.0 - 34.0 pg    MCHC 26.1 (L) 32.0 - 36.0 g/dL    RDW 19.1 (H) 11.0 - 14.5 %    Platelets 168 140 - 440 thou/uL    MPV 9.2 8.5 - 12.5 fL    Neutrophils % 71 (H) 50 - 70 %    Lymphocytes % 10 (L) 20 - 40 %    Monocytes % 13 (H) 2 - 10 %    Eosinophils % 5 0 - 6 %    Basophils % 1 0 - 2 %    Neutrophils Absolute 3.7 2.0 - 7.7 thou/uL    Lymphocytes Absolute 0.5 (L) 0.8 - 4.4 thou/uL    Monocytes Absolute 0.7 0.0 - 0.9 thou/uL    Eosinophils Absolute 0.3 0.0 - 0.4 thou/uL    Basophils Absolute 0.1 0.0 - 0.2 thou/uL   Ammonia   Result Value Ref Range    Ammonia 39 (H) 11 - 35 umol/L   Potassium   Result Value Ref Range    Potassium 4.0 3.5 - 5.0 mmol/L   Basic Metabolic Panel   Result Value Ref Range    Sodium 136 136 - 145 mmol/L    Potassium 3.1 (L) 3.5 - 5.0 mmol/L    Chloride 103 98 - 107 mmol/L    CO2 27 22 - 31 mmol/L    Anion Gap, Calculation 6 5 - 18 mmol/L    Glucose 102 70 - 125 mg/dL    Calcium 7.3 (L) 8.5 - 10.5 mg/dL    BUN 8 8 - 22 mg/dL    Creatinine 0.71 0.70 - 1.30 mg/dL    GFR MDRD Af Amer >60 >60 mL/min/1.73m2    GFR MDRD Non Af Amer >60 >60 mL/min/1.73m2   Hemoglobin   Result Value Ref Range    Hemoglobin 8.0 (L) 14.0 - 18.0 g/dL   Potassium   Result Value Ref Range    Potassium 3.2 (L) 3.5 - 5.0 mmol/L   Basic Metabolic Panel   Result Value Ref Range    Sodium 138 136 - 145 mmol/L    Potassium 2.8 (LL) 3.5 - 5.0 mmol/L    Chloride 102 98 - 107 mmol/L    CO2 25 22 - 31 mmol/L    Anion Gap, Calculation 11 5 - 18 mmol/L    Glucose 101 70 - 125 mg/dL    Calcium 7.7 (L) 8.5 - 10.5 mg/dL    BUN 9 8 - 22 mg/dL    Creatinine 0.70 0.70 - 1.30 mg/dL    GFR MDRD Af Amer >60 >60 mL/min/1.73m2    GFR MDRD Non Af Amer >60 >60  mL/min/1.73m2   Magnesium   Result Value Ref Range    Magnesium 1.2 (L) 1.8 - 2.6 mg/dL   HM2(CBC w/o Differential)   Result Value Ref Range    WBC 8.3 4.0 - 11.0 thou/uL    RBC 2.97 (L) 4.40 - 6.20 mill/uL    Hemoglobin 8.5 (L) 14.0 - 18.0 g/dL    Hematocrit 29.0 (L) 40.0 - 54.0 %    MCV 98 80 - 100 fL    MCH 28.6 27.0 - 34.0 pg    MCHC 29.3 (L) 32.0 - 36.0 g/dL    RDW 18.3 (H) 11.0 - 14.5 %    Platelets 157 140 - 440 thou/uL    MPV 8.8 8.5 - 12.5 fL   Potassium   Result Value Ref Range    Potassium 3.7 3.5 - 5.0 mmol/L   Basic Metabolic Panel   Result Value Ref Range    Sodium 139 136 - 145 mmol/L    Potassium 3.6 3.5 - 5.0 mmol/L    Chloride 102 98 - 107 mmol/L    CO2 28 22 - 31 mmol/L    Anion Gap, Calculation 9 5 - 18 mmol/L    Glucose 94 70 - 125 mg/dL    Calcium 7.8 (L) 8.5 - 10.5 mg/dL    BUN 11 8 - 22 mg/dL    Creatinine 0.75 0.70 - 1.30 mg/dL    GFR MDRD Af Amer >60 >60 mL/min/1.73m2    GFR MDRD Non Af Amer >60 >60 mL/min/1.73m2   HM2(CBC w/o Differential)   Result Value Ref Range    WBC 7.8 4.0 - 11.0 thou/uL    RBC 2.92 (L) 4.40 - 6.20 mill/uL    Hemoglobin 8.3 (L) 14.0 - 18.0 g/dL    Hematocrit 28.5 (L) 40.0 - 54.0 %    MCV 98 80 - 100 fL    MCH 28.4 27.0 - 34.0 pg    MCHC 29.1 (L) 32.0 - 36.0 g/dL    RDW 18.1 (H) 11.0 - 14.5 %    Platelets 222 140 - 440 thou/uL    MPV 9.6 8.5 - 12.5 fL   Basic Metabolic Panel   Result Value Ref Range    Sodium 136 136 - 145 mmol/L    Potassium 3.7 3.5 - 5.0 mmol/L    Chloride 99 98 - 107 mmol/L    CO2 28 22 - 31 mmol/L    Anion Gap, Calculation 9 5 - 18 mmol/L    Glucose 100 70 - 125 mg/dL    Calcium 7.7 (L) 8.5 - 10.5 mg/dL    BUN 13 8 - 22 mg/dL    Creatinine 0.84 0.70 - 1.30 mg/dL    GFR MDRD Af Amer >60 >60 mL/min/1.73m2    GFR MDRD Non Af Amer >60 >60 mL/min/1.73m2   Magnesium   Result Value Ref Range    Magnesium 1.3 (L) 1.8 - 2.6 mg/dL   HM2(CBC W/O DIFF)   Result Value Ref Range    WBC 6.7 4.0 - 11.0 thou/uL    RBC 2.95 (L) 4.40 - 6.20 mill/uL    Hemoglobin 8.3  (L) 14.0 - 18.0 g/dL    Hematocrit 28.9 (L) 40.0 - 54.0 %    MCV 98 80 - 100 fL    MCH 28.1 27.0 - 34.0 pg    MCHC 28.7 (L) 32.0 - 36.0 g/dL    RDW 18.3 (H) 11.0 - 14.5 %    Platelets 326 140 - 440 thou/uL    MPV 9.3 8.5 - 12.5 fL   Basic Metabolic Panel   Result Value Ref Range    Sodium 135 (L) 136 - 145 mmol/L    Potassium 3.8 3.5 - 5.0 mmol/L    Chloride 100 98 - 107 mmol/L    CO2 26 22 - 31 mmol/L    Anion Gap, Calculation 9 5 - 18 mmol/L    Glucose 87 70 - 125 mg/dL    Calcium 7.8 (L) 8.5 - 10.5 mg/dL    BUN 11 8 - 22 mg/dL    Creatinine 0.75 0.70 - 1.30 mg/dL    GFR MDRD Af Amer >60 >60 mL/min/1.73m2    GFR MDRD Non Af Amer >60 >60 mL/min/1.73m2   HM2(CBC w/o Differential)   Result Value Ref Range    WBC 6.3 4.0 - 11.0 thou/uL    RBC 2.91 (L) 4.40 - 6.20 mill/uL    Hemoglobin 8.1 (L) 14.0 - 18.0 g/dL    Hematocrit 28.1 (L) 40.0 - 54.0 %    MCV 97 80 - 100 fL    MCH 27.8 27.0 - 34.0 pg    MCHC 28.8 (L) 32.0 - 36.0 g/dL    RDW 18.4 (H) 11.0 - 14.5 %    Platelets 389 140 - 440 thou/uL    MPV 9.7 8.5 - 12.5 fL   Basic Metabolic Panel   Result Value Ref Range    Sodium 134 (L) 136 - 145 mmol/L    Potassium 3.7 3.5 - 5.0 mmol/L    Chloride 101 98 - 107 mmol/L    CO2 27 22 - 31 mmol/L    Anion Gap, Calculation 6 5 - 18 mmol/L    Glucose 87 70 - 125 mg/dL    Calcium 7.8 (L) 8.5 - 10.5 mg/dL    BUN 9 8 - 22 mg/dL    Creatinine 0.73 0.70 - 1.30 mg/dL    GFR MDRD Af Amer >60 >60 mL/min/1.73m2    GFR MDRD Non Af Amer >60 >60 mL/min/1.73m2   Magnesium   Result Value Ref Range    Magnesium 1.6 (L) 1.8 - 2.6 mg/dL         Assessment:  1. Alcoholic hepatitis with ascites     2. Acute liver failure without hepatic coma     3. Hypomagnesemia     4. Pleural effusion     5. Coronary artery disease involving native coronary artery of native heart without angina pectoris     6. Ischemic cardiomyopathy     7. Chronic systolic heart failure (H)     8. Essential hypertension     9. Aortic stenosis, moderate     10. Alcoholism  /alcohol abuse (H)     11. Dietary folate deficiency anemia     12. Nausea     13. Hematuria, unspecified type         Plan:   Hepatitis: Continue with lactulose twice daily explained to patient that we are unable to check ammonia levels here and so we will need to base it on him symptoms.  If he was encephalopathic we would need to send him in for an ammonia level to be checked.  I would like him to have at least 4 stools a day.  At this time he is having 5 we will continue with this at this time and have him follow-up with hepatology in the future.  Check a CMP.    Hypomagnesemia: We will check a magnesium next week along with a CMP.  Continue Mag-Ox.    Pleural effusion: Breathing well continue to monitor for any signs and symptoms of breathing issues.  Continue with Lasix for his CHF.    CAD: Continue with Brilinta for his stent, Lipitor and ASA.    Ischemic cardiomyopathy: Continue Lasix will need to monitor his weights daily Lasix 20 mg daily and has the option to increase another 20 mg if weight gain of 3 pounds.    CHF: Stable at this time, continue carvedilol 12.5 mg twice daily.  JEANINE hose to lower extremities for edema.    Hypertension: Continue blood pressure monitoring and management.  Continue Lasix and carvedilol.    Aortic stenosis: I did  patient that he will need to follow-up with his cardiologist in regards to his aortic stenosis and recommendations if he would need a valve replacement the future.    Alcohol abuse: I did  patient to abstain from alcohol due to its effects on his heart disease and liver disease.  He will need to continue on lactulose.  At this time patient declines any resources and feels that he will abstain during his rehab.  Also explained the effects of alcohol on duodenal ulcers and esophageal varices causing GI bleeds.    Anemia: Continue folate, vitamin B, thiamine, multivitamin.    Nausea: Likely related to lactulose and multiple medications.  Continue to  monitor nursing will support nonpharmacologically at this time dietitian to consult for appetite and nausea.    Hematuria: Will get a UA next week.    35 total minutes spent with 20 minutes spent face-to-face with patient and his significant other in counseling and coordination of the above plan of care.    Electronically signed by: Corrina Blanca CNP

## 2021-06-19 NOTE — CONSULTS
GASTROENTEROLOGY CONSULT NOTE     CONSULTING PHYSICIAN   Víctor Styles MD     REASON FOR CONSULTATION   Elevated LFTs in the setting of sepsis.     CHIEF COMPLAINT   Aayush García came to the hospital for evaluation of lethargy and confusion.     HISTORY OF PRESENT ILLNESS   Aayush García is a pleasant 62 y.o. male who is in his usual state of health until 4-5 days ago when he started to become more lethargic and confused.  He was brought to the hospital by his family members and on evaluation found to have bacteremia secondary to Klebsiella pneumoniae.  The patient decompensated and required intubation and was extubated yesterday.  The patient's pulmonary status is very tenuous and he is on high flow O2 satting in the low 90s.  The patient is managed in the ICU.    The patient now was found to have elevations in his LFTs.  He has a markedly elevated alkaline phosphatase greater than 700 as well as a bilirubin that is elevated.  His abdominal ultrasound showed a normal common bile duct of 3 mm with sludge in the gallbladder.  The GI service is now asked to see the patient for help in the management of his elevated liver function tests.     PAST HISTORY   Past Medical History:   Diagnosis Date     Anemia, unspecified type      Hypertension       Past Surgical History:   Procedure Laterality Date     COLONOSCOPY N/A 3/20/2018    Procedure: COLONOSCOPY;  Surgeon: Adam Nicole III, MD;  Location: [facility];  Service:      [facility]  7/24/2018             Family History Social History   No family history on file.   Marital Status: Engaged with children    Tobacco: Smokes a half pack to half pack of cigarettes a day    Alcohol: History of heavy alcohol    Recreational Drugs: None     MEDICATIONS & ALLERGIES   Prescriptions Prior to Admission   Medication Sig Dispense Refill Last Dose     aspirin 81 MG EC tablet Take 81 mg by mouth daily.   7/24/2018 at Unknown time     lisinopril (PRINIVIL,ZESTRIL) 10 MG  "tablet Take 10 mg by mouth daily.   7/24/2018 at Unknown time     multivitamin therapeutic tablet Take 1 tablet by mouth daily.   7/23/2018 at Unknown time     omeprazole (PRILOSEC) 40 MG capsule Take 40 mg by mouth daily before breakfast.  1 7/24/2018 at am     rosuvastatin (CRESTOR) 10 MG tablet Take 10 mg by mouth daily.  3 7/24/2018 at Unknown time        ALLERGIES   No Known Allergies      REVIEW OF SYSTEMS     Chest discomfort    Shortness of breath    Fevers and chills prior to his admission    no weight gain or loss    no nausea or vomiting    Diffuse abdominal pain mainly in the central area.    no constipation or diarrhea    Several dark stools this week with a history of possible peptic ulcer disease.    no numbness or weakness    Mild jaundice  A comprehensive review of systems was performed and was otherwise noncontributory.     OBJECTIVE   Vitals Blood pressure 142/85, pulse (!) 101, temperature 98.7  F (37.1  C), temperature source Oral, resp. rate (!) 31, height 5' 8\" (1.727 m), weight 211 lb 9.6 oz (96 kg), SpO2 94 %.           Physical E xam   GENERAL: Confused, uncomfortable    SKIN: warm and dry, no rashes    PULMONARY: Crackles throughout    CARDIOVASCULAR: Tachycardia in the low 100s, systolic murmur, lower extremity edema    ABDOMEN: Tenderness to deep palpation, mild distention, positive bowel sounds    MUSCULOSKELETAL: joints and gait normal    NEUROLOGICAL: Moves all 4 extremities    PSYCHIATRIC: Depressed mood        LABORATORY    ELECTROLYTE PANEL     Results from last 7 days  Lab Units 07/28/18  0509 07/27/18  0411 07/26/18  1411   LN-SODIUM mmol/L 136 132* 133*   LN-POTASSIUM mmol/L 3.6 4.6 4.0   LN-CHLORIDE mmol/L 98 99 100   LN-CO2 mmol/L 24 21* 20*   LN-BLOOD UREA NITROGEN mg/dL 25* 21 12   LN-CREATININE mg/dL 3.49* 4.01* 2.91*   LN-CALCIUM mg/dL 8.1* 7.8* 7.5*        HEMATOLOGY PANEL     Results from last 7 days  Lab Units 07/28/18  0617 07/28/18  0509 07/27/18  2251 07/27/18  0655 " 07/27/18 0411 07/26/18  1411  07/26/18  0358   LN-HEMOGLOBIN g/dL  --  7.8* 8.2*  --  6.8* 7.0*  < > 7.1*   LN-MEAN CORPUSCULAR VOLUME fL  --  100  --   --  102*  --   --  99   LN-WHITE BLOOD CELL COUNT thou/uL  --  18.2*  --   --  15.1*  --   --  13.7*   LN-PLATELET COUNT thou/uL  --  154  --  133* 127*  --   --  122*   LN-INR  1.47*  --   --   --  1.65* 1.84*  --  1.69*   < > = values in this interval not displayed.   LIVER AND PANCREAS PANEL     Results from last 7 days  Lab Units 07/28/18  0509 07/27/18 0411 07/26/18  1411 07/26/18  0358   LN-ALKALINE PHOSPHATASE U/L 771* 594* 628* 654*   LN-BILIRUBIN TOTAL mg/dL 4.6* 2.9* 2.7* 2.7*   LN-BILIRUBIN DIRECT mg/dL  --   --   --  1.6*   LN-PROTEIN TOTAL g/dL 6.3 6.0 5.7* 6.0   LN-ALT (SGPT) U/L 72* 45 44 44   LN-AST (SGOT) U/L 337* 231* 218* 239*     IMAGING STUDIES    Abdominal ultrasound from July 25 2018  1.  Sludge in the gallbladder.  2.  No gallbladder wall thickening.  3.  No evidence of cholelithiasis.  4.  No evidence of ductal dilatation.    I have reviewed the current diagnostic and laboratory tests.           IMPRESSION   Aayush García is a pleasant 62 y.o. male who presented to the hospital with acute onset of lethargy and confusion.  On evaluation the patient was found to have severe sepsis secondary to Klebsiella pneumonia and was admitted to the ICU.     RECOMMENDATION     Sepsis   IV antibiotics to treat.  ICU monitoring and cares.  Possible sources include lungs versus bladder.       Respiratory failure   Status post extubation with a tenuous pulmonary status.  Oxygen to maintain sats greater than 90%.       Elevated LFTs   Small amount of sludge was seen in the gallbladder.  However, the common bile duct was 3 mm and there is no evidence of biliary obstruction on imaging.  The cause of the patient's elevated LFTs could be related to sepsis or even shock liver.  We will repeat the abdominal ultrasound to see if there are any changes since the  25th.  In the absence of changes to his imaging we will not pursue an ERCP at this time.       Black stools   The patient had one episode of dark stools that were Hemoccult positive.  The patient does have a history of peptic ulcer disease.  At this point I would recommend IV PPI twice daily and close monitoring.  If the patient develops overt GI blood loss with hematemesis or hematochezia endoscopic evaluation would be indicated. The patient will likely require intubation for any endoscopic procedures and conservative management would be preferred at this time.     Carlos Francis M.D.  [facility]  Thank you for the opportunity to participate in the care of this patient.   Please feel free to call me with any questions or concerns [phone].

## 2021-06-19 NOTE — LETTER
Letter by Corrina Blanca CNP at      Author: Corrina Blanca CNP Service: -- Author Type: --    Filed:  Encounter Date: 11/11/2019 Status: Signed         Patient: Aayush García   MR Number: [id number]   YOB: 1955   Date of Visit: 11/11/2019     Code Status:  FULL CODE  Visit Type: Follow Up (Ammonia, lab results, lactulose with increased diarrheal stools.)     Facility:  [facility] [[id number]]      Facility Type: SNF (Skilled Nursing Facility, TCU)    History of Present Illness:   Hospital Admission Date: 10/27/2019 Hospital Discharge Date: 11/6/2019       Aayush García is a 64 y.o. male with a past medical history for CAD with a recent MI and stenting 9/2019, systolic heart failure and an EF of 30 to 35%, chronic anemia on iron, current alcohol abuse, liver disease, GERD.  He was recently hospitalized at [facility] for altered mental status and lethargy for 3 weeks.  He had fallen off of his couch while he was sleeping and sustained rib fractures and became lethargic over 3 weeks.  Upon admission he was found to have a hemoglobin of 5.9 and thought it was secondary to his hematoma from his fall.  During his admission it was complicated by encephalopathy with which is likely due to prolonged alcohol withdrawal and possible hepatic encephalopathy.  He does have a history of daily alcohol use of 0.5 to 1 pints of hard liquor per day.  His blood alcohol level on admission was 271.  His ammonia level was 14, TSH was 1.54.  Negative for an infectious process and negative lactic acid.  He was he had a negative UA and a negative chest x-ray.  His ammonia level did rise up to 39 on 10/31.  He was started on lactulose 20 mg 2 times a day and was adjusted per his stooling.  His mental status did improve with the lactulose.    For his anemia he was found to have macrocytic anemia likely due to chronic EtOH abuse and was on aspirin and Brilinta.  He was consulted by GI which indicated  "his last EGD in 6/2019 showed patchy gastritis and a 10 mm duodenal bulb ulcer with gastric biopsies negative for H. pylori.  His last colonoscopy 3/2019 showed polyp without hemorrhoids.  He did have a thoracentesis on 10/28 in which 900 mL of nonbloody fluid was removed he did need 2 units of packed red blood cells.  His hemoglobin was stable at around 8 at discharge.  GI recommends that he have an outpatient colonoscopy.  Eventually his aspirin and Brilinta were restarted and he had no issues.  Because of the issue of acute hematuria it is recommended that he have a follow-up UA as an outpatient.    He did have issues with hypokalemia and hypomagnesemia and these were supplemented and thought to be likely related to his Lasix.  He did have also issues with attention and so it is recommended to closely monitor his weights, electrolytes and blood pressures.    Ring his hospitalization he had an episode in which he had \"staring spell\" that lasted approximately 30 seconds in which he bit his tongue.  He did have a follow-up head CT which showed no skull fracture intracranial bleed and EEG which was unremarkable.  He has no history of seizures and so this was felt to be uneventful.    Last week, nursing had approached me stating that he had had significant amounts of diarrheal stools approximately 6-8 today.  I have given a verbal order to decrease his lactulose to 10 mg twice daily.  However, today I am noting that his lactulose was not decreased over the weekend.  He complains today of having 4 watery stools last night and about the span of an hour.  His CMP today shows dehydration with potassium of 2.6, BUN of 7, creatinine of 0.61, calcium of 8.1, alk phos elevated at 365 and AST at 49.  His blood pressures are showing a bit softer in the low 100s.  He did go out to [facility] for pneumonia level and this was 26 today which is within normal limits.  He reports his only complaint is the frequent loose stools " and just feeling weak and fatigued.  He does have ongoing nonpitting lower extremity edema.  Weight is down 3 pounds this week.    Past Medical History:   Diagnosis Date   ? Acute liver failure 2018   ? Acute renal failure, unspecified acute renal failure type (H) 2018   ? Acute respiratory failure with hypoxia (H) 2018   ? Alcoholic hepatitis with ascites 2018   ? Bacteremia due to Klebsiella pneumoniae    ? Coronary artery disease involving native coronary artery of native heart without angina pectoris 2019   ? Essential hypertension    ? Gastroesophageal reflux disease without esophagitis 10/31/2018   ? Hepatic encephalopathy (H) 2018   ? Macrocytic anemia    ? Stress-induced cardiomyopathy 2018     Past Surgical History:   Procedure Laterality Date   ? COLONOSCOPY N/A 3/20/2018    Procedure: COLONOSCOPY;  Surgeon: Adam Nicole III, MD;  Location: [facility] OR;  Service:    ? CV CORONARY ANGIOGRAM N/A 3/18/2019    Procedure: Coronary Angiogram;  Surgeon: Timi Rodriguez MD;  Location: [facility] Cath Lab;  Service: Cardiology   ? CV LEFT HEART CATHETERIZATION WO LEFT VETRICULOGRAM Left 3/18/2019    Procedure: Left Heart Catheterization Without Left Ventriculogram;  Surgeon: Timi Rodriguez MD;  Location: [facility] Cath Lab;  Service: Cardiology   ? PICC  2018        ? PICC  3/18/2019        ? US THORACENTESIS  10/28/2019     Family History   Problem Relation Age of Onset   ? Heart disease Father 76        type unknown to Aayush; his father  at home   ? Alzheimer's disease Mother 86        lives in long term care   ? No Medical Problems Son    ? No Medical Problems Son      Social History     Socioeconomic History   ? Marital status: Domestic Partner     Spouse name: Karlie Chisholm (АННА)   ? Number of children: Not on file   ? Years of education: Not on file   ? Highest education level: Not on file   Occupational History   ? 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 Financial resource strain: Not on file   ? Food insecurity:     Worry: Not on file     Inability: Not on file   ? Transportation needs:     Medical: Not on file     Non-medical: Not on file   Tobacco Use   ? Smoking status: Former Smoker     Packs/day: 1.00     Years: 40.00     Pack years: 40.00     Types: Cigarettes     Last attempt to quit: 2019     Years since quittin.5   ? Smokeless tobacco: Never Used   Substance and Sexual Activity   ? Alcohol use: Yes     Frequency: 4 or more times a week     Drinks per session: 1 or 2     Comment: 350 ml of peppermint schnapps daily per  h,  H&P per report of Karlie JJ to Dr. Valle   ? Drug use: No   ? Sexual activity: Not on file   Lifestyle   ? Physical activity:     Days per week: Not on file     Minutes per session: Not on file   ? Stress: Not on file   Relationships   ? Social connections:     Talks on phone: Not on file     Gets together: Not on file     Attends Yarsanism service: Not on file     Active member of club or organization: Not on file     Attends meetings of clubs or organizations: Not on file     Relationship status: Not on file   ? Intimate partner violence:     Fear of current or ex partner: Not on file     Emotionally abused: Not on file     Physically abused: Not on file     Forced sexual activity: Not on file   Other Topics Concern   ? Not on file   Social History Narrative    Works in [employer]. Lives with his SO, Karlie Chisholm.       Current Outpatient Medications   Medication Sig Dispense Refill   ? acetaminophen (TYLENOL) 325 MG tablet Take 2 tablets (650 mg total) by mouth every 6 (six) hours as needed.  0   ? aspirin 81 MG EC tablet Take 81 mg by mouth daily.     ? atorvastatin (LIPITOR) 80 MG tablet Take 1 tablet (80 mg total) by mouth daily. 90 tablet 3   ? b complex vitamins tablet Take 1 tablet by mouth daily.     ? carvedilol (COREG) 12.5 MG tablet Take 12.5  mg by mouth 2 (two) times a day with meals.     ? cholecalciferol, vitamin D3, (VITAMIN D3) 2,000 unit Tab Take 2,000 Units by mouth daily.     ? ferrous sulfate 325 (65 FE) MG tablet Take 1 tablet by mouth daily with breakfast.     ? folic acid (FOLVITE) 1 MG tablet Take 1 tablet (1 mg total) by mouth daily. 30 tablet 0   ? furosemide (LASIX) 20 MG tablet Take 1 tablet (20 mg total) by mouth daily. 30 tablet 0   ? furosemide (LASIX) 20 MG tablet TAKE 1 TABLET BY MOTUH AS NEEDED FOR WEIGHT GAIN OF 3 LBS IN ONE DAY (Patient taking differently: 10 mg. TAKE 1 TABLET BY MOTUH AS NEEDED FOR WEIGHT GAIN OF 3 LBS IN ONE DAY      ) 90 tablet 1   ? lactulose (ENULOSE) 20 gram/30 mL Soln solution Take 30 mL (20 g total) by mouth 2 (two) times a day. (Patient taking differently: Take 10 g by mouth 2 (two) times a day.       ) 1800 mL 0   ? magnesium oxide (MAG-OX) 400 mg (241.3 mg magnesium) tablet Take 1 tablet (400 mg total) by mouth 2 (two) times a day.  0   ? multivitamin therapeutic tablet Take 1 tablet by mouth daily.     ? omeprazole (PRILOSEC) 40 MG capsule Take 40 mg by mouth daily before breakfast.     ? thiamine (VITAMIN B-1) 100 MG tablet Take 100 mg by mouth daily.     ? ticagrelor (BRILINTA) 90 mg Tab Take 1 tablet (90 mg total) by mouth 2 (two) times a day. 180 tablet 3     No current facility-administered medications for this visit.      No Known Allergies  Immunization History   Administered Date(s) Administered   ? INFLUENZA,RECOMBINANT,INJ,PF QUADRIVALENT 18+YRS 10/29/2019   ? Influenza,seasonal quad, PF, =/> 6months 10/04/2018   ? Tdap 10/04/2018         Review of Systems   Patient denies fever, chills, headache, lightheadedness, dizziness, rhinorrhea, cough, congestion, shortness of breath, chest pain, palpitations, abdominal pain, constipation, change in appetite, dysuria, frequency, burning or pain with urination.  Other than stated in HPI all other review of systems is negative.         Physical Exam      Vital signs: /54, heart rate 82, respiratory 18, temp 96.8.    GENERAL APPEARANCE: Well developed, well nourished, in no acute distress.  HEENT: normocephalic, atraumatic  PERRL, sclerae anicteric, conjunctivae clear and moist, EOM intact  LUNGS: Lung sounds CTA, no adventitious sounds, respiratory effort normal.  CARD: RRR, S1, S2, harsh systolic murmur, no gallops, rubs,  ABD: Soft and nontender with normal bowel sounds.   MSK: Muscle strength and tone were normal.  EXTREMITIES: Nonpitting pedal edema bilateral lower extremities  NEURO: Alert and oriented x 3.  Face is symmetric.  SKIN: Multiple purpura of upper extremities with skin tears.  PSYCH: euthymic            Labs:   Recent Results (from the past 240 hour(s))   Potassium   Result Value Ref Range    Potassium 3.2 (L) 3.5 - 5.0 mmol/L   Basic Metabolic Panel   Result Value Ref Range    Sodium 138 136 - 145 mmol/L    Potassium 2.8 (LL) 3.5 - 5.0 mmol/L    Chloride 102 98 - 107 mmol/L    CO2 25 22 - 31 mmol/L    Anion Gap, Calculation 11 5 - 18 mmol/L    Glucose 101 70 - 125 mg/dL    Calcium 7.7 (L) 8.5 - 10.5 mg/dL    BUN 9 8 - 22 mg/dL    Creatinine 0.70 0.70 - 1.30 mg/dL    GFR MDRD Af Amer >60 >60 mL/min/1.73m2    GFR MDRD Non Af Amer >60 >60 mL/min/1.73m2   Magnesium   Result Value Ref Range    Magnesium 1.2 (L) 1.8 - 2.6 mg/dL   HM2(CBC w/o Differential)   Result Value Ref Range    WBC 8.3 4.0 - 11.0 thou/uL    RBC 2.97 (L) 4.40 - 6.20 mill/uL    Hemoglobin 8.5 (L) 14.0 - 18.0 g/dL    Hematocrit 29.0 (L) 40.0 - 54.0 %    MCV 98 80 - 100 fL    MCH 28.6 27.0 - 34.0 pg    MCHC 29.3 (L) 32.0 - 36.0 g/dL    RDW 18.3 (H) 11.0 - 14.5 %    Platelets 157 140 - 440 thou/uL    MPV 8.8 8.5 - 12.5 fL   Potassium   Result Value Ref Range    Potassium 3.7 3.5 - 5.0 mmol/L   Basic Metabolic Panel   Result Value Ref Range    Sodium 139 136 - 145 mmol/L    Potassium 3.6 3.5 - 5.0 mmol/L    Chloride 102 98 - 107 mmol/L    CO2 28 22 - 31 mmol/L    Anion Gap,  Calculation 9 5 - 18 mmol/L    Glucose 94 70 - 125 mg/dL    Calcium 7.8 (L) 8.5 - 10.5 mg/dL    BUN 11 8 - 22 mg/dL    Creatinine 0.75 0.70 - 1.30 mg/dL    GFR MDRD Af Amer >60 >60 mL/min/1.73m2    GFR MDRD Non Af Amer >60 >60 mL/min/1.73m2   HM2(CBC w/o Differential)   Result Value Ref Range    WBC 7.8 4.0 - 11.0 thou/uL    RBC 2.92 (L) 4.40 - 6.20 mill/uL    Hemoglobin 8.3 (L) 14.0 - 18.0 g/dL    Hematocrit 28.5 (L) 40.0 - 54.0 %    MCV 98 80 - 100 fL    MCH 28.4 27.0 - 34.0 pg    MCHC 29.1 (L) 32.0 - 36.0 g/dL    RDW 18.1 (H) 11.0 - 14.5 %    Platelets 222 140 - 440 thou/uL    MPV 9.6 8.5 - 12.5 fL   Basic Metabolic Panel   Result Value Ref Range    Sodium 136 136 - 145 mmol/L    Potassium 3.7 3.5 - 5.0 mmol/L    Chloride 99 98 - 107 mmol/L    CO2 28 22 - 31 mmol/L    Anion Gap, Calculation 9 5 - 18 mmol/L    Glucose 100 70 - 125 mg/dL    Calcium 7.7 (L) 8.5 - 10.5 mg/dL    BUN 13 8 - 22 mg/dL    Creatinine 0.84 0.70 - 1.30 mg/dL    GFR MDRD Af Amer >60 >60 mL/min/1.73m2    GFR MDRD Non Af Amer >60 >60 mL/min/1.73m2   Magnesium   Result Value Ref Range    Magnesium 1.3 (L) 1.8 - 2.6 mg/dL   HM2(CBC W/O DIFF)   Result Value Ref Range    WBC 6.7 4.0 - 11.0 thou/uL    RBC 2.95 (L) 4.40 - 6.20 mill/uL    Hemoglobin 8.3 (L) 14.0 - 18.0 g/dL    Hematocrit 28.9 (L) 40.0 - 54.0 %    MCV 98 80 - 100 fL    MCH 28.1 27.0 - 34.0 pg    MCHC 28.7 (L) 32.0 - 36.0 g/dL    RDW 18.3 (H) 11.0 - 14.5 %    Platelets 326 140 - 440 thou/uL    MPV 9.3 8.5 - 12.5 fL   Basic Metabolic Panel   Result Value Ref Range    Sodium 135 (L) 136 - 145 mmol/L    Potassium 3.8 3.5 - 5.0 mmol/L    Chloride 100 98 - 107 mmol/L    CO2 26 22 - 31 mmol/L    Anion Gap, Calculation 9 5 - 18 mmol/L    Glucose 87 70 - 125 mg/dL    Calcium 7.8 (L) 8.5 - 10.5 mg/dL    BUN 11 8 - 22 mg/dL    Creatinine 0.75 0.70 - 1.30 mg/dL    GFR MDRD Af Amer >60 >60 mL/min/1.73m2    GFR MDRD Non Af Amer >60 >60 mL/min/1.73m2   HM2(CBC w/o Differential)   Result Value Ref  Range    WBC 6.3 4.0 - 11.0 thou/uL    RBC 2.91 (L) 4.40 - 6.20 mill/uL    Hemoglobin 8.1 (L) 14.0 - 18.0 g/dL    Hematocrit 28.1 (L) 40.0 - 54.0 %    MCV 97 80 - 100 fL    MCH 27.8 27.0 - 34.0 pg    MCHC 28.8 (L) 32.0 - 36.0 g/dL    RDW 18.4 (H) 11.0 - 14.5 %    Platelets 389 140 - 440 thou/uL    MPV 9.7 8.5 - 12.5 fL   Basic Metabolic Panel   Result Value Ref Range    Sodium 134 (L) 136 - 145 mmol/L    Potassium 3.7 3.5 - 5.0 mmol/L    Chloride 101 98 - 107 mmol/L    CO2 27 22 - 31 mmol/L    Anion Gap, Calculation 6 5 - 18 mmol/L    Glucose 87 70 - 125 mg/dL    Calcium 7.8 (L) 8.5 - 10.5 mg/dL    BUN 9 8 - 22 mg/dL    Creatinine 0.73 0.70 - 1.30 mg/dL    GFR MDRD Af Amer >60 >60 mL/min/1.73m2    GFR MDRD Non Af Amer >60 >60 mL/min/1.73m2   Magnesium   Result Value Ref Range    Magnesium 1.6 (L) 1.8 - 2.6 mg/dL   C. Diff Toxin By PCR   Result Value Ref Range    C.Difficile Toxigenic by PCR Negative Negative    Ribotype 027/NAP1/B1 Presumptive Negative Presumptive Negative   Comprehensive Metabolic Panel   Result Value Ref Range    Sodium 136 136 - 145 mmol/L    Potassium 2.6 (LL) 3.5 - 5.0 mmol/L    Chloride 107 98 - 107 mmol/L    CO2 21 (L) 22 - 31 mmol/L    Anion Gap, Calculation 8 5 - 18 mmol/L    Glucose 67 (L) 70 - 125 mg/dL    BUN 7 (L) 8 - 22 mg/dL    Creatinine 0.67 (L) 0.70 - 1.30 mg/dL    GFR MDRD Af Amer >60 >60 mL/min/1.73m2    GFR MDRD Non Af Amer >60 >60 mL/min/1.73m2    Bilirubin, Total 0.5 0.0 - 1.0 mg/dL    Calcium 8.1 (L) 8.5 - 10.5 mg/dL    Protein, Total 7.0 6.0 - 8.0 g/dL    Albumin 2.5 (L) 3.5 - 5.0 g/dL    Alkaline Phosphatase 365 (H) 45 - 120 U/L    AST 49 (H) 0 - 40 U/L    ALT 33 0 - 45 U/L   Magnesium   Result Value Ref Range    Magnesium 1.6 (L) 1.8 - 2.6 mg/dL   Hemoglobin   Result Value Ref Range    Hemoglobin 9.1 (L) 14.0 - 18.0 g/dL   Ammonia   Result Value Ref Range    Ammonia 26 11 - 35 umol/L         Assessment:  1. Alcoholic hepatitis with ascites     2. Hepatic encephalopathy (H)      3. Chronic systolic heart failure (H)     4. Acute renal failure, unspecified acute renal failure type (H)     5. Hypokalemia due to excessive gastrointestinal loss of potassium     6. Hypomagnesemia         Plan:   Alcohol hepatitis with hepatic encephalopathy: Soft lap as he is cleared.  His ammonia level is within normal limits and he is having way more stools than 3-4 a day so we will back down on his lactulose to 10 mg daily for a few days and likely will need to go up to twice daily based on stooling.  Stooling has caused significant dehydration will have nursing encourage fluids and back down on his Lasix 10 mg daily and monitor his weights and blood pressures.  This will likely improve his labs.    Hypokalemia: We will give him 40 mEq of KCl now and 40 mEq at at bedtime.  Recheck a potassium tomorrow continue with KCl 40 mEq daily and will likely be able to back down to 20 mEq daily once within good range.    Hypomagnesemia: Magnesium level is 1.6 today which is slightly low and so we will start him on Slow-Mag daily.      Continue to monitor electrolytes.    Electronically signed by: Corrina Blanca, LINH

## 2021-06-19 NOTE — PROGRESS NOTES
Cardiology Progress Note    Assessment/Plan:    1. Non-ST elevation myocardial infarction.  This represents a type II myocardial infarction, or demand related ischemia related to sepsis presentation 7/25.  Normalized left ventricular function demonstrated on echocardiography yesterday.  Does not require ischemic workup, at this point.  2. Acute renal failure  3. Gram-negative sepsis, afebrile now .  4. Tobacco abuse , per patient and wife quit prior to admit  5. Long QT, polymorphic wide complex tachycardia suspicious for  ventricular tachycardia after haloperidol administration.  6.  Reported alcohol abuse.  Patient reports no recent alcohol.  Wife confirms no recent alcohol    We will sign off.  Please call with further questions.      Principal Problem:    Septic shock (H)  Active Problems:    Acute respiratory failure with hypoxia (H)    Acute renal failure, unspecified acute renal failure type (H)    Metabolic acidosis    Encephalopathy, metabolic    Elevated troponin    Other cardiomyopathy (H)    Bacteremia due to Klebsiella pneumoniae    Anemia, unspecified type    Alcohol abuse     LOS: 8 days     Subjective:  Sleepy, arousable but unable to open his eyes.  Can raise his eyebrows on command.  No chest pain or shortness of breath.  Sleeping comfortably.  Wife denies him snoring at home.      Objective:   Vital signs in last 24 hours:  Vitals:    08/01/18 0500 08/01/18 0724 08/01/18 0725 08/01/18 0745   BP: 91/51   (P) 95/51   Patient Position:    (P) Semi-chew   Pulse: 77  79    Resp: 15  15 (P) 16   Temp: 97.3  F (36.3  C)   (P) 98.5  F (36.9  C)   TempSrc: Oral   (P) Axillary   SpO2: 96% 95% 98%    Weight:       Height:         Weight:   Wt Readings from Last 3 Encounters:   08/01/18 214 lb 4.8 oz (97.2 kg)   03/19/18 180 lb (81.6 kg)           PHYSICAL EXAM    Less jaundiced  Respiratory: Few coarse rhonchi.  Otherwise clear  Cardiovascular:  Normal heart rate, Normal rhythm, No murmurs, No rubs, No  gallops.   GI:  Bowel sounds normal, Soft, No tenderness, No masses  Extremities: Trace edema       Cardiographics:   Telemetry sinus rhythm with frequent PACs,  bpm.  No further ventricular ectopy.    ECG (personally reviewed) 7/28:    Normal sinus rhythm with sinus arrhythmia  Left axis deviation  Moderate voltage criteria for LVH, may be normal variant  STand T wave abnormality consider anterior-lateral ischemia  Prolonged QT  Abnormal ECG      Imaging:   Echocardiogram 7/31:     Summary        When compared to the previous study dated 7/25/2018, the EF has improved    Left ventricle ejection fraction is normal. The calculated left ventricular ejection fraction is 61%.    Mild concentric left ventricular hypertrophy    Normal right ventricular size and systolic function.         XR CHEST 1 VIEW PORTABLE  7/28/2018 6:04 AM  INDICATION: Dyspnea  COMPARISON: 07/25/2018.  FINDINGS: Interval removal of ETT and NG tube. Right IJ catheter high SVC level. Right PICC catheter right atrial level. No pneumothorax. Interval worsening with bilateral airspace opacities with upper lobe predominance greatest on the left. Small left   effusion. Previous left lower rib fractures. Monitor electrodes.      Lab Results:   Lab Results   Component Value Date    WBC 18.5 (H) 07/31/2018    HGB 8.6 (L) 07/31/2018    HCT 26.1 (L) 07/31/2018     07/31/2018    CHOL 146 02/12/2018    TRIG 126 07/29/2018    HDL 35 (L) 02/12/2018    ALT 79 (H) 07/31/2018     (H) 07/31/2018     (L) 08/01/2018    K 3.6 08/01/2018    CL 98 08/01/2018    CREATININE 5.57 (H) 08/01/2018    BUN 56 (H) 08/01/2018    CO2 22 08/01/2018    TSH 1.45 07/24/2018    INR 1.47 (H) 08/01/2018     Lab Results   Component Value Date    CKTOTAL 1612 (HH) 07/27/2018    CKMB 24 (HH) 07/25/2018    TROPONINI 7.61 (HH) 07/26/2018         Terrence Cordova MD [facility]  8/1/2018

## 2021-06-19 NOTE — PROGRESS NOTES
Renal progress note  CC: ARF  Assessment and Plan:  62 y.o. yo male    1. ARF: severe ATN and continues to require dialysis; monitoring for sxs of recovery; dialysis later today then Tuesday Thursday Saturday  2. Chronic liver disease (probable) vs acute hepatitis secondary to ETOH abuse; unrecognized PTA; elevated bili and coagulopathy/hypotension and some enceph; on lactulose and rifaximin  3. HoTN; requiring midodrine for HD and also scheduled--still requiring to keep bp steady  4. CM: likely stress induced CM as he has recovered quite a bit of his EF.    5. Sepsis: s/p treatment of klebsiella bacteremia; has ongoing elevated WBC of unclear cause; off abx for now   6. Anemia: acute on chronic--recent GI bleed earlier this summer; started some epo; s/p prbcs earlier in week; hgb stable  7. Volume; has ascites and some edema; UF wih HD as tolerates  8. Malnutrition: encouraging oral intake  9. Mild hyperphos; binder if phos over 5.5-6  10. Dispo; plan eventual transfer to [place] for strengthening and ongoing cares; will be on [facility] HD schedule there  11. Hyponatremia-should be less of a problem now that he is on enforced fluid restriction        Subjective  Appropriately interactive today  Still has a sitter  Modest extracellular fluid volume excess, but no pulmonary congestion or hypoxemia    Dialysis yesterday afternoon went okay    Sodium up to 130s after successive days of ultrafiltration.  He is doing okay with current fluid restriction that was just started 2 days ago    Tentatively anticipate transfer to [place] today or tomorrow.  Will need dialysis Thursday at either facility    Objective    Vital signs in last 24 hours  Temp:  [98.1  F (36.7  C)-99  F (37.2  C)] 98.5  F (36.9  C)  Heart Rate:  [] 86  Resp:  [17-34] 18  BP: ()/(54-78) 109/69  Weight:   205 lb 9.6 oz (93.3 kg)    Intake/Output last 3 shifts  I/O last 3 completed shifts:  In: 820 [P.O.:820]  Out: 1500  [Other:1500]  Intake/Output this shift:  I/O this shift:  In: 480 [P.O.:480]  Out: -     Physical Exam  Conversant   CV: RRR without murmur or rub  Lung: clear and equal; no extra sounds  Ab: soft and NT; minimally distended; normal bs  Ext: + edema and well perfused  Skin; no rash    Pertinent Labs   Lab Results   Component Value Date    WBC 13.7 (H) 08/15/2018    HGB 7.8 (L) 08/15/2018    HCT 24.7 (L) 08/15/2018     (H) 08/15/2018     08/15/2018     Lab Results   Component Value Date    CREATININE 1.81 (H) 08/15/2018    BUN 10 08/15/2018     (L) 08/15/2018    K 4.0 08/15/2018    CL 98 08/15/2018    CO2 27 08/15/2018       Lab Results   Component Value Date    ALBUMIN 2.4 (L) 08/15/2018     Lab Results   Component Value Date    CALCIUM 8.7 08/15/2018    PHOS 5.3 (H) 08/06/2018     I reviewed all lab results  Parveen Jiménez

## 2021-06-19 NOTE — PROGRESS NOTES
"Cardiology Progress Note    Assessment/Plan:    1. Non-ST elevation myocardial infarction.  Suspect type II myocardial infarction, or demand related ischemia related to sepsis presentation 7/25.  This would be consistent with, \"Takosubo\" type wall motion abnormalities noted on echocardiography.  Will recheck echocardiogram today  2. Acute renal failure  3. Gram-negative sepsis, afebrile now over 24 hours.  4. Tobacco abuse , per patient and wife quit prior to admit  5. Long QT, polymorphic wide complex tachycardia suspicious for  ventricular tachycardia after haloperidol administration.  6.  Reported alcohol abuse.  Patient reports no recent alcohol.  Wife confirms no recent alcohol      Principal Problem:    Septic shock (H)  Active Problems:    Acute respiratory failure with hypoxia (H)    Acute renal failure, unspecified acute renal failure type (H)    Metabolic acidosis    Encephalopathy, metabolic    Elevated troponin    Other cardiomyopathy (H)    Bacteremia due to Klebsiella pneumoniae    Anemia, unspecified type    Alcohol abuse     LOS: 7 days     Subjective:  Denies pain or shortness of breath.  Complains of \"air headedness\" or generalized dizziness.  Remains weak.   He has not experienced any chest pains prior to admission.        Objective:   Vital signs in last 24 hours:  Vitals:    07/30/18 1953 07/30/18 2335 07/31/18 0327 07/31/18 0744   BP: 97/56 100/63 109/68 94/55   Patient Position:  Semi-chew Semi-chew Lying   Pulse: 91 93 90 93   Resp: 20 22 28 22   Temp: 98.2  F (36.8  C) 98.4  F (36.9  C) 98.2  F (36.8  C) 98  F (36.7  C)   TempSrc: Oral Oral Axillary Oral   SpO2: 96% 98%  96%   Weight:       Height:         Weight:   Wt Readings from Last 3 Encounters:   07/30/18 212 lb 8 oz (96.4 kg)   03/19/18 180 lb (81.6 kg)           PHYSICAL EXAM    Jaundiced  Respiratory: Coarse scattered rhonchi, loose cough.   Cardiovascular:  Normal heart rate, Normal rhythm, No murmurs, No rubs, No gallops. "   GI:  Bowel sounds normal, Soft, No tenderness, No masses  Extremities: Trace edema       Cardiographics:   Telemetry sinus rhythm with frequent PACs,  bpm.  No further ventricular ectopy.    ECG (personally reviewed) 7/28:    Normal sinus rhythm with sinus arrhythmia  Left axis deviation  Moderate voltage criteria for LVH, may be normal variant  STand T wave abnormality consider anterior-lateral ischemia  Prolonged QT  Abnormal ECG      Imaging:   Echocardiogram 7/25:    Summary        No previous study for comparison.    Technically challenging examination. Definity contrast utilized    Left ventricle ejection fraction is severely decreased. Left ventricular ejection fraction estimated 25-30%.    Global left ventricular hypokinesis with large area of akinesis involving the apex, anterior apical and apical lateral portions of left ventricle.    Normal right ventricular size and systolic function.    Mild to moderate aortic stenosis. Mean gradient of 15 mmHg. The degree of aortic stenosis potentially underrepresented in the setting of severe reduction left ventricular systolic function.    Left atrial enlargement    Moderate enlargement of the aortic root.       XR CHEST 1 VIEW PORTABLE  7/28/2018 6:04 AM  INDICATION: Dyspnea  COMPARISON: 07/25/2018.  FINDINGS: Interval removal of ETT and NG tube. Right IJ catheter high SVC level. Right PICC catheter right atrial level. No pneumothorax. Interval worsening with bilateral airspace opacities with upper lobe predominance greatest on the left. Small left   effusion. Previous left lower rib fractures. Monitor electrodes.      Lab Results:   Lab Results   Component Value Date    WBC 18.5 (H) 07/31/2018    HGB 8.6 (L) 07/31/2018    HCT 26.1 (L) 07/31/2018     07/31/2018    CHOL 146 02/12/2018    TRIG 126 07/29/2018    HDL 35 (L) 02/12/2018    ALT 79 (H) 07/31/2018     (H) 07/31/2018     (L) 07/31/2018     (L) 07/31/2018    K 3.5 07/31/2018     K 3.5 07/31/2018    CL 97 (L) 07/31/2018    CL 97 (L) 07/31/2018    CREATININE 4.63 (H) 07/31/2018    CREATININE 4.63 (H) 07/31/2018    BUN 42 (H) 07/31/2018    BUN 42 (H) 07/31/2018    CO2 26 07/31/2018    CO2 26 07/31/2018    TSH 1.45 07/24/2018    INR 1.41 (H) 07/31/2018     Lab Results   Component Value Date    CKTOTAL 1612 () 07/27/2018    CKMB 24 () 07/25/2018    TROPONINI 7.61 () 07/26/2018         Terrence Cordova MD [facility]  7/31/2018

## 2021-06-19 NOTE — PROCEDURES
Aayush García 62 y.o. male Dialysis treatment started at 0814 and ended at 1116; ran for 3hours on a K bath of 4 (Potassium 3.4).  Total removed 3.5kg.  Meds given Albumin 25% 37.5g. Access Right tunneled CVC. Heparin none. Tolerated well, no complaints.  Complications.none  Incapacitated Dialysis Nurse Procedure reviewed with An Blair RN  Water alarm functional and in place entire treatment.  Hepatitis B status. Neg Date 7/25/18.  Patient Consent Verified yes.

## 2021-06-19 NOTE — PROGRESS NOTES
"  Clinical Nutrition Therapy Assessment Note    Reason for Assessment:   Aayush García is a 62 y.o. male assessed by the registered Dietitian for follow-up per protocol/pathway order.    Acute hospital admission for septic shock, acute respiratory failure, acute renal failure, metabolic acidosis, encephalopathy, elevated troponin, ETOH abuse      Severe ATN due to Klebsiellas sepsis and volume depletion      Dialysis catheter placed 8/1    Plan to continue Dialysis MWF    Nutrition History:  Information obtained from chart.  Patient has the following food allergies or intolerances: NKA    Current Nutrition Prescription:   Diet: 2 gram sodium  Supplements and Modulars: Boost breeze BID  (To encourage oral intake, patient's diet liberalized to just sodium restriction)    Current Nutrition Intake:  The patient's current meal intake is good with 100 % consumed at recent meals    Anthropometrics:  Height: 5' 8\" (172.7 cm)  Weight: 203 lb 0.7 oz (92.1 kg)  BMI (Calculated): 30.9  BMI indication: 30-34.9 obesity (class 1)  Ideal body weight 154 lbs  % Ideal body weight 132 %  Usual body weight 190 lbs  % Usual body weight  107%    Weight History:  Wt Readings from Last 3 Encounters:   08/10/18 203 lb 0.7 oz (92.1 kg)   03/19/18 180 lb (81.6 kg)     Physical Findings:  The patient has the following physical signs which could indicate malnutrition: edema  +1 BUE edema;  +3 BLE edema    GI Status/Output:   The patient's GI symptoms include: diarrhea and abdominal distention  Loose incontinent stools induced by Rx: Lactulose ~ titrate as needed for 3-4 stools daily  Bowel Sounds present    Skin/Wound:  Edwin score Edwin Scale Score: 15  Poor skin integrity per nursing notations   No pressure ulcer/Decubitus ulcers noted.    Medications:  Medications reviewed.  Multivitamin, Thiamine, Folic acid, Magnesium, Lactulose    Labs:  Results for AAYUSH GARCÍA (MRN [id number])    Ref. Range 8/10/2018 03:58   Sodium Latest Ref " Range: 136 - 145 mmol/L 129 (L)   Potassium Latest Ref Range: 3.5 - 5.0 mmol/L 3.4 (L)   Magnesium Latest Ref Range: 1.8 - 2.6 mg/dL 1.6 (L)   Hemoglobin Latest Ref Range: 14.0 - 18.0 g/dL 8.1 (L)     Assessed Nutritional Needs:  Assessment weight is 86 kg, with a weight source of other, UBW     Estimated Energy Needs: 2150 - 2580 kcals daily per 25 - 30 kcal/kg      Estimated Protein Needs: 86 - 103 gdaily, 1.0 1.2 g/kg.     Estimated Fluid Needs: 2150 mls daily, 25 mls/kg, or per MD    Malnutrition: Not noted    Nutrition Risk Level: high risk    Goal Status:    Consume > 75% at meals MET    Bowel function WDL Progressing     Lose weight via diuresis while hospitalized Progressing   Input < output  by (-) 23 liters. Weights variable 187 to 214 lbs     Intervention / Monitoring:    Provide low sodium diet / select menu    Boost breeze BID    Honor food preferences    Provide snacks on demand within diet prescription    Follow PO intake, weight, labs

## 2021-06-19 NOTE — PROGRESS NOTES
Faculty Supervision of Residents    I have examined this patient on 8/5/2018 and the medical care has been evaluated and discussed with the resident.  The documentation has been reviewed.  I agree with the medical care provided and confirm the findings.     Mental status improving some.  Kinta Tues-Wed?  Víctor Styles

## 2021-06-19 NOTE — PROGRESS NOTES
Daily Progress note    Assessment/Plan  Principal Problem:    Septic shock (H)  Active Problems:    Acute respiratory failure with hypoxia (H)    Acute renal failure, unspecified acute renal failure type (H)    Metabolic acidosis    Encephalopathy, metabolic    Elevated troponin    Stress-induced cardiomyopathy    Bacteremia due to Klebsiella pneumoniae    Anemia, unspecified type    Alcohol abuse    Aayush García is a 62 year old male with a history of alcohol abuse, HTN and anemia who was admitted with septic shock secondary to Klebsiella bacteremia and anuria.  Required intubation and pressor support in ICU, currently extubated without pressor support, moved out of the ICU 7/31.  Has initiated dialysis, however difficulty with ongoing hypotension and persistent leukocytosis.      Anuric renal failure: Most likely severe ATN due to klebsiella sepsis, though cause still not completely clear. May be component of volume depletion as well.  Abdominal US negative 7/28.  Has initiated dialysis and will continue MWF for for the foreseen future.    - Nephrology following   -Dialysis catheter placed 8/1.  Plan to continue dialysis MWF.  -Nephrology finding it difficult to pull volume off during dialysis.    - dialysis today if pressures allow      NSTEMI: Type II myocardial infarction or demand related ischemia secondary to sepsis on presentation.  EF of 25-30% on 7/25/2018, significantly improved with echo 7/31/2018 with an EF of 61%.  Per cardiology not recommending ischemic workup at this time.  - Cardiology has signed off.  - not getting coreg 3.125 mg twice daily due to hypotension.  Consider restarting when hypotension has resolved.  -Continue aspirin daily  - Avoid QT prolong meds  - Telemetry monitoring      Sepsis   Klebsiella bacteremia  Persistent leukocytosis: Admitted with leukocytosis, lactic acidosis, hypotension and tachycardia. Blood cultures growing klebsiella.  UA shows leukocytes, blood and few  bacteria but may be unreliable as taken after period of anuria.  Most likely urinary source.  Possibly GI source, did have ulcer 1-2 months ago.  Unlikely PNA given CXR without infiltrates. Persistent leukocytosis of approximately 20 thousand. Neutrophil predominance on the 28th. Repeat lactate normal. Blood cultures x2 drawn and no growth yet. Chest x-ray performed yesterday showing improvement of infiltrates and improvement of pulmonary edema.  No signs of infection. CT abdomen showed no abscess just ascites. Therapeutic paracentesis performed with 2L drawn.   - completed 10 d course ceftriaxone      AMS  Alcohol withdrawal, resolving   Unclear etiology of altered mental status, though may be multifactorial cause including underlying sepsis, alcohol withdrawal, medication induced, element of uremia prior to starting dialysis, ICU delirium.  Given history of alcohol abuse and hepatic injury on admission, question if there could also be an element hepatic encephalopathy.  Ammonia level unremarkable, empirically treated with lactulose, given improvement continue to treat given no significant harms in therapy.  Medication induced altered mental status remains the most likely cause given patient's renal failure and hepatic injury and inability to clear medications.  - Delirium order set   -Avoid sedating medications  - PT/OT/SLP  -lactulose 20 g daily, titrate as needed for 3-4 stools daily  - rifaximin 400 mg TID      Hypotension: He has had low blood pressures during this stay and required pressors initially.  Likely multifactorial, possibly secondary to underlying sepsis and will evaluate as above, question if he could be cirrhotic and this could be contributing to his hypotension.  Also may be related to orthostasis given response to fluids on 8/1.   - Dialysis per nephrology  -midodrine 10 mg 3 times daily per nephrology.  Continue as needed midodrine  -Avoid giving fluid bolus if possible given difficulty to  remove fluid from patient's.      Hepatic injury  Possible hepatic encephalopathy  Ascites: Elevated AST/ALT, bilirubin, alk phos, CK and INR with hypoalbuminemia. Decreased synthetic function of liver most likely due to decreased perfusion. US 7/25 showed fatty infiltration of liver which may be contributing but no concerning biliary tree disease.  Less likely alcoholic hepatitis although alcoholic liver injury likely contributing given AST/ALT ratio.  Peripheral smear relatively normal which is reassuring for DIC. Elevation seems likely related to shock liver. Repeat RUQ u/s 7/28 without acute findings.  LFTs stabilized but remain elevated.  Paracentesis 8/3 with 1.95 L fluid removed.   - We will treat possible hepatic encephalopathy, as above, though likely multiple causes of patient's altered mental status.   - GI following      Melena, resolved: Episode of melanotic stool 7/27-28.  Has had downtrending hemoglobin for months as well as while here.  Was seen in clinic recently for melena which was thought due to NSAID use.  Symptoms had resolved after he had stopped using NSAIDs.  He was on a PPI as well.  Again having melenic stools here, possibly gastritis from decreased perfusion versus worsening ulceration. Hemoglobin now stable   - GI following as above  - PPI two times a day   - Transfuse for Hgb < 7      Respiratory fatigue - resolving: Successfully extubated. Remains of supplemental O2 however, significantly improved status  - Supplemental O2 prn  -Continue duo nebs 4 times daily and albuterol as needed      Sinus Arrythmia: New with this admission with frequent PACs.  Had previously been sinus tachycardic and now with arrhythmia.  Possibly related to overall disease process versus cardiac injury from shock.    - Telemetry.      Macrocytic anemia: Per chart review, Hb of 13.0 in 2/2018 with steady decline until present. Consistently macrocytic.  Likely nutritional component due to alcohol use but  continuous decline is concerning for underlying cause. Was also having melena as above. Peripheral smear showed inflammatory changes only.  LDH and haptoglobin only mildly elevated.  Per nephrology may need EPO if MICHAEL persists  - AM CBC      History of alcohol use: 350ml peppermint schnapps daily for years. Per wife, never has had withdrawal though never been more than 2 days without a drink that she knows of.    - Nicotine patch  - Continue to monitor for withdrawal symptoms  -Continue folic acid, thiamine, multivitamin      DVT Prophylaxis: heparin  Disposition: inpatient status, anticipate discharge to TCU  Anticipated discharge date:  multiple more days   Code status: Full Code     Subjective  No fevers or acute events overnight. Still hypotensive this am. Could not do dialysis yesterday due to hypotension.   Confusion slightly improved today per wife though not near baseline.  3 soft stools yesterday, not diarrhea.    Objective  Vital signs in last 24 hours: Temp:  [97.9  F (36.6  C)-99.1  F (37.3  C)] 97.9  F (36.6  C)  Heart Rate:  [76-86] 82  Resp:  [18-20] 18  BP: ()/(49-73) 81/50 95% O2 Device: Nasal cannula O2 Flow Rate (L/min): 2.5 L/min  Physical Exam:  General: lying in bed comfortably  Heart: soft systolic murmur  Lungs: bilateral faint crackles to mid posterior fields  Abdomen: soft, distended, nontender  Extremities: feet 2+ pitting edema, does not extend above SCDs  Neuro: finger to nose intact bilaterally though slow, oriented x 3, not alert, falls asleep during conversation, CN 2-12 intact and symmetric    Jayna Weber MD, MPH  [facility] Resident PGY2  Pager [phone]    Precepting patient with  Dr. Cali Styles

## 2021-06-19 NOTE — PROGRESS NOTES
GI Progress Note  Aayush García  N345/N345-01    Subjective:   Alert. Denies abdominal pain. Undergoing bedside dialysis.     Objective:   Temp:  [97  F (36.1  C)-101.6  F (38.7  C)] 99.5  F (37.5  C)  Heart Rate:  [] 97  Resp:  [17-37] 22  BP: ()/(54-79) 111/64  Body mass index is 32.31 kg/(m^2).     Gen: No acute distress  Cardio: RRR  GI: Soft, non-distended, non-tender without rebound or guarding    Laboratory  LAB DATA:    Results from last 7 days  Lab Units 07/30/18  0428 07/29/18  0431 07/28/18  0509 07/27/18  0411  07/26/18  0358   LN-ALKALINE PHOSPHATASE U/L 805* 812* 771* 594*  < > 654*   LN-BILIRUBIN TOTAL mg/dL 5.9* 5.5* 4.6* 2.9*  < > 2.7*   LN-BILIRUBIN DIRECT mg/dL 3.8*  --   --   --   --  1.6*   LN-PROTEIN TOTAL g/dL 6.1 5.4* 6.3 6.0  < > 6.0   LN-ALT (SGPT) U/L 82*  --  72* 45  < > 44   LN-AST (SGOT) U/L 299* 336* 337* 231*  < > 239*   < > = values in this interval not displayed.     Results from last 7 days  Lab Units 07/30/18 0428 07/29/18  0431 07/28/18  0617   LN-INR  1.48* 1.68* 1.47*         Results from last 7 days  Lab Units 07/30/18  0428 07/29/18  1133 07/29/18  0431  07/28/18  0509   LN-WHITE BLOOD CELL COUNT thou/uL 20.8*  --  17.4*  --  18.2*   LN-HEMOGLOBIN g/dL 8.5* 8.3* 8.4*  < > 7.8*   LN-HEMATOCRIT % 25.1*  --  25.2*  --  22.9*   LN-PLATELET COUNT thou/uL 165  --  160  --  154   < > = values in this interval not displayed.  Results from last 7 days  Lab Units 07/30/18  0428   LN-SODIUM mmol/L 133*   LN-POTASSIUM mmol/L 3.7   LN-CHLORIDE mmol/L 97*   LN-CO2 mmol/L 25   LN-BLOOD UREA NITROGEN mg/dL 38*   LN-CREATININE mg/dL 4.67*   LN-CALCIUM mg/dL 8.7     Lipase   Date Value Ref Range Status   07/25/2018 256 (H) 0 - 52 U/L Final   07/24/2018 313 (H) 0 - 52 U/L Final         Principal Problem:    Septic shock (H)  Active Problems:    Acute respiratory failure with hypoxia (H)    Acute renal failure, unspecified acute renal failure type (H)    Metabolic acidosis     Encephalopathy, metabolic    Elevated troponin    Other cardiomyopathy (H)    Bacteremia due to Klebsiella pneumoniae    Anemia, unspecified type    Alcohol abuse      Assessment:   62 y.o. male who presented to the hospital with acute onset of lethargy and confusion.  On evaluation the patient was found to have sepsis secondary to Klebsiella pneumonia and was admitted to the ICU. He continues to improve. LFTs elevated, probably related to sepsis or ETOH (AST> ALT). US showed small amount of sludge without biliary dilation.     Plan:   1. Continue supportive measures  2. No indication for endoscopic procedures at this time      Ney Khan MD  7/30/2018 8:27 AM  [facility]  [phone]

## 2021-06-19 NOTE — PROGRESS NOTES
Sputum sample obtained. Patient suctioned and sputum obtained via ETT. Labeled sample and sent to lab. Patient tolerated procedure well with no adverse reaction.

## 2021-06-19 NOTE — PROGRESS NOTES
Aayush García 62 y.o. male Dialysis treatment started at 1230 and ended at 1530; ran for 3 hours on a K bath of 4.  Total removed 2kg.  Meds given albumin 25% 12.5g, midodrine 10mg 1 unit PRBC. Access RCVC. Heparin dwell Tolerated well, no complaints.    Complications.  Hypotensive resolved with midodrine albumin and 1 unit PRBC  Incapacitated Dialysis Nurse Procedure reviewed with Ana Hanna RN  Water alarm functional and in place entire treatment.  Hepatitis B status. neg Date 7-25-18.  Patient Consent Verified yes    Rocky Lozoya RN

## 2021-06-19 NOTE — PROGRESS NOTES
RENAL  No rise in creatinine overnight compared to yesterday and UOP a bit better (after fairly aggressive diuretics).  On 1L NC.  Metabolic parameters good.  Favor cancelling tunneled line and dialysis today and watching for renal recovery.  Okay to resume diet.  Full note to follow this afternoon.    Manish Jaimes MD  [facility]  [phone]

## 2021-06-19 NOTE — PROGRESS NOTES
NURSING TRANSFER AND ARRIVAL NOTE  :    Patient Name: Aayush García  : 1955  MRN: [id number]  Patient Location: N322/N322        The reason for patient transfer is change level of care.  The patient was transferred to [facility] via via cart/stretcher.  Equipment used for transport Remote Telemetry.       Sending Unit Action:   Patient and Family (hannah Ziegler) notified of room change.    Report given to Bladimir at 1700 via verbal face/face.  Previous Reports given to Bladimir from previous shift RN at 1515 via telephone.  Belongings sent to receiving unit.  Medications were sent with patient yes.  Accompanied by Nurse and NA/PCA.      Receiving Unit Action:  Oriented patient to surroundings.   Call light within reach.     Response:  Patient tolerated transfer.    Nallely Mcarthur

## 2021-06-19 NOTE — PROGRESS NOTES
ICU PROGRESS NOTE:    Assessment/Plan:  Aayush García is a 62 y.o. male with a history of alcohol abuse, HTN and anemia who was admitted with septic shock and anuria with a positive blood culture for Klebsiella.  Required intubation and pressor support in ICU, currently extubated, clinically improved.     NEURO:  etoh withdrawal delirium, septic/metabolic encephalopathy. improving    Avoid sedating meds    precedex is off.    CIWA protocol for etoh w/d monitoring    Cont thiamine, folate, MVI    Tylenol prn for pain control    Nicotine patch for tobacco cravings    CV:  Septic shock 2/2 klebsiell bacteremia. Probable etoh cardiomyopathy. NSVT in setting of above and haldol use. Type II NSTEMI    Appreciate cardiology input    Cont ASA, coreg    Consider addition of statin - defer to cards    Repeat echo in a few days    Avoid any QTc prolonging medications.    RESP:  Intubated briefly for hypoxemic respiratory failure in setting of encephalopathy, extubated next day (7/26)., fluid overload from renal failure. Comfortable on minimal nasal cannula    Cont pulse ox montirogin    Cont duonebs prn    GI:  transaminitis with AST /ALT >2:1 suggestive of alc hep, possible ischemic hepatitis in setting of hypotension from septic shock. Unremarkable RUQ sonogram    apprciate GI input    Trend LFTs    Cont renal diet    Cont PPI (home med)    RENAL:  Septic ATN requiring HD. Dialysis catheter not functioning. Some renal recovery bu UOP still marginal. Lactic acidosis resolved.     Cont daily high-dose Lasix    Rahman, strict in/out/UOP monitoring    Will need tunnedl dialysis catheter by IR - renal to arrange    Appreciate nephrology input    Avoid nephrotoxins    Monitor lytes    ID:  Klebsiella bacteremia 2/2 ?UTI, PCT >60    Cont CTX for 10 days    Trend PCT    Repeat blood cx if fever    HEMATOLOGIC: downtrending Hgb now stabilized. Some melena earlier, none now. Macrocytic anemia 2/2 etoh use. incr wbc count  "today    Monitor counts    ENDOCRINE:  No issues    FSBG checks, insulin SS/drip per ICU protocol    ICU PROPHYLAXIS:    SCDs    Start HSQ today    Cont home PPI    DISPO/CODE STATUS: full code. OK to transfer out of ICU, to cardiac tele    FAMILY COMMUNICATION: updated patient and wife at bedside    Lines/Drains/Tubes:  RIJ dialysis catheter- remove today  golden    Overnight events:  nomajor events.  precedex off. Tachycardic. Coreg has been started.  UOP marginal  Rec'd HD this AM but dialysis catheter not working  No melena. He feels OK.    Subjective:  No complaints    Objective:  Physical Exam:  Vent settings for last 24 hours:       /62  Pulse 98  Temp 98.4  F (36.9  C) (Oral)   Resp 20  Ht 5' 8\" (1.727 m)  Wt 212 lb 8 oz (96.4 kg)  SpO2 96%  BMI 32.31 kg/m2    Intake/Output last 3 shifts:  I/O last 3 completed shifts:  In: 1304.3 [P.O.:800; I.V.:103.1; IV Piggyback:401.2]  Out: 700 [Urine:460; Stool:240]  Intake/Output this shift:  I/O this shift:  In: -   Out: 37 [Urine:37]    Physical Exam  Gen: awake, alert, oriented, no distress  HEENT: no OP lesions, no GERRY  CV: RRR, no m/g/r  Resp: CTAB  Abd: soft, nontender, BS+  Neuro: PERRL, nonfocal  Ext: no edema    LAB:    Results from last 7 days  Lab Units 07/30/18  0428   LN-WHITE BLOOD CELL COUNT thou/uL 20.8*   LN-HEMOGLOBIN g/dL 8.5*   LN-HEMATOCRIT % 25.1*   LN-PLATELET COUNT thou/uL 165       Results from last 7 days  Lab Units 07/30/18  0428 07/29/18  1132 07/29/18  0431 07/28/18  0509 07/27/18  0411   LN-SODIUM mmol/L 133* 139 141 136 132*   LN-POTASSIUM mmol/L 3.7 3.8 3.4* 3.6 4.6   LN-CHLORIDE mmol/L 97* 102 104 98 99   LN-CO2 mmol/L 25 25 25 24 21*   LN-BLOOD UREA NITROGEN mg/dL 38* 30* 23* 25* 21   LN-CREATININE mg/dL 4.67* 3.75* 3.28* 3.49* 4.01*   LN-CALCIUM mg/dL 8.7 9.0 8.1* 8.1* 7.8*   LN-PROTEIN TOTAL g/dL 6.1  --  5.4* 6.3 6.0   LN-BILIRUBIN TOTAL mg/dL 5.9*  --  5.5* 4.6* 2.9*   LN-ALKALINE PHOSPHATASE U/L 805*  --  812* 771* " 594*   LN-ALT (SGPT) U/L 82*  --   --  72* 45   LN-AST (SGOT) U/L 299*  --  336* 337* 231*     Blood 7/24: pan-sens klebsiella  Urine NG  spuutm UF  PCT 60.23      Current Facility-Administered Medications   Medication Dose Route Frequency Provider Last Rate Last Dose     acetaminophen tablet 650 mg (TYLENOL)  650 mg Oral Q4H PRN Astrid Redd CNP        Or     acetaminophen solution 650 mg (TYLENOL)  650 mg Enteral Tube Q4H PRN Astrid Redd CNP        Or     acetaminophen suppository 650 mg (TYLENOL)  650 mg Rectal Q4H PRN Astrid Redd CNP         albumin human 25 % bottle 12.5-37.5 g  12.5-37.5 g Intravenous PRN Cynthia Alexander CNP         albuterol nebulizer solution 2.5 mg (PROVENTIL)  2.5 mg Nebulization Q4H PRN Astrid Redd CNP         aspirin suppository 75 mg  75 mg Rectal DAILY April Goldsmith MD   75 mg at 07/27/18 2120     benzocaine-menthol lozenge 1 lozenge (CEPACOL)  1 lozenge Oral Q1H PRN Astrid Redd CNP         bisacodyl suppository 10 mg (DULCOLAX)  10 mg Rectal Daily PRN Astrid Redd CNP         carvedilol tablet 3.125 mg (COREG)  3.125 mg Oral BID Terrence Cordova MD         cefTRIAXone 1 g in NaCl 0.9 % 50 mL (MINI-BAG Plus) (ROCEPHIN)  1 g Intravenous Q24H Ambrocio Montalvo  mL/hr at 07/29/18 2040 1 g at 07/29/18 2040     dextrose 50 % (D50W) syringe 20-50 mL  20-50 mL Intravenous PRN Astrid Redd CNP         erythromycin 5 mg/gram (0.5 %) ophthalmic ointment   Both Eyes QID Astrid Redd CNP         folic acid 1 mg, thiamine 100 mg, multiple vitamin 3,300 unit- 150 mcg/10 mL 10 mL in sodium chloride 0.9% 1,000 mL   Intravenous Daily PRN Astrid Redd CNP         folic acid tablet 1 mg (FOLVITE)  1 mg Enteral Tube DAILY Lewis Farr MD        Or     folic acid injection 1 mg  1 mg Intramuscular DAILY Lewis Farr MD   1 mg at 07/29/18 1025     furosemide injection 160 mg (LASIX)  160  mg Intravenous QAM Brie Oliver MD   160 mg at 07/29/18 0959     gelatin absorbable sponge 1 each (GELFOAM)  1 each Topical (Top) PRN for dialysis Cynthia Alexander CNP         glucagon (human recombinant) injection 1 mg  1 mg Subcutaneous PRN Astrid Redd CNP         heparin injection 1,000 Units  1,000 Units Dialysis PRN for dialysis Cynthia Alexander CNP   1,000 Units at 07/30/18 0909     ipratropium-albuterol 0.5-2.5 mg/3 mL nebulizer solution 3 mL (DUO-NEB)  3 mL Nebulization QID - RT Lewis Farr MD   3 mL at 07/30/18 0727     lidocaine (PF) 10 mg/mL (1 %) injection 1 mL (XYLOCAINE-MPF)  1 mL Subcutaneous PRN for dialysis Cynthia Alexander CNP         magnesium hydroxide suspension 30 mL (MILK OF MAG)  30 mL Oral Daily PRN Astrid Redd CNP         multivitamin therapeutic tablet 1 tablet  1 tablet Enteral Tube DAILY Lewis Farr MD         naloxone injection 0.2-0.4 mg (NARCAN)  0.2-0.4 mg Intravenous PRN Astrid Redd CNP        Or     naloxone injection 0.2-0.4 mg (NARCAN)  0.2-0.4 mg Intramuscular PRN Astrid Redd CNP         nicotine 14 mg/24 hr 1 patch (NICODERM CQ)  1 patch Transdermal DAILY Benjamin E Rosenstein, MD   1 patch at 07/30/18 0938     ondansetron injection 4 mg (ZOFRAN)  4 mg Intravenous Q4H PRN Astrid Redd CNP        Or     ondansetron tablet 8 mg (ZOFRAN)  8 mg Oral Q8H PRN Astrid Redd CNP         pantoprazole 40 mg injection  40 mg Intravenous Q12H Bigg Medley DO   40 mg at 07/29/18 2158     phytonadione (vitamin K) 10 mg in dextrose 5% 50 mL (AQUA-MEPHYTON)  10 mg Intravenous DAILY Bigg Medley  mL/hr at 07/29/18 1012 10 mg at 07/29/18 1012     polyvinyl alcohol 1.4 % ophthalmic solution 1-2 drop (LIQUIFILM TEARS)  1-2 drop Both Eyes Q1H PRN Astrid Redd CNP   2 drop at 07/28/18 0556     senna-docusate 8.6-50 mg tablet 1 tablet (PERICOLACE)  1 tablet Oral BID Astrid Redd CNP    1 tablet at 07/26/18 0759    Or     sennosides syrup 8.8 mg (for SENOKOT)  8.8 mg Enteral Tube BID Astrid Redd, CNP         sodium chloride 0.9% 100-500 mL  100-500 mL Intravenous PRN Cynthia Alexander, LINH         sodium chloride flush 10-20 mL (NS)  10-20 mL Intravenous PRN Kota Mahoney MD         sodium chloride flush 10-30 mL (NS)  10-30 mL Intravenous PRN Kota Mahoney MD         sodium chloride flush 10-30 mL (NS)  10-30 mL Intravenous Q8H FIXED TIMES Kota Mahoney MD   30 mL at 07/30/18 0458     sodium chloride flush 20 mL (NS)  20 mL Intravenous PRN Kota Mahoney MD   20 mL at 07/29/18 1133     thiamine tablet 100 mg  100 mg Enteral Tube DAILY Lewis Farr MD        Or     thiamine injection 100 mg (vitamin B1)  100 mg Intramuscular DAILY Lewis Farr MD   100 mg at 07/29/18 1023         Total Critical Care Time : not critically ill      Alvaro (Ash Eastman MD  [facility]  Pager [phone]  Clinic [phone]

## 2021-06-19 NOTE — PROGRESS NOTES
RESPIRATORY CARE NOTE     Patient Name: Aayush García  Today's Date: 7/27/2018 07/27/18 1614   Respiratory Assessment   Assessment Type Pre-treatment   Respiratory Pattern Regular   Chest Assessment Chest expansion symmetrical   Respiratory Treatments    Medications Albuterol;Atrovent   $ Aerosol Subsequent Tx  HHN   Pre-Treatment Pulse 93   Pre-Treatment Respirations 28   Pre-Treatment Sp02 94   Breath Sounds Pre-Treatment Right Coarse   Breath Sounds Pre-Treatment Left Coarse       Albuterol Atrovent neb given. HR 93, RR 28, Sp02 94% on Heated Hi flow 30 lpm/60%. Breath sounds coarse throughout.  RT will continue to follow.      Cynthia Garcia

## 2021-06-19 NOTE — PROGRESS NOTES
GI Progress Note  Aayush García  [id number]    Subjective:   No new complaints     Objective:   Temp:  [98  F (36.7  C)-98.4  F (36.9  C)] 98  F (36.7  C)  Heart Rate:  [86-98] 93  Resp:  [18-36] 22  BP: ()/(55-68) 94/55  Body mass index is 32.31 kg/(m^2).     Gen: No acute distress  Cardio: RRR  GI: Soft, non-distended, non-tender without rebound or guarding    Laboratory  LAB DATA:    Results from last 7 days  Lab Units 07/31/18  0634 07/30/18  0428 07/29/18  0431 07/28/18  0509  07/26/18  0358   LN-ALKALINE PHOSPHATASE U/L 886* 805* 812* 771*  < > 654*   LN-BILIRUBIN TOTAL mg/dL 5.9* 5.9* 5.5* 4.6*  < > 2.7*   LN-BILIRUBIN DIRECT mg/dL  --  3.8*  --   --   --  1.6*   LN-PROTEIN TOTAL g/dL 5.9* 6.1 5.4* 6.3  < > 6.0   LN-ALT (SGPT) U/L 79* 82*  --  72*  < > 44   LN-AST (SGOT) U/L 304* 299* 336* 337*  < > 239*   < > = values in this interval not displayed.     Results from last 7 days  Lab Units 07/31/18  0634 07/30/18  0428 07/29/18  0431   LN-INR  1.41* 1.48* 1.68*         Results from last 7 days  Lab Units 07/31/18  0634 07/30/18  0428 07/29/18  1133 07/29/18  0431   LN-WHITE BLOOD CELL COUNT thou/uL 18.5* 20.8*  --  17.4*   LN-HEMOGLOBIN g/dL 8.6* 8.5* 8.3* 8.4*   LN-HEMATOCRIT % 26.1* 25.1*  --  25.2*   LN-PLATELET COUNT thou/uL 173 165  --  160     Results from last 7 days  Lab Units 07/31/18 0634   LN-SODIUM mmol/L 134*  134*   LN-POTASSIUM mmol/L 3.5  3.5   LN-CHLORIDE mmol/L 97*  97*   LN-CO2 mmol/L 26  26   LN-BLOOD UREA NITROGEN mg/dL 42*  42*   LN-CREATININE mg/dL 4.63*  4.63*   LN-CALCIUM mg/dL 8.5  8.5     Lipase   Date Value Ref Range Status   07/25/2018 256 (H) 0 - 52 U/L Final   07/24/2018 313 (H) 0 - 52 U/L Final     Principal Problem:    Septic shock (H)  Active Problems:    Acute respiratory failure with hypoxia (H)    Acute renal failure, unspecified acute renal failure type (H)    Metabolic acidosis    Encephalopathy, metabolic    Elevated troponin    Other cardiomyopathy  (H)    Bacteremia due to Klebsiella pneumoniae    Anemia, unspecified type    Alcohol abuse      Assessment:   62 y.o. male who presented to the hospital with acute onset of lethargy and confusion.  On evaluation the patient was found to have sepsis secondary to Klebsiella pneumonia and was admitted to the ICU. He continues to improve. LFTs elevated, probably related to sepsis or ETOH (AST> ALT). US showed small amount of sludge without biliary dilation. Urine output improved with no rise in creatinine.     Plan:   1. Continue supportive measures  2. Follow LFTs      Ney Khan MD  7/31/2018 9:53 AM  [facility]  [phone]

## 2021-06-19 NOTE — PROGRESS NOTES
"GASTROENTEROLOGY PROGRESS NOTE     SUBJECTIVE   The patient is making gradual improvements in the ICU.  He is more alert and denies any abdominal pain today.  No black or bloody stools in fact the patient has brown output in his fecal collection bag.     OBJECTIVE     Vitals Blood pressure 91/57, pulse 94, temperature 100.2  F (37.9  C), temperature source Axillary, resp. rate 28, height 5' 8\" (1.727 m), weight 207 lb 9.6 oz (94.2 kg), SpO2 98 %.          Physical Exam   General: Sleepy but able to answer simple questions    Cardiovascular: RRR, systolic murmur    Chest: Crackles throughout    Abdomen: soft, minimal tenderness to deep palpation, non-distended, bowel sounds present    Neurologic: Moves all 4 extremities        LABORATORY    ELECTROLYTE PANEL     Results from last 7 days  Lab Units 07/29/18  1132 07/29/18  0431 07/28/18  0509   LN-SODIUM mmol/L 139 141 136   LN-POTASSIUM mmol/L 3.8 3.4* 3.6   LN-CHLORIDE mmol/L 102 104 98   LN-CO2 mmol/L 25 25 24   LN-BLOOD UREA NITROGEN mg/dL 30* 23* 25*   LN-CREATININE mg/dL 3.75* 3.28* 3.49*   LN-CALCIUM mg/dL 9.0 8.1* 8.1*        HEMATOLOGY PANEL     Results from last 7 days  Lab Units 07/29/18  1133 07/29/18  0431 07/28/18  2030  07/28/18  0617 07/28/18  0509  07/27/18  0655 07/27/18  0411   LN-HEMOGLOBIN g/dL 8.3* 8.4* 8.3*  < >  --  7.8*  < >  --  6.8*   LN-MEAN CORPUSCULAR VOLUME fL  --  101*  --   --   --  100  --   --  102*   LN-WHITE BLOOD CELL COUNT thou/uL  --  17.4*  --   --   --  18.2*  --   --  15.1*   LN-PLATELET COUNT thou/uL  --  160  --   --   --  154  --  133* 127*   LN-INR   --  1.68*  --   --  1.47*  --   --   --  1.65*   < > = values in this interval not displayed.   LIVER AND PANCREAS PANEL     Results from last 7 days  Lab Units 07/29/18  0431 07/28/18  0509 07/27/18  0411 07/26/18  1411 07/26/18  0358   LN-ALKALINE PHOSPHATASE U/L 812* 771* 594* 628* 654*   LN-BILIRUBIN TOTAL mg/dL 5.5* 4.6* 2.9* 2.7* 2.7*   LN-BILIRUBIN DIRECT mg/dL  --   " --   --   --  1.6*   LN-PROTEIN TOTAL g/dL 5.4* 6.3 6.0 5.7* 6.0   LN-ALT (SGPT) U/L  --  72* 45 44 44   LN-AST (SGOT) U/L 336* 337* 231* 218* 239*     IMAGING STUDIES    Abdominal ultrasound from July 28, 2018  FINDINGS: Technically challenging exam due to the patient's body habitus and bowel gas.  GALLBLADDER: Small amount sludge in the gallbladder. No wall thickening.  BILE DUCTS: No intrahepatic bile duct dilation. The common bile duct was not visualized due to bowel gas..  LIVER: Hepatomegaly measuring 20.1 cm. Diffusely echogenic. Portions difficult to penetrate.  RIGHT KIDNEY: No hydronephrosis.  PANCREAS: Not well seen due to bowel gas.    I have reviewed the current diagnostic and laboratory tests.           IMPRESSION   Aayush García is a pleasant 62 y.o. male who presented to the hospital with acute onset of lethargy and confusion.  On evaluation the patient was found to have severe sepsis secondary to Klebsiella pneumonia and was admitted to the ICU.     RECOMMENDATION     Sepsis secondary to Klebsiella   IV antibiotics to treat.  Possible sources include lung versus bladder.       Respiratory failure   Continue ICU cares and close monitoring.  O2 to maintain oxygen saturations greater than 90%.       Elevated LFTs   Ongoing LFT elevation.  No evidence of bile duct dilation on repeat ultrasound.  No need for ERCP at this time.  This is more likely related to shock liver/sepsis.  Conservative management for now as the patient's respiratory status is fairly tenuous at this time.        Melena   No evidence of further black stools at this time.  IV PPI twice daily for now.  History of gastric ulcers.  At present the patient's stools are brown we will continue to monitor.     Carlos Francis M.D.  [facility]  Thank you for the opportunity to participate in the care of this patient.   Please feel free to call me with any questions or concerns.

## 2021-06-19 NOTE — LETTER
Letter by Corrina Blanca CNP at      Author: Corrina Blanca CNP Service: -- Author Type: --    Filed:  Encounter Date: 11/25/2019 Status: Signed         Patient: Aayush García   MR Number: [id number]   YOB: 1955   Date of Visit: 11/25/2019     Code Status:  FULL CODE  Visit Type: Discharge Summary     Facility:  [facility] [[id number]]          PCP:  Zachary Lewis MD  [phone]       Admission Date to our Facility: 11/6/2019 discharge Date from our Facility: 11/27/2019    Discharge Diagnosis:    1. Alcoholic hepatitis with ascites     2. Hepatic encephalopathy (H)     3. Chronic systolic heart failure (H)     4. Hypokalemia due to excessive gastrointestinal loss of potassium     5. Hypomagnesemia     6. Coronary artery disease involving native coronary artery of native heart without angina pectoris     7. Ischemic cardiomyopathy     8. Alcohol abuse          History of Present Illness: Aayush García is a 64 y.o. male with a past medical history for CAD with a recent MI and stenting 9/2019, systolic heart failure and an EF of 30 to 35%, chronic anemia on iron, current alcohol abuse, liver disease, GERD.  He was recently hospitalized at [facility] for altered mental status and lethargy for 3 weeks.  He had fallen off of his couch while he was sleeping and sustained rib fractures and became lethargic over 3 weeks.  Upon admission he was found to have a hemoglobin of 5.9 and thought it was secondary to his hematoma from his fall.  During his admission it was complicated by encephalopathy with which is likely due to prolonged alcohol withdrawal and possible hepatic encephalopathy.  He does have a history of daily alcohol use of 0.5 to 1 pints of hard liquor per day.  His blood alcohol level on admission was 271.  His ammonia level was 14, TSH was 1.54.  Negative for an infectious process and negative lactic acid.  He was he had a negative UA and a negative chest x-ray.  His  "ammonia level did rise up to 39 on 10/31.  He was started on lactulose 20 mg 2 times a day and was adjusted per his stooling.  His mental status did improve with the lactulose.     For his anemia he was found to have macrocytic anemia likely due to chronic EtOH abuse and was on aspirin and Brilinta.  He was consulted by GI which indicated his last EGD in 6/2019 showed patchy gastritis and a 10 mm duodenal bulb ulcer with gastric biopsies negative for H. pylori.  His last colonoscopy 3/2019 showed polyp without hemorrhoids.  He did have a thoracentesis on 10/28 in which 900 mL of nonbloody fluid was removed he did need 2 units of packed red blood cells.  His hemoglobin was stable at around 8 at discharge.  GI recommends that he have an outpatient colonoscopy.  Eventually his aspirin and Brilinta were restarted and he had no issues.  Because of the issue of acute hematuria it is recommended that he have a follow-up UA as an outpatient.     He did have issues with hypokalemia and hypomagnesemia and these were supplemented and thought to be likely related to his Lasix.  He did have also issues with attention and so it is recommended to closely monitor his weights, electrolytes and blood pressures.     During his hospitalization he had an episode in which he had \"staring spell\" that lasted approximately 30 seconds in which he bit his tongue.  He did have a follow-up head CT which showed no skull fracture intracranial bleed and EEG which was unremarkable.  He has no history of seizures and so this was felt to be uneventful.    Skilled Nursing Facility Course: While at the TCU he did progress with therapy to ambulating with a walker and standby assistance.  His ammonia levels did improve and was 26 on a redraw.  He did have lots of issues with excessive watery stools after taking lactulose.  At first, I did decrease this to a small dose of lactulose and recheck his ammonia and it remained at 26 and so I discontinued the " lactulose altogether.  He continues to have good bowel movements and his cognition seems to be at baseline.  I did recommend that he follow-up with GI however at this time he declines it would like his liver failure management performed by his primary care provider.  I did encourage him to follow-up with his primary care in the next 10 days in order to evaluate his liver enzymes.  During his TCU stay his AST did become slightly elevated at 49 however this was likely related to his dehydration status.  I did  him on abstaining from alcohol as to not add insult to his already injured liver.  He reports he will abstain however declines any resources to help with this.    He did have issues with electrolytes including hypokalemia and hyponatremia which was likely related to dehydration due to high doses of Lasix and increased watery stools due to lactulose.  Lactulose was discontinued as stated above and I did decrease his Lasix down to 10 mg daily.  His weight is been stable and his lower extremity edema has improved with compression h his magnesium level will need to be follow-up as an outpatient. His potassium redraw last week was 3.6.  I will draw his sodium and potassium one more time before discharge.  He also had low magnesium and was started on a magnesium supplement.    Discharge Medications:    Current Outpatient Medications   Medication Sig Dispense Refill   ? acetaminophen (TYLENOL) 325 MG tablet Take 2 tablets (650 mg total) by mouth every 6 (six) hours as needed.  0   ? aspirin 81 MG EC tablet Take 81 mg by mouth daily.     ? atorvastatin (LIPITOR) 80 MG tablet Take 1 tablet (80 mg total) by mouth daily. 90 tablet 3   ? b complex vitamins tablet Take 1 tablet by mouth daily.     ? carvedilol (COREG) 12.5 MG tablet Take 12.5 mg by mouth 2 (two) times a day with meals.     ? cholecalciferol, vitamin D3, (VITAMIN D3) 2,000 unit Tab Take 2,000 Units by mouth daily.     ? clopidogrel (PLAVIX) 75 mg  tablet Take 1 tablet (75 mg total) by mouth daily. 90 tablet 3   ? ferrous sulfate 325 (65 FE) MG tablet Take 1 tablet by mouth daily with breakfast.     ? folic acid (FOLVITE) 1 MG tablet Take 1 tablet (1 mg total) by mouth daily. 30 tablet 0   ? furosemide (LASIX) 20 MG tablet Take 1 tablet (20 mg total) by mouth daily. (Patient taking differently: Take 10 mg by mouth daily.       ) 30 tablet 0   ? furosemide (LASIX) 20 MG tablet TAKE 1 TABLET BY [facility] AS NEEDED FOR WEIGHT GAIN OF 3 LBS IN ONE DAY (Patient taking differently: 20 mg. TAKE 1 TABLET BY [facility] AS NEEDED FOR WEIGHT GAIN OF 3 LBS IN ONE DAY      ) 90 tablet 1   ? magnesium oxide (MAG-OX) 400 mg (241.3 mg magnesium) tablet Take 1 tablet (400 mg total) by mouth 2 (two) times a day.  0   ? multivitamin therapeutic tablet Take 1 tablet by mouth daily.     ? omeprazole (PRILOSEC) 40 MG capsule Take 40 mg by mouth daily before breakfast.     ? thiamine (VITAMIN B-1) 100 MG tablet Take 100 mg by mouth daily.       No current facility-administered medications for this visit.        For most current and accurate medication list, please contact the skilled nursing facility that this patient visit took place at.      Discharge Plan: Patient is stable to discharge to home with family support and follow-up with his primary provider regarding his liver failure, electrolyte imbalances.  He will discharge with home care services.    Review of Systems   Patient denies fever, chills, headache, lightheadedness, dizziness, rhinorrhea, cough, congestion, shortness of breath, chest pain, palpitations, abdominal pain, n/v, diarrhea, constipation, change in appetite, dysuria, frequency, burning or pain with urination.  Other than stated in HPI all other review of systems is negative.       Physical Exam   Vital signs: /78, heart rate 88, respiratory 18, temp 97.4.  GENERAL APPEARANCE: Well developed, well nourished, in no acute distress.  HEENT: normocephalic,  atraumatic  PERRL, sclerae anicteric, conjunctivae clear and moist, EOM intact  LUNGS: Lung sounds CTA, no adventitious sounds, respiratory effort normal.  CARD: RRR, S1, S2, without murmurs, gallops, rubs,   ABD: Soft and nontender with normal bowel sounds.   MSK: Muscle strength and tone were normal.  EXTREMITIES: Trace nonpitting edema of his lower extremities bilaterally  NEURO: Alert and oriented x 3.  Face is symmetric  SKIN: Inspection of the skin reveals no rashes, ulcerations or petechiae.  PSYCH: euthymic          Labs:    Recent Results (from the past 240 hour(s))   Magnesium   Result Value Ref Range    Magnesium 1.5 (L) 1.8 - 2.6 mg/dL   Potassium   Result Value Ref Range    Potassium 3.6 3.5 - 5.0 mmol/L   Magnesium   Result Value Ref Range    Magnesium 1.5 (L) 1.8 - 2.6 mg/dL   Ammonia   Result Value Ref Range    Ammonia 26 11 - 35 umol/L         Assessment:  1. Alcoholic hepatitis with ascites     2. Hepatic encephalopathy (H)     3. Chronic systolic heart failure (H)     4. Hypokalemia due to excessive gastrointestinal loss of potassium     5. Hypomagnesemia     6. Coronary artery disease involving native coronary artery of native heart without angina pectoris     7. Ischemic cardiomyopathy     8. Alcohol abuse         MEDICAL EQUIPMENT NEEDS:  NA      DISCHARGE PLAN/FACE TO FACE:  I certify that services are/were furnished while this patient was under the care of a physician and that a physician or an allowed non-physician practitioner (NPP), had a face-to-face encounter that meets the physician face-to-face encounter requirements. The encounter was in whole, or in part, related to the primary reason for home health. The patient is confined to his/her home and needs intermittent skilled nursing, physical therapy, speech-language pathology, or the continued need for occupational therapy. A plan of care has been established by a physician and is periodically reviewed by a physician.    I certify that  this patient is under my care and that I, or a nurse practitioner or physician's assistant working with me, had a face-to-face encounter that meets the physician face-to-face encounter requirements with this patient.   Date of Face-to-Face Encounter: 11/25/2019    I certify that, based on my findings, the following services are medically necessary home health services: PT/OT    My clinical findings support the need for the above skilled services because: (Please write a brief narrative summary that describes what the RN, PT, SLP, or other services will be doing in the home. A list of diagnoses in this section does not meet the CMS requirements.)  PT/OT for ongoing endurance and strengthening with home safety evaluation    This patient is homebound because: (Please write a brief narrative summary describing the functional limitations as to why this patient is homebound and specifically what makes this patient homebound.)  Patient requires maximum effort in order to get out into the community on a regular basis.    The patient is, or has been, under my care and I have initiated the establishment of the plan of care. This patient will be followed by a physician who will periodically review the plan of care.        Electronically signed by: Corrina Blanca, CNP

## 2021-06-19 NOTE — PROGRESS NOTES
Faculty Supervision of Residents   I have examined this patient and the medical care has been evaluated and discussed with the resident. See resident note to follow outlining our discussion.    DOS 8/9/2018    Genia Spears MD

## 2021-06-19 NOTE — PROGRESS NOTES
Renal    Patient data reviewed.  Admitted last evening with septic shock and has severe acute renal failure with oliguria.  Home meds include lisinopril.    Previously normal renal function and creatinine now 10.  He has worsening metabolic acidosis with elevated lactate.  So far has had 200 mEq of bicarbonate, but requiring intubation for respiratory fatigue.    Potassium has increased overnight to 6.5.  His blood pressure is better so worth trying diuretics at this point.  High-dose loop and thiazide diuretic bolus ordered.  However not overly optimistic he will respond given the severity of his illness.  Agree with your acute management of insulin/glucose, calcium, alkali and albuterol.    Also would consider NG once he is intubated and dose of Kayexalate now.  Probably will require bicarbonate drip to help with acidemia.  Regardless may require dialysis later today.    Follow-up potassium ordered.  Please call if any change in status this morning.      Parveen Jiménez  [facility]  [phone]

## 2021-06-19 NOTE — PROCEDURES
Aayush García 62 y.o. male Dialysis treatment started at 1406 and ended at 1609; ran for 3hours on a K bath of 3.  Total removed 3kg.  Meds given Albumin 25% 37.5 g throughout treatment. Access right tunneled CVC. Heparin none. Tolerated well, no complaints.  Complications.none  Incapacitated Dialysis Nurse Procedure reviewed with Rocky Kaminski, RN  Water alarm functional and in place entire treatment.  Hepatitis B status. Neg Date 7/25/18.  Patient Consent Verified yes.

## 2021-06-19 NOTE — PROGRESS NOTES
GI Progress Note  Aayush García  [id number]    Subjective:   Patient remains sleepy and encephalopathic. No pain. He is on lactulose 20g two times a day and has been having regular BM's; 4 episodes yesterday, None today.       Objective:   Temp:  [97.6  F (36.4  C)-98.9  F (37.2  C)] 98.3  F (36.8  C)  Heart Rate:  [71-84] 80  Resp:  [20-22] 22  BP: ()/(34-58) 97/35  Body mass index is 32.28 kg/(m^2).     Gen: No acute distress  Cardio: RRR  GI: Soft, non-distended, non-tender without rebound or guarding    Laboratory  LAB DATA:    Results from last 7 days  Lab Units 08/03/18  0548 08/02/18  0535 07/31/18  0634 07/30/18  0428   LN-ALKALINE PHOSPHATASE U/L 783* 846* 886* 805*   LN-BILIRUBIN TOTAL mg/dL 5.6* 5.8* 5.9* 5.9*   LN-BILIRUBIN DIRECT mg/dL  --  3.7*  --  3.8*   LN-PROTEIN TOTAL g/dL 5.7* 5.8* 5.9* 6.1   LN-ALT (SGPT) U/L 87* 85* 79* 82*   LN-AST (SGOT) U/L 274* 287* 304* 299*        Results from last 7 days  Lab Units 08/03/18  0548 08/02/18  0535 08/01/18  0508   LN-INR  1.39* 1.43* 1.47*         Results from last 7 days  Lab Units 08/03/18  0548 08/02/18  0535 07/31/18  0634   LN-WHITE BLOOD CELL COUNT thou/uL 20.5* 19.6* 18.5*   LN-HEMOGLOBIN g/dL 8.2* 8.4* 8.6*   LN-HEMATOCRIT % 25.5* 25.2* 26.1*   LN-PLATELET COUNT thou/uL 235 219 173       Results from last 7 days  Lab Units 08/03/18  0548   LN-SODIUM mmol/L 139   LN-POTASSIUM mmol/L 4.0   LN-CHLORIDE mmol/L 103   LN-CO2 mmol/L 22   LN-BLOOD UREA NITROGEN mg/dL 49*   LN-CREATININE mg/dL 5.51*   LN-CALCIUM mg/dL 8.6     Lipase   Date Value Ref Range Status   07/25/2018 256 (H) 0 - 52 U/L Final   07/24/2018 313 (H) 0 - 52 U/L Final     Principal Problem:    Septic shock (H)  Active Problems:    Acute respiratory failure with hypoxia (H)    Acute renal failure, unspecified acute renal failure type (H)    Metabolic acidosis    Encephalopathy, metabolic    Elevated troponin    Stress-induced cardiomyopathy    Bacteremia due to Klebsiella  pneumoniae    Anemia, unspecified type    Alcohol abuse      Assessment:   62 y.o. male who presented to the hospital with acute onset of lethargy and confusion.  On evaluation the patient was found to have sepsis secondary to Klebsiella pneumonia and was admitted to the ICU. He continues to improve. LFTs elevated, probably related to sepsis or ETOH (AST> ALT). US showed small amount of sludge without biliary dilation. Urine output improved with no rise in creatinine.     Plan:   1. Continue supportive measures  2. Continue lactulose for encephalopathy. If no relief, can consider adding rifaximin.       Favio Anthony PA-C  [facility]  [phone]

## 2021-06-19 NOTE — PROGRESS NOTES
[facility] Progress Note    Subjective    Aayush García is sitting up at bedside chair with wife present.  Patient states he is having some chronic back pain and knee pain.  Is feeling overall okay this morning but notes feeling wiped out after dialysis yesterday.    Overnight had an episode of hypotension which required 12.5 g 25% albumin.  Resolved following administration.  However given episode needing albumin, did not recommend transfer to [place] today, given he is not medically stable.    Objective    Vital signs in last 24 hours Temp:  [97.6  F (36.4  C)-99.1  F (37.3  C)] 97.7  F (36.5  C)  Heart Rate:  [] 63  Resp:  [16-33] 24  BP: ()/(44-75) 95/52   Weight: 201 lb 4.8 oz (91.3 kg)    Physical Exam   General appearance: Alert, conversational, confusion improving, oriented to person and place.  Jaundiced, scleral icterus.  Lungs: Clear to auscultation bilaterally.  No wheezing or crackles auscultated.  Respirations unlabored  Heart: Regular rate and rhythm, normal S1 and S2, no murmur, rub or gallop.  Abdomen: Obese, distended, tympanic, soft, nontender.  Extremities: +3 pitting edema up to abdomen  Skin: Jaundiced  Neurologic: Alert, oriented to person and place.  No gross neurologic deficits.  Moves all extremities spontaneously.    Pertinent Labs   Lab Results: personally reviewed.     Pertinent Radiology   Radiology Results: Personally reviewed.    Assessment/Plan  Principal Problem:    Septic shock (H)  Active Problems:    Acute respiratory failure with hypoxia (H)    Acute renal failure, unspecified acute renal failure type (H)    Metabolic acidosis    Encephalopathy, metabolic    Elevated troponin    Stress-induced cardiomyopathy    Bacteremia due to Klebsiella pneumoniae    Anemia, unspecified type    Alcohol abuse    Acute encephalopathy    Acute liver failure    Aayush García is a 62 year old male with a history of alcohol abuse, HTN and anemia who was admitted with  septic shock secondary to Klebsiella bacteremia and anuria.  Required intubation and pressor support in ICU, currently extubated, moved out of the ICU 7/31, and was transferred back into the ICU 8/5 due to need for pressure support.  Transferred back out of the ICU 8/8.  Continues to have issues with hypotension, preventing discharge at this time.      Acute renal injury: Most likely severe ATN due to klebsiella sepsis, though cause still not completely clear. May be component of volume depletion as well.  Abdominal US negative 7/28.  Has initiated dialysis and will continue MWF for the foreseen future. Dialysis catheter placed 8/1.  - Nephrology following   -Plan to continue dialysis MWF      Sepsis   Klebsiella bacteremia  Persistent leukocytosis: Admitted with leukocytosis, lactic acidosis, hypotension and tachycardia. Blood cultures growing klebsiella.  UA shows leukocytes, blood and few bacteria but may be unreliable as taken after period of anuria.  Most likely urinary source, though now question possible SBP given hepatic encephalopathy.  Possibly GI source, did have ulcer 1-2 months ago.  Unlikely PNA given CXR without infiltrates. Persistent leukocytosis of approximately 20 thousand, down trending today. Neutrophil predominance on the 28th. Repeat lactate normal. Blood cultures x2 drawn and no growth yet.  Repeat chest x-ray showing improvement of infiltrates and improvement of pulmonary edema. CT abdomen showed no abscess just ascites. Therapeutic paracentesis performed with 2L drawn, unfortunately fluid was not sent for culture or stain.  UA 8/5 suspicious for urinary tract infection, however urine culture with no growth. IV Zosyn initiated 8/5 and discontinued 8/8.   -Zosyn was discontinued 8/8 due to negative cultures.  And observe patient for greater than 24 hours off antibiotics, prior to discharge      AMS  Alcohol withdrawal, resolving   Unclear etiology of altered mental status, though may be  multifactorial cause including hepatic encephalopathy, underlying sepsis, alcohol withdrawal, medication induced, ICU delirium. Ammonia level unremarkable, empirically treated with lactulose and rifaximin.  With some improvement after starting rifaximin on 8/5  - Delirium order set   -Avoid sedating medications  - PT/OT/SLP  -Lactulose, titrate as needed for 3-4 stools daily  - Rifaximin 400 mg TID      Hypotension: Likely multifactorial, given underlying sepsis, and possible cirrhosis.  Also with severe hypoalbuminemia which is likely contributing. Was transferred to the ICU for pressure support 8/5 and transferred out 8/8 after increased minodrine.  Was given albumin 25 g IV every 6 hours for 8 doses starting 8/5.  Evening of 8/8 needed additional dose of albumin secondary to episode of hypotension.  - Dialysis per nephrology  - Midodrine to 15 mg Q8H.  Continue as needed midodrine  -Avoid giving fluid bolus if possible given difficulty to remove fluid.  Consider albumin if hypotensive not responding to midodrine      Hepatic injury  Possible hepatic encephalopathy  Ascites: Elevated AST/ALT, bilirubin, alk phos, CK and INR with hypoalbuminemia. Decreased synthetic function of liver. US 7/25 showed fatty infiltration of liver which may be contributing but no concerning biliary tree disease. Peripheral smear relatively normal which is reassuring for DIC. Repeat RUQ u/s 7/28 without acute findings.  LFTs stabilized but remain elevated.  Paracentesis 8/3 with 1.95 L fluid removed, however unfortunately send for Gram stain or culture. MELD score of 31 with a 53% estimated 3 month mortality.   - Will treat possible hepatic encephalopathy, as above, though likely multiple causes of patient's altered mental status.   - GI following, appreciate their recommendations  -If paracentesis is performed again would recommending sending fluid for analysis.    Macrocytic anemia: Per chart review, Hb of 13.0 in 2/2018 with steady  decline until present. Consistently macrocytic. Likely nutritional component due to alcohol use but continuous decline is concerning for underlying cause. Was also having melena as above. Peripheral smear showed inflammatory changes only.  LDH and haptoglobin only mildly elevated.  Has been transfused 1uPRBCs on 8/6.  - AM CBC  - EPO per nephrology     NSTEMI: Type II myocardial infarction or demand related ischemia secondary to sepsis on presentation.  EF of 25-30% on 7/25/2018, significantly improved with echo 7/31/2018 with an EF of 61%.  Per cardiology not recommending ischemic workup at this time.  - Cardiology has signed off.  - Discontinued coreg 3.125 mg twice daily due to hypotension.  Consider restarting when hypotension has resolved.  - Avoid QT prolong meds  - Telemetry monitoring      Melena, resolved: Episode of melanotic stool 7/27-28.  Has had downtrending hemoglobin for months as well as while here.  Was seen in clinic recently for melena which was thought due to NSAID use.  Symptoms had resolved after he had stopped using NSAIDs.  He was on a PPI as well.  Again having melenic stools here, possibly gastritis from decreased perfusion versus worsening ulceration.   - GI following as above  - PPI two times a day   - Transfuse for Hgb < 7      Respiratory fatigue - resolving: Successfully extubated. Remains of supplemental O2 however, significantly improved status  - Supplemental O2 prn  -Continue duo nebs and albuterol as needed      Sinus Arrythmia: New with this admission with frequent PACs.  Had previously been sinus tachycardic and now with arrhythmia.  Possibly related to overall disease process versus cardiac injury from shock.    - Telemetry.      History of alcohol use  History of tobacco abuse: 350ml peppermint schnapps daily for years. Per wife, never has had withdrawal though never been more than 2 days without a drink that she knows of.    - Nicotine patch  - Continue to monitor for  withdrawal symptoms  -Continue folic acid, thiamine, multivitamin      Diet: 2mg Na  Fluids: No IVF  DVT Prophylaxis: Subcutaneous heparin  Code: full code     Disposition/Advanced Care Planning   Discharge Planning discussed with patient's wife  Barriers to discharge: Treatment of hypotensive episodes   Dispo: anticipate discharge to [place]   Anticipated discharge date:  Likely tomorrow if blood pressures are stable       Precepted patient with Dr. Genia Xie DO (PGY2)  [facility] Resident  Pager: [phone]

## 2021-06-19 NOTE — PROGRESS NOTES
Dialysis Update-    Pt has orders to have 2 hour UF only dialysis tx. When receiving report, hypotension noted despite pt receiving midodrine. BP 78/58    Will wait to dialyze pt until Dr Oliver from Nephrology sees him. Text page sent at 0924 to update her, awaiting response.

## 2021-06-19 NOTE — PROGRESS NOTES
Faculty Supervision of Residents    I have examined this patient on 8/13/2018 and the medical care has been evaluated and discussed with the resident.  See resident note to follow outlining our discussion.    Pt was moaning in pain from his back (history of chronic back pain) during dialysis and I wrote for 1 time dose of 5mg oral oxycodone. He was able to answer questions and follow commands when I talked with him but certainly any medicine that could alter his mental status should be used cautiously given his presenting problems.     Fluid restriction started + different bath today during dialysis to aid with hyponatremia.     Looking for [place] bed.    QUENTIN WILDER

## 2021-06-19 NOTE — PROGRESS NOTES
Aayush García 62 y.o. male Dialysis treatment started at 1315 and ended at 1615; ran for 3 hours on a K bath of 3.  Total removed 0kg.  Meds given none. Access RIJ. Heparin dwell. Tolerated well, no complaints.   Complications. None  Incapacitated Dialysis Nurse Procedure reviewed with Loyda Rivera RN  Water alarm functional and in place entire treatment.  Hepatitis B status. unknown Date 7-25-18.  Patient Consent Verified yes    Reported off to Loyda Lozoya RN.

## 2021-06-19 NOTE — LETTER
Letter by Corrina Blanca CNP at      Author: Corrina Blanca CNP Service: -- Author Type: --    Filed:  Encounter Date: 11/14/2019 Status: Signed         Patient: Aayush García   MR Number: [id number]   YOB: 1955   Date of Visit: 11/14/2019     Code Status:  FULL CODE  Visit Type: Follow Up (loose stools, lab results)     Facility:  [facility] [[id number]]      Facility Type: SNF (Skilled Nursing Facility, TCU)    History of Present Illness:   Hospital Admission Date: 10/27/2019 Hospital Discharge Date: 11/6/2019       Aayush García is a 64 y.o. male with a past medical history for CAD with a recent MI and stenting 9/2019, systolic heart failure and an EF of 30 to 35%, chronic anemia on iron, current alcohol abuse, liver disease, GERD.  He was recently hospitalized at [facility] for altered mental status and lethargy for 3 weeks.  He had fallen off of his couch while he was sleeping and sustained rib fractures and became lethargic over 3 weeks.  Upon admission he was found to have a hemoglobin of 5.9 and thought it was secondary to his hematoma from his fall.  During his admission it was complicated by encephalopathy with which is likely due to prolonged alcohol withdrawal and possible hepatic encephalopathy.  He does have a history of daily alcohol use of 0.5 to 1 pints of hard liquor per day.  His blood alcohol level on admission was 271.  His ammonia level was 14, TSH was 1.54.  Negative for an infectious process and negative lactic acid.  He was he had a negative UA and a negative chest x-ray.  His ammonia level did rise up to 39 on 10/31.  He was started on lactulose 20 mg 2 times a day and was adjusted per his stooling.  His mental status did improve with the lactulose.    For his anemia he was found to have macrocytic anemia likely due to chronic EtOH abuse and was on aspirin and Brilinta.  He was consulted by GI which indicated his last EGD in 6/2019 showed patchy  "gastritis and a 10 mm duodenal bulb ulcer with gastric biopsies negative for H. pylori.  His last colonoscopy 3/2019 showed polyp without hemorrhoids.  He did have a thoracentesis on 10/28 in which 900 mL of nonbloody fluid was removed he did need 2 units of packed red blood cells.  His hemoglobin was stable at around 8 at discharge.  GI recommends that he have an outpatient colonoscopy.  Eventually his aspirin and Brilinta were restarted and he had no issues.  Because of the issue of acute hematuria it is recommended that he have a follow-up UA as an outpatient.    He did have issues with hypokalemia and hypomagnesemia and these were supplemented and thought to be likely related to his Lasix.  He did have also issues with attention and so it is recommended to closely monitor his weights, electrolytes and blood pressures.    During his hospitalization he had an episode in which he had \"staring spell\" that lasted approximately 30 seconds in which he bit his tongue.  He did have a follow-up head CT which showed no skull fracture intracranial bleed and EEG which was unremarkable.  He has no history of seizures and so this was felt to be uneventful.    Today, patient reports that his loose stools have lessened since decreasing the lactulose.  He reports that he has significant watery stools 5-8 times approximately up to 2 hours after taking his lactulose in the morning and then it gradually decreases throughout the day and he is able to sleep without issues.  His ammonia level came back within normal limits at 29.  His recheck potassium came back at 3.4 and I reduced his potassium to KCl 20 mEq daily.  He reports eating well and his wife is bringing in a daily banana.  He has a follow-up with cardiology next week.  Edema of his lower extremities are slightly better than earlier this week.  I did explain to him that his liver labs were slightly elevated likely to dehydration from his diarrhea and diuretics.    Past " Medical History:   Diagnosis Date   ? Acute liver failure 2018   ? Acute renal failure, unspecified acute renal failure type (H) 2018   ? Acute respiratory failure with hypoxia (H) 2018   ? Alcoholic hepatitis with ascites 2018   ? Bacteremia due to Klebsiella pneumoniae    ? Coronary artery disease involving native coronary artery of native heart without angina pectoris 2019   ? Essential hypertension    ? Gastroesophageal reflux disease without esophagitis 10/31/2018   ? Hepatic encephalopathy (H) 2018   ? Macrocytic anemia    ? Stress-induced cardiomyopathy 2018     Past Surgical History:   Procedure Laterality Date   ? COLONOSCOPY N/A 3/20/2018    Procedure: COLONOSCOPY;  Surgeon: Adam Nicole III, MD;  Location: [place] [facility];  Service:    ? CV CORONARY ANGIOGRAM N/A 3/18/2019    Procedure: Coronary Angiogram;  Surgeon: Timi Rodriguez MD;  Location: [facility];  Service: Cardiology   ? CV LEFT HEART CATHETERIZATION WO LEFT VETRICULOGRAM Left 3/18/2019    Procedure: Left Heart Catheterization Without Left Ventriculogram;  Surgeon: Timi Rodriguez MD;  Location: [facility];  Service: Cardiology   ? PICC  2018        ? PICC  3/18/2019        ? US THORACENTESIS  10/28/2019     Family History   Problem Relation Age of Onset   ? Heart disease Father 76        type unknown to Aayush; his father  at home   ? Alzheimer's disease Mother 86        lives in long term care   ? No Medical Problems Son    ? No Medical Problems Son      Social History     Socioeconomic History   ? Marital status: Domestic Partner     Spouse name: Karlie Chisholm (АННА)   ? Number of children: Not on file   ? Years of education: Not on file   ? Highest education level: Not on file   Occupational History   ? Occupation: Manager of building material department     Employer: [employer]   Social Needs   ? Financial resource strain: Not on file   ? Food insecurity:     Worry: Not  on file     Inability: Not on file   ? Transportation needs:     Medical: Not on file     Non-medical: Not on file   Tobacco Use   ? Smoking status: Former Smoker     Packs/day: 1.00     Years: 40.00     Pack years: 40.00     Types: Cigarettes     Last attempt to quit: 2019     Years since quittin.5   ? Smokeless tobacco: Never Used   Substance and Sexual Activity   ? Alcohol use: Yes     Frequency: 4 or more times a week     Drinks per session: 1 or 2     Comment: 350 ml of peppermint schnapps daily per 2018 H&P per report of Karlie JJ to Dr. Valle   ? Drug use: No   ? Sexual activity: Not on file   Lifestyle   ? Physical activity:     Days per week: Not on file     Minutes per session: Not on file   ? Stress: Not on file   Relationships   ? Social connections:     Talks on phone: Not on file     Gets together: Not on file     Attends Caodaism service: Not on file     Active member of club or organization: Not on file     Attends meetings of clubs or organizations: Not on file     Relationship status: Not on file   ? Intimate partner violence:     Fear of current or ex partner: Not on file     Emotionally abused: Not on file     Physically abused: Not on file     Forced sexual activity: Not on file   Other Topics Concern   ? Not on file   Social History Narrative    Works in [employer]. Lives with his SO, Karlie Chisholm.       Current Outpatient Medications   Medication Sig Dispense Refill   ? acetaminophen (TYLENOL) 325 MG tablet Take 2 tablets (650 mg total) by mouth every 6 (six) hours as needed.  0   ? aspirin 81 MG EC tablet Take 81 mg by mouth daily.     ? atorvastatin (LIPITOR) 80 MG tablet Take 1 tablet (80 mg total) by mouth daily. 90 tablet 3   ? b complex vitamins tablet Take 1 tablet by mouth daily.     ? carvedilol (COREG) 12.5 MG tablet Take 12.5 mg by mouth 2 (two) times a day with meals.     ? cholecalciferol, vitamin D3, (VITAMIN D3) 2,000 unit Tab Take 2,000 Units by mouth  daily.     ? ferrous sulfate 325 (65 FE) MG tablet Take 1 tablet by mouth daily with breakfast.     ? folic acid (FOLVITE) 1 MG tablet Take 1 tablet (1 mg total) by mouth daily. 30 tablet 0   ? furosemide (LASIX) 20 MG tablet Take 1 tablet (20 mg total) by mouth daily. (Patient taking differently: Take 10 mg by mouth daily.       ) 30 tablet 0   ? furosemide (LASIX) 20 MG tablet TAKE 1 TABLET BY MOTUH AS NEEDED FOR WEIGHT GAIN OF 3 LBS IN ONE DAY (Patient taking differently: 20 mg. TAKE 1 TABLET BY MOTUH AS NEEDED FOR WEIGHT GAIN OF 3 LBS IN ONE DAY      ) 90 tablet 1   ? lactulose (ENULOSE) 20 gram/30 mL Soln solution Take 30 mL (20 g total) by mouth 2 (two) times a day. (Patient taking differently: Take 10 g by mouth 2 (two) times a day.       ) 1800 mL 0   ? magnesium oxide (MAG-OX) 400 mg (241.3 mg magnesium) tablet Take 1 tablet (400 mg total) by mouth 2 (two) times a day.  0   ? multivitamin therapeutic tablet Take 1 tablet by mouth daily.     ? omeprazole (PRILOSEC) 40 MG capsule Take 40 mg by mouth daily before breakfast.     ? thiamine (VITAMIN B-1) 100 MG tablet Take 100 mg by mouth daily.     ? ticagrelor (BRILINTA) 90 mg Tab Take 1 tablet (90 mg total) by mouth 2 (two) times a day. 180 tablet 3     No current facility-administered medications for this visit.      No Known Allergies  Immunization History   Administered Date(s) Administered   ? INFLUENZA,RECOMBINANT,INJ,PF QUADRIVALENT 18+YRS 10/29/2019   ? Influenza,seasonal quad, PF, =/> 6months 10/04/2018   ? Tdap 10/04/2018         Review of Systems   Patient denies fever, chills, headache, lightheadedness, dizziness, rhinorrhea, cough, congestion, shortness of breath, chest pain, palpitations, abdominal pain, constipation, change in appetite, dysuria, frequency, burning or pain with urination.  Other than stated in HPI all other review of systems is negative.         Physical Exam     Vital signs: /69, heart rate 91, respiratory 18, temp  98.4.  GENERAL APPEARANCE: Well developed, well nourished, in no acute distress.  HEENT: normocephalic, atraumatic  PERRL, sclerae anicteric, conjunctivae clear and moist, EOM intact  LUNGS: Lung sounds CTA, no adventitious sounds, respiratory effort normal.  CARD: RRR, S1, S2, harsh systolic murmur, no gallops, rubs,  ABD: Soft and nontender with normal bowel sounds.   MSK: Muscle strength and tone were normal.  EXTREMITIES: Nonpitting pedal edema bilateral lower extremities  NEURO: Alert and oriented x 3.  Face is symmetric.  SKIN: Multiple purpura of upper extremities with skin tears.  PSYCH: euthymic            Labs:   Recent Results (from the past 240 hour(s))   Basic Metabolic Panel   Result Value Ref Range    Sodium 135 (L) 136 - 145 mmol/L    Potassium 3.8 3.5 - 5.0 mmol/L    Chloride 100 98 - 107 mmol/L    CO2 26 22 - 31 mmol/L    Anion Gap, Calculation 9 5 - 18 mmol/L    Glucose 87 70 - 125 mg/dL    Calcium 7.8 (L) 8.5 - 10.5 mg/dL    BUN 11 8 - 22 mg/dL    Creatinine 0.75 0.70 - 1.30 mg/dL    GFR MDRD Af Amer >60 >60 mL/min/1.73m2    GFR MDRD Non Af Amer >60 >60 mL/min/1.73m2   HM2(CBC w/o Differential)   Result Value Ref Range    WBC 6.3 4.0 - 11.0 thou/uL    RBC 2.91 (L) 4.40 - 6.20 mill/uL    Hemoglobin 8.1 (L) 14.0 - 18.0 g/dL    Hematocrit 28.1 (L) 40.0 - 54.0 %    MCV 97 80 - 100 fL    MCH 27.8 27.0 - 34.0 pg    MCHC 28.8 (L) 32.0 - 36.0 g/dL    RDW 18.4 (H) 11.0 - 14.5 %    Platelets 389 140 - 440 thou/uL    MPV 9.7 8.5 - 12.5 fL   Basic Metabolic Panel   Result Value Ref Range    Sodium 134 (L) 136 - 145 mmol/L    Potassium 3.7 3.5 - 5.0 mmol/L    Chloride 101 98 - 107 mmol/L    CO2 27 22 - 31 mmol/L    Anion Gap, Calculation 6 5 - 18 mmol/L    Glucose 87 70 - 125 mg/dL    Calcium 7.8 (L) 8.5 - 10.5 mg/dL    BUN 9 8 - 22 mg/dL    Creatinine 0.73 0.70 - 1.30 mg/dL    GFR MDRD Af Amer >60 >60 mL/min/1.73m2    GFR MDRD Non Af Amer >60 >60 mL/min/1.73m2   Magnesium   Result Value Ref Range     Magnesium 1.6 (L) 1.8 - 2.6 mg/dL   C. Diff Toxin By PCR   Result Value Ref Range    C.Difficile Toxigenic by PCR Negative Negative    Ribotype 027/NAP1/B1 Presumptive Negative Presumptive Negative   Comprehensive Metabolic Panel   Result Value Ref Range    Sodium 136 136 - 145 mmol/L    Potassium 2.6 (LL) 3.5 - 5.0 mmol/L    Chloride 107 98 - 107 mmol/L    CO2 21 (L) 22 - 31 mmol/L    Anion Gap, Calculation 8 5 - 18 mmol/L    Glucose 67 (L) 70 - 125 mg/dL    BUN 7 (L) 8 - 22 mg/dL    Creatinine 0.67 (L) 0.70 - 1.30 mg/dL    GFR MDRD Af Amer >60 >60 mL/min/1.73m2    GFR MDRD Non Af Amer >60 >60 mL/min/1.73m2    Bilirubin, Total 0.5 0.0 - 1.0 mg/dL    Calcium 8.1 (L) 8.5 - 10.5 mg/dL    Protein, Total 7.0 6.0 - 8.0 g/dL    Albumin 2.5 (L) 3.5 - 5.0 g/dL    Alkaline Phosphatase 365 (H) 45 - 120 U/L    AST 49 (H) 0 - 40 U/L    ALT 33 0 - 45 U/L   Magnesium   Result Value Ref Range    Magnesium 1.6 (L) 1.8 - 2.6 mg/dL   Hemoglobin   Result Value Ref Range    Hemoglobin 9.1 (L) 14.0 - 18.0 g/dL   Ammonia   Result Value Ref Range    Ammonia 26 11 - 35 umol/L   Urinalysis   Result Value Ref Range    Color, UA Yellow Colorless, Yellow, Straw, Light Yellow    Clarity, UA Clear Clear    Glucose, UA Negative Negative    Bilirubin, UA Negative Negative    Ketones, UA Trace (!) Negative    Specific Gravity, UA 1.026 1.001 - 1.030    Blood, UA Negative Negative    pH, UA 5.5 4.5 - 8.0    Protein, UA 30 mg/dL (!) Negative mg/dL    Urobilinogen, UA 2.0 E.U./dL <2.0 E.U./dL, 2.0 E.U./dL    Nitrite, UA Negative Negative    Leukocytes, UA Negative Negative    Bacteria, UA None Seen None Seen hpf    RBC, UA 0-2 None Seen, 0-2 hpf    WBC, UA 0-5 None Seen, 0-5 hpf    Squam Epithel, UA 0-5 None Seen, 0-5 lpf    Mucus, UA Moderate (!) None Seen lpf    Hyaline Casts, UA 25-50 (!) 0-5, None Seen lpf   Potassium   Result Value Ref Range    Potassium 2.7 (LL) 3.5 - 5.0 mmol/L   Potassium   Result Value Ref Range    Potassium 3.4 (L) 3.5 - 5.0  mmol/L         Assessment:  1. Alcoholic hepatitis with ascites     2. Hepatic encephalopathy (H)     3. Chronic systolic heart failure (H)     4. Hypokalemia due to excessive gastrointestinal loss of potassium         Plan:   His plan is to discharged home late next week.  He is concerned that his ongoing persistent liquid stools will decrease his ability to go back to work.  For his hepatic encephalopathy we will continue to monitor his ammonia and will have him check this next Monday.  Continue with lactulose 10 mg daily and monitor for any confusion.    Heart failure: Continue Lasix at 10 mg daily and monitor his weights and edema.    Hypokalemia: Continue KCl 20 mEq daily and check potassium next Monday.    Electronically signed by: Corrina Blanca CNP

## 2021-06-19 NOTE — PROGRESS NOTES
[facility] Progress Note    Subjective    Aayush García is receiving dialysis currently. He is alert and oriented to person only. No significant change in mentation today. Complaining of a flare of his chronic LBP which was not relieved by tylenol given this morning. Concerns about agitation overnight needing 1:1, though remains redirectable. Na overnight 124->123->121, unclear if this is causing alterations in mentation, though does not seem to be significantly more altered than yesterday. Addressed sodium with nephrology who will be performing dialysis today and placed him on a water restriction.     Objective    Vital signs in last 24 hours Temp:  [97.8  F (36.6  C)-98.5  F (36.9  C)] 97.8  F (36.6  C)  Heart Rate:  [78-98] 78  Resp:  [20-28] 20  BP: (114-150)/(72-85) 142/85   Weight: 199 lb 4.8 oz (90.4 kg)    Physical Exam   General appearance: Alert, conversational, confusion. Oriented to person only. Jaundiced, scleral icterus.  Lungs: Clear to auscultation bilaterally.  No wheezing or crackles auscultated.  Respirations unlabored  Heart: Regular rate and rhythm, normal S1 and S2, no murmur, rub or gallop.  Abdomen: Obese, distended, soft, nontender.  Extremities: +2 pitting edema up to knees  Skin: Jaundiced  Neurologic: Alert, oriented to person.  No gross neurologic deficits.  Moves all extremities spontaneously.    Pertinent Labs   Lab Results: personally reviewed.     Pertinent Radiology   Radiology Results: Personally reviewed.    Assessment/Plan  Principal Problem:    Septic shock (H)  Active Problems:    Acute respiratory failure with hypoxia (H)    Acute renal failure, unspecified acute renal failure type (H)    Metabolic acidosis    Encephalopathy, metabolic    Elevated troponin    Stress-induced cardiomyopathy    Bacteremia due to Klebsiella pneumoniae    Anemia, unspecified type    Alcohol abuse    Acute encephalopathy    Acute liver failure    Cognitive and behavioral changes     Sleep difficulties    Hallucinations    Aayush García is a 62 year old male with a history of alcohol abuse, HTN and anemia who was admitted with septic shock secondary to Klebsiella bacteremia and anuria.  Required intubation and pressor support in ICU, currently extubated, moved out of the ICU 7/31, and was transferred back into the ICU 8/5 due to need for pressure support.  Transferred back out of the ICU 8/8.        Acute renal injury: Most likely severe ATN due to klebsiella sepsis, though cause still not completely clear. May be component of volume depletion as well.  Abdominal US negative 7/28.  Has initiated dialysis and will continue MWF for the foreseen future. Dialysis catheter placed 8/1.  - Nephrology following   -Plan to continue dialysis MWF. Will be on T,TH,S schedule at [facility] if accepted       Sepsis, resolved   Klebsiella bacteremia, resolved  Persistent leukocytosis: Admitted with leukocytosis, lactic acidosis, hypotension and tachycardia. Blood cultures growing klebsiella.  UA shows leukocytes, blood and few bacteria but may be unreliable as taken after period of anuria.  Most likely urinary source, though now question possible SBP given hepatic encephalopathy.  Possibly GI source, did have ulcer 1-2 months ago.  Unlikely PNA given CXR without infiltrates. Persistent leukocytosis, though continuing to tend down. Blood cultures x2 drawn and no growth yet.  Repeat chest x-ray showing improvement of infiltrates and improvement of pulmonary edema. CT abdomen showed no abscess just ascites. Therapeutic paracentesis performed with 2L drawn, unfortunately fluid was not sent for culture or stain.  UA 8/5 suspicious for urinary tract infection, however urine culture with no growth. IV Zosyn initiated 8/5 and discontinued 8/8. C. Diff negative 8/12.   - Continue to monitor for signs of infection       AMS  Alcohol withdrawal, resolved  Unclear etiology of altered mental status, though may be  multifactorial cause including hepatic encephalopathy, alcohol withdrawal, medication induced, ICU delirium. Ammonia level unremarkable, empirically treating with lactulose and rifaximin.  Some improvement after starting rifaximin on 8/5. Requiring a 1:1 due to agitation and confusion overnight, though remains directable   - Delirium order set   -Avoid sedating medications  - PT/OT/SLP  -Lactulose, titrate as needed for 3-4 stools daily. Has been held a number of days due to loose stools (c diff. negative, likely secondary to recent antibiotics. Probiotic started)  - Rifaximin 400 mg three times a day    Hyponatremia: Sodium 124-> 123-> 121.  Patient is receiving dialysis this morning.  Discussed with Dr. Jiménez who will be correcting with dialysis over the next week.  Also placed a 1.5 L water restriction.  -Dialysis per nephrology  -AM BMP      Hypotension: Likely secondary to alcoholic hepatitis as below.  Also with severe hypoalbuminemia which is likely contributing. Was transferred to the ICU for pressure support 8/5 and transferred out 8/8 after increased minodrine.  Was given albumin 25 g IV every 6 hours for 8 doses 8/5. Evening of 8/8 needed additional dose of albumin secondary to episode of hypotension. Continues to have ongoing issues with hypotension   - Dialysis per nephrology  - Midodrine 15 mg Q8H.  Continue as needed midodrine. Wean as tolerated after discharge   -Avoid giving fluid bolus if possible given difficulty to remove fluid.  Consider 25% albumin if hypotensive not responding to midodrine      Alcoholic Hepatitis  Hepatic encephalopathy  Ascites: 350ml peppermint schnapps daily for years. Per wife, never has had withdrawal though never been more than 2 days without a drink that she knows of.  Elevated AST/ALT, bilirubin, alk phos, and INR with hypoalbuminemia. Decreased synthetic function of liver. US 7/25 showed fatty infiltration of liver which may be contributing but no concerning biliary  tree disease. Repeat RUQ u/s 7/28 without acute findings. Paracentesis 8/3 with 1.95 L fluid removed, however unfortunately send for Gram stain or culture. 8/10, MELD score of 31 with a 53% estimated 3 month mortality.   - Continue to treat for hepatic encephalopathy as above  - GI following, appreciate their recommendations  -If paracentesis is performed again would recommending sending fluid for analysis.  -Continue folic acid, thiamine, multivitamin    Macrocytic anemia: Likely nutritional component due to alcohol use, and MICHAEL. Peripheral smear showed inflammatory changes only. LDH and haptoglobin only mildly elevated.  Was transfused 1uPRBCs on 8/6.  - AM CBC  - EPO per nephrology     NSTEMI: Type II myocardial infarction or demand related ischemia secondary to sepsis on presentation.  EF of 25-30% on 7/25/2018, significantly improved with echo 7/31/2018 with an EF of 61%.  Per cardiology not recommending ischemic workup at this time.  - Cardiology has signed off.  - Discontinued coreg 3.125 mg twice daily due to hypotension.  Consider restarting when/if hypotension has resolved.  - Telemetry monitoring      Melena, resolved: Episode of melanotic stool 7/27-28. Had downtrending hemoglobin months prior to admission.  Was seen in clinic recently for melena which was thought due to NSAID use.  Symptoms had resolved after he had stopped using NSAIDs.  He was on a PPI as well. Had melenic stools here, possibly gastritis from decreased perfusion versus worsening ulceration, has resolved.   - GI following as above  - PPI two times a day   - Transfuse for Hgb < 7      History of tobacco abuse  - Nicotine patch    Diet: 2mg Na  Fluids: No IVF  DVT Prophylaxis: Subcutaneous heparin  Code: full code     Disposition/Advanced Care Planning   Discharge Planning discussed with patient's wife  Barriers to discharge:  Difficult disposition  Dispo: anticipate discharge to [place]?   Anticipated discharge date:  unknown at this  time        Precepted patient with Dr. Karen Xie DO (PGY2)  [facility] [place] [facility]  Pager: [phone]

## 2021-06-19 NOTE — PROGRESS NOTES
MD RESTRAINT FOR NON-VIOLENT BEHAVIOR FACE TO FACE EVALUATION    Patient's Immediate Situation:  Patient demonstrated the following behaviors: Pulling/tugging at invasive lines or tubes and does not respond to verbal/non-verbal redirection    Patient's Reaction to the intervention:  Does patient understand the reason for restraint/seclusion? No    Medical Condition:  Is there any evidence of compromise of Skin integrity, Respiratory, Cardiovascular, Musculoskeletal, Hydration? No    Behavioral Condition:  In consultation with the RN, is there a need to continue this restraint or seclusion? Yes    See Restraint Flowsheet for complete restraint documentation and assessment.    Curt Wu MD

## 2021-06-19 NOTE — PROGRESS NOTES
"RESPIRATORY CARE NOTE     Patient Name: Aayush García  Today's Date: 7/26/2018     /51  Pulse (!) 59  Temp 98.3  F (36.8  C) (Oral)   Resp 19  Ht 5' 8\" (1.727 m)  Wt 212 lb 4.9 oz (96.3 kg)  SpO2 90%  BMI 32.28 kg/m2    Vent Mode: VCV  FiO2 (%):  [35 %-40 %] 35 %  S RR:  [16-24] 16  S VT:  [550 mL] 550 mL  PEEP/CPAP (cm H2O):  [5 cm H2O] 5 cm H2O  Minute Ventilation (L/min):  [10.8 L/min-16.9 L/min] 12.7 L/min  PIP:  [27 cm H2O-35 cm H2O] 27 cm H2O  MAP (cm H2O):  [9-12] 9   Plateau pressure 22  Intrinsic peep 5.      Pt continues on VCV,35%, RR 16, , Peep +5 cm. 8.0 ET tube, 24 at the teeth. Breath sounds slightly coarse this am, more clear sounding this afternoon. Suctioned for small amounts of thick white secretions.  No weaning today as pt is sedated at this time. RT will continue to follow closely.  Cynthia Garcia, LRT  "

## 2021-06-19 NOTE — PROGRESS NOTES
Critical Care Progress Note  7/25/2018      Admit Date: 7/24/2018  ICU Day: 1   Mechanical Ventilation Day: 1  CODE: Full Code    Critical Care Chief Complaint: septic shock, anuric ARF, AMS      Problem List/Hospital Course:   Principal Problem:    Septic shock (H)  Active Problems:    Acute respiratory failure with hypoxia (H)    Acute renal failure, unspecified acute renal failure type (H)    Metabolic acidosis    Encephalopathy, metabolic    Elevated troponin      Interim Events:     R IJ HD line placed, HD performed    Assessment/Plan by System:   1) Septic shock - likely 2/2 GN bacilli bacteremia  2) ARF. Anuric  3) AG Metabolic acidosis - lactic acidosis  4) Acute toxic-metabolic encephalopathy  5) Hyponatremia  6) Elevated LFTs, bilirubin.   7) Alcohol abuse.   8) Hyponatremia: likely 2/2 beer potomania       - Nephrology following, s/p HD today   - follow Na, avoid overly-rapid correction   - replace electrolytes as indicated   - Pressors as needed, requirements improving   - Broad spectrum antibiotics. Follow culture results.   - Follow markers of perfusion, lactate downtrending   - Watch for signs of withdrawal. Thiamine, folic acid.         DVT prophylaxis SC Heparin, SCDs  Plan of care discussed with patient's wife and brother      Medications:       dexmedetomidine 400 mcg/100 mL in NS (PRECEDEX) (4mcg/mL) 1.5 mcg/kg/hr (07/25/18 1705)     epinephrine Stopped (07/25/18 0600)     insulin infusion (1 unit/mL) 4.8 Units/hr (07/25/18 1703)     norepinephrine IV infusion in NS Stopped (07/25/18 1733)     propofol Stopped (07/25/18 1620)     vasopressin 2.4 Units/hr (07/25/18 1621)       acetylcysteine  100 mg/kg Intravenous Once     albumin human  25 g Intravenous Q6H     albuterol         calcium chloride IVPB  1 g Intravenous Once     cefepime (MAXIPIME) IV  500 mg Intravenous Q24H     chlorhexidine  15 mL Topical Q12H     EPINEPHrine         folic acid  1 mg Oral DAILY    Or     folic acid  1 mg  "Intramuscular DAILY     hydrocortisone sod succ  50 mg Intravenous Q8H     insulin aspart (NovoLOG) injection   Subcutaneous Q4H FIXED TIMES     magnesium sulfate IVPB  4 g Intravenous Once     metroNIDAZOLE  500 mg Intravenous Q12H     multivitamin therapeutic  1 tablet Oral DAILY     pantoprazole  40 mg Intravenous Q12H     senna-docusate  1 tablet Oral BID    Or     senna (SENOKOT) syrup  8.8 mg Enteral Tube BID     sodium chloride  10-30 mL Intravenous Q8H FIXED TIMES     sodium chloride  3 mL Intravenous Line Care     sodium chloride  3 mL Intravenous Line Care     thiamine  100 mg Oral DAILY    Or     thiamine  100 mg Intramuscular DAILY     vancomycin intermittent dosing   Other Med Consult or Protocol         Exam/Data:   Vitals  /69  Pulse 78  Temp 98.7  F (37.1  C)  Resp 24  Ht 5' 8\" (1.727 m)  Wt 192 lb 14.4 oz (87.5 kg)  SpO2 98%  BMI 29.33 kg/m2     I/O last 3 completed shifts:  In: 8274 [I.V.:6466; Other:1000; IV Piggyback:808]  Out: 100 [Emesis/NG output:100]  Weight change:   Wt Readings from Last 3 Encounters:   07/25/18 192 lb 14.4 oz (87.5 kg)   03/19/18 180 lb (81.6 kg)     Vent Mode: VCV  FiO2 (%):  [25 %-40 %] 40 %  S RR:  [20-28] 20  S VT:  [550 mL] 550 mL  PEEP/CPAP (cm H2O):  [5 cm H2O] 5 cm H2O  Minute Ventilation (L/min):  [12.7 L/min-16.4 L/min] 12.7 L/min  PIP:  [26 cm H2O-44 cm H2O] 30 cm H2O  MAP (cm H2O):  [11-18] 11    EXAM:  GEN: patient intubated, sedated  HEENT: PERRL, EOMS, OP patent, trachea midline  PULM: on vent, moving air adequately  CV: RRR, S1, S2, no murmurs, rubs, gallops, bilateral symmetric pulse  ABD: soft, non distended, normal active bowel sound, no HSM  EXT : warm well perfused, no cyanosis, clubbing, 1+ LE edema  NEURO: withdraws to painful stimuli, non-focal  SKIN: no obvious rash, lesion    DATA  All laboratory and radiology has been personally reviewed by myself today.    Results from last 7 days  Lab Units 07/25/18  1629  07/25/18  0428   LN-WHITE " BLOOD CELL COUNT thou/uL  --   --  21.4*  21.4*   LN-HEMOGLOBIN g/dL 7.7*  < > 8.0*  8.0*   LN-HEMATOCRIT %  --   --  24.2*  24.2*   LN-PLATELET COUNT thou/uL  --   --  169  169   < > = values in this interval not displayed.    Results from last 7 days  Lab Units 07/25/18  1139 07/25/18  0813 07/25/18  0628 07/25/18  0428   LN-SODIUM mmol/L 120* 121*  121*  --  125*   LN-POTASSIUM mmol/L 4.6 5.0  5.0 5.3* 6.5*   LN-CHLORIDE mmol/L 85* 86*  86*  --  91*   LN-CO2 mmol/L 14* 14*  14*  --  14*   LN-BLOOD UREA NITROGEN mg/dL 55* 59*  59*  --  57*   LN-CREATININE mg/dL 8.76* 9.44*  9.44*  --  10.18*   LN-CALCIUM mg/dL 7.5* 7.4*  7.4*  --  7.7*   LN-PROTEIN TOTAL g/dL 6.0 6.2  --  6.4   LN-BILIRUBIN TOTAL mg/dL 2.4* 2.6*  --  2.7*   LN-ALKALINE PHOSPHATASE U/L 771* 800*  --  825*   LN-ALT (SGPT) U/L 54* 59*  --  60*   LN-AST (SGOT) U/L 265* 301*  --  294*       The patient is critically ill and remains at risk for further deterioration, organ failure and/or death.  Critical Care Time including chart review, patient assessment, coordination of care 69 minutes, excluding procedures which were documented elsewhere    Conor Farr MD  [facility]

## 2021-06-19 NOTE — PROGRESS NOTES
Faculty Supervision of Residents    I have examined Aayush García, : 1955, on 18 and the medical care has been evaluated and discussed with the resident.   I agree with the medical care provided, confirm the findings after personally reviewing the images and labs, and agree with the plan documented in the note by Dr. Benjamin Rosenstein.    Adam Nicole III, MD, FAAFP  18 12:23 PM

## 2021-06-19 NOTE — PROGRESS NOTES
[facility] Progress Note    Subjective    Aayush García is with his wife present at bedside this morning.  Wife states that he has little bit more sleepy this morning, though arouses and has an appropriate conversation before going back to sleep.  He denies any pain or discomfort this morning.    Had an episode last night of hypotension and was given a 500 mL bolus which improved blood pressures, however despite fluids creatinine worsened this morning.  Due to general somnolence ABG was obtained overnight and was overall reassuring.      Objective    Vital signs in last 24 hours Temp:  [97.3  F (36.3  C)-99.5  F (37.5  C)] (P) 98.5  F (36.9  C)  Heart Rate:  [77-99] 79  Resp:  [15-29] (P) 16  BP: ()/(43-66) (P) 95/51   Weight: 214 lb 4.8 oz (97.2 kg)    Physical Exam  General appearance: Somnolent, no distress  Lungs: Upper airway congestion radiating bilaterally.  No wheezing or crackles present.  Respirations unlabored  Heart: Regular rate and rhythm, normal S1 and S2, no murmur, rub or gallop.  Abdomen: Soft, nontender, nondistended.  Bowel sounds active in all 4 quadrants.  No masses or organomegaly.  Extremities: 2+ pitting edema in bilateral lower extremities.  Neurologic: Somnolent, though arouses to voice and has an appropriate conversation.  No gross neurologic deficits.    Pertinent Labs   Lab Results: personally reviewed.     Pertinent Radiology   Radiology Results: Personally reviewed.    Assessment/Plan  Principal Problem:    Septic shock (H)  Active Problems:    Acute respiratory failure with hypoxia (H)    Acute renal failure, unspecified acute renal failure type (H)    Metabolic acidosis    Encephalopathy, metabolic    Elevated troponin    Stress-induced cardiomyopathy    Bacteremia due to Klebsiella pneumoniae    Anemia, unspecified type    Alcohol abuse    Aayush García is a 62 year old male with a history of alcohol abuse, HTN and anemia who was admitted with septic shock  secondary to Klebsiella bacteremia and anuria.  Required intubation and pressor support in ICU, currently extubated without pressor support, moved out of the ICU 7/31.       Anuric renal failure: Most likely severe ATN due to klebsiella sepsis, though cause still not completely clear. May be component of volume depletion as well.  Abdominal US negative.  Worsening of creatinine despite IV fluid bolus given overnight.  -Nephrology is following - appreciate recs  -Dialysis catheter placement today and dialysis later this afternoon.  Plan to continue dialysis 3 times weekly.  -Continue lasix today  -Continue ceftriaxone (10 day course)    NSTEMI: Type II myocardial infarction or demand related ischemia secondary to sepsis on presentation.  EF of 25-30% on 7/25/2018, significantly improved with echo 7/31/2018 with an EF of 61%.  Per cardiology not recommending ischemic workup at this time.  - Cardiology consulted and signing off today.  -Start Coreg 3.125 mg twice daily  -Continue aspirin daily  - Avoid QT prolong meds  - Telemetry monitoring    Sepsis   Klebsiella bacteremia: Admitted with leukocytosis, tach acidosis, hypotension and tachycardia. Blood cultures growing klebsiella.  UA shows leukocytes, blood and few bacteria but may be unreliable as taken after period of anuria.  Most likely urinary source.  Possibly GI source, did have ulcer 1-2 months ago.  Unlikely PNA given CXR without infiltrates.  - Continue Ceftriaxone (10 day course)   - If appears to be septic again, would order CT abdomen    AMS  Alcohol withdrawal, resolving   Unclear etiology of altered mental status, though may be multifactorial cause including underlying sepsis, alcohol withdrawal, ICU delirium.  Given history of alcohol abuse and hepatic injury on admission, question if this could be hepatic encephalopathy.  Will obtain a ammonia but will likely empirically treat with lactulose given no significant harms.  Has not been getting  benzodiazepines or other medications which will cause sedation.  - Delirium order set placed  - PT/OT/SLP  -Start lactulose 20 g daily, may continue to titrate up depending on the response    Hypotension: He has had low blood pressures during this stay and required pressors initially.    Overnight had an episode of hypotension which needed 500 mL bolus which improved pressures.  Question if this may be related to orthostasis given response to fluids.   - Dialysis per nephrology  -Midodrine 10 mg as needed for dialysis or hypotension per nephrology    Hepatic injury  Possible hepatic encephalopathy: Elevated AST/ALT, bilirubin, alk phos, CK and INR with hypoalbuminemia. Decreased synthetic function of liver most likely due to decreased perfusion. US showed fatty infiltration of liver which may be contributing but no concerning biliary tree disease.  Less likely alcoholic hepatitis although alcoholic liver injury likely contributing given AST/ALT ratio.  Peripheral smear relatively normal which is reassuring for DIC. Ongoing elevation seems likely related to shock liver. Repeat RUQ u/s without acute findings.  Labs stabilizing.  Given altered mental status and the above findings, will obtain ammonia lab and treat empirically for possible hepatic encephalopathy with lactulose.  - GI following, appreciate input  - Continue to follow hepatic profile    Melena, resolved: Episode of melanotic stool 7/27-28.  Has had downtrending hemoglobin for months as well as while here.  Was seen in clinic recently for melena which was thought due to NSAID use.  Symptoms had resolved after he had stopped using NSAIDs.  He was on a PPI as well.  Again having melenic stools here, possibly gastritis from decreased perfusion versus worsening ulceration. Hemoglobin now stable   - GI following as above  - PPI two times a day   - Transfuse for Hgb < 7     Respiratory fatigue - resolving: Successfully extubated. Remains of supplemental O2  however, significantly improved status  - Supplemental O2 prn  -Continue duo nebs 4 times daily and albuterol as needed     Sinus Arrythmia: New with this admission with frequent PACs.  Had previously been sinus tachycardic and now with arrhythmia.  Possibly related to overall disease process versus cardiac injury from shock.    - Continue on to monitor on telemetry.     Hyponatremia: Most likely due to septic shock and renal failure. Now likely hypervolemic hyponatremia   - AM BMP      Macrocytic anemia: Per chart review, Hb of 13.0 in 2/2018 with steady decline until present. Consistently macrocytic.  Likely nutritional component due to alcohol use but continuous decline is concerning for underlying cause. Was also having melena as above. Peripheral smear showed inflammatory changes only.  LDH and haptoglobin only mildly elevated.  Per nephrology may need EPO if MICHAEL persists  - AM CBC    History of alcohol use: 350ml peppermint schnapps daily for years. Per wife, never has had withdrawal though never been more than 2 days without a drink that she knows of.    - Nicotine patch  -Continue to monitor for withdrawal symptoms  -Continue folic acid, thiamine, multivitamin     Concern for adrenal insufficiency - resolved: Acute illness and combination of hyperkalemia and hyponatremia is concerning for adrenal insufficiency. Hydrocortisone was discontinued.     Hyperglycemia - resolved: Elevated glucose, likely due to hydrocortisone. Continue sliding scale insulin    Diet: cardiac renal diet after dialysis catheter placed   Fluids: No IVF  DVT Prophylaxis: Holding DVT prophylaxis for dialysis catheter placement.  Was previously on subcu heparin.  Continue SCDs.  Code: full code     Disposition/Advanced Care Planning   Discharge Planning discussed with patient  Barriers to discharge: IV antibiotics, dialysis catheter placement, dialysis and further workup of altered mental status   Dispo: anticipate discharge to TCU    Anticipated discharge date:  multiple more days       Precepted patient with Dr. Víctor Xie DO (PGY2)  [facility] Resident  Pager: [phone]

## 2021-06-19 NOTE — PROGRESS NOTES
Aaysuh García 62 y.o. male Dialysis treatment started at 1700 and ended at 2000; ran for 3 hours on a K bath of 4.  Total removed 0kg.  Meds given none. Access RCVC Heparin 500units bolus/hr and dwell. Tolerated well, no complaints.    Complications.new CVC not working great reversed lines worked a little better  Incapacitated Dialysis Nurse Procedure reviewed with Simran Kenyon RN  Water alarm functional and in place entire treatment.  Hepatitis B status. neg Date 7-25-18  Patient Consent Verified yes    Reported off to Simran Lozoya RN

## 2021-06-19 NOTE — PROGRESS NOTES
Critical Care Progress Note  7/29/2018      Admit Date: 7/24/2018  ICU Day: 5   Mechanical Ventilation Day: NA. Extubated 7/26  CODE: Full Code    -7/24 Family called EMS for confusion and no uo for 3 days.  On arrival patient confused, hypotensive, tachypneic.  BC + for Klebsiella pneumoniae   -7/25 intubated  -7/26 extubated    Critical Care Chief Complaint: none      Problem List/Hospital Course:   Principal Problem:    Septic shock (H)  Active Problems:    Acute respiratory failure with hypoxia (H)    Acute renal failure, unspecified acute renal failure type (H)    Metabolic acidosis    Encephalopathy, metabolic    Elevated troponin    Other cardiomyopathy (H)    Bacteremia due to Klebsiella pneumoniae    Anemia, unspecified type    Alcohol abuse          Interim Events:   2 liters off yesterday with HD/UF.  Urine output 120 cc.  Received 160 mg lasix 7/28  I/O + 10 liters since admission  Down to nasal canula from HF NC (50%, 35 lpm)  Remains confused  Rising AP and bili-repeat RUQ US + only for biliary sludge    Medications:       dexmedetomidine 400 mcg/100 mL in NS (PRECEDEX) (4mcg/mL) 0.8 mcg/kg/hr (07/29/18 0621)       aspirin  75 mg Rectal DAILY     cefTRIAXone (ROCEPHIN)  IV  1 g Intravenous Q24H     erythromycin   Both Eyes QID     folic acid  1 mg Enteral Tube DAILY    Or     folic acid  1 mg Intramuscular DAILY     furosemide  160 mg Intravenous QAM     insulin aspart (NovoLOG) injection   Subcutaneous Q4H FIXED TIMES     ipratropium-albuterol  3 mL Nebulization QID - RT     magnesium sulfate IVPB  2 g Intravenous Once     multivitamin therapeutic  1 tablet Enteral Tube DAILY     nicotine  1 patch Transdermal DAILY     pantoprazole  40 mg Intravenous Q12H     phytonadione ((AQUA-MEPHYTON) IVPB  10 mg Intravenous DAILY     senna-docusate  1 tablet Oral BID    Or     senna (SENOKOT) syrup  8.8 mg Enteral Tube BID     sodium chloride  10-30 mL Intravenous Q8H FIXED TIMES     sodium chloride  3 mL  "Intravenous Line Care     sodium chloride  3 mL Intravenous Line Care     thiamine  100 mg Enteral Tube DAILY    Or     thiamine  100 mg Intramuscular DAILY         Exam/Data:   Vitals  /67  Pulse 80  Temp 100.2  F (37.9  C) (Axillary)   Resp (!) 30  Ht 5' 8\" (1.727 m)  Wt 207 lb 9.6 oz (94.2 kg)  SpO2 100%  BMI 31.57 kg/m2     I/O last 3 completed shifts:  In: 501.3 [I.V.:406.3; IV Piggyback:95]  Out: 2320 [Urine:120; Other:2000; Stool:200]  Weight change: -4 lb (-1.814 kg)  Wt Readings from Last 3 Encounters:   07/29/18 207 lb 9.6 oz (94.2 kg)   03/19/18 180 lb (81.6 kg)     FiO2 (%):  [45 %-50 %] 45 %    EXAM:  GEN: RASS 0, CAM-ICU +, oriented to person only, calling out to his significant other  HEENT: PERRL, EOMS, OP patent, trachea midline, conjunctival injection and swelling  PULM: cta   CV: RRR, S1, S2, no murmurs, rubs, gallops, bilateral symmetric pulse  ABD: soft nontender, non distended, normal active bowel sound, no HSM  EXT : warm well perfused, no cyanosis, clubbing, no edema  NEURO: normal except for mental status  SKIN: scattered ecchymoses  Lymphatics: no adenopathy    DATA  All laboratory and radiology has been personally reviewed by myself today.    Results from last 7 days  Lab Units 07/29/18  0431   LN-WHITE BLOOD CELL COUNT thou/uL 17.4*   LN-HEMOGLOBIN g/dL 8.4*   LN-HEMATOCRIT % 25.2*   LN-PLATELET COUNT thou/uL 160       Results from last 7 days  Lab Units 07/29/18  0431 07/28/18  0509 07/27/18  0411 07/26/18  1411   LN-SODIUM mmol/L 141 136 132* 133*   LN-POTASSIUM mmol/L 3.4* 3.6 4.6 4.0   LN-CHLORIDE mmol/L 104 98 99 100   LN-CO2 mmol/L 25 24 21* 20*   LN-BLOOD UREA NITROGEN mg/dL 23* 25* 21 12   LN-CREATININE mg/dL 3.28* 3.49* 4.01* 2.91*   LN-CALCIUM mg/dL 8.1* 8.1* 7.8* 7.5*   LN-PROTEIN TOTAL g/dL 5.4* 6.3 6.0 5.7*   LN-BILIRUBIN TOTAL mg/dL 5.5* 4.6* 2.9* 2.7*   LN-ALKALINE PHOSPHATASE U/L 812* 771* 594* 628*   LN-ALT (SGPT) U/L  --  72* 45 44   LN-AST (SGOT) U/L 336* " 337* 231* 218*       IMAGIN/28 CXR: Interval removal of ETT and NG tube. Right IJ catheter high SVC level. Right PICC catheter right atrial level. No pneumothorax. Interval worsening with bilateral airspace opacities with upper lobe predominance greatest on the left. Small left   effusion. Previous left lower rib fractures. Monitor electrodes.    Echocardiogram:    Technically challenging examination. Definity contrast utilized    Left ventricle ejection fraction is severely decreased. Left ventricular ejection fraction estimated 25-30%.    Global left ventricular hypokinesis with large area of akinesis involving the apex, anterior apical and apical lateral portions of left ventricle.    Normal right ventricular size and systolic function.    Mild to moderate aortic stenosis. Mean gradient of 15 mmHg. The degree of aortic stenosis potentially underrepresented in the setting of severe reduction left ventricular systolic function.    Left atrial enlargement    Moderate enlargement of the aortic root.    Results called to the floor    Abdominal US:  CONCLUSION:  1.  Sludge in the gallbladder.  2.  No gallbladder wall thickening.  3.  No evidence of cholelithiasis.  4.  No evidence of ductal dilatation.    Micro:  Blood culture  Klebsiella pneumoniae   Urine culture  negative    IMPRESSION:  Gram negative bacteremia with MODS  Pulmonary edema-cardiogenic/fluid overload improved after each HD/UF  Cardiomyopathy (EF 25%)  MICHAEL  Alcohol withdrawal  delerium  Macrocytosis-etoh  Abnormal LFT-rising AP and bili   Tobacco abuse    PLAN:  DC precedex  OOB to chair  Swallow evaluation  PPI  Transfuse for HGB </= 7   Renal dosing of medications  Avoid nephrotoxins  Will continue to require UF-does not appear necessary today  Complete 10 days abx -rocephin  -  HOB 30 degrees  Stress Ulcer   - PPI  DVT   - mechanical with SCD    Patient is critically ill with total CCT spent 45 minutes     Bigg Medley,  DO  Pulmonary/Critical Care  [phone]

## 2021-06-19 NOTE — PROCEDURES
INTERVENTIONAL RADIOLOGY PROCEDURE NOTE    Patient Name: Aayush García  Medical Record Number: [id number]  YOB: 1955    Date/Time: 8/1/2018 1:01 PM    Procedure: RIJ tunneled HD cath placement     Diagnosis: Renal Failure    Medications: None    Contrast: None    Fluoroscopy Time: 1.1 min    EBL: None    Complications: None    Specimens Sent: None    Findings: Successful RIJ tunneled HD cath placement    Plan: May use catheter now    Kenji Ortiz

## 2021-06-19 NOTE — CONSULTS
CRITICAL CARE CONSULT:    7/24/2018  8:49 PM    Assessment/Plan:  62 year old male with a history of tobacco dependence, HTN, dyslipidemia, alcohol dependence (half a 750-mL bottle of peppermint schnapps per day), PUD, colon polyps, admitted on 7/25 with severe sepsis with septic shock due to klebsiella bacteremia, acute liver failure with possible underlying chronic liver disease, severe hypoalbuminemia, oliguric MICHAEL requiring ongoing intermittent HD, intubated for acute respiratory failure and subsequently extubated, paracentesis on 8/3 with 2000 mL removed, now with persistent encephalopathy and worsening hypotension.    NEURO:  Acute encephalopathy: DDx includes hepatic encephalopathy, hypotension/sepsis and metabolic derangements including renal failure.  Likely evolving chronic liver disease with heavy alcohol use, hyperbilirubinemia, coagulopathy, hypoalbuminemia, and ascites.  NH3 mildly elevated on 8/1.  Blood gases have not shown hypercapnia.  The patient is not oriented to place or time.    Supportive care with pressors and evaluation for possible sepsis    Increase lactulose with goal 2-3 stools daily    Continue rifaximin    Check repeat ammonia, blood cultures     CV:  Shock, cardiomyopathy: DDx includes evolving sepsis, hemorrhage, other etiology.  2000 mL removed on 8/3, only 12.5 grams albumin given afterward.  Has severe hypoalbuminemia.  EF 25-30% on 7/25, improved to 61% on 7/31, suggesting initial stress cardiomyopathy, possible component of alcoholic cardiomyopathy, possibly ischemic.  Unlikely to tolerate dialysis with severe hypotension; patient has severe hypoalbuminemia.  ECG with anterior T-wave inversions but ACS unlikely; no angina, EF has improved since presenting stress cardiomyopathy.    Albumin 25%, 25 grams IV q 6 hrs x 8 doses; adjust as needed    Avoid excess crystalloid    Start norepi with goal MAP >65    Check CVP    Repeat troponin    Stat Hgb, PCT, lactic acid    Check blood  cultures, PICC and peripheral; consider blood culture from HD cath on next run    Unfortunately, ascitic fluid from 8/3 not sent for cell differential, SAAG or gram stain/culture    RESP:  Acute respiratory failure, tobacco dependence: Intubated in the setting of severe sepsis with septic shock and lactic acidosis, intubated 7/25-7/26, then extubated.    Monitor respiratory status, high risk of volume overload    Needs smoking cessation counseling    GI:  Acute liver failure, hyperbilirubinemia, ascites, possible alcohol dependence and chronic liver disease: Wife reports that he drinks half a bottle of peppermint schnapps per day (the way she describes it, likely a 750-mL bottle).  Paracentesis 8/3 with 2 liters removed, fluid not sent for studies.  High risk of SBP, and the klebsiella bacteremia may have been due to SBP vs UTI.  Has a history of H pylori-negative gastric ulcer by report, as well as colon polyps.    On lactulose and rifaximin    Clarify alcohol history    Albumin 25 gram now; assess response    If recurrent ascites, send fluid for SAAG, cell differential, gram stain/culture    GI following; appreciated    RENAL:  Acute kidney injury: Multifactorial, including likely ATN due to septic shock, initial cardiomyopathy, prior ACEI use.  Oliguric, now dialysis-dependent.  Tunneled right IJ HD catheter placed on 8/1.    Appreciate nephrology    HD as indicated, currently MWF    Maintain adequate MAP    ID:  Klebsiella bacteremia: Initial presentation with Klebsiella bacteremia, source DDx includes UTI (though presenting UC showed no growth), more likely SBP.  Initial procalcitonin >60.  Does have ascites, though ascitic fluid from 8/3 not sent for studies.  Now hypotensive.    Check repeat PCT    Blood cultures from PICC and peripheral    Consider checking culture from HD cath on run tomorrow    UA, culture if indicated    Check C diff if diarrhea    Consider empiric broad-spectrum antibiotics if febrile,  if persistent elevated PCT or other evidence of active sepsis    HEMATOLOGIC:  Anemia, coagulopathy: Sepsis, possible EtOH, liver disease.  Has a history of H pylori-negative gastric ulcer by report, as well as colon polyps.    Repeat stat Hgb with hypotension    Monitor with restrictive transfusion threshold unless active hemorrhage    Check INR as needed    Hold ASA for now    ENDOCRINE:  No active issues.  Has had normal FSBG    Daily monitoring, more often if critically ill    ICU PROPHYLAXIS:    Heparin SC; hold if evidence of bleeding    PPI    Lines/Drains/Tubes:  RUE PICC placed 7/24/18  Tunneled right IJ dialysis catheter placed 8/1/18    Restraints  Not currently indicated    DISPO/CODE STATUS: FULL CODE.  Today (8/5/2018), the patient is critically ill with encephalopathy, multiple organ failure and need for continuous vasopressors.    FAMILY COMMUNICATION: Today (8/5/2018), communicated with the patient's fiance at the bedside.    I appreciate the opportunity to participate in the care of Mr. García.  Please feel free to contact me at any time.    CCx: hypotension    HPI: 62 year old male with a history of tobacco dependence, HTN, dyslipidemia, alcohol dependence (half a bottle of peppermint schnapps per day), PUD, colon polyps, admitted on 7/25 with severe sepsis with septic shock due to klebsiella bacteremia, acute liver failure with possible underlying chronic liver disease, severe hypoalbuminemia, oliguric MICHAEL requiring ongoing intermittent HD, intubated for acute respiratory failure and subsequently extubated, paracentesis on 8/3 with 2000 mL removed, now with persistent encephalopathy and worsening hypotension.  The patient drinks half a bottle of peppermint schnapps per day (wife describes what is likely a 750-mL bottle).  He was admitted 7/25, found to be in shock with lactic acidosis, acute kidney injury, liver failure, intubated and in shock, extubated after one day.  Dirty UA but UC with no  "growth.  Likely underlying alcoholic liver disease, now with ascites so SBP was a likely cause of initial presentation.  Initial EF 25-30%, now 60%.  Extubated 7/26, BP improved, transferred to floor.  Encephalopathy has been persistent, not controlled with lactulose and rifaximin.    Past Medical History:  Alcohol dependence  Tobacco dependence  HTN  Dyslipidemia  PUD, reportedly H pylori negative  Colon polyps    Past Surgical History:  Past Surgical History:   Procedure Laterality Date     COLONOSCOPY N/A 3/20/2018    Procedure: COLONOSCOPY;  Surgeon: Adam Nicole III, MD;  Location: [facility];  Service:      PICC  7/24/2018            Social History:  Social History     Social History     Marital status: Single     Spouse name: N/A     Number of children: N/A     Years of education: N/A     Occupational History     Not on file.     Social History Main Topics     Smoking status: Current Every Day Smoker     Smokeless tobacco: Never Used     Alcohol use Yes      Comment: Occasionally     Drug use: No     Sexual activity: Not on file     Other Topics Concern     Not on file     Social History Narrative       Family History:  No family history on file.    Allergies:  No Known Allergies    MAR Reviewed      Physical Exam:  Vent settings for last 24 hours:       BP (!) 72/45  Pulse 88  Temp 99.1  F (37.3  C) (Oral)   Resp 22  Ht 5' 8\" (1.727 m)  Wt 208 lb 8 oz (94.6 kg)  SpO2 97%  BMI 31.7 kg/m2    Intake/Output last 3 shifts:  I/O last 3 completed shifts:  In: 255 [I.V.:255]  Out: 50 [Urine:50]  Intake/Output this shift:       Physical Exam  Gen: arousable but confabulating, year is \"1943\" and does not know where he is  HEENT: NT, no GERRY  CV: RRR, no m/g/r  Resp: mild bibasilar crackles  Abd: soft, nontender, BS+  Skin: no rashes or lesions  Ext: 2+ pitting throughout dependent tissue  Neuro: PERRL, somnolent but arousable, confabulating, year is \"1943\" and does not know where he is, says he was told " earlier today that he could go home    LAB:  Recent Results (from the past 24 hour(s))   Protime-INR   Result Value Ref Range    INR 1.39 (H) 0.90 - 1.10   HM1 (CBC with Diff)   Result Value Ref Range    WBC 19.4 (H) 4.0 - 11.0 thou/uL    RBC 2.21 (L) 4.40 - 6.20 mill/uL    Hemoglobin 7.6 (L) 14.0 - 18.0 g/dL    Hematocrit 23.1 (L) 40.0 - 54.0 %     (H) 80 - 100 fL    MCH 34.4 (H) 27.0 - 34.0 pg    MCHC 32.9 32.0 - 36.0 g/dL    RDW 20.8 (H) 11.0 - 14.5 %    Platelets 274 140 - 440 thou/uL    MPV 11.0 8.5 - 12.5 fL    Neutrophils % 78 (H) 50 - 70 %    Lymphocytes % 14 (L) 20 - 40 %    Monocytes % 7 2 - 10 %    Eosinophils % 1 0 - 6 %    Basophils % 1 0 - 2 %    Neutrophils Absolute 14.8 (H) 2.0 - 7.7 thou/uL    Lymphocytes Absolute 2.7 0.8 - 4.4 thou/uL    Monocytes Absolute 1.4 (H) 0.0 - 0.9 thou/uL    Eosinophils Absolute 0.1 0.0 - 0.4 thou/uL    Basophils Absolute 0.1 0.0 - 0.2 thou/uL   Basic Metabolic Panel   Result Value Ref Range    Sodium 140 136 - 145 mmol/L    Potassium 3.4 (L) 3.5 - 5.0 mmol/L    Chloride 107 98 - 107 mmol/L    CO2 24 22 - 31 mmol/L    Anion Gap, Calculation 9 5 - 18 mmol/L    Glucose 79 70 - 125 mg/dL    Calcium 7.5 (L) 8.5 - 10.5 mg/dL    BUN 35 (H) 8 - 22 mg/dL    Creatinine 4.87 (H) 0.70 - 1.30 mg/dL    GFR MDRD Af Amer 15 (L) >60 mL/min/1.73m2    GFR MDRD Non Af Amer 12 (L) >60 mL/min/1.73m2   Hepatic Profile   Result Value Ref Range    Bilirubin, Total 5.6 (H) 0.0 - 1.0 mg/dL    Bilirubin, Direct 3.6 (H) <=0.5 mg/dL    Protein, Total 5.2 (L) 6.0 - 8.0 g/dL    Albumin 1.6 (L) 3.5 - 5.0 g/dL    Alkaline Phosphatase 557 (H) 45 - 120 U/L     (H) 0 - 40 U/L    ALT 62 (H) 0 - 45 U/L   Manual Differential   Result Value Ref Range    Platelet Estimate Normal Normal    Polychromasia 1+ (!) Negative    Target Cells 2+ (!) Negative    Basophilic Stippling 1+ (!) Negative   Magnesium   Result Value Ref Range    Magnesium 1.4 (L) 1.8 - 2.6 mg/dL   Phosphorus   Result Value Ref Range     Phosphorus 4.9 (H) 2.5 - 4.5 mg/dL   Basic Metabolic Panel   Result Value Ref Range    Sodium 137 136 - 145 mmol/L    Potassium 3.7 3.5 - 5.0 mmol/L    Chloride 101 98 - 107 mmol/L    CO2 23 22 - 31 mmol/L    Anion Gap, Calculation 13 5 - 18 mmol/L    Glucose 104 70 - 125 mg/dL    Calcium 8.3 (L) 8.5 - 10.5 mg/dL    BUN 39 (H) 8 - 22 mg/dL    Creatinine 5.95 (H) 0.70 - 1.30 mg/dL    GFR MDRD Af Amer 12 (L) >60 mL/min/1.73m2    GFR MDRD Non Af Amer 10 (L) >60 mL/min/1.73m2       IMAGING:  No results found.    Total Critical Care Time : 1 Hour 30 Minutes    Get Mclain MD  Pulmonary and Critical Care Medicine  [facility]  Cell [phone]  Office [phone]  Pager [phone]

## 2021-06-19 NOTE — PROGRESS NOTES
Critical Care Progress Note  8/7/2018   9:12 AM     Admit Date: 7/24/2018  ICU Day: 2  Code Status: full code      Problem List:   Principal Problem:    Septic shock (H)  Active Problems:    Acute respiratory failure with hypoxia (H)    Acute renal failure, unspecified acute renal failure type (H)    Metabolic acidosis    Encephalopathy, metabolic    Elevated troponin    Stress-induced cardiomyopathy    Bacteremia due to Klebsiella pneumoniae    Anemia, unspecified type    Alcohol abuse    Acute encephalopathy    Acute liver failure          Plan:   1. HD MWF per Renal.   2. Levophed to keep MAP > 65, in addition to scheduled Midodrine. Adjust timing of Midodrine to give a dose before bedtime - hopefully this will get him through the night without needing Levophed.   3. Continue zosyn (Day 3) for now; urine culture without growth. Blood without growth thus far. Will stop after 5-days if no growth seen on cultures.   4. Lactulose and rifaximin. Will hold lactulose today - having up to 8-stools per day. PRNs available.   5. Scheduled albumin for total 8-doses.   6. Nebs PRN.   7. PT/OT eval and treat.     ICU Prophylaxis   Feeding: Low sodium diet.   Rahman: yes - remove  Analgesia/Sedation: prn  DVT prophylaxis: SQ Heparin  HOB > 30 degrees: yes   GI Proph: PPI   Glycemic Control: spot checking   Family updated: yes - fiance' at bedside     Dispo: ICU due to hypotension requiring vasopressor support.     Astrid Leone, Count includes the [facility] Pulmonary/Critical Care    Total critical care time: 35-minutes.   _______________________________________________________________    HPI: 62 year old male with a history of tobacco dependence, HTN, dyslipidemia, alcohol dependence (half a 750-mL bottle of peppermint schnapps per day), PUD, colon polyps, admitted on 7/25 with severe sepsis with septic shock due to klebsiella bacteremia, acute liver failure with possible underlying chronic liver disease, severe hypoalbuminemia,  "oliguric MICHAEL requiring ongoing intermittent HD, intubated for acute respiratory failure and subsequently extubated, paracentesis on 8/3 with 2000 mL removed, now with persistent encephalopathy and worsening hypotension.    Hospital Course in brief:   7/24: Admission with septic shock. Hypotensive and severe metabolic acidosis. Admitted to ICU with high dose pressors, NaHCO3 gtt.  7/25: Intubated. EF 25-30% with global hypokinesia (thought secondary to stress-induced cardiomyopathy). HD initiated. Pressors off.   7/26: Extubated to high flow nasal cannula. Abx: Vancomycin, flagyl, and cefepime for GNB in blood - Klebsiella. Daily diuretic trials.   7/27: Abx narrowed to Cefepime. Difficult to control volume status on HD; On Precedex for EtOH w/d.   7/28: INR rising - received Vit K, FFP x 2, PRBC x 1; worsening LFTs; melenotic stools (resolved).   7/30: Prolonged QTC and Polymorphic wide complex tachycardia c/w VT - Haldol d/c. Off Precedex. Transferred out of ICU.   7/31: Hypotensive and confused; repeat Echo with EF 61%  8/1: Tunneled HD catheter placed; hypotensive & received 500mL fluid bolus.   8/2: Ongoing hypotension; Midodrine started. Blood cultures sent. CT Abd.   8/3: Paracentesis done 1.95-L removed - not sent for culture. Rifaximin started.   8/5: Return to ICU due to worsening hypotension requiring Levophed. Albumin scheduled x 8-doses.   8/6: HD with 1.8 L removed; 1-unit PRBC transfused. Epo started.       ROS: \"I need a day off\"; no pain, no shortness of breath, no nausea. Feels better.     Events over last 24-hours: required low dose Levophed for SBPs in the 70s overnight. Delirious - required 1:1 sitter.       Objective:     Vitals:    08/07/18 0815 08/07/18 0830 08/07/18 0845 08/07/18 0900   BP: 106/57 102/57 105/57 110/58   Patient Position:       Pulse: 75 77 74 83   Resp: 20 24 23 22   Temp:       TempSrc:       SpO2: 96% 96% 96% 96%   Weight:       Height:           I/O:     Intake/Output " Summary (Last 24 hours) at 08/07/18 0911  Last data filed at 08/07/18 0905   Gross per 24 hour   Intake            791.3 ml   Output             1957 ml   Net          -1165.7 ml     Wt Readings from Last 3 Encounters:   08/07/18 203 lb 8 oz (92.3 kg)   03/19/18 180 lb (81.6 kg)      Weight change: -0.9 oz (-0.024 kg)    Physical Exam:  Gen:  not in acute distress; sitting up in bed eating breakfast. More alert today.   HEENMT:PERRLA, extra ocular movement intact and icteric sclera  NEURO: no focal deficits, some tremors noted.   CARDIOVASCULAR: S1, S2 normal, no murmur, rub or gallop, regular rate and rhythm  PULMONARY: unlabored on room air; lungs are clearer today, no wheeze.   GASTROINTESTINAL: rounded, semi-firm. Active bowel sounds.   INTEGUMENT: Jaundiced; decreased edema noted in BLE.   PSYCH: flat affect; more alert today, but only oriented to self.     Labs:     Results from last 7 days  Lab Units 08/07/18  0408   LN-SODIUM mmol/L 138   LN-POTASSIUM mmol/L 3.8   LN-CHLORIDE mmol/L 101   LN-CO2 mmol/L 25   LN-BLOOD UREA NITROGEN mg/dL 24*   LN-CREATININE mg/dL 4.39*   LN-CALCIUM mg/dL 8.4*   LN-PROTEIN TOTAL g/dL 5.7*   LN-BILIRUBIN TOTAL mg/dL 7.4*   LN-ALKALINE PHOSPHATASE U/L 395*   LN-ALT (SGPT) U/L 47*   LN-AST (SGOT) U/L 109*         Results from last 7 days  Lab Units 08/07/18  0408   LN-WHITE BLOOD CELL COUNT thou/uL 18.1*   LN-HEMOGLOBIN g/dL 7.8*   LN-HEMATOCRIT % 23.4*   LN-PLATELET COUNT thou/uL 245       Micro: 8/5 Urine - no growth   8/6 HD catheter blood culture - ngtd   8/5 Blood - ngtd   8/3 Blood - ngtd    Imaging: all imaging personalized reviewed 8/5 CXR -  Persistent low lung volumes with associated hypoventilatory changes. Increased right basilar opacities most likely reflect subsegmental atelectasis. Interval improved aeration at the left lung   base. Persistent trace left-sided pleural effusion. Stable heart size and no overt vascular congestion. No definite  pneumothorax.      Astrid Leone, [facility] Pulmonary/Critical Care

## 2021-06-19 NOTE — ANESTHESIA PROCEDURE NOTES
Emergent Intubation  Date/Time: 7/25/2018 5:00 AM    Performing CRNA: GRETTA BRUMFIELD  Indications: respiratory distress  Route: oral  Technique: direct  Laryngoscope size: Glidescope #4.  Tube size: 8.0 mm  Tube type: cuffed  Cuff inflated: yes  Level of Difficulty: 0ETT to lip: 23 cm  Tube secured with: ETT uribe  ETCO2 = Yes  Breath sounds: equal  SaO2 %: 100    Sign out given. CXR and sedation per primary care team.

## 2021-06-19 NOTE — PROGRESS NOTES
"Speech Language/Pathology    Attempted to see patient to follow-up regarding diet toleration. He was sleeping soundly. His fiance, Karlie, was at bedside. She stated that patient has had \"an off day\". SLP did not attempt to awaken him. Karlie reports that patient has been tolerating current diet of Regular textures and thin liquids without difficulty chewing or swallowing. He ate pasta for dinner last night and a turkey sandwich for lunch today, and has been drinking a lot of fluids. She has not observed any coughing or other s/sx of aspiration. SLP reviewed safe swallowing strategies with Karlie, including having patient sit fully upright (preferably in chair) for all intake, making sure he eats slowly, and encouraging small bites and sips. Karlie verbalized understanding of this information. Speech Therapy will attempt to see patient during a meal tomorrow to verify diet toleration. Karlie was agreeable to this.    8 conference minutes     Liliya Khan MA, CCC-SLP      "

## 2021-06-19 NOTE — PROGRESS NOTES
Progress Note    Assessment/Plan  Principal Problem:    Septic shock (H)  Active Problems:    Acute respiratory failure with hypoxia (H)    Acute renal failure, unspecified acute renal failure type (H)    Metabolic acidosis    Encephalopathy, metabolic    Elevated troponin    Other cardiomyopathy (H)    Bacteremia due to Klebsiella pneumoniae    Anemia, unspecified type    Alcohol abuse      Aayush García is a 62 year old male with a history of alcohol abuse, HTN and anemia who was admitted with septic shock and anuria with a positive blood culture for Klebsiella.  Required intubation and pressor support in ICU, currently extubated without pressor support, but still sedated for alcohol withdrawal symptoms. Significantly improved today, will move out of ICU.   - Extubated, off sedation  - Blood cultures with Klebsiella - on ceftriaxone (10 days total abx)  - Melena resolved, continue two times a day PPI  - Hgb stable  - LFTs, Alk phos, and Bili stabilizing -- likely due to sepsis, no finding of RUQ u/s  - Renal function remains poor. Still low UOP  - Repeat Echo today  - Episode of hypotension today: Poor cardiac output and volume up vs orthostatics -- Repeat lactate  - PT/OT/SLP     NEURO:   AMS - resolving  Alcohol withdrawal - resolved  AMS likely related to underlying sepsis. Was also on precedex gtt for alcohol withdrawal. Likely developed ICU delirium as as well, likely contributing to ongoing confusion as well as effects of withdrawal.  - Delirium order set placed  - PT/OT/SLP     RESPIRATORY:   Respiratory fatigue - resolving  Successfully extubated. Remains of supplemental O2 however, significantly improved status  - Downgrade to cardiac-tele  - Supplemental O2 prn     CV:   Elevated troponin and low EF  Likely ICM  Most likely due to stress cardiomyopathy and demand ischemia from acute illness, though with reduced LVEF may represent NSTEMI. EKG in early June at [facility] did show ST segment  changes concerning for ischemia so he may have component of underlying cardiac disease, possibly alcoholic cardiomyopathy. He will need a stress test eventually to evaluate for underlying disease.  - Cardiology is following - appreciate recs  --Repeat EKG with T-wave inversion in anterolateral leads, suggests ischemic event  --Avoid QT prolong meds  --Low dose coreg when able  --Repeat Echo today  - Continue supportive measures as needed  - Telemetry monitoring     Sinus Arrhyhtmia: New with this admission with frequent PACs.  Had previously been sinus tachycardic and now with arrhythmia.  Possibly related to overall disease process versus cardiac injury from shock.  Continue on telemetry. Cardiology following as above.    Hypotension: He has had low blood pressures during this stay and required pressors initially.  Today with episode of decreased blood pressure to 70s/50s.  Had received his Lasix but continues to have poor urine output and did not believe it was related to this.  Likely due to significant afterload with volume up and poor cardiac output. May be orthostatic as improved when he was laid down.  -Repeat lactate  -Discussed with Dr. Eastman, intensivist  -Consider small fluid bolus (250ml) if drops     RENAL:  Anuric renal failure  Most likely severe ATN due to klebsiella sepsis, though cause still not completely clear. May be component of volume depletion as well.  Abdominal US did not show any changes to kidneys. Still with minimal UOP - creatinine stable today.  -Nephrology is following - appreciate recs  -Continue lasix today  -Holding off tunneled catheter today with stable creatinine, continue to monitor.  -Continue ceftriaxone     Volume overload  Volume up on exam with b/l LE and UE edema and with dilutional changes to labs. Still producing minimal urine and getting large volumes of IVF for sepsis management. Significant improvement after dialysis  - Continue diuresis     GI:  Hepatic injury    Patient with elevated AST/ALT, bilirubin, alk phos, CK and INR with hypoalbuminemia. Decreased synthetic function of liver most likely due to decreased perfusion. US showed fatty infiltration of liver which may be contributing but no concerning biliary tree disease.  Less likely alcoholic hepatitis although alcoholic liver injury likely contributing given AST/ALT ratio.  Peripheral smear relatively normal which is reassuring for DIC. Ongoing elevation seems likely related to shock liver. Repeat RUQ u/s without acute findings  - GI following, appreciate input  - Continue to follow liver labs     Melena: Episode of melanotic stool 7/27-28.  Has had downtrending hemoglobin for months as well as while here.  Was seen in clinic recently for melanoma which was thought due to NSAID use.  Symptoms had resolved after he had stopped using NSAIDs.  He was on a PPI as well.  Again having melenic stools here, possibly gastritis from decreased perfusion versus worsening ulceration.  - GI following as above  - Trend Hgb  - PPI two times a day   - Transfuse for Hgb < 7     FEN:  Hyponatremia  Most likely due to septic shock and renal failure. Now likely hypervolemic hyponatremia  Consider adrenal insufficiency with hyponatremia and hyperkalemia. Continue to treat with fluid and antibiotics  - AM BMP  - Lasix -- replete potassium per protocol     ID:   Sepsis  Admitted with elevated WBC, elevated lactic acid, hypotension and tachycardia. Blood cultures growing klebsiella.  UA shows leukocytes, blood and few bacteria but may be unreliable as taken after period of anuria.  Most likely urinary source.  Possibly GI source, did have ulcer 1-2 months ago.  Unlikely PNA given CXR without infiltrates.  - Continue Ceftriaxone  - If appears to be septic again, would order CT abdomen     HEME/ONC:   Macrocytic anemia  Per chart review, Hb of 13.0 in 2/2018 with steady decline until present. Consistently macrocytic.  Likely nutritional  component due to alcohol use but continuous decline is concerning for underlying cause. Has been seen in clinic for melena thought due to PUD 2/2 to NSAID use. Had resolved per last clinic visit with decreased NSAID use. Also consider hemolysis with elevated bilirubin and INR. Peripheral smear showed inflammatory changes only.  LDH and haptoglobin only mildly elevated  - Hgb monitoring as above     ENDOCRINE:   Concern for adrenal insufficiency - resolved  Acute illness and combination of hyperkalemia and hyponatremia is concerning for adrenal insufficiency. Hydrocortisone was discontinued.      Hyperglycemia - resolved  Elevated glucose, likely due to hydrocortisone  - sliding scale insulin     PSYCH:   History of alcohol use  350ml peppermint schnapps daily for years. Per wife, never has had withdrawal though never been more than 2 days without a drink that she knows of.  Currently with signs of withdrawal and continues on precedex drip  -Nicotine patch  -Continue to monitor for withdrawal symptoms     FEN: Renal, NPO at [facility]  DVT ppx: SCDs  Lines: R PICC, Radial Art line, Dialysis Cath RIJ  Code: Full      Subjective  Feeling worse today.  Feels more shaky and like his breathing a little harder.  More sleepy.  Wife says he seems more confused than yesterday.    Objective    Vital signs in last 24 hours Temp:  [98  F (36.7  C)-98.4  F (36.9  C)] 98  F (36.7  C)  Heart Rate:  [86-93] 93  Resp:  [18-28] 22  BP: ()/(55-68) 94/55   Weight: 212 lb 8 oz (96.4 kg)    Intake/Output last 3 shift I/O last 3 completed shifts:  In: 890 [P.O.:840; IV Piggyback:50]  Out: 614 [Urine:614]    Intake/Output this shift:     Review of Systems   Abdominal pain remains  Else negative except as stated above    Physical Exam  General: Sleepy, difficult to arouse, does respond to questions  HEENT: Icteric  CV: RRR, normal S1/S2, no m/r/g  Pulm: Lungs coarse throughout  GI: Abdomen obese, soft, mildly tender to palpation, bowel sounds  active  Ext: Pitting edema to mid-thigh b/l  Neuro: Somnolent, confused, difficult to arouse    Pertinent Labs and Pertinent Radiology   Lab Results: personally reviewed.   Recent Results (from the past 24 hour(s))   Protime-INR   Result Value Ref Range    INR 1.41 (H) 0.90 - 1.10   Comprehensive Metabolic Panel   Result Value Ref Range    Sodium 134 (L) 136 - 145 mmol/L    Potassium 3.5 3.5 - 5.0 mmol/L    Chloride 97 (L) 98 - 107 mmol/L    CO2 26 22 - 31 mmol/L    Anion Gap, Calculation 11 5 - 18 mmol/L    Glucose 87 70 - 125 mg/dL    BUN 42 (H) 8 - 22 mg/dL    Creatinine 4.63 (H) 0.70 - 1.30 mg/dL    GFR MDRD Af Amer 16 (L) >60 mL/min/1.73m2    GFR MDRD Non Af Amer 13 (L) >60 mL/min/1.73m2    Bilirubin, Total 5.9 (H) 0.0 - 1.0 mg/dL    Calcium 8.5 8.5 - 10.5 mg/dL    Protein, Total 5.9 (L) 6.0 - 8.0 g/dL    Albumin 2.1 (L) 3.5 - 5.0 g/dL    Alkaline Phosphatase 886 (H) 45 - 120 U/L     (H) 0 - 40 U/L    ALT 79 (H) 0 - 45 U/L   HM2(CBC w/o Differential)   Result Value Ref Range    WBC 18.5 (H) 4.0 - 11.0 thou/uL    RBC 2.57 (L) 4.40 - 6.20 mill/uL    Hemoglobin 8.6 (L) 14.0 - 18.0 g/dL    Hematocrit 26.1 (L) 40.0 - 54.0 %     (H) 80 - 100 fL    MCH 33.5 27.0 - 34.0 pg    MCHC 33.0 32.0 - 36.0 g/dL    RDW 17.5 (H) 11.0 - 14.5 %    Platelets 173 140 - 440 thou/uL    MPV 11.0 8.5 - 12.5 fL     EKG Results: personally reviewed.     Disposition/Advanced Care Planning  Discharge Planning discussed with Wife  Barriers to discharge: W/u in progress, IV abx  Anticipated discharge date: Multiple days  Disposition:TCU    Benjamin Rosenstein, MD, MA   [facility]-PGY2  P: [phone]    Precepted patient with Dr. Cali Styles

## 2021-06-19 NOTE — PROGRESS NOTES
Faculty Supervision of Residents    I have examined Aayush García,  1955, on 2018 and the medical care has been evaluated and discussed with the resident.   I agree with the medical care provided, confirm the findings after personally reviewing the images and labs, and agree with the plan documented in the note by Dr. Jade Xie.    Had checked for c.diff given watery bowels, was negative.      Overall patient improving and ready for TCU.  Dr. Chery Bell

## 2021-06-19 NOTE — PROGRESS NOTES
[facility] Progress Note    Subjective    Aauysh García is receiving dialysis at this time.  His wife is sitting at bedside.  He is awake and answers questions, however frequently talks nonsensically.  Is oriented to person only.  Wife states that he is more awake today, however fusion is similar to yesterday, if not worse.    Overnight received as needed minute drain due to episode of hypotension.  This morning with dialysis run is continuing to have issues with hypotension and have been unable to pull off fluid.    Leukocytosis is persisting despite treatment with ceftriaxone for 10 days.  Concerned there may be an underlying source of infection we are missing.    Objective    Vital signs in last 24 hours Temp:  [97.6  F (36.4  C)-98.9  F (37.2  C)] 98.3  F (36.8  C)  Heart Rate:  [76-83] 82  Resp:  [20-27] 22  BP: ()/() 104/55   Weight: 212 lb 4.8 oz (96.3 kg)    Physical Exam   General appearance: Alert, conversational but often nonsensical, confused, oriented to person only. Juandiced.    Lungs: Upper airway congestion radiating to lower air fields bilaterally.  No wheezing or crackles present.  Respirations unlabored. NC present.   Heart: Regular rate and rhythm, normal S1 and S2, no murmur, rub or gallop.  Abdomen: Soft, nontender, mildly distended.   Extremities: 4+ pitting edema in ankles and feet bilaterally, 2+ in shins to hips  Neurologic: Alert, confused, oriented to person only.    Pertinent Labs   Lab Results: personally reviewed.     Pertinent Radiology   Radiology Results: Personally reviewed.    Assessment/Plan  Principal Problem:    Septic shock (H)  Active Problems:    Acute respiratory failure with hypoxia (H)    Acute renal failure, unspecified acute renal failure type (H)    Metabolic acidosis    Encephalopathy, metabolic    Elevated troponin    Stress-induced cardiomyopathy    Bacteremia due to Klebsiella pneumoniae    Anemia, unspecified type    Alcohol  abuse    Aayush García is a 62 year old male with a history of alcohol abuse, HTN and anemia who was admitted with septic shock secondary to Klebsiella bacteremia and anuria.  Required intubation and pressor support in ICU, currently extubated without pressor support, moved out of the ICU 7/31.  Has initiated dialysis, however difficulty with ongoing hypotension and persistent leukocytosis.     Anuric renal failure: Most likely severe ATN due to klebsiella sepsis, though cause still not completely clear. May be component of volume depletion as well.  Abdominal US negative 7/28.  Has initiated dialysis and will continue MWF for for the foreseen future.    - Nephrology is following - appreciate recs  -Dialysis catheter placed 8/1.  Plan to continue dialysis MWF.  -Nephrology finding it difficult to pull volume off during dialysis.  Planning for ultrafiltration run tomorrow.  -Continue ceftriaxone, has completed 10 day course, but will continue due to continued leukocytosis, see below    NSTEMI: Type II myocardial infarction or demand related ischemia secondary to sepsis on presentation.  EF of 25-30% on 7/25/2018, significantly improved with echo 7/31/2018 with an EF of 61%.  Per cardiology not recommending ischemic workup at this time.  - Cardiology consulted and has signed off.  -Hold Coreg 3.125 mg twice daily.  Consider restarting when hypotension has resolved.  -Continue aspirin daily  - Avoid QT prolong meds  - Telemetry monitoring    Sepsis   Klebsiella bacteremia: Admitted with leukocytosis, lactic acidosis, hypotension and tachycardia. Blood cultures growing klebsiella.  UA shows leukocytes, blood and few bacteria but may be unreliable as taken after period of anuria.  Most likely urinary source.  Possibly GI source, did have ulcer 1-2 months ago.  Unlikely PNA given CXR without infiltrates.  -Persistent leukocytosis with white count of 20.5, up from 19.6 yesterday.  Concerning for continued source of  infection, which may be contributing to ongoing hypotension and altered mental status of these are likely multifactorial.  Will perform further workup today.  -Lactate to rule out ongoing sepsis  -Repeat blood cultures ×2  -Chest x-ray performed yesterday showing improvement of infiltrates and improvement of pulmonary edema.  No signs of infection  -CT of abdomen and pelvis to rule out abscesses and evaluate for ascites and cirrhosis.  Discussed with nephrology and they are in agreement and will dialyze tomorrow.  - Continue Ceftriaxone    AMS  Alcohol withdrawal, resolving   Unclear etiology of altered mental status, though may be multifactorial cause including underlying sepsis, alcohol withdrawal, medication induced, element of uremia prior to starting dialysis, ICU delirium.  Given history of alcohol abuse and hepatic injury on admission, question if there could also be an element hepatic encephalopathy.  Ammonia level unremarkable, empirically treated with lactulose, given improvement continue to treat given no significant harms in therapy.  Medication induced altered mental status remains the most likely cause given patient's renal failure and hepatic injury and inability to clear medications.  - Delirium order set placed  -Avoid sedating medications  - PT/OT/SLP  -Decrease lactulose to 20 g daily, titrate as needed for 3-4 stools daily  -CT abdomen and pelvis to evaluate for cirrhosis and ascites.  If ascites is present will perform therapeutic paracentesis to see if this may help with his hypotensive episodes, and remove excess volume    Hypotension: He has had low blood pressures during this stay and required pressors initially.  Likely multifactorial, possibly secondary to underlying sepsis and will evaluate as above, question if he could be cirrhotic and this could be contributing to his hypotension.  Also may be related to orthostasis given response to fluids on 8/1.   - Dialysis per nephrology  -We will  schedule midodrine 10 mg 3 times daily per nephrology.  Continue as needed midodrine  -Avoid giving fluid bolus if possible given difficulty to remove fluid from patient's.    Hepatic injury  Possible hepatic encephalopathy: Elevated AST/ALT, bilirubin, alk phos, CK and INR with hypoalbuminemia. Decreased synthetic function of liver most likely due to decreased perfusion. US 7/25 showed fatty infiltration of liver which may be contributing but no concerning biliary tree disease.  Less likely alcoholic hepatitis although alcoholic liver injury likely contributing given AST/ALT ratio.  Peripheral smear relatively normal which is reassuring for DIC. Elevation seems likely related to shock liver. Repeat RUQ u/s 7/28 without acute findings.  LFTs stabilized but remain elevated.  We will treat possible hepatic encephalopathy, as above, though likely multiple causes of patient's altered mental status.   - GI following, appreciate input  - Continue to follow hepatic profile    Melena, resolved: Episode of melanotic stool 7/27-28.  Has had downtrending hemoglobin for months as well as while here.  Was seen in clinic recently for melena which was thought due to NSAID use.  Symptoms had resolved after he had stopped using NSAIDs.  He was on a PPI as well.  Again having melenic stools here, possibly gastritis from decreased perfusion versus worsening ulceration. Hemoglobin now stable   - GI following as above  - PPI two times a day   - Transfuse for Hgb < 7     Respiratory fatigue - resolving: Successfully extubated. Remains of supplemental O2 however, significantly improved status  - Supplemental O2 prn  -Continue duo nebs 4 times daily and albuterol as needed     Sinus Arrythmia: New with this admission with frequent PACs.  Had previously been sinus tachycardic and now with arrhythmia.  Possibly related to overall disease process versus cardiac injury from shock.    - Continue on to monitor on telemetry.      Macrocytic  anemia: Per chart review, Hb of 13.0 in 2/2018 with steady decline until present. Consistently macrocytic.  Likely nutritional component due to alcohol use but continuous decline is concerning for underlying cause. Was also having melena as above. Peripheral smear showed inflammatory changes only.  LDH and haptoglobin only mildly elevated.  Per nephrology may need EPO if MICHAEL persists  - AM CBC    History of alcohol use: 350ml peppermint schnapps daily for years. Per wife, never has had withdrawal though never been more than 2 days without a drink that she knows of.    - Nicotine patch  -Continue to monitor for withdrawal symptoms  -Continue folic acid, thiamine, multivitamin     Concern for adrenal insufficiency - resolved: Acute illness and combination of hyperkalemia and hyponatremia is concerning for adrenal insufficiency. Hydrocortisone was discontinued.     Hyperglycemia - resolved: Elevated glucose, likely due to hydrocortisone. Continue sliding scale insulin    Hyponatremia, resolved: Most likely due to septic shock and renal failure. Later in the course likely hypervolemic hyponatremia     Diet: cardiac renal diet  Fluids: No IVF  DVT Prophylaxis: Subcutaneous heparin and SCDs  Code: full code     Disposition/Advanced Care Planning   Discharge Planning discussed with patient  Barriers to discharge: IV antibiotics, management of hypotension, dialysis and further workup of leukocytosis   Dispo: anticipate discharge to [facility] versus [facility]   Anticipated discharge date:  2-3 days       Precepted patient with Dr. Karen Xie DO (PGY2)  [facility] Resident  Pager: [phone]

## 2021-06-19 NOTE — PROGRESS NOTES
[facility] Pre-Procedure Note  Date/Time: 7/30/2018/11:59 AM    Requested Procedure:  Tunneled dialysis catheter placement   Requested Provider:  Dr. Jaimes     HPI: Aayush García is a 62 y.o. male with a history of alcohol abuse, HTN and anemia who was admitted with septic shock and anuria with a positive blood culture for Klebsiella.  Patient currently remains with MICHAEL requiring dialysis.  Has been dialyzing via a temporary line but due to catheter dysfunction, dialysis was aborted today and the catheter was ordered to be discontinued.  Tunneled dialysis catheter placement has been requested for tomorrow to continue dialysis.      NPO status:  Will make NPO at midnight   Anticoagulation/Antiplatelets/Bleeding tendencies:  SQ heparin   Antibiotics:  Rocephin 1 gram IV q24h    PAST MEDICAL HISTORY:      Past Medical History:   Diagnosis Date     Anemia, unspecified type      Hypertension        PAST SURGICAL HISTORY:  Past Surgical History:   Procedure Laterality Date     COLONOSCOPY N/A 3/20/2018    Procedure: COLONOSCOPY;  Surgeon: Adam Nicole III, MD;  Location: [facility];  Service:      PICC  7/24/2018            ALLERGIES:  Review of patient's allergies indicates no known allergies.    MEDICATIONS:  Current Facility-Administered Medications   Medication Dose Route Frequency Provider Last Rate Last Dose     acetaminophen tablet 650 mg (TYLENOL)  650 mg Oral Q4H PRN Astrid Redd CNP        Or     acetaminophen solution 650 mg (TYLENOL)  650 mg Enteral Tube Q4H PRN Astrid Redd CNP        Or     acetaminophen suppository 650 mg (TYLENOL)  650 mg Rectal Q4H PRN Astrid Redd CNP         albuterol nebulizer solution 2.5 mg (PROVENTIL)  2.5 mg Nebulization Q4H PRN Astrid Redd CNP         aspirin chewable tablet 81 mg  81 mg Oral QHS Alvaro Eastman MD         benzocaine-menthol lozenge 1 lozenge (CEPACOL)  1 lozenge Oral Q1H PRN Astrid Redd CNP         bisacodyl  suppository 10 mg (DULCOLAX)  10 mg Rectal Daily PRN Astrid Redd CNP         carvedilol tablet 3.125 mg (COREG)  3.125 mg Oral BID Terrence Cordova MD   3.125 mg at 07/30/18 1045     cefTRIAXone 1 g in NaCl 0.9 % 50 mL (MINI-BAG Plus) (ROCEPHIN)  1 g Intravenous Q24H Ambrocio Montalvo  mL/hr at 07/29/18 2040 1 g at 07/29/18 2040     dextrose 50 % (D50W) syringe 20-50 mL  20-50 mL Intravenous PRN Astrid Redd CNP         erythromycin 5 mg/gram (0.5 %) ophthalmic ointment   Both Eyes QID Astrid Redd CNP         folic acid 1 mg, thiamine 100 mg, multiple vitamin 3,300 unit- 150 mcg/10 mL 10 mL in sodium chloride 0.9% 1,000 mL   Intravenous Daily PRN Astrid Redd CNP         folic acid tablet 1 mg (FOLVITE)  1 mg Enteral Tube DAILY Lewis Farr MD   1 mg at 07/30/18 1046    Or     folic acid injection 1 mg  1 mg Intramuscular DAILY Lewis Farr MD   1 mg at 07/29/18 1025     furosemide injection 160 mg (LASIX)  160 mg Intravenous Q12H Manish Parveen Jaimes MD         glucagon (human recombinant) injection 1 mg  1 mg Subcutaneous PRN Astrid Redd CNP         heparin (PF) subcutaneous injection 5,000 Units  5,000 Units Subcutaneous Q8H FIXED TIMES Alvaro Eastman MD         ipratropium-albuterol 0.5-2.5 mg/3 mL nebulizer solution 3 mL (DUO-NEB)  3 mL Nebulization QID - RT Lewis Farr MD   3 mL at 07/30/18 1105     magnesium hydroxide suspension 30 mL (MILK OF MAG)  30 mL Oral Daily PRN Astrid Redd CNP         multivitamin therapeutic tablet 1 tablet  1 tablet Enteral Tube DAILY Lewis Farr MD   1 tablet at 07/30/18 1045     naloxone injection 0.2-0.4 mg (NARCAN)  0.2-0.4 mg Intravenous PRN Astrid Redd CNP        Or     naloxone injection 0.2-0.4 mg (NARCAN)  0.2-0.4 mg Intramuscular PRN Astrid Redd CNP         nicotine 14 mg/24 hr 1 patch (NICODERM CQ)  1 patch Transdermal DAILY Benjamin E Rosenstein,  "MD   1 patch at 07/30/18 0938     ondansetron injection 4 mg (ZOFRAN)  4 mg Intravenous Q4H PRN Astrid Redd CNP        Or     ondansetron tablet 8 mg (ZOFRAN)  8 mg Oral Q8H PRN Astrid Redd CNP         pantoprazole 40 mg injection  40 mg Intravenous Q12H Bigg RayShivamGladys, DO   40 mg at 07/30/18 1043     polyvinyl alcohol 1.4 % ophthalmic solution 1-2 drop (LIQUIFILM TEARS)  1-2 drop Both Eyes Q1H PRN Astrid Redd CNP   2 drop at 07/28/18 0556     senna-docusate 8.6-50 mg tablet 1 tablet (PERICOLACE)  1 tablet Oral BID Astrid Redd CNP   1 tablet at 07/26/18 0759    Or     sennosides syrup 8.8 mg (for SENOKOT)  8.8 mg Enteral Tube BID Astrid Redd CNP         sodium chloride flush 10-20 mL (NS)  10-20 mL Intravenous PRN Kota Mahoney MD         sodium chloride flush 10-30 mL (NS)  10-30 mL Intravenous PRN Kota Mahoney MD         sodium chloride flush 10-30 mL (NS)  10-30 mL Intravenous Q8H FIXED TIMES Kota Mahoney MD   30 mL at 07/30/18 0458     sodium chloride flush 20 mL (NS)  20 mL Intravenous PRN Kota Mahoney MD   20 mL at 07/29/18 1133     thiamine tablet 100 mg  100 mg Enteral Tube DAILY Lewis Farr MD   100 mg at 07/30/18 1046    Or     thiamine injection 100 mg (vitamin B1)  100 mg Intramuscular DAILY Lewis Farr MD   100 mg at 07/29/18 1023       LABS:    Lab Results   Component Value Date    INR 1.48 (H) 07/30/2018    INR 1.68 (H) 07/29/2018    INR 1.47 (H) 07/28/2018     Lab Results   Component Value Date    WBC 20.8 (H) 07/30/2018    HGB 8.5 (L) 07/30/2018    HCT 25.1 (L) 07/30/2018     (H) 07/30/2018     07/30/2018     Lab Results   Component Value Date    CREATININE 4.67 (H) 07/30/2018     Lab Results   Component Value Date    K 3.7 07/30/2018     EXAM:  /56 (Patient Position: Semi-chew)  Pulse 95  Temp 98.4  F (36.9  C) (Oral)   Resp 28  Ht 5' 8\" (1.727 m)  Wt 212 lb 8 oz (96.4 kg)  " SpO2 97%  BMI 32.31 kg/m2  General:  Lying in bed, sleepy but awakens, tracks and answers questions appropriately, in no acute distress.  Neuro:  A&O x 3.   Resp:  Rate regular, breathing non-labored, anterior lungs coarse to auscultation bilaterally (however may represent oral secretions that he's not clearing well).  Cardio:  S1S2 and reg, without murmur, clicks or rubs.  Vascular: Bandage in place over right IJ.    Skin:  Upper chest clean and clear.  Motor/sensation:  No gross motor weakness.  Sensation intact.      Pre-Sedation Assessment:  Mallampati Airway Classification: Class 2: upper half of tonsil fossa visible  Previous reaction to anesthesia/sedation: no  Sedation plan based on assessment: Moderate  Sleep Apnea: no  Dentures: no  COPD: no  ASA Classification: ASA 3 - Patient with moderate systemic disease with functional limitations  Comments: no hx of asthma     ASSESSMENT:  62 y.o. male currently remains with MICHAEL requiring dialysis.  Has been dialyzing via a temporary line but due to catheter dysfunction, dialysis was aborted today and the catheter was ordered to be discontinued.  Tunneled dialysis catheter placement has been requested for tomorrow to continue dialysis.        PLAN:    NPO at midnight.    Hold SQ heparin after 2000 this evening (needs to be held X 12 hours pre procedure).    Tunneled dialysis catheter placement with sedation tomorrow 7/31/18 - exact timing TBD, IR staff will call when ready.    The procedure, risks and moderate sedation were discussed with patient and his significant other, all questions answered and they agree to proceed with the procedure.   Written consent obtained.    Hoda BARR [facility]  [phone] (office)  [phone] (pager)

## 2021-06-19 NOTE — PROGRESS NOTES
Speech Language/Pathology  Speech Therapy Daily Progress Note    Patient presents as lethargic during this session. His fiance, Karlie, was present. Patient was noted to have audible pharyngeal secretions upon SLP's arrival. Karlie reports that patient recently received a neb treatment. Increased coughing was noted when patient's HOB was elevated for PO trial.    An  was not applicable.    Objective  Follow-up regarding diet toleration. Per recent nursing documentation, patient's oral intake is poor. He has been refusing to eat. SLP was able to observe patient with a few bites of a regular solid (i.e., rice krispie bar) and sips of water via straw. Mildly prolonged mastication due to lethargy. Swallow response appears timely and laryngeal elevation adequate. No overt clinical s/s of aspiration noted. SLP reviewed safe swallowing precautions with patient and Karlie, including sitting fully upright (preferably in chair) for all intake, eating slowly, and taking small bites and sips. Karlie verbalized understanding. Patient was not fully attending to SLP.    Assessment  Patient appears to be tolerating Regular solids and thin liquids. No overt clinical s/s of aspiration noted with these. Patient's coughing during session did not appear to be related to swallow.    Plan/Recommendations  Will discontinue Speech Therapy at this time. Please notify SLP if new concerns regarding swallow arise.    The ST Care Plan has been reviewed and recommend discontinue ST.    15 dysphagia minutes     Liliya Khan MA, CCC-SLP

## 2021-06-19 NOTE — PROGRESS NOTES
[facility] Progress Note    Subjective    Aayush García is lying in bed with wife at bedside. Patient is oriented to person and place. Is having some back pain with lying in bed for an extended periood time, discussed getting up to chair and working with PT today. Wife has additional concerns regarding persistent hypotension which were discussed.     No acute events overnight. Working to wean off norepinephrine, however BP continues to trend down. Mentation continues to improve     Objective    Vital signs in last 24 hours Temp:  [97.7  F (36.5  C)-99.3  F (37.4  C)] 98.2  F (36.8  C)  Heart Rate:  [70-98] 78  Resp:  [12-44] 25  BP: ()/(46-82) 82/46   Weight: 203 lb 8 oz (92.3 kg)    Physical Exam  General appearance: Alert, conversational,confusion improving, oriented to person and place.  Jaundiced.   Lungs: Upper airway congestion which radiates to bilateral lower airways.  No wheezing or crackles auscultated.  Respirations unlabored  Heart: Regular rate and rhythm, normal S1 and S2, no murmur, rub or gallop.  Abdomen: Obese, distended, soft, nontender.  Extremities: +3 pitting edema up to abdomen, though improved from yesterday   Skin: Jaundiced  Neurologic: Alert, oriented to person and place.    Pertinent Labs   Lab Results: personally reviewed.     Pertinent Radiology   Radiology Results: Personally reviewed.    Assessment/Plan  Principal Problem:    Septic shock (H)  Active Problems:    Acute respiratory failure with hypoxia (H)    Acute renal failure, unspecified acute renal failure type (H)    Metabolic acidosis    Encephalopathy, metabolic    Elevated troponin    Stress-induced cardiomyopathy    Bacteremia due to Klebsiella pneumoniae    Anemia, unspecified type    Alcohol abuse    Acute encephalopathy    Acute liver failure    Aayush García is a 62 year old male with a history of alcohol abuse, HTN and anemia who was admitted with septic shock secondary to Klebsiella bacteremia and  anuria.  Required intubation and pressor support in ICU, currently extubated, moved out of the ICU 7/31, and was transferred back into the ICU 8/5 due to need for pressure support. Has initiated dialysis, however difficulty with ongoing hypotension and persistent leukocytosis.      Acute renal injury: Most likely severe ATN due to klebsiella sepsis, though cause still not completely clear. May be component of volume depletion as well.  Abdominal US negative 7/28.  Has initiated dialysis and will continue MWF for for the foreseen future. Dialysis catheter placed 8/1.  - Nephrology following   -Plan to continue dialysis MWF.      Sepsis   Klebsiella bacteremia  Persistent leukocytosis: Admitted with leukocytosis, lactic acidosis, hypotension and tachycardia. Blood cultures growing klebsiella.  UA shows leukocytes, blood and few bacteria but may be unreliable as taken after period of anuria.  Most likely urinary source, though now question possible SBP given hepatic encephalopathy.  Possibly GI source, did have ulcer 1-2 months ago.  Unlikely PNA given CXR without infiltrates. Persistent leukocytosis of approximately 20 thousand, down trending today. Neutrophil predominance on the 28th. Repeat lactate normal. Blood cultures x2 drawn and no growth yet.  Repeat chest x-ray showing improvement of infiltrates and improvement of pulmonary edema. CT abdomen showed no abscess just ascites. Therapeutic paracentesis performed with 2L drawn, unfortunately fluid was not sent for culture or stain.  UA 8/5 with urinary tract infection, may have been contributing to altered mental status.  -IV Zosyn initiated 8/5   -Cultures repeat pending with no growth to date      AMS  Alcohol withdrawal, resolving   Unclear etiology of altered mental status, though may be multifactorial cause including hepatic encephalopathy, underlying sepsis, alcohol withdrawal, medication induced, ICU delirium. Ammonia level unremarkable, empirically treated  with lactulose and rifaximin.  With some improvement after starting rifaximin on 8/5  - Delirium order set   -Avoid sedating medications  - PT/OT/SLP  - Holding lactulose today due to numerus BMs. Titrate as needed for 3-4 stools daily  - rifaximin 400 mg TID      Hypotension: Likely multifactorial, given underlying sepsis, and possible cirrhosis.  Also with severe hypoalbuminemia which is likely contributing. Was transferred to the ICU for pressure support 8/5. Repeat lactate unremarkable.  Pro-calcitonin remains elevated, however down significantly from admission.  -ICU managing pressors  -Albumin 25 g IV every 6 hours for 8 doses initiated 8/5  - Dialysis per nephrology  - Increase midodrine to 15 mg Q8H.  Continue as needed midodrine  -Avoid giving fluid bolus if possible given difficulty to remove fluid.      Hepatic injury  Possible hepatic encephalopathy  Ascites: Elevated AST/ALT, bilirubin, alk phos, CK and INR with hypoalbuminemia. Decreased synthetic function of liver. US 7/25 showed fatty infiltration of liver which may be contributing but no concerning biliary tree disease. Peripheral smear relatively normal which is reassuring for DIC. Repeat RUQ u/s 7/28 without acute findings.  LFTs stabilized but remain elevated.  Paracentesis 8/3 with 1.95 L fluid removed, however unfortunately send for Gram stain or culture. MELD score of 31 with a 53% estimated 3 month mortality.   - We will treat possible hepatic encephalopathy, as above, though likely multiple causes of patient's altered mental status.   - GI following, appreciate the recommendations  -If paracentesis is performed again would recommending sending fluid for analysis.    Macrocytic anemia: Per chart review, Hb of 13.0 in 2/2018 with steady decline until present. Consistently macrocytic.  Likely nutritional component due to alcohol use but continuous decline is concerning for underlying cause. Was also having melena as above. Peripheral smear  showed inflammatory changes only.  LDH and haptoglobin only mildly elevated.  Has been transfused 1uPRBCs on 8/6.  - AM CBC  - EPO per nephrology     NSTEMI: Type II myocardial infarction or demand related ischemia secondary to sepsis on presentation.  EF of 25-30% on 7/25/2018, significantly improved with echo 7/31/2018 with an EF of 61%.  Per cardiology not recommending ischemic workup at this time.  - Cardiology has signed off.  - Discontinued coreg 3.125 mg twice daily due to hypotension.  Consider restarting when hypotension has resolved.  - Avoid QT prolong meds  - Telemetry monitoring      Melena, resolved: Episode of melanotic stool 7/27-28.  Has had downtrending hemoglobin for months as well as while here.  Was seen in clinic recently for melena which was thought due to NSAID use.  Symptoms had resolved after he had stopped using NSAIDs.  He was on a PPI as well.  Again having melenic stools here, possibly gastritis from decreased perfusion versus worsening ulceration.   - GI following as above  - PPI two times a day   - Transfuse for Hgb < 7      Respiratory fatigue - resolving: Successfully extubated. Remains of supplemental O2 however, significantly improved status  - Supplemental O2 prn  -Continue duo nebs 4 times daily and albuterol as needed      Sinus Arrythmia: New with this admission with frequent PACs.  Had previously been sinus tachycardic and now with arrhythmia.  Possibly related to overall disease process versus cardiac injury from shock.    - Telemetry.      History of alcohol use  History of tobacco abuse: 350ml peppermint schnapps daily for years. Per wife, never has had withdrawal though never been more than 2 days without a drink that she knows of.    - Nicotine patch  - Continue to monitor for withdrawal symptoms  -Continue folic acid, thiamine, multivitamin      Diet: 2mg Na  Fluids: No IVF  DVT Prophylaxis: Subcutaneous heparin  Code: full code     Disposition/Advanced Care Planning    Discharge Planning discussed with patient's wife  Barriers to discharge: critical care in ICU   Dispo: anticipate discharge to Unclear at this time, most likely [facility]   Anticipated discharge date:  multiple more days       Precepted patient with Dr. Víctor Xie DO (PGY2)  [facility] Resident  Pager: [phone]

## 2021-06-19 NOTE — PROGRESS NOTES
"  Clinical Nutrition Therapy Reassessment Note      Reason for Assessment:   Aayush García is a 62 y.o. male assessed by the registered Dietitian for follow up.    Admitted for septic shock, acute respiratory failure, acute renal failure, metabolic acidosis, encephalopathy, elevated troponin, ETOH abuse    Pt receives hemodialysis, making very little  urine. Extubated 7/26, OG pulled at that time.    Dr. Medley - does not wish to start TPN today, will order SLP to evaluate swallow as pt had more fluid pulled off yesterday, and is waking up.  Pt is  still confused but, asking for water during rounds.    Nutrition History:  Information obtained from family/caregiver and chart.  Patients diet prior to admission has been regular. Recent food/fluid intake has been good, \"eats everything\". Drinks 1/2 bottle peppermint schnapps daily per Rounds.  Patient has been consuming the following supplements none.   Patient has the following cultural/[employer] food needs or preferences:none   Patient has the following food allergies or intolerances:nkfa    Current Nutrition Prescription:   Diet: NPO  Supplements and Modulars:     IV dextrose or Fluids:    dexmedetomidine 400 mcg/100 mL in NS (PRECEDEX) (4mcg/mL) Last Rate: 0.8 mcg/kg/hr (07/29/18 0621)       Current Nutrition Intake:  Total nutrient intake from all sources does not meet estimated nutritional needs.  NPO day #5    Anthropometrics:  Height: 5' 8\" (172.7 cm)  Admission weight: 193 lb 7/25  Weight: 207 lb 9.6 oz (94.2 kg) 7/29 was 211 oz 9.6 oz 7/28  BMI (Calculated): 28.4  BMI indication: 25-29.9 overweight  Ideal body weight 154 lb  % Ideal body weight 138%  Usual body weight 190 lb  % Usual body weight 90%  Weight History:  Wt Readings from Last 3 Encounters:   07/29/18 207 lb 9.6 oz (94.2 kg)   03/19/18 180 lb (81.6 kg)     Physical Findings:  The patient has the following physical signs which could indicate malnutrition: edema       GI Status/Output:   The " patient's GI symptoms include: diarrhea and abdominal distention    Bowel Sounds hypoactive  Greenish loose stool - [facility]    Skin/Wound:  Edwin score Edwin Scale Score: 12    Medications:  Medications reviewed.   thiamine, mvi, folic acid, cefapime, ssi, kcl    Labs:  Na 141, K 3.4, Mg 1.5, phos 2.0, BUN 23, Creat 3.28, Glu 80, Hgb 8.4, wbc 17.4, triglycerides 126, alk 812, bili 5.5    Assessed Nutritional Needs:  Assessment weight is 86 kg, with a weight source of other, UBW    Estimated Energy Needs: 2150 - 2580 kcals daily per 25 - 30 kcal/kg     Estimated Protein Needs: 86 - 103 gdaily, 1.0 1.2 g/kg.    Estimated Fluid Needs: 2150 mls daily, 25 mls/kg, or per MD    Malnutrition: Not noted    Nutrition Risk Level: high risk    Nutrition dx:  Impaired swallowing related to vent evidenced by NPO - old  Impaired swallowing due to current mental status and sedation evidenced by NPO    Goal:  Advance diet when safe    Intervention:  None at this time.    Monitoring:  diet advancement per SLP, weight, labs  See Care Plan for Problems, Goals, and Interventions.

## 2021-06-19 NOTE — PROGRESS NOTES
RESPIRATORY CARE NOTE     Patient Name: Aayush García  Today's Date: 7/25/2018     Patient was placed on BIPAP at 0315, settings of 10/5, 16, 25%. ABG done at 0421: 7.18/37/93/14.4/98.9. Intubated at 0500 with an 8.0 ETT/24 cm @ teeth. Breath sounds coarse expiratory wheezes. ETCO2 had good color change. CXR pending. [place] was started per CNP.  Ventilator settings: VCV 28, 550, +5, 25%. Patient is constantly coughing with large amounts of thick yellow secretions being suctioned from ETT. PIP 37, plateau pressure 23, RR 30, Spo2 98%. 10 mg Albuterol neb given at 0517. /114. Will continue to monitor closely.    Saray Peoples, LRT/NPS

## 2021-06-19 NOTE — ED PROVIDER NOTES
eMERGENCY dEPARTMENT eNCOUnter        CHIEF COMPLAINT    Chief Complaint   Patient presents with     Urinary Retention     Altered Mental Status     Tachycardia       ED COURSE & MEDICAL DECISION MAKING    Pertinent Labs & Imaging studies reviewed. (see chart for details)    ED Course   Comment By Time   62 y.o. male with history of HTN presenting per EMS from home for confusion and no urine output x3 days. Wife states he did not recognized her this evening so called EMS. Patient currently with no complains but thinks the year is 1987.    HR 110s on exam with BP 70s/50s; afebrile; tachypneic in 30s but satting well on room air. Ill-appearing on exam with GCS 14 (-1 confusion), no asterixis, no focal neuro deficit, mild diffuse jaundice but benign abdomen, no peripheral edema. Bedside bladder scan with only 38mL urine; not retaining. High concern for renal failure with concomitant sepsis. Has had mild cough recently. Will cover with empiric broad-spectrum antibiotics (avoiding vanc+Zosyn combo for renal failure concerns) and give IVF as obtaining septic workup. No meningismus on exam to specifically suggest meningitis. Family comfortable with this plan. Kota Mahoney MD 07/24 2118     09:42 PM: Lab called.  09:56 PM: Lab called.  09:56 PM: Went to check on patient.   10:19 PM: Talked with Dr. Sanchez about patient's admission to the hospital.  10:21 PM: Talked with [place].   11:21 PM: Rechecked on patient     Labs with acute renal failure with creatinine 10, K 5.7; no EKG changes of hyperK. CXR with no clear pneumonia. WBC elevated with no bands but lactate 7.1; continues to have MAPs<65 despite 3L IVF; has septic shock. Likely urinary source. Covered with antibiotics as above already. PICC placed in the ED and Levophed ordered. Patient's physical exam unchanged; still satting well on room air and mentating the same. ICU and family medicine consulted for admission; they agreed. Patient understood and  agreed with the plan; no further questions at the time of admission.    FINAL IMPRESSION    1. Septic shock (H)        HPI    Aayush García is a 62 y.o. male with a history of HTN who presents to the Emergency Department via EMS for evaluation of an altered mental status. Patient's wife called EMS after the patient was unable to void and was having confused conversation. Patient's wife states that today the patient could not remember who she was. Patient's wife notes that the patient has a history of tremors but they have worsened since yesterday. She also states that the patient was experiencing hallucinations yesterday and did not sleep much last night. Patient has not urinated in three days. Patient endorses fatigue. Patient denies any pain, shortness of breath, epistaxis, nausea, vomiting, or diarrhea. Patient denies any allergies. Patient is on medication for his high blood pressure. Patient's wife states that the patient fell and hit his head on June 18, 2018 but did not lose consciousness. Patient is a current smoker.      The creation of this record is based on the scribe s observations of the work being performed by Kota Mahoney MD and the provider s statements to them. It was created on his/her behalf by Melisa Bryant, a trained medical scribe. This document has been checked and approved by the attending provider.      REVIEW OF SYSTEMS    Review of Systems   Constitutional: Positive for fatigue. Negative for chills and fever.   HENT: Negative for congestion, nosebleeds and rhinorrhea.    Eyes: Negative for visual disturbance.   Respiratory: Negative for shortness of breath.    Cardiovascular: Negative for chest pain.   Gastrointestinal: Negative for abdominal pain, diarrhea, nausea and vomiting.   Genitourinary: Positive for decreased urine volume. Negative for discharge, dysuria, frequency and hematuria.   Musculoskeletal: Negative for back pain and neck pain.   Skin: Negative for rash.  "  Allergic/Immunologic: Negative for environmental allergies.   Neurological: Negative for dizziness, light-headedness, numbness and headaches.   All other systems reviewed and are negative.       PAST MEDICAL HISTORY    Past Medical History:   Diagnosis Date     Hypertension     and   Patient Active Problem List   Diagnosis     Septic shock (H)       SURGICAL HISTORY    Past Surgical History:   Procedure Laterality Date     COLONOSCOPY N/A 3/20/2018    Procedure: COLONOSCOPY;  Surgeon: Adam Nicole III, MD;  Location: [facility];  Service:        CURRENT MEDICATIONS    Patient's Medications   New Prescriptions    No medications on file   Previous Medications    ASPIRIN 81 MG EC TABLET    Take 81 mg by mouth daily.    LISINOPRIL (PRINIVIL,ZESTRIL) 10 MG TABLET    Take 10 mg by mouth daily.    MULTIVITAMIN THERAPEUTIC TABLET    Take 1 tablet by mouth daily.    OMEPRAZOLE (PRILOSEC) 40 MG CAPSULE    Take 40 mg by mouth daily before breakfast.    ROSUVASTATIN (CRESTOR) 10 MG TABLET    Take 10 mg by mouth daily.   Modified Medications    No medications on file   Discontinued Medications    No medications on file       ALLERGIES    No Known Allergies    FAMILY HISTORY    No family history on file.    SOCIAL HISTORY    Social History     Social History     Marital status: Single     Spouse name: N/A     Number of children: N/A     Years of education: N/A     Social History Main Topics     Smoking status: Current Every Day Smoker     Smokeless tobacco: Never Used     Alcohol use Yes      Comment: Occasionally     Drug use: No     Sexual activity: Not Asked     Other Topics Concern     None     Social History Narrative       PHYSICAL EXAM    VITAL SIGNS: BP 90/54  Pulse (!) 110  Temp 98.2  F (36.8  C) (Oral)   Resp (!) 39  Ht 5' 8\" (1.727 m)  Wt 187 lb (84.8 kg)  SpO2 96%  BMI 28.43 kg/m2   Constitutional:  Ill-appearing slightly jaundiced male, awake, alert  EYES: Conjunctivae clear, mild scleral " icterus  HENT:  Atraumatic, external ears normal, nose normal, oropharynx dry. Neck- supple. Mucus membranes are dry.  Respiratory:  RR 30s during exam, no respiratory distress, clear lungs bilaterally  Cardiovascular:  HR 110s during exam, normal rhythm, no murmurs, capillary refill normal.  No chest tenderness  GI:  Soft, nondistended, nontender, no palpable masses, no rebound, no guarding; negative Rogers's  : No CVA tenderness  Musculoskeletal:  No edema.  Range of motion major extremities intact. No tenderness to palpation or major deformities noted.    Integument: Warm, Dry, No erythema, No rash. Jaundice.    Neurologic:  Alert & orient to place and person but not time, no focal deficits noted, ambulatory. Follows commands. Negative pronator drift. No asterixis. Mild to moderate intention tremor. Cranial nerves intact.   Psych: Affect normal, Mood normal.    EKG    Independently reviewed and interpreted by me.  EKG: sinus tachycardia @ 110bpm, no ST or T wave changes, normal intervals.  My read.    LABS / RADIOLOGY    I have independently reviewed and interpreted the below imaging, pending the final radiology read  Please see official radiology report.  Xr Chest 1 View Portable    Result Date: 7/24/2018  XR CHEST 1 VIEW PORTABLE 7/24/2018 8:54 PM INDICATION: Sob COMPARISON: None. FINDINGS: Cardiomegaly. Pulmonary vascularity normal. The lungs are clear.    Results for orders placed or performed during the hospital encounter of 07/24/18   Alcohol, Ethyl, Blood   Result Value Ref Range    Alcohol, Blood 65 (H) None detected mg/dL   Comprehensive Metabolic Panel   Result Value Ref Range    Sodium 120 (L) 136 - 145 mmol/L    Potassium 5.7 (H) 3.5 - 5.0 mmol/L    Chloride 88 (L) 98 - 107 mmol/L    CO2 7 (LL) 22 - 31 mmol/L    Anion Gap, Calculation 25 (H) 5 - 18 mmol/L    Glucose 104 70 - 125 mg/dL    BUN 60 (H) 8 - 22 mg/dL    Creatinine 10.49 (HH) 0.70 - 1.30 mg/dL    GFR MDRD Af Amer 6 (L) >60 mL/min/1.73m2     GFR MDRD Non Af Amer 5 (L) >60 mL/min/1.73m2    Bilirubin, Total 2.9 (H) 0.0 - 1.0 mg/dL    Calcium 8.0 (L) 8.5 - 10.5 mg/dL    Protein, Total 7.1 6.0 - 8.0 g/dL    Albumin 1.8 (L) 3.5 - 5.0 g/dL    Alkaline Phosphatase 907 (H) 45 - 120 U/L     (H) 0 - 40 U/L    ALT 62 (H) 0 - 45 U/L   Lactic Acid   Result Value Ref Range    Lactic Acid 7.1 (HH) 0.5 - 2.2 mmol/L   Lipase   Result Value Ref Range    Lipase 313 (H) 0 - 52 U/L   Magnesium   Result Value Ref Range    Magnesium 1.8 1.8 - 2.6 mg/dL   Thyroid Stimulating Hormone (TSH)   Result Value Ref Range    TSH 1.45 0.30 - 5.00 uIU/mL   Troponin I   Result Value Ref Range    Troponin I 0.11 0.00 - 0.29 ng/mL   INR   Result Value Ref Range    INR 1.35 (H) 0.90 - 1.10   Procalcitonin   Result Value Ref Range    Procalcitonin 60.23 (H) 0.00 - 0.49 ng/mL   HM1 (CBC with Diff)   Result Value Ref Range    WBC 20.2 (H) 4.0 - 11.0 thou/uL    RBC 2.56 (L) 4.40 - 6.20 mill/uL    Hemoglobin 8.9 (L) 14.0 - 18.0 g/dL    Hematocrit 26.5 (L) 40.0 - 54.0 %     (H) 80 - 100 fL    MCH 34.8 (H) 27.0 - 34.0 pg    MCHC 33.6 32.0 - 36.0 g/dL    RDW 16.5 (H) 11.0 - 14.5 %    Platelets 174 140 - 440 thou/uL    MPV 11.0 8.5 - 12.5 fL    Neutrophils % 84 (H) 50 - 70 %    Lymphocytes % 8 (L) 20 - 40 %    Monocytes % 8 2 - 10 %    Eosinophils % 0 0 - 6 %    Basophils % 0 0 - 2 %    Neutrophils Absolute 16.7 (H) 2.0 - 7.7 thou/uL    Lymphocytes Absolute 1.6 0.8 - 4.4 thou/uL    Monocytes Absolute 1.7 (H) 0.0 - 0.9 thou/uL    Eosinophils Absolute 0.0 0.0 - 0.4 thou/uL    Basophils Absolute 0.0 0.0 - 0.2 thou/uL   Salicylate (ASA)   Result Value Ref Range    Salicylate <8.0 2.0 - 25.0 mg/dL   Blood Gases, Venous   Result Value Ref Range    pH, Venous 7.24 (LL) 7.35 - 7.45    pCO2, Venous 27 (L) 35 - 50 mm Hg    pO2, [place]ank 20 (L) 25 - 47 mm Hg    Base Excess, Venous -14.4 mmol/L    HCO3, Venous 12.7 (L) 24.0 - 30.0 mmol/L    Oxyhemoglobin 31.5 (L) 70.0 - 75.0 %    O2 Sat, Venous 32.3 (L)  70.0 - 75.0 %   Acetaminophen(Tylenol )   Result Value Ref Range    Acetaminophen 10.9 10.0 - 20.0 ug/mL       PROCEDURES  Procedures    This patient is critically ill and 43 minutes of critical care, excluding time spent performing procedures, was provided directly by me to this patient.    ED ORDERS  XR Chest 1 View Portable   Final Result      US Kidney Bilateral    (Results Pending)       ED MEDICATIONS  Medications   vancomycin 2 g in sodium chloride 0.9% 500 mL (VANCOCIN) (2 g Intravenous New Bag 7/24/18 2152)   sodium chloride flush 3 mL (NS) (not administered)   lactated Ringers (not administered)   norepinephrine 4 mg/250 ml in D5W (0.016 mg/ml) (not administered)   sodium chloride flush 10-20 mL (NS) (not administered)   sodium chloride flush 20 mL (NS) (not administered)   sodium chloride flush 10-30 mL (NS) (not administered)   sodium chloride flush 10-30 mL (NS) (not administered)   albuterol (PROVENTIL) 2.5 mg /3 mL (0.083 %) nebulizer solution (2.5 mg  Given 7/24/18 2105)   lactated Ringers 2,000 mL (0 mL Intravenous Stopped 7/24/18 2205)   cefepime (MAXIPIME) 1 gram (1 g Intravenous Given 7/24/18 2149)   lactated Ringers 1,000 mL (1,000 mL Intravenous New Bag 7/24/18 2135)   lidocaine (PF) 10 mg/mL (1 %) injection 1-5 mL (XYLOCAINE-MPF) (2 mL Intradermal Given 7/24/18 2317)       DISCHARGE MEDICATIONS  Patient's Medications   New Prescriptions    No medications on file   Previous Medications    ASPIRIN 81 MG EC TABLET    Take 81 mg by mouth daily.    LISINOPRIL (PRINIVIL,ZESTRIL) 10 MG TABLET    Take 10 mg by mouth daily.    MULTIVITAMIN THERAPEUTIC TABLET    Take 1 tablet by mouth daily.    OMEPRAZOLE (PRILOSEC) 40 MG CAPSULE    Take 40 mg by mouth daily before breakfast.    ROSUVASTATIN (CRESTOR) 10 MG TABLET    Take 10 mg by mouth daily.   Modified Medications    No medications on file   Discontinued Medications    No medications on file         I, Kota Mahoney MD personally performed the  services described in this documentation, as scribed by Melisa Bryant in my presence, and it is both accurate and complete.      Kota Mahoney MD  07/24/18  Emergency Medicine  [facility]     Kota Mahoney MD  07/24/18 4354

## 2021-06-19 NOTE — PROGRESS NOTES
Pt remained on Vent with settings: VCV; Rate 20, Vt 550, FiO2 30% PEEP +5. Pt was assessed during shift, BS coarse, SpO2 95%, RR 20 HR 68, PIP 20, Pplat 22-26, Complaince 28 AutoPeep 1. Pt suctioned, Small, white thick amount and tolerated well. RTs will continue to monitor and assess the pt.     Rate decreased to 18 by Nurse Practitioner.     Alexus Carrillo, LRT

## 2021-06-19 NOTE — PROGRESS NOTES
[place] Admissions: Met with patient and significant other to discuss [place] as a possible option once ready for discharge. Information on [place] left with them to review. Will follow up once closer to discharge. Thank you.    Digna Celaya RN Referral Specialist [phone]

## 2021-06-19 NOTE — PROGRESS NOTES
"Cardiology Progress Note  New to me.  Chart reviewed.  Assessment/Plan:    1. Non-ST elevation myocardial infarction.  Suspect type II myocardial infarction, or demand related ischemia related to sepsis presentation 7/25.  This would be consistent with, \"Takosubo\" type wall motion abnormalities noted on echocardiography.  when hemodynamically stable, suggest repeat echocardiographic examination.  2. Acute renal failure  3. Gram-negative sepsis, still with intermittent fevers day 5 after hospitalization  4. Tobacco abuse  Long QT, polymorphic wide complex tachycardia suspicious for  ventricular tachycardia.  Will discontinue haloperidol and would suggest avoiding any potential QT prolonging agents.  As noted in 'up-to-date'-intravenous haloperidol are known to be more likely to prolong the QT interval  5.    Reported alcohol abuse.  Patient reports drinking only one beer daily.      Principal Problem:    Septic shock (H)  Active Problems:    Acute respiratory failure with hypoxia (H)    Acute renal failure, unspecified acute renal failure type (H)    Metabolic acidosis    Encephalopathy, metabolic    Elevated troponin    Other cardiomyopathy (H)    Bacteremia due to Klebsiella pneumoniae    Anemia, unspecified type    Alcohol abuse     LOS: 6 days     Subjective:  Denies pain or shortness of breath.  Reports he has been short of breath for 1 year, with diminishing urine output over the last several months.  He has not experienced any chest pains prior to admission.  He denies alcohol excess.  Reports his smoking has been decreasing down to 1 or 2 cigarettes a day in recent months.      Objective:   Vital signs in last 24 hours:  Vitals:    07/30/18 0700 07/30/18 0727 07/30/18 0744 07/30/18 0803   BP: 111/64      Patient Position:       Pulse: 98  94 97   Resp: (!) 31  18 22   Temp:       TempSrc:       SpO2: 96% 96% 97% 96%   Weight:       Height:         Weight:   Wt Readings from Last 3 Encounters:   07/30/18 212 " lb 8 oz (96.4 kg)   03/19/18 180 lb (81.6 kg)           PHYSICAL EXAM      Respiratory: Coarse scattered rhonchi, loose cough.  Cardiovascular:  Normal heart rate, Normal rhythm, No murmurs, No rubs, No gallops.   GI:  Bowel sounds normal, Soft, No tenderness, No masses  Extremities: Trace edema       Cardiographics:   Telemetry sinus rhythm with frequent PACs, 6 beat V. tach.  Run of rapid rhythm last evening possibly polymorphic VT, more likely SVT with aberrancy soon after receiving IV haloperidol    ECG (personally reviewed) 7/28:  Normal sinus rhythm with sinus arrhythmia  Left axis deviation  Moderate voltage criteria for LVH, may be normal variant  STand T wave abnormality consider anterior-lateral ischemia  Prolonged QT  Abnormal ECG      Imaging:   Echocardiogram 7/25:    Summary        No previous study for comparison.    Technically challenging examination. Definity contrast utilized    Left ventricle ejection fraction is severely decreased. Left ventricular ejection fraction estimated 25-30%.    Global left ventricular hypokinesis with large area of akinesis involving the apex, anterior apical and apical lateral portions of left ventricle.    Normal right ventricular size and systolic function.    Mild to moderate aortic stenosis. Mean gradient of 15 mmHg. The degree of aortic stenosis potentially underrepresented in the setting of severe reduction left ventricular systolic function.    Left atrial enlargement    Moderate enlargement of the aortic root.       XR CHEST 1 VIEW PORTABLE  7/28/2018 6:04 AM  INDICATION: Dyspnea  COMPARISON: 07/25/2018.  FINDINGS: Interval removal of ETT and NG tube. Right IJ catheter high SVC level. Right PICC catheter right atrial level. No pneumothorax. Interval worsening with bilateral airspace opacities with upper lobe predominance greatest on the left. Small left   effusion. Previous left lower rib fractures. Monitor electrodes.      Lab Results:   Lab Results    Component Value Date    WBC 20.8 (H) 07/30/2018    HGB 8.5 (L) 07/30/2018    HCT 25.1 (L) 07/30/2018     07/30/2018    CHOL 146 02/12/2018    TRIG 126 07/29/2018    HDL 35 (L) 02/12/2018    ALT 82 (H) 07/30/2018     (H) 07/30/2018     (L) 07/30/2018    K 3.7 07/30/2018    CL 97 (L) 07/30/2018    CREATININE 4.67 (H) 07/30/2018    BUN 38 (H) 07/30/2018    CO2 25 07/30/2018    TSH 1.45 07/24/2018    INR 1.48 (H) 07/30/2018     Lab Results   Component Value Date    CKTOTAL 1612 () 07/27/2018    CKMB 24 () 07/25/2018    TROPONINI 7.61 () 07/26/2018         Terrence Cordova MD [facility]  7/30/2018

## 2021-06-19 NOTE — PROGRESS NOTES
GI Progress Note  Aayush García  [id number]    Subjective:   Wife at bedside - supportive.  Has aide at bedside.  Converses but tires easily.  Still needs help in answering where he is when asked.  Lactulose on hold due to frequent stools.     Objective:     Vitals:    08/07/18 1030   BP: (!) 84/52   Pulse: 78   Resp:    Temp:    SpO2: 96%     Body mass index is 30.94 kg/(m^2).   Gen: No acute distress  Cardio: RRR  GI: Distended, BS positive, soft, non-tender. No guarding.  Ext: decreased anasarca from yesterday.  Neuro: unable to fully complete exam for asterixis; hands quite shaky when checking, but no overt flap.    Laboratory    Results from last 7 days  Lab Units 08/07/18  0408 08/06/18  0440 08/05/18  1746 08/05/18  0548   LN-WHITE BLOOD CELL COUNT thou/uL 18.1* 17.9*  --  19.4*   LN-HEMOGLOBIN g/dL 7.8* 6.9* 7.3* 7.6*   LN-HEMATOCRIT % 23.4* 21.3*  --  23.1*   LN-PLATELET COUNT thou/uL 245 270  --  274        Results from last 7 days  Lab Units 08/07/18  0408   LN-SODIUM mmol/L 138   LN-POTASSIUM mmol/L 3.8   LN-CHLORIDE mmol/L 101   LN-CO2 mmol/L 25   LN-BLOOD UREA NITROGEN mg/dL 24*   LN-CREATININE mg/dL 4.39*   LN-CALCIUM mg/dL 8.4*       Results from last 7 days  Lab Units 08/07/18  0408 08/05/18  0548 08/04/18  0505   LN-BILIRUBIN TOTAL mg/dL 7.4* 5.6* 6.3*   LN-ALKALINE PHOSPHATASE U/L 395* 557* 691*   LN-AST (SGOT) U/L 109* 165* 222*   LN-ALT (SGPT) U/L 47* 62* 76*   ]    Results from last 7 days  Lab Units 08/05/18  0548 08/04/18  0505 08/03/18  0548   LN-INR  1.39* 1.34* 1.39*     Xr Chest 1 View Portable    Result Date: 8/5/2018  XR CHEST 1 VIEW PORTABLE 8/5/2018 5:14 PM INDICATION: Hypotension COMPARISON: Chest x-ray 08/02/2018 FINDINGS: The life-support devices are in stable positions. Persistent low lung volumes with associated hypoventilatory changes. Increased right basilar opacities most likely reflect subsegmental atelectasis. Interval improved aeration at the left lung base. Persistent  trace left-sided pleural effusion. Stable heart size and no overt vascular congestion. No definite pneumothorax.    Xr Chest 1 View Portable    Result Date: 7/28/2018  XR CHEST 1 VIEW PORTABLE 7/28/2018 6:04 AM INDICATION: Dyspnea COMPARISON: 07/25/2018. FINDINGS: Interval removal of ETT and NG tube. Right IJ catheter high SVC level. Right PICC catheter right atrial level. No pneumothorax. Interval worsening with bilateral airspace opacities with upper lobe predominance greatest on the left. Small left effusion. Previous left lower rib fractures. Monitor electrodes.    Xr Chest 1 View Portable    Result Date: 7/25/2018  XR CHEST 1 VIEW PORTABLE 7/25/2018 12:39 PM INDICATION: Dialysis line placement COMPARISON: 07/25/2018 FINDINGS: Right IJ central venous catheter tip over the right IJ vein-brachiocephalic junction or proximal SVC. Right PICC tip over the right atrium. Appropriately positioned endotracheal tube. Enteric tube courses into the stomach and off the field of view. No pneumothorax. Mildly hypoexpanded lungs. No focal consolidation. No definitive pulmonary edema or pleural effusion. Stable mildly prominent cardiomediastinal silhouette.     Xr Chest 1 View Portable    Result Date: 7/25/2018  XR CHEST 1 VIEW PORTABLE 7/25/2018 5:51 AM INDICATION: Confirm et tube position COMPARISON: 7/25/2018. FINDINGS: Heart is slightly enlarged. Tip of ETT located 3.2 cm above stacy. NG tube extends below left diaphragm. Right PICC catheter with tip right atrial level. No pneumothorax. Minimal amount of bilateral lower lung atelectasis. Monitor electrodes.    Xr Chest 1 View Portable    Result Date: 7/25/2018  XR CHEST 1 VIEW PORTABLE 7/25/2018 3:33 AM INDICATION: Sepsis. anuric. increasing tachypnea, hypoxia. COMPARISON: 7/24/2018. FINDINGS: Shallow inspiration. Stable heart size. Interval placement right PICC catheter with tip right atrial level. No pneumothorax. Minimal amount of bilateral lower lung atelectasis with  lung findings accentuated due to level of inspiration. Old healed left rib fractures. Monitor electrodes.    Xr Chest 1 View Portable    Result Date: 7/24/2018  XR CHEST 1 VIEW PORTABLE 7/24/2018 8:54 PM INDICATION: Sob COMPARISON: None. FINDINGS: Cardiomegaly. Pulmonary vascularity normal. The lungs are clear.    Xr Chest 2 Views    Result Date: 8/2/2018  XR CHEST 2 VIEWS 8/2/2018 5:20 PM INDICATION: Followup infilitrate COMPARISON: 7/28/2018 FINDINGS: Right PICC could be pulled back into the proximal SVC. It is also possible it is obscured by the right central line whose tip projects over the right atrium. Limited inspiratory volume. Stable heart size. Improved bilateral alveolar infiltrates most likely represents improved edema.    Us Abdomen Complete    Result Date: 7/25/2018  US ABDOMEN COMPLETE 7/25/2018 2:01 AM INDICATION: Anuric, septic, elevated alk phos, bili, lipase COMPARISON: None. FINDINGS: GALLBLADDER: Sludge in the gallbladder. Gallbladder wall thickness measures 3 mm. Negative Rogers sign. BILE DUCTS: Common bile duct not visualized. No evidence of ductal dilatation. LIVER: Measures 17.5 cm. Fatty infiltration. SPLEEN: Normal in size. RIGHT KIDNEY: Measures 10.1 x 5.1 x 5.3 cm. Normal. No hydronephrosis. LEFT KIDNEY: Measures 9.5 x 5.7 x 4.8 cm. Normal. No hydronephrosis. PANCREAS: Not visualized due to bowel gas. AORTA: Not visualized due to bowel gas. IVC: Poorly visualized.     CONCLUSION: 1.  Sludge in the gallbladder. 2.  No gallbladder wall thickening. 3.  No evidence of cholelithiasis. 4.  No evidence of ductal dilatation.    Ct Abdomen Pelvis Without Oral With Iv Contrast    Result Date: 8/3/2018  CT ABDOMEN PELVIS WO ORAL W IV CONTRAST 8/3/2018 1:37 PM     INDICATION: Infection, abdomen-pelvis leukocytosis, hypotension, evaluate for ascites and cirrhosis TECHNIQUE: CT abdomen and pelvis. Multiplanar reformation images (MPR). Dose reduction techniques were used. IV CONTRAST: Iohexol (Omni)  100 mL COMPARISON: Ultrasound abdomen 7/28/2018 and 7/25/2018 FINDINGS: LUNG BASES: Small right-sided trace left-sided pleural effusion with adjacent consolidation likely reflecting compressive atelectasis. Extensive coronary artery calcifications. Mild cardiomegaly. ABDOMEN: Limited due to patient motion. Heterogeneous liver with no definite discrete measurable lesion. Prominent gallbladder without wall thickening or appreciable edema. No biliary ductal dilatation. Unremarkable spleen, pancreas and adrenals. Unremarkable kidneys without evidence of hydronephrosis. Unremarkable stomach. Normal caliber small bowel. Normal caliber appendix. The colon is tortuous but normal in caliber. Moderate body wall and mild mesenteric edema. Small amount of diffuse simple ascites. Left lower ventral abdominal wall air densities likely reflecting recent injection. Correlation is recommended. Similar findings in the right mid ventral abdominal wall. No free air. Patent central SMV and SMA. PELVIS: Urinary bladder decompressed by a Rahman catheter. Mild presacral edema. Small amount of simple free fluid. No adenopathy. MUSCULOSKELETAL: Age indeterminate but likely remote L1 compression deformity without significant osseous retropulsion or central canal stenosis.     CONCLUSION: 1. Small amount of diffuse simple ascites. Mesenteric and body wall edema are also noted. No loculated fluid collections in the abdomen or pelvis. 2. Small right-sided and trace left-sided pleural effusions with adjacent consolidation which may reflect atelectasis or pneumonic infiltrates. 3. Nonspecific heterogeneous liver. No discrete measurable liver lesion. Correlate with liver function tests.    Ir Tunneled Catheter Insert    Result Date: 8/1/2018  [facility] PROCEDURE: TUNNELED DIALYSIS CATHETER PLACEMENT 1.  Insertion of a tunneled central venous catheter. 2.  Ultrasound guidance for vascular access. A permanent image was stored. 3.   Fluoroscopic guidance for central venous access device placement. INTERVENTIONAL RADIOLOGIST: Kenji Ortiz MD. INDICATION: 62-year-old male with renal insufficiency requiring hemodialysis. CONSENT: The risks, benefits and alternatives of tunneled dialysis catheter placement were discussed with the patient's guardian in detail. All questions were answered. Informed consent was given to proceed with the procedure. MODERATE SEDATION: None. ANTIBIOTICS: The patient is receiving antibiotic therapy. No additional antibiotic was administered for the procedure. ADDITIONAL MEDICATIONS: None. FLUOROSCOPIC TIME: 1.1 minutes. AIR KERMA:  6 mGy. CONTRAST: None. COMPLICATIONS: No immediate complications. STERILE TECHNIQUE: The procedure was performed using maximum sterile barrier technique. The interventionalist and assistants performed appropriate hand hygiene and wore a hat, mask, sterile gown, and sterile gloves during the entire procedure. PROCEDURE:  Using local anesthesia and real-time ultrasound guidance the right internal jugular vein was accessed. A subcutaneous tunnel was created requiring a second incision. Using this access, a 23 cm tip to cuff Duraflow dialysis catheter was advanced until the  tip was in the proximal right atrium.  The catheter was tested and found to flush and aspirate appropriately. FINDINGS: Ultrasound shows an anechoic and compressible jugular vein.  At the completion of the study, the tunneled dialysis catheter tip lies in the proximal right atrium.     1.  Successful tunneled dialysis catheter placement. 2.  The catheter is ready for use.     Us Abdomen Limited    Result Date: 7/28/2018  US ABDOMEN LIMITED 7/28/2018 4:25 PM INDICATION: Elevated alk phos, bilirubin COMPARISON: Abdominal ultrasound 07/25/2018 FINDINGS: Technically challenging exam due to the patient's body habitus and bowel gas. GALLBLADDER: Small amount sludge in the gallbladder. No wall thickening. BILE DUCTS: No intrahepatic  bile duct dilation. The common bile duct was not visualized due to bowel gas.. LIVER: Hepatomegaly measuring 20.1 cm. Diffusely echogenic. Portions difficult to penetrate. RIGHT KIDNEY: No hydronephrosis. PANCREAS: Not well seen due to bowel gas. Small amount of ascites in the right upper quadrant.     CONCLUSION: 1.  Technically challenging exam. 2.  Gallbladder sludge. No findings for cholecystitis. 3.  Enlarged and fatty liver.    Us Paracentesis    Result Date: 8/3/2018  1. PARACENTESIS 2. ULTRASOUND GUIDANCE 8/3/2018 9:43 PM INDICATION: Ascites. PROCEDURE: Informed consent obtained. Time out performed. The abdomen was prepped and draped in a sterile fashion. 10 mL of 1 percent lidocaine was infused into local soft tissues. A 5 Tamazight LeadSift catheter system was introduced into the abdominal ascites  under ultrasound guidance. 1.95 liters of clear yellow fluid was removed and sent to lab if requested. Estimated blood loss is minimal. No immediate complication. The patient tolerated the procedure well. RADIOLOGIC SUPERVISION AND INTERPRETATION: ULTRASOUND GUIDANCE: Images demonstrate ascites.     CONCLUSION: 1.  Status post ultrasound-guided paracentesis.       Assessment:   1. Elevated LFT's - likely related to sepsis and EtOH.  LFT's showing recent improvement -- except for bili being up more today (total and direct).    US showing small amount of sludge but normal duct size.  Paracentesis 8/3/18 - fluid not sent for cell count/gram stain.      2. Sepsis secondary to Klebsiella pneumonia.    3. MICHAEL - ATN.  Nephrology following.  4. Anemia - macrocytic.  Brown stool.  5. Encephalopathy - unclear if hepatic encephalopathy but on lactulose and more recently added on rifaximin.  Likely multifactorial, as detailed in resident note.      Patient Active Problem List   Diagnosis     Septic shock (H)     Acute respiratory failure with hypoxia (H)     Acute renal failure, unspecified acute renal failure type (H)      Metabolic acidosis     Encephalopathy, metabolic     Elevated troponin     Stress-induced cardiomyopathy     Bacteremia due to Klebsiella pneumoniae     Anemia, unspecified type     Alcohol abuse     Acute encephalopathy     Acute liver failure     Plan:   1. Rifaximin 550mg po three times a day.  2. Agree with stopping lactulose for now.  3. Trend LFT's. Will recheck MELD tomorrow.    Thank you.  Wilmer Hernández PA-C  [facility]  [phone]

## 2021-06-19 NOTE — PROGRESS NOTES
Faculty Supervision of Residents    I have examined this patient on 8/4/2018 and the medical care has been evaluated and discussed with the resident.  See resident note to follow outlining our discussion.    Encephalopathy isnt clearing yet.  Add Rifaximin.  Wonder if BP now at baseline given liver disease?  Víctor Styles

## 2021-06-19 NOTE — PROGRESS NOTES
Faculty Supervision of Residents    I have examined this patient on 7/25/2018 and the medical care has been evaluated and discussed with the resident.  The documentation has been reviewed.  I agree with the medical care provided and confirm the findings.     Quite ill.  Suspect sepsis being the primary insult.  Unclear source- chest film pending, anuric.  Cultures pending.  Tick borne?  Smear pending.  Víctor Styles

## 2021-06-19 NOTE — PROGRESS NOTES
[facility] Progress Note    Subjective    Aayush García is lying in bed with wife present at bedside. BP stable. Has continued to have loose stools, despite not having lactulose in a number of days. C diff sent overnight and on contact precautions. Was pulling at telemetry leads last night and needed to have 1:1 due to confusion. No additional concerns from patient or wife this morning     Objective    Vital signs in last 24 hours Temp:  [97.3  F (36.3  C)-98.7  F (37.1  C)] 97.8  F (36.6  C)  Heart Rate:  [77-82] 82  Resp:  [22-23] 22  BP: ()/(57-72) 119/72   Weight: 199 lb 4.8 oz (90.4 kg)    Physical Exam   General appearance: Alert, conversational, confusion. Oriented to person only. Jaundiced, scleral icterus.  Lungs: Clear to auscultation bilaterally.  No wheezing or crackles auscultated.  Respirations unlabored  Heart: Regular rate and rhythm, normal S1 and S2, no murmur, rub or gallop.  Abdomen: Obese, distended, soft, nontender.  Extremities: +2 pitting edema up to knees  Skin: Jaundiced  Neurologic: Alert, oriented to person.  No gross neurologic deficits.  Moves all extremities spontaneously.    Pertinent Labs   Lab Results: personally reviewed.     Pertinent Radiology   Radiology Results: Personally reviewed.    Assessment/Plan  Principal Problem:    Septic shock (H)  Active Problems:    Acute respiratory failure with hypoxia (H)    Acute renal failure, unspecified acute renal failure type (H)    Metabolic acidosis    Encephalopathy, metabolic    Elevated troponin    Stress-induced cardiomyopathy    Bacteremia due to Klebsiella pneumoniae    Anemia, unspecified type    Alcohol abuse    Acute encephalopathy    Acute liver failure    Cognitive and behavioral changes    Sleep difficulties    Hallucinations    Aayush García is a 62 year old male with a history of alcohol abuse, HTN and anemia who was admitted with septic shock secondary to Klebsiella bacteremia and anuria.  Required  intubation and pressor support in ICU, currently extubated, moved out of the ICU 7/31, and was transferred back into the ICU 8/5 due to need for pressure support.  Transferred back out of the ICU 8/8.        Acute renal injury: Most likely severe ATN due to klebsiella sepsis, though cause still not completely clear. May be component of volume depletion as well.  Abdominal US negative 7/28.  Has initiated dialysis and will continue MWF for the foreseen future. Dialysis catheter placed 8/1.  - Nephrology following   -Plan to continue dialysis MWF. Will be on T,TH,S schedule at [facility] once accepted       Sepsis, resolved   Klebsiella bacteremia, resolved  Persistent leukocytosis: Admitted with leukocytosis, lactic acidosis, hypotension and tachycardia. Blood cultures growing klebsiella.  UA shows leukocytes, blood and few bacteria but may be unreliable as taken after period of anuria.  Most likely urinary source, though now question possible SBP given hepatic encephalopathy.  Possibly GI source, did have ulcer 1-2 months ago.  Unlikely PNA given CXR without infiltrates. Persistent leukocytosis, though continuing to tend down. Blood cultures x2 drawn and no growth yet.  Repeat chest x-ray showing improvement of infiltrates and improvement of pulmonary edema. CT abdomen showed no abscess just ascites. Therapeutic paracentesis performed with 2L drawn, unfortunately fluid was not sent for culture or stain.  UA 8/5 suspicious for urinary tract infection, however urine culture with no growth. IV Zosyn initiated 8/5 and discontinued 8/8.   - continue to monitor for signs of infection   - C. diff pending       AMS  Alcohol withdrawal, resolved  Unclear etiology of altered mental status, though may be multifactorial cause including hepatic encephalopathy, alcohol withdrawal, medication induced, ICU delirium. Ammonia level unremarkable, empirically treating with lactulose and rifaximin.  Some improvement after starting  rifaximin on 8/5  - Delirium order set   -Avoid sedating medications  - PT/OT/SLP  -Lactulose, titrate as needed for 3-4 stools daily  - Rifaximin 400 mg TID      Hypotension: Likely secondary to alcoholic hepatitis as below.  Also with severe hypoalbuminemia which is likely contributing. Was transferred to the ICU for pressure support 8/5 and transferred out 8/8 after increased minodrine.  Was given albumin 25 g IV every 6 hours for 8 doses 8/5. Evening of 8/8 needed additional dose of albumin secondary to episode of hypotension. Continues to have ongoing issues with hypotension   - Dialysis per nephrology  - Midodrine 15 mg Q8H.  Continue as needed midodrine. Wean as tolerated after discharge   -Avoid giving fluid bolus if possible given difficulty to remove fluid.  Consider 25% albumin if hypotensive not responding to midodrine      Alcoholic Hepatitis  Hepatic encephalopathy  Ascites: 350ml peppermint schnapps daily for years. Per wife, never has had withdrawal though never been more than 2 days without a drink that she knows of.  Elevated AST/ALT, bilirubin, alk phos, and INR with hypoalbuminemia. Decreased synthetic function of liver. US 7/25 showed fatty infiltration of liver which may be contributing but no concerning biliary tree disease. Repeat RUQ u/s 7/28 without acute findings. Paracentesis 8/3 with 1.95 L fluid removed, however unfortunately send for Gram stain or culture. MELD score of 31 with a 53% estimated 3 month mortality.   - Continue to treat for hepatic encephalopathy as above  - GI following, appreciate their recommendations  -If paracentesis is performed again would recommending sending fluid for analysis.  -Continue folic acid, thiamine, multivitamin    Macrocytic anemia: Likely nutritional component due to alcohol use, and MICHAEL. Peripheral smear showed inflammatory changes only. LDH and haptoglobin only mildly elevated.  Was transfused 1uPRBCs on 8/6.  - AM CBC  - EPO per nephrology      NSTEMI: Type II myocardial infarction or demand related ischemia secondary to sepsis on presentation.  EF of 25-30% on 7/25/2018, significantly improved with echo 7/31/2018 with an EF of 61%.  Per cardiology not recommending ischemic workup at this time.  - Cardiology has signed off.  - Discontinued coreg 3.125 mg twice daily due to hypotension.  Consider restarting when/if hypotension has resolved.  - Telemetry monitoring      Melena, resolved: Episode of melanotic stool 7/27-28. Had downtrending hemoglobin months prior to admission.  Was seen in clinic recently for melena which was thought due to NSAID use.  Symptoms had resolved after he had stopped using NSAIDs.  He was on a PPI as well. Had melenic stools here, possibly gastritis from decreased perfusion versus worsening ulceration, has resolved.   - GI following as above  - PPI two times a day   - Transfuse for Hgb < 7      History of tobacco abuse  - Nicotine patch    Diet: 2mg Na  Fluids: No IVF  DVT Prophylaxis: Subcutaneous heparin  Code: full code     Disposition/Advanced Care Planning   Discharge Planning discussed with patient's wife  Barriers to discharge:  Difficult disposition  Dispo: anticipate discharge to [place]   Anticipated discharge date:  unknown at this time        Precepted patient with Dr. Chery Xie DO (PGY2)  [facility] Medicine Resident  Pager: [phone]

## 2021-06-19 NOTE — PROGRESS NOTES
[facility] - Interventional Radiology     Chart check.    Nephrology note appreciated this morning.    Will proceed with tunneled dialysis catheter placement as planned.      ASSESSMENT:  62 y.o. male with MICHAEL in need of ongoing dialysis.        PLAN:    Continue NPO.      Continue to hold SQ heparin.     Tunneled dialysis catheter placement with sedation today 8/1/2018 - timing will likely be in the afternoon due to current staffing/scheduling plans.  No additional pre procedure antibiotic needed (patient on Rocephin 1 gram IV daily).        Hoda BHATTI, CNP  [facility]  [phone] (office)  [phone] (pager)

## 2021-06-19 NOTE — PROGRESS NOTES
07/31/18 1821   Vitals   Heart Rate 89   Resp 21   BP (!) 75/43   MAP (mmHg) (Calculated) 54   Noninvasive BP (Mean) 54   Oxygen Therapy/Pulse Ox   SpO2 96 %   Pt in bed, HOB flat, house officer called.

## 2021-06-19 NOTE — PROGRESS NOTES
RENAL Progress Note -seen on HD, SO here.      Assessment/Plan  MICHAEL - severe ATN in setting of shock and prob unrecognized chronic liver disease. ?recovery potential. U/o low, azotemia ongoing. Severe anasarca. HD today, inc midodrine for bp and use alb for bp support.     Hypotension - persistent and not improving.  Not clearly cirrhotic but acting like chronic liver dz pt. Nothing acute on CT. CM better.  Using scheduled midodrine now and prn before dialysis, inc dose. Needs to get better to contemplate outpt HD.     CM  - likely stress induced CM as he has recovered quite a bit of his EF.  Clearly volume up but hard to shift fluid     Sepsis - klebsiella bacteremia.  Finished course.  WBC still elevated but no acute findings on CT. Now ?new UTI, back on abx.     Anemia - acute on chronic, recent GI bleed, hgb drifting down, transfused Friday on run. Worse, ?contributing to anemia. prbc today on run. Will start epo-d/w risk benefits with pt and SO and they agree    Inc LFT's and coagulopathy - No documented chronic liver disease but ?early cirrhosis    Resp failure - on 1-2L NC, very volume up but difficult to shift fluid off him.    Encephalopathy - waxing and waning.  Remains delirious.  ?Hepatic encephalopathy?  Getting lactulose. Better today    Malnutrition - encourage oral intake, liberalize diet to just sodium restriction       Let's just give albumin 25% if needed for hypotension and try and avoid saline due to severe anasarca    Principal Problem:    Septic shock (H)  Active Problems:    Acute respiratory failure with hypoxia (H)    Acute renal failure, unspecified acute renal failure type (H)    Metabolic acidosis    Encephalopathy, metabolic    Elevated troponin    Stress-induced cardiomyopathy    Bacteremia due to Klebsiella pneumoniae    Anemia, unspecified type    Alcohol abuse    Acute encephalopathy    Acute liver failure      Subjective  Moved to ICU overnoc for low bp, despite saline.   Pt awake  and c/o uncomfortable mattress.   No shortness of breath, 1 liter O2.       Objective    Vital signs in last 24 hours  Temp:  [97.8  F (36.6  C)-99.1  F (37.3  C)] 97.8  F (36.6  C)  Heart Rate:  [70-92] 77  Resp:  [12-53] 33  BP: ()/(43-81) 102/56  Weight:   204 lb 2.3 oz (92.6 kg)    Intake/Output last 3 shifts  I/O last 3 completed shifts:  In: 971.1 [I.V.:700.5; IV Piggyback:270.6]  Out: 30 [Urine:30]  Intake/Output this shift:       Review of Systems   A 12 point comprehensive review of systems was negative except as noted.    Physical Exam  Asleep  HEENT NC in place  Neck supple  Lungs fairly clear anteriorally  Cor RRR, 3+ edema up to hips  abd soft, obese, more distended  Ext warm    Pertinent Labs   Lab Results: personally reviewed.   Lab Results   Component Value Date     08/06/2018     08/05/2018     08/05/2018    K 3.6 08/06/2018    K 3.7 08/05/2018    K 3.4 (L) 08/05/2018    CO2 21 (L) 08/06/2018    CO2 23 08/05/2018    CO2 24 08/05/2018    BUN 41 (H) 08/06/2018    BUN 39 (H) 08/05/2018    BUN 35 (H) 08/05/2018    CREATININE 5.87 (H) 08/06/2018    CREATININE 5.95 (H) 08/05/2018    CREATININE 4.87 (H) 08/05/2018    CALCIUM 8.2 (L) 08/06/2018    CALCIUM 8.3 (L) 08/05/2018    CALCIUM 7.5 (L) 08/05/2018     Lab Results   Component Value Date    WBC 17.9 (H) 08/06/2018    WBC 19.4 (H) 08/05/2018    WBC 19.2 (H) 08/04/2018    HGB 6.9 (LL) 08/06/2018    HGB 7.3 (L) 08/05/2018    HGB 7.6 (L) 08/05/2018    HCT 21.3 (L) 08/06/2018    HCT 23.1 (L) 08/05/2018    HCT 23.7 (L) 08/04/2018     (H) 08/06/2018     (H) 08/05/2018     (H) 08/04/2018     08/06/2018     08/05/2018     08/04/2018       Pertinent Radiology   Radiology Results: Personally reviewed impression/s    Brie Oliver MD  [facility]  [phone]

## 2021-06-19 NOTE — PROGRESS NOTES
Clinical Nutrition Therapy Reassessment Note    Reason for Assessment: follow-up    Current Nutrition Prescription:   Diet: 2 gram sodium with 1500 cc fluid restriction  Supplements and Modulars: boost breeze two times a day between meals    Current Nutrition Intake:  The patient's current meal intake is poor <50% (10-50% meals 8/12-8/13), and the patients current supplement/snack intake is good > 75%.  The pt has taken 2 breeze supplements today    Anthropometrics:  Most recent weight: 199 lb (8/12)  7/24 weight: 187 vs 193 lb    GI Status/Output:   The patient's GI symptoms include: abdominal distention  Bowel Sounds present    Skin/Wound:  Edwin score  15    Medications:  Medications reviewed.    Labs:  Na 121, K+ 3.7, Mg 1.7, glu 84    Physical Findings:  The patient has the following physical signs which could indicate malnutrition: edema +3 RLE and LLE edema)     Malnutrition Assessment:  Not noted    Nutrition Risk Level: high risk    Goal:   Consume >75% at meals-not met  Bowel function WDL-progressing  Lose weight via diuresis while hospitalized-progressing    Intervention:  Will continue boost breeze supplements and try to work in with fluid restriction as pt is taking these    Monitoring:  Po intake, weight, labs, plan of care        See Care Plan for further Problems, Goals, and Interventions.

## 2021-06-19 NOTE — PROGRESS NOTES
Faculty Supervision of Residents   I have examined this patient and the medical care has been evaluated and discussed with the resident. See resident note to follow outlining our discussion.    DOS 8/10/2018    Genia Spears MD

## 2021-06-19 NOTE — PROGRESS NOTES
RENAL Progress Note        Assessment/Plan  MICHAEL - severe ATN in setting of shock and prob unrecognized chronic liver disease. ?recovery potential mary with ongoing low bp. U/o has been low but now ?, as no golden but ok to observe w/o precise u/o as still has high Cr and azotemia and fluid overload so no signif recovery so far.continue HD with midodrine and alb for bp support.      Hypotension - Not clearly cirrhotic but acting like chronic liver dz pt. Nothing acute on CT. CM better. UTI treated, s/p transfusion.   Using scheduled midodrine now and prn before dialysis, inc dose. Needs to get better to contemplate outpt HD.     CM  - likely stress induced CM as he has recovered quite a bit of his EF.       Sepsis - klebsiella bacteremia treated.   Ongoing leukocytosis.   new UTI, back on abx.     Anemia - acute on chronic, recent GI bleed earlier this summer  S/p transfusion again on Monday  Started epo    Inc LFT's and coagulopathy/hypotension, - No documented chronic liver disease but ?early cirrhosis. Bili is sl up.   Ascites, non tense/asymptomatic, and anasarca. Agree with GI that pt does not appear to need LVP currently and better strategy to cont to push UF on dialysis as he tolerates.     Resp failure -low level O2 needs    Encephalopathy - waxing and waning suspect hepatic encephalopathy?  Getting lactulose.     Malnutrition - encourage oral intake, liberalize diet to just sodium restriction       Give albumin 25% if needed for hypotension and try and avoid saline due to severe anasarca    Principal Problem:    Septic shock (H)  Active Problems:    Acute respiratory failure with hypoxia (H)    Acute renal failure, unspecified acute renal failure type (H)    Metabolic acidosis    Encephalopathy, metabolic    Elevated troponin    Stress-induced cardiomyopathy    Bacteremia due to Klebsiella pneumoniae    Anemia, unspecified type    Alcohol abuse    Acute encephalopathy    Acute liver  failure      Subjective  Confused, asking to run an errand before HD today.   Plan to go to [place] tomorrow.   Eating pretty well  No shortness of breath  No abd discomfort.    Objective    Vital signs in last 24 hours  Temp:  [98.3  F (36.8  C)-98.9  F (37.2  C)] 98.5  F (36.9  C)  Heart Rate:  [69-91] 73  Resp:  [20-24] 22  BP: ()/(43-99) 86/50  Weight:   205 lb 4.8 oz (93.1 kg)    Intake/Output last 3 shifts  I/O last 3 completed shifts:  In: 686.5 [P.O.:570; I.V.:21; IV Piggyback:95.5]  Out: 27 [Urine:27]  Intake/Output this shift:  I/O this shift:  In: 57 [IV Piggyback:57]  Out: -     Review of Systems   A 12 point comprehensive review of systems was negative except as noted.    Physical Exam  Awake jaundiced  HEENT NC in place, more spider angiomata on chest and face  Neck supple  Lungs fairly clear anteriorally  Cor RRR, 3+ edema up to hips  abd soft, obese, more distended  Ext warm  CVC R IJ dark dried bld at exit    Pertinent Labs   Lab Results: personally reviewed.   Lab Results   Component Value Date     (L) 08/08/2018     08/07/2018     08/06/2018    K 3.7 08/08/2018    K 3.7 08/07/2018    K 3.8 08/07/2018    CO2 22 08/08/2018    CO2 25 08/07/2018    CO2 21 (L) 08/06/2018    BUN 30 (H) 08/08/2018    BUN 24 (H) 08/07/2018    BUN 41 (H) 08/06/2018    CREATININE 5.37 (H) 08/08/2018    CREATININE 4.39 (H) 08/07/2018    CREATININE 5.87 (H) 08/06/2018    CALCIUM 8.4 (L) 08/08/2018    CALCIUM 8.4 (L) 08/07/2018    CALCIUM 8.2 (L) 08/06/2018     Lab Results   Component Value Date    WBC 18.1 (H) 08/07/2018    WBC 17.9 (H) 08/06/2018    WBC 19.4 (H) 08/05/2018    HGB 8.3 (L) 08/08/2018    HGB 7.8 (L) 08/07/2018    HGB 6.9 (LL) 08/06/2018    HCT 23.4 (L) 08/07/2018    HCT 21.3 (L) 08/06/2018    HCT 23.1 (L) 08/05/2018     08/07/2018     (H) 08/06/2018     (H) 08/05/2018     08/07/2018     08/06/2018     08/05/2018       Pertinent Radiology    Radiology Results: Personally reviewed impression/s    Brie Oliver MD  [facility]  [phone]

## 2021-06-19 NOTE — PROGRESS NOTES
"RESPIRATORY CARE NOTE     Patient Name: Aayush García  Today's Date: 7/25/2018     Pt continues on the following settings:  Vent Mode: VCV  FiO2 (%):  [25 %-40 %] 40 %  S RR:  [20-28] 20  S VT:  [550 mL] 550 mL  PEEP/CPAP (cm H2O):  [5 cm H2O] 5 cm H2O  Minute Ventilation (L/min):  [12.7 L/min-16.9 L/min] 16.9 L/min  PIP:  [26 cm H2O-44 cm H2O] 30 cm H2O  MAP (cm H2O):  [9-18] 9   Plateau pressure: 26 cm H2O     Pt is intubated with  # 8.0ETT secured  24 at the teeth.  BS are course before suctioning, decreased post. Pt has a strong cough with suction. RT suctioned pt for yellow/white secretions. Pt's respiratory status is stable. RT will continue to follow per MD's orders.     /69  Pulse 81  Temp 98.3  F (36.8  C) (Oral)   Resp 15  Ht 5' 8\" (1.727 m)  Wt 192 lb 14.4 oz (87.5 kg)  SpO2 97%  BMI 29.33 kg/m2      Concepción Sommer LRT  "

## 2021-06-19 NOTE — LETTER
Letter by Madeline Avila CNP at      Author: Madeline Avila CNP Service: -- Author Type: --    Filed:  Encounter Date: 10/9/2019 Status: Signed         October 9, 2019     Patient: Aayush García   YOB: 1955   Date of Visit: 10/9/2019       To Whom It May Concern:    It is my medical opinion that Aayush García should be released from work as he was not feeling well due to health issues.    If you have any questions or concerns, please don't hesitate to call.    Sincerely,        Electronically signed by Madeline Avila CNP        8

## 2021-06-19 NOTE — H&P
Admission History and Physical   Aayush García,  1955, MRN [id number]    [facility]  There are no admission diagnoses documented for this encounter.    PCP: Misbah Y Palla, MD, [phone]   Code status:  Full code     Extended Emergency Contact Information  Primary Emergency Contact: Karlie Chisholm  Address: [address] [place] [address] [place]  Home Phone: [phone]  Mobile Phone: [phone]  Relation: Significant Other       Assessment and Plan   Principal Problem:    Septic shock (H)    Aayush García is a 62 y.o. male history of hypertension and hyperlipidemia admitted with septic shock.    Septic shock  Lactic acidosis: Patient hemodynamically unstable with tachycardia, tachypnea, and hypotension. Suspect secondary to urinary source given history of urinary retention.  PICC line placed in ED.  He received 3 L of LR and was started on cefepime and vancomycin.  -ICU managing, will continue to follow  -Continue cefepime and vancomycin  -Blood and urine cultures pending  -Trend lactic acid  -PICC in place, pressors available for blood pressure support  -Telemetry monitoring    MICHAEL: Significantly elevated creatinine at 10.18. Likely from volume depletion and end organ damage in the setting of septic shock. Initially thought patient was retaining, however, bladder scan showed little urine despite initial fluid resuscitation.   -Renal ultrasound pending  -UA/Ucx pending  -Place Rahman catheter  -Strict I's and O's, daily weights  -IVFs    Hyponatremia: Low-normal calculated serum awes molality at 281-285 mOsm/kg, likely from arterial blood volume depletion.   -IVFs  -Trend sodium     Hyperkalemia: Potentially from hemoconcentration along with MICHAEL.  No notable changes on EKG.    Alcohol abuse: Disproportionate AST to ALT ratio with elevated blood alcohol level on admission, along with macrocytic anemia.  -CMP in morning    FEN: IV LR at 125 ml/hr  DVT  Prophylaxis: moderate-high risk, heparin Q12H  Code: full code     Disposition/Advanced Care Planning   Barriers to discharge: inpatient status for IV antibiotics, IV fluids, potentially pressors  Anticipated discharge date:  unknown at this time        Chief Complaint: Urinary retention, AMS     HPI:    Aayush García is a 62 y.o. old male with history of hypertension and hyperlipidemia accompanied with wife for urinary retention and confusion.  History is provided by patient and patient's wife.  Patient's wife notes increased confusion over the past 2 days.  Today he was unable to recall her name and in the ED he was unable to provide the correct year.  She also states he has been experiencing hallucinations and has been unable to sleep.  He states he has not urinated for the past 3 days and has felt more fatigued than normal.  He denies chest pain, shortness of breath, fevers, chills, nausea, or vomiting.       Medical History  There are no active non-hospital problems to display for this patient.    Past Medical History:   Diagnosis Date     Hypertension         Surgical History  He  has a past surgical history that includes Colonoscopy (N/A, 3/20/2018).       Social History  Reviewed, and he  reports that he has been smoking.  He has never used smokeless tobacco. He reports that he drinks alcohol. He reports that he does not use illicit drugs.       Allergies  No Known Allergies Family History  Reviewed, and  noncontributory          Prior to Admission Medications     (Not in a hospital admission)       Review of Systems:  Pertinent items are noted in HPI.  A 12 point comprehensive review of systems was negative except as noted. Physical Exam:  Temp:  [98.2  F (36.8  C)] 98.2  F (36.8  C)  Heart Rate:  [107-121] 115  Resp:  [26-48] 42  BP: ()/(48-89) 86/54    General: Alert, pleasant, cooperative, significant resting tremor  HEENT: NC/AT, EOMI, PERRL, normal conjunctivae and sclerae, dry mucus membranes,  no erythema or lesions in oropharynx, neck supple  CV: tachycardic, soft systolic murmur left sternal boarder, rubs or gallops, 2+ peripheral pulses  Respiratory: increased respiratory effort, audible upper airway noise without stethoscope, harsh rhonchi throughout  Abdomen:  Obese, soft,  ND, NT  Back: No CVA tenderness  Extremities: warm, trace edema  Psych: mood neutral and affect appropriate  Neuro: A&O x 1- unable to correctly identify location or , CN II-XII grossly intact, no focal deficits     Pertinent Labs  Lab Results: Personally reviewed.  Recent Results (from the past 24 hour(s))   Alcohol, Ethyl, Blood    Collection Time: 18  9:00 PM   Result Value Ref Range    Alcohol, Blood 65 (H) None detected mg/dL   Comprehensive Metabolic Panel    Collection Time: 18  9:00 PM   Result Value Ref Range    Sodium 120 (L) 136 - 145 mmol/L    Potassium 5.7 (H) 3.5 - 5.0 mmol/L    Chloride 88 (L) 98 - 107 mmol/L    CO2 7 (LL) 22 - 31 mmol/L    Anion Gap, Calculation 25 (H) 5 - 18 mmol/L    Glucose 104 70 - 125 mg/dL    BUN 60 (H) 8 - 22 mg/dL    Creatinine 10.49 (HH) 0.70 - 1.30 mg/dL    GFR MDRD Af Amer 6 (L) >60 mL/min/1.73m2    GFR MDRD Non Af Amer 5 (L) >60 mL/min/1.73m2    Bilirubin, Total 2.9 (H) 0.0 - 1.0 mg/dL    Calcium 8.0 (L) 8.5 - 10.5 mg/dL    Protein, Total 7.1 6.0 - 8.0 g/dL    Albumin 1.8 (L) 3.5 - 5.0 g/dL    Alkaline Phosphatase 907 (H) 45 - 120 U/L     (H) 0 - 40 U/L    ALT 62 (H) 0 - 45 U/L   Lipase    Collection Time: 18  9:00 PM   Result Value Ref Range    Lipase 313 (H) 0 - 52 U/L   Magnesium    Collection Time: 18  9:00 PM   Result Value Ref Range    Magnesium 1.8 1.8 - 2.6 mg/dL   Troponin I    Collection Time: 18  9:00 PM   Result Value Ref Range    Troponin I 0.11 0.00 - 0.29 ng/mL   INR    Collection Time: 18  9:00 PM   Result Value Ref Range    INR 1.35 (H) 0.90 - 1.10   HM1 (CBC with Diff)    Collection Time: 18  9:00 PM   Result Value  Ref Range    WBC 20.2 (H) 4.0 - 11.0 thou/uL    RBC 2.56 (L) 4.40 - 6.20 mill/uL    Hemoglobin 8.9 (L) 14.0 - 18.0 g/dL    Hematocrit 26.5 (L) 40.0 - 54.0 %     (H) 80 - 100 fL    MCH 34.8 (H) 27.0 - 34.0 pg    MCHC 33.6 32.0 - 36.0 g/dL    RDW 16.5 (H) 11.0 - 14.5 %    Platelets 174 140 - 440 thou/uL    MPV 11.0 8.5 - 12.5 fL    Neutrophils % 84 (H) 50 - 70 %    Lymphocytes % 8 (L) 20 - 40 %    Monocytes % 8 2 - 10 %    Eosinophils % 0 0 - 6 %    Basophils % 0 0 - 2 %    Neutrophils Absolute 16.7 (H) 2.0 - 7.7 thou/uL    Lymphocytes Absolute 1.6 0.8 - 4.4 thou/uL    Monocytes Absolute 1.7 (H) 0.0 - 0.9 thou/uL    Eosinophils Absolute 0.0 0.0 - 0.4 thou/uL    Basophils Absolute 0.0 0.0 - 0.2 thou/uL   Salicylate (ASA)    Collection Time: 07/24/18  9:00 PM   Result Value Ref Range    Salicylate <8.0 2.0 - 25.0 mg/dL   Acetaminophen(Tylenol )    Collection Time: 07/24/18  9:00 PM   Result Value Ref Range    Acetaminophen 10.9 10.0 - 20.0 ug/mL   Lactic Acid    Collection Time: 07/24/18  9:16 PM   Result Value Ref Range    Lactic Acid 7.1 (HH) 0.5 - 2.2 mmol/L   Blood Gases, Venous    Collection Time: 07/24/18  9:35 PM   Result Value Ref Range    pH, Venous 7.24 (LL) 7.35 - 7.45    pCO2, Venous 27 (L) 35 - 50 mm Hg    pO2, Mayank 20 (L) 25 - 47 mm Hg    Base Excess, Venous -14.4 mmol/L    HCO3, Venous 12.7 (L) 24.0 - 30.0 mmol/L    Oxyhemoglobin 31.5 (L) 70.0 - 75.0 %    O2 Sat, Venous 32.3 (L) 70.0 - 75.0 %       Pertinent Radiology  Radiology Results: Personally reviewed.  EKG Results: Personally reviewed.  Sinus tachycardia, left axis deviation, Q waves in inferior leads, no ST or T-wave changes, compared to prior EKG in 2003 no significant change was found.  Xr Chest 1 View Portable    Result Date: 7/24/2018  XR CHEST 1 VIEW PORTABLE 7/24/2018 8:54 PM INDICATION: Sob COMPARISON: None. FINDINGS: Cardiomegaly. Pulmonary vascularity normal. The lungs are clear.      María Elena Renee DO  [facility]ident, PGY-1  [phone]    Precepted patient with faculty in morning report

## 2021-06-19 NOTE — PROCEDURES
Aayush García 62 y.o. male Dialysis treatment started at 0815 and ended at 1016; ran for 2 hours on a K bath of 3.  Total removed 0 kg.  Meds given none. Access Non-tunneled IJ catheter. Heparin bolus of 1000 units given. Post tx catheter lumens filled with heparin, 1.4 mL each. Complications.Pt had poor performing catheter with almost continuous arterial pressure alarms. MD notified. Per Dr. Jaimes, treatment ended early and a catheter exchange will be performed, re-schedule patient for dialysis again tomorrow.   Incapacitated Dialysis Nurse Procedure reviewed with RANDALL Alvares RN.  Water alarm functional and in place entire treatment.  Hepatitis B status. Negative  Date 7/25/18.  Patient Consent Verified yes.

## 2021-06-19 NOTE — PROGRESS NOTES
Patient remains on high flow nasal cannula at 20L and 45%  sats 93%. Duo nebs given x2 with mask, breath sounds coarse, coughing spont.  Will continue to wean 02 as able, currently on dialysis.

## 2021-06-19 NOTE — PROGRESS NOTES
RENAL Progress Note-seen on HD today       Assessment/Plan  MICHAEL suspect hemodynamic ATN due to volume depletion, low bp, CM, ACE-I. Ongoing MICHAEL, minimal u/o, ongoing hyponatremia, azotemia, acidosis better. HD today to correct lytes and azotemia and pull some fluid. +UA of ?significance given obtained after golden and after long period of anuria. Will give trial of diuretic later today.     Shock better, hypovolemia, acidosis +/- sepsis and +CM. Off pressors since late yesterday     Sepsis?, GNR in one of 2 BCX, on abx. No other +    Anemia acute on chronic, recent GI bleed, hgb stable    Inc LFT's and coagulopathy. No documented chronic liver disease. Suspect mostly due to shock. NAC for tylenol tox    resp failure on vent, low FIO2, plan to wean later today    ETOH ism, at risk for W/D    CM ?all stress induced CM. Has RF for CAD and ETOH CM.    Encephalopathy, sepsis and ETOH on admit. Now sedated.     Will follow.     Principal Problem:    Septic shock (H)  Active Problems:    Acute respiratory failure with hypoxia (H)    Acute renal failure, unspecified acute renal failure type (H)    Metabolic acidosis    Encephalopathy, metabolic    Elevated troponin    Other cardiomyopathy (H)      Subjective  Intubated and sedated. Fiance here.     Objective    Vital signs in last 24 hours  Temp:  [98.2  F (36.8  C)-98.8  F (37.1  C)] 98.5  F (36.9  C)  Heart Rate:  [] 55  Resp:  [0-35] 23  BP: ()/(45-72) 119/47  Arterial Line BP: ()/(39-65) 119/48  FiO2 (%):  [30 %-40 %] 35 %  Weight:   212 lb 4.9 oz (96.3 kg)    Intake/Output last 3 shifts  I/O last 3 completed shifts:  In: 8546.6 [I.V.:5369.6; Other:1000; IV Piggyback:2177]  Out: 515 [Urine:115; Emesis/NG output:400]  Intake/Output this shift:  I/O this shift:  In: 779 [I.V.:779]  Out: 0     Review of Systems   Review of systems not obtained due to inability to communicate with the patient.     Physical Exam  Vent sedated and on HD  HEENT ET tube  Neck  supple  Lungs clear  Cor RRR (60)  abd soft +enlarged liver  Ext warm min edema   Skin palmar erythema no other stigmata chronic liver dz.    Pertinent Labs   Lab Results: personally reviewed.   Lab Results   Component Value Date     (L) 07/26/2018     (L) 07/26/2018     (L) 07/26/2018    K 4.4 07/26/2018    K 4.0 07/26/2018    K 3.6 07/25/2018    CO2 20 (L) 07/26/2018    CO2 20 (L) 07/26/2018    CO2 21 (L) 07/25/2018    BUN 24 (H) 07/26/2018    BUN 23 (H) 07/26/2018    BUN 22 07/25/2018    CREATININE 5.09 (H) 07/26/2018    CREATININE 4.92 (H) 07/26/2018    CREATININE 4.35 (H) 07/25/2018    CALCIUM 8.0 (L) 07/26/2018    CALCIUM 7.6 (L) 07/26/2018    CALCIUM 7.6 (L) 07/25/2018     Lab Results   Component Value Date    WBC 13.7 (H) 07/26/2018    WBC 21.4 (H) 07/25/2018    WBC 21.4 (H) 07/25/2018    HGB 7.1 (L) 07/26/2018    HGB 7.1 (L) 07/26/2018    HGB 7.7 (L) 07/25/2018    HCT 20.3 (L) 07/26/2018    HCT 24.2 (L) 07/25/2018    HCT 24.2 (L) 07/25/2018    MCV 99 07/26/2018     (H) 07/25/2018     (H) 07/25/2018     (L) 07/26/2018     07/25/2018     07/25/2018     's   Lact 2.0  Lft 's noted  INR 1.6    Pertinent Radiology   Radiology Results: Personally reviewed image/s and Personally reviewed impression/s  EKG Results: personally reviewed.     Advanced Care Planning  pending

## 2021-06-19 NOTE — PROGRESS NOTES
Faculty Supervision of Residents    I have examined this patient on 8/2/2018 and the medical care has been evaluated and discussed with the resident.  The documentation has been reviewed.  I agree with the medical care provided and confirm the findings.     Hepatic encephalopathy?  Improving with lactulose.  Could also be med related.  Could benefit from LTACH if he qualifies.  Víctor Styles

## 2021-06-19 NOTE — PROGRESS NOTES
RESPIRATORY CARE NOTE     Patient Name: Aayush García  Today's Date: 8/5/2018       Pt continues to receive DuoNeb as schedueld. BS are coarse/expiratory wheezes bilaterally. Pt is on 2 lpm of oxygen via NC, SpO2 is 97%. Post treatment there is increased aeration. Pt also perceives improvement.  RT encouraged deep breathing and coughing techniques .  RT will continue to monitor and assess. Oxygen was titrated down to 1 lpm after second tx.    PACHECO BrushT

## 2021-06-19 NOTE — PROGRESS NOTES
Progress Note  Assessment/Plan  Principal Problem:    Septic shock (H)  Active Problems:    Acute respiratory failure with hypoxia (H)    Acute renal failure, unspecified acute renal failure type (H)    Metabolic acidosis    Encephalopathy, metabolic    Elevated troponin    Stress-induced cardiomyopathy    Bacteremia due to Klebsiella pneumoniae    Anemia, unspecified type    Alcohol abuse    Aayush García is a 62 year old male with a history of alcohol abuse, HTN and anemia who was admitted with septic shock secondary to Klebsiella bacteremia and anuria.  Required intubation and pressor support in ICU, currently extubated without pressor support, moved out of the ICU 7/31.  Has initiated dialysis, however difficulty with ongoing hypotension and persistent leukocytosis.      Anuric renal failure: Most likely severe ATN due to klebsiella sepsis, though cause still not completely clear. May be component of volume depletion as well.  Abdominal US negative 7/28.  Has initiated dialysis and will continue MWF for for the foreseen future. Hypotensive 8/4/18 so planning to dialyze tomorrow 8/5 instead if pressures normal.   - Nephrology is following - appreciate recs  -Dialysis catheter placed 8/1.  Plan to continue dialysis MWF.  -Nephrology finding it difficult to pull volume off during dialysis.  Planning for ultrafiltration run tomorrow.     NSTEMI: Type II myocardial infarction or demand related ischemia secondary to sepsis on presentation.  EF of 25-30% on 7/25/2018, significantly improved with echo 7/31/2018 with an EF of 61%.  Per cardiology not recommending ischemic workup at this time.  - Cardiology consulted and has signed off.  -DCd Coreg 3.125 mg twice daily.  Consider restarting when hypotension has resolved.  -Continue aspirin daily  - Avoid QT prolong meds  - Telemetry monitoring     Sepsis   Klebsiella bacteremia: Admitted with leukocytosis, lactic acidosis, hypotension and tachycardia. Blood cultures  growing klebsiella.  UA shows leukocytes, blood and few bacteria but may be unreliable as taken after period of anuria.  Most likely urinary source.  Possibly GI source, did have ulcer 1-2 months ago.  Unlikely PNA given CXR without infiltrates. Persistent leukocytosis of approximately 20 thousand. Neutrophil predominance on the 28th. Repeat lactate normal. Blood cultures x2 drawn and no growth yet. Chest x-ray performed yesterday showing improvement of infiltrates and improvement of pulmonary edema.  No signs of infection. CT abdomen showed no abscess just ascites. Therapeutic paracentesis performed with 2L drawn.   - Continue Ceftriaxone     AMS  Alcohol withdrawal, resolving   Unclear etiology of altered mental status, though may be multifactorial cause including underlying sepsis, alcohol withdrawal, medication induced, element of uremia prior to starting dialysis, ICU delirium.  Given history of alcohol abuse and hepatic injury on admission, question if there could also be an element hepatic encephalopathy.  Ammonia level unremarkable, empirically treated with lactulose, given improvement continue to treat given no significant harms in therapy.  Medication induced altered mental status remains the most likely cause given patient's renal failure and hepatic injury and inability to clear medications.  - Delirium order set placed  -Avoid sedating medications  - PT/OT/SLP  -Decrease lactulose to 20 g daily, titrate as needed for 3-4 stools daily  - Added rifaximin 400 mg TID     Hypotension: He has had low blood pressures during this stay and required pressors initially.  Likely multifactorial, possibly secondary to underlying sepsis and will evaluate as above, question if he could be cirrhotic and this could be contributing to his hypotension.  Also may be related to orthostasis given response to fluids on 8/1.   - Dialysis per nephrology  -midodrine 10 mg 3 times daily per nephrology.  Continue as needed  midodrine  -Avoid giving fluid bolus if possible given difficulty to remove fluid from patient's.     Hepatic injury  Possible hepatic encephalopathy: Elevated AST/ALT, bilirubin, alk phos, CK and INR with hypoalbuminemia. Decreased synthetic function of liver most likely due to decreased perfusion. US 7/25 showed fatty infiltration of liver which may be contributing but no concerning biliary tree disease.  Less likely alcoholic hepatitis although alcoholic liver injury likely contributing given AST/ALT ratio.  Peripheral smear relatively normal which is reassuring for DIC. Elevation seems likely related to shock liver. Repeat RUQ u/s 7/28 without acute findings.  LFTs stabilized but remain elevated.  We will treat possible hepatic encephalopathy, as above, though likely multiple causes of patient's altered mental status.   - GI following, appreciate input     Melena, resolved: Episode of melanotic stool 7/27-28.  Has had downtrending hemoglobin for months as well as while here.  Was seen in clinic recently for melena which was thought due to NSAID use.  Symptoms had resolved after he had stopped using NSAIDs.  He was on a PPI as well.  Again having melenic stools here, possibly gastritis from decreased perfusion versus worsening ulceration. Hemoglobin now stable   - GI following as above  - PPI two times a day   - Transfuse for Hgb < 7      Respiratory fatigue - resolving: Successfully extubated. Remains of supplemental O2 however, significantly improved status  - Supplemental O2 prn  -Continue duo nebs 4 times daily and albuterol as needed      Sinus Arrythmia: New with this admission with frequent PACs.  Had previously been sinus tachycardic and now with arrhythmia.  Possibly related to overall disease process versus cardiac injury from shock.    - Telemetry.      Macrocytic anemia: Per chart review, Hb of 13.0 in 2/2018 with steady decline until present. Consistently macrocytic.  Likely nutritional component due  to alcohol use but continuous decline is concerning for underlying cause. Was also having melena as above. Peripheral smear showed inflammatory changes only.  LDH and haptoglobin only mildly elevated.  Per nephrology may need EPO if MICHAEL persists  - AM CBC     History of alcohol use: 350ml peppermint schnapps daily for years. Per wife, never has had withdrawal though never been more than 2 days without a drink that she knows of.    - Nicotine patch  -Continue to monitor for withdrawal symptoms  -Continue folic acid, thiamine, multivitamin      Concern for adrenal insufficiency - resolved: Acute illness and combination of hyperkalemia and hyponatremia is concerning for adrenal insufficiency. Hydrocortisone was discontinued.      Hyperglycemia - resolved: Elevated glucose, likely due to hydrocortisone. Continue sliding scale insulin     Hyponatremia, resolved: Most likely due to septic shock and renal failure. Later in the course likely hypervolemic hyponatremia      Diet: cardiac renal diet  Fluids: No IVF  DVT Prophylaxis: Heparin infusion, treatment dosing  Code: full code     Disposition/Advanced Care Planning  Discharge Planning discussed with Patient  Barriers to discharge: Critical condition  Anticipated discharge date: Multiple more days  Disposition: unknown    Precepted patient with Dr. Cali Franco MD, PGY1  Pager: [phone]      Subjective  Patient is doing better today in terms of his confusion. Responding more appropriately to questions per fiance in room.  Responds appropriately most of the time to my questions.    Continues to be hypotensive intermittently. Felt may improve with DC of metoprolol but has not affected this so far.     Still has a leukocytosis but without fevers. Abdominal CT scan shows ascites but no abscess as source for this.     Objective    Vital signs in last 24 hours Temp:  [97.5  F (36.4  C)-99.7  F (37.6  C)] 99.1  F (37.3  C)  Heart Rate:  [78-88] 84  Resp:   [18-20] 20  BP: ()/(50-58) 102/50   Weight: 212 lb 4.8 oz (96.3 kg)    Intake/Output last 3 shift I/O last 3 completed shifts:  In: 290 [P.O.:240; I.V.:50]  Out: -42 [Urine:100]    Intake/Output this shift:I/O this shift:  In: 150 [P.O.:150]  Out: -     Review of Systems   Complete review of systems negative except as noted above    Physical Exam  General Appearance:    Alert, oriented x2, ill appearing, obese, occasionally responding appropriately in bed   Head:    Normocephalic, without obvious abnormality, atraumatic   Eyes:    PERRL, conjunctiva/corneas clear,        Nose:   Nares normal, septum midline, mucosa normal,   Throat:   Lips, mucosa, and tongue normal;   Neck:   Supple, symmetrical, trachea midline,   Back:     Symmetric, no curvature, ROM normal, no CVA tenderness   Lungs:     Clear to auscultation bilaterally, respirations unlabored       Heart:    Regular rate and rhythm, S1 and S2 normal, no murmur, rub   or gallop   Abdomen:     Soft, non-tender, bowel sounds active all four quadrants,     no masses, no organomegaly           Extremities:   Extremities normal, atraumatic, no cyanosis or edema   Pulses:   2+ and symmetric all extremities       Pertinent Labs and Pertinent Radiology   Lab Results: personally reviewed.   Lab Results   Component Value Date     08/04/2018    K 3.6 08/04/2018     08/04/2018    CO2 27 08/04/2018    BUN 31 (H) 08/04/2018    CREATININE 4.15 (H) 08/04/2018    CALCIUM 8.1 (L) 08/04/2018     Lab Results   Component Value Date    WBC 19.2 (H) 08/04/2018    HGB 7.7 (L) 08/04/2018    HCT 23.7 (L) 08/04/2018     (H) 08/04/2018     08/04/2018       Radiology Results: Personally reviewed image/s

## 2021-06-19 NOTE — PROGRESS NOTES
07/30/18 1019   Reason for Assessment (RCAT)   RCAT Assesment Initial   Assessment Reason  Other (comment)  (Respiratory Failure & Pneumonia)   Chart Assessment (RCAT)   Pulmonary Status  2   Surgical Status  0   Chest Xray  4   Patient Assessment (RCAT)    Respiratory Pattern  0   Mental Status  0   Breath Sounds  4   Cough Effectiveness  1   Level of Activity  2   O2 Required SpO2 >= 92%  1   Chart + Pt. Assessment Total Points  14   RCAT Acuity Score 14 (Acuity 3)   Clinical Indications (RCAT)   Aerosol Hygiene RCAT protocol   RCAT Order Placed  Yes;Continue current therapy   RCAT assessment done, acuity 3, continue Duoneb four times a day and RCAT in 48 hours and PRN.

## 2021-06-19 NOTE — PROGRESS NOTES
"Patient received treatments as scheduled and slept through both. Continues to have loud audible upper airway wheezes. RT to continue to follow.    BP (!) 85/49 (Patient Position: Lying)  Pulse 77  Temp 99  F (37.2  C) (Axillary)   Resp 18  Ht 5' 8\" (1.727 m)  Wt 212 lb 4.8 oz (96.3 kg)  SpO2 97%  BMI 32.28 kg/m2    "

## 2021-06-19 NOTE — PROGRESS NOTES
Patient continues on high flow nasal cannula at 25L 60%, sats 95%.  Breath sounds coarse with rhonchi.  Duo nebs given x 2, patient NT suctioned for large thick  creamy yellow secretions.

## 2021-06-19 NOTE — PROGRESS NOTES
[facility] Progress Note    Subjective    Aayush García is more alert this morning.  He is able to state that he is in the hospital and the reason why he is here is because he is sick.  He is oriented to himself and mentation appears to be improved compared to last Friday.     Overnight was hypotensive despite 500 mL bolus and was transferred to the ICU for pressors.  Initiated Zosyn.  UA showing possible infection.    Objective    Vital signs in last 24 hours Temp:  [98  F (36.7  C)-99.1  F (37.3  C)] 98.5  F (36.9  C)  Heart Rate:  [70-92] 81  Resp:  [12-53] 24  BP: ()/(43-81) 92/55   Weight: 204 lb 3.2 oz (92.6 kg)    Physical Exam  General appearance: Alert, conversational, some confusion but oriented to person and general place.  Jaundiced.   Lungs: Upper airway congestion which radiates to bilateral lower airways.  No wheezing or crackles auscultated.  Respirations unlabored  Heart: Regular rate and rhythm, normal S1 and S2, no murmur, rub or gallop.  Abdomen: Obese, distended, soft, nontender.  Extremities: +3 pitting edema up to abdomen  Skin: Jaundiced  Neurologic: Alert, oriented to person and general place.    Pertinent Labs   Lab Results: personally reviewed.     Pertinent Radiology   Radiology Results: Personally reviewed.    Assessment/Plan  Principal Problem:    Septic shock (H)  Active Problems:    Acute respiratory failure with hypoxia (H)    Acute renal failure, unspecified acute renal failure type (H)    Metabolic acidosis    Encephalopathy, metabolic    Elevated troponin    Stress-induced cardiomyopathy    Bacteremia due to Klebsiella pneumoniae    Anemia, unspecified type    Alcohol abuse    Acute encephalopathy    Acute liver failure    Aayush García is a 62 year old male with a history of alcohol abuse, HTN and anemia who was admitted with septic shock secondary to Klebsiella bacteremia and anuria.  Required intubation and pressor support in ICU, currently extubated, moved  out of the ICU 7/31, and was transferred back into the ICU 8/5 due to need for pressure support.  Has initiated dialysis, however difficulty with ongoing hypotension and persistent leukocytosis.      Acute renal injury: Most likely severe ATN due to klebsiella sepsis, though cause still not completely clear. May be component of volume depletion as well.  Abdominal US negative 7/28.  Has initiated dialysis and will continue MWF for for the foreseen future. Dialysis catheter placed 8/1.  - Nephrology following   -Plan to continue dialysis MWF.  - Dialysis today if pressures allow      Sepsis   Klebsiella bacteremia  Persistent leukocytosis: Admitted with leukocytosis, lactic acidosis, hypotension and tachycardia. Blood cultures growing klebsiella.  UA shows leukocytes, blood and few bacteria but may be unreliable as taken after period of anuria.  Most likely urinary source, though now question possible SBP given hepatic encephalopathy.  Possibly GI source, did have ulcer 1-2 months ago.  Unlikely PNA given CXR without infiltrates. Persistent leukocytosis of approximately 20 thousand, down trending today. Neutrophil predominance on the 28th. Repeat lactate normal. Blood cultures x2 drawn and no growth yet.  Repeat chest x-ray showing improvement of infiltrates and improvement of pulmonary edema. CT abdomen showed no abscess just ascites. Therapeutic paracentesis performed with 2L drawn, unfortunately fluid was not sent for culture or stain.  UA 8/5 with urinary tract infection, may have been contributing to altered mental status.  -IV Zosyn initiated 8/5 per ICU  -Cultures from hemodialysis catheter to be drawn today      AMS  Alcohol withdrawal, resolving   Unclear etiology of altered mental status, though may be multifactorial cause including hepatic encephalopathy, underlying sepsis, alcohol withdrawal, medication induced, ICU delirium. Ammonia level unremarkable, empirically treated with lactulose and rifaximin.   With some improvement after starting rifaximin on 8/5  - Delirium order set   -Avoid sedating medications  - PT/OT/SLP  -lactulose 20 g daily, titrate as needed for 3-4 stools daily  - rifaximin 400 mg TID      Hypotension: Likely multifactorial, given underlying sepsis, and likely cirrhotic liver.  Also with severe hypoalbuminemia which is likely contributing. Hypotensive with MAPs in the 50s 8/5.  Was given a 500mL bolus without response and transferred to the ICU for pressors.  Repeat lactate unremarkable.  Pro-calcitonin remains elevated, however down significantly from admission.  -ICU managing pressors  -Albumin 25 g IV every 6 hours for 8 doses initiated 8/5  - Dialysis per nephrology  -midodrine 10 mg 3 times daily per nephrology.  Continue as needed midodrine  -Avoid giving fluid bolus if possible given difficulty to remove fluid from patient's.      Hepatic injury  Possible hepatic encephalopathy  Ascites: Elevated AST/ALT, bilirubin, alk phos, CK and INR with hypoalbuminemia. Decreased synthetic function of liver. US 7/25 showed fatty infiltration of liver which may be contributing but no concerning biliary tree disease. Peripheral smear relatively normal which is reassuring for DIC. Repeat RUQ u/s 7/28 without acute findings.  LFTs stabilized but remain elevated.  Paracentesis 8/3 with 1.95 L fluid removed, however unfortunately send for Gram stain or culture.   - We will treat possible hepatic encephalopathy, as above, though likely multiple causes of patient's altered mental status.   - GI following, appreciate the recommendations  -If paracentesis is performed again would recommending sending fluid for analysis.    NSTEMI: Type II myocardial infarction or demand related ischemia secondary to sepsis on presentation.  EF of 25-30% on 7/25/2018, significantly improved with echo 7/31/2018 with an EF of 61%.  Per cardiology not recommending ischemic workup at this time.  - Cardiology has signed off.  - Not  getting coreg 3.125 mg twice daily due to hypotension.  Consider restarting when hypotension has resolved.  - Avoid QT prolong meds  - Telemetry monitoring      Melena, resolved: Episode of melanotic stool 7/27-28.  Has had downtrending hemoglobin for months as well as while here.  Was seen in clinic recently for melena which was thought due to NSAID use.  Symptoms had resolved after he had stopped using NSAIDs.  He was on a PPI as well.  Again having melenic stools here, possibly gastritis from decreased perfusion versus worsening ulceration.   - GI following as above  - PPI two times a day   - Transfuse for Hgb < 7      Respiratory fatigue - resolving: Successfully extubated. Remains of supplemental O2 however, significantly improved status  - Supplemental O2 prn  -Continue duo nebs 4 times daily and albuterol as needed      Sinus Arrythmia: New with this admission with frequent PACs.  Had previously been sinus tachycardic and now with arrhythmia.  Possibly related to overall disease process versus cardiac injury from shock.    - Telemetry.      Macrocytic anemia: Per chart review, Hb of 13.0 in 2/2018 with steady decline until present. Consistently macrocytic.  Likely nutritional component due to alcohol use but continuous decline is concerning for underlying cause. Was also having melena as above. Peripheral smear showed inflammatory changes only.  LDH and haptoglobin only mildly elevated.  Per nephrology may need EPO if MICHAEL persists.  Hemoglobin 6.9 8/6, and will be transfused 1 unit PRBCs  - AM CBC      History of alcohol use  History of tobacco abuse: 350ml peppermint schnapps daily for years. Per wife, never has had withdrawal though never been more than 2 days without a drink that she knows of.    - Nicotine patch  - Continue to monitor for withdrawal symptoms  -Continue folic acid, thiamine, multivitamin      Diet: 2mg Na  Fluids: No IVF  DVT Prophylaxis: Subcutaneous heparin  Code: full code      Disposition/Advanced Care Planning   Discharge Planning discussed with patient's wife  Barriers to discharge: critical care in ICU   Dispo: anticipate discharge to Unclear at this time, most likely [place]   Anticipated discharge date:  multiple more days       Precepted patient with Dr. Víctor Xie DO (PGY2)  [facility] [place] [facility]  Pager: [phone]

## 2021-06-19 NOTE — PROGRESS NOTES
Pharmacy Note - Admission Medication History    Pertinent Provider Information: N/A     ______________________________________________________________________    Prior To Admission (PTA) med list completed and updated in EMR.       PTA Med List   Medication Sig Last Dose     aspirin 81 MG EC tablet Take 81 mg by mouth daily. 7/24/2018 at Unknown time     lisinopril (PRINIVIL,ZESTRIL) 10 MG tablet Take 10 mg by mouth daily. 7/24/2018 at Unknown time     multivitamin therapeutic tablet Take 1 tablet by mouth daily. 7/23/2018 at Unknown time     omeprazole (PRILOSEC) 40 MG capsule Take 40 mg by mouth daily before breakfast. 7/24/2018 at am     rosuvastatin (CRESTOR) 10 MG tablet Take 10 mg by mouth daily. 7/24/2018 at Unknown time       Information source(s): Patient and CareEverywhere/SureScripts    Summary of Changes to PTA Med List  New: Crestor, omeprazole  Discontinued: N/A  Changed: N/A    Patient was asked about OTC/herbal products specifically.  PTA med list reflects this.    Based on the pharmacist s assessment, the PTA med list information appears reliable    Patient appears adherent: Yes    Allergies were reviewed, assessed, and updated with the patient.      Patient does not use any multi-dose medications prior to admission.    Thank you for the opportunity to participate in the care of this patient.    Tadeo Nelson, PharmD  7/24/2018 11:10 PM

## 2021-06-19 NOTE — PROGRESS NOTES
Critical Care Progress Note  8/6/2018   10:23 AM    Admit Date: 7/24/2018  ICU Day: 1  Code Status: full code      Problem List:   Principal Problem:    Septic shock (H)  Active Problems:    Acute respiratory failure with hypoxia (H)    Acute renal failure, unspecified acute renal failure type (H)    Metabolic acidosis    Encephalopathy, metabolic    Elevated troponin    Stress-induced cardiomyopathy    Bacteremia due to Klebsiella pneumoniae    Anemia, unspecified type    Alcohol abuse    Acute encephalopathy    Acute liver failure          Plan:   1. HD MWF per Renal.   2. Levophed to keep MAP > 65, in addition to scheduled Midodrine.   3. Continue zosyn for now; follow culture results, WBC, and temps. Procal remains elevated but down from previous. Draw cultures from HD line during dialysis today.   4. Lactulose and rifaximin.   5. Scheduled albumin for total 8-doses.   6. Nebs PRN.     ICU Prophylaxis   Feeding: Low sodium diet.   Rahman: yes  Analgesia/Sedation: prn  DVT prophylaxis: SQ Heparin  HOB > 30 degrees: yes   GI Proph: PPI   Glycemic Control: spot checking   Family updated: yes - fiance' at bedside     Dispo: ICU due to hypotension requiring vasopressor support.     Astrid Leone, [facility] Pulmonary/Critical Care    Total critical care time: 40-minutes.   _______________________________________________________________    HPI: 62 year old male with a history of tobacco dependence, HTN, dyslipidemia, alcohol dependence (half a 750-mL bottle of peppermint schnapps per day), PUD, colon polyps, admitted on 7/25 with severe sepsis with septic shock due to klebsiella bacteremia, acute liver failure with possible underlying chronic liver disease, severe hypoalbuminemia, oliguric MICHAEL requiring ongoing intermittent HD, intubated for acute respiratory failure and subsequently extubated, paracentesis on 8/3 with 2000 mL removed, now with persistent encephalopathy and worsening hypotension.    Hospital  Course in brief:   7/24: Admission with septic shock. Hypotensive and severe metabolic acidosis. Admitted to ICU with high dose pressors, NaHCO3 gtt.  7/25: Intubated. EF 25-30% with global hypokinesia (thought secondary to stress-induced cardiomyopathy). HD initiated. Pressors off.   7/26: Extubated to high flow nasal cannula. Abx: Vancomycin, flagyl, and cefepime for GNB in blood - Klebsiella. Daily diuretic trials.   7/27: Abx narrowed to Cefepime. Difficult to control volume status on HD; On Precedex for EtOH w/d.   7/28: INR rising - received Vit K, FFP x 2, PRBC x 1; worsening LFTs; melenotic stools (resolved).   7/30: Prolonged QTC and Polymorphic wide complex tachycardia c/w VT - Haldol d/c. Off Precedex. Transferred out of ICU.   7/31: Hypotensive and confused; repeat Echo with EF 61%  8/1: Tunneled HD catheter placed; hypotensive & received 500mL fluid bolus.   8/2: Ongoing hypotension; Midodrine started. Blood cultures sent. CT Abd.   8/3: Paracentesis done 1.95-L removed - not sent for culture. Rifaximin started.   8/5: Return to ICU due to worsening hypotension requiring Levophed. Albumin scheduled x 8-doses.       ROS: Feeling better; no pain, breathing improved, no headache. Feels swollen    Events over last 24-hours: return to ICU.     Objective:     Vitals:    08/06/18 0830 08/06/18 0900 08/06/18 0930 08/06/18 1000   BP: 104/59 104/59 97/56 90/55   Patient Position:       Pulse: 77 75 74 70   Resp: 17 21 16 22   Temp:       TempSrc:       SpO2: 96% 99% 100% 98%   Weight:       Height:           I/O:   Intake/Output Summary (Last 24 hours) at 08/06/18 1023  Last data filed at 08/06/18 0700   Gross per 24 hour   Intake            971.1 ml   Output               30 ml   Net            941.1 ml     Wt Readings from Last 3 Encounters:   08/05/18 204 lb 3.2 oz (92.6 kg)   03/19/18 180 lb (81.6 kg)      Weight change: -4 lb 4.8 oz (-1.95 kg)    Physical Exam:  Gen:  not in acute distress; sitting up in bed  eating breakfast.   HEENMT:PERRLA, extra ocular movement intact and icteric sclera  NEURO: no focal deficits  CARDIOVASCULAR: S1, S2 normal, no murmur, rub or gallop, regular rate and rhythm  PULMONARY: unlabored on nasal cannula; coughing with eating this morning; lungs are diminished and coarse. No wheeze.   GASTROINTESTINAL: rounded, semi-firm. Active bowel sounds.   INTEGUMENT: Jaundice; 2-3+ edema in lower extremities  PSYCH: lethargic, flat     Labs:   Results from last 7 days  Lab Units 08/06/18  0440  08/05/18  0548   LN-SODIUM mmol/L 136  < > 140   LN-POTASSIUM mmol/L 3.6  < > 3.4*   LN-CHLORIDE mmol/L 104  < > 107   LN-CO2 mmol/L 21*  < > 24   LN-BLOOD UREA NITROGEN mg/dL 41*  < > 35*   LN-CREATININE mg/dL 5.87*  < > 4.87*   LN-CALCIUM mg/dL 8.2*  < > 7.5*   LN-PROTEIN TOTAL g/dL  --   --  5.2*   LN-BILIRUBIN TOTAL mg/dL  --   --  5.6*   LN-ALKALINE PHOSPHATASE U/L  --   --  557*   LN-ALT (SGPT) U/L  --   --  62*   LN-AST (SGOT) U/L  --   --  165*   < > = values in this interval not displayed.      Results from last 7 days  Lab Units 08/06/18  0440   LN-WHITE BLOOD CELL COUNT thou/uL 17.9*   LN-HEMOGLOBIN g/dL 6.9*   LN-HEMATOCRIT % 21.3*   LN-PLATELET COUNT thou/uL 270       Micro: no new growth. Blood cultures from 8/3 and 8/5 are without growth thus far.     Imaging: all imaging personalized reviewed 8/5 CXR -  Persistent low lung volumes with associated hypoventilatory changes. Increased right basilar opacities most likely reflect subsegmental atelectasis. Interval improved aeration at the left lung   base. Persistent trace left-sided pleural effusion. Stable heart size and no overt vascular congestion. No definite pneumothorax.      Astrid Leone, [facility] Pulmonary/Critical Care

## 2021-06-19 NOTE — PROGRESS NOTES
Faculty Supervision of Residents    I have examined this patient on 8/7/2018 and the medical care has been evaluated and discussed with the resident.  The documentation has been reviewed.  I agree with the medical care provided and confirm the findings.       Víctor Styles

## 2021-06-19 NOTE — LETTER
Letter by Madeline Avila CNP at      Author: Madeline Avila CNP Service: -- Author Type: --    Filed:  Encounter Date: 10/18/2019 Status: Signed         October 18, 2019     Patient: Aayush García   YOB: 1955   Date of Visit: 10/18/2019       To Whom It May Concern:    It is my medical opinion that Aayush García be released from work today 10/18/2019.    If you have any questions or concerns, please don't hesitate to call.    Sincerely,        Electronically signed by Madeline Avila CNP

## 2021-06-19 NOTE — PROGRESS NOTES
08/04/18 0936   Reason for Assessment (RCAT)   RCAT Assesment Re-eval   Assessment Reason  (shock)   $ RCAT Eval Time 15 min.  Yes   Vitals (RCAT)   BP (!) 78/58   Resp 18   SpO2 92 %   Patient Temp 98.6 F   Chart Assessment (RCAT)   Pulmonary Status  2   Surgical Status  0   Chest Xray  2   Patient Assessment (RCAT)    Respiratory Pattern  0   Mental Status  2   Breath Sounds  4   Cough Effectiveness  1   Level of Activity  1   O2 Required SpO2 >= 92%  0   Chart + Pt. Assessment Total Points  12   RCAT Acuity Score 12 (Acuity 3)   Clinical Indications (RCAT)   Aerosol Hygiene RCAT protocol   RCAT Order Placed  Continue current therapy;Yes   Pt continues to have coarse rhonchi throughout. Loose cough. Will continue to monitor. Melissa Rooney

## 2021-06-19 NOTE — PROGRESS NOTES
Faculty Supervision of Residents    I have examined this patient on 8/6/2018 and the medical care has been evaluated and discussed with the resident.  The documentation has been reviewed.  I agree with the medical care provided and confirm the findings.       Víctor Styles

## 2021-06-19 NOTE — PROGRESS NOTES
[facility] Progress Note    Subjective    Aayush García is sitting up in chair with his wife present this morning.  Patient is able to have a conversation and quite weak, but remains confused and oriented only to person.  Repeatedly asking and stating that he was going to go home, but will be back in the morning.    Dialysis was performed yesterday afternoon with a successful run. BP has remained stable.     Objective    Vital signs in last 24 hours Temp:  [97.9  F (36.6  C)-99.5  F (37.5  C)] 99.4  F (37.4  C)  Heart Rate:  [75-95] 92  Resp:  [16-32] 29  BP: ()/(50-78) 100/57   Weight: 210 lb 8 oz (95.5 kg)    Physical Exam   General appearance: Alert, conversational, confused, oriented to person only. Juandiced.    Lungs: Upper airway congestion radiating bilaterally.  No wheezing or crackles present.  Respirations unlabored. NC present.   Heart: Regular rate and rhythm, normal S1 and S2, no murmur, rub or gallop.  Abdomen: Soft, nontender, nondistended.  Bowel sounds active in all 4 quadrants.  No masses or organomegaly.  Extremities: 4+ pitting edema in ankles and feet bilaterally, 2+ in shins to knees.   Neurologic: Alert, conversational, confused, oriented to person only.    Pertinent Labs   Lab Results: personally reviewed.     Pertinent Radiology   Radiology Results: Personally reviewed.    Assessment/Plan  Principal Problem:    Septic shock (H)  Active Problems:    Acute respiratory failure with hypoxia (H)    Acute renal failure, unspecified acute renal failure type (H)    Metabolic acidosis    Encephalopathy, metabolic    Elevated troponin    Stress-induced cardiomyopathy    Bacteremia due to Klebsiella pneumoniae    Anemia, unspecified type    Alcohol abuse    Aayush García is a 62 year old male with a history of alcohol abuse, HTN and anemia who was admitted with septic shock secondary to Klebsiella bacteremia and anuria.  Required intubation and pressor support in ICU, currently  extubated without pressor support, moved out of the ICU 7/31.       Anuric renal failure: Most likely severe ATN due to klebsiella sepsis, though cause still not completely clear. May be component of volume depletion as well.  Abdominal US negative.  Worsening of creatinine despite IV fluid bolus given overnight.  -Nephrology is following - appreciate recs  -Dialysis catheter placed 8/1 and dialysis yesterday afternoon.  Plan to continue dialysis 3 times weekly.  -Continue ceftriaxone, has completed 10 day course, but will continue today due to continued leukocytosis    NSTEMI: Type II myocardial infarction or demand related ischemia secondary to sepsis on presentation.  EF of 25-30% on 7/25/2018, significantly improved with echo 7/31/2018 with an EF of 61%.  Per cardiology not recommending ischemic workup at this time.  - Cardiology consulted and has signed off.  -Coreg 3.125 mg twice daily  -Continue aspirin daily  - Avoid QT prolong meds  - Telemetry monitoring    Sepsis   Klebsiella bacteremia: Admitted with leukocytosis, lactic acidosis, hypotension and tachycardia. Blood cultures growing klebsiella.  UA shows leukocytes, blood and few bacteria but may be unreliable as taken after period of anuria.  Most likely urinary source.  Possibly GI source, did have ulcer 1-2 months ago.  Unlikely PNA given CXR without infiltrates.  - Continue Ceftriaxone, has completed 10 day course, but will continue today due to continued leukocytosis  -Unclear significance of continued leukocytosis, however continuing to improve clinically.  If appears to be septic again, would consider CT abdomen    AMS  Alcohol withdrawal, resolving   Unclear etiology of altered mental status, though may be multifactorial cause including underlying sepsis, alcohol withdrawal, medication induced, element of uremia, ICU delirium.  Given history of alcohol abuse and hepatic injury on admission, question if there could also be an element hepatic  encephalopathy.  Ammonia level unremarkable, empirically treated with lactulose, given improvement continue to treat given no significant harms in therapy.  Medication induced altered mental status remains the most likely cause given patient's renal failure and hepatic injury and inability to clear medications.  - Delirium order set placed  -Avoid sedating medications  - PT/OT/SLP  -Continue lactulose 20 g daily, may continue to titrate up depending on the response    Hypotension: He has had low blood pressures during this stay and required pressors initially.  Question if this may be related to orthostasis given response to fluids on 8/1.   - Dialysis per nephrology  -Midodrine 10 mg as needed for dialysis or hypotension per nephrology    Hepatic injury  Possible hepatic encephalopathy: Elevated AST/ALT, bilirubin, alk phos, CK and INR with hypoalbuminemia. Decreased synthetic function of liver most likely due to decreased perfusion. US showed fatty infiltration of liver which may be contributing but no concerning biliary tree disease.  Less likely alcoholic hepatitis although alcoholic liver injury likely contributing given AST/ALT ratio.  Peripheral smear relatively normal which is reassuring for DIC. Elevation seems likely related to shock liver. Repeat RUQ u/s without acute findings.  Labs stabilizing.  We will treat possible hepatic encephalopathy, as above, though likely multiple causes of patient's altered mental status.   - GI following, appreciate input  - Continue to follow hepatic profile    Melena, resolved: Episode of melanotic stool 7/27-28.  Has had downtrending hemoglobin for months as well as while here.  Was seen in clinic recently for melena which was thought due to NSAID use.  Symptoms had resolved after he had stopped using NSAIDs.  He was on a PPI as well.  Again having melenic stools here, possibly gastritis from decreased perfusion versus worsening ulceration. Hemoglobin now stable   - GI  following as above  - PPI two times a day   - Transfuse for Hgb < 7     Respiratory fatigue - resolving: Successfully extubated. Remains of supplemental O2 however, significantly improved status  - Supplemental O2 prn  -Continue duo nebs 4 times daily and albuterol as needed     Sinus Arrythmia: New with this admission with frequent PACs.  Had previously been sinus tachycardic and now with arrhythmia.  Possibly related to overall disease process versus cardiac injury from shock.    - Continue on to monitor on telemetry.     Hyponatremia: Most likely due to septic shock and renal failure. Now likely hypervolemic hyponatremia   - AM BMP      Macrocytic anemia: Per chart review, Hb of 13.0 in 2/2018 with steady decline until present. Consistently macrocytic.  Likely nutritional component due to alcohol use but continuous decline is concerning for underlying cause. Was also having melena as above. Peripheral smear showed inflammatory changes only.  LDH and haptoglobin only mildly elevated.  Per nephrology may need EPO if MICHAEL persists  - AM CBC    History of alcohol use: 350ml peppermint schnapps daily for years. Per wife, never has had withdrawal though never been more than 2 days without a drink that she knows of.    - Nicotine patch  -Continue to monitor for withdrawal symptoms  -Continue folic acid, thiamine, multivitamin     Concern for adrenal insufficiency - resolved: Acute illness and combination of hyperkalemia and hyponatremia is concerning for adrenal insufficiency. Hydrocortisone was discontinued.     Hyperglycemia - resolved: Elevated glucose, likely due to hydrocortisone. Continue sliding scale insulin    Diet: cardiac renal diet after dialysis catheter placed   Fluids: No IVF  DVT Prophylaxis: Subcutaneous heparin and SCDs  Code: full code     Disposition/Advanced Care Planning   Discharge Planning discussed with patient  Barriers to discharge: IV antibiotics, dialysis catheter placement, dialysis and  further workup of altered mental status   Dispo: anticipate discharge to TCU   Anticipated discharge date:  2-3 days       Precepted patient with Dr. Víctor Xie DO (PGY2)  [facility] Resident  Pager: [phone]

## 2021-06-19 NOTE — PROCEDURES
ARTERIAL LINE INSERTION PROCEDURE NOTE  (NON-OR)    Procedure Date:  7/25/2018   Performing Physician:  Astrid Redd CNP    Pre-Procedure Diagnosis:     SEPTIC SHOCK and RESPIRATORY FAILURE  Post-Procedure Diagnosis:  Same as Pre-Procedure Diagnosis    Procedure:  Arterial line insertion  Left Radial  Indications:  RESPIRATORY FAILURE and HEMODYNAMIC SHOCK      Estimated Blood Loss: Minimal   Complications: none      Procedure Details:   The patient is admitted with septic shock and is hemodynamically unstable requiring three pressors. No family is immediately available. The patient is encephalopathic and unable to give informed consent.   The procedure is deemed medically necessary.       An Oniel test was  done before the procedure.  There was capillary refill present .  In sterile fashion, the line site was prepped with Chlorhexidine.  Strict sterile conditions were maintained,  Cap, mask, and sterile gloves were worn by all participants.  1 ml of   Lidocaine 1% anesthetic were infiltrated into the skin.  The arterial line was placed in the Left Radial artery percutaneously,  without  difficulty using ultrasound guidance.  The line was sutured in place  and an occlusive sterile dressing was applied.  The total number of needle stick attempts was 1.      Condition: unstable    Astrid Redd CNP, 7/25/2018, 5:43 AM

## 2021-06-19 NOTE — PROGRESS NOTES
RENAL Progress Note -reviewed, known to me from earlier hosp stay.       Assessment/Plan  MICHAEL - suspect hemodynamic ATN due to volume depletion, low bp, CM, ACE-I. Ongoing MICHAEL, low UOP despite boluses and Cr worse.  Continue dialysis MWF for now via tunneled cath.  Concerned hypotension is going to impair his renal recovery, he's acting a bit like he has more significant liver disease. Got iv contrast yesterday. U/o remains poor, anuric rise in Cr btw HD runs. Cancelled UF run due to hypotension today, won't tolerate UF and no emergent need for UF..    Recs:  - would liberalize diet to sodium restriction alone to encourage better intake  - dialysis MWF  - follow for recovery although I worry about this with ongoing hypotension, recommend d/c carvedilol(done)  - schedule 10mg midodrine three times a day and prn for dialysis       Hypotension - persistent and not improving.  Not clearly cirrhotic but he looks cirrhotic to some degree. Nothing acute on CT. CM better.  Using scheduled midodrine now and prn before dialysis    CM  - likely stress induced CM as he has recovered quite a bit of his EF.  Clearly volume up but hard to shift fluid     Sepsis - klebsiella bacteremia.  Finished course.  WBC still elevated but no acute findings on CT.    Anemia - acute on chronic, recent GI bleed, hgb drifting down, transfused Friday on run. Stable in the 8s.  May need EPO if MICHAEL persists    Inc LFT's and coagulopathy - No documented chronic liver disease but ?early cirrhosis    Resp failure - on 1-2L NC, very volume up but difficult to shift fluid off him.    Encephalopathy - waxing and waning.  Remains delirious.  ?Hepatic encephalopathy?  Getting lactulose    Malnutrition - encourage oral intake, liberalize diet to just sodium restriction       Principal Problem:    Septic shock (H)  Active Problems:    Acute respiratory failure with hypoxia (H)    Acute renal failure, unspecified acute renal failure type (H)    Metabolic  acidosis    Encephalopathy, metabolic    Elevated troponin    Stress-induced cardiomyopathy    Bacteremia due to Klebsiella pneumoniae    Anemia, unspecified type    Alcohol abuse      Subjective  Seen in room earlier, SO at bedside. Says he's eating well.   Asleep now, he did not arouse.   Says he has been doing limited OT, up in chair once.   No standing/walking.     Objective    Vital signs in last 24 hours  Temp:  [97.5  F (36.4  C)-99.7  F (37.6  C)] 98.8  F (37.1  C)  Heart Rate:  [78-88] 86  Resp:  [18-20] 18  BP: ()/(50-58) 84/51  Weight:   212 lb 4.8 oz (96.3 kg)    Intake/Output last 3 shifts  I/O last 3 completed shifts:  In: 440 [P.O.:390; I.V.:50]  Out: 150 [Urine:150]  Intake/Output this shift:       Review of Systems   A 12 point comprehensive review of systems was negative except as noted.    Physical Exam  Asleep  HEENT NC in place  Neck supple  Lungs fairly clear anteriorally  Cor RRR, 2-3+ edema up to hips  abd soft, obese, mildly distended  Ext warm    Pertinent Labs   Lab Results: personally reviewed.   Lab Results   Component Value Date     08/04/2018     08/03/2018     08/02/2018    K 3.6 08/04/2018    K 4.0 08/03/2018    K 3.7 08/02/2018    CO2 27 08/04/2018    CO2 22 08/03/2018    CO2 26 08/02/2018    BUN 31 (H) 08/04/2018    BUN 49 (H) 08/03/2018    BUN 39 (H) 08/02/2018    CREATININE 4.15 (H) 08/04/2018    CREATININE 5.51 (H) 08/03/2018    CREATININE 4.24 (H) 08/02/2018    CALCIUM 8.1 (L) 08/04/2018    CALCIUM 8.6 08/03/2018    CALCIUM 8.7 08/02/2018     Lab Results   Component Value Date    WBC 19.2 (H) 08/04/2018    WBC 20.5 (H) 08/03/2018    WBC 19.6 (H) 08/02/2018    HGB 7.7 (L) 08/04/2018    HGB 8.2 (L) 08/03/2018    HGB 8.4 (L) 08/02/2018    HCT 23.7 (L) 08/04/2018    HCT 25.5 (L) 08/03/2018    HCT 25.2 (L) 08/02/2018     (H) 08/04/2018     (H) 08/03/2018     (H) 08/02/2018     08/04/2018     08/03/2018     08/02/2018        Pertinent Radiology   Radiology Results: Personally reviewed impression/s    Brie Oliver MD  [facility]  [phone]

## 2021-06-19 NOTE — PROGRESS NOTES
GI Progress Note  Aayush García  [id number]    Subjective:   Rifaximin added yesterday. Mental status not much improved. Had 3 loose BMs yesterday. 1 small, brown loose stool this am. No melena or hematochezia.       Objective:   Temp:  [97.9  F (36.6  C)-99.1  F (37.3  C)] 97.9  F (36.6  C)  Heart Rate:  [76-86] 82  Resp:  [18-20] 18  BP: ()/(49-73) 81/50  Body mass index is 31.7 kg/(m^2).     Gen: No acute distress  Cardio: RRR  GI: Soft, non-distended, non-tender without rebound or guarding    Laboratory  LAB DATA:    Results from last 7 days  Lab Units 08/05/18  0548 08/04/18  0505 08/03/18  0548 08/02/18  0535  07/30/18  0428   LN-ALKALINE PHOSPHATASE U/L 557* 691* 783* 846*  < > 805*   LN-BILIRUBIN TOTAL mg/dL 5.6* 6.3* 5.6* 5.8*  < > 5.9*   LN-BILIRUBIN DIRECT mg/dL 3.6*  --   --  3.7*  --  3.8*   LN-PROTEIN TOTAL g/dL 5.2* 5.7* 5.7* 5.8*  < > 6.1   LN-ALT (SGPT) U/L 62* 76* 87* 85*  < > 82*   LN-AST (SGOT) U/L 165* 222* 274* 287*  < > 299*   < > = values in this interval not displayed.     Results from last 7 days  Lab Units 08/05/18  0548 08/04/18  0505 08/03/18  0548   LN-INR  1.39* 1.34* 1.39*         Results from last 7 days  Lab Units 08/05/18  0548 08/04/18  0505 08/03/18  0548   LN-WHITE BLOOD CELL COUNT thou/uL 19.4* 19.2* 20.5*   LN-HEMOGLOBIN g/dL 7.6* 7.7* 8.2*   LN-HEMATOCRIT % 23.1* 23.7* 25.5*   LN-PLATELET COUNT thou/uL 274 275 235       Results from last 7 days  Lab Units 08/05/18  0548   LN-SODIUM mmol/L 140   LN-POTASSIUM mmol/L 3.4*   LN-CHLORIDE mmol/L 107   LN-CO2 mmol/L 24   LN-BLOOD UREA NITROGEN mg/dL 35*   LN-CREATININE mg/dL 4.87*   LN-CALCIUM mg/dL 7.5*     Lipase   Date Value Ref Range Status   07/25/2018 256 (H) 0 - 52 U/L Final   07/24/2018 313 (H) 0 - 52 U/L Final     Imaging:      Assessment:   62 y.o. male who presented to the hospital with acute onset of lethargy and confusion.  On evaluation the patient was found to have sepsis secondary to Klebsiella pneumonia and  was admitted to the ICU. LFTs elevated, probably related to sepsis or ETOH (AST> ALT). Now improving. Total bili stable 6.3-->5.6. INR up, but stable. US showed small amount of sludge without biliary dilation. CT A/P 8/3 shows no concerning liver lesions or gall bladder pathology, small amount of ascites, possible PNA. 1.95 L fluid removed on paracentesis 8/3 (fluid not sent for cell ct/gram stain). Nephrology following. On midodrine. Receiving dialysis. Stooling with lactulose. Rifaximin added yesterday. Mental status seemed slightly better yesterday but more confusion today.      Plan:   1. PNA treatment per primary.  2. Trend LFTs.   3. Titrate lactulose for goal 3 loose BMs daily.   4. Rifaximin 550mg three times a day.    5. Will continue to follow.     D/w Dr. Curtis.     Petra Villarreal, [facility]  [phone]

## 2021-06-19 NOTE — PROGRESS NOTES
"Mr. García remains on the heated HFNC, currently on 20Lpm and 45%; Sp02 low to mid 90s. Patient confortably sleeping; no issues noted overnight. Will continue to follow and titrate down the Fi02 and flow on the system. Exhibits strong, congested, non-productive cough. Continues on DuoNeb four times a day. BS=coarse, diminished.     /74  Pulse 88  Temp 100.2  F (37.9  C) (Axillary)   Resp 20  Ht 5' 8\" (1.727 m)  Wt 207 lb 9.6 oz (94.2 kg)  SpO2 99%  BMI 31.57 kg/m2     Ran Avilez, RRT     "

## 2021-06-19 NOTE — PROGRESS NOTES
Critical Care Progress Note  7/26/2018      Admit Date: 7/24/2018  ICU Day: 2   Mechanical Ventilation Day: 2  CODE: Full Code    Critical Care Chief Complaint: EtOH w/d requiring Precedex      Problem List/Hospital Course:   Principal Problem:    Septic shock (H)  Active Problems:    Acute respiratory failure with hypoxia (H)    Acute renal failure, unspecified acute renal failure type (H)    Metabolic acidosis    Encephalopathy, metabolic    Elevated troponin    Other cardiomyopathy (H)          Interim Events:     HD this AM, extubated after. Patient now in EtOH w/d. Denies pain/SOB.        Assessment/Plan:   1) Septic shock - likely 2/2 GN bacilli bacteremia, resolved & off pressors. Cont abx and f/u Cx's  2) ARF. Anuric: likely 2/2 hypotension, NSAIDS, ? UTI. HD 7/25 & 7/26, starting to make some urine  3) AG Metabolic acidosis - lactic acidosis, resolved  4) EtOH withdrawal: CIWA, precedex. MVI, Thiamine, folic acid.   5) Hyponatremia: now improving at acceptable rate   6) Elevated LFTs, bilirubin: improving but INR increasing. On scheduled albumin. Follow LFTs & INR       - Nephrology following, s/p HD today   - follow Na, avoid overly-rapid correction   - replace electrolytes as indicated        DVT prophylaxis SC Heparin, SCDs  Plan of care discussed with patient's wife and brother    Medications:       dexmedetomidine 400 mcg/100 mL in NS (PRECEDEX) (4mcg/mL) 1.4 mcg/kg/hr (07/26/18 1655)     dextrose 5% 75 mL/hr (07/26/18 1607)     norepinephrine IV infusion in NS Stopped (07/25/18 1733)     propofol Stopped (07/26/18 1236)     vasopressin Stopped (07/25/18 2019)       albumin human  25 g Intravenous Q6H     cefepime (MAXIPIME) IV  500 mg Intravenous Q24H     chlorhexidine  15 mL Topical Q12H     [START ON 7/27/2018] folic acid  1 mg Enteral Tube DAILY    Or     [START ON 7/27/2018] folic acid  1 mg Intramuscular DAILY     furosemide  120 mg Intravenous Once     hydrocortisone sod succ  50 mg Intravenous  "Q8H     insulin aspart (NovoLOG) injection   Subcutaneous Q4H FIXED TIMES     ipratropium-albuterol  3 mL Nebulization QID - RT     magnesium sulfate IVPB  2 g Intravenous Once     metroNIDAZOLE  500 mg Intravenous Q12H     [START ON 7/27/2018] multivitamin therapeutic  1 tablet Enteral Tube DAILY     omeprazole  20 mg Oral BID AC     senna-docusate  1 tablet Oral BID    Or     senna (SENOKOT) syrup  8.8 mg Enteral Tube BID     sodium chloride  10-30 mL Intravenous Q8H FIXED TIMES     sodium chloride  3 mL Intravenous Line Care     sodium chloride  3 mL Intravenous Line Care     [START ON 7/27/2018] thiamine  100 mg Enteral Tube DAILY    Or     [START ON 7/27/2018] thiamine  100 mg Intramuscular DAILY     vancomycin intermittent dosing   Other Med Consult or Protocol         Exam/Data:   Vitals  /51  Pulse 86  Temp 98.8  F (37.1  C) (Axillary)   Resp (!) 33  Ht 5' 8\" (1.727 m)  Wt 212 lb 4.9 oz (96.3 kg)  SpO2 96%  BMI 32.28 kg/m2     I/O last 3 completed shifts:  In: 5983.6 [I.V.:3886.6; Other:31; NG/GT:80; IV Piggyback:1986]  Out: 2445 [Urine:145; Emesis/NG output:300; Other:2000]  Weight change: 5 lb 14.4 oz (2.677 kg)  Wt Readings from Last 3 Encounters:   07/26/18 212 lb 4.9 oz (96.3 kg)   03/19/18 180 lb (81.6 kg)     Vent Mode: VCV  FiO2 (%):  [35 %-80 %] 70 %  S RR:  [16-20] 16  S VT:  [550 mL] 550 mL  PEEP/CPAP (cm H2O):  [5 cm H2O] 5 cm H2O  Minute Ventilation (L/min):  [10.8 L/min-16.9 L/min] 12.7 L/min  PIP:  [27 cm H2O-35 cm H2O] 27 cm H2O  VA SUP:  [8 cm H20] 8 cm H20  MAP (cm H2O):  [9-12] 9    EXAM:  GEN: patient Alert and oriented to person only. Follows commands. Confabulates  HEENT: PERRL, EOMS, OP patent, trachea midline  PULM: unlabored respirations, moving air adequately  CV: RRR, S1, S2, no murmurs, rubs, gallops, bilateral symmetric pulse  ABD: soft nontender, non distended, normal active bowel sound, no HSM  EXT : warm well perfused, no cyanosis, clubbing, trace edema  NEURO: " grossly intact without focal deficit  SKIN: no obvious rash, lesion    DATA  All laboratory and radiology has been personally reviewed by myself today.    Results from last 7 days  Lab Units 07/26/18  1411  07/26/18  0358   LN-WHITE BLOOD CELL COUNT thou/uL  --   --  13.7*   LN-HEMOGLOBIN g/dL 7.0*  < > 7.1*   LN-HEMATOCRIT %  --   --  20.3*   LN-PLATELET COUNT thou/uL  --   --  122*   < > = values in this interval not displayed.    Results from last 7 days  Lab Units 07/26/18  1411 07/26/18  0805 07/26/18  0358  07/25/18  1629   LN-SODIUM mmol/L 133* 125*  125* 125*  < > 131*   LN-POTASSIUM mmol/L 4.0 4.4 4.0  < > 3.3*   LN-CHLORIDE mmol/L 100 91* 90*  < > 93*   LN-CO2 mmol/L 20* 20* 20*  < > 22   LN-BLOOD UREA NITROGEN mg/dL 12 24* 23*  < > 20   LN-CREATININE mg/dL 2.91* 5.09* 4.92*  < > 3.65*   LN-CALCIUM mg/dL 7.5* 8.0* 7.6*  < > 7.3*   LN-PROTEIN TOTAL g/dL 5.7*  --  6.0  --  6.4   LN-BILIRUBIN TOTAL mg/dL 2.7*  --  2.7*  --  2.7*   LN-ALKALINE PHOSPHATASE U/L 628*  --  654*  --  799*   LN-ALT (SGPT) U/L 44  --  44  --  56*   LN-AST (SGOT) U/L 218*  --  239*  --  273*   < > = values in this interval not displayed.          Patient is critically ill with total CCT spent 62 minutes thus far today.     Conor Farr MD  [facility]

## 2021-06-19 NOTE — PROGRESS NOTES
Renal progress note  CC: ARF  Assessment and Plan:  62 y.o. yo male    1. ARF: severe ATN and continues to require dialysis; monitoring for sxs of recovery; next HD planned for Monday  2. Chronic liver disease (probable) vs acute hepatitis secondary to ETOH abuse; unrecognized PTA; elevated bili and coagulopathy/hypotension and some enceph; on lactulose and rifaximin  3. HoTN; requiring midodrine for HD and also scheduled--still requiring to keep bp steady  4. CM: likely stress induced CM as he has recovered quite a bit of his EF.    5. Sepsis: s/p treatment of klebsiella bacteremia; has ongoing elevated WBC of unclear cause; off abx for now   6. Anemia: acute on chronic--recent GI bleed earlier this summer; started some epo; s/p prbcs earlier in week; hgb stable  7. Volume; has ascites and some edema; UF wih HD as tolerates  8. Malnutrition: encouraging oral intake  9. Mild hyperphos; binder if phos over 5.5-6  10. Dispo; plan eventual transfer to [place] for strengthening and ongoing cares; will be on TT HD schedule there        Subjective  More confused; on 1:1   Out of ICU  Pt denies pain or nausea or SOB  Objective    Vital signs in last 24 hours  Temp:  [97.3  F (36.3  C)-98.7  F (37.1  C)] 97.8  F (36.6  C)  Heart Rate:  [77-82] 82  Resp:  [20-22] 20  BP: ()/(57-73) 114/73  Weight:   199 lb 4.8 oz (90.4 kg)    Intake/Output last 3 shifts  I/O last 3 completed shifts:  In: 905 [P.O.:800; I.V.:55; IV Piggyback:50]  Out: 300 [Urine:300]  Intake/Output this shift:       Physical Exam  Sleeping; wakes but not fully alert,NAD  Up in chair  CV: RRR without murmur or rub  Lung: clear and equal; no extra sounds  Ab: soft and NT; not distended; normal bs  Ext: ++ edema and well perfused  Skin; no rash    Pertinent Labs   Lab Results   Component Value Date    WBC 16.2 (H) 08/12/2018    HGB 8.3 (L) 08/12/2018    HCT 25.5 (L) 08/12/2018     08/12/2018     08/12/2018     Lab Results   Component Value  Date    CREATININE 4.12 (H) 08/12/2018    BUN 20 08/12/2018     (L) 08/12/2018    K 3.8 08/12/2018    CL 91 (L) 08/12/2018    CO2 21 (L) 08/12/2018       Lab Results   Component Value Date    ALBUMIN 2.4 (L) 08/12/2018     Lab Results   Component Value Date    CALCIUM 8.6 08/12/2018    PHOS 5.3 (H) 08/06/2018     I reviewed all lab results  Lillie Moscoso

## 2021-06-19 NOTE — PROCEDURES
Aayush IKMANI Tracibartolopaulina 62 y.o. male Dialysis treatment started at 1750  and ended at 2050  ; ran for 3 hours on a K bath of 4 .  Total removed 1.5 kg.  Meds given heparin. Access right CVC. Heparin 2000 units given total . Tolerated well, no complaints.  Complications.none   Incapacitated Dialysis Nurse Procedure reviewed with Jaycob Lozoya, TOÑA  Water alarm functional and in place entire treatment.  Hepatitis B status. negative Date 7/25/18.  Patient Consent Verified yes.

## 2021-06-19 NOTE — PROGRESS NOTES
"  Clinical Nutrition Therapy Follow Up Note    Current Nutrition Prescription:   Diet:  FL for breakfast, Renal for lunch and supper; will be NPO at midnight for tunneled dialysis catheter placement.    Current Nutrition Intake:  Pt ate 100% of a full liquid dinner last night, 60% of a renal lunch.  Is normally a good eater and \"eats everything\".    Anthropometrics:  Height: 5' 8\" (172.7 cm)  Admission weight: 193 lb 7/25  Weight: 212 lb 8 oz (96.4 kg) ; is up 11.2 L per I/Os     Physical Findings:  The patient has the following physical signs which could indicate malnutrition: edema which may mask weight loss    GI Status/Output:   The patient's GI symptoms include: diarrhea resolved and [facility] was removed    Bowel Sounds present per nursing    Skin/Wound:  Edwin score Edwin Scale Score: 14    Medications:  Medications reviewed.   thiamine, mvi, folic acid    Labs:  K 3.7, Mg 2.1, phos 2.9, Creat 4.67, bili 5.9     Malnutrition: Not noted    Nutrition Risk Level: moderate risk    Nutrition dx:  Impaired swallowing related to vent evidenced by NPO - old-resolved; passed swallow eval  Impaired swallowing due to current mental status and sedation evidenced by NPO-resolved; passed swallow eval    New- nutrition-related altered lab values d/t MICHAEL and septic shock as evidenced by labs    Goal:  Meet estimated nutrition needs    Intervention:  Renal diet, selective menu.       Monitoring:  Weight, po intake, labs, need for renal diet education if pt likely to continue on dialysis    See Care Plan for Problems, Goals, and Interventions.  "

## 2021-06-19 NOTE — PROCEDURES
Aayush García 62 y.o. male Dialysis treatment started at 1301 and ended at 1629; ran for 3 hours on a K bath of 4.  Total removed 2.0 kg.  Meds given none. Access Non-tunneled IJ. Heparin No heparin during dialysis, post tx catheter lumens filled with 1.4 mL heparin lock each. Complications.Patient has a poorly functioning non-tunneled catheter. Due to repeated arterial pressure alarms treatment extended by 30 minutes. Unable to control arterial pressure even when lines reversed. Pt had hypotension. Patient was restless and pulling at all lines and nasal cannula throughout run.   Incapacitated Dialysis Nurse Procedure reviewed with RAY Kaminski RN  Water alarm functional and in place entire treatment.  Hepatitis B status. Negative  Date 7/25/18.  Patient Consent Verified yes.

## 2021-06-19 NOTE — PROGRESS NOTES
Faculty Supervision of Residents    I have examined this patient on 8/8/2018 and the medical care has been evaluated and discussed with the resident.  See resident note to follow outlining our discussion.      Víctor Styles

## 2021-06-19 NOTE — PROGRESS NOTES
Progress Note    Assessment/Plan  Principal Problem:    Septic shock (H)  Active Problems:    Acute respiratory failure with hypoxia (H)    Acute renal failure, unspecified acute renal failure type (H)    Metabolic acidosis    Encephalopathy, metabolic    Elevated troponin    Other cardiomyopathy (H)    Bacteremia due to Klebsiella pneumoniae    Anemia, unspecified type    Alcohol abuse    Aayush García is a 62 year old male with a history of alcohol abuse, HTN and anemia (thought to be due to GI bleed in clinic) who was admitted with septic shock and anuria with a positive blood culture for Klebsiella.  Required intubation and pressor support in ICU, currently extubated without pressor support, but still sedated for alcohol withdrawal symptoms.  - Extubated, remains on sedation for alcohol withdrawal symptoms  - Blood cultures with Klebsiella - on ceftriaxone  - LFTs, INR slowly improving  - Renal function slowly improving, still requiring dialysis with low UOP  - Hgb trending down. Mild abdominal distention, unlikely perforated bowel with overall clinical improvement, possibly related to fluid resuscitation. Discussed with Dr. Khan, hold off on further abdominal imaging today and continue to monitor today.     NEURO:   AMS  Alcohol withdrawal  Likely related to underlying sepsis.  Showing signs of alcohol withdrawal so on precedex drip for management.   - On thiamine and folate  - Withdrawal management per ICU    RESPIRATORY:   Respiratory fatigue - improving  Successfully extubated to HFNC, requiring 80% O2 at 35L/min.    - Respiratory management per ICU    CV:   Elevated troponin and low EF  Most likely due to stress cardiomyopathy and demand ischemia from acute illness.  However, EKG in early June at [place] clinic did show ST segment changes concerning for ischemia so he may have component of underlying cardiac disease, possibly alcoholic cardiomyopathy.  Cards is following and will repeat echo in  3-4 months.  He will need a stress test eventually to evaluate for underlying disease.  - Cardiology is following - appreciate recs  - Continue supportive measures as needed  - Telemetry monitoring    Sinus Arrhyhtmia: New with this admission.  Had previously been sinus tachycardic and now with arrhythmia.  Possibly related to overall disease process versus cardiac injury from shock.  Continue on telemetry. Cardiology following as above.    RENAL:  Lactic acidosis - resolved  MICHAEL - anuric renal failure  Most likely severe ATN due to sepsis. May be component of volume depletion as well.  Abdominal US did not show any changes to kidneys.  Made ~ 100mL urine on 7/26.  Will have dialysis again today for volume control and electrolyte management.  -Nephrology is following - appreciate recs  - Fluid management per ICU  - Continue ceftriaxone and vancomycin    Volume overload  Volume up on exam with b/l LE and UE edema and with dilutional changes to labs. Still producing minimal urine and getting large volumes of IVF for sepsis management.  Plan for HD today for volume management and minimize fluids as possible.  - HD today  - Limit IVF as possible     GI:  Hepatic injury  Patient with elevated AST/ALT, bilirubin, alk phos, CK and INR with hypoalbuminemia.  Overall lab values are improving.  Decreased synthetic function of liver most likely due to decreased perfusion. US showed fatty infiltration of liver which may be contributing but no concerning biliary tree disease.  Less likely alcoholic hepatitis although alcoholic liver injury likely contributing given AST/ALT ratio.  Peripheral smear relatively normal which is reassuring for DIC  - Continue fluid management per ICU  - Continue to follow liver labs    FEN:  Hyponatremia  Hyperkalemia - improved with dialysis  Hypochloremia - improved  Hypocalcemia  Most likely due to septic shock and renal failure.  Consider adrenal insufficiency with hyponatremia and hyperkalemia.  Continue to treat with fluid and antibiotics  - Fluid management  - Frequent BMP monitoring    NPO status while sedated  Pulled out tube for tube feeds with ET tube, if able to lighten sedation soon can have swallow study, otherwise may need NG tube for feeds    ID:   Sepsis  Admitted with elevated WBC, elevated lactic acid, hypotension and tachycardia. Blood cultures growing klebsiella.  UA shows leukocytes, blood and few bacteria but may be unreliable as taken after period of anuria.  Most likely urinary source.  Possibly GI source, did have ulcer 1-2 months ago.  Unlikely pna given CXR without infiltrates.  - Narrowed to cefepime --> ceftriaxone starting tonight    HEME/ONC:   Macrocytic anemia  Hb currently 6.8, decrease likely due to dilution as he is very volume up. Per chart review, Hb of 13.0 in 2/2018 with steady decline until present. Consistently macrocytic.  Likely nutritional component due to alcohol use but continuous decline is concerning for underlying cause. Has been seen in clinic for melena thought due to PUD 2/2 to NSAID use. Had resolved per last clinic visit with decreased NSAID use. Also consider hemolysis with elevated bilirubin and INR. Peripheral smear showed inflammatory changes only.  LDH and haptoglobin only mildly elevated  - Will need further evaluation as OP after acute event    ENDOCRINE:   Concern for adrenal insufficiency  Acute illness and combination of hyperkalemia and hyponatremia is concerning for adrenal insufficiency. Plan to cover with steroids for now and consider further work up as clinical course progresses  - Continue hydrocortisone     Hyperglycemia  Elevated glucose, likely due to hydrocortisone  - sliding scale insulin     PSYCH:   History of alcohol use  350ml peppermint schnapps daily for years. Per wife, never gone into withdrawal though never been more than 2 days without a drink that she knows of.  Currently with signs of withdrawal and on precedex drip  -  Withdrawal treatment per ICU      DVT ppx: SCDs  Diet: NPO, plan for swallow study when not sedated  Code: full code      Dispo: TBD  Barriers to discharge: sedation, IV antibiotics      Gaviota Deya, MS4    I was present with the medical student who participated in the service and in the documentation of the note. I have verified the history and personally performed the physical exam and medical decision making. I agree with the assessment and plan of care as documented in the note.    Benjamin Rosenstein, MD, MA   [facility]Y2  P: [phone]    Precepted patient with Dr. Cali Styles      Subjective  Patient was successfully extubate to [facility] yesterday.  Developed alcohol withdrawal symptoms overnight and sedated with precedex.  Appears to be resting comfortably.  Discussed progress with and answered questions of significant other, brother and sister-in-law at bedside.    Objective    Vital signs in last 24 hours Temp:  [97.4  F (36.3  C)-99.2  F (37.3  C)] 99.2  F (37.3  C)  Heart Rate:  [81-93] 92  Resp:  [2-39] 22  BP: (115-126)/(57-68) 115/59  Arterial Line BP: ()/(52-66) 115/59  FiO2 (%):  [50 %-80 %] 75 %   Weight: 210 lb 1.6 oz (95.3 kg)    Intake/Output last 3 shift I/O last 3 completed shifts:  In: 2906.3 [I.V.:2145.3; Other:31; NG/GT:80; IV Piggyback:650]  Out: 2093 [Urine:93; Other:2000]    Intake/Output this shift:       Physical Exam    GENERAL: appears to be resting comfortably with high flow NC in place, sedated, but attempts to open eyes to vocal and tactile stimulation  CV: irregular rhythm, no murmurs  RESP: lungs with coarse rhonci and crackles bilaterally  ABDOMEN: soft, distended, some grimacing and movement with palpation particularly in upper right quadrant  EXTREMITIES: pitting edema of upper and lower extremities, fairly symmetric bilaterally, distal pulses 2+  NEURO: sedated, attempts to open eyes to command, squeezes hand to command       Pertinent Labs and  Pertinent Radiology   Lab Results: personally reviewed.   Results for orders placed or performed during the hospital encounter of 07/24/18   Comprehensive Metabolic Panel   Result Value Ref Range    Sodium 132 (L) 136 - 145 mmol/L    Potassium 4.6 3.5 - 5.0 mmol/L    Chloride 99 98 - 107 mmol/L    CO2 21 (L) 22 - 31 mmol/L    Anion Gap, Calculation 12 5 - 18 mmol/L    Glucose 124 70 - 125 mg/dL    BUN 21 8 - 22 mg/dL    Creatinine 4.01 (H) 0.70 - 1.30 mg/dL    GFR MDRD Af Amer 19 (L) >60 mL/min/1.73m2    GFR MDRD Non Af Amer 15 (L) >60 mL/min/1.73m2    Bilirubin, Total 2.9 (H) 0.0 - 1.0 mg/dL    Calcium 7.8 (L) 8.5 - 10.5 mg/dL    Protein, Total 6.0 6.0 - 8.0 g/dL    Albumin 2.9 (L) 3.5 - 5.0 g/dL    Alkaline Phosphatase 594 (H) 45 - 120 U/L     (H) 0 - 40 U/L    ALT 45 0 - 45 U/L     Lab Results   Component Value Date    CKTOTAL 1612 (HH) 07/27/2018    CKMB 24 (HH) 07/25/2018    TROPONINI 7.61 (HH) 07/26/2018     Lab Results   Component Value Date    WBC 15.1 (H) 07/27/2018    HGB 6.8 (LL) 07/27/2018    HCT 20.1 (L) 07/27/2018     (H) 07/27/2018     (L) 07/27/2018     Lab Results   Component Value Date    INR 1.65 (H) 07/27/2018    INR 1.84 (H) 07/26/2018    INR 1.69 (H) 07/26/2018     Lab Results   Component Value Date    CALCIUM 7.8 (L) 07/27/2018    PHOS 3.5 07/27/2018       EKG Results: personally reviewed.   Prolonged QT, sinus arrythmia

## 2021-06-19 NOTE — PROGRESS NOTES
Renal   Remains anuric, on levophed 10 and vaso.   Vent sedated.   Ongoing acidosis.  rec dialysis to control acidosis/k and azotemia and prevent further volume overload  Keep Na at 130 to avoid rapid Na correction.   3 hour run.   D/w SO/ wife and obtained consent, risks and benefits discussed.   Line RIJ ready to use.   Brie Oliver MD

## 2021-06-19 NOTE — PROGRESS NOTES
"Pharmacy Consult: Vancomycin Dosing    Pharmacist consulted to dose vancomycin for Aayush García, a 62 y.o. male.    Ordering provider: Astrid Redd CNP    Indication for vancomycin therapy: Septic Shock    Goal Trough Range:  15-20 mcg/mL based on indication    Other current antimicrobials             cefepime 500 mg in dextrose 5% 50 mL (MAXIPIME)  Every 24 hours             Subjective/Objective:    Patient was admitted for Septic shock (H) on 7/24/2018    Height: 5' 8\" (1.727 m)    Actual Body Weight (ABW): 87.5 kg (193 lb)    Ideal body weight: 68.4 kg (150 lb 12.7 oz)  Adjusted ideal body weight: 76.1 kg (167 lb 10.8 oz)    BMI: Body mass index is 29.35 kg/(m^2).    No Known Allergies    Patient Active Problem List   Diagnosis     Septic shock (H)    Past Medical History:   Diagnosis Date     Hypertension         Temp Readings from Current Encounter:     07/24/18 2055 07/25/18 0006   Temp: 98.2  F (36.8  C) 98.3  F (36.8  C)     Net Intake/Output (last 24 hours):  I/O last 3 completed shifts:  In: 2000 [I.V.:2000]  Out: -     Recent Labs      07/24/18   2100  07/24/18   2116  07/25/18   0010  07/25/18   0036   WBC  20.2*   --    --    --    NEUTROABS  16.7*   --    --    --    LACTICACID   --   7.1*  8.0*   --    PROCAL   --   60.23*   --    --    BUN  60*   --    --   53*   CREATININE  10.49*   --    --   9.67*     Estimated Creatinine Clearance: 8.6 mL/min (by C-G formula based on Cr of 9.67).    No results for input(s): CULTURE in the last 72 hours.    No results found for any visits on 07/24/18.    No results for input(s): VANCOMYCIN in the last 168 hours.    Vancomycin administrations: (last 120 hours)     Date/Time Action Medication Dose Rate    07/24/18 5360 New Bag    vancomycin 2 g in sodium chloride 0.9% 500 mL (VANCOCIN) 2 g 180 mL/hr          Assessment/Plan:    Pharmacist consulted to dose vancomycin for septic shock, goal trough range 15-20 mcg/mL.  1. Vancomycin 2 g given in ED.  "   2. Vancomycin intermittent dosing order placed.  Pharmacy will re-evaluate kidney function tomorrow to determine further dosing/vancomycin levels.   3. Pharmacist will continue to follow.    Thank you for the consult.  Daphne Garcia, PharmD 7/25/2018 2:12 AM

## 2021-06-19 NOTE — PROGRESS NOTES
Faculty Supervision of Residents    I have examined this patient on 8/15/2018 and the medical care has been evaluated and discussed with the resident.  See resident note to follow outlining our discussion.      Víctor Styles

## 2021-06-19 NOTE — LETTER
Letter by Madeline Avila CNP at      Author: Madeline Avila CNP Service: -- Author Type: --    Filed:  Encounter Date: 10/10/2019 Status: Signed         October 10, 2019     Patient: Aayush García   YOB: 1955   Date of Visit: 10/10/2019       To Whom It May Concern:    It is my medical opinion that Aayush García should be excused from work today 10/10/2019 due to health issues.    If you have any questions or concerns, please don't hesitate to call.    Sincerely,        Electronically signed by Madeline Avila CNP

## 2021-06-19 NOTE — PROGRESS NOTES
GI Progress Note  Aayush García  [id number]    Subjective:   Family and wife visiting.  Continues to more alert and interactive every day.  He holds hands up for asterixis exam shortly after I enter the room -- he remembers that this is part of our exam.  [place] transfer is pending.       Objective:     Vitals:    08/10/18 1100   BP: 145/72   Pulse: 86   Resp: 24   Temp:    SpO2: 99%     Body mass index is 30.87 kg/(m^2).   Gen: No acute distress  Cardio: RRR  GI: Non-distended, BS positive, soft, non-tender. No guarding.    Laboratory    Results from last 7 days  Lab Units 08/10/18  0358 08/08/18  0438 08/07/18  0408 08/06/18  0440   LN-WHITE BLOOD CELL COUNT thou/uL 15.6*  --  18.1* 17.9*   LN-HEMOGLOBIN g/dL 8.1* 8.3* 7.8* 6.9*   LN-HEMATOCRIT % 24.7*  --  23.4* 21.3*   LN-PLATELET COUNT thou/uL 220  --  245 270          Results from last 7 days  Lab Units 08/10/18  0358   LN-SODIUM mmol/L 129*   LN-POTASSIUM mmol/L 3.4*   LN-CHLORIDE mmol/L 95*   LN-CO2 mmol/L 22   LN-BLOOD UREA NITROGEN mg/dL 22   LN-CREATININE mg/dL 4.80*   LN-CALCIUM mg/dL 8.3*       Results from last 7 days  Lab Units 08/10/18  0358 08/09/18  0435 08/08/18  0438   LN-BILIRUBIN TOTAL mg/dL 5.5* 6.8* 6.7*   LN-ALKALINE PHOSPHATASE U/L 325* 368* 394*   LN-AST (SGOT) U/L 83* 90* 100*   LN-ALT (SGPT) U/L 38 41 46*   ]    Results from last 7 days  Lab Units 08/10/18  0358 08/08/18  0438 08/05/18  0548   LN-INR  1.40* 1.37* 1.39*         [unfilled]    Xr Chest 1 View Portable    Result Date: 8/5/2018  XR CHEST 1 VIEW PORTABLE 8/5/2018 5:14 PM INDICATION: Hypotension COMPARISON: Chest x-ray 08/02/2018 FINDINGS: The life-support devices are in stable positions. Persistent low lung volumes with associated hypoventilatory changes. Increased right basilar opacities most likely reflect subsegmental atelectasis. Interval improved aeration at the left lung base. Persistent trace left-sided pleural effusion. Stable heart size and no overt vascular  congestion. No definite pneumothorax.    Xr Chest 1 View Portable    Result Date: 7/28/2018  XR CHEST 1 VIEW PORTABLE 7/28/2018 6:04 AM INDICATION: Dyspnea COMPARISON: 07/25/2018. FINDINGS: Interval removal of ETT and NG tube. Right IJ catheter high SVC level. Right PICC catheter right atrial level. No pneumothorax. Interval worsening with bilateral airspace opacities with upper lobe predominance greatest on the left. Small left effusion. Previous left lower rib fractures. Monitor electrodes.    Xr Chest 1 View Portable    Result Date: 7/25/2018  XR CHEST 1 VIEW PORTABLE 7/25/2018 12:39 PM INDICATION: Dialysis line placement COMPARISON: 07/25/2018 FINDINGS: Right IJ central venous catheter tip over the right IJ vein-brachiocephalic junction or proximal SVC. Right PICC tip over the right atrium. Appropriately positioned endotracheal tube. Enteric tube courses into the stomach and off the field of view. No pneumothorax. Mildly hypoexpanded lungs. No focal consolidation. No definitive pulmonary edema or pleural effusion. Stable mildly prominent cardiomediastinal silhouette.     Xr Chest 1 View Portable    Result Date: 7/25/2018  XR CHEST 1 VIEW PORTABLE 7/25/2018 5:51 AM INDICATION: Confirm et tube position COMPARISON: 7/25/2018. FINDINGS: Heart is slightly enlarged. Tip of ETT located 3.2 cm above stacy. NG tube extends below left diaphragm. Right PICC catheter with tip right atrial level. No pneumothorax. Minimal amount of bilateral lower lung atelectasis. Monitor electrodes.    Xr Chest 1 View Portable    Result Date: 7/25/2018  XR CHEST 1 VIEW PORTABLE 7/25/2018 3:33 AM INDICATION: Sepsis. anuric. increasing tachypnea, hypoxia. COMPARISON: 7/24/2018. FINDINGS: Shallow inspiration. Stable heart size. Interval placement right PICC catheter with tip right atrial level. No pneumothorax. Minimal amount of bilateral lower lung atelectasis with lung findings accentuated due to level of inspiration. Old healed left rib  fractures. Monitor electrodes.    Xr Chest 1 View Portable    Result Date: 7/24/2018  XR CHEST 1 VIEW PORTABLE 7/24/2018 8:54 PM INDICATION: Sob COMPARISON: None. FINDINGS: Cardiomegaly. Pulmonary vascularity normal. The lungs are clear.    Xr Chest 2 Views    Result Date: 8/2/2018  XR CHEST 2 VIEWS 8/2/2018 5:20 PM INDICATION: Followup infilitrate COMPARISON: 7/28/2018 FINDINGS: Right PICC could be pulled back into the proximal SVC. It is also possible it is obscured by the right central line whose tip projects over the right atrium. Limited inspiratory volume. Stable heart size. Improved bilateral alveolar infiltrates most likely represents improved edema.    Us Abdomen Complete    Result Date: 7/25/2018  US ABDOMEN COMPLETE 7/25/2018 2:01 AM INDICATION: Anuric, septic, elevated alk phos, bili, lipase COMPARISON: None. FINDINGS: GALLBLADDER: Sludge in the gallbladder. Gallbladder wall thickness measures 3 mm. Negative Rogers sign. BILE DUCTS: Common bile duct not visualized. No evidence of ductal dilatation. LIVER: Measures 17.5 cm. Fatty infiltration. SPLEEN: Normal in size. RIGHT KIDNEY: Measures 10.1 x 5.1 x 5.3 cm. Normal. No hydronephrosis. LEFT KIDNEY: Measures 9.5 x 5.7 x 4.8 cm. Normal. No hydronephrosis. PANCREAS: Not visualized due to bowel gas. AORTA: Not visualized due to bowel gas. IVC: Poorly visualized.     CONCLUSION: 1.  Sludge in the gallbladder. 2.  No gallbladder wall thickening. 3.  No evidence of cholelithiasis. 4.  No evidence of ductal dilatation.    Ct Abdomen Pelvis Without Oral With Iv Contrast    Result Date: 8/3/2018  CT ABDOMEN PELVIS WO ORAL W IV CONTRAST 8/3/2018 1:37 PM     INDICATION: Infection, abdomen-pelvis leukocytosis, hypotension, evaluate for ascites and cirrhosis TECHNIQUE: CT abdomen and pelvis. Multiplanar reformation images (MPR). Dose reduction techniques were used. IV CONTRAST: Iohexol (Omni) 100 mL COMPARISON: Ultrasound abdomen 7/28/2018 and 7/25/2018 FINDINGS: LUNG  BASES: Small right-sided trace left-sided pleural effusion with adjacent consolidation likely reflecting compressive atelectasis. Extensive coronary artery calcifications. Mild cardiomegaly. ABDOMEN: Limited due to patient motion. Heterogeneous liver with no definite discrete measurable lesion. Prominent gallbladder without wall thickening or appreciable edema. No biliary ductal dilatation. Unremarkable spleen, pancreas and adrenals. Unremarkable kidneys without evidence of hydronephrosis. Unremarkable stomach. Normal caliber small bowel. Normal caliber appendix. The colon is tortuous but normal in caliber. Moderate body wall and mild mesenteric edema. Small amount of diffuse simple ascites. Left lower ventral abdominal wall air densities likely reflecting recent injection. Correlation is recommended. Similar findings in the right mid ventral abdominal wall. No free air. Patent central SMV and SMA. PELVIS: Urinary bladder decompressed by a Rahman catheter. Mild presacral edema. Small amount of simple free fluid. No adenopathy. MUSCULOSKELETAL: Age indeterminate but likely remote L1 compression deformity without significant osseous retropulsion or central canal stenosis.     CONCLUSION: 1. Small amount of diffuse simple ascites. Mesenteric and body wall edema are also noted. No loculated fluid collections in the abdomen or pelvis. 2. Small right-sided and trace left-sided pleural effusions with adjacent consolidation which may reflect atelectasis or pneumonic infiltrates. 3. Nonspecific heterogeneous liver. No discrete measurable liver lesion. Correlate with liver function tests.    Ir Tunneled Catheter Insert    Result Date: 8/1/2018  [facility] PROCEDURE: TUNNELED DIALYSIS CATHETER PLACEMENT 1.  Insertion of a tunneled central venous catheter. 2.  Ultrasound guidance for vascular access. A permanent image was stored. 3.  Fluoroscopic guidance for central venous access device placement. INTERVENTIONAL  RADIOLOGIST: Kenji Ortiz MD. INDICATION: 62-year-old male with renal insufficiency requiring hemodialysis. CONSENT: The risks, benefits and alternatives of tunneled dialysis catheter placement were discussed with the patient's guardian in detail. All questions were answered. Informed consent was given to proceed with the procedure. MODERATE SEDATION: None. ANTIBIOTICS: The patient is receiving antibiotic therapy. No additional antibiotic was administered for the procedure. ADDITIONAL MEDICATIONS: None. FLUOROSCOPIC TIME: 1.1 minutes. AIR KERMA:  6 mGy. CONTRAST: None. COMPLICATIONS: No immediate complications. STERILE TECHNIQUE: The procedure was performed using maximum sterile barrier technique. The interventionalist and assistants performed appropriate hand hygiene and wore a hat, mask, sterile gown, and sterile gloves during the entire procedure. PROCEDURE:  Using local anesthesia and real-time ultrasound guidance the right internal jugular vein was accessed. A subcutaneous tunnel was created requiring a second incision. Using this access, a 23 cm tip to cuff Duraflow dialysis catheter was advanced until the  tip was in the proximal right atrium.  The catheter was tested and found to flush and aspirate appropriately. FINDINGS: Ultrasound shows an anechoic and compressible jugular vein.  At the completion of the study, the tunneled dialysis catheter tip lies in the proximal right atrium.     1.  Successful tunneled dialysis catheter placement. 2.  The catheter is ready for use.     Us Abdomen Limited    Result Date: 7/28/2018  US ABDOMEN LIMITED 7/28/2018 4:25 PM INDICATION: Elevated alk phos, bilirubin COMPARISON: Abdominal ultrasound 07/25/2018 FINDINGS: Technically challenging exam due to the patient's body habitus and bowel gas. GALLBLADDER: Small amount sludge in the gallbladder. No wall thickening. BILE DUCTS: No intrahepatic bile duct dilation. The common bile duct was not visualized due to bowel gas..  LIVER: Hepatomegaly measuring 20.1 cm. Diffusely echogenic. Portions difficult to penetrate. RIGHT KIDNEY: No hydronephrosis. PANCREAS: Not well seen due to bowel gas. Small amount of ascites in the right upper quadrant.     CONCLUSION: 1.  Technically challenging exam. 2.  Gallbladder sludge. No findings for cholecystitis. 3.  Enlarged and fatty liver.    Us Paracentesis    Result Date: 8/3/2018  1. PARACENTESIS 2. ULTRASOUND GUIDANCE 8/3/2018 9:43 PM INDICATION: Ascites. PROCEDURE: Informed consent obtained. Time out performed. The abdomen was prepped and draped in a sterile fashion. 10 mL of 1 percent lidocaine was infused into local soft tissues. A 5 [employer] Dish.fm catheter system was introduced into the abdominal ascites  under ultrasound guidance. 1.95 liters of clear yellow fluid was removed and sent to lab if requested. Estimated blood loss is minimal. No immediate complication. The patient tolerated the procedure well. RADIOLOGIC SUPERVISION AND INTERPRETATION: ULTRASOUND GUIDANCE: Images demonstrate ascites.     CONCLUSION: 1.  Status post ultrasound-guided paracentesis.       Assessment:   1. Elevated LFT's - likely related to sepsis and EtOH hepatitis/EtOH liver disease.  US last month showed fatty infiltration - suspect early cirrhosis based on his course here.   MELD-unos 29.9 today  MELD-Na-32.7 today  Bili looks to have hit plateau.    US showing small amount of sludge but normal duct size and no concerns for choledocholithiasis.     Paracentesis 8/3/18 - 1.95L removed - fluid not sent for cell count/gram stain - can not exclude SBP, but has been on Zosyn.  2. Sepsis secondary to Klebsiella pneumonia.    3. MICHAEL - ATN.  Nephrology following.  4. Anemia - macrocytic.  Brown stool.  5. Encephalopathy - Likely multifactorial - withdrawal, sepsis, meds, delirium, possibly hepatic encephalopathy.     On rifaximin; lactulose held due to frequent stools; no asterixis on exam.  6. Diarrhea - holding lactulose  "improved diarrhea but still with loose stools.  Check C difficile.      Patient Active Problem List   Diagnosis     Septic shock (H)     Acute respiratory failure with hypoxia (H)     Acute renal failure, unspecified acute renal failure type (H)     Metabolic acidosis     Encephalopathy, metabolic     Elevated troponin     Stress-induced cardiomyopathy     Bacteremia due to Klebsiella pneumoniae     Anemia, unspecified type     Alcohol abuse     Acute encephalopathy     Acute liver failure     Cognitive and behavioral changes     Sleep difficulties     Hallucinations      Plan:   1. Low-sodium diet (2gm)  2. Rifaximin 550 three times a day  3. Check C difficile.  4. Trend LFT\"s, INR.  5. Will have our office contact him for outpatient hepatology follow-up in clinic.     Nothing further from GI perspective.  We will sign off though please contact if there is something we can help with.   Thank you.  Wilmer Hernández PA-C  [facility]  [phone]  "

## 2021-06-19 NOTE — PROCEDURES
CENTRAL LINE INSERTION PROCEDURE NOTE  (NON-OR)    Procedure Date: 7/25/2018   Performing Physician: Lewis Farr    Procedure:  Insertion of Central Venous Catheter  Right   INTERNAL  JUGULAR    Indications:  anuric acute renal failure       Estimated Blood Loss:  MINIMAL  ml  Complications: NONE     Findings:  All ports chanell and flushed    Procedure Details:   Procedure was done as an emergency : no  The risks, benefits, complications, treatment options, and expected outcomes were discussed with WIFE .  The risks and potential complications of their problem and purposed procedure include but are not limited to infection, bleeding, pain,  lung puncture, the need for additional procedures, creating a complication requiring transfusion or operation, and nerve and vessel injury.  The patient/alternate (see above) concurred with the proposed plan, giving informed consent.  The site of the procedure was properly noted/marked. The patient was identified as Aayush García with Date of Birth 1955 and the procedure verified as Insertion of Central Venous Catheter.  A Time Out was held and the above information confirmed.    Under sterile conditions the skin over the  Right   INTERNAL  JUGULAR  was prepped with Chlorhexidine and covered with a sterile drape.  Strict sterile conditions were maintained,  Cap, mask, and sterile gloves were worn by all participants.  5 ml of Lidocaine 1% local anesthetic was infiltrated into the skin and subcutaneous tissues. Under realtime ultrasound guidance using Sledinger technique an 18-gauge needle was then inserted into the vein.  A guide wire was then passed easily through the catheter.  There were brief <6 beat runs of ectopy.  The catheter was then withdrawn.  A 13 cm dual lumen hemodialysis   was then inserted into the vessel over the guide wire.  All ports were flushed with sterile solution and had good non-pulsatile blood return.  The catheter was sutured into  place and an occlusive sterile dressing applied.  The patient tolerated the procedure well with no change in vital signs.     Number of attempts:  1     A chest x-ray was ordered.      Condition: stable      ________________________________________________________________________  Lewis Farr  7/25/201812:25 PM

## 2021-06-19 NOTE — PROGRESS NOTES
Renal   Present during initiation of HD. SO updated.   EF 25% global and regional dysfxn.   RV normal  Plan 3 hour run, keep fluid even.   K3 bicarb 35 and Na 130 and f/u lytes after run.   D/c bicarb drip.    Brie Oliver

## 2021-06-19 NOTE — PROGRESS NOTES
Aayushelroy García 62 y.o. male Dialysis treatment started at 0724 and ended at 1054; ran for 3.5hours on a K bath of 3.  Total removed -142 ml.  Meds given Midodrine (by Rima Meier RN) & Albumin (by this writer). Access R CVC. Heparin 2.5ml post tx venous lumen; 2.4ml post tx arterial lumen. Complications.Arterial lumen draw sluggish before tx - lines reversed.  Pt hypotensive during tx.  Pt had one hypotensive episode where pt c/o not feeling well; otherwise no complaints during all other hypotensive episodes.  Incapacitated Dialysis Nurse Procedure reviewed with Rima Meier RN  Water alarm functional and in place entire treatment.  Hepatitis B status. Negative  Date 7/25/18.  Patient Consent Verified yes.  Dr. Jaimes was notified re: pt status and lines reversed.  Dr. Jaimes was in to see pt during patient's dialysis run today.

## 2021-06-19 NOTE — PROGRESS NOTES
Faculty Supervision of Residents    I have examined Aayush García,  1955, on 2018 and the medical care has been evaluated and discussed with the resident.   I agree with the medical care provided, confirm the findings after personally reviewing the images and labs, and agree with the plan documented in the note by Dr. Benjamin Rosenstein.    Dr. Chery Bell

## 2021-06-19 NOTE — PROGRESS NOTES
Cardiology Progress Note    Assessment:  1.  Elevated troponin with reduced left ventricular function.  Findings consistent with non-ST segment elevation myocardial infarction.  Patient with multiple medical issues possible demand ischemia.  EKG yesterday does demonstrate increase in QT.  There is anterior T-wave changes when compared to the prior EKG.  Patient is on aspirin.  He is not currently on beta-blockers.    2.  Cardiomyopathy.  Reduced ejection fraction with distal anterior anteroseptal and apical hypokinesis.  Need to be concerned about possible ischemic heart disease although features would also be consistent with stress-induced cardiomyopathy.  There are anterior T-wave changes noted on the EKG obtained yesterday.    3.  Acute kidney injury.  Felt to be hemodynamic.  Patient on hemodialysis.  He has been off pressors for 2 days.    4.  Sepsis with gram-negative rods in 1 blood culture.    5.  Shock liver.  AST remains elevated and higher today.  Principal Problem:    Septic shock (H)  Active Problems:    Acute respiratory failure with hypoxia (H)    Acute renal failure, unspecified acute renal failure type (H)    Metabolic acidosis    Encephalopathy, metabolic    Elevated troponin    Other cardiomyopathy (H)    Bacteremia due to Klebsiella pneumoniae    Anemia, unspecified type    Alcohol abuse      Plan:  1.  Resume rosuvastatin if okay with other rounders  2.  Repeat EKG today.  Avoid edicines that will prolong QT.  3.  Might be reasonable to repeat echocardiogram in the near future to reevaluate left ventricular function.  4.  Low-dose carvedilol when okay with family care in ICU team.    Subjective:   Aayush Olivopaulina is seen in follow-up today.  Chart record is reviewed.  First visit for this examiner.  Patient admitted with respiratory insufficiency.  Evaded troponin with reduced left ventricular function.  Possible demand ischemia.  Dr Goldsmith suggested supportive cares.    Patient is confused.   His wife is at the bedside.  She reports no prior history of coronary artery disease.  Is currently undergoing dialysis.    Objective:   Vital signs in last 24 hours:  Temp:  [98.1  F (36.7  C)-100.7  F (38.2  C)] 98.1  F (36.7  C)  Heart Rate:  [] 94  Resp:  [26-40] 28  BP: (101-142)/(48-85) 117/55  Arterial Line BP: (112-148)/(50-71) 123/56  FiO2 (%):  [45 %-60 %] 45 %  Weight:   211 lb 9.6 oz (96 kg)     Physical Exam: Confused.  Not able to participate much of the conversation.   Neck: JVD difficult to assess currently undergoing dialysis with a catheter in the right neck.   Lungs: Initially at the bases.  Clear to anterior auscultation.   COR: RRR, No murmur   Abd: Soft, distended, some grimacing with palpation of the right upper quadrant.   Extrem pitting edema.  Neuro: Opens eyes to command     Current Facility-Administered Medications   Medication Dose Route Frequency Provider Last Rate Last Dose     acetaminophen tablet 650 mg (TYLENOL)  650 mg Oral Q4H PRN Astrid Redd CNP        Or     acetaminophen solution 650 mg (TYLENOL)  650 mg Enteral Tube Q4H PRN Astrid Redd CNP        Or     acetaminophen suppository 650 mg (TYLENOL)  650 mg Rectal Q4H PRN Astrid Redd CNP         albumin human 25 % bottle 12.5-37.5 g  12.5-37.5 g Intravenous PRN Brie Oliver MD         albuterol nebulizer solution 2.5 mg (PROVENTIL)  2.5 mg Nebulization Q4H PRN Astrid Redd CNP         aspirin suppository 75 mg  75 mg Rectal DAILY April Goldsmith MD   75 mg at 07/27/18 2120     benzocaine-menthol lozenge 1 lozenge (CEPACOL)  1 lozenge Oral Q1H PRN Astrid Redd CNP         bisacodyl suppository 10 mg (DULCOLAX)  10 mg Rectal Daily PRN Astrid Redd CNP         cefTRIAXone 1 g in NaCl 0.9 % 50 mL (MINI-BAG Plus) (ROCEPHIN)  1 g Intravenous Q24H Ambrocio Montalvo  mL/hr at 07/27/18 2202 1 g at 07/27/18 2202     dexmedetomidine 400 mcg/100 mL in NS (PRECEDEX)  (4mcg/mL)  0.1-1.5 mcg/kg/hr Intravenous Continuous Astrid Redd CNP 8.8 mL/hr at 07/28/18 0800 0.4 mcg/kg/hr at 07/28/18 0800     dextrose 50 % (D50W) syringe 20-50 mL  20-50 mL Intravenous PRN Astrid Redd CNP         folic acid 1 mg, thiamine 100 mg, multiple vitamin 3,300 unit- 150 mcg/10 mL 10 mL in sodium chloride 0.9% 1,000 mL   Intravenous Daily PRN Astrid Redd CNP         folic acid tablet 1 mg (FOLVITE)  1 mg Enteral Tube DAILY Lewis Farr MD        Or     folic acid injection 1 mg  1 mg Intramuscular DAILY Lewis Farr MD   1 mg at 07/28/18 0935     furosemide injection 160 mg (LASIX)  160 mg Intravenous QAM Brie Oliver MD   160 mg at 07/28/18 0930     gelatin absorbable sponge 1 each (GELFOAM)  1 each Topical (Top) PRN for dialysis Brie Oliver MD         glucagon (human recombinant) injection 1 mg  1 mg Subcutaneous PRN Astrid Redd CNP         heparin injection 1,000-6,000 Units  1,000-6,000 Units Intracatheter PRN for dialysis Brie Oliver MD         insulin aspart U-100 injection (NovoLOG)   Subcutaneous Q4H FIXED TIMES Astrid Redd CNP   2 Units at 07/27/18 0403     ipratropium-albuterol 0.5-2.5 mg/3 mL nebulizer solution 3 mL (DUO-NEB)  3 mL Nebulization QID - RT Lewis Farr MD   3 mL at 07/28/18 1200     lidocaine (PF) 10 mg/mL (1 %) injection 1 mL (XYLOCAINE-MPF)  1 mL Subcutaneous PRN for dialysis Brie Oliver MD         magnesium hydroxide suspension 30 mL (MILK OF MAG)  30 mL Oral Daily PRN Astrid Redd CNP         multivitamin therapeutic tablet 1 tablet  1 tablet Enteral Tube DAILY Lewis Farr MD         naloxone injection 0.2-0.4 mg (NARCAN)  0.2-0.4 mg Intravenous PRN Astrid Redd CNP        Or     naloxone injection 0.2-0.4 mg (NARCAN)  0.2-0.4 mg Intramuscular PRN Astrid Redd CNP         nicotine 14 mg/24 hr 1 patch (NICODERM CQ)  1 patch Transdermal DAILY Manuel JIMENES  Rosenstein, MD   1 patch at 18 1221     ondansetron injection 4 mg (ZOFRAN)  4 mg Intravenous Q4H PRN Astrid Redd CNP        Or     ondansetron tablet 8 mg (ZOFRAN)  8 mg Oral Q8H PRN Astrid Redd CNP         pantoprazole 40 mg injection  40 mg Intravenous Q12H Bigg Medley DO         polyvinyl alcohol 1.4 % ophthalmic solution 1-2 drop (LIQUIFILM TEARS)  1-2 drop Both Eyes Q1H PRN Astrid Redd CNP   2 drop at 18 0556     senna-docusate 8.6-50 mg tablet 1 tablet (PERICOLACE)  1 tablet Oral BID Astrid Redd CNP   1 tablet at 18 0759    Or     sennosides syrup 8.8 mg (for SENOKOT)  8.8 mg Enteral Tube BID Astrid Redd CNP         sodium chloride 0.9% 100-500 mL  100-500 mL Intravenous PRN Brie Oliver MD         sodium chloride flush 10-20 mL (NS)  10-20 mL Intravenous PRN Kota Mahoney MD         sodium chloride flush 10-30 mL (NS)  10-30 mL Intravenous PRN Kota Mahoney MD         sodium chloride flush 10-30 mL (NS)  10-30 mL Intravenous Q8H FIXED TIMES Kota Mahoney MD   10 mL at 18 0509     sodium chloride flush 20 mL (NS)  20 mL Intravenous PRN Kota Mahoney MD         sodium chloride flush 3 mL (NS)  3 mL Intravenous Line Care Kota Mahoney MD   10 mL at 18 0922     sodium chloride flush 3 mL (NS)  3 mL Intravenous Line Care Astrid Redd CNP   10 mL at 18 0921     thiamine tablet 100 mg  100 mg Enteral Tube DAILY Lewis Farr MD        Or     thiamine injection 100 mg (vitamin B1)  100 mg Intramuscular DAILY Lewis Farr MD   100 mg at 18 0932       Cardiographics:    EC2018 08:36:00 HE JOES/JOHNS/AKBAR/SARMAD  Sinus rhythm with marked sinus arrhythmia Premature atrial complexes  ST & T wave abnormality, consider anterior ischemia  Prolonged QT  Abnormal ECG  When compared with ECG of 2018 09:42,  T wave inversion now evident in Anterior leads  Confirmed  by LEANDRO COLON MD LOC:[facility]I ([id number]) on 2018 2:46:40 PM  25mm/s 10mm/mV 100Hz 8.0 SP2 12SL 237 EFRAIN: 1  Referred by: Confirmed By: LEANDRO LOC:[facility] COLON MD  Echocardiogram:   Patient Information      Patient Name MRN Sex              Age     Aayush García [id number] Male 1955 (62 y.o.)       Indications      Acute myocardial infarction       Summary        No previous study for comparison.    Technically challenging examination. Definity contrast utilized    Left ventricle ejection fraction is severely decreased. Left ventricular ejection fraction estimated 25-30%.    Global left ventricular hypokinesis with large area of akinesis involving the apex, anterior apical and apical lateral portions of left ventricle.    Normal right ventricular size and systolic function.    Mild to moderate aortic stenosis. Mean gradient of 15 mmHg. The degree of aortic stenosis potentially underrepresented in the setting of severe reduction left ventricular systolic function.    Left atrial enlargement    Moderate enlargement of the aortic root.    Results called to the floor         Telemetry: Sinus rhythm, premature ventricular complexes, short runs of nonsustained ventricular tachycardia    Imaging:   Chest X-Ray:  Imaging    Date: 2018   Department: [facility]   Released By/Authorizing: Ambrocio Montalvo MD (auto-released)         Study Result      XR CHEST 1 VIEW PORTABLE  2018 6:04 AM     INDICATION: Dyspnea  COMPARISON: 2018.     FINDINGS: Interval removal of ETT and NG tube. Right IJ catheter high SVC level. Right PICC catheter right atrial level. No pneumothorax. Interval worsening with bilateral airspace opacities with upper lobe predominance greatest on the left. Small left   effusion. Previous left lower rib fractures. Monitor electrodes.       Lab Results:   Sodium   Date Value Ref Range Status   2018 136 136 - 145 mmol/L Final   2018 132 (L) 136 - 145  mmol/L Final   07/26/2018 133 (L) 136 - 145 mmol/L Final     Potassium   Date Value Ref Range Status   07/28/2018 3.6 3.5 - 5.0 mmol/L Final   07/27/2018 4.6 3.5 - 5.0 mmol/L Final   07/26/2018 4.0 3.5 - 5.0 mmol/L Final     Chloride   Date Value Ref Range Status   07/28/2018 98 98 - 107 mmol/L Final   07/27/2018 99 98 - 107 mmol/L Final   07/26/2018 100 98 - 107 mmol/L Final     CO2   Date Value Ref Range Status   07/28/2018 24 22 - 31 mmol/L Final   07/27/2018 21 (L) 22 - 31 mmol/L Final   07/26/2018 20 (L) 22 - 31 mmol/L Final     BUN   Date Value Ref Range Status   07/28/2018 25 (H) 8 - 22 mg/dL Final   07/27/2018 21 8 - 22 mg/dL Final   07/26/2018 12 8 - 22 mg/dL Final     Creatinine   Date Value Ref Range Status   07/28/2018 3.49 (H) 0.70 - 1.30 mg/dL Final   07/27/2018 4.01 (H) 0.70 - 1.30 mg/dL Final   07/26/2018 2.91 (H) 0.70 - 1.30 mg/dL Final     Calcium   Date Value Ref Range Status   07/28/2018 8.1 (L) 8.5 - 10.5 mg/dL Final   07/27/2018 7.8 (L) 8.5 - 10.5 mg/dL Final   07/26/2018 7.5 (L) 8.5 - 10.5 mg/dL Final     WBC   Date Value Ref Range Status   07/28/2018 18.2 (H) 4.0 - 11.0 thou/uL Final   07/27/2018 15.1 (H) 4.0 - 11.0 thou/uL Final   07/26/2018 13.7 (H) 4.0 - 11.0 thou/uL Final     Hemoglobin   Date Value Ref Range Status   07/28/2018 8.0 (L) 14.0 - 18.0 g/dL Final   07/28/2018 7.8 (L) 14.0 - 18.0 g/dL Final   07/27/2018 8.2 (L) 14.0 - 18.0 g/dL Final     Hematocrit   Date Value Ref Range Status   07/28/2018 22.9 (L) 40.0 - 54.0 % Final   07/27/2018 20.1 (L) 40.0 - 54.0 % Final   07/26/2018 20.3 (L) 40.0 - 54.0 % Final     MCV   Date Value Ref Range Status   07/28/2018 100 80 - 100 fL Final   07/27/2018 102 (H) 80 - 100 fL Final   07/26/2018 99 80 - 100 fL Final     Platelets   Date Value Ref Range Status   07/28/2018 154 140 - 440 thou/uL Final   07/27/2018 133 (L) 140 - 440 thou/uL Final   07/27/2018 127 (L) 140 - 440 thou/uL Final     CK, Total   Date Value Ref Range Status   07/27/2018 4048  (HH) 30 - 190 U/L Final   07/26/2018 762 (HH) 30 - 190 U/L Final   07/25/2018 649 (HH) 30 - 190 U/L Final     CK-MB   Date Value Ref Range Status   07/25/2018 24 (HH) 0 - 7 ng/mL Final     Troponin I   Date Value Ref Range Status   07/26/2018 7.61 (HH) 0.00 - 0.29 ng/mL Final   07/25/2018 8.47 (HH) 0.00 - 0.29 ng/mL Final   07/25/2018 5.17 (HH) 0.00 - 0.29 ng/mL Final     INR   Date Value Ref Range Status   07/28/2018 1.47 (H) 0.90 - 1.10 Final   07/27/2018 1.65 (H) 0.90 - 1.10 Final   07/26/2018 1.84 (H) 0.90 - 1.10 Final

## 2021-06-19 NOTE — PROGRESS NOTES
[facility] - Interventional Radiology    Tunneled dialysis catheter placement canceled this morning.  Now may be back on tomorrow pending renal recovery.      ASSESSMENT:  62 y.o. male currently remains with MICHAEL requiring dialysis.  Had been dialyzing via a temporary line but due to catheter dysfunction, the catheter was removed yesterday.  Tunneled dialysis catheter placement has been requested for tomorrow to continue dialysis.       PLAN:    NPO at midnight.    Hold SQ heparin after 2000 this evening (needs to be held X 12 hours pre procedure).    Tunneled dialysis catheter placement with sedation tomorrow 8/1/18 - timing will likely be in the afternoon due to current staffing/scheduling plans.     Hoda BHATTI, CNP  [facility]  [phone] (office)  [phone] (pager)

## 2021-06-19 NOTE — PROGRESS NOTES
"Pharmacy Consult: Vancomycin Dosing    Pharmacist consulted to dose vancomycin for Aayush García, a 62 y.o. male.    Ordering provider: Astrid Redd CNP    Indication for vancomycin therapy: Septic Shock    Goal Trough Range:  15-20 mcg/mL based on indication    Other current antimicrobials             cefepime 500 mg in dextrose 5% 50 mL (MAXIPIME)  Every 24 hours          metroNIDAZOLE IVPB 500 mg (FLAGYL)  Every 12 hours             Subjective/Objective:    Patient was admitted for Septic shock (H) on 7/24/2018    Height: 5' 8\" (1.727 m)    Actual Body Weight (ABW): 96.3 kg (212 lb 4.9 oz)    Ideal body weight: 68.4 kg (150 lb 12.7 oz)  Adjusted ideal body weight: 79.6 kg (175 lb 6.4 oz)    BMI: Body mass index is 32.28 kg/(m^2).    No Known Allergies    Patient Active Problem List   Diagnosis     Septic shock (H)     Acute respiratory failure with hypoxia (H)     Acute renal failure, unspecified acute renal failure type (H)     Metabolic acidosis     Encephalopathy, metabolic     Elevated troponin     Other cardiomyopathy (H)    Past Medical History:   Diagnosis Date     Hypertension         Temp Readings from Current Encounter:     07/26/18 0400 07/26/18 0600 07/26/18 0800   Temp: 98.6  F (37  C) 98.8  F (37.1  C) 98.5  F (36.9  C)     Net Intake/Output (last 24 hours):  I/O last 3 completed shifts:  In: 8546.6 [I.V.:5369.6; Other:1000; IV Piggyback:2177]  Out: 515 [Urine:115; Emesis/NG output:400]    Recent Labs      07/24/18   2100  07/24/18   2116  07/25/18   0010  07/25/18   0036  07/25/18   0428  07/25/18   0639  07/25/18   0813  07/25/18   0814  07/25/18   1139  07/25/18   1629  07/25/18   2202  07/26/18   0358  07/26/18   0805  07/26/18   1025   WBC  20.2*   --    --    --   21.4*  21.4*   --    --    --    --    --    --   13.7*   --    --    NEUTROABS  16.7*   --    --    --   18.4*   --    --    --    --    --    --   11.6*   --    --    LACTICACID   --   7.1*  8.0*   --    --   6.8*   --   6.9*  "  --   3.8*  3.7*  2.7*   --   2.0   PROCAL   --   60.23*   --    --    --    --    --    --    --    --    --    --    --    --    BUN  60*   --    --   53*  57*   --   59*  59*   --   55*  20  22  23*  24*   --    CREATININE  10.49*   --    --   9.67*  10.18*   --   9.44*  9.44*   --   8.76*  3.65*  4.35*  4.92*  5.09*   --      Estimated Creatinine Clearance: 17.2 mL/min (by C-G formula based on Cr of 5.09).    Recent Labs      07/25/18   1047   CULTURE  Usual Lesley       Results for orders placed or performed during the hospital encounter of 07/24/18   Culture/Gram Stain: Sputum    Collection Time: 07/25/18 10:47 AM   Result Value Status    Culture Usual Lesley Preliminary       Recent labs: (last 7 days)      07/26/18   0805   VANCOMYCIN  20.2       Vancomycin administrations: (last 120 hours)     Date/Time Action Medication Dose Rate    07/24/18 2152 New Bag    vancomycin 2 g in sodium chloride 0.9% 500 mL (VANCOCIN) 2 g 180 mL/hr          Assessment/Plan:    Pharmacist consulted to dose vancomycin for septic shock, goal trough range 15-20 mcg/mL.  1. Vancomycin 1000 mg IV once (10.4 mg/kg actual body weight).  2. Vancomycin trough level of 20 mcg/mL was within the goal trough range. This trough level was not drawn at steady state.  3. Pharmacist will plan to re-check a vancomycin trough level tomorrow or as appropriate.  4. Vancomycin intermittent dosing order placed. Pharmacy will re-evaluate kidney function and plans for dialysis tomorrow to determine further dosing/vancomycin levels.  5. Pharmacist will continue to follow.    Thank you for the consult.  Val Holman, Pharmacy Student 7/26/2018 11:37 AM

## 2021-06-19 NOTE — CONSULTS
Consultation - [facility] Pulmonary/Critical Care  Aayush García,  1955, MRN [id number]    Admitting Dx: Septic shock (H) [A41.9, R65.21]    PCP: Misbah Y Palla, MD, [phone]   Code status:  Full Code       Extended Emergency Contact Information  Primary Emergency Contact: Karlie Chisholm  Address: [address] [place] [address] of [place]  Home Phone: [phone]  Mobile Phone: [phone]  Relation: Significant Other       Assessment and Plan   1) Septic shock  2) ARF. Anuric  3) Metabolic acidosis  4) Acute toxic-metabolic encephalopathy  5) Hyponatremia  6) Elevated LFTs, bilirubin.   7) Alcohol abuse.      - Continue gentle IVF. Follow fluid status closely   - Pressors as needed.    - Broad spectrum antibiotics. Follow culture results.   - Follow markers of perfusion.    - Abdominal US   - Nephrology consult   - Watch for signs of withdrawal. Thiamine, folic acid.       DVT prophylaxis Start Heparin after diagnostic testing, SCDs  Patient assessment, plan of care discussed with Dr. Sanchez.  Plan of care discussed with patient. No family present at this time.  The patient is critically ill and remains at risk for further deterioration, organ failure and/or death.  Critical Care Time including chart review, patient assessment, coordination of care 55 minutes     Chief Complaint        HPI    We have been requested by Dr. Mahoney to evaluate Aayush García who is a 62 y.o. year old male for septic shock. No family present and patient is poor historian. HPI taken from chart review. Family called EMS for confusion and no uo for 3 days. Patient has tremor at baseline but has worsened. On arrival patient confused, hypotensive, tachypneic.   Currently he denies shortness of breath, chest pain or pressure, nausea, abdominal pain or cramping.        Medical History  There are no active non-hospital problems to display for this patient.    Past Medical History:   Diagnosis Date      Hypertension     Surgical History  He  has a past surgical history that includes Colonoscopy (N/A, 3/20/2018) and PICC (7/24/2018).   Social History  Reviewed, and he  reports that he has been smoking.  He has never used smokeless tobacco. He reports that he drinks alcohol. He reports that he does not use illicit drugs.   Allergies  No Known Allergies Family History  Reviewed, and family history is not on file.   Psychosocial Needs  Social History     Social History Narrative     Additional psychosocial needs reviewed per nursing assessment.     Prior to Admission Medications   Prescriptions Prior to Admission   Medication Sig Dispense Refill Last Dose     aspirin 81 MG EC tablet Take 81 mg by mouth daily.   7/24/2018 at Unknown time     lisinopril (PRINIVIL,ZESTRIL) 10 MG tablet Take 10 mg by mouth daily.   7/24/2018 at Unknown time     multivitamin therapeutic tablet Take 1 tablet by mouth daily.   7/23/2018 at Unknown time     omeprazole (PRILOSEC) 40 MG capsule Take 40 mg by mouth daily before breakfast.  1 7/24/2018 at am     rosuvastatin (CRESTOR) 10 MG tablet Take 10 mg by mouth daily.  3 7/24/2018 at Unknown time              Review of Systems:  Pertinent items are noted in HPI.   Limited to delirium Physical Exam:  Temp:  [98.2  F (36.8  C)-98.3  F (36.8  C)] 98.3  F (36.8  C)  Heart Rate:  [107-121] 115  Resp:  [26-48] 40  BP: ()/(47-96) 89/50    General appearance: Awake. Fidgety. Tremorous. Cooperative. No acute distress  Eyes: Conjunctiva erythematous. Sclera anicteric. No exudate. JOHN.  Throat: Mucus membranes dry.   Neck: no adenopathy and supple, symmetrical, trachea midline  Back: no tenderness to percussion or palpation, symmetric, no curvature. ROM normal. No CVA tenderness.  Lungs: Coarse ronchi and bilateral crackles. No wheezes. Tachypneic with HO. No accessory muscles. Speaking in short, choppy sentences.   Heart: regular rate and rhythm and S1, S2 normal Tachycardic  Abdomen: soft,  non-tender; bowel sounds normal; no masses,  no organomegaly  Extremities: Warm, well perfused. No peripheral edema  Pulses: +1 and symmetric  Neurologic: Oriented to self only. Not date (1984), place ([facility]), recent events. Facial movements appear symmetric. Non focal motor.        Pertinent Labs  Lab Results: Personally Reviewed.  Recent Results (from the past 24 hour(s))   Alcohol, Ethyl, Blood   Result Value Ref Range    Alcohol, Blood 65 (H) None detected mg/dL   Comprehensive Metabolic Panel   Result Value Ref Range    Sodium 120 (L) 136 - 145 mmol/L    Potassium 5.7 (H) 3.5 - 5.0 mmol/L    Chloride 88 (L) 98 - 107 mmol/L    CO2 7 (LL) 22 - 31 mmol/L    Anion Gap, Calculation 25 (H) 5 - 18 mmol/L    Glucose 104 70 - 125 mg/dL    BUN 60 (H) 8 - 22 mg/dL    Creatinine 10.49 (HH) 0.70 - 1.30 mg/dL    GFR MDRD Af Amer 6 (L) >60 mL/min/1.73m2    GFR MDRD Non Af Amer 5 (L) >60 mL/min/1.73m2    Bilirubin, Total 2.9 (H) 0.0 - 1.0 mg/dL    Calcium 8.0 (L) 8.5 - 10.5 mg/dL    Protein, Total 7.1 6.0 - 8.0 g/dL    Albumin 1.8 (L) 3.5 - 5.0 g/dL    Alkaline Phosphatase 907 (H) 45 - 120 U/L     (H) 0 - 40 U/L    ALT 62 (H) 0 - 45 U/L   Lipase   Result Value Ref Range    Lipase 313 (H) 0 - 52 U/L   Magnesium   Result Value Ref Range    Magnesium 1.8 1.8 - 2.6 mg/dL   Thyroid Stimulating Hormone (TSH)   Result Value Ref Range    TSH 1.45 0.30 - 5.00 uIU/mL   Troponin I   Result Value Ref Range    Troponin I 0.11 0.00 - 0.29 ng/mL   INR   Result Value Ref Range    INR 1.35 (H) 0.90 - 1.10   HM1 (CBC with Diff)   Result Value Ref Range    WBC 20.2 (H) 4.0 - 11.0 thou/uL    RBC 2.56 (L) 4.40 - 6.20 mill/uL    Hemoglobin 8.9 (L) 14.0 - 18.0 g/dL    Hematocrit 26.5 (L) 40.0 - 54.0 %     (H) 80 - 100 fL    MCH 34.8 (H) 27.0 - 34.0 pg    MCHC 33.6 32.0 - 36.0 g/dL    RDW 16.5 (H) 11.0 - 14.5 %    Platelets 174 140 - 440 thou/uL    MPV 11.0 8.5 - 12.5 fL    Neutrophils % 84 (H) 50 - 70 %    Lymphocytes % 8 (L)  20 - 40 %    Monocytes % 8 2 - 10 %    Eosinophils % 0 0 - 6 %    Basophils % 0 0 - 2 %    Neutrophils Absolute 16.7 (H) 2.0 - 7.7 thou/uL    Lymphocytes Absolute 1.6 0.8 - 4.4 thou/uL    Monocytes Absolute 1.7 (H) 0.0 - 0.9 thou/uL    Eosinophils Absolute 0.0 0.0 - 0.4 thou/uL    Basophils Absolute 0.0 0.0 - 0.2 thou/uL   Salicylate (ASA)   Result Value Ref Range    Salicylate <8.0 2.0 - 25.0 mg/dL   Acetaminophen(Tylenol )   Result Value Ref Range    Acetaminophen 10.9 10.0 - 20.0 ug/mL   Lactic Acid   Result Value Ref Range    Lactic Acid 7.1 (HH) 0.5 - 2.2 mmol/L   Procalcitonin   Result Value Ref Range    Procalcitonin 60.23 (H) 0.00 - 0.49 ng/mL   Blood Gases, Venous   Result Value Ref Range    pH, Venous 7.24 (LL) 7.35 - 7.45    pCO2, Venous 27 (L) 35 - 50 mm Hg    pO2, [place] 20 (L) 25 - 47 mm Hg    Base Excess, Venous -14.4 mmol/L    HCO3, Venous 12.7 (L) 24.0 - 30.0 mmol/L    Oxyhemoglobin 31.5 (L) 70.0 - 75.0 %    O2 Sat, Venous 32.3 (L) 70.0 - 75.0 %          Pertinent Radiology  XR CHEST 1 VIEW PORTABLE  7/24/2018 8:54 PM     INDICATION: Sob  COMPARISON: None.     FINDINGS: Cardiomegaly. Pulmonary vascularity normal. The lungs are clear       Astrid Redd, CNP  [phone]  [facility] Pulmonary/Critical Care

## 2021-06-19 NOTE — PROGRESS NOTES
RENAL Progress Note       Assessment/Plan  MICHAEL - suspect hemodynamic ATN due to volume depletion, low bp, CM, ACE-I. Ongoing MICHAEL, low UOP despite diuretics.  Cr stable this morning and  yesterday giving me hope he was turning around.  However, little UOP today despite lasix  Recs:  - discussed with IR, repeat plan for possible dialysis line tomorrow am  - hold subq heparin in the evening, NPO at midnight, etc  - I'll review his labs in the morning to decided whether to proceed or not with dialysis  - hold lasix given orthostatic currently....  - might benefit from midodrine to improve pressures, would check with Cards to make sure they're okay with this    Hypotension - shock seems resolved although some soft BPs still.  Lactate repeated and it is normal today.  Seems orthostatic in nature, will hold further lasix    CM  - ?all stress induced CM. Has risk factors for CAD and ETOH CM. Cards following, repeat echo today.  Overall volume status seems to be up but intravascular status in question.       Sepsis - klebsiella bacteremia.  No further positive cultures.  On Abx    Anemia - acute on chronic, recent GI bleed, hgb drifting down, transfused Friday on run. Stable in the 8s.  May need EPO if MICHAEL persists    Inc LFT's and coagulopathy - No documented chronic liver disease. Suspect mostly due to shock.  Stable today    Resp failure - on 1L NC    ETOHism -  at risk for W/D, on CIWA protocol    Encephalopathy, sepsis and ETOH on admit - mentation improving although waxes and wanes quite a bit    Hyponatremia - stable       Principal Problem:    Septic shock (H)  Active Problems:    Acute respiratory failure with hypoxia (H)    Acute renal failure, unspecified acute renal failure type (H)    Metabolic acidosis    Encephalopathy, metabolic    Elevated troponin    Other cardiomyopathy (H)    Bacteremia due to Klebsiella pneumoniae    Anemia, unspecified type    Alcohol abuse      Subjective  Seen in room.  Some  softer BPs before, better when laying down.  Patient answer orientation questions reasonably well but wasn't right on the date.  However than became tangential.      I spoke to Dr. Eastman regarding his dialysis needs as well as bedside nurse.  Will assess for dialysis need again tomorrow, will arrange for possible tunneled line again in the am.      Objective    Vital signs in last 24 hours  Temp:  [97.9  F (36.6  C)-98.4  F (36.9  C)] 97.9  F (36.6  C)  Heart Rate:  [86-95] 90  Resp:  [18-29] 29  BP: ()/(46-68) 78/52  Weight:   212 lb 8 oz (96.4 kg)    Intake/Output last 3 shifts  I/O last 3 completed shifts:  In: 890 [P.O.:840; IV Piggyback:50]  Out: 614 [Urine:614]  Intake/Output this shift:  I/O this shift:  In: 240 [P.O.:240]  Out: -     Review of Systems   A 12 point comprehensive review of systems was negative except as noted.    Physical Exam  Awake and more interactive   HEENT NC in place  Neck supple, CVC in place  Lungs coarse  But fair air movement  Cor RRR   abd soft +enlarged liver  Ext warm  + general edema     Pertinent Labs   Lab Results: personally reviewed.   Lab Results   Component Value Date     (L) 07/31/2018     (L) 07/31/2018     (L) 07/30/2018    K 3.5 07/31/2018    K 3.5 07/31/2018    K 3.7 07/30/2018    CO2 26 07/31/2018    CO2 26 07/31/2018    CO2 25 07/30/2018    BUN 42 (H) 07/31/2018    BUN 42 (H) 07/31/2018    BUN 38 (H) 07/30/2018    CREATININE 4.63 (H) 07/31/2018    CREATININE 4.63 (H) 07/31/2018    CREATININE 4.67 (H) 07/30/2018    CALCIUM 8.5 07/31/2018    CALCIUM 8.5 07/31/2018    CALCIUM 8.7 07/30/2018     Lab Results   Component Value Date    WBC 18.5 (H) 07/31/2018    WBC 20.8 (H) 07/30/2018    WBC 17.4 (H) 07/29/2018    HGB 8.6 (L) 07/31/2018    HGB 8.5 (L) 07/30/2018    HGB 8.3 (L) 07/29/2018    HCT 26.1 (L) 07/31/2018    HCT 25.1 (L) 07/30/2018    HCT 25.2 (L) 07/29/2018     (H) 07/31/2018     (H) 07/30/2018     (H) 07/29/2018      07/31/2018     07/30/2018     07/29/2018       Pertinent Radiology   Radiology Results: Personally reviewed impression/s    Manish Jaimes MD  [facility]  [phone]

## 2021-06-19 NOTE — PROGRESS NOTES
[facility] Admissions: Continuing to follow patients progress for LTACH needs at discharge. Patient does continue to be appropriate for [facility] at this time due to multiple complex medical needs. Currently on pressors, unable to accept to  on pressors. Can accept once off pressors, when MD feels patient is stable and once insurance has approved LTACH admission. Discussed with CM this am. Thank you.    Digna Celaya RN Referral Specialist [phone]

## 2021-06-19 NOTE — PROGRESS NOTES
Faculty Supervision of Residents    I have examined Aayush García,  1955, on 2018 and the medical care has been evaluated and discussed with the resident.   I agree with the medical care provided, confirm the findings after personally reviewing the images and labs, and agree with the plan documented in the note by Dr. Jade Xie.    Dr. Chery Bell

## 2021-06-19 NOTE — PROGRESS NOTES
[facility] Admissions: Patient populated LTACH tool as possibly needing LTACH at discharge. If dialysis will continue for immediate short term, along with ongoing medical management, the patient would be appropriate for [facility]. Would need insurance to approve admission prior to final acceptance. Will connect with CM to discuss further. Thank you.    Digna Celaya RN Referral Specialist [phone]

## 2021-06-19 NOTE — PROGRESS NOTES
[facility] Progress Note    Subjective    Aayush García is sitting up in bed doing a work search. No acute events overnight. BP stable. Discussed with patient's wife placing referrals to TCUs over the weekend, though feel he is not a good TCU candidate, and attempting [facility] referral again next week.     Objective    Vital signs in last 24 hours Temp:  [97.8  F (36.6  C)-98.7  F (37.1  C)] 97.8  F (36.6  C)  Heart Rate:  [74-87] 87  Resp:  [18-24] 20  BP: ()/(56-66) 93/63   Weight: 200 lb 6.4 oz (90.9 kg)    Physical Exam   General appearance: Alert, conversational, confusion. Oriented to person only. Jaundiced, scleral icterus.  Lungs: Clear to auscultation bilaterally.  No wheezing or crackles auscultated.  Respirations unlabored  Heart: Regular rate and rhythm, normal S1 and S2, no murmur, rub or gallop.  Abdomen: Obese, distended, tympanic, soft, nontender.  Extremities: +3 pitting edema up to knees  Skin: Jaundiced  Neurologic: Alert, oriented to person.  No gross neurologic deficits.  Moves all extremities spontaneously.    Pertinent Labs   Lab Results: personally reviewed.     Pertinent Radiology   Radiology Results: Personally reviewed.    Assessment/Plan  Principal Problem:    Septic shock (H)  Active Problems:    Acute respiratory failure with hypoxia (H)    Acute renal failure, unspecified acute renal failure type (H)    Metabolic acidosis    Encephalopathy, metabolic    Elevated troponin    Stress-induced cardiomyopathy    Bacteremia due to Klebsiella pneumoniae    Anemia, unspecified type    Alcohol abuse    Acute encephalopathy    Acute liver failure    Cognitive and behavioral changes    Sleep difficulties    Hallucinations    Aayush García is a 62 year old male with a history of alcohol abuse, HTN and anemia who was admitted with septic shock secondary to Klebsiella bacteremia and anuria.  Required intubation and pressor support in ICU, currently extubated, moved out of the ICU  7/31, and was transferred back into the ICU 8/5 due to need for pressure support.  Transferred back out of the ICU 8/8.        Acute renal injury: Most likely severe ATN due to klebsiella sepsis, though cause still not completely clear. May be component of volume depletion as well.  Abdominal US negative 7/28.  Has initiated dialysis and will continue MWF for the foreseen future. Dialysis catheter placed 8/1.  - Nephrology following   -Plan to continue dialysis MWF. Will be on T,TH,S schedule at [facility] once accepted       Sepsis, resolved   Klebsiella bacteremia, resolved  Persistent leukocytosis: Admitted with leukocytosis, lactic acidosis, hypotension and tachycardia. Blood cultures growing klebsiella.  UA shows leukocytes, blood and few bacteria but may be unreliable as taken after period of anuria.  Most likely urinary source, though now question possible SBP given hepatic encephalopathy.  Possibly GI source, did have ulcer 1-2 months ago.  Unlikely PNA given CXR without infiltrates. Persistent leukocytosis, though continuing to tend down. Blood cultures x2 drawn and no growth yet.  Repeat chest x-ray showing improvement of infiltrates and improvement of pulmonary edema. CT abdomen showed no abscess just ascites. Therapeutic paracentesis performed with 2L drawn, unfortunately fluid was not sent for culture or stain.  UA 8/5 suspicious for urinary tract infection, however urine culture with no growth. IV Zosyn initiated 8/5 and discontinued 8/8.   - continue to monitor for signs of infection       AMS  Alcohol withdrawal, resolved  Unclear etiology of altered mental status, though may be multifactorial cause including hepatic encephalopathy, alcohol withdrawal, medication induced, ICU delirium. Ammonia level unremarkable, empirically treating with lactulose and rifaximin.  Some improvement after starting rifaximin on 8/5  - Delirium order set   -Avoid sedating medications  - PT/OT/SLP  -Lactulose, titrate as  needed for 3-4 stools daily  - Rifaximin 400 mg TID      Hypotension: Likely secondary to alcoholic hepatitis as below.  Also with severe hypoalbuminemia which is likely contributing. Was transferred to the ICU for pressure support 8/5 and transferred out 8/8 after increased minodrine.  Was given albumin 25 g IV every 6 hours for 8 doses 8/5. Evening of 8/8 needed additional dose of albumin secondary to episode of hypotension. Continues to have ongoing issues with hypotension   - Dialysis per nephrology  - Midodrine 15 mg Q8H.  Continue as needed midodrine. Wean as tolerated after discharge   -Avoid giving fluid bolus if possible given difficulty to remove fluid.  Consider 25% albumin if hypotensive not responding to midodrine      Alcoholic Hepatitis  Hepatic encephalopathy  Ascites: 350ml peppermint schnapps daily for years. Per wife, never has had withdrawal though never been more than 2 days without a drink that she knows of.  Elevated AST/ALT, bilirubin, alk phos, and INR with hypoalbuminemia. Decreased synthetic function of liver. US 7/25 showed fatty infiltration of liver which may be contributing but no concerning biliary tree disease. Repeat RUQ u/s 7/28 without acute findings. Paracentesis 8/3 with 1.95 L fluid removed, however unfortunately send for Gram stain or culture. MELD score of 31 with a 53% estimated 3 month mortality.   - Continue to treat for hepatic encephalopathy as above  - GI following, appreciate their recommendations  -If paracentesis is performed again would recommending sending fluid for analysis.  -Continue folic acid, thiamine, multivitamin    Macrocytic anemia: Likely nutritional component due to alcohol use, and MICHAEL. Peripheral smear showed inflammatory changes only. LDH and haptoglobin only mildly elevated.  Was transfused 1uPRBCs on 8/6.  - AM CBC  - EPO per nephrology     NSTEMI: Type II myocardial infarction or demand related ischemia secondary to sepsis on presentation.  EF of  25-30% on 7/25/2018, significantly improved with echo 7/31/2018 with an EF of 61%.  Per cardiology not recommending ischemic workup at this time.  - Cardiology has signed off.  - Discontinued coreg 3.125 mg twice daily due to hypotension.  Consider restarting when/if hypotension has resolved.  - Telemetry monitoring      Melena, resolved: Episode of melanotic stool 7/27-28. Had downtrending hemoglobin months prior to admission.  Was seen in clinic recently for melena which was thought due to NSAID use.  Symptoms had resolved after he had stopped using NSAIDs.  He was on a PPI as well. Had melenic stools here, possibly gastritis from decreased perfusion versus worsening ulceration, has resolved.   - GI following as above  - PPI two times a day   - Transfuse for Hgb < 7      History of tobacco abuse  - Nicotine patch    Diet: 2mg Na  Fluids: No IVF  DVT Prophylaxis: Subcutaneous heparin  Code: full code     Disposition/Advanced Care Planning   Discharge Planning discussed with patient's wife  Barriers to discharge:  Difficult disposition  Dispo: anticipate discharge to [facility]   Anticipated discharge date:  unknown at this time        Precepted patient with Dr. Chery Xie DO (PGY2)  [facility] Resident  Pager: [phone]

## 2021-06-19 NOTE — PROCEDURES
Aayush García 62 y.o. male    Dialysis Treatment Details  ?  Length of Treatment: 3.5 Hours  ?  K+ Bath: 2  ?  Access: Right tunneled cvc  Access Complications: Lines reversed  ?  Fluid Removed: 2 L  Intradialytic Complications : None  ?  Medications Given by Dialysis RN: None  Anticoagulant : Heparin 1750 units  Hepatitis B Surface Antigen result: Neg  Date Hepatitis B surface Antigen result date : 7/25/18  ?  Post Treatment report called to: SANDEEP Downs RN  ?  ?  Derick Dimas RN  ?

## 2021-06-19 NOTE — PROGRESS NOTES
Hemodialysis: blood pressure 70/46 now with midodrine at 1340. Dr. Oliver updated. Dialysis cancelled for today per Dr. Oliver. Floor nurse updated.

## 2021-06-19 NOTE — PROGRESS NOTES
RENAL Progress Note       Assessment/Plan  MICHAEL - suspect hemodynamic ATN due to volume depletion, low bp, CM, ACE-I. Ongoing MICHAEL, low UOP despite boluses and Cr worse.  Continue dialysis MWF for now via tunneled cath.  I'm worried his ongoing hypotension is going to impair his renal recovery  Recs:  - dialysis MWF  - follow for recovery although I worry about this with ongoing hypotension  - query whether we really need carvedilol with normal heart and hypotension, will make UF and recovery hard    Hypotension - persistent and not improving.  Not clearly cirrhotic but he looks cirrhotic to some degree with encephalopathy and hypotension.  Prn midodrine and midodrine prior to dialysis.      CM  - likely stress induced CM as he has recovered quite a bit of his EF. Has risk factors for CAD and ETOH CM. Overall very volume up but extensively third spacing.     Sepsis - klebsiella bacteremia.  No further positive cultures.  On ceftriaxone, ?last day today?  Persistent leukocytosis....    Anemia - acute on chronic, recent GI bleed, hgb drifting down, transfused Friday on run. Stable in the 8s.  May need EPO if MICHAEL persists    Inc LFT's and coagulopathy - No documented chronic liver disease but he looks cirrhotic to some extent. Suspect mostly due to shock.  They have stabilized    Resp failure - on 1-2L NC, very volume up but difficult to shift fluid off him.    Encephalopathy - waxing and waning.  Remains delirious.  ?Hepatic encephalopathy?    Malnutrition - not eating enough currently, particularly with his delirium       Principal Problem:    Septic shock (H)  Active Problems:    Acute respiratory failure with hypoxia (H)    Acute renal failure, unspecified acute renal failure type (H)    Metabolic acidosis    Encephalopathy, metabolic    Elevated troponin    Stress-induced cardiomyopathy    Bacteremia due to Klebsiella pneumoniae    Anemia, unspecified type    Alcohol abuse      Subjective  Dialyzed last night, No  UF.  Fairly stable with midodrine.  Discussed with SO, still delirious but waking up a bit more.  Not very hungry.  Diffuse anasarca.      Objective    Vital signs in last 24 hours  Temp:  [97.9  F (36.6  C)-99.5  F (37.5  C)] 98.8  F (37.1  C)  Heart Rate:  [75-95] 76  Resp:  [18-32] 22  BP: ()/() 81/50  Weight:   210 lb 8 oz (95.5 kg)    Intake/Output last 3 shifts  I/O last 3 completed shifts:  In: 150 [P.O.:150]  Out: 455 [Urine:455]  Intake/Output this shift:  I/O this shift:  In: 100 [P.O.:100]  Out: 80 [Urine:80]    Review of Systems   A 12 point comprehensive review of systems was negative except as noted.    Physical Exam  Awake but nonsensical  HEENT NC in place  Neck supple  Lungs fairly clear anteriorally  Cor RRR, 2-3+ edema up to hips  abd soft, obese, mildly distended  Ext warm    Pertinent Labs   Lab Results: personally reviewed.   Lab Results   Component Value Date     08/02/2018     (L) 08/01/2018     (L) 07/31/2018    K 3.7 08/02/2018    K 3.6 08/01/2018    K 3.6 07/31/2018    CO2 26 08/02/2018    CO2 22 08/01/2018    CO2 22 07/31/2018    BUN 39 (H) 08/02/2018    BUN 56 (H) 08/01/2018    BUN 53 (H) 07/31/2018    CREATININE 4.24 (H) 08/02/2018    CREATININE 5.57 (H) 08/01/2018    CREATININE 5.34 (H) 07/31/2018    CALCIUM 8.7 08/02/2018    CALCIUM 8.5 08/01/2018    CALCIUM 8.4 (L) 07/31/2018     Lab Results   Component Value Date    WBC 19.6 (H) 08/02/2018    WBC 18.5 (H) 07/31/2018    WBC 20.8 (H) 07/30/2018    HGB 8.4 (L) 08/02/2018    HGB 8.6 (L) 07/31/2018    HGB 8.5 (L) 07/30/2018    HCT 25.2 (L) 08/02/2018    HCT 26.1 (L) 07/31/2018    HCT 25.1 (L) 07/30/2018     (H) 08/02/2018     (H) 07/31/2018     (H) 07/30/2018     08/02/2018     07/31/2018     07/30/2018       Pertinent Radiology   Radiology Results: Personally reviewed impression/s    Manish Jaimes MD  [facility]  [phone]

## 2021-06-19 NOTE — PROGRESS NOTES
"  Clinical Nutrition Therapy Follow Up Note    Current Nutrition Prescription:   Diet:  Cardiac, Renal    Pt is confused.    Current Nutrition Intake:  Pt appetite is fairly poor. Had 1/4 cup black bean and 1/4 cup chicken noodle soup, part of sandwich from [employer], and 1/4 tuna sandwich 1 cranberry juice and part of an apple juice yesterday per SO, who is bringing in food she thinks pt may eat.     No breakfast today, pt receiving dialysis and BP is already low per dialysis RN - will have meal after dialysis.    Anthropometrics:  Height: 5' 8\" (172.7 cm)  Admission weight: 193 lb 7/25  Weight: 212 lb 4.8 oz (96.3 kg) 8/3    Physical Findings:  The patient has the following physical signs which could indicate malnutrition: edema which may mask weight loss    GI Status/Output:   The patient's GI symptoms include: loose stools yesterday    Skin/Wound:  Edwin score Edwin Scale Score: 13  Pt has 3+ lower extremity edema    Medications:  Medications reviewed.   thiamine, mvi, folic acid    Labs:   Lytes wdl, BUN 49, Creat 5.51, , ALT 87, Alk phos 783, glucose 87    Malnutrition: Not noted    Nutrition Risk Level: moderate risk    Nutrition dx:  Impaired swallowing related to vent evidenced by NPO - old-resolved; passed swallow eval  Impaired swallowing due to current mental status and sedation evidenced by NPO-resolved; passed swallow eval    New- nutrition-related altered lab values d/t MICHAEL and septic shock as evidenced by labs    Goal:  Meet estimated nutrition needs    Intervention:  Boost Breeze two times a day    (pt does not like chocolate or vanilla)     Suggest:  Nov[employer]ource Renal TF if pt cannot meet nutrition needs over the weekend.    Monitoring:  Weight, po intake, labs, need for renal diet education if pt likely to continue on dialysis, supplement acceptance, if appetite does not improve may need TF to supplement oral intake until able to eat enough to meet needs.    See Care Plan for Problems, Goals, " and Interventions.

## 2021-06-20 NOTE — PROGRESS NOTES
Code Status:  FULL CODE  Visit Type: H & P     Facility:  [facility] [[id number]]      Facility Type: SNF (Skilled Nursing Facility, TCU)    History of Present Illness:   Hospital Admission Date: August 15, 2018 [facility]   hospital Discharge Date: September 7, 2018  Facility Admission Date: September 7, 2018 [facility] transitional care unit    Aayush García is a 63 y.o. male who presented to [facility] with septic shock secondary to Klebsiella bacteremia.  He had been drinking for years 350 mL of peppermints schnapps.  He had never had a withdrawal and had never been more than 2 days without a drink.  He subsequently developed a secondary shock and had to be placed in the ICU for ventilation and pressor support.  He became anuric and had to be started on dialysis.  They found a urinary source versus sepsis and he was started on broad-spectrum antibiotics vancomycin and cefepime.  Blood cultures revealed Klebsiella and he was narrowed to ceftriaxone.  He then developed a persistent leukocytosis despite repeat cultures and CT of the abdomen being negative.  He then had a 3 day course of Zosyn and has remained off antibiotics since August 8.  His C. difficile screen was negative on August 1.  Unfortunately had significant multiorgan involvement with significant elevations of AST and ALT related to his alcoholic hepatitis.  His troponins were elevated as well which was felt to be a demand ischemia with a max of 8.47.  His initial echo showed a 25-30% ejection fraction in the repeat as he was improving went up to 61%.  Coreg was initiated but he had persistent hypotension and they had to stop it.  He developed melena in the ICU and they stopped his NSAIDs and this was a resolution of the dark stools.  GI started him on IV protein pump inhibitor.  They did not do an EGD and his melena resolved.  He was getting while on dialysis E both however.  They treated him with lactulose  and rifaximin mean for the Warneke's encephalopathy and started him on thiamine daily.  His encephalopathy significantly improved.  He ended up still having very poor urine output with an elevated creatinine and they thought it was secondary to severe ATN due to the Klebsiella sepsis.  He was started on high-dose Lasix and continued to have low urine output despite this.  They did place a tunnel catheter for his needs.  Because of his hypotension they had to place him on Midrin every 8 hours.  His last creatinine was 0.97 and he came to us on Bumex 2 mg twice daily.  His weight at discharge was 78.9 kg.  His his sodium improved and 233 and they continue his fluid restriction 1500 mL a day potassium improved to 4.3.  Magnesium was 1.6 and he was started on magnesium 400 twice daily starting on September 6.  He did have a bolus of mag sulfate IV 2 g at one point.  His persistent leukocytosis lactic acidosis and hypotension and tachycardia gradually all improved.  He did have a macrocytic anemia with his last hemoglobin being 8.9.  He did ultimately get 1 unit of packed red cells on August 6.  He is found moderate protein calorie malnutrition in the doing dietary supplements.  Cardiac issues discussed quitting smoking which she is quite keen on.  Meds at discharge were going to DC the Tylenol Dulcolax Bumex 2 mg twice daily mag oxide 400 twice daily melatonin 3 at at bedtime omeprazole 20 twice daily potassium chloride 20 twice daily quetiapine 25 4 times a day and will look at weaning that over time Zhao 8 mg 1 at at bedtime rifaximin 550 twice daily Spironolactone 50 twice daily thiamine 100 daily and vitamin B complex.    Past Medical History:   Diagnosis Date     Alcoholic hepatitis with ascites      Anemia, unspecified type      Hypertension      Past Surgical History:   Procedure Laterality Date     COLONOSCOPY N/A 3/20/2018    Procedure: COLONOSCOPY;  Surgeon: Adam Nicole III, MD;  Location: [facility]  OR;  Service:      [facility]  7/24/2018          No family history on file.  Social History     Social History     Marital status: Single     Spouse name: N/A     Number of children: N/A     Years of education: N/A     Occupational History     Not on file.     Social History Main Topics     Smoking status: Current Every Day Smoker     Smokeless tobacco: Never Used     Alcohol use Yes      Comment: Occasionally     Drug use: No     Sexual activity: Not on file     Other Topics Concern     Not on file     Social History Narrative     Additional Geriatric Review patient lives with his wife has 2 children one is estranged and they both live in the area he worked many different jobs his last drink was in mid August he states he does want to drink anymore.  He is going to give smoking cessation a good go.  No hearing loss no urinary or fecal incontinence as noted this encephalopathy in the hospital pretty clear today  Current Outpatient Prescriptions   Medication Sig Dispense Refill     acetaminophen (TYLENOL) 325 MG tablet Take 2 tablets (650 mg total) by mouth every 6 (six) hours as needed.  0     bisacodyl (DULCOLAX) 10 mg suppository 1 ID q day prn.  0     bumetanide (BUMEX) 2 MG tablet Take 1 tablet (2 mg total) by mouth 2 (two) times a day at 9am and 6pm.  0     magnesium oxide (MAG-OX) 400 mg (241.3 mg magnesium) tablet Take 1 tablet (400 mg total) by mouth 2 (two) times a day.  0     melatonin 3 mg Tab tablet Take 1 tablet (3 mg total) by mouth at bedtime as needed.  0     miconazole nitrate (CRITIC-AID AF) 2 % Oint ointment Apply to perineum bid.  0     omeprazole (PRILOSEC) 20 MG capsule Take 1 capsule (20 mg total) by mouth 2 (two) times a day before meals.  0     potassium chloride (K-DUR,KLOR-CON) 20 MEQ tablet Take 1 tablet (20 mEq total) by mouth 2 (two) times a day with meals.  0     QUEtiapine (SEROQUEL) 25 MG tablet Take 1 tablet (25 mg total) by mouth 4 (four) times a day.  0     ramelteon (ROZEREM) 8 mg  tablet Take 1 tablet (8 mg total) by mouth at bedtime.  0     rifAXIMin (XIFAXAN) 550 mg Tab tablet Take 1 tablet (550 mg total) by mouth 2 (two) times a day.  0     spironolactone (ALDACTONE) 50 MG tablet Take 1 tablet (50 mg total) by mouth 2 (two) times a day at 9am and 6pm.  0     thiamine 100 MG tablet Take 1 tablet (100 mg total) by mouth daily.  0     vit B comp no.3-folic-C-biotin (NEPHRO-FRANCISCO) 1- mg-mg-mcg Tab tablet Take 1 tablet by mouth daily.  0     No current facility-administered medications for this visit.      No Known Allergies    There is no immunization history on file for this patient.      Review of Systems   Constitutional: Positive for fatigue. Negative for activity change, chills, diaphoresis and fever.   HENT: Negative for ear pain, hearing loss, sinus pressure, sore throat and trouble swallowing.    Eyes: Negative for discharge and visual disturbance.   Respiratory: Negative for cough, shortness of breath and wheezing.    Cardiovascular: Negative for chest pain, palpitations and leg swelling.   Gastrointestinal: Negative for abdominal pain, constipation and diarrhea.   Endocrine: Negative for cold intolerance and heat intolerance.   Genitourinary: Negative for difficulty urinating, dysuria, flank pain, frequency and urgency.   Musculoskeletal: Negative for arthralgias, back pain, myalgias and neck stiffness.   Allergic/Immunologic: Negative for immunocompromised state.   Neurological: Positive for weakness. Negative for dizziness, tremors, syncope, speech difficulty, light-headedness, numbness and headaches.        Physical Exam   Constitutional: He is oriented to person, place, and time. He appears well-developed and well-nourished.   HENT:   Head: Normocephalic and atraumatic.   Right Ear: External ear normal.   Left Ear: External ear normal.   Nose: Nose normal.   Mouth/Throat: Oropharynx is clear and moist.   Eyes: Conjunctivae and EOM are normal. Pupils are equal, round, and  reactive to light. Right eye exhibits no discharge. Left eye exhibits no discharge.   Neck: Neck supple. No JVD present. No tracheal deviation present. No thyromegaly present.   Cardiovascular: Normal rate, regular rhythm, normal heart sounds and intact distal pulses.    No murmur heard.  Pulmonary/Chest: Effort normal. No respiratory distress. He has wheezes. He has no rales. He exhibits no tenderness.   Expiratory wheeze poor air movement overall suggestive of some early COPD   Abdominal: He exhibits no distension and no mass. There is no tenderness. There is no guarding.   Musculoskeletal: Normal range of motion. He exhibits no edema or tenderness.   Lymphadenopathy:     He has no cervical adenopathy.   Neurological: He is alert and oriented to person, place, and time. He has normal reflexes. No cranial nerve deficit. Coordination normal.   Skin: Skin is warm and dry. No rash noted. No erythema.   Psychiatric: He has a normal mood and affect. His behavior is normal. Thought content normal.         Labs:  All labs reviewed in the nursing home record.  Recent Results (from the past 168 hour(s))   Basic Metabolic Panel   Result Value Ref Range    Sodium 133 (L) 136 - 145 mmol/L    Potassium 3.9 3.5 - 5.0 mmol/L    Chloride 95 (L) 98 - 107 mmol/L    CO2 27 22 - 31 mmol/L    Anion Gap, Calculation 11 5 - 18 mmol/L    Glucose 91 70 - 125 mg/dL    Calcium 10.2 8.5 - 10.5 mg/dL    BUN 28 (H) 8 - 22 mg/dL    Creatinine 0.91 0.70 - 1.30 mg/dL    GFR MDRD Af Amer >60 >60 mL/min/1.73m2    GFR MDRD Non Af Amer >60 >60 mL/min/1.73m2   Magnesium   Result Value Ref Range    Magnesium 1.6 (L) 1.8 - 2.6 mg/dL   Magnesium   Result Value Ref Range    Magnesium 2.0 1.8 - 2.6 mg/dL   Renal Function Profile   Result Value Ref Range    Albumin 2.6 (L) 3.5 - 5.0 g/dL    Calcium 10.6 (H) 8.5 - 10.5 mg/dL    Phosphorus 6.3 (H) 2.5 - 4.5 mg/dL    Glucose 90 70 - 125 mg/dL    BUN 34 (H) 8 - 22 mg/dL    Creatinine 0.97 0.70 - 1.30 mg/dL     Sodium 133 (L) 136 - 145 mmol/L    Potassium 4.3 3.5 - 5.0 mmol/L    Chloride 95 (L) 98 - 107 mmol/L    CO2 26 22 - 31 mmol/L    Anion Gap, Calculation 12 5 - 18 mmol/L    GFR MDRD Af Amer >60 >60 mL/min/1.73m2    GFR MDRD Non Af Amer >60 >60 mL/min/1.73m2         Assessment:  1. Hepatic encephalopathy (H)     2. Alcoholic hepatitis with ascites     3. Acute respiratory failure with hypoxia (H)     4. Bacteremia due to Klebsiella pneumoniae     5. Sepsis (H)     6. Acute renal failure (ARF) (H)     7. Hyponatremia     8. Hypomagnesemia     9. Demand ischemia (H)         Plan: We will get a CBC with differential BMP CMP prealbumin and serum ammonia for Thursday in addition I will do q. O day weights.  We will look down the road short-term reducing his Seroquel and probably weaning him off these other meds as well in time as hopefully his kidney function stabilizes and his fluid flux stabilizes.    Total 55 minutes of which 65% was spent in counseling and coordination of care of the above plan.    Electronically signed by: Parveen Callaway MD

## 2021-06-20 NOTE — PROGRESS NOTES
[facility]    Facility:   [facility] [[id number]]   Code Status: FULL CODE  PCP: Misbah Y Palla, MD   Phone: [phone]   Fax: [phone]      CHIEF COMPLAINT/REASON FOR VISIT:  Chief Complaint   Patient presents with     Discharge Summary       HISTORY COURSE:  Aayush is a 63 y.o. male undergoing physical , occupational and speech  therapy at [facility].   He underwent an extensive complex hospitalization. He is with hx of alcohol abuse, HTN, anemia chronic knee pain . He was admitted to [facility] with septic shock secondary to Klebsiella bacteremia, He was sent to the ICU where he was intubated and on pressors.  He has been off antibiotics since 8/8/18.  While in the ICU  he developed melena. He was seen earlier for this in the clinic and it resolved after stopping NSAID's.  He had a negative c-diff on 8/1/18. He had anuria and was started on dialysis. Last run was 8/21/18. He no longer has the dialysis catheter. He also underwent a paracentesis on 8/3 in which 1.95 L was  removed. He had elevated troponin's as high as 8.47. An initial echo showed an EF of 25-30% and a repeat one improved to 61%. He was also with hyponatremia, hypokalemia, hypomagnesemia elevated LFT's.  Per the notes his thiamine should be continued indefinitely.     Today he is seen  for a routine visit for review of multiple medical issues and face to face for discharge .  His kidney function is improving and  his ammonia level has decreased to 33 from 71 .  He had a left knee  x-ray  That showed marked degenerative changes and possible old trauma of the medial tibial plateau.  He was to see Orthopedics but he  cancelled his appointment and follow up if the pain gets worse..   .He denied CP or shortness of breath.  BP's have been stable and his pulse is 87 today.  He will discharge today to home with his fiance and current medications and treatments. [facility] will have [facility] PT/OT and  Speech.     Review of Systems   onstitutional: Positive for fatigue. Negative for activity change, appetite change, chills and fever.   HENT: Negative for congestion and sore throat.    Eyes: Negative for visual disturbance.   Respiratory: Negative for cough, shortness of breath and wheezing.    Cardiovascular: Negative for chest pain and leg swelling.   Gastrointestinal: Positive for abdominal distention. Negative for abdominal pain, constipation, diarrhea and nausea.        HX of paracentesis on 8/3/18.    Genitourinary: Negative for dysuria.   Musculoskeletal: Positive for arthralgias. Negative for myalgias.        Left knee   Skin: Negative for color change, rash and wound.     Vitals:    10/02/18 0943   BP: 132/72   Pulse: 87   Resp: 20   Temp: 97.2  F (36.2  C)   SpO2: 97%   Weight: 165 lb 11.2 oz (75.2 kg)       Physical Exam  Constitutional: He appears well-developed and well-nourished.   HENT:   Head: Normocephalic.   Eyes: Conjunctivae are normal.   Neck: Normal range of motion.   Cardiovascular: Normal rate, regular rhythm and normal heart sounds.    No murmur heard.  Pulmonary/Chest: Breath sounds normal. No respiratory distress. He has no wheezes. He has no rales.   Abdominal: Soft. Bowel sounds are normal. He exhibits no distension. There is no tenderness.   Musculoskeletal: He exhibits no edema.   Decreased ROM left arm  Reports left knee pain relief with tramadol   Neurological: He is alert and oriented x3   Skin: Skin is warm.   Psychiatric: He has a normal mood and affect. His behavior is normal.     MEDICATION LIST:  Current Outpatient Prescriptions   Medication Sig     acetaminophen (TYLENOL) 325 MG tablet Take 2 tablets (650 mg total) by mouth every 6 (six) hours as needed.     bisacodyl (DULCOLAX) 10 mg suppository 1 [place] q day prn.     bumetanide (BUMEX) 2 MG tablet Take 1 tablet (2 mg total) by mouth 2 (two) times a day at 9am and 6pm. (Patient taking differently: Take 1 mg by mouth 2 (two)  times a day at 9am and 6pm. )     magnesium oxide (MAG-OX) 400 mg (241.3 mg magnesium) tablet Take 1 tablet (400 mg total) by mouth 2 (two) times a day.     melatonin 3 mg Tab tablet Take 1 tablet (3 mg total) by mouth at bedtime as needed.     miconazole nitrate (CRITIC-AID AF) 2 % Oint ointment Apply to perineum bid. (Patient taking differently: 2 (two) times a day as needed. Apply to perineum bid.)     omeprazole (PRILOSEC) 20 MG capsule Take 1 capsule (20 mg total) by mouth 2 (two) times a day before meals.     potassium chloride (K-DUR,KLOR-CON) 20 MEQ tablet Take 1 tablet (20 mEq total) by mouth 2 (two) times a day with meals.     ramelteon (ROZEREM) 8 mg tablet Take 1 tablet (8 mg total) by mouth at bedtime.     rifAXIMin (XIFAXAN) 550 mg Tab tablet Take 1 tablet (550 mg total) by mouth 2 (two) times a day.     spironolactone (ALDACTONE) 50 MG tablet Take 1 tablet (50 mg total) by mouth 2 (two) times a day at 9am and 6pm. (Patient taking differently: Take 25 mg by mouth 2 (two) times a day at 9am and 6pm. )     thiamine 100 MG tablet Take 1 tablet (100 mg total) by mouth daily.     traMADol (ULTRAM) 50 mg tablet Take 25 mg by mouth every 8 (eight) hours as needed for pain.      vit B comp no.3-folic-C-biotin (NEPHRO-FRANCISCO) 1- mg-mg-mcg Tab tablet Take 1 tablet by mouth daily.       DISCHARGE DIAGNOSIS:    ICD-10-CM    1. Acute renal failure, unspecified acute renal failure type (H) N17.9    2. Alcohol abuse F10.10    3. Acute liver failure without hepatic coma K72.00    4. Left knee pain M25.562        MEDICAL EQUIPMENT NEEDS:  None needed.    DISCHARGE PLAN/FACE TO FACE:  I certify that services are/were furnished while this patient was under the care of a physician and that a physician or an allowed non-physician practitioner (NPP), had a face-to-face encounter that meets the physician face-to-face encounter requirements. The encounter was in whole, or in part, related to the primary reason for home  health. The patient is confined to his/her home and needs intermittent skilled nursing, physical therapy, speech-language pathology, or the continued need for occupational therapy. A plan of care has been established by a physician and is periodically reviewed by a physician.  Date of Face-to-Face Encounter: 10/2/18    I certify that, based on my findings, the following services are medically necessary home health services: PT/OT/Speech    My clinical findings support the need for the above skilled services because PT/OT for continued strength and endurance following a recent hospitalization for sepsis and speech for cognition and  memory  This patient is homebound because: he is  Easily fatigued and deconditioned related to recent illness and requires further strengthening and endurance.     The patient is, or has been, under my care and I have initiated the establishment of the plan of care. This patient will be followed by a physician who will periodically review the plan of care.    Schedule follow up visit with primary care provider within 7 days to reestablish care.    Electronically signed by: Irina Rubio CNP

## 2021-06-20 NOTE — PROGRESS NOTES
[facility]    Facility:   [facility] [[id number]]   Code Status: FULL CODE      CHIEF COMPLAINT/REASON FOR VISIT:  Chief Complaint   Patient presents with     Review Of Multiple Medical Conditions       HISTORY:      HPI: Aayush is a 63 y.o. male undergoing physical , occupational and speech  therapy at [facility].   He underwent an extensive complex hospitalization. He is with hx of alcohol abuse, HTN, anemia chronic knee pain . He was admitted to [place] with septic shock secondary to Klebsiella bacteremia, He was sent to the ICU where he was intubated and on pressors.  He has been off antibiotics since 8/8/18.  While in the ICU  he developed melena. He was seen earlier for this in the clinic and it resolved after stopping NSAID's.  He had a negative c-diff on 8/1/18. He had anuria and was started on dialysis. Last run was 8/21/18. He no longer has the dialysis catheter. He also underwent a paracentesis on 8/3 in which 1.95 L was  removed. He had elevated troponin's as high as 8.47. An initial echo showed an EF of 25-30% and a repeat one improved to 61%. He was also with hyponatremia, hypokalemia, hypomagnesemia elevated LFT's.  Per the noted his thiamine should be continued indefinitely.     Today he is seen in his room. He appeared frustrated because lunch was coming soon. He denied CP or shortness of breath. He reports he is having formed stools. He no longer has the dialysis catheter. His labs are improving .  His NA remains at 133 and he is on a fluid restriction. It appears he has been hyponatremic 133-135 for quite some time. BP's have been stable however he has been tachy at 108-112     Past Medical History:   Diagnosis Date     Alcoholic hepatitis with ascites      Anemia, unspecified type      Hypertension              No family history on file.  Social History     Social History     Marital status: Single     Spouse name: N/A     Number of children: N/A      Years of education: N/A     Social History Main Topics     Smoking status: Current Every Day Smoker     Smokeless tobacco: Never Used     Alcohol use Yes      Comment: Occasionally     Drug use: No     Sexual activity: Not on file     Other Topics Concern     Not on file     Social History Narrative         Review of Systems   Constitutional: Positive for fatigue. Negative for activity change, appetite change, chills and fever.   HENT: Negative for congestion and sore throat.    Eyes: Negative for visual disturbance.   Respiratory: Negative for cough, shortness of breath and wheezing.    Cardiovascular: Negative for chest pain and leg swelling.   Gastrointestinal: Positive for abdominal distention. Negative for abdominal pain, constipation, diarrhea and nausea.        HX of paracentesis on 8/3/18. Pt reports abdomen less distended    Genitourinary: Negative for dysuria.   Musculoskeletal: Negative for arthralgias and myalgias.   Skin: Negative for color change, rash and wound.   Neurological: Negative for dizziness, weakness and numbness.   Psychiatric/Behavioral: Negative for agitation, behavioral problems and sleep disturbance.       .  Vitals:    09/11/18 0838   BP: 114/74   Pulse: (!) 109   Resp: 20   Temp: 98.4  F (36.9  C)   SpO2: 90%   Weight: 176 lb 6.4 oz (80 kg)       Physical Exam   Constitutional: He appears well-developed and well-nourished.   HENT:   Head: Normocephalic.   Eyes: Conjunctivae are normal.   Neck: Normal range of motion.   Cardiovascular: Normal rate, regular rhythm and normal heart sounds.    No murmur heard.  Pulmonary/Chest: Breath sounds normal. No respiratory distress. He has no wheezes. He has no rales.   Abdominal: Soft. Bowel sounds are normal. He exhibits no distension. There is no tenderness.   Musculoskeletal: He exhibits no edema.   Decreased ROM left arm   Neurological: He is alert.   Alert and oriented to name, facility and situation, He was negative for date.    Skin:  Skin is warm.   Psychiatric: He has a normal mood and affect. His behavior is normal.         LABS:   Recent Results (from the past 240 hour(s))   Hemoglobin   Result Value Ref Range    Hemoglobin 8.9 (L) 14.0 - 18.0 g/dL   Basic Metabolic Panel   Result Value Ref Range    Sodium 134 (L) 136 - 145 mmol/L    Potassium 3.8 3.5 - 5.0 mmol/L    Chloride 97 (L) 98 - 107 mmol/L    CO2 27 22 - 31 mmol/L    Anion Gap, Calculation 10 5 - 18 mmol/L    Glucose 90 70 - 125 mg/dL    Calcium 9.5 8.5 - 10.5 mg/dL    BUN 22 8 - 22 mg/dL    Creatinine 0.83 0.70 - 1.30 mg/dL    GFR MDRD Af Amer >60 >60 mL/min/1.73m2    GFR MDRD Non Af Amer >60 >60 mL/min/1.73m2   Hepatic Profile   Result Value Ref Range    Bilirubin, Total 1.6 (H) 0.0 - 1.0 mg/dL    Bilirubin, Direct 0.9 (H) <=0.5 mg/dL    Protein, Total 7.7 6.0 - 8.0 g/dL    Albumin 2.4 (L) 3.5 - 5.0 g/dL    Alkaline Phosphatase 283 (H) 45 - 120 U/L    AST 54 (H) 0 - 40 U/L    ALT 27 0 - 45 U/L   HM2(CBC W/O DIFF)   Result Value Ref Range    WBC 12.1 (H) 4.0 - 11.0 thou/uL    RBC 2.87 (L) 4.40 - 6.20 mill/uL    Hemoglobin 8.9 (L) 14.0 - 18.0 g/dL    Hematocrit 27.6 (L) 40.0 - 54.0 %    MCV 96 80 - 100 fL    MCH 31.0 27.0 - 34.0 pg    MCHC 32.2 32.0 - 36.0 g/dL    RDW 15.6 (H) 11.0 - 14.5 %    Platelets 411 140 - 440 thou/uL    MPV 9.5 8.5 - 12.5 fL   Magnesium   Result Value Ref Range    Magnesium 0.9 (LL) 1.8 - 2.6 mg/dL   Potassium   Result Value Ref Range    Potassium 3.8 3.5 - 5.0 mmol/L   Basic Metabolic Panel   Result Value Ref Range    Sodium 133 (L) 136 - 145 mmol/L    Potassium 3.9 3.5 - 5.0 mmol/L    Chloride 95 (L) 98 - 107 mmol/L    CO2 27 22 - 31 mmol/L    Anion Gap, Calculation 11 5 - 18 mmol/L    Glucose 91 70 - 125 mg/dL    Calcium 10.2 8.5 - 10.5 mg/dL    BUN 28 (H) 8 - 22 mg/dL    Creatinine 0.91 0.70 - 1.30 mg/dL    GFR MDRD Af Amer >60 >60 mL/min/1.73m2    GFR MDRD Non Af Amer >60 >60 mL/min/1.73m2   Magnesium   Result Value Ref Range    Magnesium 1.6 (L) 1.8 -  2.6 mg/dL   Magnesium   Result Value Ref Range    Magnesium 2.0 1.8 - 2.6 mg/dL   Renal Function Profile   Result Value Ref Range    Albumin 2.6 (L) 3.5 - 5.0 g/dL    Calcium 10.6 (H) 8.5 - 10.5 mg/dL    Phosphorus 6.3 (H) 2.5 - 4.5 mg/dL    Glucose 90 70 - 125 mg/dL    BUN 34 (H) 8 - 22 mg/dL    Creatinine 0.97 0.70 - 1.30 mg/dL    Sodium 133 (L) 136 - 145 mmol/L    Potassium 4.3 3.5 - 5.0 mmol/L    Chloride 95 (L) 98 - 107 mmol/L    CO2 26 22 - 31 mmol/L    Anion Gap, Calculation 12 5 - 18 mmol/L    GFR MDRD Af Amer >60 >60 mL/min/1.73m2    GFR MDRD Non Af Amer >60 >60 mL/min/1.73m2     Current Outpatient Prescriptions   Medication Sig     acetaminophen (TYLENOL) 325 MG tablet Take 2 tablets (650 mg total) by mouth every 6 (six) hours as needed.     bisacodyl (DULCOLAX) 10 mg suppository 1 OH q day prn.     bumetanide (BUMEX) 2 MG tablet Take 1 tablet (2 mg total) by mouth 2 (two) times a day at 9am and 6pm.     magnesium oxide (MAG-OX) 400 mg (241.3 mg magnesium) tablet Take 1 tablet (400 mg total) by mouth 2 (two) times a day.     melatonin 3 mg Tab tablet Take 1 tablet (3 mg total) by mouth at bedtime as needed.     miconazole nitrate (CRITIC-AID AF) 2 % Oint ointment Apply to perineum bid.     omeprazole (PRILOSEC) 20 MG capsule Take 1 capsule (20 mg total) by mouth 2 (two) times a day before meals.     potassium chloride (K-DUR,KLOR-CON) 20 MEQ tablet Take 1 tablet (20 mEq total) by mouth 2 (two) times a day with meals.     QUEtiapine (SEROQUEL) 25 MG tablet Take 1 tablet (25 mg total) by mouth 4 (four) times a day.     ramelteon (ROZEREM) 8 mg tablet Take 1 tablet (8 mg total) by mouth at bedtime.     rifAXIMin (XIFAXAN) 550 mg Tab tablet Take 1 tablet (550 mg total) by mouth 2 (two) times a day.     spironolactone (ALDACTONE) 50 MG tablet Take 1 tablet (50 mg total) by mouth 2 (two) times a day at 9am and 6pm.     thiamine 100 MG tablet Take 1 tablet (100 mg total) by mouth daily.     vit B comp  no.3-folic-C-biotin (NEPHRO-FRANCISCO) 1- mg-mg-mcg Tab tablet Take 1 tablet by mouth daily.     ASSESSMENT:      ICD-10-CM    1. Hepatic encephalopathy (H) K72.90    2. Alcoholic hepatitis with ascites K70.11    3. Cognitive and behavioral changes R41.89     R46.89    4. Acute respiratory failure with hypoxia (H) J96.01    5. Acute encephalopathy G93.40        PLAN:    Daily weights and adjust diuretics as approriate  Encephalopathy- improving no longer on lactulose.Hospital ammonia level   Hyponatremia- continue fluid restriction, monitor labs, appears chronic  Hypomagnesemia- resolved mag 2.0 on 9/7/18 per hospital records.  Hypokalemia- resolved last 4.3 on 9/7/18  Tachycardia- monitor currently only on Bumex.       Electronically signed by: Irina Rubio CNP

## 2021-06-20 NOTE — LETTER
Letter by Terrence Cordova MD at      Author: Terrence Cordova MD Service: -- Author Type: --    Filed:  Encounter Date: 10/8/2020 Status: (Other)         Aayush KIMANI Pallavipaulina  [address] [facility] [address]      October 8, 2020      Dear Aayush,    This letter is to remind you that you will be due for your follow up appointment with Dr. Terrence Cordova in October, 2020. To help ensure you are in the best health possible, a regular follow-up with your cardiologist is essential.     Please call our Patient Scheduling Line at [phone] to schedule your appointment at your earliest convenience.  If you have recently scheduled an appointment, please disregard this letter.    We look forward to seeing you again. As always, we are available at the number  above for any questions or concerns you may have.      Sincerely,     The Physicians and Staff of [facility] [place]

## 2021-06-20 NOTE — PROGRESS NOTES
[facility]    Facility:   [facility] [[id number]]   Code Status: FULL CODE      CHIEF COMPLAINT/REASON FOR VISIT:  Chief Complaint   Patient presents with     Review Of Multiple Medical Conditions       HISTORY:      HPI: Aayush is a 63 y.o. male undergoing physical , occupational and speech  therapy at [facility].   He underwent an extensive complex hospitalization. He is with hx of alcohol abuse, HTN, anemia chronic knee pain . He was admitted to [place] with septic shock secondary to Klebsiella bacteremia, He was sent to the ICU where he was intubated and on pressors.  He has been off antibiotics since 8/8/18.  While in the ICU  he developed melena. He was seen earlier for this in the clinic and it resolved after stopping NSAID's.  He had a negative c-diff on 8/1/18. He had anuria and was started on dialysis. Last run was 8/21/18. He no longer has the dialysis catheter. He also underwent a paracentesis on 8/3 in which 1.95 L was  removed. He had elevated troponin's as high as 8.47. An initial echo showed an EF of 25-30% and a repeat one improved to 61%. He was also with hyponatremia, hypokalemia, hypomagnesemia elevated LFT's.  Per the notes his thiamine should be continued indefinitely.     Today he is seen in his room for review of labs and follow up of left knee xray.  His kidney function worsening however lab was done prior to reduction in diuretics.  Will recheck BMP and ammonia level in the AM.  Left knee x-ray showed marked degenerative changes and possible old trauma of the medial tibial plateau.   He reported his knee pain s controlled with the tramadol. .He denied CP or shortness of breath.  BP's have been stable however he has been tachy at 113.  He was also offered smoking cessation because I could smell smoke on him. He tells me he is not smoking much and declined.      Past Medical History:   Diagnosis Date     Alcoholic hepatitis with ascites       Anemia, unspecified type      Hypertension              No family history on file.  Social History     Social History     Marital status: Single     Spouse name: N/A     Number of children: N/A     Years of education: N/A     Social History Main Topics     Smoking status: Current Every Day Smoker     Smokeless tobacco: Never Used     Alcohol use Yes      Comment: Occasionally     Drug use: No     Sexual activity: Not on file     Other Topics Concern     Not on file     Social History Narrative         Review of Systems   Constitutional: Positive for fatigue. Negative for activity change, appetite change, chills and fever.   HENT: Negative for congestion and sore throat.    Eyes: Negative for visual disturbance.   Respiratory: Negative for cough, shortness of breath and wheezing.    Cardiovascular: Negative for chest pain and leg swelling.   Gastrointestinal: Positive for abdominal distention. Negative for abdominal pain, constipation, diarrhea and nausea.        HX of paracentesis on 8/3/18. Pt reports abdomen less distended    Genitourinary: Negative for dysuria.   Musculoskeletal: Positive for arthralgias. Negative for myalgias.        Left knee   Skin: Negative for color change, rash and wound.   Neurological: Negative for dizziness, weakness and numbness.   Psychiatric/Behavioral: Negative for agitation, behavioral problems and sleep disturbance.       .  Vitals:    09/18/18 1114   BP: 133/83   Pulse: (!) 113   Resp: 20   Temp: 97.7  F (36.5  C)   SpO2: 96%   Weight: 169 lb 1.6 oz (76.7 kg)       Physical Exam   Constitutional: He appears well-developed and well-nourished.   HENT:   Head: Normocephalic.   Eyes: Conjunctivae are normal.   Neck: Normal range of motion.   Cardiovascular: Normal rate, regular rhythm and normal heart sounds.    No murmur heard.  Pulmonary/Chest: Breath sounds normal. No respiratory distress. He has no wheezes. He has no rales.   Abdominal: Soft. Bowel sounds are normal. He exhibits  no distension. There is no tenderness.   Musculoskeletal: He exhibits no edema.   Decreased ROM left arm  Reports left knee pain relief with tramadol   Neurological: He is alert.   Alert and oriented to name, facility and situation, He was negative for date.    Skin: Skin is warm.   Psychiatric: He has a normal mood and affect. His behavior is normal.         LABS:   Recent Results (from the past 240 hour(s))   Albumin   Result Value Ref Range    Albumin 2.9 (L) 3.5 - 5.0 g/dL   BNP(B-type Natriuretic Peptide)   Result Value Ref Range     (H) 0 - 55 pg/mL   Comprehensive Metabolic Panel   Result Value Ref Range    Sodium 133 (L) 136 - 145 mmol/L    Potassium 4.7 3.5 - 5.0 mmol/L    Chloride 97 (L) 98 - 107 mmol/L    CO2 24 22 - 31 mmol/L    Anion Gap, Calculation 12 5 - 18 mmol/L    Glucose 88 70 - 125 mg/dL    BUN 45 (H) 8 - 22 mg/dL    Creatinine 1.53 (H) 0.70 - 1.30 mg/dL    GFR MDRD Af Amer 56 (L) >60 mL/min/1.73m2    GFR MDRD Non Af Amer 46 (L) >60 mL/min/1.73m2    Bilirubin, Total 1.5 (H) 0.0 - 1.0 mg/dL    Calcium 11.2 (H) 8.5 - 10.5 mg/dL    Protein, Total 9.1 (H) 6.0 - 8.0 g/dL    Albumin 2.9 (L) 3.5 - 5.0 g/dL    Alkaline Phosphatase 219 (H) 45 - 120 U/L    AST 42 (H) 0 - 40 U/L    ALT 25 0 - 45 U/L   Magnesium   Result Value Ref Range    Magnesium 2.2 1.8 - 2.6 mg/dL   Ammonia   Result Value Ref Range    Ammonia 55 (H) 11 - 35 umol/L   HM1 (CBC with Diff)   Result Value Ref Range    WBC 10.6 4.0 - 11.0 thou/uL    RBC 3.04 (L) 4.40 - 6.20 mill/uL    Hemoglobin 9.0 (L) 14.0 - 18.0 g/dL    Hematocrit 27.1 (L) 40.0 - 54.0 %    MCV 89 80 - 100 fL    MCH 29.6 27.0 - 34.0 pg    MCHC 33.2 32.0 - 36.0 g/dL    RDW 15.3 (H) 11.0 - 14.5 %    Platelets 425 140 - 440 thou/uL    MPV 9.8 8.5 - 12.5 fL    Neutrophils % 60 50 - 70 %    Lymphocytes % 20 20 - 40 %    Monocytes % 13 (H) 2 - 10 %    Eosinophils % 6 0 - 6 %    Basophils % 1 0 - 2 %    Neutrophils Absolute 6.4 2.0 - 7.7 thou/uL    Lymphocytes Absolute 2.1  0.8 - 4.4 thou/uL    Monocytes Absolute 1.4 (H) 0.0 - 0.9 thou/uL    Eosinophils Absolute 0.6 (H) 0.0 - 0.4 thou/uL    Basophils Absolute 0.1 0.0 - 0.2 thou/uL   Basic Metabolic Panel   Result Value Ref Range    Sodium 130 (L) 136 - 145 mmol/L    Potassium 4.8 3.5 - 5.0 mmol/L    Chloride 96 (L) 98 - 107 mmol/L    CO2 24 22 - 31 mmol/L    Anion Gap, Calculation 10 5 - 18 mmol/L    Glucose 81 70 - 125 mg/dL    Calcium 11.6 (H) 8.5 - 10.5 mg/dL    BUN 46 (H) 8 - 22 mg/dL    Creatinine 1.79 (H) 0.70 - 1.30 mg/dL    GFR MDRD Af Amer 47 (L) >60 mL/min/1.73m2    GFR MDRD Non Af Amer 39 (L) >60 mL/min/1.73m2     Current Outpatient Prescriptions   Medication Sig     acetaminophen (TYLENOL) 325 MG tablet Take 2 tablets (650 mg total) by mouth every 6 (six) hours as needed.     bisacodyl (DULCOLAX) 10 mg suppository 1 OK q day prn.     bumetanide (BUMEX) 2 MG tablet Take 1 tablet (2 mg total) by mouth 2 (two) times a day at 9am and 6pm. (Patient taking differently: Take 1 mg by mouth 2 (two) times a day at 9am and 6pm. )     magnesium oxide (MAG-OX) 400 mg (241.3 mg magnesium) tablet Take 1 tablet (400 mg total) by mouth 2 (two) times a day.     melatonin 3 mg Tab tablet Take 1 tablet (3 mg total) by mouth at bedtime as needed.     miconazole nitrate (CRITIC-AID AF) 2 % Oint ointment Apply to perineum bid.     omeprazole (PRILOSEC) 20 MG capsule Take 1 capsule (20 mg total) by mouth 2 (two) times a day before meals.     potassium chloride (K-DUR,KLOR-CON) 20 MEQ tablet Take 1 tablet (20 mEq total) by mouth 2 (two) times a day with meals.     QUEtiapine (SEROQUEL) 25 MG tablet Take 1 tablet (25 mg total) by mouth 4 (four) times a day.     ramelteon (ROZEREM) 8 mg tablet Take 1 tablet (8 mg total) by mouth at bedtime.     rifAXIMin (XIFAXAN) 550 mg Tab tablet Take 1 tablet (550 mg total) by mouth 2 (two) times a day.     spironolactone (ALDACTONE) 50 MG tablet Take 1 tablet (50 mg total) by mouth 2 (two) times a day at 9am and  6pm. (Patient taking differently: Take 25 mg by mouth 2 (two) times a day at 9am and 6pm. )     thiamine 100 MG tablet Take 1 tablet (100 mg total) by mouth daily.     traMADol (ULTRAM) 50 mg tablet Take 25 mg by mouth every 8 (eight) hours as needed for pain. For 7 days     vit B comp no.3-folic-C-biotin (NEPHRO-FRANCISCO) 1- mg-mg-mcg Tab tablet Take 1 tablet by mouth daily.     ASSESSMENT:      ICD-10-CM    1. Hepatic encephalopathy (H) K72.90    2. Acute liver failure without hepatic coma K72.00    3. Left knee pain M25.562    4. Cognitive and behavioral changes R41.89     R46.89        PLAN:    Daily weights and adjust diuretics as approriate  Encephalopathy- improving no longer on lactulose.ammonia level increasing to 55 will recheck in the AM   Hyponatremia- continue fluid restriction, monitor labs, appears chronic continues at 133. Last 130 diuretics have been decreased and will recheck BMP in the AM   Hypomagnesemia- resolved mag 2.2 on 9/13/18 per hospital records.  Elevated creatinine- recent reduction  in diuretics Check BMP in AM    Left knee pain- tramadol 25 mg q 8 hrs x-rays reviewed, ortho consult.     Electronically signed by: Irina Rubio CNP

## 2021-06-20 NOTE — PROGRESS NOTES
[facility]    Facility:   [facility] [[id number]]   Code Status: FULL CODE      CHIEF COMPLAINT/REASON FOR VISIT:  Chief Complaint   Patient presents with     Review Of Multiple Medical Conditions       HISTORY:      HPI: Aayush is a 63 y.o. male undergoing physical , occupational and speech  therapy at [facility].   He underwent an extensive complex hospitalization. He is with hx of alcohol abuse, HTN, anemia chronic knee pain . He was admitted to [place] with septic shock secondary to Klebsiella bacteremia, He was sent to the ICU where he was intubated and on pressors.  He has been off antibiotics since 8/8/18.  While in the ICU  he developed melena. He was seen earlier for this in the clinic and it resolved after stopping NSAID's.  He had a negative c-diff on 8/1/18. He had anuria and was started on dialysis. Last run was 8/21/18. He no longer has the dialysis catheter. He also underwent a paracentesis on 8/3 in which 1.95 L was  removed. He had elevated troponin's as high as 8.47. An initial echo showed an EF of 25-30% and a repeat one improved to 61%. He was also with hyponatremia, hypokalemia, hypomagnesemia elevated LFT's.  Per the notes his thiamine should be continued indefinitely.     Today he is seen in his room for a routine visit for review of multiple medical issues.  His kidney function is improving however his ammonia level has increased. .  Will recheck BMP and ammonia level 9/27/18 and will give lactulose x 2 days.   Left knee x-ray showed marked degenerative changes and possible old trauma of the medial tibial plateau.  He was to see Orthopedics but I am told today it was cancelled due to his fiance not being aware of it.   He reported his knee pain s controlled with the tramadol. .He denied CP or shortness of breath.  BP's have been stable however he has been tachy at 112.  He is scheduled to discharge to home next Tuesday.     Past Medical  History:   Diagnosis Date     Alcoholic hepatitis with ascites      Anemia, unspecified type      Hypertension              No family history on file.  Social History     Social History     Marital status: Single     Spouse name: N/A     Number of children: N/A     Years of education: N/A     Social History Main Topics     Smoking status: Current Every Day Smoker     Smokeless tobacco: Never Used     Alcohol use Yes      Comment: Occasionally     Drug use: No     Sexual activity: Not on file     Other Topics Concern     Not on file     Social History Narrative         Review of Systems   Constitutional: Positive for fatigue. Negative for activity change, appetite change, chills and fever.   HENT: Negative for congestion and sore throat.    Eyes: Negative for visual disturbance.   Respiratory: Negative for cough, shortness of breath and wheezing.    Cardiovascular: Negative for chest pain and leg swelling.   Gastrointestinal: Positive for abdominal distention. Negative for abdominal pain, constipation, diarrhea and nausea.        HX of paracentesis on 8/3/18. Pt reports abdomen less distended    Genitourinary: Negative for dysuria.   Musculoskeletal: Positive for arthralgias. Negative for myalgias.        Left knee   Skin: Negative for color change, rash and wound.   Neurological: Negative for dizziness, weakness and numbness.   Psychiatric/Behavioral: Negative for agitation, behavioral problems and sleep disturbance.       .  Vitals:    09/25/18 1147   BP: 111/70   Pulse: (!) 112   Resp: 18   Temp: 98.1  F (36.7  C)   SpO2: 100%   Weight: 169 lb 6.4 oz (76.8 kg)       Physical Exam   Constitutional: He appears well-developed and well-nourished.   HENT:   Head: Normocephalic.   Eyes: Conjunctivae are normal.   Neck: Normal range of motion.   Cardiovascular: Normal rate, regular rhythm and normal heart sounds.    No murmur heard.  Pulmonary/Chest: Breath sounds normal. No respiratory distress. He has no wheezes. He  has no rales.   Abdominal: Soft. Bowel sounds are normal. He exhibits no distension. There is no tenderness.   Musculoskeletal: He exhibits no edema.   Decreased ROM left arm  Reports left knee pain relief with tramadol   Neurological: He is alert.   Alert and oriented to name, facility and situation, He was negative for date.    Skin: Skin is warm.   Psychiatric: He has a normal mood and affect. His behavior is normal.         LABS:   Recent Results (from the past 240 hour(s))   Basic Metabolic Panel   Result Value Ref Range    Sodium 130 (L) 136 - 145 mmol/L    Potassium 4.4 3.5 - 5.0 mmol/L    Chloride 97 (L) 98 - 107 mmol/L    CO2 23 22 - 31 mmol/L    Anion Gap, Calculation 10 5 - 18 mmol/L    Glucose 91 70 - 125 mg/dL    Calcium 11.9 (H) 8.5 - 10.5 mg/dL    BUN 35 (H) 8 - 22 mg/dL    Creatinine 1.41 (H) 0.70 - 1.30 mg/dL    GFR MDRD Af Amer >60 >60 mL/min/1.73m2    GFR MDRD Non Af Amer 51 (L) >60 mL/min/1.73m2   Ammonia   Result Value Ref Range    Ammonia 71 (H) 11 - 35 umol/L     Current Outpatient Prescriptions   Medication Sig     acetaminophen (TYLENOL) 325 MG tablet Take 2 tablets (650 mg total) by mouth every 6 (six) hours as needed.     bisacodyl (DULCOLAX) 10 mg suppository 1 [place] q day prn.     bumetanide (BUMEX) 2 MG tablet Take 1 tablet (2 mg total) by mouth 2 (two) times a day at 9am and 6pm. (Patient taking differently: Take 1 mg by mouth 2 (two) times a day at 9am and 6pm. )     magnesium oxide (MAG-OX) 400 mg (241.3 mg magnesium) tablet Take 1 tablet (400 mg total) by mouth 2 (two) times a day.     melatonin 3 mg Tab tablet Take 1 tablet (3 mg total) by mouth at bedtime as needed.     miconazole nitrate (CRITIC-AID AF) 2 % Oint ointment Apply to perineum bid. (Patient taking differently: 2 (two) times a day as needed. Apply to perineum bid.)     omeprazole (PRILOSEC) 20 MG capsule Take 1 capsule (20 mg total) by mouth 2 (two) times a day before meals.     potassium chloride (K-DUR,KLOR-CON) 20 MEQ  tablet Take 1 tablet (20 mEq total) by mouth 2 (two) times a day with meals.     QUEtiapine (SEROQUEL) 25 MG tablet Take 1 tablet (25 mg total) by mouth 4 (four) times a day. (Patient taking differently: Take 25 mg by mouth daily. Until 9/26/18)     ramelteon (ROZEREM) 8 mg tablet Take 1 tablet (8 mg total) by mouth at bedtime.     rifAXIMin (XIFAXAN) 550 mg Tab tablet Take 1 tablet (550 mg total) by mouth 2 (two) times a day.     spironolactone (ALDACTONE) 50 MG tablet Take 1 tablet (50 mg total) by mouth 2 (two) times a day at 9am and 6pm. (Patient taking differently: Take 25 mg by mouth 2 (two) times a day at 9am and 6pm. )     thiamine 100 MG tablet Take 1 tablet (100 mg total) by mouth daily.     traMADol (ULTRAM) 50 mg tablet Take 25 mg by mouth every 8 (eight) hours as needed for pain.      vit B comp no.3-folic-C-biotin (NEPHRO-FRANCISCO) 1- mg-mg-mcg Tab tablet Take 1 tablet by mouth daily.     ASSESSMENT:      ICD-10-CM    1. Acute renal failure, unspecified acute renal failure type (H) N17.9    2. Alcohol abuse F10.10    3. Acute liver failure without hepatic coma K72.00    4. Hallucinations R44.3        PLAN:    Daily weights and adjust diuretics as approriate  Encephalopathy- ammonia level elevated lactulose x 2 days BID and will check lab 9/27/18  Hyponatremia- continue fluid restriction, monitor labs, appears chronic Last 130.  diuretics have been decreased.     Hypomagnesemia- resolved mag 2.2 on 9/13/18 per hospital records.  Elevated creatinine- recent reduction  in diuretics and Labs improving   Left knee pain- tramadol 25 mg q 8 hrs prn   ortho appointment cancelled. Fiance reported they will follow up if pain worsens.   Hallucinations- resolved taping off the seroquel last dose 9/26/18    Electronically signed by: Irina Rubio, CNP

## 2021-06-20 NOTE — PROGRESS NOTES
[facility]    Facility:   [facility] [[id number]]   Code Status: FULL CODE      CHIEF COMPLAINT/REASON FOR VISIT:  Chief Complaint   Patient presents with     Review Of Multiple Medical Conditions     left knee pain, review of labs       HISTORY:      HPI: Aayush is a 63 y.o. male undergoing physical , occupational and speech  therapy at [facility].   He underwent an extensive complex hospitalization. He is with hx of alcohol abuse, HTN, anemia chronic knee pain . He was admitted to [place] with septic shock secondary to Klebsiella bacteremia, He was sent to the ICU where he was intubated and on pressors.  He has been off antibiotics since 8/8/18.  While in the ICU  he developed melena. He was seen earlier for this in the clinic and it resolved after stopping NSAID's.  He had a negative c-diff on 8/1/18. He had anuria and was started on dialysis. Last run was 8/21/18. He no longer has the dialysis catheter. He also underwent a paracentesis on 8/3 in which 1.95 L was  removed. He had elevated troponin's as high as 8.47. An initial echo showed an EF of 25-30% and a repeat one improved to 61%. He was also with hyponatremia, hypokalemia, hypomagnesemia elevated LFT's.  Per the notes his thiamine should be continued indefinitely.     Today he is seen in his room for review of labs and left knee pain. His kidney function worsening and adjustments were made to his diuretics.  He reports left knee pain 7/10 and no relief with tylenol.  I did order a small dose of tramadol x 7 days and will check an xray. He tells me he has had the pain for a long time. He was not able to tell me if he ever had an injection. . He denied CP or shortness of breath. His NA remains at 133 and he is on a fluid restriction. It appears he has been hyponatremic 133-135 for quite some time. BP's have been stable however he has been tachy at 110.    Past Medical History:   Diagnosis Date     Alcoholic  hepatitis with ascites      Anemia, unspecified type      Hypertension              No family history on file.  Social History     Social History     Marital status: Single     Spouse name: N/A     Number of children: N/A     Years of education: N/A     Social History Main Topics     Smoking status: Current Every Day Smoker     Smokeless tobacco: Never Used     Alcohol use Yes      Comment: Occasionally     Drug use: No     Sexual activity: Not on file     Other Topics Concern     Not on file     Social History Narrative         Review of Systems   Constitutional: Positive for fatigue. Negative for activity change, appetite change, chills and fever.   HENT: Negative for congestion and sore throat.    Eyes: Negative for visual disturbance.   Respiratory: Negative for cough, shortness of breath and wheezing.    Cardiovascular: Negative for chest pain and leg swelling.   Gastrointestinal: Positive for abdominal distention. Negative for abdominal pain, constipation, diarrhea and nausea.        HX of paracentesis on 8/3/18. Pt reports abdomen less distended    Genitourinary: Negative for dysuria.   Musculoskeletal: Positive for arthralgias. Negative for myalgias.        Left knee   Skin: Negative for color change, rash and wound.   Neurological: Negative for dizziness, weakness and numbness.   Psychiatric/Behavioral: Negative for agitation, behavioral problems and sleep disturbance.       .  Vitals:    09/13/18 1242   BP: 108/68   Pulse: (!) 110   Resp: 22   Temp: 97.5  F (36.4  C)   SpO2: 100%   Weight: 178 lb (80.7 kg)       Physical Exam   Constitutional: He appears well-developed and well-nourished.   HENT:   Head: Normocephalic.   Eyes: Conjunctivae are normal.   Neck: Normal range of motion.   Cardiovascular: Normal rate, regular rhythm and normal heart sounds.    No murmur heard.  Pulmonary/Chest: Breath sounds normal. No respiratory distress. He has no wheezes. He has no rales.   Abdominal: Soft. Bowel sounds are  normal. He exhibits no distension. There is no tenderness.   Musculoskeletal: He exhibits no edema.   Decreased ROM left arm  Reports left knee pain 7/10   Neurological: He is alert.   Alert and oriented to name, facility and situation, He was negative for date.    Skin: Skin is warm.   Psychiatric: He has a normal mood and affect. His behavior is normal.         LABS:   Recent Results (from the past 240 hour(s))   Magnesium   Result Value Ref Range    Magnesium 0.9 (LL) 1.8 - 2.6 mg/dL   Potassium   Result Value Ref Range    Potassium 3.8 3.5 - 5.0 mmol/L   Basic Metabolic Panel   Result Value Ref Range    Sodium 133 (L) 136 - 145 mmol/L    Potassium 3.9 3.5 - 5.0 mmol/L    Chloride 95 (L) 98 - 107 mmol/L    CO2 27 22 - 31 mmol/L    Anion Gap, Calculation 11 5 - 18 mmol/L    Glucose 91 70 - 125 mg/dL    Calcium 10.2 8.5 - 10.5 mg/dL    BUN 28 (H) 8 - 22 mg/dL    Creatinine 0.91 0.70 - 1.30 mg/dL    GFR MDRD Af Amer >60 >60 mL/min/1.73m2    GFR MDRD Non Af Amer >60 >60 mL/min/1.73m2   Magnesium   Result Value Ref Range    Magnesium 1.6 (L) 1.8 - 2.6 mg/dL   Magnesium   Result Value Ref Range    Magnesium 2.0 1.8 - 2.6 mg/dL   Renal Function Profile   Result Value Ref Range    Albumin 2.6 (L) 3.5 - 5.0 g/dL    Calcium 10.6 (H) 8.5 - 10.5 mg/dL    Phosphorus 6.3 (H) 2.5 - 4.5 mg/dL    Glucose 90 70 - 125 mg/dL    BUN 34 (H) 8 - 22 mg/dL    Creatinine 0.97 0.70 - 1.30 mg/dL    Sodium 133 (L) 136 - 145 mmol/L    Potassium 4.3 3.5 - 5.0 mmol/L    Chloride 95 (L) 98 - 107 mmol/L    CO2 26 22 - 31 mmol/L    Anion Gap, Calculation 12 5 - 18 mmol/L    GFR MDRD Af Amer >60 >60 mL/min/1.73m2    GFR MDRD Non Af Amer >60 >60 mL/min/1.73m2   Albumin   Result Value Ref Range    Albumin 2.9 (L) 3.5 - 5.0 g/dL   BNP(B-type Natriuretic Peptide)   Result Value Ref Range     (H) 0 - 55 pg/mL   Comprehensive Metabolic Panel   Result Value Ref Range    Sodium 133 (L) 136 - 145 mmol/L    Potassium 4.7 3.5 - 5.0 mmol/L    Chloride  97 (L) 98 - 107 mmol/L    CO2 24 22 - 31 mmol/L    Anion Gap, Calculation 12 5 - 18 mmol/L    Glucose 88 70 - 125 mg/dL    BUN 45 (H) 8 - 22 mg/dL    Creatinine 1.53 (H) 0.70 - 1.30 mg/dL    GFR MDRD Af Amer 56 (L) >60 mL/min/1.73m2    GFR MDRD Non Af Amer 46 (L) >60 mL/min/1.73m2    Bilirubin, Total 1.5 (H) 0.0 - 1.0 mg/dL    Calcium 11.2 (H) 8.5 - 10.5 mg/dL    Protein, Total 9.1 (H) 6.0 - 8.0 g/dL    Albumin 2.9 (L) 3.5 - 5.0 g/dL    Alkaline Phosphatase 219 (H) 45 - 120 U/L    AST 42 (H) 0 - 40 U/L    ALT 25 0 - 45 U/L   Magnesium   Result Value Ref Range    Magnesium 2.2 1.8 - 2.6 mg/dL   Ammonia   Result Value Ref Range    Ammonia 55 (H) 11 - 35 umol/L   HM1 (CBC with Diff)   Result Value Ref Range    WBC 10.6 4.0 - 11.0 thou/uL    RBC 3.04 (L) 4.40 - 6.20 mill/uL    Hemoglobin 9.0 (L) 14.0 - 18.0 g/dL    Hematocrit 27.1 (L) 40.0 - 54.0 %    MCV 89 80 - 100 fL    MCH 29.6 27.0 - 34.0 pg    MCHC 33.2 32.0 - 36.0 g/dL    RDW 15.3 (H) 11.0 - 14.5 %    Platelets 425 140 - 440 thou/uL    MPV 9.8 8.5 - 12.5 fL    Neutrophils % 60 50 - 70 %    Lymphocytes % 20 20 - 40 %    Monocytes % 13 (H) 2 - 10 %    Eosinophils % 6 0 - 6 %    Basophils % 1 0 - 2 %    Neutrophils Absolute 6.4 2.0 - 7.7 thou/uL    Lymphocytes Absolute 2.1 0.8 - 4.4 thou/uL    Monocytes Absolute 1.4 (H) 0.0 - 0.9 thou/uL    Eosinophils Absolute 0.6 (H) 0.0 - 0.4 thou/uL    Basophils Absolute 0.1 0.0 - 0.2 thou/uL     Current Outpatient Prescriptions   Medication Sig     acetaminophen (TYLENOL) 325 MG tablet Take 2 tablets (650 mg total) by mouth every 6 (six) hours as needed.     bisacodyl (DULCOLAX) 10 mg suppository 1 [place] q day prn.     bumetanide (BUMEX) 2 MG tablet Take 1 tablet (2 mg total) by mouth 2 (two) times a day at 9am and 6pm. (Patient taking differently: Take 1 mg by mouth 2 (two) times a day at 9am and 6pm. )     lactulose (ENULOSE) 10 gram/15 mL (15 mL) Take 10 g by mouth 2 (two) times a day.     magnesium oxide (MAG-OX) 400 mg (241.3  mg magnesium) tablet Take 1 tablet (400 mg total) by mouth 2 (two) times a day.     melatonin 3 mg Tab tablet Take 1 tablet (3 mg total) by mouth at bedtime as needed.     miconazole nitrate (CRITIC-AID AF) 2 % Oint ointment Apply to perineum bid.     omeprazole (PRILOSEC) 20 MG capsule Take 1 capsule (20 mg total) by mouth 2 (two) times a day before meals.     potassium chloride (K-DUR,KLOR-CON) 20 MEQ tablet Take 1 tablet (20 mEq total) by mouth 2 (two) times a day with meals.     QUEtiapine (SEROQUEL) 25 MG tablet Take 1 tablet (25 mg total) by mouth 4 (four) times a day.     ramelteon (ROZEREM) 8 mg tablet Take 1 tablet (8 mg total) by mouth at bedtime.     rifAXIMin (XIFAXAN) 550 mg Tab tablet Take 1 tablet (550 mg total) by mouth 2 (two) times a day.     spironolactone (ALDACTONE) 50 MG tablet Take 1 tablet (50 mg total) by mouth 2 (two) times a day at 9am and 6pm. (Patient taking differently: Take 25 mg by mouth 2 (two) times a day at 9am and 6pm. )     thiamine 100 MG tablet Take 1 tablet (100 mg total) by mouth daily.     traMADol (ULTRAM) 50 mg tablet Take 25 mg by mouth every 8 (eight) hours as needed for pain. For 7 days     vit B comp no.3-folic-C-biotin (NEPHRO-FRANCISCO) 1- mg-mg-mcg Tab tablet Take 1 tablet by mouth daily.     ASSESSMENT:      ICD-10-CM    1. Acute renal failure, unspecified acute renal failure type (H) N17.9    2. Acute liver failure without hepatic coma K72.00    3. Left knee pain M25.562    4. Cognitive and behavioral changes R41.89     R46.89    5. Hyponatremia E87.1        PLAN:    Daily weights and adjust diuretics as approriate  Encephalopathy- improving no longer on lactulose.ammonia level increasing to 55  Hyponatremia- continue fluid restriction, monitor labs, appears chronic continues at 133.  Hypomagnesemia- resolved mag 2.2 on 9/13/18 per hospital records.  Hypokalemia- resolved last 47 on 9/1318  Tachycardia- monitor  Elevated creatinine- decrease spironolactone to 25  mg bid and Bumex to 1 mg two times a day, Check BMP on Saturday   Left knee pain- tramadol 25 mg q 8 hrs prn x 7 days, x-ray 2 views      Electronically signed by: Irina Rubio CNP

## 2021-06-21 NOTE — LETTER
Letter by Terrence Cordova MD at      Author: Terrence Cordova MD Service: -- Author Type: --    Filed:  Encounter Date: 10/15/2020 Status: (Other)         Aayush KIMANI Pallavipaulina  [address] [place] [id number]      October 15, 2020      Dear Aayush,    This letter is to remind you that you will be due for your follow up appointment with Dr. Terrence Cordova in November, 2020. To help ensure you are in the best health possible, a regular follow-up with your cardiologist is essential.     Please call our Patient Scheduling Line at [phone] to schedule your appointment at your earliest convenience.  If you have recently scheduled an appointment, please disregard this letter.    We look forward to seeing you again. As always, we are available at the number  above for any questions or concerns you may have.      Sincerely,     The Physicians and Staff of [facility]

## 2021-06-26 NOTE — PROGRESS NOTES
Progress Notes by Parveen Callaway MD at 9/19/2018  3:45 PM     Author: Parveen Callaway MD Service: -- Author Type: Physician    Filed: 9/19/2018  3:51 PM Encounter Date: 9/19/2018 Status: Signed    : Parveen Callaway MD (Physician)       Code Status:  FULL CODE  Visit Type: Problem Visit (Weakness/balance/confusion/hypercalcemia/renal insufficiency)     Facility:  [facility] [[id number]]        Facility Type: SNF (Skilled Nursing Facility, TCU)    History of Present Illness: Aayush García is a 63 y.o. male I am seeing back in follow-up of her lab work and several questions.  He reports is getting stronger therapy is getting better he does not like taking Seroquel as it makes him [place].  He feels he is not having any active hallucinations or fantasies for which this was started for.  Additionally pharmacy is asked about length of coverage for the rifaximin    Review of Systems     Physical Exam   Constitutional:   Bedbound.  Lungs are clear heart is regular rate.  No montse jaundice or edema   Vitals reviewed.      Labs:  All labs reviewed in the nursing home record.   9/19/18  8:00 AM   9/15/18  5:10 AM   9/13/18  7:15 AM   9/7/18  6:50 AM   9/6/18  7:23 AM        Sodium 136 - 145 mmol/L 130 (L) 130 (L) 133 (L) 133 (L) 133 (L)    Potassium 3.5 - 5.0 mmol/L 4.4 4.8 4.7 4.3 3.9    Chloride 98 - 107 mmol/L 97 (L) 96 (L) 97 (L) 95 (L) 95 (L)    CO2 22 - 31 mmol/L 23 24 24 26 27    Anion Gap, Calculation 5 - 18 mmol/L 10 10 12 12 11    Glucose 70 - 125 mg/dL 91 81 88 90 91    Calcium 8.5 - 10.5 mg/dL 11.9 (H) 11.6 (H) 11.2 (H) 10.6 (H) 10.2    BUN 8 - 22 mg/dL 35 (H) 46 (H) 45 (H) 34 (H) 28 (H)    Creatinine 0.70 - 1.30 mg/dL 1.41 (H) 1.79 (H) 1.53 (H) 0.97 0.91    GFR MDRD Af Amer >60 mL/min/1.73m2 >60 47 (L) 56 (L) >60 >60       Assessment:  1. Hepatic encephalopathy (H)     2. Acute liver failure without hepatic coma     3. Acute renal failure, unspecified acute renal failure type (H)     4.  Hypercalcemia         Plan: At this juncture we will decrease the Seroquel from 25 4 times daily to 3 times daily for 2 days then twice daily for 2 days daily for 2 days and this continue.  Will monitor for changes in behavior and hallucinations.  Rifaximin stop date will be in 2 weeks and then will reevaluate.      25 minutes spent of which greater than 65% was face to face communication with the patient about above plan of care    Electronically signed by: Parveen Callaway MD

## 2021-06-26 NOTE — PROGRESS NOTES
"Progress Notes by April Goldsmith MD at 7/26/2018  9:35 AM     Author: April Goldsmith MD Service: Cardiology Author Type: Physician    Filed: 7/26/2018 10:53 AM Date of Service: 7/26/2018  9:35 AM Status: Signed    : April Goldsmith MD (Physician)        `        [email or web address]  [phone]             Impression and Plan     1.  Elevated troponin.  Clinical significance not entirely clear.  May represent non-ST elevation myocardial infarction.  Patient with multiple medical issues and suspect this may reflect \"demand ischemia\" ( i.e. type 2 MI secondary to ischemia due to either an increased oxygen demand or a decreased supply in the absence of an acute primary coronary thrombotic event).  ECG is without prominent ST changes.    Would advocate supportive measures and conservative management from a cardiac standpoint for now (i.e. would not advocate direct angiography at this time).    2.  Cardiomyopathy.  Echocardiogram yesterday revealed ejection fraction of 25-30% with mid to distal anterior, anteroseptal, and apical hypokinesis.  This may be related to underlying ischemic heart disease, however, echocardiographic features would also be consistent with stress-induced cardiomyopathy (Takot-subo myopathy).  ECG with lack of evidence of anterior infarct/injury would be supportive of stress-induced cardiomyopathy.    3. Acute kidney injury/renal insufficiency/lactic acidosis.  Management plan as outlined by other providers.     4.  Hypotension/sepsis.  Agree with current management as outlined by Critical Care Medicine.     Primary Cardiologist:  Dr. April Goldsmith (initial consultation this admission)ogist:     Subjective     Patient intubated.    Cardiac Diagnostics   Telemetry (personally reviewed): This rhythm.    Echocardiogram 25 July 2018:   1. Mild left ventricular enlargement with moderate to severe depression in left ventricular systolic performance.  " "Ejection fraction 25-30%.    2. There is mid to distal anterior, anteroseptal, and apical hypokinesis.  3. Mild to moderate aortic stenosis.  4. Mild aortic insufficiency.  5. Normal right ventricular size and systolic performance.  6. Mild left atrial enlargement.  Right atrium of normal dimension.  7. Moderate aortic root enlargement.    Twelve-lead ECG (personally reviewed) 25 July 2018: Sinus rhythm.  Nonspecific T-wave changes.     Twelve-lead ECG (personally reviewed) 24 July 2018: Sinus rhythm.  Nonspecific T-wave changes.    Physical Examination       /49  Pulse (!) 59  Temp 98.5  F (36.9  C) (Oral)   Resp 22  Ht 5' 8\" (1.727 m)  Wt 212 lb 4.9 oz (96.3 kg)  SpO2 96%  BMI 32.28 kg/m2      212 lb 4.9 oz (96.3 kg)        Intake/Output Summary (Last 24 hours) at 07/26/18 0935  Last data filed at 07/26/18 0904   Gross per 24 hour   Intake           7825.6 ml   Output              515 ml   Net           7310.6 ml       Patient is intubated.No significant adenopathy/thyromegally appreciated. Lungs feel a few coarse bronchogenic breath sounds.  On cardiovascular exam, the patient has a regular S1 and S2.  Heart sounds are distant.  Abdomen is soft . Extremities reveal no clubbing, cyanosis, warm extremities.         Imaging      Chest radiograph 25 July 2018:  1. Heart is slightly enlarged.   2. Tip of ETT located 3.2 cm above stacy.   3. NG tube extends below left diaphragm.   4. Right PICC catheter with tip right atrial level.   5. No pneumothorax.   6. Minimal amount of bilateral lower lung atelectasis.   7. Monitor electrodes.    Lab Results   Lab Results   Component Value Date     (L) 07/26/2018     (L) 07/26/2018    K 4.4 07/26/2018    CL 91 (L) 07/26/2018    CO2 20 (L) 07/26/2018    BUN 24 (H) 07/26/2018    CREATININE 5.09 (H) 07/26/2018    CALCIUM 8.0 (L) 07/26/2018     Lab Results   Component Value Date    WBC 13.7 (H) 07/26/2018    HGB 7.1 (L) 07/26/2018    HCT 20.3 (L) 07/26/2018 "    MCV 99 07/26/2018     (L) 07/26/2018     Lab Results   Component Value Date    CHOL 146 02/12/2018    TRIG 76 02/12/2018    HDL 35 (L) 02/12/2018    LDLCALC 96 02/12/2018     Lab Results   Component Value Date    INR 1.69 (H) 07/26/2018     Lab Results   Component Value Date    CKTOTAL 762 () 07/26/2018    CKTOTAL 649 () 07/25/2018    CKTOTAL 606 () 07/25/2018    CKMB 24 () 07/25/2018    TROPONINI 7.61 (HH) 07/26/2018    TROPONINI 8.47 () 07/25/2018    TROPONINI 5.17 () 07/25/2018     Lab Results   Component Value Date    TSH 1.45 07/24/2018           Current Inpatient Scheduled Medications   Scheduled Meds:  ? albumin human  25 g Intravenous Q6H   ? cefepime (MAXIPIME) IV  500 mg Intravenous Q24H   ? chlorhexidine  15 mL Topical Q12H   ? folic acid  1 mg Oral DAILY    Or   ? folic acid  1 mg Intramuscular DAILY   ? hydrocortisone sod succ  50 mg Intravenous Q8H   ? insulin aspart (NovoLOG) injection   Subcutaneous Q4H FIXED TIMES   ? metroNIDAZOLE  500 mg Intravenous Q12H   ? multivitamin therapeutic  1 tablet Oral DAILY   ? pantoprazole  40 mg Intravenous Q12H   ? senna-docusate  1 tablet Oral BID    Or   ? senna (SENOKOT) syrup  8.8 mg Enteral Tube BID   ? sodium chloride  10-30 mL Intravenous Q8H FIXED TIMES   ? sodium chloride  3 mL Intravenous Line Care   ? sodium chloride  3 mL Intravenous Line Care   ? thiamine  100 mg Oral DAILY    Or   ? thiamine  100 mg Intramuscular DAILY   ? vancomycin intermittent dosing   Other Med Consult or Protocol     Continuous Infusions:  ? dexmedetomidine 400 mcg/100 mL in NS (PRECEDEX) (4mcg/mL) 1.5 mcg/kg/hr (07/26/18 0859)   ? norepinephrine IV infusion in NS Stopped (07/25/18 1733)   ? propofol 10 mcg/kg/min (07/26/18 0848)   ? vasopressin Stopped (07/25/18 2019)            Medications Prior to Admission   Prior to Admission medications    Medication Sig Start Date End Date Taking? Authorizing Provider   aspirin 81 MG EC tablet Take 81 mg by mouth  daily.   Yes PROVIDER, HISTORICAL   lisinopril (PRINIVIL,ZESTRIL) 10 MG tablet Take 10 mg by mouth daily.   Yes PROVIDER, HISTORICAL   multivitamin therapeutic tablet Take 1 tablet by mouth daily.   Yes PROVIDER, HISTORICAL   omeprazole (PRILOSEC) 40 MG capsule Take 40 mg by mouth daily before breakfast. 6/27/18  Yes PROVIDER, HISTORICAL   rosuvastatin (CRESTOR) 10 MG tablet Take 10 mg by mouth daily. 7/17/18  Yes PROVIDER, HISTORICAL

## 2021-06-26 NOTE — PROGRESS NOTES
"Progress Notes by April Goldsmith MD at 7/27/2018  7:40 AM     Author: April Goldsmith MD Service: Cardiology Author Type: Physician    Filed: 7/27/2018  9:01 AM Date of Service: 7/27/2018  7:40 AM Status: Signed    : April Goldsmith MD (Physician)        `        [email or web address]  [phone]             Impression and Plan     1.  Elevated troponin.  May represent non-ST elevation myocardial infarction.  Patient with multiple medical issues and suspect this may reflect \"demand ischemia\" ( i.e. type 2 MI secondary to ischemia due to either an increased oxygen demand or a decreased supply in the absence of an acute primary coronary thrombotic event).  ECGs have been without prominent ST changes.    Would continue to advocate supportive measures and conservative management from a cardiac standpoint for now.    2.  Cardiomyopathy.  Echocardiogram 25 July 2018 revealed ejection fraction of 25-30% with mid to distal anterior, anteroseptal, and apical hypokinesis.  This may be related to underlying ischemic heart disease, however, echocardiographic features would also be consistent with stress-induced cardiomyopathy (Takot-subo myopathy).  ECGs with lack of evidence of anterior infarct/injury would be supportive of stress-induced cardiomyopathy.    Will repeat ECG this morning.    3. Acute kidney injury/renal insufficiency/lactic acidosis.  Management plan as outlined by other providers.     4.  Hypotension/sepsis.  Agree with current management as outlined by Critical Care Medicine.    5.  Ethanol withdrawal.  Management plan as outlined.    6.  Renal failure.  Hemodialysis per nephrology.     Primary Cardiologist:  Dr. April Goldsmith (initial consultation this admission)    Subjective     Patient somnolent and difficult to arouse peer    Cardiac Diagnostics   Telemetry (personally reviewed): This rhythm.    Echocardiogram 25 July 2018:   1. Mild left ventricular enlargement " "with moderate to severe depression in left ventricular systolic performance.  Ejection fraction 25-30%.    2. There is mid to distal anterior, anteroseptal, and apical hypokinesis.  3. Mild to moderate aortic stenosis.  4. Mild aortic insufficiency.  5. Normal right ventricular size and systolic performance.  6. Mild left atrial enlargement.  Right atrium of normal dimension.  7. Moderate aortic root enlargement.    Twelve-lead ECG (personally reviewed) 25 July 2018: Sinus rhythm.  Nonspecific T-wave changes.     Twelve-lead ECG (personally reviewed) 24 July 2018: Sinus rhythm.  Nonspecific T-wave changes.    Physical Examination       /63 (Patient Position: Lying)  Pulse 90  Temp 98.4  F (36.9  C) (Oral)   Resp (!) 36  Ht 5' 8\" (1.727 m)  Wt 210 lb 1.6 oz (95.3 kg)  SpO2 94%  BMI 31.95 kg/m2      210 lb 1.6 oz (95.3 kg)          Intake/Output Summary (Last 24 hours) at 07/27/18 0740  Last data filed at 07/27/18 0600   Gross per 24 hour   Intake          2906.31 ml   Output             2093 ml   Net           813.31 ml       Patient somnolent and difficult to arouse.No significant adenopathy/thyromegally appreciated. Lungs with coarse bronchogenic breath sounds.  On cardiovascular exam, the patient has a regular S1 and S2.  Heart sounds are distant.  Abdomen is soft . Extremities reveal no clubbing, cyanosis, with warm extremities.         Imaging      Chest radiograph 25 July 2018:  1. Heart is slightly enlarged.   2. Tip of ETT located 3.2 cm above stacy.   3. NG tube extends below left diaphragm.   4. Right PICC catheter with tip right atrial level.   5. No pneumothorax.   6. Minimal amount of bilateral lower lung atelectasis.   7. Monitor electrodes.    Lab Results   Lab Results   Component Value Date     (L) 07/27/2018    K 4.6 07/27/2018    CL 99 07/27/2018    CO2 21 (L) 07/27/2018    BUN 21 07/27/2018    CREATININE 4.01 (H) 07/27/2018    CALCIUM 7.8 (L) 07/27/2018     Lab Results "   Component Value Date    WBC 15.1 (H) 07/27/2018    HGB 6.8 (LL) 07/27/2018    HCT 20.1 (L) 07/27/2018     (H) 07/27/2018     (L) 07/27/2018     Lab Results   Component Value Date    CHOL 146 02/12/2018    TRIG 76 02/12/2018    HDL 35 (L) 02/12/2018    LDLCALC 96 02/12/2018     Lab Results   Component Value Date    INR 1.65 (H) 07/27/2018     Lab Results   Component Value Date    CKTOTAL 1612 () 07/27/2018    CKTOTAL 762 () 07/26/2018    CKTOTAL 649 () 07/25/2018    CKMB 24 () 07/25/2018    TROPONINI 7.61 () 07/26/2018    TROPONINI 8.47 () 07/25/2018    TROPONINI 5.17 () 07/25/2018     Lab Results   Component Value Date    TSH 1.45 07/24/2018           Current Inpatient Scheduled Medications   Scheduled Meds:  ? albumin human  25 g Intravenous Q6H   ? cefepime (MAXIPIME) IV  500 mg Intravenous Q24H   ? folic acid  1 mg Enteral Tube DAILY    Or   ? folic acid  1 mg Intramuscular DAILY   ? hydrocortisone sod succ  50 mg Intravenous Q8H   ? insulin aspart (NovoLOG) injection   Subcutaneous Q4H FIXED TIMES   ? ipratropium-albuterol  3 mL Nebulization QID - RT   ? metroNIDAZOLE  500 mg Intravenous Q12H   ? multivitamin therapeutic  1 tablet Enteral Tube DAILY   ? pantoprazole  40 mg Intravenous Q24H   ? senna-docusate  1 tablet Oral BID    Or   ? senna (SENOKOT) syrup  8.8 mg Enteral Tube BID   ? sodium chloride  10-30 mL Intravenous Q8H FIXED TIMES   ? sodium chloride  3 mL Intravenous Line Care   ? sodium chloride  3 mL Intravenous Line Care   ? thiamine  100 mg Enteral Tube DAILY    Or   ? thiamine  100 mg Intramuscular DAILY   ? vancomycin intermittent dosing   Other Med Consult or Protocol     Continuous Infusions:  ? dexmedetomidine 400 mcg/100 mL in NS (PRECEDEX) (4mcg/mL) 1.1 mcg/kg/hr (07/27/18 0726)   ? norepinephrine IV infusion in NS Stopped (07/25/18 1733)   ? vasopressin Stopped (07/25/18 2019)            Medications Prior to Admission   Prior to Admission medications     Medication Sig Start Date End Date Taking? Authorizing Provider   aspirin 81 MG EC tablet Take 81 mg by mouth daily.   Yes PROVIDER, HISTORICAL   lisinopril (PRINIVIL,ZESTRIL) 10 MG tablet Take 10 mg by mouth daily.   Yes PROVIDER, HISTORICAL   multivitamin therapeutic tablet Take 1 tablet by mouth daily.   Yes PROVIDER, HISTORICAL   omeprazole (PRILOSEC) 40 MG capsule Take 40 mg by mouth daily before breakfast. 6/27/18  Yes PROVIDER, HISTORICAL   rosuvastatin (CRESTOR) 10 MG tablet Take 10 mg by mouth daily. 7/17/18  Yes PROVIDER, HISTORICAL

## 2021-06-27 NOTE — PROGRESS NOTES
Progress Notes by Madeline Avila CNP at 7/16/2019  7:50 AM     Author: Madeline Avila CNP Service: -- Author Type: Nurse Practitioner    Filed: 7/16/2019  8:26 AM Encounter Date: 7/16/2019 Status: Signed    : Madeline Avila CNP (Nurse Practitioner)           Click to link to [facility] HEART CARE NOTE      Assessment/Recommendations   Assessment:    1. Ischemic cardiomyopathy with systolic dysfunction, NYHA class I: Compensated.  No signs or symptoms of fluid retention.  His weights have remained stable at home.  He continues to follow a low-sodium diet.  We discussed the results of his echocardiogram again.  I briefly discussed that he qualifies for an ICD which is recommended.  He refuses to get an ICD.  I mentioned that he is at a higher risk of sudden cardiac death with his reduced ejection fraction.  He still refuses at this time.  We discussed medication titration.    2.  Hypertension: Controlled.  Blood pressure 100/60    3.  Coronary artery disease: Non-STEMI in March 2019 with PCI to his LAD.  He is on dual antiplatelet therapy with Brilinta and aspirin.  He continues Lipitor 80 mg daily.  Denies any chest pain    Plan:  1.   Heart failure medications:  - Beta blocker therapy with carvedilol increased to 15.625 mg twice a day  - ARB therapy with losartan 25 mg daily  - Diuretic therapy with furosemide 20 mg daily  2.  Continue daily weights and low-sodium diet  3.  Encouraged regular exercise    Aayush García will follow up with Dr. Cordova July 29 and in the heart failure clinic in 6 weeks.     History of Present Illness    Mr. Aayush García is a 63 y.o. male seen at [facility] heart failure clinic today for continued follow-up.   His wife accompanies him today.  He follows up for ischemic cardiomyopathy with systolic dysfunction.  He had an echocardiogram June 4, 2019 which showed an ejection fraction of 30 to 35% with moderate to severe  aortic stenosis.  This is improved from 22% in March.  He has a past medical history significant for hypertension, coronary artery disease with non-STEMI, hyperlipidemia, and nonsustained ventricular tachycardia.  He had a PCI to the LAD.  He used to smoke cigarettes but has quit.    During the last clinic visit, I increased his carvedilol to 12.5 mg twice a day.  He tolerated this well.  He denies any acute heart failure symptoms.  He denies dyspnea on exertion, orthopnea, or PND.  He denies fatigue, dizziness, or lightheadedness.  He denies chest pain.  He denies fatigue, lightheadedness, shortness of breath, dyspnea on exertion, orthopnea, PND, palpitations, chest pain, abdominal fullness/bloating and lower extremity edema.      He is monitoring home weights which are stable around 170 pounds.  He is following a low sodium diet.      ECHO 6/4/2019:   Summary     1. The left ventricle is mildly enlarged. Left ventricular systolic performance is moderately reduced. The ejection fraction is estimated to be 30-35%.   2. There is moderate global reduction in left ventricular systolic performance. There is mild concentric increase in left ventricular wall thickness.  3. There is moderate to severe aortic stenosis.    The mean gradient is measured at 27 mmHg with peak velocity of 3.2 m/s.  Calculated valve area 1.1 cm . VRVTI = 0.3. VRvel = 0.3.   4. There is mild aortic insufficiency.   5. Normal right ventricular size and systolic performance.   6. There is mild left atrial enlargement.           Physical Examination Review of Systems   Vitals:    07/16/19 0802   BP: 100/60   Pulse: (!) 56   Resp: 16     Body mass index is 25.55 kg/m .  Wt Readings from Last 3 Encounters:   07/16/19 173 lb (78.5 kg)   06/13/19 176 lb (79.8 kg)   06/04/19 178 lb (80.7 kg)       General Appearance:     Alert, cooperative and in no acute distress.   ENT/Mouth: membranes moist, no oral lesions or bleeding gums.      EYES:  no scleral  icterus, normal conjunctivae   Neck: no carotid bruits or thyromegaly   Chest/Lungs:   lungs are clear to auscultation, no rales or wheezing, respirations unlabored   Cardiovascular:   Regular. Normal first and second heart sounds with no murmurs, rubs, or gallops; the carotid, radial and posterior tibial pulses are intact, Jugular venous pressure normal, no edema    Abdomen:  Soft, nontender, nondistended, bowel sounds present   Extremities: no cyanosis or clubbing   Skin: warm, dry.    Neurologic: mood and affect are appropriate, alert and oriented x3      General: WNL  Eyes: WNL  Ears/Nose/Throat: WNL  Lungs: WNL  Heart: WNL  Stomach: WNL  Bladder: WNL  Muscle/Joints: WNL  Skin: WNL  Nervous System: WNL  Mental Health: WNL     Blood: WNL     Medical History  Surgical History Family History Social History   Past Medical History:   Diagnosis Date   ? Acute liver failure 8/5/2018   ? Acute renal failure, unspecified acute renal failure type (H) 08/05/2018   ? Acute respiratory failure with hypoxia (H) 08/05/2018   ? Alcoholic hepatitis with ascites 08/05/2018   ? Bacteremia due to Klebsiella pneumoniae    ? Essential hypertension    ? Gastroesophageal reflux disease without esophagitis 10/31/2018   ? Hepatic encephalopathy (H) 08/05/2018   ? Macrocytic anemia    ? Stress-induced cardiomyopathy 08/05/2018    Past Surgical History:   Procedure Laterality Date   ? COLONOSCOPY N/A 3/20/2018    Procedure: COLONOSCOPY;  Surgeon: Adam Nicole III, MD;  Location: [facility];  Service:    ? CV CORONARY ANGIOGRAM N/A 3/18/2019    Procedure: Coronary Angiogram;  Surgeon: Timi Rodriguez MD;  Location: [facility] Cath Lab;  Service: Cardiology   ? CV LEFT HEART CATHETERIZATION WO LEFT VETRICULOGRAM Left 3/18/2019    Procedure: Left Heart Catheterization Without Left Ventriculogram;  Surgeon: Timi Rodriguez MD;  Location: [facility] Cath Lab;  Service: Cardiology   ? PICC  7/24/2018        ? PICC  3/18/2019          Family History   Problem Relation Age of Onset   ? Heart disease Father 76        type unknown to Aayush; his father  at home   ? Alzheimer's disease Mother 86        lives in long term care   ? No Medical Problems Son    ? No Medical Problems Son     Social History     Socioeconomic History   ? Marital status: Domestic Partner     Spouse name: Karlie Chisholm (АННА)   ? Number of children: Not on file   ? Years of education: Not on file   ? Highest education level: Not on file   Occupational History     Employer: [employer]   Social Needs   ? Financial resource strain: Not on file   ? Food insecurity:     Worry: Not on file     Inability: Not on file   ? Transportation needs:     Medical: Not on file     Non-medical: Not on file   Tobacco Use   ? Smoking status: Former Smoker     Types: Cigarettes     Last attempt to quit: 2019     Years since quittin.2   ? Smokeless tobacco: Never Used   ? Tobacco comment: pack a week   Substance and Sexual Activity   ? Alcohol use: Yes     Frequency: 4 or more times a week     Drinks per session: 1 or 2     Comment: 350 ml of peppermint schnapps daily per [facility]se h,  H&P per report of Karlie JJ to Dr. Valle   ? Drug use: No   ? Sexual activity: Not on file   Lifestyle   ? Physical activity:     Days per week: Not on file     Minutes per session: Not on file   ? Stress: Not on file   Relationships   ? Social connections:     Talks on phone: Not on file     Gets together: Not on file     Attends Hoahaoism service: Not on file     Active member of club or organization: Not on file     Attends meetings of clubs or organizations: Not on file     Relationship status: Not on file   ? Intimate partner violence:     Fear of current or ex partner: Not on file     Emotionally abused: Not on file     Physically abused: Not on file     Forced sexual activity: Not on file   Other Topics Concern   ? Not on file   Social History Narrative    Works in lumber sales. Lives with his SO,  Karlie Chisholm.          Medications  Allergies   Current Outpatient Medications   Medication Sig Dispense Refill   ? acetaminophen (TYLENOL) 325 MG tablet Take 2 tablets (650 mg total) by mouth every 6 (six) hours as needed.  0   ? aspirin 81 MG EC tablet Take 81 mg by mouth daily.     ? atorvastatin (LIPITOR) 80 MG tablet Take 1 tablet (80 mg total) by mouth daily. 90 tablet 3   ? b complex vitamins tablet Take 1 tablet by mouth daily.     ? cholecalciferol, vitamin D3, (VITAMIN D3) 2,000 unit Tab Take 2,000 Units by mouth daily.     ? ferrous sulfate 325 (65 FE) MG tablet Take 1 tablet (325 mg total) by mouth 2 (two) times a day with meals. 60 tablet 0   ? folic acid (FOLVITE) 1 MG tablet Take 1 tablet (1 mg total) by mouth daily. 30 tablet 0   ? furosemide (LASIX) 20 MG tablet Take 1 tablet (20 mg total) by mouth daily. 30 tablet 11   ? losartan (COZAAR) 50 MG tablet Take 0.5 tablets (25 mg total) by mouth daily. 90 tablet 3   ? magnesium oxide (MAG-OX) 400 mg (241.3 mg magnesium) tablet Take 1 tablet (400 mg total) by mouth 2 (two) times a day.  0   ? multivitamin therapeutic tablet Take 1 tablet by mouth daily.     ? omeprazole (PRILOSEC) 40 MG capsule Take 40 mg by mouth daily before breakfast.     ? potassium chloride (K-DUR,KLOR-CON) 20 MEQ tablet Take 20 mEq by mouth daily.     ? thiamine (VITAMIN B-1) 100 MG tablet Take 100 mg by mouth daily.     ? ticagrelor (BRILINTA) 90 mg Tab Take 1 tablet (90 mg total) by mouth 2 (two) times a day. 180 tablet 3   ? carvedilol (COREG) 12.5 MG tablet Take 15.625 mg twice a day. Take one 12.5 mg with one 3.125 mg twice a day 180 tablet 3   ? carvedilol (COREG) 3.125 MG tablet Take 15.625 mg twice a day, take one 12.5 mg with one 3.125 mg twice a day 180 tablet 3     No current facility-administered medications for this visit.       No Known Allergies      Lab Results    Chemistry CBC BNP   Lab Results   Component Value Date    CREATININE 0.84 04/04/2019    BUN 13  04/04/2019     04/04/2019    K 4.8 04/04/2019     (H) 04/04/2019    CO2 25 04/04/2019     Creatinine (mg/dL)   Date Value   04/04/2019 0.84   03/21/2019 1.13   03/20/2019 1.20   03/19/2019 1.01    Lab Results   Component Value Date    WBC 8.3 03/21/2019    HGB 7.7 (L) 03/21/2019    HCT 25.5 (L) 03/21/2019    MCV 75 (L) 03/21/2019     03/21/2019     03/21/2019    Lab Results   Component Value Date     (H) 03/17/2019     BNP (pg/mL)   Date Value   03/17/2019 873 (H)   09/13/2018 167 (H)          25 minutes were spent with the patient with greater than 50% spent on education and counseling.      Madeline Avila, [facility]   Heart Failure Clinic

## 2021-06-27 NOTE — PROGRESS NOTES
Progress Notes by Loyda Bradshaw CNP at 4/4/2019  9:10 AM     Author: Loyda Bradshaw CNP Service: -- Author Type: Nurse Practitioner    Filed: 4/4/2019 10:52 AM Encounter Date: 4/4/2019 Status: Signed    : Loyda Bradshaw CNP (Nurse Practitioner)                 Click to link to [facility] Heart Care       [facility] HEART CARE NOTE      Assessment/Recommendations   1.  Coronary artery disease: He was hospitalized March 17 - March 21 with non-STEMI.  PCI with drug-eluting stent to LAD and plan to treat OM medically.  He required balloon pump following PCI.  Dual antiplatelet therapy is being used with aspirin indefinitely and ticagrelor for 1 year.  We discussed the importance of antiplatelet therapy and talking with his cardiologist prior to stopping these medications for any reason.      Risk factor modification and lifestyle management topics were discussed including managing comorbidities, weight loss, heart healthy diet, exercise, smoking cessation and alcohol use.  He has started cardiac rehab.    2.  Ischemic cardiomyopathy, heart failure with reduced ejection fraction, ejection fraction 22%: He has mild lower extremity edema.  He denies any shortness of breath.  His weight has increased about 5 pounds since discharge but he thinks this may be related to diet and lack of exercise. We reviewed heart failure diagnosis, medications, treatment plan, low sodium diet, weight monitoring, and symptom monitoring.  He met with a heart failure nurse clinician to further discuss.  We discussed the goal of medication titration for decreased ejection fraction.  This will be limited due to hypotension.  Blood pressure stable today at 112/70.  However, he had significant lightheadedness and hypotension when losartan was started during hospitalization.  If blood pressure remains stable at next clinic appointment, will try losartan at low dose.    3.  Dyslipidemia: Aayush García is on high  intensity statin therapy with atorvastatin 80 mg daily.  LDL 96.  I recommended rechecking cholesterol in 3 months and if LDL is still elevated, change to rosuvastatin.  We discussed a diet low in saturated fat, weight loss, and exercise along with medication for better control of cholesterol.     4.  Hypertension: Blood pressure controlled today.  He has a tendency towards low blood pressures with new medications.    5.  Substance abuse: He is no longer using tobacco.  He has quit using alcohol.    Aayush will follow-up in the heart failure clinic in 2 weeks and with Dr. Cordova in May.     History of Present Illness    Aayush García is seen at [facility] for post coronary intervention follow up.  He has a history of hypertension, peripheral arterial disease, tobacco use, alcohol use, and alcoholic hepatitis.  He was hospitalized March 17 - March 21 with non-STEMI.  PCI with drug-eluting stent to LAD and plan to treat OM medically.  He required balloon pump following PCI.  Dual antiplatelet therapy is being used with aspirin indefinitely and ticagrelor for 1 year.  Echocardiogram on March 17, 2019 showed ejection fraction of 22%.    He denies any chest pain or shortness of breath since hospital discharge.  He is participating in cardiac rehab but is limited due to chronic knee pain.  He will be seeing his primary care provider later today for a cortisone shot.  He denies fatigue, lightheadedness, shortness of breath, dyspnea on exertion, chest pain and lower extremity edema.      His home weight has increased about 5 pounds since discharge.    Echo 3/17/19:    Mild left atrial enlargement    Left ventricle ejection fraction is severely decreased. The calculated left ventricular ejection fraction is 22% with segmental wall motion abnormalities as outlined below.    Normal right ventricular size and systolic function.    Aortic valve sclerosis without stenosis    When compared to the previous study dated  7/31/2018, there has been a significant fall in left ventricular function with regional wall motion abnormality.      Results for orders placed during the hospital encounter of 03/18/19   Cardiac Catheterization [CATH01] 03/18/2019    La García is a 63 y.o. old male with alcoholic hepatitis with   ascites, hypertension, and prior cardiomyopathy that resolved previously   here w/ acute pulmonary failure, cardiogenic shock.    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   Would manage OM1  medically unless has intractable angina or no   improvement of cardiogenic shock  - ASA 81mg daily indefinitely, ticagrelor 180mg once, followed by 90mg   twice daily for at least 12 months  - start diuresis, furosemide 40mg IV once  - atorva 40 in view of liver disease  - continue aggressive risk factor modification      Findings:  LAD:seperate ostium, large, severe Ca2+ 95% lesion, tortuous Ca2+ mLAD  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 OM1 is subtotally   occluded v.  w/ bridging collaterals  RCA:dominant, mildly irregular    LVEDP:39  AV gradient: P/M 35/25 simultaneous    Access:  R Femoral artery  L Femoral artery    PCI:  After discussing case with the patient and CTS team, we placed an IABP via   LFA, then LMT was engaged w/ a 6F EBU 3.5 Guide catheter and the lesion in   LAD was wired w/ a Rotofloppy wire, over which 1.75mm mary was used to   perform 6 runs of rotational atherectomy  at 264712 RPM. Wire exchanged for   a Forte, and a 4.0x15mm NC Emerge was used at 20 ollie to pre-dilate the   lesions. Then w/ Guidezilla support, a 4.0c24mm Synergy EES was delivered   and deployed at 13 ollie. Proximally post-dilating w/ a 5.0x12mm NC Emerge,   then after IVUS, w/ 6.0x12mm NC Emerge at 7 ollie inflation. We briefly   attempted to intervene on the OM1 via a 6F MPA guide, but were unable to   wire in spite of using a Turnpike LP-supported Fielder FC wire, w/ wire   behavious consistent w/ a . ,Final angiography showed no dissection or   perforation and a KAREN 3 flow.    Closure:   RFA Manual/LFA IABP left in place    This is a complex modifier 22 case due to high risk anatomy, need for   rotational atherectomy, cardiogenic shock              Physical Examination Review of Systems   Vitals:    04/04/19 0909   BP: 112/70   Pulse: 82   Resp: 16     Body mass index is 27.48 kg/m .  Wt Readings from Last 3 Encounters:   04/04/19 186 lb 1.6 oz (84.4 kg)   03/26/19 178 lb (80.7 kg)   03/20/19 179 lb 1 oz (81.2 kg)       General Appearance:     Alert, cooperative and in no acute distress.   ENT/Mouth: membranes moist, no oral lesions or bleeding gums.      EYES:  no scleral icterus, normal conjunctivae   Chest/Lungs:   lungs are clear to auscultation, no rales or wheezing, respirations unlabored   Cardiovascular:   Regular. Normal first and second heart sounds with no murmurs, rubs, or gallops; 1+ ankle edema   Abdomen:  Soft, nontender, nondistended, bowel sounds present   Extremities: no cyanosis or clubbing   Skin:  Neurologic: warm, dry.  mood and affect are appropriate, alert and oriented x3     Puncture Site:  He reports bilateral femoral puncture sites soft with no hematoma or pain.  Radial pulses and Pedal pulses intact and symmetrical.  CMS intact.      General: WNL  Eyes: WNL  Ears/Nose/Throat: WNL  Lungs: Cough  Heart: WNL  Stomach: WNL  Bladder: WNL  Muscle/Joints: WNL  Skin: WNL  Nervous System:  WNL  Mental Health: WNL     Blood: Easy Bleeding     Medical History  Surgical History Family History Social History   Past Medical History:   Diagnosis Date   ? Acute liver failure 2018   ? Acute renal failure, unspecified acute renal failure type (H) 2018   ? Acute respiratory failure with hypoxia (H) 2018   ? Alcoholic hepatitis with ascites 2018   ? Bacteremia due to Klebsiella pneumoniae    ? Essential hypertension    ? Gastroesophageal reflux disease without esophagitis 10/31/2018   ? Hepatic encephalopathy (H) 2018   ? Macrocytic anemia    ? Stress-induced cardiomyopathy 2018    Past Surgical History:   Procedure Laterality Date   ? COLONOSCOPY N/A 3/20/2018    Procedure: COLONOSCOPY;  Surgeon: Adam Nicole III, MD;  Location: [facility] OR;  Service:    ? CV CORONARY ANGIOGRAM N/A 3/18/2019    Procedure: Coronary Angiogram;  Surgeon: Timi Rodriguez MD;  Location: [facility] Cath Lab;  Service: Cardiology   ? CV LEFT HEART CATHETERIZATION WO LEFT VETRICULOGRAM Left 3/18/2019    Procedure: Left Heart Catheterization Without Left Ventriculogram;  Surgeon: Timi Rodriguez MD;  Location: [facility] Cath Lab;  Service: Cardiology   ? PICC  2018        ? PICC  3/18/2019         Family History   Problem Relation Age of Onset   ? Heart disease Father 76        type unknown to Aayush; his father  at home   ? Alzheimer's disease Mother 86        lives in long term care   ? No Medical Problems Son    ? No Medical Problems Son     Social History     Socioeconomic History   ? Marital status: Domestic Partner     Spouse name: Karlie Chisholm (АННА)   ? Number of children: Not on file   ? Years of education: Not on file   ? Highest education level: Not on file   Occupational History     Employer: [employer]   Social Needs   ? Financial resource strain: Not on file   ? Food insecurity:     Worry: Not on file     Inability: Not on file   ? Transportation needs:     Medical: Not  on file     Non-medical: Not on file   Tobacco Use   ? Smoking status: Current Every Day Smoker     Types: Cigarettes   ? Smokeless tobacco: Never Used   ? Tobacco comment: pack a week   Substance and Sexual Activity   ? Alcohol use: Yes     Frequency: 4 or more times a week     Drinks per session: 1 or 2     Comment: 350 ml of peppermint schnapps daily per [facility], 2018 H&P per report of Karlie JJ to Dr. Valle   ? Drug use: No   ? Sexual activity: Not on file   Lifestyle   ? Physical activity:     Days per week: Not on file     Minutes per session: Not on file   ? Stress: Not on file   Relationships   ? Social connections:     Talks on phone: Not on file     Gets together: Not on file     Attends Yazdanism service: Not on file     Active member of club or organization: Not on file     Attends meetings of clubs or organizations: Not on file     Relationship status: Not on file   ? Intimate partner violence:     Fear of current or ex partner: Not on file     Emotionally abused: Not on file     Physically abused: Not on file     Forced sexual activity: Not on file   Other Topics Concern   ? Not on file   Social History Narrative    Works in [place]. Lives with his SOKarlie.          Medications  Allergies   Current Outpatient Medications   Medication Sig Dispense Refill   ? acetaminophen (TYLENOL) 325 MG tablet Take 2 tablets (650 mg total) by mouth every 6 (six) hours as needed.  0   ? aspirin 81 MG EC tablet Take 81 mg by mouth daily.     ? atorvastatin (LIPITOR) 80 MG tablet Take 1 tablet (80 mg total) by mouth daily. 30 tablet 11   ? b complex vitamins tablet Take 1 tablet by mouth daily.     ? carvedilol (COREG) 3.125 MG tablet Take 1 tablet (3.125 mg total) by mouth 2 (two) times a day. 60 tablet 11   ? cholecalciferol, vitamin D3, (VITAMIN D3) 2,000 unit Tab Take 2,000 Units by mouth daily.     ? ferrous sulfate 325 (65 FE) MG tablet Take 1 tablet (325 mg total) by mouth 2 (two) times a day with  meals. 60 tablet 0   ? folic acid (FOLVITE) 1 MG tablet Take 1 tablet (1 mg total) by mouth daily. 30 tablet 0   ? furosemide (LASIX) 20 MG tablet Take 1 tablet (20 mg total) by mouth daily. 30 tablet 11   ? magnesium oxide (MAG-OX) 400 mg (241.3 mg magnesium) tablet Take 1 tablet (400 mg total) by mouth 2 (two) times a day.  0   ? multivitamin therapeutic tablet Take 1 tablet by mouth daily.     ? omeprazole (PRILOSEC) 40 MG capsule Take 40 mg by mouth daily before breakfast.     ? potassium chloride (K-DUR,KLOR-CON) 20 MEQ tablet Take 20 mEq by mouth daily.     ? ticagrelor (BRILINTA) 90 mg Tab Take 1 tablet (90 mg total) by mouth 2 (two) times a day. 60 tablet 11     No current facility-administered medications for this visit.       No Known Allergies      Lab Results    Chemistry CBC BNP   Lab Results   Component Value Date    CREATININE 0.84 04/04/2019    BUN 13 04/04/2019     04/04/2019    K 4.8 04/04/2019     (H) 04/04/2019    CO2 25 04/04/2019     Creatinine (mg/dL)   Date Value   04/04/2019 0.84   03/21/2019 1.13   03/20/2019 1.20   03/19/2019 1.01    Lab Results   Component Value Date    WBC 8.3 03/21/2019    HGB 7.7 (L) 03/21/2019    HCT 25.5 (L) 03/21/2019    MCV 75 (L) 03/21/2019     03/21/2019     03/21/2019    Lab Results   Component Value Date     (H) 03/17/2019     BNP (pg/mL)   Date Value   03/17/2019 873 (H)   09/13/2018 167 (H)            Loyda Bradshaw, [facility]

## 2021-06-27 NOTE — PROGRESS NOTES
Progress Notes by Madeline Avila CNP at 8/27/2019  7:50 AM     Author: Madeline Avila CNP Service: -- Author Type: Nurse Practitioner    Filed: 8/27/2019  8:19 AM Encounter Date: 8/27/2019 Status: Signed    : Madeline Avila CNP (Nurse Practitioner)           Click to link to [facility] Heart [facility] HEART [facility] NOTE      Assessment/Recommendations   Assessment:    1. Ischemic cardiomyopathy with systolic dysfunction, NYHA class I: Compensated.  No signs or symptoms of fluid retention.  His weights have remained stable at home.  He continues to follow a low-sodium diet.  He is very active at his job and walks approximately 10,000 steps a day.    2.  Hypotension: Blood pressure 82/50.  He denies any dizziness or lightheadedness.  He has taken his medications this morning.    3.  Coronary artery disease: Non-STEMI in March 2019 with PCI to his LAD.  He is on dual antiplatelet therapy with Brilinta and aspirin.  He continues Lipitor 80 mg daily.  He denies any chest pain.    Plan:  1.   Heart failure medications:  - Beta blocker therapy with carvedilol 25 mg twice a day  - ARB therapy with losartan 25 mg daily  - Diuretic therapy with furosemide discontinued 20 mg daily due to hypotension and no signs or symptoms of fluid retention  2.  Monitor for signs or symptoms of fluid retention with discontinuing Lasix  3.  Continue daily weights and low-sodium diet  4.  Continue regular exercise  5.  Echocardiogram scheduled for October 22    Aayush García will follow up with Dr. Cordova October 25 and in the heart failure clinic in 4 weeks.     History of Present Illness    Mr. Aayush García is a 64 y.o. male seen at [facility] heart failure clinic today for continued follow-up. His wife accompanies him today.  He follows up for ischemic cardiomyopathy with systolic dysfunction.  He had an echocardiogram June 4, 2019 which showed an ejection fraction of 30 to 35% with moderate  to severe aortic stenosis.  This is improved from 22% in March.  He has a past medical history significant for hypertension, coronary artery disease with non-STEMI, hyperlipidemia, and nonsustained ventricular tachycardia.  He had a PCI to the LAD.  He used to smoke cigarettes but has quit.     During the last clinic visit, Dr. Cordova increased his carvedilol to 25 mg twice a day.  He states he tolerated this well.  He denies any acute heart failure symptoms today including dyspnea on exertion, orthopnea, or PND.  He denies fatigue.  He denies dizziness or lightheadedness.  He denies any abdominal bloating or lower extremity edema.  He denies chest pain.  He denies fatigue, lightheadedness, shortness of breath, dyspnea on exertion, orthopnea, PND, palpitations, chest pain, abdominal fullness/bloating and lower extremity edema.      He is monitoring home weights which are stable around 170 pounds.  He is following a low sodium diet.  He participates in regular physical activity including walking at work and usually gets 10,000 steps in each day.        ECHO 6/4/2019:   Summary     1. The left ventricle is mildly enlarged. Left ventricular systolic performance is moderately reduced. The ejection fraction is estimated to be 30-35%.   2. There is moderate global reduction in left ventricular systolic performance. There is mild concentric increase in left ventricular wall thickness.  3. There is moderate to severe aortic stenosis.    The mean gradient is measured at 27 mmHg with peak velocity of 3.2 m/s.  Calculated valve area 1.1 cm . VRVTI = 0.3. VRvel = 0.3.   4. There is mild aortic insufficiency.   5. Normal right ventricular size and systolic performance.   6. There is mild left atrial enlargement.           Physical Examination Review of Systems   Vitals:    08/27/19 0756   BP: (!) 82/50   Pulse: 64   Resp: 16     Body mass index is 26.14 kg/m .  Wt Readings from Last 3 Encounters:   08/27/19 177 lb (80.3 kg)    07/29/19 174 lb (78.9 kg)   07/16/19 173 lb (78.5 kg)       General Appearance:     Alert, cooperative and in no acute distress.   ENT/Mouth: membranes moist, no oral lesions or bleeding gums.      EYES:  no scleral icterus, normal conjunctivae   Neck: no carotid bruits or thyromegaly   Chest/Lungs:   lungs are clear to auscultation, no rales or wheezing, respirations unlabored   Cardiovascular:   Regular. Normal first and second heart sounds with no murmurs, rubs, or gallops; the carotid, radial and posterior tibial pulses are intact, Jugular venous pressure normal, no edema    Abdomen:  Soft, nontender, nondistended, bowel sounds present   Extremities: no cyanosis or clubbing   Skin: warm, dry.    Neurologic: mood and affect are appropriate, alert and oriented x3      General: WNL  Eyes: WNL  Ears/Nose/Throat: WNL  Lungs: WNL  Heart: WNL  Stomach: WNL  Bladder: WNL  Muscle/Joints: WNL  Skin: WNL  Nervous System: WNL  Mental Health: WNL     Blood: WNL     Medical History  Surgical History Family History Social History   Past Medical History:   Diagnosis Date   ? Acute liver failure 8/5/2018   ? Acute renal failure, unspecified acute renal failure type (H) 08/05/2018   ? Acute respiratory failure with hypoxia (H) 08/05/2018   ? Alcoholic hepatitis with ascites 08/05/2018   ? Bacteremia due to Klebsiella pneumoniae    ? Essential hypertension    ? Gastroesophageal reflux disease without esophagitis 10/31/2018   ? Hepatic encephalopathy (H) 08/05/2018   ? Macrocytic anemia    ? Stress-induced cardiomyopathy 08/05/2018    Past Surgical History:   Procedure Laterality Date   ? COLONOSCOPY N/A 3/20/2018    Procedure: COLONOSCOPY;  Surgeon: Adam Nicole III, MD;  Location: [facility];  Service:    ? CV CORONARY ANGIOGRAM N/A 3/18/2019    Procedure: Coronary Angiogram;  Surgeon: Timi Rodriguez MD;  Location: [facility] Cath Lab;  Service: Cardiology   ? CV LEFT HEART CATHETERIZATION WO LEFT VETRICULOGRAM Left  3/18/2019    Procedure: Left Heart Catheterization Without Left Ventriculogram;  Surgeon: Timi Rodriguez MD;  Location: [facility] Cath Lab;  Service: Cardiology   ? PICC  2018        ? PICC  3/18/2019         Family History   Problem Relation Age of Onset   ? Heart disease Father 76        type unknown to Aayush; his father  at home   ? Alzheimer's disease Mother 86        lives in long term care   ? No Medical Problems Son    ? No Medical Problems Son     Social History     Socioeconomic History   ? Marital status: Domestic Partner     Spouse name: Karlie Chisholm (АННА)   ? Number of children: Not on file   ? Years of education: Not on file   ? Highest education level: Not on file   Occupational History     Employer: [employer]   Social Needs   ? Financial resource strain: Not on file   ? Food insecurity:     Worry: Not on file     Inability: Not on file   ? Transportation needs:     Medical: Not on file     Non-medical: Not on file   Tobacco Use   ? Smoking status: Former Smoker     Types: Cigarettes     Last attempt to quit: 2019     Years since quittin.3   ? Smokeless tobacco: Never Used   ? Tobacco comment: pack a week   Substance and Sexual Activity   ? Alcohol use: Yes     Frequency: 4 or more times a week     Drinks per session: 1 or 2     Comment: 350 ml of peppermint schnapps daily per [facility] h,  H&P per report of Karlie JJ to Dr. Valle   ? Drug use: No   ? Sexual activity: Not on file   Lifestyle   ? Physical activity:     Days per week: Not on file     Minutes per session: Not on file   ? Stress: Not on file   Relationships   ? Social connections:     Talks on phone: Not on file     Gets together: Not on file     Attends Jewish service: Not on file     Active member of club or organization: Not on file     Attends meetings of clubs or organizations: Not on file     Relationship status: Not on file   ? Intimate partner violence:     Fear of current or ex partner: Not on file      Emotionally abused: Not on file     Physically abused: Not on file     Forced sexual activity: Not on file   Other Topics Concern   ? Not on file   Social History Narrative    Works in [employer]. Lives with his SO, Karlie Chisholm.          Medications  Allergies   Current Outpatient Medications   Medication Sig Dispense Refill   ? acetaminophen (TYLENOL) 325 MG tablet Take 2 tablets (650 mg total) by mouth every 6 (six) hours as needed.  0   ? aspirin 81 MG EC tablet Take 81 mg by mouth daily.     ? atorvastatin (LIPITOR) 80 MG tablet Take 1 tablet (80 mg total) by mouth daily. 90 tablet 3   ? b complex vitamins tablet Take 1 tablet by mouth daily.     ? carvedilol (COREG) 12.5 MG tablet Take 2 tablets (25 mg total) by mouth 2 (two) times a day with meals. 180 tablet 3   ? cholecalciferol, vitamin D3, (VITAMIN D3) 2,000 unit Tab Take 2,000 Units by mouth daily.     ? ferrous sulfate 325 (65 FE) MG tablet Take 1 tablet (325 mg total) by mouth 2 (two) times a day with meals. 60 tablet 0   ? folic acid (FOLVITE) 1 MG tablet Take 1 tablet (1 mg total) by mouth daily. 30 tablet 0   ? losartan (COZAAR) 50 MG tablet Take 0.5 tablets (25 mg total) by mouth daily. 90 tablet 3   ? magnesium oxide (MAG-OX) 400 mg (241.3 mg magnesium) tablet Take 1 tablet (400 mg total) by mouth 2 (two) times a day.  0   ? multivitamin therapeutic tablet Take 1 tablet by mouth daily.     ? omeprazole (PRILOSEC) 40 MG capsule Take 40 mg by mouth daily before breakfast.     ? potassium chloride (K-DUR,KLOR-CON) 20 MEQ tablet Take 20 mEq by mouth daily.     ? thiamine (VITAMIN B-1) 100 MG tablet Take 100 mg by mouth daily.     ? ticagrelor (BRILINTA) 90 mg Tab Take 1 tablet (90 mg total) by mouth 2 (two) times a day. 180 tablet 3     No current facility-administered medications for this visit.       No Known Allergies      Lab Results    Chemistry CBC BNP   Lab Results   Component Value Date    CREATININE 0.84 04/04/2019    BUN 13 04/04/2019      04/04/2019    K 4.8 04/04/2019     (H) 04/04/2019    CO2 25 04/04/2019     Creatinine (mg/dL)   Date Value   04/04/2019 0.84   03/21/2019 1.13   03/20/2019 1.20   03/19/2019 1.01    Lab Results   Component Value Date    WBC 8.3 03/21/2019    HGB 7.7 (L) 03/21/2019    HCT 25.5 (L) 03/21/2019    MCV 75 (L) 03/21/2019     03/21/2019     03/21/2019    Lab Results   Component Value Date     (H) 03/17/2019     BNP (pg/mL)   Date Value   03/17/2019 873 (H)   09/13/2018 167 (H)          25 minutes were spent with the patient with greater than 50% spent on education and counseling.      Madeline Avila, [facility]   Heart Failure Clinic

## 2021-06-27 NOTE — PROGRESS NOTES
Progress Notes by Madeline Avila CNP at 6/13/2019  7:50 AM     Author: Madeline Avila CNP Service: -- Author Type: Nurse Practitioner    Filed: 6/13/2019  8:22 AM Encounter Date: 6/13/2019 Status: Signed    : Madeline Avila CNP (Nurse Practitioner)           Click to link to [facility] Heart [facility] HEART [facility] NOTE      Assessment/Recommendations   Assessment:    1. Ischemic cardiomyopathy with systolic dysfunction, NYHA class I: Compensated.  No signs or symptoms of fluid retention.  He states he is feeling well.  His weights have remained stable and he is following a low-sodium diet.  He is trying to exercise and bought a new elliptical machine.  We discussed the results of his echocardiogram and continued medication titration.    2.  Hypertension: Slightly elevated.  Blood pressure 144/68    3.  Coronary artery disease: Non-STEMI and March 2019.  PCI to LAD.  He continues dual antiplatelet therapy with Brilinta and aspirin.  He is on Lipitor 80 mg daily.    Plan:  1.   Heart failure medications:  - Beta blocker therapy with carvedilol increased to 12.5 mg twice a day  - ARB therapy with losartan 25 mg daily  - Diuretic therapy with furosemide 20 mg daily  2.  Continue daily weights and low-sodium diet  3.  Encouraged regular exercise    Aayush García will follow up with Dr. Cordova July 29 and in the heart failure clinic in 4 weeks.     History of Present Illness    Mr. Ayaush García is a 63 y.o. male seen at [facility] heart failure clinic today for continued follow-up.  His wife accompanies him today.  He follows up for ischemic cardiomyopathy with systolic dysfunction.  He had an echocardiogram June 4, 2019 which showed an ejection fraction of 30 to 35% with moderate to severe aortic stenosis.  This is improved from 22% in March.  He has a past medical history significant for hypertension, coronary artery disease with non-STEMI, hyperlipidemia, and  nonsustained ventricular tachycardia.  He had a PCI to the LAD.  He used to smoke cigarettes but has quit and states he is feeling very well.    During the last clinic visit, I increased his carvedilol to 6.25 mg twice a day.  He tolerated this well.  He denies any acute heart failure symptoms today.  He denies fatigue, dizziness, or lightheadedness.  He denies shortness of breath, dyspnea on exertion, orthopnea, or PND. Denies chest pain.  He denies fatigue, lightheadedness, shortness of breath, dyspnea on exertion, orthopnea, PND, palpitations, chest pain, abdominal fullness/bloating and lower extremity edema.      He is monitoring home weights which are stable around 172 pounds.  He is following a low sodium diet.      ECHO 6/4/2019:   Summary     1. The left ventricle is mildly enlarged. Left ventricular systolic performance is moderately reduced. The ejection fraction is estimated to be 30-35%.   2. There is moderate global reduction in left ventricular systolic performance. There is mild concentric increase in left ventricular wall thickness.  3. There is moderate to severe aortic stenosis.    The mean gradient is measured at 27 mmHg with peak velocity of 3.2 m/s.  Calculated valve area 1.1 cm . VRVTI = 0.3. VRvel = 0.3.   4. There is mild aortic insufficiency.   5. Normal right ventricular size and systolic performance.   6. There is mild left atrial enlargement.              Physical Examination Review of Systems   Vitals:    06/13/19 0753   BP: 144/68   Pulse: 84   Resp: 16     Body mass index is 25.99 kg/m .  Wt Readings from Last 3 Encounters:   06/13/19 176 lb (79.8 kg)   06/04/19 178 lb (80.7 kg)   05/24/19 178 lb (80.7 kg)       General Appearance:     Alert, cooperative and in no acute distress.   ENT/Mouth: membranes moist, no oral lesions or bleeding gums.      EYES:  no scleral icterus, normal conjunctivae   Neck: no carotid bruits or thyromegaly   Chest/Lungs:   lungs are clear to auscultation, no  rales or wheezing, respirations unlabored   Cardiovascular:   Regular. Normal first and second heart sounds with no murmurs, rubs, or gallops; the carotid, radial and posterior tibial pulses are intact, Jugular venous pressure normal, no edema    Abdomen:  Soft, nontender, nondistended, bowel sounds present   Extremities: no cyanosis or clubbing   Skin: warm, dry.    Neurologic: mood and affect are appropriate, alert and oriented x3      General: WNL  Eyes: WNL  Ears/Nose/Throat: WNL  Lungs: WNL  Heart: WNL  Stomach: WNL  Bladder: WNL  Muscle/Joints: WNL  Skin: WNL  Nervous System: WNL  Mental Health: WNL     Blood: WNL     Medical History  Surgical History Family History Social History   Past Medical History:   Diagnosis Date   ? Acute liver failure 2018   ? Acute renal failure, unspecified acute renal failure type (H) 2018   ? Acute respiratory failure with hypoxia (H) 2018   ? Alcoholic hepatitis with ascites 2018   ? Bacteremia due to Klebsiella pneumoniae    ? Essential hypertension    ? Gastroesophageal reflux disease without esophagitis 10/31/2018   ? Hepatic encephalopathy (H) 2018   ? Macrocytic anemia    ? Stress-induced cardiomyopathy 2018    Past Surgical History:   Procedure Laterality Date   ? COLONOSCOPY N/A 3/20/2018    Procedure: COLONOSCOPY;  Surgeon: Adam Nicole III, MD;  Location: [facility];  Service:    ? CV CORONARY ANGIOGRAM N/A 3/18/2019    Procedure: Coronary Angiogram;  Surgeon: Timi Rodriguez MD;  Location: [facility] Cath Lab;  Service: Cardiology   ? CV LEFT HEART CATHETERIZATION WO LEFT VETRICULOGRAM Left 3/18/2019    Procedure: Left Heart Catheterization Without Left Ventriculogram;  Surgeon: Timi Rodriguez MD;  Location: [facility] Cath Lab;  Service: Cardiology   ? PICC  2018        ? PICC  3/18/2019         Family History   Problem Relation Age of Onset   ? Heart disease Father 76        type unknown to Aayush; his father   at home   ? Alzheimer's disease Mother 86        lives in long term care   ? No Medical Problems Son    ? No Medical Problems Son     Social History     Socioeconomic History   ? Marital status: Domestic Partner     Spouse name: Karlie Chisholm (АННА)   ? Number of children: Not on file   ? Years of education: Not on file   ? Highest education level: Not on file   Occupational History     Employer: [employer]   Social Needs   ? Financial resource strain: Not on file   ? Food insecurity:     Worry: Not on file     Inability: Not on file   ? Transportation needs:     Medical: Not on file     Non-medical: Not on file   Tobacco Use   ? Smoking status: Former Smoker     Types: Cigarettes     Last attempt to quit: 2019     Years since quittin.1   ? Smokeless tobacco: Never Used   ? Tobacco comment: pack a week   Substance and Sexual Activity   ? Alcohol use: Yes     Frequency: 4 or more times a week     Drinks per session: 1 or 2     Comment: 350 ml of peppermint schnapps daily per [facility],  H&P per report of Karlie JJ to Dr. Valle   ? Drug use: No   ? Sexual activity: Not on file   Lifestyle   ? Physical activity:     Days per week: Not on file     Minutes per session: Not on file   ? Stress: Not on file   Relationships   ? Social connections:     Talks on phone: Not on file     Gets together: Not on file     Attends Hoahaoism service: Not on file     Active member of club or organization: Not on file     Attends meetings of clubs or organizations: Not on file     Relationship status: Not on file   ? Intimate partner violence:     Fear of current or ex partner: Not on file     Emotionally abused: Not on file     Physically abused: Not on file     Forced sexual activity: Not on file   Other Topics Concern   ? Not on file   Social History Narrative    Works in [place]. Lives with his SO, Karlie Chisholm.          Medications  Allergies   Current Outpatient Medications   Medication Sig Dispense Refill   ?  acetaminophen (TYLENOL) 325 MG tablet Take 2 tablets (650 mg total) by mouth every 6 (six) hours as needed.  0   ? aspirin 81 MG EC tablet Take 81 mg by mouth daily.     ? atorvastatin (LIPITOR) 80 MG tablet Take 1 tablet (80 mg total) by mouth daily. 30 tablet 11   ? b complex vitamins tablet Take 1 tablet by mouth daily.     ? carvedilol (COREG) 6.25 MG tablet Take 2 tablets (12.5 mg total) by mouth 2 (two) times a day. 180 tablet 3   ? cholecalciferol, vitamin D3, (VITAMIN D3) 2,000 unit Tab Take 2,000 Units by mouth daily.     ? ferrous sulfate 325 (65 FE) MG tablet Take 1 tablet (325 mg total) by mouth 2 (two) times a day with meals. 60 tablet 0   ? folic acid (FOLVITE) 1 MG tablet Take 1 tablet (1 mg total) by mouth daily. 30 tablet 0   ? furosemide (LASIX) 20 MG tablet Take 1 tablet (20 mg total) by mouth daily. 30 tablet 11   ? losartan (COZAAR) 50 MG tablet Take 0.5 tablets (25 mg total) by mouth daily. 90 tablet 3   ? magnesium oxide (MAG-OX) 400 mg (241.3 mg magnesium) tablet Take 1 tablet (400 mg total) by mouth 2 (two) times a day.  0   ? multivitamin therapeutic tablet Take 1 tablet by mouth daily.     ? omeprazole (PRILOSEC) 40 MG capsule Take 40 mg by mouth daily before breakfast.     ? potassium chloride (K-DUR,KLOR-CON) 20 MEQ tablet Take 20 mEq by mouth daily.     ? thiamine (VITAMIN B-1) 100 MG tablet Take 100 mg by mouth daily.     ? ticagrelor (BRILINTA) 90 mg Tab Take 1 tablet (90 mg total) by mouth 2 (two) times a day. 60 tablet 11     No current facility-administered medications for this visit.       No Known Allergies      Lab Results    Chemistry CBC BNP   Lab Results   Component Value Date    CREATININE 0.84 04/04/2019    BUN 13 04/04/2019     04/04/2019    K 4.8 04/04/2019     (H) 04/04/2019    CO2 25 04/04/2019     Creatinine (mg/dL)   Date Value   04/04/2019 0.84   03/21/2019 1.13   03/20/2019 1.20   03/19/2019 1.01    Lab Results   Component Value Date    WBC 8.3  03/21/2019    HGB 7.7 (L) 03/21/2019    HCT 25.5 (L) 03/21/2019    MCV 75 (L) 03/21/2019     03/21/2019     03/21/2019    Lab Results   Component Value Date     (H) 03/17/2019     BNP (pg/mL)   Date Value   03/17/2019 873 (H)   09/13/2018 167 (H)          25 minutes were spent with the patient with greater than 50% spent on education and counseling.      Madeline Avila, [facility] Heart [facility]   Heart Failure Clinic

## 2021-06-27 NOTE — PROGRESS NOTES
Progress Notes by Loyda Bradshaw CNP at 4/18/2019  8:30 AM     Author: Loyda Bradshaw CNP Service: -- Author Type: Nurse Practitioner    Filed: 4/18/2019  8:59 AM Encounter Date: 4/18/2019 Status: Signed    : Loyda Bradshaw CNP (Nurse Practitioner)           Click to link to [facility] HEART CARE NOTE      Assessment/Recommendations   Assessment:    1. Ischemic cardiomyopathy, heart failure with reduced ejection fraction, ejection fraction 22%, NYHA class I: He has no symptoms of acute heart failure.  We discussed heart failure medications and goal of titration.  Losartan was stopped during hospitalization due to hypotension.  Blood pressures have been stable at cardiac rehab.  I recommended starting losartan 12.5 mg daily but Aayush does not want to start any new medications at this time since he is feeling well.  He would like to discuss with Dr. Cordova at his appointment in 2 weeks.    2.  Coronary artery disease: Non-STEMI on March 17, 2019.  PCI with drug and stent to LAD and plan to treat OM medically.  He continues on dual antiplatelet therapy with aspirin and Brilinta.  He has participated in cardiac rehab.  He denies any chest pain.    3.  Dyslipidemia: He is now on high intensity statin therapy with atorvastatin 80 mg daily.  LDL 96.  I recommended rechecking cholesterol in June and if LDL is still elevated, change to rosuvastatin.     4.  Substance abuse: He is no longer using tobacco or alcohol.    Plan:  1.  Continue current medications.  He will discuss starting losartan with Dr. Cordova.   2.  Continue low-sodium diet and daily weights    Aayush García will follow up with Dr. Cordova on May 2 and in the heart failure clinic in late May.     History of Present Illness    Mr. Aayush García is a 63 y.o. male seen at [facility] heart failure clinic today for continued follow-up.  He has a history of hypertension, peripheral arterial  disease, tobacco use, alcohol use, and alcoholic hepatitis.  He was hospitalized March 17 - March 21 with non-STEMI.  PCI with drug-eluting stent to LAD and plan to treat him medically.  He has new ischemic cardiomyopathy.  Echocardiogram on March 17, 2019 showed ejection fraction of 22%.    He denies any symptoms of acute heart failure.  He is tolerating cardiac rehab with no symptoms.  He denies fatigue, lightheadedness, shortness of breath, dyspnea on exertion, orthopnea, chest pain and lower extremity edema.      His home weight has been stable around 180 pounds.  He is following a low-sodium diet.    ECHO:   Results for orders placed during the hospital encounter of 03/17/19   Echo Complete [ECH10] 03/17/2019    Narrative   Mild left atrial enlargement    Left ventricle ejection fraction is severely decreased. The calculated   left ventricular ejection fraction is 22% with segmental wall motion   abnormalities as outlined below.    Normal right ventricular size and systolic function.    Aortic valve sclerosis without stenosis    When compared to the previous study dated 7/31/2018, there has been a   significant fall in left ventricular function with regional wall motion   abnormality.           Physical Examination Review of Systems   Vitals:    04/18/19 0832   BP: 130/78   Pulse: (!) 56   Resp: 16     Body mass index is 27.76 kg/m .  Wt Readings from Last 3 Encounters:   04/18/19 188 lb (85.3 kg)   04/17/19 184 lb (83.5 kg)   04/15/19 183 lb (83 kg)       General Appearance:     Alert, cooperative and in no acute distress.   ENT/Mouth: membranes moist, no oral lesions or bleeding gums.      EYES:  no scleral icterus, normal conjunctivae   Chest/Lungs:   lungs are clear to auscultation, no rales or wheezing, respirations unlabored   Cardiovascular:   Regular. Normal first and second heart sounds, no edema bilateral lower extremities    Abdomen:  Soft, nontender, nondistended, bowel sounds present   Extremities:  no cyanosis or clubbing   Skin: warm, dry.    Neurologic: mood and affect are appropriate, alert and oriented x3      General: WNL  Eyes: WNL  Ears/Nose/Throat: WNL  Lungs: WNL  Heart: WNL  Stomach: WNL  Bladder: WNL  Muscle/Joints: WNL  Skin: WNL  Nervous System: WNL  Mental Health: WNL     Blood: WNL     Medical History  Surgical History Family History Social History   Past Medical History:   Diagnosis Date   ? Acute liver failure 2018   ? Acute renal failure, unspecified acute renal failure type (H) 2018   ? Acute respiratory failure with hypoxia (H) 2018   ? Alcoholic hepatitis with ascites 2018   ? Bacteremia due to Klebsiella pneumoniae    ? Essential hypertension    ? Gastroesophageal reflux disease without esophagitis 10/31/2018   ? Hepatic encephalopathy (H) 2018   ? Macrocytic anemia    ? Stress-induced cardiomyopathy 2018    Past Surgical History:   Procedure Laterality Date   ? COLONOSCOPY N/A 3/20/2018    Procedure: COLONOSCOPY;  Surgeon: Adam Nicole III, MD;  [facility];  Service:    ? CV CORONARY ANGIOGRAM N/A 3/18/2019    Procedure: Coronary Angiogram;  Surgeon: Timi Rodriguez MD;  Location: [facility] Cath Lab;  Service: Cardiology   ? CV LEFT HEART CATHETERIZATION WO LEFT VETRICULOGRAM Left 3/18/2019    Procedure: Left Heart Catheterization Without Left Ventriculogram;  Surgeon: Timi Rodriguze MD;  Location: [facility] Cath Lab;  Service: Cardiology   ? PICC  2018        ? PICC  3/18/2019         Family History   Problem Relation Age of Onset   ? Heart disease Father 76        type unknown to Aayush; his father  at home   ? Alzheimer's disease Mother 86        lives in long term care   ? No Medical Problems Son    ? No Medical Problems Son     Social History     Socioeconomic History   ? Marital status: Domestic Partner     Spouse name: Karlie Chisholm (АННА)   ? Number of children: Not on file   ? Years of education: Not on file   ?  Highest education level: Not on file   Occupational History     Employer: [employer]   Social Needs   ? Financial resource strain: Not on file   ? Food insecurity:     Worry: Not on file     Inability: Not on file   ? Transportation needs:     Medical: Not on file     Non-medical: Not on file   Tobacco Use   ? Smoking status: Former Smoker     Types: Cigarettes     Last attempt to quit: 4/18/2019   ? Smokeless tobacco: Never Used   ? Tobacco comment: pack a week   Substance and Sexual Activity   ? Alcohol use: Yes     Frequency: 4 or more times a week     Drinks per session: 1 or 2     Comment: 350 ml of peppermint schnapps daily per [facility], 2018 H&P per report of Karlie JJ to Dr. Valle   ? Drug use: No   ? Sexual activity: Not on file   Lifestyle   ? Physical activity:     Days per week: Not on file     Minutes per session: Not on file   ? Stress: Not on file   Relationships   ? Social connections:     Talks on phone: Not on file     Gets together: Not on file     Attends Hoahaoism service: Not on file     Active member of club or organization: Not on file     Attends meetings of clubs or organizations: Not on file     Relationship status: Not on file   ? Intimate partner violence:     Fear of current or ex partner: Not on file     Emotionally abused: Not on file     Physically abused: Not on file     Forced sexual activity: Not on file   Other Topics Concern   ? Not on file   Social History Narrative    Works in [employer]. Lives with his SO, Karlie Chisholm.          Medications  Allergies   Current Outpatient Medications   Medication Sig Dispense Refill   ? acetaminophen (TYLENOL) 325 MG tablet Take 2 tablets (650 mg total) by mouth every 6 (six) hours as needed.  0   ? aspirin 81 MG EC tablet Take 81 mg by mouth daily.     ? atorvastatin (LIPITOR) 80 MG tablet Take 1 tablet (80 mg total) by mouth daily. 30 tablet 11   ? b complex vitamins tablet Take 1 tablet by mouth daily.     ? carvedilol (COREG) 3.125 MG  tablet Take 1 tablet (3.125 mg total) by mouth 2 (two) times a day. 60 tablet 11   ? cholecalciferol, vitamin D3, (VITAMIN D3) 2,000 unit Tab Take 2,000 Units by mouth daily.     ? ferrous sulfate 325 (65 FE) MG tablet Take 1 tablet (325 mg total) by mouth 2 (two) times a day with meals. 60 tablet 0   ? folic acid (FOLVITE) 1 MG tablet Take 1 tablet (1 mg total) by mouth daily. 30 tablet 0   ? furosemide (LASIX) 20 MG tablet Take 1 tablet (20 mg total) by mouth daily. 30 tablet 11   ? magnesium oxide (MAG-OX) 400 mg (241.3 mg magnesium) tablet Take 1 tablet (400 mg total) by mouth 2 (two) times a day.  0   ? multivitamin therapeutic tablet Take 1 tablet by mouth daily.     ? omeprazole (PRILOSEC) 40 MG capsule Take 40 mg by mouth daily before breakfast.     ? potassium chloride (K-DUR,KLOR-CON) 20 MEQ tablet Take 20 mEq by mouth daily.     ? thiamine (VITAMIN B-1) 100 MG tablet Take 100 mg by mouth daily.     ? ticagrelor (BRILINTA) 90 mg Tab Take 1 tablet (90 mg total) by mouth 2 (two) times a day. 60 tablet 11     No current facility-administered medications for this visit.       No Known Allergies      Lab Results    Chemistry CBC BNP   Lab Results   Component Value Date    CREATININE 0.84 04/04/2019    BUN 13 04/04/2019     04/04/2019    K 4.8 04/04/2019     (H) 04/04/2019    CO2 25 04/04/2019     Creatinine (mg/dL)   Date Value   04/04/2019 0.84   03/21/2019 1.13   03/20/2019 1.20   03/19/2019 1.01    Lab Results   Component Value Date    WBC 8.3 03/21/2019    HGB 7.7 (L) 03/21/2019    HCT 25.5 (L) 03/21/2019    MCV 75 (L) 03/21/2019     03/21/2019     03/21/2019    Lab Results   Component Value Date     (H) 03/17/2019     BNP (pg/mL)   Date Value   03/17/2019 873 (H)   09/13/2018 167 (H)            Loyda Bradshaw, [facility]   Heart Failure Clinic

## 2021-06-27 NOTE — PROGRESS NOTES
Progress Notes by Madeline Avila CNP at 5/24/2019  7:50 AM     Author: Madeline Avila CNP Service: -- Author Type: Nurse Practitioner    Filed: 5/24/2019  8:27 AM Encounter Date: 5/24/2019 Status: Signed    : Madeline Avila CNP (Nurse Practitioner)           Click to link to [facility] HEART [facility] NOTE      Assessment/Recommendations   Assessment:    1. Ischemic cardiomyopathy with systolic dysfunction, NYHA class I: Compensated.  No signs or symptoms of fluid retention.  He did not increase his carvedilol which Dr. Cordova recommended.  We discussed goal of medication titration.  He is scheduled for an echocardiogram June 4 to reassess heart function.  He is following a low-sodium diet.  He is unable to do cardiac rehab due to chronic knee pain.  His weights have remained stable.    2.  Coronary artery disease: Denies any chest pain.  Status post PCI to LAD in March 2018.  He continues dual antiplatelet therapy with aspirin and Brilinta.  He continues Lipitor.    3.  Hypertension: Controlled.  Blood pressure 120/62    Plan:  1.   Heart failure medications:  - Beta blocker therapy with carvedilol increased to 6.25 mg twice a day  - ARB therapy with losartan 25 mg daily  - Diuretic therapy with furosemide 20 mg daily  2.  Continue daily weights and low-sodium diet  3.  Echocardiogram scheduled June 4    Aayush García will follow up Dr. Cordova in August and in the heart failure clinic in 3 weeks.     History of Present Illness    Mr. Aayush García is a 63 y.o. male seen at [facility] heart failure clinic today for continued follow-up.  His significant other Karlie accompanies him today.  He follows up for ischemic cardiomyopathy with systolic dysfunction.  He had an echocardiogram March 17, 2019 which showed an ejection fraction of 22%.  He had a non-STEMI in March which she had a drug-eluting stent to his LAD.  He has a past medical history significant  for hypertension, coronary disease, hyperlipidemia, and past tobacco use.  He also has a history of nonsustained ventricular tachycardia.    During the last clinic visit, Dr. Cordova increase carvedilol to 6.25 mg and started losartan 25 mg daily.  He states he tolerated this well but he did not increase the carvedilol.  He denies any acute heart failure symptoms today.  He denies fatigue, lightheadedness, shortness of breath, dyspnea on exertion, orthopnea, PND, palpitations, chest pain, abdominal fullness/bloating and lower extremity edema.      He is monitoring home weights which are stable around 171 pounds.  He is following a low sodium diet.      ECHO 3/17/2019:   Summary       Mild left atrial enlargement    Left ventricle ejection fraction is severely decreased. The calculated left ventricular ejection fraction is 22% with segmental wall motion abnormalities as outlined below.    Normal right ventricular size and systolic function.    Aortic valve sclerosis without stenosis    When compared to the previous study dated 7/31/2018, there has been a significant fall in left ventricular function with regional wall motion abnormality.          Physical Examination Review of Systems   Vitals:    05/24/19 0747   BP: 120/62   Pulse: 90   Resp: 20     Body mass index is 26.29 kg/m .  Wt Readings from Last 3 Encounters:   05/24/19 178 lb (80.7 kg)   05/07/19 178 lb (80.7 kg)   05/02/19 176 lb (79.8 kg)       General Appearance:     Alert, cooperative and in no acute distress.   ENT/Mouth: membranes moist, no oral lesions or bleeding gums.      EYES:  no scleral icterus, normal conjunctivae   Neck: no carotid bruits or thyromegaly   Chest/Lungs:   lungs are clear to auscultation, no rales or wheezing, respirations unlabored   Cardiovascular:   Regular. Normal first and second heart sounds with no murmurs, rubs, or gallops; the carotid, radial and posterior tibial pulses are intact, Jugular venous pressure normal, no  edema    Abdomen:  Soft, nontender, nondistended, bowel sounds present   Extremities: no cyanosis or clubbing   Skin: warm, dry.    Neurologic: mood and affect are appropriate, alert and oriented x3      General: WNL  Eyes: WNL  Ears/Nose/Throat: WNL  Lungs: WNL  Heart: WNL  Stomach: WNL  Bladder: WNL  Muscle/Joints: WNL  Skin: WNL  Nervous System: WNL  Mental Health: WNL     Blood: WNL     Medical History  Surgical History Family History Social History   Past Medical History:   Diagnosis Date   ? Acute liver failure 2018   ? Acute renal failure, unspecified acute renal failure type (H) 2018   ? Acute respiratory failure with hypoxia (H) 2018   ? Alcoholic hepatitis with ascites 2018   ? Bacteremia due to Klebsiella pneumoniae    ? Essential hypertension    ? Gastroesophageal reflux disease without esophagitis 10/31/2018   ? Hepatic encephalopathy (H) 2018   ? Macrocytic anemia    ? Stress-induced cardiomyopathy 2018    Past Surgical History:   Procedure Laterality Date   ? COLONOSCOPY N/A 3/20/2018    Procedure: COLONOSCOPY;  Surgeon: Adam Nicole III, MD;  Location: [facility] OR;  Service:    ? CV CORONARY ANGIOGRAM N/A 3/18/2019    Procedure: Coronary Angiogram;  Surgeon: Timi Rodriguez MD;  Location: [facility] Cath Lab;  Service: Cardiology   ? CV LEFT HEART CATHETERIZATION WO LEFT VETRICULOGRAM Left 3/18/2019    Procedure: Left Heart Catheterization Without Left Ventriculogram;  Surgeon: Timi Rodriguez MD;  Location: [facility] Cath Lab;  Service: Cardiology   ? PICC  2018        ? PICC  3/18/2019         Family History   Problem Relation Age of Onset   ? Heart disease Father 76        type unknown to Aayush; his father  at home   ? Alzheimer's disease Mother 86        lives in long term care   ? No Medical Problems Son    ? No Medical Problems Son     Social History     Socioeconomic History   ? Marital status: Domestic Partner     Spouse name: Karlie  Phan (SO)   ? Number of children: Not on file   ? Years of education: Not on file   ? Highest education level: Not on file   Occupational History     Employer: [employer]   Social Needs   ? Financial resource strain: Not on file   ? Food insecurity:     Worry: Not on file     Inability: Not on file   ? Transportation needs:     Medical: Not on file     Non-medical: Not on file   Tobacco Use   ? Smoking status: Former Smoker     Types: Cigarettes     Last attempt to quit: 2019     Years since quittin.0   ? Smokeless tobacco: Never Used   ? Tobacco comment: pack a week   Substance and Sexual Activity   ? Alcohol use: Yes     Frequency: 4 or more times a week     Drinks per session: 1 or 2     Comment: 350 ml of peppermint schnapps daily per [facility],  H&P per report of Karlie JJ to Dr. Valle   ? Drug use: No   ? Sexual activity: Not on file   Lifestyle   ? Physical activity:     Days per week: Not on file     Minutes per session: Not on file   ? Stress: Not on file   Relationships   ? Social connections:     Talks on phone: Not on file     Gets together: Not on file     Attends Yazidi service: Not on file     Active member of club or organization: Not on file     Attends meetings of clubs or organizations: Not on file     Relationship status: Not on file   ? Intimate partner violence:     Fear of current or ex partner: Not on file     Emotionally abused: Not on file     Physically abused: Not on file     Forced sexual activity: Not on file   Other Topics Concern   ? Not on file   Social History Narrative    Works in [employer]. Lives with his SO, Karlie Chisholm.          Medications  Allergies   Current Outpatient Medications   Medication Sig Dispense Refill   ? acetaminophen (TYLENOL) 325 MG tablet Take 2 tablets (650 mg total) by mouth every 6 (six) hours as needed.  0   ? aspirin 81 MG EC tablet Take 81 mg by mouth daily.     ? atorvastatin (LIPITOR) 80 MG tablet Take 1 tablet (80 mg total) by mouth  daily. 30 tablet 11   ? b complex vitamins tablet Take 1 tablet by mouth daily.     ? carvedilol (COREG) 6.25 MG tablet Take 1 tablet (6.25 mg total) by mouth 2 (two) times a day. 180 tablet 3   ? cholecalciferol, vitamin D3, (VITAMIN D3) 2,000 unit Tab Take 2,000 Units by mouth daily.     ? ferrous sulfate 325 (65 FE) MG tablet Take 1 tablet (325 mg total) by mouth 2 (two) times a day with meals. 60 tablet 0   ? folic acid (FOLVITE) 1 MG tablet Take 1 tablet (1 mg total) by mouth daily. 30 tablet 0   ? furosemide (LASIX) 20 MG tablet Take 1 tablet (20 mg total) by mouth daily. 30 tablet 11   ? losartan (COZAAR) 50 MG tablet Take 0.5 tablets (25 mg total) by mouth daily. 90 tablet 3   ? magnesium oxide (MAG-OX) 400 mg (241.3 mg magnesium) tablet Take 1 tablet (400 mg total) by mouth 2 (two) times a day.  0   ? multivitamin therapeutic tablet Take 1 tablet by mouth daily.     ? omeprazole (PRILOSEC) 40 MG capsule Take 40 mg by mouth daily before breakfast.     ? potassium chloride (K-DUR,KLOR-CON) 20 MEQ tablet Take 20 mEq by mouth daily.     ? thiamine (VITAMIN B-1) 100 MG tablet Take 100 mg by mouth daily.     ? ticagrelor (BRILINTA) 90 mg Tab Take 1 tablet (90 mg total) by mouth 2 (two) times a day. 60 tablet 11     No current facility-administered medications for this visit.       No Known Allergies      Lab Results    Chemistry CBC BNP   Lab Results   Component Value Date    CREATININE 0.84 04/04/2019    BUN 13 04/04/2019     04/04/2019    K 4.8 04/04/2019     (H) 04/04/2019    CO2 25 04/04/2019     Creatinine (mg/dL)   Date Value   04/04/2019 0.84   03/21/2019 1.13   03/20/2019 1.20   03/19/2019 1.01    Lab Results   Component Value Date    WBC 8.3 03/21/2019    HGB 7.7 (L) 03/21/2019    HCT 25.5 (L) 03/21/2019    MCV 75 (L) 03/21/2019     03/21/2019     03/21/2019    Lab Results   Component Value Date     (H) 03/17/2019     BNP (pg/mL)   Date Value   03/17/2019 873 (H)    09/13/2018 167 (H)          25 minutes were spent with the patient with greater than 50% spent on education and counseling.      Madeline Avila, [facility]   Heart Failure Clinic

## 2021-06-28 NOTE — PROGRESS NOTES
Progress Notes by Terrence Cordova MD at 11/18/2019  2:10 PM     Author: Terrence Cordova MD Service: -- Author Type: Physician    Filed: 11/18/2019  3:15 PM Encounter Date: 11/18/2019 Status: Signed    : Terrence Cordova MD (Physician)       Heart Care Office Note    Assessment / Plan:    1.  Dilated cardiomyopathy, likely combination of ischemic and toxic from alcohol.  No medication changes today with marginal blood pressure.  Plan recheck echocardiogram.  Continue follow-up in heart failure clinic for medication titration  2.  Coronary artery disease status post coronary intervention with no anginal symptoms.  Continues on dual antiplatelet therapy for 1 year, unless bleeding recurs.  We will switch Brilinta to clopidogrel 75 mg daily.  3.  Nonsustained ventricular tachycardia.  No evidence of recurrence  4.  Alcohol abuse.  Continuing efforts at abstinence  5.  Tobacco abuse.  Congratulated on abstinence.  6.  Aortic stenosis.   moderate to severe by echo.  Will reassess.     Plan follow-up in 3 months with me, 2 weeks with heart failure clinic      ______________________________________________________________________    Subjective:    I had the opportunity to see Aayush García at the [facility]. Aayush García is a 64 y.o. male with a history of alcohol abuse and, hypertension admitted with cardiogenic shock and non-ST elevation myocardial infarction 3/2019.  He subsequently underwent complex coronary intervention with intra-aortic balloon pump.  His left ventricular ejection fraction at that time was 22%.  His course was complicated by alcoholic hepatitis.  He has been seen in heart failure clinic with gradual up titration of his medication regimen.  Follow-up echocardiogram 6/2019 showed left ventricular ejection fraction improved at 30 to 35%.  Moderate to severe aortic stenosis was also suggested.  He returns today for routine follow-up, accompanied by his significant other  Karlie.      Last month, he was hospitalized with anemia and acute alcohol intoxication.  Source of bleeding was not found, Brilinta was continued after transfusion.  He had been experiencing black stools for a number of days prior to his hospitalization.  He is now back off of alcohol and has had no further complaints.  He is residing in a care center, trying to supplement his nutritional intake because of his diagnosis of malnutrition.  He has had lower extremity edema since prior to that hospitalization.    He has declined placement of an ICD.    ______________________________________________________________________    Problem List:  Patient Active Problem List   Diagnosis   ? Alcoholism /alcohol abuse (H)   ? Class 1 obesity   ? Nonsustained ventricular tachycardia (H)   ? Essential hypertension   ? Cardiomyopathy, unspecified type (H)   ? Non-ST elevation MI (NSTEMI) (H)   ? Iron deficiency anemia due to chronic blood loss   ? Heart failure with reduced ejection fraction (H)   ? Ischemic cardiomyopathy   ? Aortic stenosis, moderate   ? Coronary artery disease involving native coronary artery of native heart without angina pectoris   ? GI bleeding   ? Altered mental status   ? Closed fracture of multiple ribs of right side, initial encounter   ? Pleural effusion     Medical History:  Past Medical History:   Diagnosis Date   ? Acute liver failure 8/5/2018   ? Acute renal failure, unspecified acute renal failure type (H) 08/05/2018   ? Acute respiratory failure with hypoxia (H) 08/05/2018   ? Alcoholic hepatitis with ascites 08/05/2018   ? Bacteremia due to Klebsiella pneumoniae    ? Coronary artery disease involving native coronary artery of native heart without angina pectoris 9/24/2019   ? Essential hypertension    ? Gastroesophageal reflux disease without esophagitis 10/31/2018   ? Hepatic encephalopathy (H) 08/05/2018   ? Macrocytic anemia    ? Stress-induced cardiomyopathy 08/05/2018     Surgical  History:  Past Surgical History:   Procedure Laterality Date   ? COLONOSCOPY N/A 3/20/2018    Procedure: COLONOSCOPY;  Surgeon: Adam Nicole III, MD;  Location: [facility];  Service:    ? CV CORONARY ANGIOGRAM N/A 3/18/2019    Procedure: Coronary Angiogram;  Surgeon: Timi Rodriguez MD;  Location: [facility] Cath Lab;  Service: Cardiology   ? CV LEFT HEART CATHETERIZATION WO LEFT VETRICULOGRAM Left 3/18/2019    Procedure: Left Heart Catheterization Without Left Ventriculogram;  Surgeon: Timi Rodriguez MD;  Location: [facility] Cath Lab;  Service: Cardiology   ? PICC  2018        ? PICC  3/18/2019        ? US THORACENTESIS  10/28/2019     Social History:  Social History     Socioeconomic History   ? Marital status: Domestic Partner     Spouse name: Karlie Chisholm (АННА)   ? Number of children: Not on file   ? Years of education: Not on file   ? Highest education level: Not on file   Occupational History   ? Occupation: Manager of building material department     Employer: [employer]   Social Needs   ? Financial resource strain: Not on file   ? Food insecurity:     Worry: Not on file     Inability: Not on file   ? Transportation needs:     Medical: Not on file     Non-medical: Not on file   Tobacco Use   ? Smoking status: Former Smoker     Packs/day: 1.00     Years: 40.00     Pack years: 40.00     Types: Cigarettes     Last attempt to quit: 2019     Years since quittin.5   ? Smokeless tobacco: Never Used   Substance and Sexual Activity   ? Alcohol use: Yes     Frequency: 4 or more times a week     Drinks per session: 1 or 2     Comment: 350 ml of peppermint schnapps daily per 2018 H&P per report of Karlie JJ to Dr. Valle   ? Drug use: No   ? Sexual activity: Not on file   Lifestyle   ? Physical activity:     Days per week: Not on file     Minutes per session: Not on file   ? Stress: Not on file   Relationships   ? Social connections:     Talks on phone: Not on file     Gets together:  Not on file     Attends Sikhism service: Not on file     Active member of club or organization: Not on file     Attends meetings of clubs or organizations: Not on file     Relationship status: Not on file   ? Intimate partner violence:     Fear of current or ex partner: Not on file     Emotionally abused: Not on file     Physically abused: Not on file     Forced sexual activity: Not on file   Other Topics Concern   ? Not on file   Social History Narrative    Works in [employer]. Lives with his SO, Karlie Chisholm.     Sleep History:  Restorative flat  Exercise History:  Uses the elliptical  in his home 20 minutes 3 days a week.  Breaks a sweat but is limited by knee pain.    Review of Systems:                        13 point review of systems otherwise negative                            Family History:  Family History   Problem Relation Age of Onset   ? Heart disease Father 76        type unknown to Aayush; his father  at home   ? Alzheimer's disease Mother 86        lives in long term care   ? No Medical Problems Son    ? No Medical Problems Son          Allergies:  No Known Allergies  Medications:  Current Outpatient Medications   Medication Sig Dispense Refill   ? acetaminophen (TYLENOL) 325 MG tablet Take 2 tablets (650 mg total) by mouth every 6 (six) hours as needed.  0   ? aspirin 81 MG EC tablet Take 81 mg by mouth daily.     ? atorvastatin (LIPITOR) 80 MG tablet Take 1 tablet (80 mg total) by mouth daily. 90 tablet 3   ? b complex vitamins tablet Take 1 tablet by mouth daily.     ? carvedilol (COREG) 12.5 MG tablet Take 12.5 mg by mouth 2 (two) times a day with meals.     ? cholecalciferol, vitamin D3, (VITAMIN D3) 2,000 unit Tab Take 2,000 Units by mouth daily.     ? ferrous sulfate 325 (65 FE) MG tablet Take 1 tablet by mouth daily with breakfast.     ? folic acid (FOLVITE) 1 MG tablet Take 1 tablet (1 mg total) by mouth daily. 30 tablet 0   ? furosemide (LASIX) 20 MG tablet Take 1 tablet (20  mg total) by mouth daily. (Patient taking differently: Take 10 mg by mouth daily.       ) 30 tablet 0   ? furosemide (LASIX) 20 MG tablet TAKE 1 TABLET BY MOTUH AS NEEDED FOR WEIGHT GAIN OF 3 LBS IN ONE DAY (Patient taking differently: 20 mg. TAKE 1 TABLET BY MOTUH AS NEEDED FOR WEIGHT GAIN OF 3 LBS IN ONE DAY      ) 90 tablet 1   ? lactulose (ENULOSE) 20 gram/30 mL Soln solution Take 30 mL (20 g total) by mouth 2 (two) times a day. (Patient taking differently: Take 10 g by mouth 2 (two) times a day.       ) 1800 mL 0   ? magnesium oxide (MAG-OX) 400 mg (241.3 mg magnesium) tablet Take 1 tablet (400 mg total) by mouth 2 (two) times a day.  0   ? multivitamin therapeutic tablet Take 1 tablet by mouth daily.     ? omeprazole (PRILOSEC) 40 MG capsule Take 40 mg by mouth daily before breakfast.     ? thiamine (VITAMIN B-1) 100 MG tablet Take 100 mg by mouth daily.     ? ticagrelor (BRILINTA) 90 mg Tab Take 1 tablet (90 mg total) by mouth 2 (two) times a day. 180 tablet 3     No current facility-administered medications for this visit.        Objective:   Wt Readings from Last 3 Encounters:   11/05/19 178 lb (80.7 kg)   09/24/19 176 lb (79.8 kg)   08/27/19 177 lb (80.3 kg)     Vital signs:  There were no vitals taken for this visit.      Physical Exam:    GENERAL APPEARANCE: Alert, cooperative and in no acute distress.  HEENT: Conjunctivae not injected. no scleral icterus. No Xanthelasma. Oral mucous membranes pink and moist.  NECK: No JVD.  No Hepatojugular reflux. Thyroid not enlarged.  CHEST: clear to auscultation  CARDIOVASCULAR: Nonpalpable point of maximal impulse.  Regular S1, S2 with 2/6 harsh systolic murmur upper right sternal border without radiation. Brachial, radial and posterior tibial pulses are intact and symmetric. No carotid bruits noted.  ABDOMEN: Protuberant.  Soft.  Nontender. BS+. No bruits.  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 OM1 is subtotally occluded v.  w/ bridging collaterals  RCA:dominant, mildly irregular  LVEDP:39  AV gradient: P/M 35/25 simultaneous    Echocardiogram 6/2019  1. The left ventricle is mildly enlarged. Left ventricular systolic performance is moderately reduced. The ejection fraction is estimated to be 30-35%.   2. There is moderate global reduction in left ventricular systolic performance. There is mild concentric increase in left ventricular wall thickness.  3. There is moderate to severe aortic stenosis.    The mean gradient is measured at 27 mmHg with peak velocity of 3.2 m/s.  Calculated valve area 1.1 cm . VRVTI = 0.3. VRvel = 0.3.   4. There is mild aortic insufficiency.   5. Normal right ventricular size and systolic performance.   6. There is mild left atrial enlargement.   When compared to the prior real-time echocardiogram dated 17 March 2019, the ejection fraction is somewhat higher on the current examination.  Moderate to severe aortic stenosis is now detected.      HUMAIRA HUGHES MD [facility]      [phone]    This note created using Dragon voice recognition software.  Sound alike errors may have escaped editing.

## 2021-06-28 NOTE — PROGRESS NOTES
Progress Notes by Madeline Avila CNP at 9/24/2019  7:50 AM     Author: Madeline Avila CNP Service: -- Author Type: Nurse Practitioner    Filed: 9/24/2019  8:42 AM Encounter Date: 9/24/2019 Status: Signed    : Madeline Avila CNP (Nurse Practitioner)           Click to link to [facility] Heart [facility] HEART [facility] NOTE      Assessment/Recommendations   Assessment:    1. Ischemic cardiomyopathy with systolic dysfunction, NYHA class II: Compensated.  He has fatigue and mild dyspnea on exertion.  His weights have remained stable.  He states he had a take a few doses of Lasix due to a weight gain of 3 pounds in 1 day.  He is following a low-sodium diet.  He stays active at his work.    2.  Hypotension: Blood pressure 76/46 and 80/46.  He has occasional lightheadedness.  He states he has blurred vision after he takes losartan.  He has monitoring his blood pressures in range 70-90/40-70.  He complains of fatigue.    3.  Coronary artery disease: Denies any chest pain.  Non-STEMI in March 2019 with PCI to his LAD.  He continues dual antiplatelet therapy with Brilinta and aspirin.  He continues Lipitor 80 mg daily.    Plan:  1.   Heart failure medications:  - Beta blocker therapy with carvedilol 25 mg twice a day  - ARB therapy with losartan 25 mg daily discontinued due to hypotension  2.  BMP pending  3.  Continue to monitor blood pressure and if still low inform me in the next week  4.  Continue daily weights and low-sodium diet and take Lasix as needed for weight gain of 3 pounds in 1 day  5.  Continue regular exercise  6.  Echocardiogram scheduled for October 22    Aayush García will follow up with Dr. Cordova October 25 and in the heart failure clinic in 2 months.     History of Present Illness    Mr. Aayush García is a 64 y.o. male seen at [facility] heart failure clinic today for continued follow-up.  His wife accompanies him today.  He follows up for ischemic  cardiomyopathy with systolic dysfunction.  He had echocardiogram June 4, 2019 which showed an ejection fraction of 30 to 35% with moderate to severe aortic stenosis.  This is improved from 22% in March 2019.  He has a past medical history significant for hypertension, coronary artery disease with non-STEMI, hyperlipidemia, and nonsustained ventricular tachycardia.  He had a PCI to his LAD.  He has a past history of tobacco use but has quit.    During the last clinic visit, I discontinued his Lasix due to hypotension.  He states he has had to take it a few occasions due to weight gain.  He has fatigue and mild dyspnea on exertion.  He states he has had some blurred vision after taking his losartan.  He has slight lightheadedness.  He denies any falls.  He denies orthopnea or PND.  He denies chest pain.  He denies shortness of breath, orthopnea, PND, palpitations, chest pain, abdominal fullness/bloating and lower extremity edema.      He is monitoring home weights which are stable between 170-172 pounds.  He is following a low sodium diet.      Physical Examination Review of Systems   Vitals:    09/24/19 0805   BP: (!) 80/46   Pulse:    Resp:      Body mass index is 25.99 kg/m .  Wt Readings from Last 3 Encounters:   09/24/19 176 lb (79.8 kg)   08/27/19 177 lb (80.3 kg)   07/29/19 174 lb (78.9 kg)       General Appearance:     Alert, cooperative and in no acute distress.   ENT/Mouth: membranes moist, no oral lesions or bleeding gums.      EYES:  no scleral icterus, normal conjunctivae   Neck: no carotid bruits or thyromegaly   Chest/Lungs:   lungs are clear to auscultation, no rales or wheezing, respirations unlabored   Cardiovascular:   Regular. Normal first and second heart sounds with no murmurs, rubs, or gallops; the carotid, radial and posterior tibial pulses are intact, Jugular venous pressure normal, no edema   Abdomen:  Soft, nontender, nondistended, bowel sounds present   Extremities: no cyanosis or clubbing    Skin: warm, dry.    Neurologic: mood and affect are appropriate, alert and oriented x3      General: WNL  Eyes: Visual Distubance  Ears/Nose/Throat: WNL  Lungs: Shortness of Breath  Heart: WNL  Stomach: WNL  Bladder: WNL  Muscle/Joints: WNL  Skin: WNL  Nervous System: WNL  Mental Health: WNL     Blood: WNL     Medical History  Surgical History Family History Social History   Past Medical History:   Diagnosis Date   ? Acute liver failure 2018   ? Acute renal failure, unspecified acute renal failure type (H) 2018   ? Acute respiratory failure with hypoxia (H) 2018   ? Alcoholic hepatitis with ascites 2018   ? Bacteremia due to Klebsiella pneumoniae    ? Coronary artery disease involving native coronary artery of native heart without angina pectoris 2019   ? Essential hypertension    ? Gastroesophageal reflux disease without esophagitis 10/31/2018   ? Hepatic encephalopathy (H) 2018   ? Macrocytic anemia    ? Stress-induced cardiomyopathy 2018    Past Surgical History:   Procedure Laterality Date   ? COLONOSCOPY N/A 3/20/2018    Procedure: COLONOSCOPY;  Surgeon: Adam Nicole III, MD;  Location: [facility];  Service:    ? CV CORONARY ANGIOGRAM N/A 3/18/2019    Procedure: Coronary Angiogram;  Surgeon: Timi Rodriguez MD;  Location: [facility] Cath Lab;  Service: Cardiology   ? CV LEFT HEART CATHETERIZATION WO LEFT VETRICULOGRAM Left 3/18/2019    Procedure: Left Heart Catheterization Without Left Ventriculogram;  Surgeon: Timi Rodriguez MD;  Location: [facility] Cath Lab;  Service: Cardiology   ? PICC  2018        ? PICC  3/18/2019         Family History   Problem Relation Age of Onset   ? Heart disease Father 76        type unknown to Aayush; his father  at home   ? Alzheimer's disease Mother 86        lives in long term care   ? No Medical Problems Son    ? No Medical Problems Son     Social History     Socioeconomic History   ? Marital status: Domestic  Partner     Spouse name: Karlie Chisholm (АННА)   ? Number of children: Not on file   ? Years of education: Not on file   ? Highest education level: Not on file   Occupational History     Employer: [employer]   Social Needs   ? Financial resource strain: Not on file   ? Food insecurity:     Worry: Not on file     Inability: Not on file   ? Transportation needs:     Medical: Not on file     Non-medical: Not on file   Tobacco Use   ? Smoking status: Former Smoker     Types: Cigarettes     Last attempt to quit: 2019     Years since quittin.4   ? Smokeless tobacco: Never Used   ? Tobacco comment: pack a week   Substance and Sexual Activity   ? Alcohol use: Yes     Frequency: 4 or more times a week     Drinks per session: 1 or 2     Comment: 350 ml of peppermint schnapps daily per  h,  H&P per report of Karlie JJ to Dr. Valle   ? Drug use: No   ? Sexual activity: Not on file   Lifestyle   ? Physical activity:     Days per week: Not on file     Minutes per session: Not on file   ? Stress: Not on file   Relationships   ? Social connections:     Talks on phone: Not on file     Gets together: Not on file     Attends Rastafari service: Not on file     Active member of club or organization: Not on file     Attends meetings of clubs or organizations: Not on file     Relationship status: Not on file   ? Intimate partner violence:     Fear of current or ex partner: Not on file     Emotionally abused: Not on file     Physically abused: Not on file     Forced sexual activity: Not on file   Other Topics Concern   ? Not on file   Social History Narrative    Works in [place]. Lives with his SO, Karlie Chisholm.          Medications  Allergies   Current Outpatient Medications   Medication Sig Dispense Refill   ? acetaminophen (TYLENOL) 325 MG tablet Take 2 tablets (650 mg total) by mouth every 6 (six) hours as needed.  0   ? aspirin 81 MG EC tablet Take 81 mg by mouth daily.     ? atorvastatin (LIPITOR) 80 MG tablet Take 1  tablet (80 mg total) by mouth daily. 90 tablet 3   ? b complex vitamins tablet Take 1 tablet by mouth daily.     ? carvedilol (COREG) 12.5 MG tablet Take 2 tablets (25 mg total) by mouth 2 (two) times a day with meals. 180 tablet 3   ? cholecalciferol, vitamin D3, (VITAMIN D3) 2,000 unit Tab Take 2,000 Units by mouth daily.     ? ferrous sulfate 325 (65 FE) MG tablet Take 1 tablet (325 mg total) by mouth 2 (two) times a day with meals. 60 tablet 0   ? folic acid (FOLVITE) 1 MG tablet Take 1 tablet (1 mg total) by mouth daily. 30 tablet 0   ? magnesium oxide (MAG-OX) 400 mg (241.3 mg magnesium) tablet Take 1 tablet (400 mg total) by mouth 2 (two) times a day.  0   ? multivitamin therapeutic tablet Take 1 tablet by mouth daily.     ? omeprazole (PRILOSEC) 40 MG capsule Take 40 mg by mouth daily before breakfast.     ? potassium chloride (K-DUR,KLOR-CON) 20 MEQ tablet Take 20 mEq by mouth daily.     ? thiamine (VITAMIN B-1) 100 MG tablet Take 100 mg by mouth daily.     ? ticagrelor (BRILINTA) 90 mg Tab Take 1 tablet (90 mg total) by mouth 2 (two) times a day. 180 tablet 3     No current facility-administered medications for this visit.       No Known Allergies      Lab Results    Chemistry CBC BNP   Lab Results   Component Value Date    CREATININE 0.84 04/04/2019    BUN 13 04/04/2019     04/04/2019    K 4.8 04/04/2019     (H) 04/04/2019    CO2 25 04/04/2019     Creatinine (mg/dL)   Date Value   04/04/2019 0.84   03/21/2019 1.13   03/20/2019 1.20   03/19/2019 1.01    Lab Results   Component Value Date    WBC 8.3 03/21/2019    HGB 7.7 (L) 03/21/2019    HCT 25.5 (L) 03/21/2019    MCV 75 (L) 03/21/2019     03/21/2019     03/21/2019    Lab Results   Component Value Date     (H) 03/17/2019     BNP (pg/mL)   Date Value   03/17/2019 873 (H)   09/13/2018 167 (H)          25 minutes were spent with the patient with greater than 50% spent on education and counseling.      Madeline Avila,  [facility]   Heart Failure Clinic

## 2021-06-28 NOTE — PROGRESS NOTES
"Progress Notes by April Goldsmith MD at 3/26/2020  4:00 PM     Author: April Goldsmith MD Service: -- Author Type: Physician    Filed: 3/26/2020  3:35 PM Encounter Date: 3/26/2020 Status: Signed    : April Goldsmith MD (Physician)          The patient has been notified of following:     \"This telephone visit will be conducted via a call between you and your physician/provider. We have found that certain health care needs can be provided without the need for a physical exam.  This service lets us provide the care you need with a phone conversation.  If a prescription is necessary we can send it directly to your pharmacy.  If lab work is needed we can place an order for that and you can then stop by our lab to have the test done at a later time. If during the course of the call the physician/provider feels a telephone visit is not appropriate, you will not be charged for this service.\" Verbal consent has been obtained for this service by care team member:         HEART CARE PHONE ENCOUNTER     The patient has chosen to have the visit conducted as a telephone visit, to reduce risk of exposure given the current status of Coronavirus in our community. This telephone visit is being conducted via a call between the patient and physician/provider. Health care needs are being provided without a physical exam.      Impression and Plan     1.  Cardiomyopathy.  Patient with known cardiomyopathy with moderate depression of left ventricular systolic performance based on echocardiogram 3 December 2019.  Ejection fraction 30% on that study (see Cardiac Diagnostic section below).  Nature of cardiomyopathy felt combined ischemic as well as non-ischemic in part related to ethanol use.  ACE inhibitor/ARB has been deferred in this patient vis-à-vis hypotension.  Parenthetically, patient has declined prophylactic ICD placement (documented in myriad notes crafted by my colleague, Dr. Terrence GUZMAN " Art).    This is stable.  Patient states his weights have been stable on his home scale.  He minimizes any shortness of breath.    Continue carvedilol    Continue current furosemide dosing.    Continued avoidance of ethanol (patient states that he has remained abstemious).    2.  Coronary artery disease.  Aayush has known coronary artery disease.  Specifically, patient underwent angiography 18 March 2019 and found to have significant two-vessel disease involving proximal LAD and first obtuse marginal.  Patient had successful PCI with stent placement to proximal LAD (4.0 x 24 mm Synergy drug-eluting stent).  He also had significant disease involving the first obtuse marginal. Specifically, the OM1 stenosis demonstrated bridging collaterals and was felt to be a chronic total occlusion (). His Iinterventionalist, Dr. Timi Rodriguez, had recommended medical therapy of the OM1 lesion.      This, too, has been stable.  He denies chest pain.    At this time, patient may discontinue clopidogrel though emphasized the importance of maintaining a daily aspirin.    3.  Aortic stenosis.  This was felt severe on most recent echocardiogram with mean gradient measured at 29 mmHg with peak velocity of 3.3 m/s.  Calculated valve area 0.7 cm .  VRVTI = 0.2. VRvel = 0.2    Will refer to the Valve Clinic with hopes of getting him and in approximately 3 months (hopefully by then, COVID-19 situation will have ameliorated).    4.  History of ventricular arrhythmias.  Patient previously had been noted to have evidence of NSVT though this has been quiescent and he has not had any documented recent recurrence.  As aforementioned, patient has declined prophylactic ICD placement (documented in myriad notes crafted by my colleague, Dr. Terrence Cordova).    Continue carvedilol as per problem #1.    5. Peripheral arterial disease.  Severe native peripheral arterial disease with right common femoral artery calcification and left common  iliac lesion. Recommend continued medical management/risk factor modification at this time.    6.  Dyslipidemia.  Lipid profile 12 February 2018 revealed LDL 96 mg/dL and HDL 35 mg/dL.    Continue atorvastatin 80 mg daily.    Recommend repeat lipid profile, but see no urgency and will arrange to have this done in approximately 3-4 months (hopefully by then, COVID-19 situation will have ameliorated).    Total time of call between patient and provider was 15:00 minutes     Start Time: 1525     Stop Time: 1540         History of Present Illness    Aayush García is a 64 y.o. male who is being evaluated via a billable telephone visit.     Once again I would like to thank you again for asking me to participate in the care of your patient, Aayush García.  As you know, but to reiterate for my own records, Aayush García is a 64 y.o. male with who historically has been followed by my colleague, Dr. Terrence Cordova, in the Heart Care Clinic.    history of cardiomyopathy (combined ischemic and non-ischemic).  Patient also has a history of coronary artery disease with prior intervention.  In addition, he has a history of aortic stenosis.    Again, Aayush has known coronary artery disease.  Specifically, patient underwent angiography 18 March 2019 and found to have significant two-vessel disease involving proximal LAD and first obtuse marginal.  Patient had successful PCI with stent placement to proximal LAD (4.0 x 24 mm Synergy drug-eluting stent).  He also had significant disease involving the first obtuse marginal. Specifically, the OM1 stenosis demonstrated bridging collaterals and was felt to be a chronic total occlusion (). His Iinterventionalist, Dr. Timi Rodriguez, had recommended medical therapy of the OM1 lesion.    On telephone interview, Aayush states that he is pleased with how he is performing from a cardiac standpoint.  He specifically denies chest pain and shortness of breath.  He states his weights have  been stable on his home scale.  He reports no palpitations or lightheadedness.  He denies any fevers, chills, or other constitutional symptoms.    Further review of systems is otherwise negative/noncontributory (medical record and 13 point review of systems reviewed as well and pertinent positives noted).         Cardiac Diagnostics   Echocardiogram 3 December 2019:  1. Mild left ventricular enlargement with moderate depression left ventricular systolic performance.  Ejection fraction 30%.  2. The following segments are akinetic: basal anteroseptal, basal inferoseptal, mid anteroseptal and mid inferoseptal. The following segments are hypokinetic: basal anterior, basal inferior, basal inferolateral, basal anterolateral, mid anterior, mid inferior, mid inferolateral, mid anterolateral, apical anterior, apical septal, apical inferior, apical lateral and apex.  3. Severe aortic stenosis.    Mean gradient measured at 29 mmHg with peak velocity of 3.3 m/s.  Calculated valve area 0.7 cm .  VRVTI = 0.2. VRvel = 0.2    Mild aortic insufficiency.  4. Normal right ventricular size and systolic performance.  5. Severe left atrial enlargement.  Moderate right atrial enlargement.  6. Mild aortic root enlargement.  7. Right ventricular systolic pressure relative to right atrial pressure is mildly increased.  Pulmonary artery pressure estimated at 35 to 40 mmHg plus right atrial pressure.    Coronary angiogram 18 March 2019:  1. Left anterior descending coronary artery: 95% proximal stenosis.  2. 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  First obtuse marginal with 99% chronic total occlusion ().  There are bridging collaterals to the distal portion of the vessel.  3. Successful PCI with stent placement to proximal LAD (4.0 x 24 mm Synergy drug-eluting stent.  4. Recommendations  5. Medical management for OM1 chronic total occlusion.           Physical  Examination           Wt Readings from Last 3 Encounters:   03/26/20 172 lb (78 kg)   12/17/19 163 lb 6.4 oz (74.1 kg)   12/03/19 170 lb (77.1 kg)     The patient has chosen to have the visit conducted as a telephone visit, to reduce risk of exposure given the current status of Coronavirus in our community. This telephone visit is being conducted via a call between the patient and physician/provider. Health care needs are being provided without a physical exam.        Imaging     Chest radiograph 16 December 2019:  1. Cardiomegaly.   2. New hazy right perihilar and lower lung opacity may be edema or pneumonia.   3. Bilateral perihilar interstitial markings slightly prominent. CHF is possible.   4. Old bilateral rib fracture deformities.          Family History/Social History/Risk Factors   Patient does not smoke.  Family history reviewed, and family history includes Alzheimer's disease (age of onset: 86) in his mother; Heart disease (age of onset: 76) in his father; No Medical Problems in his son and son.        Medications  Allergies   Current Outpatient Medications   Medication Sig Dispense Refill   ? acetaminophen (TYLENOL) 325 MG tablet Take 2 tablets (650 mg total) by mouth every 6 (six) hours as needed.  0   ? aspirin 81 MG EC tablet Take 81 mg by mouth daily.     ? atorvastatin (LIPITOR) 80 MG tablet Take 1 tablet (80 mg total) by mouth daily. 90 tablet 3   ? b complex vitamins tablet Take 1 tablet by mouth daily.     ? carvedilol (COREG) 12.5 MG tablet Take 12.5 mg by mouth 2 (two) times a day with meals.     ? cholecalciferol, vitamin D3, (VITAMIN D3) 2,000 unit Tab Take 2,000 Units by mouth daily.     ? clopidogrel (PLAVIX) 75 mg tablet Take 1 tablet (75 mg total) by mouth daily. 90 tablet 3   ? ferrous sulfate 325 (65 FE) MG tablet Take 1 tablet by mouth daily with breakfast.     ? folic acid (FOLVITE) 1 MG tablet Take 1 tablet (1 mg total) by mouth daily. 30 tablet 0   ? furosemide (LASIX) 20 MG tablet  TAKE 1 TABLET BY [facility] AS NEEDED FOR WEIGHT GAIN OF 3 LBS IN ONE DAY 90 tablet 1   ? magnesium oxide (MAG-OX) 400 mg (241.3 mg magnesium) tablet Take 1 tablet (400 mg total) by mouth 2 (two) times a day.  0   ? multivitamin therapeutic tablet Take 1 tablet by mouth daily.     ? pantoprazole (PROTONIX) 40 MG tablet Take 40 mg by mouth daily.  11   ? potassium chloride 20 mEq TbER Take 1 tablet by mouth daily.     ? furosemide (LASIX) 20 MG tablet Take 1 tablet (20 mg total) by mouth daily. 30 tablet 0   ? thiamine (VITAMIN B-1) 100 MG tablet Take 100 mg by mouth daily.       No current facility-administered medications for this visit.       No Known Allergies       Lab Results   Lab Results   Component Value Date     (L) 12/16/2019    K 2.9 (L) 12/17/2019     12/16/2019    CO2 22 12/16/2019    BUN 9 12/16/2019    CREATININE 0.67 (L) 12/16/2019    CALCIUM 8.7 12/16/2019     Lab Results   Component Value Date    WBC 7.6 12/16/2019    HGB 10.9 (L) 12/16/2019    HCT 36.1 (L) 12/16/2019    MCV 93 12/16/2019     12/16/2019     Lab Results   Component Value Date    CHOL 146 02/12/2018    TRIG 126 07/29/2018    HDL 35 (L) 02/12/2018    LDLCALC 96 02/12/2018     Lab Results   Component Value Date    INR 1.12 (H) 12/16/2019     Lab Results   Component Value Date    BNP 1,634 (H) 12/16/2019     Lab Results   Component Value Date    CKTOTAL 1,612 (HH) 07/27/2018    CKTOTAL 762 (HH) 07/26/2018    CKTOTAL 649 (HH) 07/25/2018    CKMB 24 (HH) 07/25/2018    TROPONINI 0.02 12/16/2019    TROPONINI 0.02 12/16/2019    TROPONINI <0.01 12/16/2019     Lab Results   Component Value Date    TSH 1.54 10/27/2019           Medical History  Surgical History   Past Medical History:   Diagnosis Date   ? Acute liver failure 8/5/2018   ? Acute renal failure, unspecified acute renal failure type (H) 08/05/2018   ? Acute respiratory failure with hypoxia (H) 08/05/2018   ? Alcoholic hepatitis with ascites 08/05/2018   ? Bacteremia due  to Klebsiella pneumoniae    ? Coronary artery disease involving native coronary artery of native heart without angina pectoris 9/24/2019   ? Essential hypertension    ? Gastroesophageal reflux disease without esophagitis 10/31/2018   ? Hepatic encephalopathy (H) 08/05/2018   ? Macrocytic anemia    ? Primary localized osteoarthrosis of left lower leg 4/9/2019   ? Stress-induced cardiomyopathy 08/05/2018      Past Surgical History:   Procedure Laterality Date   ? COLONOSCOPY N/A 3/20/2018    Procedure: COLONOSCOPY;  Surgeon: Adam Nicole III, MD;  Location: [facility];  Service:    ? CV CORONARY ANGIOGRAM N/A 3/18/2019    Procedure: Coronary Angiogram;  Surgeon: Timi Rodriguez MD;  Location: [facility] Lab;  Service: Cardiology   ? CV LEFT HEART CATHETERIZATION WO LEFT VETRICULOGRAM Left 3/18/2019    Procedure: Left Heart Catheterization Without Left Ventriculogram;  Surgeon: Timi Rodriguez MD;  Location: [facility] Cath Lab;  Service: Cardiology   ? PICC  7/24/2018        ? PICC  3/18/2019        ? US THORACENTESIS  10/28/2019

## 2021-06-30 NOTE — PROGRESS NOTES
Progress Notes by April Goldsmith MD at 4/22/2021  9:40 AM     Author: April Goldsmith MD Service: -- Author Type: Physician    Filed: 4/22/2021 10:12 AM Encounter Date: 4/22/2021 Status: Signed    : April Goldsmith MD (Physician)                                       Thank you Dr. Lewis for asking the [facility] team to participate in the care of your patient, Aayush García.     Impression and Plan     1.  Cardiomyopathy.  Patient with known cardiomyopathy with moderate depression of left ventricular systolic performance based on echocardiogram 3 December 2019.  Ejection fraction 30% on that study (see Cardiac Diagnostic section below).  Nature of cardiomyopathy felt combined ischemic as well as non-ischemic in part related to prior ethanol use.  ACE inhibitor/ARB has been deferred in this patient vis-à-vis hypotension.  Parenthetically, Aayush has declined prophylactic ICD placement (documented in myriad notes crafted by my colleague, Dr. Terrence Cordova).     This is stable.  Patient states his weights have been stable on his home scale.   He appears volume neutral on clinical exam.  He minimizes any shortness of breath.    Continue carvedilol.    Continued avoidance of ethanol (patient states that he has remained abstemious).     2.  Coronary artery disease.  Aayush has known coronary artery disease.  Specifically, patient underwent angiography 18 March 2019 and found to have significant two-vessel disease involving proximal LAD and first obtuse marginal.  Patient had successful PCI with stent placement to proximal LAD (4.0 x 24 mm Synergy drug-eluting stent).  He also had significant disease involving the first obtuse marginal. Specifically, the OM1 stenosis demonstrated bridging collaterals and was felt to be a chronic total occlusion (). His Iinterventionalist, Dr. Timi Rodriguez, had recommended medical therapy of the OM1 lesion.       This, too, has been  stable.  He denies chest pain.     3.  Aortic stenosis.  This was felt severe on most recent echocardiogram. The aortic valve was described as severely calcified with reduced systolic excursion and findings felt consistent with low flow, low gradient severe aortic stenosis (stroke-volume index 30.2 ml/m2) . The mean gradient was measured at 29 mmHg with peak velocity of 3.3 m/s.  Calculated valve area 0.7 cm .  VRVTI = 0.2. VRvel = 0.2.  Clinical exam is commensurate with advanced aortic stenosis with later peaking systolic murmur and carotid pulses consistent with pulses parvus et tardus.  Although he has not had prominent symptoms, given depressed left ventricular systolic function felt likely combined ischemic/nonischemic; patient likely would benefit from aortic valve replacement (i.e. TAVR).  I did discuss this with Aayush and his spouse today and he would be amenable to pursuing this if felt to be a candidate.      Echocardiogram to not only reassess aortic stenosis, but also reassess left ventricular systolic performance with comparison to previous study.    Will review echocardiogram when completed, but tentatively will plan on referring to the Valve Clinic after echocardiogram completed.     4.  History of ventricular arrhythmias.  Patient previously had been noted to have evidence of NSVT though this has been quiescent and he has not had any documented recent recurrence.  As aforementioned, patient has declined prophylactic ICD placement (documented in myriad notes crafted by my colleague, Dr. Terrence Cordova).  Continue carvedilol as per problem #1.     5. Peripheral arterial disease.  Severe native peripheral arterial disease with right common femoral artery calcification and left common iliac lesion. Recommend continued medical management/risk factor modification at this time.     6.  Dyslipidemia.  Lipid profile 12 February 2018 revealed LDL 96 mg/dL and HDL 35 mg/dL.    Continue high intensity statin  therapy, atorvastatin 80 mg daily.    Follow-up and further recommendations pending echocardiographic findings.    35 minutes spent reviewing prior records (including documentation, laboratory studies, cardiac testing/imaging), interview with patient along with physical exam, planning, and subsequent documentation/crafting of note.           History of Present Illness    Once again I would like to thank you again for asking me to participate in the care of your patient, Aayush García.  As you know, but to reiterate for my own records, Aayush García is a 65 y.o. male who historically has been followed by my colleague, Dr. Terrence Cordova, in the [facility].     Aayush has a history of cardiomyopathy (combined ischemic and non-ischemic).  Patient also has a history of coronary artery disease with prior intervention.  In addition, he has a history of aortic stenosis.     Again, Aayush has known coronary artery disease.  Specifically, Aayush underwent angiography 18 March 2019 and found to have significant two-vessel disease involving proximal LAD and first obtuse marginal.  Patient had successful PCI with stent placement to proximal LAD (4.0 x 24 mm Synergy drug-eluting stent).  He also had significant disease involving the first obtuse marginal. Specifically, the OM1 stenosis demonstrated bridging collaterals and was felt to be a chronic total occlusion (). His Interventionalist, Dr. Timi Rodriguez, had recommended medical therapy of the OM1 lesion.     On interview, Aayush states that he is pleased with how he is performing from a cardiac standpoint.  He specifically denies chest pain or shortness of breath.  He states his weights have been stable on his home scale.   He is somewhat limited in his exercise tolerance though he feels this is mainly due to some orthopedic issues.  He reports no palpitations or lightheadedness.  He denies any fevers, chills, or other constitutional symptoms.    Further review  of systems is otherwise negative/noncontributory (medical record and 13 point review of systems reviewed as well and pertinent positives noted).         Cardiac Diagnostics      Echocardiogram 3 December 2019:  1. Mild left ventricular enlargement with moderate depression left ventricular systolic performance.  Ejection fraction 30%.  2. The following segments are akinetic: basal anteroseptal, basal inferoseptal, mid anteroseptal and mid inferoseptal. The following segments are hypokinetic: basal anterior, basal inferior, basal inferolateral, basal anterolateral, mid anterior, mid inferior, mid inferolateral, mid anterolateral, apical anterior, apical septal, apical inferior, apical lateral and apex.  3. Severe aortic stenosis.    Aortic valve is severely calcified with reduced systolic excursion. Low flow, low gradient severe aortic stenosis is present with mild regurgitation (stroke-volume index 30.2 ml/m2) .     Mean gradient measured at 29 mmHg with peak velocity of 3.3 m/s.  Calculated valve area 0.7 cm .  VRVTI = 0.2. VRvel = 0.2  4. Mild aortic insufficiency.  5. Normal right ventricular size and systolic performance.  6. Severe left atrial enlargement.  Moderate right atrial enlargement.  7. Mild aortic root enlargement.  8. Right ventricular systolic pressure relative to right atrial pressure is mildly increased.  Pulmonary artery pressure estimated at 35 to 40 mmHg plus right atrial pressure.    Coronary angiogram 18 March 2019:  1. Left anterior descending coronary artery: 95% proximal stenosis.  2. 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  First obtuse marginal with 99% chronic total occlusion ().  There are bridging collaterals to the distal portion of the vessel.  3. Successful PCI with stent placement to proximal LAD (4.0 x 24 mm Synergy drug-eluting stent.  4. Recommendations  5. Medical management for OM1 chronic total  occlusion.         Physical Examination       /80   Pulse (!) 56   Resp 16   Wt 186 lb (84.4 kg)   BMI 27.47 kg/m          Wt Readings from Last 3 Encounters:   04/22/21 186 lb (84.4 kg)   05/15/20 178 lb 9.6 oz (81 kg)   03/26/20 172 lb (78 kg)     The patient is alert and oriented times three. Sclerae are anicteric. Mucosal membranes are moist. Jugular venous pressure is normal.  Carotid pulses are consistent with pulses parvus et tardus.  No significant adenopathy/thyromegally appreciated. Lungs are clear with good expansion. On cardiovascular exam, the patient has a regular S1 and S2.  There is a 3/6 systolic murmur which is mid to late peaking heard in a sash-like distribution.  Abdomen is soft and non-tender. Extremities reveal no clubbing, cyanosis, or edema.         Imaging     Chest radiograph 16 December 2019:  1. Cardiomegaly.   2. New hazy right perihilar and lower lung opacity may be edema or pneumonia.   3. Bilateral perihilar interstitial markings slightly prominent. CHF is possible.   4. Old bilateral rib fracture deformities.          Family History/Social History/Risk Factors   Patient does not smoke.  Family history reviewed, and family history includes Alzheimer's disease (age of onset: 86) in his mother; Heart disease (age of onset: 76) in his father; No Medical Problems in his son and son.          Medications  Allergies   Current Outpatient Medications   Medication Sig Dispense Refill   ? acetaminophen (TYLENOL) 325 MG tablet Take 2 tablets (650 mg total) by mouth every 6 (six) hours as needed.  0   ? aspirin 81 MG EC tablet Take 81 mg by mouth daily.     ? atorvastatin (LIPITOR) 80 MG tablet TAKE 1 TABLET BY MOUTH EVERY DAY. NEED APPOINTMENT FRO FURTHER REFILLS 90 tablet 1   ? b complex vitamins tablet Take 1 tablet by mouth daily.     ? carvediloL (COREG) 25 MG tablet Take 1 tablet (25 mg total) by mouth 2 (two) times a day with meals. 180 tablet 0   ? cholecalciferol, vitamin D3,  (VITAMIN D3) 2,000 unit Tab Take 2,000 Units by mouth daily.     ? ferrous sulfate 325 (65 FE) MG tablet Take 1 tablet by mouth daily with breakfast.     ? folic acid (FOLVITE) 1 MG tablet Take 1 tablet (1 mg total) by mouth daily. 30 tablet 0   ? magnesium oxide (MAG-OX) 400 mg (241.3 mg magnesium) tablet Take 1 tablet (400 mg total) by mouth 2 (two) times a day.  0   ? multivitamin therapeutic tablet Take 1 tablet by mouth daily.     ? pantoprazole (PROTONIX) 40 MG tablet Take 40 mg by mouth daily.  11   ? potassium chloride 20 mEq TbER Take 1 tablet by mouth daily. 90 tablet 3     No current facility-administered medications for this visit.       No Known Allergies       Lab Results   Lab Results   Component Value Date     06/09/2020    K 4.1 06/09/2020     06/09/2020    CO2 21 (L) 06/09/2020    BUN 15 06/09/2020    CREATININE 0.66 (L) 06/09/2020    CALCIUM 8.9 06/09/2020     Lab Results   Component Value Date    WBC 4.2 05/14/2020    HGB 11.4 (L) 05/14/2020    HCT 31.9 (L) 05/14/2020    MCV 95 05/14/2020    PLT 68 (L) 05/14/2020     Lab Results   Component Value Date    CHOL 146 02/12/2018    TRIG 126 07/29/2018    HDL 35 (L) 02/12/2018    LDLCALC 96 02/12/2018     Lab Results   Component Value Date    INR 1.12 (H) 12/16/2019     Lab Results   Component Value Date    BNP 1,634 (H) 12/16/2019     Lab Results   Component Value Date    CKTOTAL 125 05/14/2020    CKTOTAL 1,612 (HH) 07/27/2018    CKTOTAL 762 (HH) 07/26/2018    CKMB 24 (HH) 07/25/2018    TROPONINI 0.02 12/16/2019    TROPONINI 0.02 12/16/2019    TROPONINI <0.01 12/16/2019     Lab Results   Component Value Date    TSH 1.54 10/27/2019

## 2021-07-01 ENCOUNTER — HOSPITAL ENCOUNTER (OUTPATIENT)
Dept: CARDIOLOGY | Facility: CLINIC | Age: 66
Discharge: HOME | End: 2021-07-01
Attending: INTERNAL MEDICINE
Payer: COMMERCIAL

## 2021-07-01 DIAGNOSIS — I25.5 ISCHEMIC CARDIOMYOPATHY: ICD-10-CM

## 2021-07-01 DIAGNOSIS — I25.10 CORONARY ARTERY DISEASE INVOLVING NATIVE CORONARY ARTERY OF NATIVE HEART WITHOUT ANGINA PECTORIS: ICD-10-CM

## 2021-07-01 DIAGNOSIS — I35.0 NONRHEUMATIC AORTIC VALVE STENOSIS: ICD-10-CM

## 2021-07-01 LAB
AORTIC ROOT: 4 CM
AORTIC VALVE MEAN VELOCITY: 246 CM/S
AR DECEL SLOPE: 2070 MM/S2
AR PEAK VELOCITY: 398 CM/S
ASCENDING AORTA: 4.7 CM
AV DIMENSIONLESS INDEX VTI: 0.2
AV MEAN GRADIENT: 29 MMHG
AV PEAK GRADIENT: 51.3 MMHG
AV REGURGITANT PEAK GRADIENT: 63.4 MMHG
AV REGURGITATION PRESSURE HALF TIME: 567 MS
AV VALVE AREA: 0.6 CM2
AV VELOCITY RATIO: 0.2
BSA FOR ECHO PROCEDURE: 2.02 M2
CV BLOOD PRESSURE: ABNORMAL MMHG
CV ECHO HEIGHT: 69 IN
CV ECHO WEIGHT: 186 LBS
DOP CALC AO PEAK VEL: 358 CM/S
DOP CALC AO VTI: 103 CM
DOP CALC LVOT AREA: 3.14 CM2
DOP CALC LVOT DIAMETER: 2 CM
DOP CALC LVOT PEAK VEL: 68.5 CM/S
DOP CALC LVOT STROKE VOLUME: 60.3 CM3
DOP CALCLVOT PEAK VEL VTI: 19.2 CM
EJECTION FRACTION: 39 % (ref 55–75)
FRACTIONAL SHORTENING: 14.3 % (ref 28–44)
INTERVENTRICULAR SEPTUM IN END DIASTOLE: 1.7 CM (ref 0.6–1)
IVS/PW RATIO: 1.4
LA AREA 1: 25.8 CM2
LA AREA 2: 25.9 CM2
LEFT ATRIUM LENGTH: 5.55 CM
LEFT ATRIUM SIZE: 4.1 CM
LEFT ATRIUM VOLUME INDEX: 50.7 ML/M2
LEFT ATRIUM VOLUME: 102.3 ML
LEFT VENTRICLE CARDIAC INDEX: 1.5 L/MIN/M2
LEFT VENTRICLE CARDIAC OUTPUT: 3 L/MIN
LEFT VENTRICLE DIASTOLIC VOLUME INDEX: 62.9 CM3/M2 (ref 34–74)
LEFT VENTRICLE DIASTOLIC VOLUME: 127 CM3 (ref 62–150)
LEFT VENTRICLE HEART RATE: 50 BPM
LEFT VENTRICLE MASS INDEX: 147.3 G/M2
LEFT VENTRICLE SYSTOLIC VOLUME INDEX: 38.6 CM3/M2 (ref 11–31)
LEFT VENTRICLE SYSTOLIC VOLUME: 78 CM3 (ref 21–61)
LEFT VENTRICULAR INTERNAL DIMENSION IN DIASTOLE: 4.9 CM (ref 4.2–5.8)
LEFT VENTRICULAR INTERNAL DIMENSION IN SYSTOLE: 4.2 CM (ref 2.5–4)
LEFT VENTRICULAR MASS: 297.5 G
LEFT VENTRICULAR OUTFLOW TRACT MEAN GRADIENT: 1 MMHG
LEFT VENTRICULAR OUTFLOW TRACT MEAN VELOCITY: 51.7 CM/S
LEFT VENTRICULAR OUTFLOW TRACT PEAK GRADIENT: 2 MMHG
LEFT VENTRICULAR POSTERIOR WALL IN END DIASTOLE: 1.2 CM (ref 0.6–1)
LV STROKE VOLUME INDEX: 29.8 ML/M2
MITRAL VALVE E/A RATIO: 0.7
MV AVERAGE E/E' RATIO: 13 CM/S
MV DECELERATION TIME: 264 MS
MV E'TISSUE VEL-LAT: 6.14 CM/S
MV E'TISSUE VEL-MED: 3.41 CM/S
MV LATERAL E/E' RATIO: 10.1
MV MEDIAL E/E' RATIO: 18.2
MV PEAK A VELOCITY: 84.5 CM/S
MV PEAK E VELOCITY: 62 CM/S
NUC REST DIASTOLIC VOLUME INDEX: 2976 LBS
NUC REST SYSTOLIC VOLUME INDEX: 69 IN
PV ACCELERATION TIME: 134 MS
TRICUSPID REGURGITATION PEAK PRESSURE GRADIENT: 30.5 MMHG
TRICUSPID VALVE ANULAR PLANE SYSTOLIC EXCURSION: 2.1 CM
TRICUSPID VALVE PEAK REGURGITANT VELOCITY: 276 CM/S

## 2021-07-01 ASSESSMENT — MIFFLIN-ST. JEOR: SCORE: 1624.07

## 2021-07-01 NOTE — PROCEDURES
"Procedures by Rima Vazquez RN at 7/24/2018 11:25 PM     Author: Rima Vazquez RN Service: -- Author Type: Registered Nurse    Filed: 7/24/2018 11:31 PM Date of Service: 7/24/2018 11:25 PM Status: Signed    : Rima Vazquez RN (Registered Nurse)     Procedure Orders    1. Blood draws from line OK on existing PICC [[id number]] ordered by Kota Mahoney MD at 07/24/18 2318    2. Insert PICC [[id number]] ordered by Kota Mahoney MD at 07/24/18 2209           Procedures    1. PICC [[id number] (Custom)]             PICC Line Insertion Procedure Note  Pt. Name: Aayush García  MRN:        [id number]    Procedure: Insertion of a  Triple Lumen  5 fr  Bard SOLO (valved) Power PICC, Lot number [id number]    Indications: pressor    Contraindications : none    Procedure Details   Patient identified with 2 identifiers and \"Time Out\" conducted.  .     Central line insertion bundle followed: hand hygeine performed prior to procedure, site cleansed with cholraprep, hat, mask, sterile gloves,sterile gown worn, patient draped with maximum barrier head to toe drape, sterile field maintained.    The vein was assessed and found to be compressible and of adequate size. 2 ml 1% Lidocaine administered sq to the insertion site. A 5 Fr PICC was inserted into the brachial vein of the right arm with ultrasound guidance. 1 attempt(s) required to access vein.   Catheter threaded without difficulty. Good blood return noted.    Modified Seldinger Technique used for insertion.    The 8 sharps that are included in the PICC insertion kit were accounted for and disposed of in the sharps container prior to breakdown of the sterile field.    Catheter secured with Statlock, biopatch and Tegaderm dressing applied.    Findings:  Total catheter length  48 cm, with 0 cm exposed. Mid upper arm circumference is 30 cm. Catheter was flushed with 30 cc NS. Patient  tolerated procedure well.    Tip placement verified by 3CG . Tip " placement in the SVC.    CLABSI prevention brochure left at bedside.    Patient's primary RN notified PICC is ready for use.    Comments:  Thanks        Rima Vazquez, RN    [facility]  [phone]

## 2021-07-01 NOTE — CONSULTS
"Consults by April Goldsmith MD at 7/25/2018 10:38 AM     Author: April Goldsmith MD Service: Cardiology Author Type: Physician    Filed: 7/25/2018 11:00 AM Date of Service: 7/25/2018 10:38 AM Status: Signed    : April Goldsmith MD (Physician)     Consult Orders    1. Inpatient consult to Cardiology Reason for Consult? NSTEMI; Consult priority: Today (routine); Communication for MD: No phone communication necessary for now [[id number]] ordered by Lewis Farr MD at 07/25/18 0932              `        [email or web address]  [phone]         Impression and Plan     1.  Elevated troponin.  Clinical significance not entirely clear.  May represent non-ST elevation myocardial infarction.  Patient with multiple medical issues and suspect this more likely reflects \"demand ischemia\" ( i.e. type 2 MI secondary to ischemia due to either an increased oxygen demand or a decreased supply in the absence of an acute primary coronary thrombotic event).  ECG is without prominent ST changes.    Would advocate supportive measures and conservative management from a cardiac standpoint for now (i.e. would not advocate direct angiography at this time).    Echocardiogram.    2.  Acute kidney injury/renal insufficiency/lactic acidosis.  Management plan as outlined by other providers.    3.  Hypotension/sepsis.  Agree with current management as outlined by Critical Care Medicine.    Primary Cardiologist:  Dr. April Goldsmith (initial consultation this admission)    History of Present Illness    Aayush García is a 62 y.o. male admitted with worsening confusion.  Patient had had worsening confusion over 48 hours prior to admission.  Patient also had been experiencing hallucinations and difficult sleeping.  Patient had not passed his urine for 3 days per documentation.  Also reported feeling fatigued.  Denies chest pain or shortness of breath, however.    Patient with evidence of septic " "shock on on presentation with renal failure.  She is now intubated.    Further review of systems is otherwise negative/noncontributory (medical record and 13 point review of systems reviewed as well and pertinent positives noted).    Cardiac Diagnostics   Telemetry (personally reviewed): Sinus rhythm    Twelve-lead ECG (personally reviewed) 25 July 2018: Sinus rhythm.  Nonspecific T-wave changes.    Twelve-lead ECG (personally reviewed) 24 July 2018: Sinus rhythm.  Nonspecific T-wave changes.      Medical History  Surgical History   Past Medical History:   Diagnosis Date   ? Hypertension       Past Surgical History:   Procedure Laterality Date   ? COLONOSCOPY N/A 3/20/2018    Procedure: COLONOSCOPY;  Surgeon: Adam Nicole III, MD;  Location: [facility];  Service:    ? PICC  7/24/2018               Family History/Social History/Risk Factors   Patient does smoke.  Family history cannot be obtained directly from patient.      Physical Examination       /51  Pulse 89  Temp 98.4  F (36.9  C) (Oral)   Resp 24  Ht 5' 8\" (1.727 m)  Wt 193 lb (87.5 kg)  SpO2 99%  BMI 29.35 kg/m2      193 lb (87.5 kg)        Intake/Output Summary (Last 24 hours) at 07/25/18 1039  Last data filed at 07/25/18 1005   Gross per 24 hour   Intake             6108 ml   Output                0 ml   Net             6108 ml       Patient is intubated.No significant adenopathy/thyromegally appreciated. Lungs feel a few coarse bronchogenic breath sounds.  On cardiovascular exam, the patient has a regular S1 and S2.  Heart sounds are distant.  Abdomen is soft . Extremities reveal no clubbing, cyanosis, warm extremities.         Imaging      Chest radiograph 25 July 2018:  Heart is slightly enlarged.   Tip of ETT located 3.2 cm above stacy.   NG tube extends below left diaphragm.   Right PICC catheter with tip right atrial level.   No pneumothorax.   Minimal amount of bilateral lower lung atelectasis.   Monitor electrodes.    Lab " Results   Lab Results   Component Value Date     (L) 07/25/2018     (L) 07/25/2018    K 5.0 07/25/2018    K 5.0 07/25/2018    CL 86 (L) 07/25/2018    CL 86 (L) 07/25/2018    CO2 14 (L) 07/25/2018    CO2 14 (L) 07/25/2018    BUN 59 (H) 07/25/2018    BUN 59 (H) 07/25/2018    CREATININE 9.44 (HH) 07/25/2018    CREATININE 9.44 (HH) 07/25/2018    CALCIUM 7.4 (L) 07/25/2018    CALCIUM 7.4 (L) 07/25/2018     Lab Results   Component Value Date    WBC 21.4 (H) 07/25/2018    HGB 7.7 (L) 07/25/2018    HCT 24.2 (L) 07/25/2018     (H) 07/25/2018     07/25/2018     Lab Results   Component Value Date    CHOL 146 02/12/2018    TRIG 76 02/12/2018    HDL 35 (L) 02/12/2018    LDLCALC 96 02/12/2018     Lab Results   Component Value Date    INR 1.48 (H) 07/25/2018     Lab Results   Component Value Date    CKTOTAL 606 () 07/25/2018    CKMB 24 (HH) 07/25/2018    TROPONINI 4.20 (HH) 07/25/2018    TROPONINI 0.51 (HH) 07/25/2018    TROPONINI 0.11 07/24/2018     Lab Results   Component Value Date    TSH 1.45 07/24/2018           Current Inpatient Scheduled Medications   Scheduled Meds:  ? acetylcysteine  150 mg/kg Intravenous Once    Followed by   ? acetylcysteine  50 mg/kg Intravenous Once    Followed by   ? acetylcysteine  100 mg/kg Intravenous Once   ? albumin human  25 g Intravenous Q6H   ? albuterol       ? cefepime (MAXIPIME) IV  500 mg Intravenous Q24H   ? chlorhexidine  15 mL Topical Q12H   ? EPINEPHrine       ? folic acid  1 mg Oral DAILY    Or   ? folic acid  1 mg Intramuscular DAILY   ? hydrocortisone sod succ  50 mg Intravenous Q8H   ? insulin aspart (NovoLOG) injection   Subcutaneous Q4H FIXED TIMES   ? metroNIDAZOLE  500 mg Intravenous Q12H   ? multivitamin therapeutic  1 tablet Oral DAILY   ? pantoprazole  40 mg Intravenous Q12H   ? senna-docusate  1 tablet Oral BID    Or   ? senna (SENOKOT) syrup  8.8 mg Enteral Tube BID   ? sodium chloride  10-30 mL Intravenous Q8H FIXED TIMES   ? sodium chloride   3 mL Intravenous Line Care   ? sodium chloride  3 mL Intravenous Line Care   ? thiamine  100 mg Oral DAILY    Or   ? thiamine  100 mg Intramuscular DAILY   ? vancomycin intermittent dosing   Other Med Consult or Protocol     Continuous Infusions:  ? dexmedetomidine 400 mcg/100 mL in NS (PRECEDEX) (4mcg/mL) 1.5 mcg/kg/hr (07/25/18 0832)   ? IV infusion, Custom Builder 100 mL/hr at 07/25/18 0848   ? epinephrine Stopped (07/25/18 0600)   ? insulin infusion (1 unit/mL)     ? norepinephrine 4 mg/250 ml in D5W (0.016 mg/ml) 10 mcg/min (07/25/18 1024)   ? propofol 10 mcg/kg/min (07/25/18 0849)   ? vasopressin 2.4 Units/hr (07/25/18 0849)            Medications Prior to Admission   Prior to Admission medications    Medication Sig Start Date End Date Taking? Authorizing Provider   aspirin 81 MG EC tablet Take 81 mg by mouth daily.   Yes PROVIDER, HISTORICAL   lisinopril (PRINIVIL,ZESTRIL) 10 MG tablet Take 10 mg by mouth daily.   Yes PROVIDER, HISTORICAL   multivitamin therapeutic tablet Take 1 tablet by mouth daily.   Yes PROVIDER, HISTORICAL   omeprazole (PRILOSEC) 40 MG capsule Take 40 mg by mouth daily before breakfast. 6/27/18  Yes PROVIDER, HISTORICAL   rosuvastatin (CRESTOR) 10 MG tablet Take 10 mg by mouth daily. 7/17/18  Yes PROVIDER, HISTORICAL

## 2021-07-06 VITALS — WEIGHT: 186 LBS | HEIGHT: 69 IN | BODY MASS INDEX: 27.55 KG/M2

## 2021-07-14 PROBLEM — K72.00 ACUTE LIVER FAILURE: Status: RESOLVED | Noted: 2018-08-05 | Resolved: 2019-03-18

## 2021-07-14 PROBLEM — I50.1 PULMONARY EDEMA CARDIAC CAUSE (H): Status: RESOLVED | Noted: 2019-03-17 | Resolved: 2019-04-04

## 2021-07-14 PROBLEM — I10 BENIGN ESSENTIAL HYPERTENSION: Status: RESOLVED | Noted: 2018-04-12 | Resolved: 2019-03-18

## 2021-07-20 NOTE — PROCEDURES
Aayush Olivopaulina 62 y.o. male Dialysis treatment started at 0835 and ended at 1135; ran for 3 hours on a K bath of 3.  Total removed 2 kg.  Meds given None. Access Right non tunneled CVC. Heparin 500 units per hour, total of 1500 units. Tolerated well, no complaints.  Complications.None, BP remained stable throughout treatment  Incapacitated Dialysis Nurse Procedure reviewed with RANDALL Rivera RN  Water alarm functional and in place entire treatment.  Hepatitis B status. Negative Date 7/23/18.  Patient Consent Verified Yes.    [History reviewed] : History reviewed. [Medications and Allergies reviewed] : Medications and allergies reviewed.

## 2021-07-28 DIAGNOSIS — I35.0 AORTIC STENOSIS, MODERATE: Primary | ICD-10-CM

## 2021-07-29 ENCOUNTER — OFFICE VISIT (OUTPATIENT)
Dept: CARDIOLOGY | Facility: CLINIC | Age: 66
End: 2021-07-29
Payer: COMMERCIAL

## 2021-07-29 ENCOUNTER — PREP FOR PROCEDURE (OUTPATIENT)
Dept: CARDIOLOGY | Facility: CLINIC | Age: 66
End: 2021-07-29

## 2021-07-29 ENCOUNTER — ALLIED HEALTH/NURSE VISIT (OUTPATIENT)
Dept: CARDIOLOGY | Facility: CLINIC | Age: 66
End: 2021-07-29
Payer: COMMERCIAL

## 2021-07-29 ENCOUNTER — LAB (OUTPATIENT)
Dept: CARDIOLOGY | Facility: CLINIC | Age: 66
End: 2021-07-29
Payer: COMMERCIAL

## 2021-07-29 VITALS
HEIGHT: 68 IN | HEART RATE: 68 BPM | RESPIRATION RATE: 16 BRPM | BODY MASS INDEX: 28.49 KG/M2 | DIASTOLIC BLOOD PRESSURE: 62 MMHG | WEIGHT: 188 LBS | SYSTOLIC BLOOD PRESSURE: 128 MMHG

## 2021-07-29 DIAGNOSIS — I35.0 AORTIC STENOSIS, MODERATE: Primary | ICD-10-CM

## 2021-07-29 DIAGNOSIS — I35.0 AORTIC STENOSIS, MODERATE: ICD-10-CM

## 2021-07-29 DIAGNOSIS — I35.0 NONRHEUMATIC AORTIC VALVE STENOSIS: Primary | ICD-10-CM

## 2021-07-29 DIAGNOSIS — Z11.59 ENCOUNTER FOR SCREENING FOR OTHER VIRAL DISEASES: ICD-10-CM

## 2021-07-29 LAB
ALBUMIN SERPL-MCNC: 3.8 G/DL (ref 3.5–5)
ALP SERPL-CCNC: 57 U/L (ref 45–120)
ALT SERPL W P-5'-P-CCNC: 25 U/L (ref 0–45)
ANION GAP SERPL CALCULATED.3IONS-SCNC: 7 MMOL/L (ref 5–18)
AST SERPL W P-5'-P-CCNC: 36 U/L (ref 0–40)
BILIRUB SERPL-MCNC: 1.5 MG/DL (ref 0–1)
BUN SERPL-MCNC: 13 MG/DL (ref 8–22)
CALCIUM SERPL-MCNC: 8.9 MG/DL (ref 8.5–10.5)
CHLORIDE BLD-SCNC: 106 MMOL/L (ref 98–107)
CO2 SERPL-SCNC: 24 MMOL/L (ref 22–31)
CREAT SERPL-MCNC: 0.81 MG/DL (ref 0.7–1.3)
ERYTHROCYTE [DISTWIDTH] IN BLOOD BY AUTOMATED COUNT: 11.9 % (ref 10–15)
GFR SERPL CREATININE-BSD FRML MDRD: >90 ML/MIN/1.73M2
GLUCOSE BLD-MCNC: 94 MG/DL (ref 70–125)
HCT VFR BLD AUTO: 40.1 % (ref 40–53)
HGB BLD-MCNC: 13.7 G/DL (ref 13.3–17.7)
MCH RBC QN AUTO: 33 PG (ref 26.5–33)
MCHC RBC AUTO-ENTMCNC: 34.2 G/DL (ref 31.5–36.5)
MCV RBC AUTO: 97 FL (ref 78–100)
PLATELET # BLD AUTO: 233 10E3/UL (ref 150–450)
POTASSIUM BLD-SCNC: 4.6 MMOL/L (ref 3.5–5)
PROT SERPL-MCNC: 7.3 G/DL (ref 6–8)
RBC # BLD AUTO: 4.15 10E6/UL (ref 4.4–5.9)
SODIUM SERPL-SCNC: 137 MMOL/L (ref 136–145)
WBC # BLD AUTO: 8.3 10E3/UL (ref 4–11)

## 2021-07-29 PROCEDURE — 99207 PR NO CHARGE NURSE ONLY: CPT

## 2021-07-29 PROCEDURE — 99205 OFFICE O/P NEW HI 60 MIN: CPT | Performed by: INTERNAL MEDICINE

## 2021-07-29 PROCEDURE — 80053 COMPREHEN METABOLIC PANEL: CPT

## 2021-07-29 PROCEDURE — 85027 COMPLETE CBC AUTOMATED: CPT

## 2021-07-29 PROCEDURE — 93000 ELECTROCARDIOGRAM COMPLETE: CPT | Performed by: INTERNAL MEDICINE

## 2021-07-29 PROCEDURE — 36415 COLL VENOUS BLD VENIPUNCTURE: CPT

## 2021-07-29 RX ORDER — ASPIRIN 81 MG/1
243 TABLET, CHEWABLE ORAL ONCE
Status: CANCELLED | OUTPATIENT
Start: 2021-09-22

## 2021-07-29 RX ORDER — LIDOCAINE 40 MG/G
CREAM TOPICAL
Status: CANCELLED | OUTPATIENT
Start: 2021-07-29

## 2021-07-29 RX ORDER — MULTIVITAMIN,THERAPEUTIC
1 TABLET ORAL DAILY
Status: ON HOLD | COMMUNITY
End: 2023-09-05

## 2021-07-29 RX ORDER — FENTANYL CITRATE 50 UG/ML
25 INJECTION, SOLUTION INTRAMUSCULAR; INTRAVENOUS
Status: CANCELLED | OUTPATIENT
Start: 2021-07-29

## 2021-07-29 RX ORDER — SODIUM CHLORIDE 9 MG/ML
INJECTION, SOLUTION INTRAVENOUS CONTINUOUS
Status: CANCELLED | OUTPATIENT
Start: 2021-09-22

## 2021-07-29 RX ORDER — ASPIRIN 325 MG
325 TABLET ORAL ONCE
Status: CANCELLED | OUTPATIENT
Start: 2021-09-22 | End: 2021-07-29

## 2021-07-29 RX ORDER — DIAZEPAM 5 MG
5 TABLET ORAL
Status: CANCELLED | OUTPATIENT
Start: 2021-09-22

## 2021-07-29 ASSESSMENT — MIFFLIN-ST. JEOR: SCORE: 1617.26

## 2021-07-29 NOTE — PROGRESS NOTES
Valve Clinic Nursing Note: Aortic Stenosis    Referring provider: Rupal    Patient history is significant for CAD (PCI in 2019 to the LAD), cardiomyopathy, knee problems, PAD- R common femoral and left common iliac calcification, DLD, ETOH    Symptoms include some SOB upon exertion     Echo information: ()  EF: 39% M P. Agusto: 3.58 KALE: 0.6 Di: 0.2 Svi: 29.8    CT scan scheduled to be done in august.     Tentative Plan: plan to obtain TAVR work up- angiogram, CT scan, surgeon and dental. He states that he has not been to the dentist for 20 + years. I told him that that appt should take priority.     TAVR Frailty nursing assessment:    Serum albumin (date completed 2021): 3.8  5 meter walk (date completed 2021): 12.50, 11.60, 12.04  Moe index (date completed 2021): 6/6  Total frailty score: 1/3    KCCQ12 (date completed 2021 )    Preliminary STS score: 3%      Chery Roman, RN, BSN  Valve Clinic Coordinator  [facility]  [phone]  21 12:55 PM

## 2021-07-29 NOTE — PROGRESS NOTES
[facility] RN Pre-Procedure Education Note    Reason for angiogram: severe aortic stenosis, pre-TAVR    Procedure: coronary angiogram with possible intervention  With Dr. Rodriguez     Date of Procedure: 8/16  Arrival time: 6:30 am    Location: [facility]                Diagnosis: severe aortic stenosis  Cardiologist Ordering Procedure: Raghav  Primary Cardiologist: Rupal  PCP: Zachary Lewis    H&P completed by: 7/29/2021   Previous Cath Report: in Epic 2019 MY  Bypass grafts: No  Labs within 7 days: no  Renal Issues: No  Diabetic: No    COVID TEST:   Date: 8/11  Location: [facility]        Does patient have contrast/IV dye allergy: no      Patient Education  Explained indications/risks for diagnostic evaluation, including one or more of the following:  left heart catheterization, right heart catheterization and coronary angiogram  Explained indications/risks for therapeutic interventions, including one or more of the following: PTCA, artherectomy and stent.  These risks are in addition to baseline risks associated with a Diagnostic Evaluation.  Patient state understanding of procedure and risks and agrees to proceed    Additional education comment: Pt was instructed on and given procedure letter and written education material. This information was reviewed with the patient. No further questions at this time.    Pre-procedure instructions  Patient instructed to be NPO after midnight.  Patient instructed to arrange for transportation home following procedure  No driving for 24 hours post procedure  Depending on the results of the test, provider may decide to keep patient overnight in the hospital for further evaluation.  Reviewed lifting restrictions    Pre-procedure medication instructions  medication instructions: take all morning medications      Current Outpatient Medications   Medication Sig Dispense Refill     acetaminophen (TYLENOL 8 HOUR ARTHRITIS PAIN) 650 MG CR tablet Take 2  tablets (1,300 mg) by mouth every 8 hours as needed for pain       aspirin (ASA) 81 MG EC tablet Take 1 tablet (81 mg) by mouth daily 90 tablet 3     atorvastatin (LIPITOR) 80 MG tablet Take 80 mg by mouth daily  0     B Complex-C-Folic Acid (NEPHRO-FRANCISCO) 0.8 MG TABS Take 1 tablet by mouth daily       carvedilol (COREG) 12.5 MG tablet Take 2 tablets (25 mg) by mouth 2 times daily       Cholecalciferol (VITAMIN D) 50 MCG (2000 UT) CAPS Take 1 capsule by mouth daily       ferrous sulfate (FEROSUL) 325 (65 Fe) MG tablet Take 325 mg by mouth daily  0     folic acid (FOLVITE) 400 MCG tablet Take 1 tablet (400 mcg) by mouth daily       furosemide (LASIX) 20 MG tablet Take 1 tablet (20 mg) by mouth as needed (fluid retention) (Patient not taking: Reported on 7/29/2021)  0     magnesium oxide (MAG-OX) 400 MG tablet Take 1 tablet (400 mg) by mouth 2 times daily 60 tablet 11     Multiple Vitamins-Minerals (ONCOVITE) TABS Take 1 tablet by mouth daily       order for DME Equipment being ordered: Blood pressure instrument and cuff 1 each 0     pantoprazole (PROTONIX) 20 MG EC tablet TAKE 1 TABLET BY MOUTH EVERY DAY 90 tablet 3     potassium chloride ER (KLOR-CON M) 20 MEQ CR tablet Take 1 tablet (20 mEq) by mouth daily 30 tablet 11       Allergies   Allergen Reactions     No Known Allergies          Chery Roman, RN, BSN  Valve Clinic Coordinator  [facility]  [phone]  07/29/21 1:31 PM

## 2021-07-29 NOTE — PROGRESS NOTES
HEART CARE ENCOUNTER CONSULTATON NOTE      [facility]  [phone]      Assessment/Recommendations   Assessment/Plan:    Severe low-flow low gradient aortic valve stenosis: Given the progression of disease, it would be reasonable to consider aortic valve replacement.  With his cardiomyopathy and history of alcoholic cirrhosis, he would likely be better served with transcatheter approach.  The risks, benefits and alternatives of TAVR were reviewed, and he is interested in proceeding but would like to wait a few months.  In the interim he would like to move forward with scheduling his coronary angiogram and CTA.  Of note, he is known to have peripheral arterial disease with right common femoral artery calcification as well as a history of left common iliac disease, which do not appear to be causing claudication at this time, but could affect access for his TAVR.    Moderate ischemic cardiomyopathy: Last ejection fraction approximately 35%.  Continue ongoing heart failure regimen and risk factor modification.  Reviewing prior records, he has not tolerated angiotensin blockade due to hypotension.    Thank you for the opportunity to participate in the care of Mr. García.  He will be scheduled for his coronary angiogram and CTA, and knows to call if there is any changes in condition or worsening symptoms.       History of Present Illness/Subjective    HPI: Aayush García is a 65 year old male who has been known to have aortic valve stenosis for few years, and was noted on his most recent echocardiogram to have progression to severe aortic valve stenosis, referred now to the valve clinic for further evaluation.    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  Due to his underlying alcoholic cirrhosis, he was not felt to be a good candidate for surgery, and had complex PCI with rotational atherectomy and stenting of the proximal LAD with a 4.0 x 24 mm Synergy drug-eluting stent.  The obtuse marginal  has been managed medically.    He had another echocardiogram performed on July 1, 2021, which showed progression of his aortic valve disease to severe stenosis.  The mean gradient across the aortic valve was 29 mmHg in the setting of a moderate cardiomyopathy with ejection fraction of 35 to 40%.  The peak velocity was 3.6 m/s, the valve area of 0.6 cm  and a dimensionless index of 0.2, overall consistent with severe low-flow low gradient aortic valve stenosis.      He has not noticed marked shortness of breath or chest pain.  But he does describe some decline in his functional capacity with easier fatigability.           Physical Examination  Review of Systems   Vitals: /62 (BP Location: Left arm, Patient Position: Sitting, Cuff Size: Adult Regular)   Pulse 68   Resp 16   Ht 1.727 m (5' 8\")   Wt 85.3 kg (188 lb)   BMI 28.59 kg/m    BMI= Body mass index is 28.59 kg/m .  Wt Readings from Last 3 Encounters:   07/29/21 85.3 kg (188 lb)   04/22/21 84.4 kg (186 lb)   06/09/20 78.2 kg (172 lb 6.4 oz)     General Appearance:   no distress, normal body habitus, upright.   ENT/Mouth: membranes moist, no nasal discharge or bleeding gums.  Normal head shape, no evidence of injury or laceration.     EYES:  no scleral icterus, normal conjunctivae   Neck: no evidence of thyromegaly.  Supple   Chest/Lungs:   No audible wheezing equal chest wall expansion. Non labored breathing.  No cough.   Cardiovascular:   No evidence of elevated jugular venous pressure.  No evidence of pitting edema bilaterally    Abdomen:  no evidence of abdominal distention. No observe juandice.     Extremities: no cyanosis or clubbing noted.    Skin: no xanthelasma, normal skin color. No " evidence of facial lacerations.      Neurologic: Normal arm motion bilateral, no tremors.  No evidence of focal defect.       Psychiatric: alert and oriented x3, calm                                                      Please refer above for cardiac ROS details.        Medical History  Surgical History Family History Social History   Past Medical History:   Diagnosis Date     Acute liver failure 8/5/2018     Acute on chronic systolic congestive heart failure (H) 1/14/2020     Acute renal failure, unspecified acute renal failure type (H) 08/05/2018     Acute respiratory failure with hypoxia (H) 12/16/2019     Acute respiratory failure with hypoxia (H) 08/05/2018     Alcoholic hepatitis with ascites 08/05/2018     Bacteremia due to Klebsiella pneumoniae      Benign essential hypertension 4/12/2018     Chronic liver disease      Closed fracture of multiple ribs of right side, initial encounter 11/23/2019     Coronary artery disease involving native coronary artery of native heart without angina pectoris 9/24/2019     Essential hypertension      Gastroesophageal reflux disease without esophagitis 10/31/2018     GI bleeding 10/27/2019     Heart failure with reduced ejection fraction (H) 4/4/2019     Hepatic encephalopathy (H) 08/05/2018     Macrocytic anemia      Non-ST elevation MI (NSTEMI) (H) 3/18/2019    Overview:  Added automatically from request for surgery [id number]     NSTEMI (non-ST elevated myocardial infarction) (H) 03/2019    s/p stent placement     Primary localized osteoarthrosis of left lower leg 4/9/2019     Stress-induced cardiomyopathy 08/05/2018     Past Surgical History:   Procedure Laterality Date     COLONOSCOPY N/A 3/20/2018    Procedure: COLONOSCOPY;  Surgeon: Adam Nicole III, MD;  Location: [facility] OR;  Service:      CV CORONARY ANGIOGRAM N/A 3/18/2019    Procedure: Coronary Angiogram;  Surgeon: Timi Rodriguez MD;  Location: [facility] Cath Lab;  Service: Cardiology     CV LEFT HEART  CATHETERIZATION WITHOUT LEFT VENTRICULOGRAM Left 3/18/2019    Procedure: Left Heart Catheterization Without Left Ventriculogram;  Surgeon: Timi Rodriguez MD;  Location: [facility] Cath Lab;  Service: Cardiology     IR CVC TUNNEL PLACEMENT > 5 YRS OF AGE  2018     IR TUNNELED CATHETER REMOVAL  2018     PICC  2018          PICC  3/18/2019          US THORACENTESIS  10/28/2019     ZZHC CORONARY STENT PERCUT, INITIAL VESSEL  2019     Family History   Problem Relation Age of Onset     Heart Disease Father 76.00        type unknown to Aayush; his father  at home     Diabetes No family hx of      Coronary Artery Disease Early Onset No family hx of      Cancer No family hx of      Alzheimer Disease Mother 86.00        lives in long term care     No Known Problems Son      No Known Problems Son         Social History     Socioeconomic History     Marital status: Single     Spouse name: Not on file     Number of children: Not on file     Years of education: Not on file     Highest education level: Not on file   Occupational History     Occupation: [employer]   Tobacco Use     Smoking status: Former Smoker     Packs/day: 1.00     Years: 40.00     Pack years: 40.00     Types: Cigarettes, Cigarettes     Quit date: 2019     Years since quittin.2     Smokeless tobacco: Never Used     Tobacco comment: 3 Cig/Week   Substance and Sexual Activity     Alcohol use: Yes     Comment: Alcoholic Drinks/day: 350 ml of peppermint schnapps daily per 2018 H&P per report of Karlie JJ to Dr. Valle     Drug use: No     Sexual activity: Not on file   Other Topics Concern     Parent/sibling w/ CABG, MI or angioplasty before 65F 55M? Not Asked   Social History Narrative    Works in [employer]. Lives with his Karlie JJon.     Social Determinants of Health     Financial Resource Strain:      Difficulty of Paying Living Expenses:    Food Insecurity:      Worried About Running Out of Food in the Last Year:       Ran Out of Food in the Last Year:    Transportation Needs:      Lack of Transportation (Medical):      Lack of Transportation (Non-Medical):    Physical Activity:      Days of Exercise per Week:      Minutes of Exercise per Session:    Stress:      Feeling of Stress :    Social Connections:      Frequency of Communication with Friends and Family:      Frequency of Social Gatherings with Friends and Family:      Attends Mandaeism Services:      Active Member of Clubs or Organizations:      Attends Club or Organization Meetings:      Marital Status:    Intimate Partner Violence:      Fear of Current or Ex-Partner:      Emotionally Abused:      Physically Abused:      Sexually Abused:            Medications  Allergies   Current Outpatient Medications   Medication Sig Dispense Refill     acetaminophen (TYLENOL 8 HOUR ARTHRITIS PAIN) 650 MG CR tablet Take 2 tablets (1,300 mg) by mouth every 8 hours as needed for pain       aspirin (ASA) 81 MG EC tablet Take 1 tablet (81 mg) by mouth daily 90 tablet 3     atorvastatin (LIPITOR) 80 MG tablet Take 80 mg by mouth daily  0     B Complex-C-Folic Acid (NEPHRO-FRANCISCO) 0.8 MG TABS Take 1 tablet by mouth daily       carvedilol (COREG) 12.5 MG tablet Take 2 tablets (25 mg) by mouth 2 times daily       Cholecalciferol (VITAMIN D) 50 MCG (2000 UT) CAPS Take 1 capsule by mouth daily       ferrous sulfate (FEROSUL) 325 (65 Fe) MG tablet Take 325 mg by mouth daily  0     folic acid (FOLVITE) 400 MCG tablet Take 1 tablet (400 mcg) by mouth daily       magnesium oxide (MAG-OX) 400 MG tablet Take 1 tablet (400 mg) by mouth 2 times daily 60 tablet 11     Multiple Vitamins-Minerals (ONCOVITE) TABS Take 1 tablet by mouth daily       order for DME Equipment being ordered: Blood pressure instrument and cuff 1 each 0     pantoprazole (PROTONIX) 20 MG EC tablet TAKE 1 TABLET BY MOUTH EVERY DAY 90 tablet 3     potassium chloride ER (KLOR-CON M) 20 MEQ CR tablet Take 1 tablet (20 mEq) by mouth  daily 30 tablet 11     furosemide (LASIX) 20 MG tablet Take 1 tablet (20 mg) by mouth as needed (fluid retention) (Patient not taking: Reported on 7/29/2021)  0       Allergies   Allergen Reactions     No Known Allergies           Lab Results    Chemistry/lipid CBC Cardiac Enzymes/BNP/TSH/INR   Recent Labs   Lab Test 07/29/18  0431 02/12/18  1635   CHOL  --  146   HDL  --  35*   LDL  --  96   TRIG 126  --      Recent Labs   Lab Test 02/12/18  1635   LDL 96     Recent Labs   Lab Test 06/09/20  0908 05/15/20  0948    133*   POTASSIUM 4.1 3.5   CHLORIDE 107 99   CO2  --  20*   GLC 88 118   BUN 15 8   CR 0.66* 0.71   GFRESTIMATED >60 >60   SOLO 8.9 7.8*     Recent Labs   Lab Test 06/09/20  0908 05/15/20  0948 05/14/20  1944   CR 0.66* 0.71 0.63*     No results for input(s): A1C in the last 82949 hours.       Recent Labs   Lab Test 05/14/20  1944   WBC 4.2   HGB 11.4*   HCT 31.9*   MCV 95   PLT 68*     Recent Labs   Lab Test 05/14/20  1944 12/16/19  0352 11/11/19  0455   HGB 11.4* 10.9* 9.1*    Recent Labs   Lab Test 12/16/19  1701 12/16/19  1042 12/16/19  0352   TROPONINI 0.02 0.02 <0.01     Recent Labs   Lab Test 12/16/19  0352 10/29/19  0440 03/17/19  0309   BNP 1,634* >5,000* 873*     Recent Labs   Lab Test 10/27/19  1648   TSH 1.54     Recent Labs   Lab Test 12/16/19  0352 10/28/19  0506 10/27/19  1648   INR 1.12* 1.36* 1.26*        Brianda Avilez MD

## 2021-07-29 NOTE — LETTER
7/29/2021    Zachary Lewis MD  [address] [place] [address]    RE: Aayush García       Dear Colleague,    I had the pleasure of seeing Aayush García in the [facility].      HEART CARE ENCOUNTER CONSULTATON NOTE      [facility]  [phone]      Assessment/Recommendations   Assessment/Plan:    Severe low-flow low gradient aortic valve stenosis: Given the progression of disease, it would be reasonable to consider aortic valve replacement.  With his cardiomyopathy and history of alcoholic cirrhosis, he would likely be better served with transcatheter approach.  The risks, benefits and alternatives of TAVR were reviewed, and he is interested in proceeding but would like to wait a few months.  In the interim he would like to move forward with scheduling his coronary angiogram and CTA.  Of note, he is known to have peripheral arterial disease with right common femoral artery calcification as well as a history of left common iliac disease, which do not appear to be causing claudication at this time, but could affect access for his TAVR.    Moderate ischemic cardiomyopathy: Last ejection fraction approximately 35%.  Continue ongoing heart failure regimen and risk factor modification.  Reviewing prior records, he has not tolerated angiotensin blockade due to hypotension.    Thank you for the opportunity to participate in the care of Mr. García.  He will be scheduled for his coronary angiogram and CTA, and knows to call if there is any changes in condition or worsening symptoms.       History of Present Illness/Subjective    HPI: Aayush García is a 65 year old male who has been known to have aortic valve stenosis for few years, and was noted on his most recent echocardiogram to have progression to severe aortic valve stenosis, referred now to the valve clinic for further evaluation.    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  Due to his underlying alcoholic cirrhosis, he was not felt to be a good candidate for surgery, and had complex PCI with rotational atherectomy and stenting of the proximal LAD with a 4.0 x 24 mm Synergy drug-eluting stent.  The obtuse marginal  has been managed medically.    He had another echocardiogram performed on July 1, 2021, which showed progression of his aortic valve disease to severe stenosis.  The mean gradient across the aortic valve was 29 mmHg in the setting of a moderate cardiomyopathy with ejection fraction of 35 to 40%.  The peak velocity was 3.6 m/s, the valve area of 0.6 cm  and a dimensionless index of 0.2, overall consistent with severe low-flow low gradient aortic valve stenosis.      He has not noticed marked shortness of breath or chest pain.  But he does describe some decline in his functional capacity with easier fatigability.           Physical Examination  Review of Systems   Vitals: /62 (BP Location: Left arm, Patient Position: Sitting, Cuff Size: Adult Regular)   Pulse 68   Resp 16   Ht 1.727 m (5' 8\")   Wt 85.3 kg (188 lb)   BMI 28.59 kg/m    BMI= Body mass index is 28.59 kg/m .  Wt Readings from Last 3 Encounters:   07/29/21 85.3 kg (188 lb)   04/22/21 84.4 kg (186 lb)   06/09/20 78.2 kg (172 lb 6.4 oz)     General Appearance:   no distress, normal body habitus, upright.   ENT/Mouth: membranes moist, no nasal discharge or bleeding gums.  Normal head shape, no evidence of injury or laceration.     EYES:  no scleral icterus, normal conjunctivae   Neck: no evidence of thyromegaly.  Supple   Chest/Lungs:   No audible wheezing equal chest wall expansion. Non labored breathing.  No cough.   Cardiovascular:   No evidence of elevated " jugular venous pressure.  No evidence of pitting edema bilaterally    Abdomen:  no evidence of abdominal distention. No observe juandice.     Extremities: no cyanosis or clubbing noted.    Skin: no xanthelasma, normal skin color. No evidence of facial lacerations.      Neurologic: Normal arm motion bilateral, no tremors.  No evidence of focal defect.       Psychiatric: alert and oriented x3, calm                                                      Please refer above for cardiac ROS details.        Medical History  Surgical History Family History Social History   Past Medical History:   Diagnosis Date     Acute liver failure 8/5/2018     Acute on chronic systolic congestive heart failure (H) 1/14/2020     Acute renal failure, unspecified acute renal failure type (H) 08/05/2018     Acute respiratory failure with hypoxia (H) 12/16/2019     Acute respiratory failure with hypoxia (H) 08/05/2018     Alcoholic hepatitis with ascites 08/05/2018     Bacteremia due to Klebsiella pneumoniae      Benign essential hypertension 4/12/2018     Chronic liver disease      Closed fracture of multiple ribs of right side, initial encounter 11/23/2019     Coronary artery disease involving native coronary artery of native heart without angina pectoris 9/24/2019     Essential hypertension      Gastroesophageal reflux disease without esophagitis 10/31/2018     GI bleeding 10/27/2019     Heart failure with reduced ejection fraction (H) 4/4/2019     Hepatic encephalopathy (H) 08/05/2018     Macrocytic anemia      Non-ST elevation MI (NSTEMI) (H) 3/18/2019    Overview:  Added automatically from request for surgery [id number]     NSTEMI (non-ST elevated myocardial infarction) (H) 03/2019    s/p stent placement     Primary localized osteoarthrosis of left lower leg 4/9/2019     Stress-induced cardiomyopathy 08/05/2018     Past Surgical History:   Procedure Laterality Date     COLONOSCOPY N/A 3/20/2018    Procedure: COLONOSCOPY;  Surgeon: Adam  Corey BELTRAN MD;  Location: [facility] OR;  Service:      CV CORONARY ANGIOGRAM N/A 3/18/2019    Procedure: Coronary Angiogram;  Surgeon: Timi Rodriguez MD;  Location: [facility];  Service: Cardiology     CV LEFT HEART CATHETERIZATION WITHOUT LEFT VENTRICULOGRAM Left 3/18/2019    Procedure: Left Heart Catheterization Without Left Ventriculogram;  Surgeon: Timi Rodriguez MD;  Location: [facility] Lab;  Service: Cardiology     IR CVC TUNNEL PLACEMENT > 5 YRS OF AGE  2018     IR TUNNELED CATHETER REMOVAL  2018     PICC  2018          PICC  3/18/2019          US THORACENTESIS  10/28/2019     ZZHC CORONARY STENT PERCUT, INITIAL VESSEL  2019     Family History   Problem Relation Age of Onset     Heart Disease Father 76.00        type unknown to Aayush; his father  at home     Diabetes No family hx of      Coronary Artery Disease Early Onset No family hx of      Cancer No family hx of      Alzheimer Disease Mother 86.00        lives in long term care     No Known Problems Son      No Known Problems Son         Social History     Socioeconomic History     Marital status: Single     Spouse name: Not on file     Number of children: Not on file     Years of education: Not on file     Highest education level: Not on file   Occupational History     Occupation: [employer]   Tobacco Use     Smoking status: Former Smoker     Packs/day: 1.00     Years: 40.00     Pack years: 40.00     Types: Cigarettes, Cigarettes     Quit date: 2019     Years since quittin.2     Smokeless tobacco: Never Used     Tobacco comment: 3 Cig/Week   Substance and Sexual Activity     Alcohol use: Yes     Comment: Alcoholic Drinks/day: 350 ml of peppermint schnapps daily per [facility] h, 2018 H&P per report of Karlie JJ to Dr. Valle     Drug use: No     Sexual activity: Not on file   Other Topics Concern     Parent/sibling w/ CABG, MI or angioplasty before 65F 55M? Not Asked   Social History Narrative    Works  in "[employer] Lives with his SO, Karlie Chisholm.     Social Determinants of Health     Financial Resource Strain:      Difficulty of Paying Living Expenses:    Food Insecurity:      Worried About Running Out of Food in the Last Year:      Ran Out of Food in the Last Year:    Transportation Needs:      Lack of Transportation (Medical):      Lack of Transportation (Non-Medical):    Physical Activity:      Days of Exercise per Week:      Minutes of Exercise per Session:    Stress:      Feeling of Stress :    Social Connections:      Frequency of Communication with Friends and Family:      Frequency of Social Gatherings with Friends and Family:      Attends Jew Services:      Active Member of Clubs or Organizations:      Attends Club or Organization Meetings:      Marital Status:    Intimate Partner Violence:      Fear of Current or Ex-Partner:      Emotionally Abused:      Physically Abused:      Sexually Abused:            Medications  Allergies   Current Outpatient Medications   Medication Sig Dispense Refill     acetaminophen (TYLENOL 8 HOUR ARTHRITIS PAIN) 650 MG CR tablet Take 2 tablets (1,300 mg) by mouth every 8 hours as needed for pain       aspirin (ASA) 81 MG EC tablet Take 1 tablet (81 mg) by mouth daily 90 tablet 3     atorvastatin (LIPITOR) 80 MG tablet Take 80 mg by mouth daily  0     B Complex-C-Folic Acid (NEPHRO-FRANCISCO) 0.8 MG TABS Take 1 tablet by mouth daily       carvedilol (COREG) 12.5 MG tablet Take 2 tablets (25 mg) by mouth 2 times daily       Cholecalciferol (VITAMIN D) 50 MCG (2000 UT) CAPS Take 1 capsule by mouth daily       ferrous sulfate (FEROSUL) 325 (65 Fe) MG tablet Take 325 mg by mouth daily  0     folic acid (FOLVITE) 400 MCG tablet Take 1 tablet (400 mcg) by mouth daily       magnesium oxide (MAG-OX) 400 MG tablet Take 1 tablet (400 mg) by mouth 2 times daily 60 tablet 11     Multiple Vitamins-Minerals (ONCOVITE) TABS Take 1 tablet by mouth daily       order for DME Equipment  being ordered: Blood pressure instrument and cuff 1 each 0     pantoprazole (PROTONIX) 20 MG EC tablet TAKE 1 TABLET BY MOUTH EVERY DAY 90 tablet 3     potassium chloride ER (KLOR-CON M) 20 MEQ CR tablet Take 1 tablet (20 mEq) by mouth daily 30 tablet 11     furosemide (LASIX) 20 MG tablet Take 1 tablet (20 mg) by mouth as needed (fluid retention) (Patient not taking: Reported on 7/29/2021)  0       Allergies   Allergen Reactions     No Known Allergies           Lab Results    Chemistry/lipid CBC Cardiac Enzymes/BNP/TSH/INR   Recent Labs   Lab Test 07/29/18  0431 02/12/18  1635   CHOL  --  146   HDL  --  35*   LDL  --  96   TRIG 126  --      Recent Labs   Lab Test 02/12/18  1635   LDL 96     Recent Labs   Lab Test 06/09/20  0908 05/15/20  0948    133*   POTASSIUM 4.1 3.5   CHLORIDE 107 99   CO2  --  20*   GLC 88 118   BUN 15 8   CR 0.66* 0.71   GFRESTIMATED >60 >60   SOLO 8.9 7.8*     Recent Labs   Lab Test 06/09/20  0908 05/15/20  0948 05/14/20  1944   CR 0.66* 0.71 0.63*     No results for input(s): A1C in the last 29025 hours.       Recent Labs   Lab Test 05/14/20  1944   WBC 4.2   HGB 11.4*   HCT 31.9*   MCV 95   PLT 68*     Recent Labs   Lab Test 05/14/20  1944 12/16/19  0352 11/11/19  0455   HGB 11.4* 10.9* 9.1*    Recent Labs   Lab Test 12/16/19  1701 12/16/19  1042 12/16/19  0352   TROPONINI 0.02 0.02 <0.01     Recent Labs   Lab Test 12/16/19  0352 10/29/19  0440 03/17/19  0309   BNP 1,634* >5,000* 873*     Recent Labs   Lab Test 10/27/19  1648   TSH 1.54     Recent Labs   Lab Test 12/16/19  0352 10/28/19  0506 10/27/19  1648   INR 1.12* 1.36* 1.26*        Brianda Avilez MD                                          Thank you for allowing me to participate in the care of your patient.      Sincerely,     Brianda Avilez MD     [facility]  cc:   No referring provider defined for this encounter.

## 2021-07-30 LAB
ATRIAL RATE - MUSE: 68 BPM
DIASTOLIC BLOOD PRESSURE - MUSE: NORMAL MMHG
INTERPRETATION ECG - MUSE: NORMAL
P AXIS - MUSE: 6 DEGREES
PR INTERVAL - MUSE: 146 MS
QRS DURATION - MUSE: 102 MS
QT - MUSE: 458 MS
QTC - MUSE: 487 MS
R AXIS - MUSE: -41 DEGREES
SYSTOLIC BLOOD PRESSURE - MUSE: NORMAL MMHG
T AXIS - MUSE: 64 DEGREES
VENTRICULAR RATE- MUSE: 68 BPM

## 2021-08-03 PROBLEM — A41.9 SEPTIC SHOCK (H): Status: RESOLVED | Noted: 2018-07-24 | Resolved: 2019-03-18

## 2021-08-03 PROBLEM — R65.21 SEPTIC SHOCK (H): Status: RESOLVED | Noted: 2018-07-24 | Resolved: 2019-03-18

## 2021-08-24 DIAGNOSIS — Z11.59 ENCOUNTER FOR SCREENING FOR OTHER VIRAL DISEASES: ICD-10-CM

## 2021-08-26 ENCOUNTER — HOSPITAL ENCOUNTER (OUTPATIENT)
Dept: CT IMAGING | Facility: CLINIC | Age: 66
Discharge: HOME | End: 2021-08-26
Attending: INTERNAL MEDICINE | Admitting: INTERNAL MEDICINE
Payer: COMMERCIAL

## 2021-08-26 DIAGNOSIS — I35.0 AORTIC STENOSIS, MODERATE: ICD-10-CM

## 2021-08-26 PROCEDURE — 71275 CT ANGIOGRAPHY CHEST: CPT

## 2021-08-26 PROCEDURE — 71275 CT ANGIOGRAPHY CHEST: CPT | Mod: 26 | Performed by: INTERNAL MEDICINE

## 2021-08-26 PROCEDURE — 74174 CTA ABD&PLVS W/CONTRAST: CPT | Mod: 26 | Performed by: INTERNAL MEDICINE

## 2021-08-26 PROCEDURE — 250N000011 HC RX IP 250 OP 636: Performed by: INTERNAL MEDICINE

## 2021-08-26 RX ORDER — IOPAMIDOL 755 MG/ML
100 INJECTION, SOLUTION INTRAVASCULAR ONCE
Status: COMPLETED | OUTPATIENT
Start: 2021-08-26 | End: 2021-08-26

## 2021-08-26 RX ADMIN — IOPAMIDOL 90 ML: 755 INJECTION, SOLUTION INTRAVENOUS at 15:21

## 2021-09-19 ENCOUNTER — LAB (OUTPATIENT)
Dept: FAMILY MEDICINE | Facility: CLINIC | Age: 66
End: 2021-09-19
Payer: COMMERCIAL

## 2021-09-19 ENCOUNTER — HEALTH MAINTENANCE LETTER (OUTPATIENT)
Age: 66
End: 2021-09-19

## 2021-09-19 DIAGNOSIS — Z11.59 ENCOUNTER FOR SCREENING FOR OTHER VIRAL DISEASES: ICD-10-CM

## 2021-09-19 PROCEDURE — U0003 INFECTIOUS AGENT DETECTION BY NUCLEIC ACID (DNA OR RNA); SEVERE ACUTE RESPIRATORY SYNDROME CORONAVIRUS 2 (SARS-COV-2) (CORONAVIRUS DISEASE [COVID-19]), AMPLIFIED PROBE TECHNIQUE, MAKING USE OF HIGH THROUGHPUT TECHNOLOGIES AS DESCRIBED BY CMS-2020-01-R: HCPCS

## 2021-09-19 PROCEDURE — U0005 INFEC AGEN DETEC AMPLI PROBE: HCPCS

## 2021-09-20 LAB — SARS-COV-2 RNA RESP QL NAA+PROBE: NEGATIVE

## 2021-09-22 ENCOUNTER — HOSPITAL ENCOUNTER (OUTPATIENT)
Facility: HOSPITAL | Age: 66
Discharge: HOME | End: 2021-09-22
Attending: INTERNAL MEDICINE | Admitting: INTERNAL MEDICINE
Payer: COMMERCIAL

## 2021-09-22 VITALS
OXYGEN SATURATION: 100 % | BODY MASS INDEX: 27.38 KG/M2 | TEMPERATURE: 98 F | HEART RATE: 54 BPM | RESPIRATION RATE: 24 BRPM | SYSTOLIC BLOOD PRESSURE: 154 MMHG | HEIGHT: 68 IN | DIASTOLIC BLOOD PRESSURE: 74 MMHG | WEIGHT: 180.7 LBS

## 2021-09-22 DIAGNOSIS — I25.10 CORONARY ARTERY DISEASE INVOLVING NATIVE CORONARY ARTERY OF NATIVE HEART WITHOUT ANGINA PECTORIS: Primary | ICD-10-CM

## 2021-09-22 DIAGNOSIS — I35.0 AORTIC STENOSIS, MODERATE: ICD-10-CM

## 2021-09-22 LAB
ABO/RH(D): NORMAL
ACT BLD: 260 SECONDS (ref 74–150)
ACT BLD: 336 SECONDS (ref 74–150)
ANION GAP SERPL CALCULATED.3IONS-SCNC: 9 MMOL/L (ref 5–18)
ANTIBODY SCREEN: NEGATIVE
APTT PPP: 158 SECONDS (ref 22–38)
APTT PPP: 42 SECONDS (ref 22–38)
ATRIAL RATE - MUSE: 100 BPM
ATRIAL RATE - MUSE: 65 BPM
BUN SERPL-MCNC: 10 MG/DL (ref 8–22)
CALCIUM SERPL-MCNC: 9.4 MG/DL (ref 8.5–10.5)
CHLORIDE BLD-SCNC: 108 MMOL/L (ref 98–107)
CO2 SERPL-SCNC: 23 MMOL/L (ref 22–31)
CREAT SERPL-MCNC: 0.72 MG/DL (ref 0.7–1.3)
DIASTOLIC BLOOD PRESSURE - MUSE: NORMAL MMHG
DIASTOLIC BLOOD PRESSURE - MUSE: NORMAL MMHG
ERYTHROCYTE [DISTWIDTH] IN BLOOD BY AUTOMATED COUNT: 12 % (ref 10–15)
GFR SERPL CREATININE-BSD FRML MDRD: >90 ML/MIN/1.73M2
GLUCOSE BLD-MCNC: 91 MG/DL (ref 70–125)
HCT VFR BLD AUTO: 39.1 % (ref 40–53)
HGB BLD-MCNC: 13.4 G/DL (ref 13.3–17.7)
INTERPRETATION ECG - MUSE: NORMAL
INTERPRETATION ECG - MUSE: NORMAL
MCH RBC QN AUTO: 33.3 PG (ref 26.5–33)
MCHC RBC AUTO-ENTMCNC: 34.3 G/DL (ref 31.5–36.5)
MCV RBC AUTO: 97 FL (ref 78–100)
P AXIS - MUSE: 4 DEGREES
P AXIS - MUSE: 48 DEGREES
PLATELET # BLD AUTO: 185 10E3/UL (ref 150–450)
POTASSIUM BLD-SCNC: 4.3 MMOL/L (ref 3.5–5)
PR INTERVAL - MUSE: 152 MS
PR INTERVAL - MUSE: 162 MS
QRS DURATION - MUSE: 102 MS
QRS DURATION - MUSE: 104 MS
QT - MUSE: 452 MS
QT - MUSE: 504 MS
QTC - MUSE: 518 MS
QTC - MUSE: 524 MS
R AXIS - MUSE: -39 DEGREES
R AXIS - MUSE: -42 DEGREES
RBC # BLD AUTO: 4.02 10E6/UL (ref 4.4–5.9)
SODIUM SERPL-SCNC: 140 MMOL/L (ref 136–145)
SPECIMEN EXPIRATION DATE: NORMAL
SYSTOLIC BLOOD PRESSURE - MUSE: NORMAL MMHG
SYSTOLIC BLOOD PRESSURE - MUSE: NORMAL MMHG
T AXIS - MUSE: 49 DEGREES
T AXIS - MUSE: 52 DEGREES
VENTRICULAR RATE- MUSE: 65 BPM
VENTRICULAR RATE- MUSE: 79 BPM
WBC # BLD AUTO: 7.5 10E3/UL (ref 4–11)

## 2021-09-22 PROCEDURE — C1725 CATH, TRANSLUMIN NON-LASER: HCPCS | Performed by: INTERNAL MEDICINE

## 2021-09-22 PROCEDURE — 255N000002 HC RX 255 OP 636: Performed by: INTERNAL MEDICINE

## 2021-09-22 PROCEDURE — 93005 ELECTROCARDIOGRAM TRACING: CPT

## 2021-09-22 PROCEDURE — 85347 COAGULATION TIME ACTIVATED: CPT | Mod: 91

## 2021-09-22 PROCEDURE — C1769 GUIDE WIRE: HCPCS | Performed by: INTERNAL MEDICINE

## 2021-09-22 PROCEDURE — C1724 CATH, TRANS ATHEREC,ROTATION: HCPCS | Performed by: INTERNAL MEDICINE

## 2021-09-22 PROCEDURE — 250N000013 HC RX MED GY IP 250 OP 250 PS 637: Performed by: INTERNAL MEDICINE

## 2021-09-22 PROCEDURE — 999N000054 HC STATISTIC EKG NON-CHARGEABLE

## 2021-09-22 PROCEDURE — 93010 ELECTROCARDIOGRAM REPORT: CPT | Performed by: INTERNAL MEDICINE

## 2021-09-22 PROCEDURE — 99153 MOD SED SAME PHYS/QHP EA: CPT | Performed by: INTERNAL MEDICINE

## 2021-09-22 PROCEDURE — 93454 CORONARY ARTERY ANGIO S&I: CPT | Performed by: INTERNAL MEDICINE

## 2021-09-22 PROCEDURE — 99152 MOD SED SAME PHYS/QHP 5/>YRS: CPT | Performed by: INTERNAL MEDICINE

## 2021-09-22 PROCEDURE — C1887 CATHETER, GUIDING: HCPCS | Performed by: INTERNAL MEDICINE

## 2021-09-22 PROCEDURE — C1761 HC OR CATH, TRANS INTRA LITHO/CORONARY: HCPCS | Performed by: INTERNAL MEDICINE

## 2021-09-22 PROCEDURE — 92933 PRQ TRLML C ATHRC ST ANGIOP1: CPT | Mod: 22 | Performed by: INTERNAL MEDICINE

## 2021-09-22 PROCEDURE — C9602 PERC D-E COR STENT ATHER S: HCPCS | Mod: LD | Performed by: INTERNAL MEDICINE

## 2021-09-22 PROCEDURE — 36415 COLL VENOUS BLD VENIPUNCTURE: CPT | Performed by: INTERNAL MEDICINE

## 2021-09-22 PROCEDURE — 93005 ELECTROCARDIOGRAM TRACING: CPT | Performed by: INTERNAL MEDICINE

## 2021-09-22 PROCEDURE — 258N000003 HC RX IP 258 OP 636: Performed by: INTERNAL MEDICINE

## 2021-09-22 PROCEDURE — 93571 IV DOP VEL&/PRESS C FLO 1ST: CPT | Performed by: INTERNAL MEDICINE

## 2021-09-22 PROCEDURE — 85041 AUTOMATED RBC COUNT: CPT | Performed by: INTERNAL MEDICINE

## 2021-09-22 PROCEDURE — C9600 PERC DRUG-EL COR STENT SING: HCPCS | Performed by: INTERNAL MEDICINE

## 2021-09-22 PROCEDURE — 85730 THROMBOPLASTIN TIME PARTIAL: CPT | Performed by: INTERNAL MEDICINE

## 2021-09-22 PROCEDURE — 250N000009 HC RX 250: Performed by: INTERNAL MEDICINE

## 2021-09-22 PROCEDURE — 99213 OFFICE O/P EST LOW 20 MIN: CPT | Performed by: NURSE PRACTITIONER

## 2021-09-22 PROCEDURE — 93454 CORONARY ARTERY ANGIO S&I: CPT | Mod: 26 | Performed by: INTERNAL MEDICINE

## 2021-09-22 PROCEDURE — 93571 IV DOP VEL&/PRESS C FLO 1ST: CPT | Mod: 26 | Performed by: INTERNAL MEDICINE

## 2021-09-22 PROCEDURE — C1894 INTRO/SHEATH, NON-LASER: HCPCS | Performed by: INTERNAL MEDICINE

## 2021-09-22 PROCEDURE — 272N000001 HC OR GENERAL SUPPLY STERILE: Performed by: INTERNAL MEDICINE

## 2021-09-22 PROCEDURE — 250N000011 HC RX IP 250 OP 636: Performed by: INTERNAL MEDICINE

## 2021-09-22 PROCEDURE — 86901 BLOOD TYPING SEROLOGIC RH(D): CPT | Performed by: INTERNAL MEDICINE

## 2021-09-22 PROCEDURE — 80048 BASIC METABOLIC PNL TOTAL CA: CPT | Performed by: INTERNAL MEDICINE

## 2021-09-22 PROCEDURE — C1874 STENT, COATED/COV W/DEL SYS: HCPCS | Performed by: INTERNAL MEDICINE

## 2021-09-22 DEVICE — STENT CORONARY DES SYNERGY XD MR US 3.50X32MM H7493941832350: Type: IMPLANTABLE DEVICE | Status: FUNCTIONAL

## 2021-09-22 RX ORDER — NITROGLYCERIN 0.4 MG/1
0.4 TABLET SUBLINGUAL EVERY 5 MIN PRN
Status: DISCONTINUED | OUTPATIENT
Start: 2021-09-22 | End: 2021-09-23 | Stop reason: HOSPADM

## 2021-09-22 RX ORDER — ATROPINE SULFATE 0.1 MG/ML
0.5 INJECTION INTRAVENOUS
Status: ACTIVE | OUTPATIENT
Start: 2021-09-22 | End: 2021-09-22

## 2021-09-22 RX ORDER — FLUMAZENIL 0.1 MG/ML
0.2 INJECTION, SOLUTION INTRAVENOUS
Status: ACTIVE | OUTPATIENT
Start: 2021-09-22 | End: 2021-09-22

## 2021-09-22 RX ORDER — OXYCODONE HYDROCHLORIDE 5 MG/1
10 TABLET ORAL EVERY 4 HOURS PRN
Status: DISCONTINUED | OUTPATIENT
Start: 2021-09-22 | End: 2021-09-23 | Stop reason: HOSPADM

## 2021-09-22 RX ORDER — METOPROLOL TARTRATE 1 MG/ML
5-10 INJECTION, SOLUTION INTRAVENOUS
Status: DISCONTINUED | OUTPATIENT
Start: 2021-09-22 | End: 2021-09-23 | Stop reason: HOSPADM

## 2021-09-22 RX ORDER — HYDRALAZINE HYDROCHLORIDE 20 MG/ML
10 INJECTION INTRAMUSCULAR; INTRAVENOUS EVERY 4 HOURS PRN
Status: DISCONTINUED | OUTPATIENT
Start: 2021-09-22 | End: 2021-09-23 | Stop reason: HOSPADM

## 2021-09-22 RX ORDER — DIAZEPAM 5 MG
5 TABLET ORAL
Status: COMPLETED | OUTPATIENT
Start: 2021-09-22 | End: 2021-09-22

## 2021-09-22 RX ORDER — HEPARIN SODIUM 1000 [USP'U]/ML
INJECTION, SOLUTION INTRAVENOUS; SUBCUTANEOUS
Status: DISCONTINUED | OUTPATIENT
Start: 2021-09-22 | End: 2021-09-22 | Stop reason: HOSPADM

## 2021-09-22 RX ORDER — OXYCODONE HYDROCHLORIDE 5 MG/1
5 TABLET ORAL EVERY 4 HOURS PRN
Status: DISCONTINUED | OUTPATIENT
Start: 2021-09-22 | End: 2021-09-23 | Stop reason: HOSPADM

## 2021-09-22 RX ORDER — ONDANSETRON 4 MG/1
4 TABLET, ORALLY DISINTEGRATING ORAL EVERY 6 HOURS PRN
Status: DISCONTINUED | OUTPATIENT
Start: 2021-09-22 | End: 2021-09-23 | Stop reason: HOSPADM

## 2021-09-22 RX ORDER — NALOXONE HYDROCHLORIDE 0.4 MG/ML
0.2 INJECTION, SOLUTION INTRAMUSCULAR; INTRAVENOUS; SUBCUTANEOUS
Status: ACTIVE | OUTPATIENT
Start: 2021-09-22 | End: 2021-09-22

## 2021-09-22 RX ORDER — NALOXONE HYDROCHLORIDE 0.4 MG/ML
0.4 INJECTION, SOLUTION INTRAMUSCULAR; INTRAVENOUS; SUBCUTANEOUS
Status: ACTIVE | OUTPATIENT
Start: 2021-09-22 | End: 2021-09-22

## 2021-09-22 RX ORDER — ACETAMINOPHEN 325 MG/1
650 TABLET ORAL EVERY 4 HOURS PRN
Status: DISCONTINUED | OUTPATIENT
Start: 2021-09-22 | End: 2021-09-23 | Stop reason: HOSPADM

## 2021-09-22 RX ORDER — SODIUM CHLORIDE 9 MG/ML
INJECTION, SOLUTION INTRAVENOUS CONTINUOUS
Status: DISCONTINUED | OUTPATIENT
Start: 2021-09-22 | End: 2021-09-22 | Stop reason: HOSPADM

## 2021-09-22 RX ORDER — FUROSEMIDE 20 MG
20 TABLET ORAL DAILY PRN
COMMUNITY
End: 2021-09-30

## 2021-09-22 RX ORDER — IODIXANOL 320 MG/ML
INJECTION, SOLUTION INTRAVASCULAR
Status: DISCONTINUED | OUTPATIENT
Start: 2021-09-22 | End: 2021-09-22 | Stop reason: HOSPADM

## 2021-09-22 RX ORDER — ASPIRIN 81 MG/1
243 TABLET, CHEWABLE ORAL ONCE
Status: COMPLETED | OUTPATIENT
Start: 2021-09-22 | End: 2021-09-22

## 2021-09-22 RX ORDER — FENTANYL CITRATE 50 UG/ML
25 INJECTION, SOLUTION INTRAMUSCULAR; INTRAVENOUS
Status: DISCONTINUED | OUTPATIENT
Start: 2021-09-22 | End: 2021-09-22 | Stop reason: HOSPADM

## 2021-09-22 RX ORDER — FENTANYL CITRATE 50 UG/ML
INJECTION, SOLUTION INTRAMUSCULAR; INTRAVENOUS
Status: DISCONTINUED | OUTPATIENT
Start: 2021-09-22 | End: 2021-09-22 | Stop reason: HOSPADM

## 2021-09-22 RX ORDER — SODIUM CHLORIDE 9 MG/ML
INJECTION, SOLUTION INTRAVENOUS CONTINUOUS
Status: ACTIVE | OUTPATIENT
Start: 2021-09-22 | End: 2021-09-22

## 2021-09-22 RX ORDER — FENTANYL CITRATE 50 UG/ML
25 INJECTION, SOLUTION INTRAMUSCULAR; INTRAVENOUS
Status: DISCONTINUED | OUTPATIENT
Start: 2021-09-22 | End: 2021-09-23 | Stop reason: HOSPADM

## 2021-09-22 RX ORDER — LIDOCAINE 40 MG/G
CREAM TOPICAL
Status: DISCONTINUED | OUTPATIENT
Start: 2021-09-22 | End: 2021-09-22 | Stop reason: HOSPADM

## 2021-09-22 RX ORDER — ASPIRIN 81 MG/1
81 TABLET ORAL DAILY
Status: DISCONTINUED | OUTPATIENT
Start: 2021-09-23 | End: 2021-09-23 | Stop reason: HOSPADM

## 2021-09-22 RX ORDER — ONDANSETRON 2 MG/ML
4 INJECTION INTRAMUSCULAR; INTRAVENOUS EVERY 6 HOURS PRN
Status: DISCONTINUED | OUTPATIENT
Start: 2021-09-22 | End: 2021-09-23 | Stop reason: HOSPADM

## 2021-09-22 RX ORDER — ASPIRIN 81 MG/1
81 TABLET, CHEWABLE ORAL ONCE
Status: DISCONTINUED | OUTPATIENT
Start: 2021-09-22 | End: 2021-09-23 | Stop reason: HOSPADM

## 2021-09-22 RX ORDER — ASPIRIN 325 MG
325 TABLET ORAL ONCE
Status: COMPLETED | OUTPATIENT
Start: 2021-09-22 | End: 2021-09-22

## 2021-09-22 RX ADMIN — ASPIRIN 81 MG CHEWABLE TABLET 243 MG: 81 TABLET CHEWABLE at 07:32

## 2021-09-22 RX ADMIN — SODIUM CHLORIDE 150 ML/HR: 9 INJECTION, SOLUTION INTRAVENOUS at 08:04

## 2021-09-22 RX ADMIN — DIAZEPAM 5 MG: 5 TABLET ORAL at 07:58

## 2021-09-22 ASSESSMENT — MIFFLIN-ST. JEOR: SCORE: 1579.15

## 2021-09-22 NOTE — Clinical Note
The first balloon was inserted into the left anterior descending and middle left anterior descending.Max pressure = 13 ollie. Total duration = 20 seconds.     Max pressure = 13 ollie. Total duration = 23 seconds.    Balloon reinflated a second time: Max pressure = 13 ollie. Total duration = 23 seconds.  Balloon reinflated a third time: Max pressure = 15 ollie. Total duration = 21 seconds.

## 2021-09-22 NOTE — PRE-PROCEDURE
GENERAL PRE-PROCEDURE:   Procedure:  Coronary angiogram with possible PCI  Date/Time:  9/22/2021 7:14 AM    Written consent obtained?: Yes    Risks and benefits: Risks, benefits and alternatives were discussed    Consent given by:  Patient  Patient states understanding of procedure being performed: Yes    Patient's understanding of procedure matches consent: Yes    Procedure consent matches procedure scheduled: Yes    Expected level of sedation:  Moderate  Appropriately NPO:  Yes  ASA Class:  4 (CAD with hx of NSTEMI; s/p LAD PCI, OM , severe aortic stenosis, ICM)  Mallampati  :  Grade 1- soft palate, uvula, tonsillar pillars, and posterior pharyngeal wall visible  Lungs:  Lungs clear with good breath sounds bilaterally  Heart:  Systolic murmur  History & Physical reviewed:  History and physical reviewed and no updates needed  Statement of review:  I have reviewed the lab findings, diagnostic data, medications, and the plan for sedation

## 2021-09-22 NOTE — Clinical Note
Atherectomy performed in the middle left anterior descending. Rotational atherectomy was performed. Pass rate = 144 RPM. Pass duration = 21 seconds.

## 2021-09-22 NOTE — PROGRESS NOTES
Pt admitted with walker for angiogram. States he needs a valve replacement, his main symptom being fatigue. He found this problem getting ready for knee replacement, wears brace, uses walker. Wife Karlie present. Labs noted Checklist complete. Pt ready for procedure.

## 2021-09-22 NOTE — Clinical Note
The first balloon was inserted into the left anterior descending and middle left anterior descending.Max pressure = 4 ollie. Total duration = 30 seconds.     Max pressure = 4 ollie. Total duration = 15 seconds.    Balloon reinflated a second time: Max pressure = 4 ollie. Total duration = 15 seconds.  Balloon reinflated a third time: Max pressure = 4 ollie. Total duration = 18 seconds.

## 2021-09-22 NOTE — Clinical Note
Stent deployed in the middle left anterior descending. Max pressure = 13 ollie. Total duration = 20 seconds. Balloon reinflated a second time: Max pressure = 15 ollie. Total duration = 20 seconds.

## 2021-09-22 NOTE — H&P
Assessment/Recommendations   Assessment:    Severe low-flow low gradient aortic valve stenosis- awaiting transcatheter valve replacement  Peripheral Artery disease- right common femoral artery calcification, left common iliac disease, without claudication; may affect access for TAVR.   Moderate ischemic cardiomyopathy-  Compensated/euvolmic. EF 39%. On appropriate medical therapy for HF with the exception that he has not tolerated angiotensin blockade due to hypotension in the past.   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 Avilez pending today's evaluation  Thank you for the opportunity to participate in the care of this patient       History of Present Illness/Subjective    Mr. Aayush García is a 66 year old male with have aortic valve stenosis for few years, and was noted on his most recent echocardiogram to have progression to severe aortic valve stenosis, referred now to the valve clinic for further evaluation.     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  Due to his underlying alcoholic cirrhosis, he was not felt to be a good candidate for surgery, and had complex PCI with rotational atherectomy and stenting of the proximal LAD with a 4.0 x 24 mm Synergy drug-eluting stent.  The obtuse marginal  has been managed medically.     He had another echocardiogram performed on July 1, 2021, which showed progression of his aortic valve disease to severe stenosis.  The mean gradient  across the aortic valve was 29 mmHg in the setting of a moderate cardiomyopathy with ejection fraction of 35 to 40%.  The peak velocity was 3.6 m/s, the valve area of 0.6 cm  and a dimensionless index of 0.2, overall consistent with severe low-flow low gradient aortic valve stenosis.       He has not noticed marked shortness of breath or chest pain.  But he does describe some decline in his functional capacity with easier fatigability.      ECHOCARDIOGRAM 7/1/2021  Narrative & Impression    The calculated left ventricular ejection fraction is 39%. This represents a moderately decreased ejection fraction. Mild concentric hypertrophy noted.    Normal right ventricular size and systolic function.    Severe aortic stenosis. Mild aortic regurgitation. Findings consistent with low flow low gradient severe aortic stenosis.    The aortic root is mildly dilated. The ascending aorta is moderately dilated.    When compared to the previous study dated 12/3/2019, no significant change.       Physical Examination Review of Systems   There were no vitals taken for this visit.  There is no height or weight on file to calculate BMI.  Wt Readings from Last 3 Encounters:   07/29/21 85.3 kg (188 lb)   04/22/21 84.4 kg (186 lb)   06/09/20 78.2 kg (172 lb 6.4 oz)     No intake or output data in the 24 hours ending 09/22/21 0728  General Appearance:   no distress, normal body habitus   ENT/Mouth: membranes moist, no oral lesions or bleeding gums.      EYES:  no scleral icterus, normal conjunctivae   Neck: no carotid bruits or thyromegaly   Chest/Lungs:   lungs are clear to auscultation, no rales or wheezing, no sternal scar, equal chest wall expansion    Cardiovascular:   Regular. Normal first and second heart sounds with II/VI KAREN, rubs, or gallops; the carotid, radial and posterior tibial pulses are intact, Jugular venous pressure not elevated, no significant edema   Abdomen:  no organomegaly, masses, bruits, or tenderness; bowel sounds  are present   Extremities: no cyanosis or clubbing   Skin: no xanthelasma, warm.    Neurologic: normal  bilateral, no tremors     Psychiatric: alert and oriented x3, calm     General: WNL  Eyes: WNL  Ears/Nose/Throat: WNL  Lungs: See HPI  Heart: See HPI  Stomach: WNL  Bladder: WNL  Muscle/Joints: WNL  Skin: WNL  Nervous System: WNL  Mental Health: WNL  Blood: WNL     Medical History  Surgical History Family History Social History   Past Medical History:   Diagnosis Date     Acute liver failure 8/5/2018     Acute on chronic systolic congestive heart failure (H) 1/14/2020     Acute renal failure, unspecified acute renal failure type (H) 08/05/2018     Acute respiratory failure with hypoxia (H) 12/16/2019     Acute respiratory failure with hypoxia (H) 08/05/2018     Alcoholic hepatitis with ascites 08/05/2018     Bacteremia due to Klebsiella pneumoniae      Benign essential hypertension 4/12/2018     Chronic liver disease      Closed fracture of multiple ribs of right side, initial encounter 11/23/2019     Coronary artery disease involving native coronary artery of native heart without angina pectoris 9/24/2019     Essential hypertension      Gastroesophageal reflux disease without esophagitis 10/31/2018     GI bleeding 10/27/2019     Heart failure with reduced ejection fraction (H) 4/4/2019     Hepatic encephalopathy (H) 08/05/2018     Macrocytic anemia      Non-ST elevation MI (NSTEMI) (H) 3/18/2019    Overview:  Added automatically from request for surgery [id number]     NSTEMI (non-ST elevated myocardial infarction) (H) 03/2019    s/p stent placement     Primary localized osteoarthrosis of left lower leg 4/9/2019     Stress-induced cardiomyopathy 08/05/2018    Past Surgical History:   Procedure Laterality Date     COLONOSCOPY N/A 3/20/2018    Procedure: COLONOSCOPY;  Surgeon: Adam Nicole III, MD;  Location: [facility];  Service:      CV CORONARY ANGIOGRAM N/A 3/18/2019    Procedure: Coronary Angiogram;   Surgeon: Timi Rodriguez MD;  Location: [facility] Cath Lab;  Service: Cardiology     CV LEFT HEART CATHETERIZATION WITHOUT LEFT VENTRICULOGRAM Left 3/18/2019    Procedure: Left Heart Catheterization Without Left Ventriculogram;  Surgeon: Timi Rodriguez MD;  Location: [facility] Cath Lab;  Service: Cardiology     IR CVC TUNNEL PLACEMENT > 5 YRS OF AGE  2018     IR TUNNELED CATHETER REMOVAL  2018     PICC  2018          PICC  3/18/2019          US THORACENTESIS  10/28/2019     ZZHC CORONARY STENT PERCUT, INITIAL VESSEL  2019    Family History   Problem Relation Age of Onset     Heart Disease Father 76.00        type unknown to Aayush; his father  at home     Diabetes No family hx of      Coronary Artery Disease Early Onset No family hx of      Cancer No family hx of      Alzheimer Disease Mother 86.00        lives in long term care     No Known Problems Son      No Known Problems Son     Social History     Socioeconomic History     Marital status: Single     Spouse name: Not on file     Number of children: Not on file     Years of education: Not on file     Highest education level: Not on file   Occupational History     Occupation: Foodist   Tobacco Use     Smoking status: Former Smoker     Packs/day: 1.00     Years: 40.00     Pack years: 40.00     Types: Cigarettes, Cigarettes     Quit date: 2019     Years since quittin.4     Smokeless tobacco: Never Used     Tobacco comment: 3 Cig/Week   Substance and Sexual Activity     Alcohol use: Yes     Comment: Alcoholic Drinks/day: 350 ml of peppermint schnapps daily per [facility], 2018 H&P per report of Karlie JJ to Dr. Valle     Drug use: No     Sexual activity: Not on file   Other Topics Concern     Parent/sibling w/ CABG, MI or angioplasty before 65F 55M? Not Asked   Social History Narrative    Works in [employer]. Lives with his Karlie JJon.     Social Determinants of Health     Financial Resource Strain:      Difficulty of  Paying Living Expenses:    Food Insecurity:      Worried About Running Out of Food in the Last Year:      Ran Out of Food in the Last Year:    Transportation Needs:      Lack of Transportation (Medical):      Lack of Transportation (Non-Medical):    Physical Activity:      Days of Exercise per Week:      Minutes of Exercise per Session:    Stress:      Feeling of Stress :    Social Connections:      Frequency of Communication with Friends and Family:      Frequency of Social Gatherings with Friends and Family:      Attends Quaker Services:      Active Member of Clubs or Organizations:      Attends Club or Organization Meetings:      Marital Status:    Intimate Partner Violence:      Fear of Current or Ex-Partner:      Emotionally Abused:      Physically Abused:      Sexually Abused:           Medications  Allergies   No current outpatient medications on file.    Allergies   Allergen Reactions     No Known Allergies          Lab Results    Chemistry/lipid CBC Cardiac Enzymes/BNP/TSH/INR   Lab Results   Component Value Date    CHOL 146 02/12/2018    HDL 35 (L) 02/12/2018    TRIG 126 07/29/2018    BUN 13 07/29/2021     07/29/2021    CO2 24 07/29/2021    Lab Results   Component Value Date    WBC 8.3 07/29/2021    HGB 13.7 07/29/2021    HCT 40.1 07/29/2021    MCV 97 07/29/2021     07/29/2021    Lab Results   Component Value Date    CKMB 24 (HH) 07/25/2018    TROPONINI 0.02 12/16/2019    BNP 1,634 (H) 12/16/2019    TSH 1.54 10/27/2019    INR 1.12 (H) 12/16/2019                                                Pre-Excision Curettage Text (Leave Blank If You Do Not Want): Prior to drawing the surgical margin the visible lesion was removed with electrodesiccation and curettage to clearly define the lesion size.

## 2021-09-22 NOTE — Clinical Note
Atherectomy performed in the middle left anterior descending. Rotational atherectomy was performed. Pass rate = 150 RPM. Pass duration = 21 seconds.

## 2021-09-22 NOTE — Clinical Note
Atherectomy performed in the middle left anterior descending. Rotational atherectomy was performed. Pass rate = 152 RPM. Pass duration = 19 seconds.

## 2021-09-22 NOTE — Clinical Note
The first balloon was inserted into the left anterior descending and middle left anterior descending.Max pressure = 9 ollie. Total duration = 24 seconds.     Max pressure = 9 ollie. Total duration = 20 seconds.    Balloon reinflated a second time: Max pressure = 9 ollie. Total duration = 20 seconds.  Balloon reinflated a third time: Max pressure = 9 ollie. Total duration = 18 seconds.  Balloon reinflated a fourth time: Max pressure = 9 ollie. Total duration = 19 seconds.

## 2021-09-22 NOTE — Clinical Note
Atherectomy performed in the proximal left anterior descending. Rotational atherectomy was performed. Pass rate = 150 RPM. Pass duration = 20 seconds.

## 2021-09-22 NOTE — Clinical Note
Atherectomy performed in the middle left anterior descending. Rotational atherectomy was performed. Pass rate = 150 RPM. Pass duration = 24 seconds.

## 2021-09-23 ENCOUNTER — TELEPHONE (OUTPATIENT)
Dept: ADMINISTRATIVE | Facility: CLINIC | Age: 66
End: 2021-09-23

## 2021-09-23 NOTE — DISCHARGE INSTRUCTIONS
Interventional Cardiology  Coronary Angiogram/Angioplasty/Stent/Atherectomy Discharge  Instructions -   Radial (wrist) Approach     The instructions below are to help you understand how to take care of yourself. There is also information about when to call the doctor or emergency services.    **Do not stop your aspirin or platelet inhibitor unless directed by your Cardiologist.  These medications help to prevent platelets in your blood from sticking together and forming a clot.   Examples of these medications are: Ticagrelor (Brilinta), Clopidogrel (Plavix), Prasugrel (Effient)    For 24 hours after procedure:    Do not subject hand/arm to any forceful movements for 24 hours, such as supporting weight when rising from a chair or bed.    Do not drive a car for 24 hours.    The dressing on the puncture site may be removed after 24 hours and left open to air. If minor oozing, you may apply a Band-Aid and remove after 12 hours.     You may shower on the day after your procedure. Do not take a tub bath or wash dishes (no soaking wrist) with the puncture site in water for 3 days after the procedure.    For 48 hours following the procedure:    Do not operate a lawnmower, motorcycle, chainsaw or all-terrain vehicle.    Do not lift anything heavier than 5-10 pounds with affected arm for 5 days.    Avoid excessive bending (flexion/extension) wrist movement.    Do not engage in vigorous exercise (i.e. tennis, golf) using the affected arm for 5 days after discharge.    You may return to work in 72 hours if no complications and no heavy lifting.    If bleeding should occur following discharge:    Sit down and apply firm pressure with your thumb against the puncture site and fingers against back of wrist for 10 minutes.    If the bleeding stops, continue to rest, keeping your wrist still for 2 hours. Notify your doctor as soon as possible.    If bleeding does not stop after 10 minutes or if there is a large amount of bleeding or  spurting, call 911 immediately. Do not drive yourself to the hospital.           Contact the Heart Clinic at [phone] if you develop:    Fever over 100.4, that lasts more than one day    Redness, heat, or pus at the puncture site    Change in color or temperature of the hand or arm    Expect mild tingling of hand and tenderness at the puncture site for up to 3 days. You may take Tylenol or a pain medicine recommended by your doctor.                             Interventional Cardiology  Coronary Angiogram/Angioplasty/Stent/Atherectomy Discharge Instructions -   Femoral Approach    The instructions below are to help you understand how to take care of yourself. There is also information about when to call the doctor or emergency services          For the first 72 hours after your procedure:    No driving for 24 hours    Do not lift more than 15 pounds.  If you usually lift 50 pounds or more daily, please contact your cardiologist    Avoid any hard work or tiring activities, this includes, yard work, jogging, biking, sexual activity    During the day get up and walk around every 2 hours    You may return to work after 72 hours if you are feeling well and your job does not involve heavy lifting          Groin Site / Wound Care / Bleeding      You may take off the dressing on your groin the day after your procedure    Keep the area dry and clean    It is ok to shower with regular soap.  Pat dry, do not rub    You do not need to use a bandage    No tub/pool or hot tub for 1 week    If your groin starts to bleed or begins to swell suddenly after leaving the hospital, lie flat and apply firm pressure above the site for 15 minutes.  If bleeding continues, call 9-1-1    Bruising around the groin area is normal.  It may take 2-3 weeks for this to go away.  It is normal for the bruised area to turn green and/or yellow as it is healing.  A small lump may also be present and may last 2-3 months.    Your leg may be sore or  stiff for a few days.  You may take Tylenol or a pain medicine recommended by your doctor    If you have a fever over 100.4, that lasts more than one day - call your cardiologist.      Our Cardiac Rehab staff may visit briefly with you while your in the hospital.  If they miss you, someone will contact you after you are home.  You are encouraged to enroll in an Outpatient Cardiac Rehab Program     ? No elective dental work for 6 weeks after having a stent.       ? No driving for 24 hours      Your Procedural Physician was: Dr. Brianda Avilez  the phone number is: [phone]      [facility]:  [phone]  If you are calling after hours, please listen to the entire voicemail, a live  will answer at the end of the message

## 2021-09-23 NOTE — PLAN OF CARE
Problem: Adult Inpatient Plan of Care  Goal: Plan of Care Review  Outcome: Adequate for Discharge  Goal: Patient-Specific Goal (Individualized)  Outcome: Adequate for Discharge  Goal: Absence of Hospital-Acquired Illness or Injury  Outcome: Adequate for Discharge  Goal: Optimal Comfort and Wellbeing  Outcome: Adequate for Discharge  Goal: Readiness for Transition of Care  Outcome: Adequate for Discharge   Patient has discharged to home after right radial and left femoral approach angiogram  Tolerated bedrest well  Discharge instructions reviewed post procedural cares, medication schedule and follow up appointments  Patient up ambulating on unit with no problems  Discharged to home accompanied by his wife

## 2021-09-23 NOTE — TELEPHONE ENCOUNTER
Called patient and patients spouse to discuss plans for valve replacement.  Patient has surgery consult with Dr. Rodriguez 09/27, CT and cath is done.  Patient has not made a dental appointment yet, he needs to find a dentist.  Per spouse, she is going to work on that today, 09/23.  I asked that she call me back with details of the dental appt date and updates if she has any.  We discussed dates and possibly 10/19, otherwise we will look into November.

## 2021-09-27 ENCOUNTER — OFFICE VISIT (OUTPATIENT)
Dept: CARDIOLOGY | Facility: CLINIC | Age: 66
End: 2021-09-27
Payer: COMMERCIAL

## 2021-09-27 VITALS
HEART RATE: 51 BPM | HEIGHT: 68 IN | BODY MASS INDEX: 28.64 KG/M2 | DIASTOLIC BLOOD PRESSURE: 62 MMHG | SYSTOLIC BLOOD PRESSURE: 130 MMHG | RESPIRATION RATE: 16 BRPM | WEIGHT: 189 LBS

## 2021-09-27 DIAGNOSIS — K70.31 ALCOHOLIC CIRRHOSIS OF LIVER WITH ASCITES (H): ICD-10-CM

## 2021-09-27 DIAGNOSIS — I25.10 CORONARY ARTERY DISEASE INVOLVING NATIVE CORONARY ARTERY OF NATIVE HEART WITHOUT ANGINA PECTORIS: ICD-10-CM

## 2021-09-27 DIAGNOSIS — I25.5 ISCHEMIC CARDIOMYOPATHY: ICD-10-CM

## 2021-09-27 DIAGNOSIS — I35.0 NONRHEUMATIC AORTIC VALVE STENOSIS: Primary | ICD-10-CM

## 2021-09-27 DIAGNOSIS — I50.22 CHRONIC SYSTOLIC HEART FAILURE (H): ICD-10-CM

## 2021-09-27 DIAGNOSIS — Q24.5 ANOMALOUS AORTIC ORIGIN OF CORONARY ARTERY: ICD-10-CM

## 2021-09-27 PROCEDURE — 99205 OFFICE O/P NEW HI 60 MIN: CPT | Performed by: THORACIC SURGERY (CARDIOTHORACIC VASCULAR SURGERY)

## 2021-09-27 ASSESSMENT — MIFFLIN-ST. JEOR: SCORE: 1616.8

## 2021-09-27 NOTE — LETTER
9/27/2021    Zachary Lewis MD  [address] [place] [address]    RE: Aayush García       Dear Colleague,    I had the pleasure of seeing Aayush García in the [facility].    [facility] Cardiovascular Surgery Consult     Aayush García MRN# [id number]   YOB: 1955 Age: 66 year old      Primary care provider: Zachary Lweis    Referring Cardiologist(s): Brianda Avilez MD and Leoncio Goldsmith MD    Date of Service: September 27, 2021    Reason for consult: Progressive aortic stenosis           Assessment and Plan:   Aayush García is a 66 year old male with increasing fatigue and a known history of alcoholic cirrhosis (MELD 9, Eduardo A). He had cardiogenic shock in 2019 found to have 2 vessel coronary disease with  to an OM that was managed medically and a PCI to his pLAD. He has known aortic stenosis that has show to progress on surveillance TTE in the setting of a reduced EF for a diagnosis of low flow-low gradient severe aortic valve stenosis. He is being evaluated for TAVR vs SAVR. Due to his underlying cirrhosis he is not a candidate for surgical intervention. Additionally we discussed rare complications that can occur during TAVR requiring conversion to emergency surgery such as aortic rupture, dissection or valve embolization. This carries high risk for mortality in the absence of comorbid conditions. We discussed that he is not prohibitive risk but at greatly increased risk for death and complications should he require emergency surgical conversion during his TAVR procedure.      1) Schedule for TAVR per valve clinic  2) CPB Standby: Yes.    Gray Rodriguez MD  Cardiothoracic Surgery  [phone]             Chief Complaint:   Increased fatigue         History of Present Illness:   Aayush García is a 66 year old male with increasing fatigue and a known history of alcoholic cirrhosis (MELD 9, Eduardo A). He had cardiogenic shock in 2019  found to have 2 vessel coronary disease with  to an OM that was managed medically and a PCI to his pLAD. He has known aortic stenosis that has show to progress on surveillance TTE in the setting of a reduced EF for a diagnosis of low flow-low gradient severe aortic valve stenosis. He denies current symptoms. He is able to walk at work without shortness of breath or chest pain. He denies syncope, pre-syncope, orthopnea, dyspnea, hematemesis or hemoptysis.                  Past Medical History:     Past Medical History:   Diagnosis Date     Acute liver failure 8/5/2018     Acute on chronic systolic congestive heart failure (H) 1/14/2020     Acute renal failure, unspecified acute renal failure type (H) 08/05/2018     Acute respiratory failure with hypoxia (H) 12/16/2019     Acute respiratory failure with hypoxia (H) 08/05/2018     Alcoholic hepatitis with ascites 08/05/2018     Bacteremia due to Klebsiella pneumoniae      Benign essential hypertension 4/12/2018     Chronic liver disease      Closed fracture of multiple ribs of right side, initial encounter 11/23/2019     Coronary artery disease involving native coronary artery of native heart without angina pectoris 9/24/2019     Essential hypertension      Gastroesophageal reflux disease without esophagitis 10/31/2018     GI bleeding 10/27/2019     Heart failure with reduced ejection fraction (H) 4/4/2019     Hepatic encephalopathy (H) 08/05/2018     Macrocytic anemia      Non-ST elevation MI (NSTEMI) (H) 3/18/2019    Overview:  Added automatically from request for surgery [id number]     NSTEMI (non-ST elevated myocardial infarction) (H) 03/2019    s/p stent placement     Primary localized osteoarthrosis of left lower leg 4/9/2019     Stress-induced cardiomyopathy 08/05/2018             Past Surgical History:     Past Surgical History:   Procedure Laterality Date     COLONOSCOPY N/A 3/20/2018    Procedure: COLONOSCOPY;  Surgeon: Adam Nicole III, MD;  Location:  [facility];  Service:      CV CORONARY ANGIOGRAM N/A 3/18/2019    Procedure: Coronary Angiogram;  Surgeon: Timi Rodriguez MD;  Location: [facility] Cath Lab;  Service: Cardiology     CV CORONARY ANGIOGRAM N/A 2021    Procedure: Coronary Angiogram;  Surgeon: Brianda Avilez MD;  Location: [facility] CATH LAB CV     CV FRACTIONAL FLOW RATIO WIRE N/A 2021    Procedure: Fractional Flow Ratio Wire;  Surgeon: Brianda Avilez MD;  Location: [facility] CATH LAB CV     CV LEFT HEART CATHETERIZATION WITHOUT LEFT VENTRICULOGRAM Left 3/18/2019    Procedure: Left Heart Catheterization Without Left Ventriculogram;  Surgeon: Timi Rodriguez MD;  Location: [facility] Cath Lab;  Service: Cardiology     CV PCI N/A 2021    Procedure: Percutaneous Coronary Intervention;  Surgeon: Brianda Avilez MD;  Location: [facility] CATH LAB CV     CV PCI ATHERECTOMY ORBITAL N/A 2021    Procedure: Percutaneous Coronary Intervention Atherectomy Rotational;  Surgeon: Brianda Avilez MD;  Location: [facility] LAB CV     IR CVC TUNNEL PLACEMENT > 5 YRS OF AGE  2018     IR TUNNELED CATHETER REMOVAL  2018     PICC  2018          PICC  3/18/2019          US THORACENTESIS  10/28/2019     [facility] CORONARY STENT PERCUT, INITIAL VESSEL  2019              Social History:     Social History     Socioeconomic History     Marital status: Single     Spouse name: Not on file     Number of children: Not on file     Years of education: Not on file     Highest education level: Not on file   Occupational History     Occupation: [employer]   Tobacco Use     Smoking status: Former Smoker     Packs/day: 1.00     Years: 40.00     Pack years: 40.00     Types: Cigarettes, Cigarettes     Quit date: 2019     Years since quittin.4     Smokeless tobacco: Never Used     Tobacco comment: 3 Cig/Week   Substance and Sexual Activity     Alcohol use: Yes     Comment: Alcoholic Drinks/day: 350 ml of peppermint schnapps daily per  [place] h, 2018 H&P per report of АННА Karlie to Dr. Valle     Drug use: No     Sexual activity: Not on file   Other Topics Concern     Parent/sibling w/ CABG, MI or angioplasty before 65F 55M? Not Asked   Social History Narrative    Works in [employer]. Lives with his Karlie JJ.     Social Determinants of Health     Financial Resource Strain:      Difficulty of Paying Living Expenses:    Food Insecurity:      Worried About Running Out of Food in the Last Year:      Ran Out of Food in the Last Year:    Transportation Needs:      Lack of Transportation (Medical):      Lack of Transportation (Non-Medical):    Physical Activity:      Days of Exercise per Week:      Minutes of Exercise per Session:    Stress:      Feeling of Stress :    Social Connections:      Frequency of Communication with Friends and Family:      Frequency of Social Gatherings with Friends and Family:      Attends Sabianist Services:      Active Member of Clubs or Organizations:      Attends Club or Organization Meetings:      Marital Status:    Intimate Partner Violence:      Fear of Current or Ex-Partner:      Emotionally Abused:      Physically Abused:      Sexually Abused:             Family History:     Family History   Problem Relation Age of Onset     Heart Disease Father 76.00        type unknown to Aayush; his father  at home     Diabetes No family hx of      Coronary Artery Disease Early Onset No family hx of      Cancer No family hx of      Alzheimer Disease Mother 86.00        lives in long term care     No Known Problems Son      No Known Problems Son              Immunizations:     Immunization History   Administered Date(s) Administered     COVID-19,PF,Pfizer 2021, 2021     Influenza Quad, Recombinant, pf(RIV4) (Flublok) 10/29/2019     Influenza Vaccine IM > 6 months Valent IIV4 (Alfuria,Fluzone) 10/04/2018     TDAP Vaccine (Boostrix) 10/04/2018             Allergies:      Allergies   Allergen Reactions     No  "Known Allergies              Medications:     Current Outpatient Medications   Medication     acetaminophen (TYLENOL 8 HOUR ARTHRITIS PAIN) 650 MG CR tablet     aspirin (ASA) 81 MG EC tablet     atorvastatin (LIPITOR) 80 MG tablet     B Complex-C-Folic Acid (NEPHRO-FRANCISCO) 0.8 MG TABS     carvedilol (COREG) 12.5 MG tablet     Cholecalciferol (VITAMIN D) 50 MCG (2000 UT) CAPS     ferrous sulfate (FEROSUL) 325 (65 Fe) MG tablet     folic acid (FOLVITE) 400 MCG tablet     furosemide (LASIX) 20 MG tablet     magnesium oxide (MAG-OX) 400 MG tablet     Multiple Vitamins-Minerals (ONCOVITE) TABS     order for DME     pantoprazole (PROTONIX) 20 MG EC tablet     potassium chloride ER (KLOR-CON M) 20 MEQ CR tablet     ticagrelor (BRILINTA) 90 MG tablet     No current facility-administered medications for this visit.             Review of Systems:     A 10 point ROS was performed and is negative other than HPI.             Physical Exam:   /62 (BP Location: Left arm, Patient Position: Sitting, Cuff Size: Adult Large)   Pulse 51   Resp 16   Ht 1.727 m (5' 8\")   Wt 85.7 kg (189 lb)   BMI 28.74 kg/m       Gen: NAD, resting comfortably in bed  Neck: No JVD, trachea midline  ENT: EOMI, anciteric  Lungs: CTAB, non-labored breathing  CV: regular rhythm, normal rate  Abd: soft, obese, nt, nd  Ext: mild edema, no deformities  Neuro: AOx3    Labs:  Lab Results   Component Value Date    WBC 7.5 09/22/2021    HGB 13.4 09/22/2021    HCT 39.1 (L) 09/22/2021     09/22/2021     09/22/2021    POTASSIUM 4.3 09/22/2021    CHLORIDE 108 (H) 09/22/2021    CO2 23 09/22/2021    BUN 10 09/22/2021    CR 0.72 09/22/2021    GLC 91 09/22/2021    AST 36 07/29/2021    ALT 25 07/29/2021     (H) 08/14/2018    ALKPHOS 57 07/29/2021    BILITOTAL 1.5 (H) 07/29/2021    PARAM 44 (H) 05/15/2020    INR 1.12 (H) 12/16/2019       Imaging:  Transthoracic echocardiogram (7/1/2021): I have personally reviewed these images  LVEF 39%, mild " aortic regurg, PV 3.58 m/s, MG 29 mmHg, KALE 0.6 cm2, SVi 38, DI 0.2    Coronary angiogram (9/22/2021):  I have personally reviewed these images  Separate LAD and LCx ostia  LAD pPCI patent, mid heavily calcified with eccentric stenoses  LCx anomalous RCA origin, 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   Calcium is present in the annulus and does not extend into the LVOT.                 Area:                                        680                  mm2              Perimeter:                               98                    mm  Diameters measured:             27x33              mm              Diameter calculated:               29                    mm     3-Cusp View:                                       Albanian 5, CAU 8  Anterior View:                                      SCHULZ 0, CAU 17  No=CRA-CAU View:                           Albanian 9, CAU 0      Distance from Annulus:              LM:                                          16                    mm              RCA:                                        17                    mm     Sinus of Valsalva:               Sinus-L                                    38                    mm                    Sinus-R                                   40                    Mm              Sinus-N                                   40                    mm     Aortic Valve Calcium Score:               1018 (, , )                             Sinotubular junction diameters:          38                    mm     Ascending aorta diameters:                45                    mm      ADDITIONAL FINDINGS:   1. Normal left ventricular size and thickness, normal apical thickness without calcium.   2. Normal pulmonary artery and venous anatomy. All pulmonary veins draining into the  left atrium.    3. No left atrial or left ventricular mass or thrombus.    4. Normal pericardial thickness. No pericardial effusion.   5. Diffuse calcified coronary artery disease.           Thank you for allowing me to participate in the care of your patient.      Sincerely,     Gray Rodriguez MD     [facility]  cc:   No referring provider defined for this encounter.

## 2021-09-27 NOTE — PROGRESS NOTES
[facility] Cardiovascular Surgery Consult     Aayush García MRN# [id number]   YOB: 1955 Age: 66 year old      Primary care provider: Zachary Lewis    Referring Cardiologist(s): Brianda Avilez MD and Leoncio Goldsmith MD    Date of Service: September 27, 2021    Reason for consult: Progressive aortic stenosis           Assessment and Plan:   Aayush García is a 66 year old male with increasing fatigue and a known history of alcoholic cirrhosis (MELD 9, Eduardo A). He had cardiogenic shock in 2019 found to have 2 vessel coronary disease with  to an OM that was managed medically and a PCI to his pLAD. He has known aortic stenosis that has show to progress on surveillance TTE in the setting of a reduced EF for a diagnosis of low flow-low gradient severe aortic valve stenosis. He is being evaluated for TAVR vs SAVR. Due to his underlying cirrhosis he is not a candidate for surgical intervention. Additionally we discussed rare complications that can occur during TAVR requiring conversion to emergency surgery such as aortic rupture, dissection or valve embolization. This carries high risk for mortality in the absence of comorbid conditions. We discussed that he is not prohibitive risk but at greatly increased risk for death and complications should he require emergency surgical conversion during his TAVR procedure.      1) Schedule for TAVR per valve clinic  2) CPB Standby: Yes.    Gray Rodriguez MD  Cardiothoracic Surgery  [phone]             Chief Complaint:   Increased fatigue         History of Present Illness:   Aayush García is a 66 year old male with increasing fatigue and a known history of alcoholic cirrhosis (MELD 9, Eduardo A). He had cardiogenic shock in 2019 found to have 2 vessel coronary disease with  to an OM that was managed medically and a PCI to his pLAD. He has known aortic stenosis that has show to progress on surveillance TTE in the setting of a reduced EF for a diagnosis of low  flow-low gradient severe aortic valve stenosis. He denies current symptoms. He is able to walk at work without shortness of breath or chest pain. He denies syncope, pre-syncope, orthopnea, dyspnea, hematemesis or hemoptysis.                  Past Medical History:     Past Medical History:   Diagnosis Date     Acute liver failure 8/5/2018     Acute on chronic systolic congestive heart failure (H) 1/14/2020     Acute renal failure, unspecified acute renal failure type (H) 08/05/2018     Acute respiratory failure with hypoxia (H) 12/16/2019     Acute respiratory failure with hypoxia (H) 08/05/2018     Alcoholic hepatitis with ascites 08/05/2018     Bacteremia due to Klebsiella pneumoniae      Benign essential hypertension 4/12/2018     Chronic liver disease      Closed fracture of multiple ribs of right side, initial encounter 11/23/2019     Coronary artery disease involving native coronary artery of native heart without angina pectoris 9/24/2019     Essential hypertension      Gastroesophageal reflux disease without esophagitis 10/31/2018     GI bleeding 10/27/2019     Heart failure with reduced ejection fraction (H) 4/4/2019     Hepatic encephalopathy (H) 08/05/2018     Macrocytic anemia      Non-ST elevation MI (NSTEMI) (H) 3/18/2019    Overview:  Added automatically from request for surgery [id number]     NSTEMI (non-ST elevated myocardial infarction) (H) 03/2019    s/p stent placement     Primary localized osteoarthrosis of left lower leg 4/9/2019     Stress-induced cardiomyopathy 08/05/2018             Past Surgical History:     Past Surgical History:   Procedure Laterality Date     COLONOSCOPY N/A 3/20/2018    Procedure: COLONOSCOPY;  Surgeon: Adam Nicole III, MD;  Location: [facility] OR;  Service:      CV CORONARY ANGIOGRAM N/A 3/18/2019    Procedure: Coronary Angiogram;  Surgeon: Timi Rodriguez MD;  Location: [facility] Cath Lab;  Service: Cardiology     CV CORONARY ANGIOGRAM N/A 9/22/2021    Procedure:  Coronary Angiogram;  Surgeon: Brianda Avilez MD;  Location: [facility] LAB CV     CV FRACTIONAL FLOW RATIO WIRE N/A 2021    Procedure: Fractional Flow Ratio Wire;  Surgeon: Brianda Avilez MD;  Location: [facility] LAB CV     CV LEFT HEART CATHETERIZATION WITHOUT LEFT VENTRICULOGRAM Left 3/18/2019    Procedure: Left Heart Catheterization Without Left Ventriculogram;  Surgeon: Timi Rodriguez MD;  Location: [facility] Cath Lab;  Service: Cardiology     CV PCI N/A 2021    Procedure: Percutaneous Coronary Intervention;  Surgeon: Brianda Avilez MD;  Location: [facility] CV     CV PCI ATHERECTOMY ORBITAL N/A 2021    Procedure: Percutaneous Coronary Intervention Atherectomy Rotational;  Surgeon: Brianda Avilez MD;  Location: [facility] CV     IR CVC TUNNEL PLACEMENT > 5 YRS OF AGE  2018     IR TUNNELED CATHETER REMOVAL  2018     PICC  2018          PICC  3/18/2019          US THORACENTESIS  10/28/2019     ZZHC CORONARY STENT PERCUT, INITIAL VESSEL  2019              Social History:     Social History     Socioeconomic History     Marital status: Single     Spouse name: Not on file     Number of children: Not on file     Years of education: Not on file     Highest education level: Not on file   Occupational History     Occupation: [employer]   Tobacco Use     Smoking status: Former Smoker     Packs/day: 1.00     Years: 40.00     Pack years: 40.00     Types: Cigarettes, Cigarettes     Quit date: 2019     Years since quittin.4     Smokeless tobacco: Never Used     Tobacco comment: 3 Cig/Week   Substance and Sexual Activity     Alcohol use: Yes     Comment: Alcoholic Drinks/day: 350 ml of peppermint schnapps daily per [facility] h, 2018 H&P per report of Karlie JJ to Dr. Valle     Drug use: No     Sexual activity: Not on file   Other Topics Concern     Parent/sibling w/ CABG, MI or angioplasty before 65F 55M? Not Asked   Social History Narrative    Works in  "[employer]. Lives with his SO, Karlie Chisholm.     Social Determinants of Health     Financial Resource Strain:      Difficulty of Paying Living Expenses:    Food Insecurity:      Worried About Running Out of Food in the Last Year:      Ran Out of Food in the Last Year:    Transportation Needs:      Lack of Transportation (Medical):      Lack of Transportation (Non-Medical):    Physical Activity:      Days of Exercise per Week:      Minutes of Exercise per Session:    Stress:      Feeling of Stress :    Social Connections:      Frequency of Communication with Friends and Family:      Frequency of Social Gatherings with Friends and Family:      Attends Mu-ism Services:      Active Member of Clubs or Organizations:      Attends Club or Organization Meetings:      Marital Status:    Intimate Partner Violence:      Fear of Current or Ex-Partner:      Emotionally Abused:      Physically Abused:      Sexually Abused:             Family History:     Family History   Problem Relation Age of Onset     Heart Disease Father 76.00        type unknown to Aayush; his father  at home     Diabetes No family hx of      Coronary Artery Disease Early Onset No family hx of      Cancer No family hx of      Alzheimer Disease Mother 86.00        lives in long term care     No Known Problems Son      No Known Problems Son              Immunizations:     Immunization History   Administered Date(s) Administered     COVID-19,PF,Pfizer 2021, 2021     Influenza Quad, Recombinant, pf(RIV4) (Flublok) 10/29/2019     Influenza Vaccine IM > 6 months Valent IIV4 (Alfuria,Fluzone) 10/04/2018     TDAP Vaccine (Boostrix) 10/04/2018             Allergies:      Allergies   Allergen Reactions     No Known Allergies              Medications:     Current Outpatient Medications   Medication     acetaminophen (TYLENOL 8 HOUR ARTHRITIS PAIN) 650 MG CR tablet     aspirin (ASA) 81 MG EC tablet     atorvastatin (LIPITOR) 80 MG tablet     B  "Complex-C-Folic Acid (NEPHRO-FRANCISCO) 0.8 MG TABS     carvedilol (COREG) 12.5 MG tablet     Cholecalciferol (VITAMIN D) 50 MCG (2000 UT) CAPS     ferrous sulfate (FEROSUL) 325 (65 Fe) MG tablet     folic acid (FOLVITE) 400 MCG tablet     furosemide (LASIX) 20 MG tablet     magnesium oxide (MAG-OX) 400 MG tablet     Multiple Vitamins-Minerals (ONCOVITE) TABS     order for DME     pantoprazole (PROTONIX) 20 MG EC tablet     potassium chloride ER (KLOR-CON M) 20 MEQ CR tablet     ticagrelor (BRILINTA) 90 MG tablet     No current facility-administered medications for this visit.             Review of Systems:     A 10 point ROS was performed and is negative other than HPI.             Physical Exam:   /62 (BP Location: Left arm, Patient Position: Sitting, Cuff Size: Adult Large)   Pulse 51   Resp 16   Ht 1.727 m (5' 8\")   Wt 85.7 kg (189 lb)   BMI 28.74 kg/m       Gen: NAD, resting comfortably in bed  Neck: No JVD, trachea midline  ENT: EOMI, anciteric  Lungs: CTAB, non-labored breathing  CV: regular rhythm, normal rate  Abd: soft, obese, nt, nd  Ext: mild edema, no deformities  Neuro: AOx3    Labs:  Lab Results   Component Value Date    WBC 7.5 09/22/2021    HGB 13.4 09/22/2021    HCT 39.1 (L) 09/22/2021     09/22/2021     09/22/2021    POTASSIUM 4.3 09/22/2021    CHLORIDE 108 (H) 09/22/2021    CO2 23 09/22/2021    BUN 10 09/22/2021    CR 0.72 09/22/2021    GLC 91 09/22/2021    AST 36 07/29/2021    ALT 25 07/29/2021     (H) 08/14/2018    ALKPHOS 57 07/29/2021    BILITOTAL 1.5 (H) 07/29/2021    PARAM 44 (H) 05/15/2020    INR 1.12 (H) 12/16/2019       Imaging:  Transthoracic echocardiogram (7/1/2021): I have personally reviewed these images  LVEF 39%, mild aortic regurg, PV 3.58 m/s, MG 29 mmHg, KALE 0.6 cm2, SVi 38, DI 0.2    Coronary angiogram (9/22/2021):  I have personally reviewed these images  Separate LAD and LCx ostia  LAD pPCI patent, mid heavily calcified with eccentric stenoses  LCx " anomalous RCA origin, 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   Calcium is present in the annulus and does not extend into the LVOT.                 Area:                                        680                  mm2              Perimeter:                               98                    mm  Diameters measured:             27x33              mm              Diameter calculated:               29                    mm     3-Cusp View:                                       Kittitian 5, CAU 8  Anterior View:                                      SCHULZ 0, CAU 17  No=CRA-CAU View:                           Kittitian 9, CAU 0      Distance from Annulus:              LM:                                          16                    mm              RCA:                                        17                    mm     Sinus of Valsalva:               Sinus-L                                    38                    mm                    Sinus-R                                   40                    Mm              Sinus-N                                   40                    mm     Aortic Valve Calcium Score:               1018 (, , )                             Sinotubular junction diameters:          38                    mm     Ascending aorta diameters:                45                    mm      ADDITIONAL FINDINGS:   1. Normal left ventricular size and thickness, normal apical thickness without calcium.   2. Normal pulmonary artery and venous anatomy. All pulmonary veins draining into the left atrium.    3. No left atrial or left ventricular mass or thrombus.    4. Normal pericardial thickness. No pericardial effusion.   5. Diffuse calcified coronary artery disease.

## 2021-09-30 ENCOUNTER — OFFICE VISIT (OUTPATIENT)
Dept: CARDIOLOGY | Facility: CLINIC | Age: 66
End: 2021-09-30
Payer: COMMERCIAL

## 2021-09-30 VITALS
RESPIRATION RATE: 16 BRPM | DIASTOLIC BLOOD PRESSURE: 52 MMHG | SYSTOLIC BLOOD PRESSURE: 130 MMHG | WEIGHT: 187 LBS | HEIGHT: 68 IN | HEART RATE: 76 BPM | BODY MASS INDEX: 28.34 KG/M2

## 2021-09-30 DIAGNOSIS — I25.10 CORONARY ARTERY DISEASE INVOLVING NATIVE CORONARY ARTERY OF NATIVE HEART WITHOUT ANGINA PECTORIS: Primary | ICD-10-CM

## 2021-09-30 DIAGNOSIS — I50.22 CHRONIC SYSTOLIC HEART FAILURE (H): ICD-10-CM

## 2021-09-30 DIAGNOSIS — I25.5 ISCHEMIC CARDIOMYOPATHY: ICD-10-CM

## 2021-09-30 PROCEDURE — 99214 OFFICE O/P EST MOD 30 MIN: CPT | Performed by: NURSE PRACTITIONER

## 2021-09-30 ASSESSMENT — MIFFLIN-ST. JEOR: SCORE: 1607.73

## 2021-09-30 NOTE — LETTER
9/30/2021    Zachary Lewis MD  [address] [place] [address]    RE: Aayush García       Dear Colleague,    I had the pleasure of seeing Aayush García in the [facility].    HEART CARE PROGRESS NOTE      [facility]  [phone]      Assessment/Recommendations   1.  Coronary artery disease: Coronary angiogram on September 22, 2021 as part of work-up for TAVR.  Previous LAD stent patent.  PCI and drug-eluting stent to mid LAD.  Consider PCI of left circumflex if symptoms persist.  He is on dual antiplatelet therapy with aspirin and Brilinta.  We discussed the importance of antiplatelet therapy and talking with his cardiologist prior to stopping these medications for any reason.      Risk factor modification and lifestyle management topics were discussed including managing comorbidities, weight loss, heart healthy diet and exercise.  He walks and uses a stationary bike daily.    2.  Dyslipidemia: Aayush García is on high intensity statin therapy with atorvastatin 80 mg daily.  Recommend rechecking cholesterol in 2 months.  We discussed a diet low in saturated fat, weight loss, and exercise along with medication for better control of cholesterol.     3.  Ischemic cardiomyopathy, heart failure with reduced ejection fraction, ejection fraction 39%: He has no symptoms of acute fluid retention.  He follows a low-sodium diet.  His weight is stable.    4.  Aortic stenosis: Planning for TAVR.  He saw the dentist and will need all his teeth removed prior to TAVR.       History of Present Illness/Subjective    Aayush García is seen at [facility] for post coronary intervention follow up.  He has a history of coronary artery disease with PCI to LAD in 2019, ischemic cardiomyopathy, heart failure with reduced ejection fraction, aortic stenosis, and alcoholic cirrhosis.  Coronary angiogram on September 22, 2021 as part of  work-up for TAVR.  Previous LAD stent patent.  PCI and drug-eluting stent to mid LAD.  Consider PCI of left circumflex if symptoms persist.  He is on dual antiplatelet therapy with aspirin and Brilinta.    He states that he feels better since PCI.  He denies any shortness of breath or chest pain.  He is back to working at [employer] and tolerating this activity.  He denies lower extremity edema.      Cardiac Catheterization    Result Date: 9/27/2021  66-year-old male with established coronary artery disease having had   emergent PCI of his LAD in 2019 in the setting of cardiogenic shock.  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  More recently, he has noticed   increasing fatigue and dyspnea on exertion, and was found to have severe   low-flow low gradient aortic valve stenosis.  Due to his comorbidities   including cirrhosis, he is being evaluated for transcatheter aortic valve   replacement rather than surgery.    Presented today for coronary angiography prior to planned TAVR.    Coronary angiography showed:    Left main not present due to separate ostia of the LAD and circumflex  LAD has a patent proximal stent, with the mid LAD being heavily calcified   with eccentric stenoses.  The distal and apical LAD have mild disease.  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  There is a   70 to 75% stenosis in the proximal portion of the vessel which goes on to   supply several obtuse marginals to the lateral wall  RCA is a moderate sized, dominant vessel with mild diffuse disease    Following careful review of the angiographic films, it was decided to   evaluate the mid LAD further with fractional flow reserve, given the   calcium burden and eccentricity, which prevented a more precise   angiographic estimation of degree of stenosis.  FFR across the mid LAD   using intravenous adenosine, confirmed the vessel to be  "obstructive, with   an FFR of 0.75 at peak hyperemia, consistent with a 90% stenosis.  It was   then decided to proceed with intervention on the mid LAD given the   significant stenosis and the impact on any valve replacement procedures.    Successful PCI of the mid LAD with PTCA, coronary lithotripsy, rotational   atherectomy followed by 3.5 x 32 mm Synergy drug-eluting stent  0% residual stenosis with KAREN-3 flow pre and post    (Details of PCI: Due to tortuosity in the mid LAD, it was decided to treat   the vessel with coronary lithotripsy.  3 5 balloon was able to reach the   first part of the mid LAD, but would not traverse into the second   calcified lesion after the origin of a large diagonal, despite the use of   guide extension catheters.  This lesion was predilated with a 2.5 balloon,   despite which lithotripsy balloon catheter would not cross.  The mid LAD   was therefore then treated with rotational atherectomy using 1.5 mm bur,   following which the lesion became more compliant and was dilated with a   3.0 mm balloon.  The mid LAD was then stented with a 3.5 x 30 mm Synergy   drug-eluting stent, the proximal portion of the stent overlapping the   prior proximal stent.  Increased complexity of the case due to need for multiple wires, guide   extension catheters, heavy calcification and tortuosity)          Physical Examination Review of Systems   /52 (BP Location: Right arm, Patient Position: Sitting, Cuff Size: Adult Large)   Pulse 76   Resp 16   Ht 1.727 m (5' 8\")   Wt 84.8 kg (187 lb)   BMI 28.43 kg/m    Body mass index is 28.43 kg/m .  Wt Readings from Last 3 Encounters:   09/30/21 84.8 kg (187 lb)   09/27/21 85.7 kg (189 lb)   09/22/21 82 kg (180 lb 11.2 oz)       General Appearance:     Alert, cooperative and in no acute distress.   ENT/Mouth: membranes moist, no oral lesions or bleeding gums.      EYES:  no scleral icterus, normal conjunctivae   Chest/Lungs:   lungs are clear to " auscultation, no rales or wheezing, respirations unlabored   Cardiovascular:   Regular. Normal first and second heart sounds, murmur, no edema bilateral lower extremities    Abdomen:  Soft, nontender, nondistended, bowel sounds present   Extremities: no cyanosis or clubbing   Skin:  Neurologic: warm, dry.  mood and affect are appropriate, alert and oriented x3     Puncture Site: Right radial site is soft with no bruising.  He reports no pain or bruising of femoral puncture site.  Radial pulses intact and symmetrical.  CMS intact.           Please refer above for cardiac ROS details.      Medical History  Surgical History Family History Social History   Past Medical History:   Diagnosis Date     Acute liver failure 8/5/2018     Acute on chronic systolic congestive heart failure (H) 1/14/2020     Acute renal failure, unspecified acute renal failure type (H) 08/05/2018     Acute respiratory failure with hypoxia (H) 12/16/2019     Acute respiratory failure with hypoxia (H) 08/05/2018     Alcoholic hepatitis with ascites 08/05/2018     Bacteremia due to Klebsiella pneumoniae      Benign essential hypertension 4/12/2018     Chronic liver disease      Closed fracture of multiple ribs of right side, initial encounter 11/23/2019     Coronary artery disease involving native coronary artery of native heart without angina pectoris 9/24/2019     Essential hypertension      Gastroesophageal reflux disease without esophagitis 10/31/2018     GI bleeding 10/27/2019     Heart failure with reduced ejection fraction (H) 4/4/2019     Hepatic encephalopathy (H) 08/05/2018     Macrocytic anemia      Non-ST elevation MI (NSTEMI) (H) 3/18/2019    Overview:  Added automatically from request for surgery [id number]     NSTEMI (non-ST elevated myocardial infarction) (H) 03/2019    s/p stent placement     Primary localized osteoarthrosis of left lower leg 4/9/2019     Stress-induced cardiomyopathy 08/05/2018     Past Surgical History:   Procedure  Laterality Date     COLONOSCOPY N/A 3/20/2018    Procedure: COLONOSCOPY;  Surgeon: Adam Nicole III, MD;  Location: [facility];  Service:      CV CORONARY ANGIOGRAM N/A 3/18/2019    Procedure: Coronary Angiogram;  Surgeon: Timi Rodriguez MD;  Location: [facility] Cath Lab;  Service: Cardiology     CV CORONARY ANGIOGRAM N/A 2021    Procedure: Coronary Angiogram;  Surgeon: Brianda Avilez MD;  Location: [facility] CATH LAB CV     CV FRACTIONAL FLOW RATIO WIRE N/A 2021    Procedure: Fractional Flow Ratio Wire;  Surgeon: Brianda Avilez MD;  Location: [facility] CATH LAB CV     CV LEFT HEART CATHETERIZATION WITHOUT LEFT VENTRICULOGRAM Left 3/18/2019    Procedure: Left Heart Catheterization Without Left Ventriculogram;  Surgeon: Timi Rodriguez MD;  Location: [facility] Cath Lab;  Service: Cardiology     CV PCI N/A 2021    Procedure: Percutaneous Coronary Intervention;  Surgeon: Brianda Avilez MD;  Location: [facility] CV     CV PCI ATHERECTOMY ORBITAL N/A 2021    Procedure: Percutaneous Coronary Intervention Atherectomy Rotational;  Surgeon: Brianda Avilez MD;  Location: [facility] LAB CV     IR CVC TUNNEL PLACEMENT > 5 YRS OF AGE  2018     IR TUNNELED CATHETER REMOVAL  2018     PICC  2018          PICC  3/18/2019          US THORACENTESIS  10/28/2019     ZZHC CORONARY STENT PERCUT, INITIAL VESSEL  2019     Family History   Problem Relation Age of Onset     Heart Disease Father 76.00        type unknown to Aayush; his father  at home     Diabetes No family hx of      Coronary Artery Disease Early Onset No family hx of      Cancer No family hx of      Alzheimer Disease Mother 86.00        lives in long term care     No Known Problems Son      No Known Problems Son     Social History     Socioeconomic History     Marital status: Single     Spouse name: Not on file     Number of children: Not on file     Years of education: Not on file     Highest  education level: Not on file   Occupational History     Occupation: [employer]   Tobacco Use     Smoking status: Former Smoker     Packs/day: 1.00     Years: 40.00     Pack years: 40.00     Types: Cigarettes, Cigarettes     Quit date: 2019     Years since quittin.4     Smokeless tobacco: Never Used     Tobacco comment: 3 Cig/Week   Substance and Sexual Activity     Alcohol use: Yes     Comment: Alcoholic Drinks/day: 350 ml of peppermint schnapps daily per Finesse h,  H&P per report of Karlie JJ to Dr. Valle     Drug use: No     Sexual activity: Not on file   Other Topics Concern     Parent/sibling w/ CABG, MI or angioplasty before 65F 55M? Not Asked   Social History Narrative    Works in [employer]. Lives with his Karlie JJon.     Social Determinants of Health     Financial Resource Strain:      Difficulty of Paying Living Expenses:    Food Insecurity:      Worried About Running Out of Food in the Last Year:      Ran Out of Food in the Last Year:    Transportation Needs:      Lack of Transportation (Medical):      Lack of Transportation (Non-Medical):    Physical Activity:      Days of Exercise per Week:      Minutes of Exercise per Session:    Stress:      Feeling of Stress :    Social Connections:      Frequency of Communication with Friends and Family:      Frequency of Social Gatherings with Friends and Family:      Attends [employer] Services:      Active Member of Clubs or Organizations:      Attends Club or Organization Meetings:      Marital Status:    Intimate Partner Violence:      Fear of Current or Ex-Partner:      Emotionally Abused:      Physically Abused:      Sexually Abused:           Medications  Allergies   Current Outpatient Medications   Medication Sig Dispense Refill     acetaminophen (TYLENOL 8 HOUR ARTHRITIS PAIN) 650 MG CR tablet Take 2 tablets (1,300 mg) by mouth every 8 hours as needed for pain       aspirin (ASA) 81 MG EC tablet Take 1 tablet (81 mg) by mouth daily 90  tablet 3     atorvastatin (LIPITOR) 80 MG tablet Take 80 mg by mouth daily  0     B Complex-C-Folic Acid (NEPHRO-FRANCISCO) 0.8 MG TABS Take 1 tablet by mouth daily       carvedilol (COREG) 25 MG tablet Take 25 mg by mouth 2 times daily        Cholecalciferol (VITAMIN D) 50 MCG (2000 UT) CAPS Take 1 capsule by mouth daily       ferrous sulfate (FEROSUL) 325 (65 Fe) MG tablet Take 325 mg by mouth daily  0     folic acid (FOLVITE) 400 MCG tablet Take 1 tablet (400 mcg) by mouth daily       magnesium oxide (MAG-OX) 400 MG tablet Take 1 tablet (400 mg) by mouth 2 times daily 60 tablet 11     Multiple Vitamins-Minerals (ONCOVITE) TABS Take 1 tablet by mouth daily       order for DME Equipment being ordered: Blood pressure instrument and cuff 1 each 0     pantoprazole (PROTONIX) 20 MG EC tablet TAKE 1 TABLET BY MOUTH EVERY DAY 90 tablet 3     potassium chloride ER (KLOR-CON M) 20 MEQ CR tablet Take 1 tablet (20 mEq) by mouth daily 30 tablet 11     ticagrelor (BRILINTA) 90 MG tablet Take 1 tablet (90 mg) by mouth 2 times daily Start tomorrow morning. 180 tablet 3    Allergies   Allergen Reactions     No Known Allergies          Lab Results    Chemistry/lipid CBC Cardiac Enzymes/BNP/TSH/INR   Recent Labs   Lab Test 07/29/18  0431 02/12/18  1635   CHOL  --  146   HDL  --  35*   LDL  --  96   TRIG 126  --      Recent Labs   Lab Test 02/12/18  1635   LDL 96     Recent Labs   Lab Test 09/22/21  0727      POTASSIUM 4.3   CHLORIDE 108*   CO2 23   GLC 91   BUN 10   CR 0.72   GFRESTIMATED >90   SOLO 9.4     Recent Labs   Lab Test 09/22/21  0727 07/29/21  1036 06/09/20  0908   CR 0.72 0.81 0.66*     No results for input(s): A1C in the last 76125 hours. Recent Labs   Lab Test 09/22/21  0727   WBC 7.5   HGB 13.4   HCT 39.1*   MCV 97        Recent Labs   Lab Test 09/22/21  0727 07/29/21  1036 05/14/20  1944   HGB 13.4 13.7 11.4*    Recent Labs   Lab Test 12/16/19  1701 12/16/19  1042 12/16/19  0352   TROPONINI 0.02 0.02 <0.01      Recent Labs   Lab Test 12/16/19  0352 10/29/19  0440 03/17/19  0309   BNP 1,634* >5,000* 873*     Recent Labs   Lab Test 10/27/19  1648   TSH 1.54     Recent Labs   Lab Test 12/16/19  0352 10/28/19  0506 10/27/19  1648   INR 1.12* 1.36* 1.26*                                                 Thank you for allowing me to participate in the care of your patient.      Sincerely,     Loyda Marin NP     [facility]  cc:   No referring provider defined for this encounter.

## 2021-09-30 NOTE — PATIENT INSTRUCTIONS
Aayush García,    It was a pleasure to see you today at the [facility].     My recommendations after this visit include:  - I will talk with Dr. Avilez about timing of dental work and Chery will get back to you.      Loyda Bradshaw, CNP

## 2021-09-30 NOTE — PROGRESS NOTES
HEART CARE PROGRESS NOTE      [facility]  [phone]      Assessment/Recommendations   1.  Coronary artery disease: Coronary angiogram on September 22, 2021 as part of work-up for TAVR.  Previous LAD stent patent.  PCI and drug-eluting stent to mid LAD.  Consider PCI of left circumflex if symptoms persist.  He is on dual antiplatelet therapy with aspirin and Brilinta.  We discussed the importance of antiplatelet therapy and talking with his cardiologist prior to stopping these medications for any reason.      Risk factor modification and lifestyle management topics were discussed including managing comorbidities, weight loss, heart healthy diet and exercise.  He walks and uses a stationary bike daily.    2.  Dyslipidemia: Aayush García is on high intensity statin therapy with atorvastatin 80 mg daily.  Recommend rechecking cholesterol in 2 months.  We discussed a diet low in saturated fat, weight loss, and exercise along with medication for better control of cholesterol.     3.  Ischemic cardiomyopathy, heart failure with reduced ejection fraction, ejection fraction 39%: He has no symptoms of acute fluid retention.  He follows a low-sodium diet.  His weight is stable.    4.  Aortic stenosis: Planning for TAVR.  He saw the dentist and will need all his teeth removed prior to TAVR.       History of Present Illness/Subjective    Aayush García is seen at [facility] for post coronary intervention follow up.  He has a history of coronary artery disease with PCI to LAD in 2019, ischemic cardiomyopathy, heart failure with reduced ejection fraction, aortic stenosis, and alcoholic cirrhosis.  Coronary angiogram on September 22, 2021 as part of work-up for TAVR.  Previous LAD stent patent.  PCI and drug-eluting stent to mid LAD.  Consider PCI of left circumflex if symptoms persist.  He is on dual antiplatelet therapy with aspirin and Brilinta.    He states that he feels better since  PCI.  He denies any shortness of breath or chest pain.  He is back to working at [employer] and tolerating this activity.  He denies lower extremity edema.      Cardiac Catheterization    Result Date: 9/27/2021  66-year-old male with established coronary artery disease having had   emergent PCI of his LAD in 2019 in the setting of cardiogenic shock.  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  More recently, he has noticed   increasing fatigue and dyspnea on exertion, and was found to have severe   low-flow low gradient aortic valve stenosis.  Due to his comorbidities   including cirrhosis, he is being evaluated for transcatheter aortic valve   replacement rather than surgery.    Presented today for coronary angiography prior to planned TAVR.    Coronary angiography showed:    Left main not present due to separate ostia of the LAD and circumflex  LAD has a patent proximal stent, with the mid LAD being heavily calcified   with eccentric stenoses.  The distal and apical LAD have mild disease.  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  There is a   70 to 75% stenosis in the proximal portion of the vessel which goes on to   supply several obtuse marginals to the lateral wall  RCA is a moderate sized, dominant vessel with mild diffuse disease    Following careful review of the angiographic films, it was decided to   evaluate the mid LAD further with fractional flow reserve, given the   calcium burden and eccentricity, which prevented a more precise   angiographic estimation of degree of stenosis.  FFR across the mid LAD   using intravenous adenosine, confirmed the vessel to be obstructive, with   an FFR of 0.75 at peak hyperemia, consistent with a 90% stenosis.  It was   then decided to proceed with intervention on the mid LAD given the   significant stenosis and the impact on any valve replacement procedures.    Successful  "PCI of the mid LAD with PTCA, coronary lithotripsy, rotational   atherectomy followed by 3.5 x 32 mm Synergy drug-eluting stent  0% residual stenosis with KAREN-3 flow pre and post    (Details of PCI: Due to tortuosity in the mid LAD, it was decided to treat   the vessel with coronary lithotripsy.  3 5 balloon was able to reach the   first part of the mid LAD, but would not traverse into the second   calcified lesion after the origin of a large diagonal, despite the use of   guide extension catheters.  This lesion was predilated with a 2.5 balloon,   despite which lithotripsy balloon catheter would not cross.  The mid LAD   was therefore then treated with rotational atherectomy using 1.5 mm bur,   following which the lesion became more compliant and was dilated with a   3.0 mm balloon.  The mid LAD was then stented with a 3.5 x 30 mm Synergy   drug-eluting stent, the proximal portion of the stent overlapping the   prior proximal stent.  Increased complexity of the case due to need for multiple wires, guide   extension catheters, heavy calcification and tortuosity)          Physical Examination Review of Systems   /52 (BP Location: Right arm, Patient Position: Sitting, Cuff Size: Adult Large)   Pulse 76   Resp 16   Ht 1.727 m (5' 8\")   Wt 84.8 kg (187 lb)   BMI 28.43 kg/m    Body mass index is 28.43 kg/m .  Wt Readings from Last 3 Encounters:   09/30/21 84.8 kg (187 lb)   09/27/21 85.7 kg (189 lb)   09/22/21 82 kg (180 lb 11.2 oz)       General Appearance:     Alert, cooperative and in no acute distress.   ENT/Mouth: membranes moist, no oral lesions or bleeding gums.      EYES:  no scleral icterus, normal conjunctivae   Chest/Lungs:   lungs are clear to auscultation, no rales or wheezing, respirations unlabored   Cardiovascular:   Regular. Normal first and second heart sounds, murmur, no edema bilateral lower extremities    Abdomen:  Soft, nontender, nondistended, bowel sounds present   Extremities: no " cyanosis or clubbing   Skin:  Neurologic: warm, dry.  mood and affect are appropriate, alert and oriented x3     Puncture Site: Right radial site is soft with no bruising.  He reports no pain or bruising of femoral puncture site.  Radial pulses intact and symmetrical.  CMS intact.           Please refer above for cardiac ROS details.      Medical History  Surgical History Family History Social History   Past Medical History:   Diagnosis Date     Acute liver failure 8/5/2018     Acute on chronic systolic congestive heart failure (H) 1/14/2020     Acute renal failure, unspecified acute renal failure type (H) 08/05/2018     Acute respiratory failure with hypoxia (H) 12/16/2019     Acute respiratory failure with hypoxia (H) 08/05/2018     Alcoholic hepatitis with ascites 08/05/2018     Bacteremia due to Klebsiella pneumoniae      Benign essential hypertension 4/12/2018     Chronic liver disease      Closed fracture of multiple ribs of right side, initial encounter 11/23/2019     Coronary artery disease involving native coronary artery of native heart without angina pectoris 9/24/2019     Essential hypertension      Gastroesophageal reflux disease without esophagitis 10/31/2018     GI bleeding 10/27/2019     Heart failure with reduced ejection fraction (H) 4/4/2019     Hepatic encephalopathy (H) 08/05/2018     Macrocytic anemia      Non-ST elevation MI (NSTEMI) (H) 3/18/2019    Overview:  Added automatically from request for surgery [id number]     NSTEMI (non-ST elevated myocardial infarction) (H) 03/2019    s/p stent placement     Primary localized osteoarthrosis of left lower leg 4/9/2019     Stress-induced cardiomyopathy 08/05/2018     Past Surgical History:   Procedure Laterality Date     COLONOSCOPY N/A 3/20/2018    Procedure: COLONOSCOPY;  Surgeon: Adam Nicole III, MD;  Location: [facility];  Service:      CV CORONARY ANGIOGRAM N/A 3/18/2019    Procedure: Coronary Angiogram;  Surgeon: Timi Rodriguez MD;   Location: [facility] Cath Lab;  Service: Cardiology     CV CORONARY ANGIOGRAM N/A 2021    Procedure: Coronary Angiogram;  Surgeon: Brianda Avilez MD;  Location: [facility] CATH LAB CV     CV FRACTIONAL FLOW RATIO WIRE N/A 2021    Procedure: Fractional Flow Ratio Wire;  Surgeon: Brianda Avilez MD;  Location: [facility] LAB CV     CV LEFT HEART CATHETERIZATION WITHOUT LEFT VENTRICULOGRAM Left 3/18/2019    Procedure: Left Heart Catheterization Without Left Ventriculogram;  Surgeon: Timi Rodriguez MD;  Location: [facility] Cath Lab;  Service: Cardiology     CV PCI N/A 2021    Procedure: Percutaneous Coronary Intervention;  Surgeon: Brianda Avilez MD;  Location: [facility] CV     CV PCI ATHERECTOMY ORBITAL N/A 2021    Procedure: Percutaneous Coronary Intervention Atherectomy Rotational;  Surgeon: Brianda Avilez MD;  Location: [facility] CV     IR CVC TUNNEL PLACEMENT > 5 YRS OF AGE  2018     IR TUNNELED CATHETER REMOVAL  2018     PICC  2018          PICC  3/18/2019          US THORACENTESIS  10/28/2019     ZZHC CORONARY STENT PERCUT, INITIAL VESSEL  2019     Family History   Problem Relation Age of Onset     Heart Disease Father 76.00        type unknown to Aayush; his father  at home     Diabetes No family hx of      Coronary Artery Disease Early Onset No family hx of      Cancer No family hx of      Alzheimer Disease Mother 86.00        lives in long term care     No Known Problems Son      No Known Problems Son     Social History     Socioeconomic History     Marital status: Single     Spouse name: Not on file     Number of children: Not on file     Years of education: Not on file     Highest education level: Not on file   Occupational History     Occupation: [employer]   Tobacco Use     Smoking status: Former Smoker     Packs/day: 1.00     Years: 40.00     Pack years: 40.00     Types: Cigarettes, Cigarettes     Quit date: 2019     Years since  quittin.4     Smokeless tobacco: Never Used     Tobacco comment: 3 Cig/Week   Substance and Sexual Activity     Alcohol use: Yes     Comment: Alcoholic Drinks/day: 350 ml of peppermint schnapps daily per 2018 H&P per report of Karlie JJ to Dr. Valle     Drug use: No     Sexual activity: Not on file   Other Topics Concern     Parent/sibling w/ CABG, MI or angioplasty before 65F 55M? Not Asked   Social History Narrative    Works in [employer] Lives with his Karlie JJ.     Social Determinants of Health     Financial Resource Strain:      Difficulty of Paying Living Expenses:    Food Insecurity:      Worried About Running Out of Food in the Last Year:      Ran Out of Food in the Last Year:    Transportation Needs:      Lack of Transportation (Medical):      Lack of Transportation (Non-Medical):    Physical Activity:      Days of Exercise per Week:      Minutes of Exercise per Session:    Stress:      Feeling of Stress :    Social Connections:      Frequency of Communication with Friends and Family:      Frequency of Social Gatherings with Friends and Family:      Attends [employer] Services:      Active Member of Clubs or Organizations:      Attends Club or Organization Meetings:      Marital Status:    Intimate Partner Violence:      Fear of Current or Ex-Partner:      Emotionally Abused:      Physically Abused:      Sexually Abused:           Medications  Allergies   Current Outpatient Medications   Medication Sig Dispense Refill     acetaminophen (TYLENOL 8 HOUR ARTHRITIS PAIN) 650 MG CR tablet Take 2 tablets (1,300 mg) by mouth every 8 hours as needed for pain       aspirin (ASA) 81 MG EC tablet Take 1 tablet (81 mg) by mouth daily 90 tablet 3     atorvastatin (LIPITOR) 80 MG tablet Take 80 mg by mouth daily  0     B Complex-C-Folic Acid (NEPHRO-FRANCISCO) 0.8 MG TABS Take 1 tablet by mouth daily       carvedilol (COREG) 25 MG tablet Take 25 mg by mouth 2 times daily        Cholecalciferol (VITAMIN  D) 50 MCG (2000 UT) CAPS Take 1 capsule by mouth daily       ferrous sulfate (FEROSUL) 325 (65 Fe) MG tablet Take 325 mg by mouth daily  0     folic acid (FOLVITE) 400 MCG tablet Take 1 tablet (400 mcg) by mouth daily       magnesium oxide (MAG-OX) 400 MG tablet Take 1 tablet (400 mg) by mouth 2 times daily 60 tablet 11     Multiple Vitamins-Minerals (ONCOVITE) TABS Take 1 tablet by mouth daily       order for DME Equipment being ordered: Blood pressure instrument and cuff 1 each 0     pantoprazole (PROTONIX) 20 MG EC tablet TAKE 1 TABLET BY MOUTH EVERY DAY 90 tablet 3     potassium chloride ER (KLOR-CON M) 20 MEQ CR tablet Take 1 tablet (20 mEq) by mouth daily 30 tablet 11     ticagrelor (BRILINTA) 90 MG tablet Take 1 tablet (90 mg) by mouth 2 times daily Start tomorrow morning. 180 tablet 3    Allergies   Allergen Reactions     No Known Allergies          Lab Results    Chemistry/lipid CBC Cardiac Enzymes/BNP/TSH/INR   Recent Labs   Lab Test 07/29/18  0431 02/12/18  1635   CHOL  --  146   HDL  --  35*   LDL  --  96   TRIG 126  --      Recent Labs   Lab Test 02/12/18  1635   LDL 96     Recent Labs   Lab Test 09/22/21  0727      POTASSIUM 4.3   CHLORIDE 108*   CO2 23   GLC 91   BUN 10   CR 0.72   GFRESTIMATED >90   SOLO 9.4     Recent Labs   Lab Test 09/22/21  0727 07/29/21  1036 06/09/20  0908   CR 0.72 0.81 0.66*     No results for input(s): A1C in the last 58334 hours. Recent Labs   Lab Test 09/22/21  0727   WBC 7.5   HGB 13.4   HCT 39.1*   MCV 97        Recent Labs   Lab Test 09/22/21  0727 07/29/21  1036 05/14/20  1944   HGB 13.4 13.7 11.4*    Recent Labs   Lab Test 12/16/19  1701 12/16/19  1042 12/16/19  0352   TROPONINI 0.02 0.02 <0.01     Recent Labs   Lab Test 12/16/19  0352 10/29/19  0440 03/17/19  0309   BNP 1,634* >5,000* 873*     Recent Labs   Lab Test 10/27/19  1648   TSH 1.54     Recent Labs   Lab Test 12/16/19  0352 10/28/19  0506 10/27/19  1648   INR 1.12* 1.36* 1.26*

## 2021-10-01 ENCOUNTER — TELEPHONE (OUTPATIENT)
Dept: CARDIOLOGY | Facility: CLINIC | Age: 66
End: 2021-10-01

## 2021-10-01 NOTE — TELEPHONE ENCOUNTER
for patient to return call to discuss     Khanh Quiñones RN, Structural Heart Coordinator  [facility] [phone]  Fax: [phone]  10/01/21  2:11 PM      ----- Message ----------------------------------------------------------------------------   From: Brianda Avilez MD   Sent: 10/1/2021   7:55 AM CDT   To: Loyda Marin NP, Alexsandra Varghese, *     Kirk Scales, the issue will be his DAPT. He can get the dental work done 4-6 weeks post PCI as long as he stays on DAPT; but if the dentist wants him to stop antiplatelets, I would have him wait at least 3 months. This will of course push back the TAVR but we may not have a choice     Brianda   ----- Message -------------------------------------------------------------------------------   From: Loyda Marin NP   Sent: 9/30/2021   1:49 PM CDT   To: Alexsandra Varghese, Chery Brown RN, *     Dr. Avilez,   I saw Aayush for post PCI today and doing well.  You are planning TAVR.  He saw his dentist and needs all teeth pulled.  Do you want him to wait 6 weeks post PCI to have dental work?     ThanksLoyda

## 2021-10-04 NOTE — TELEPHONE ENCOUNTER
Spoke to patient and discussed recommendations for dental work pre-TAVR, post PCI. He understands and will reach out to his dentist to see what they would prefer for his DAPT. He will call us back with their response.     Chery Roman RN, BSN  Valve Clinic Coordinator  [facility]  [phone]  10/04/21 3:55 PM

## 2021-10-08 NOTE — TELEPHONE ENCOUNTER
Patient's keyur Ziegler called and stated that the dentist will extract teeth while on DAPT. They plan to wait 4-6 weeks for that and once complete, they will contact me and we can set a date for his TAVR.     Chery Roman, RN, BSN  Valve Clinic Coordinator  [facility]  [phone]  10/08/21 10:47 AM

## 2021-11-02 ENCOUNTER — OFFICE VISIT (OUTPATIENT)
Dept: CARDIOLOGY | Facility: CLINIC | Age: 66
End: 2021-11-02
Payer: COMMERCIAL

## 2021-11-02 VITALS
DIASTOLIC BLOOD PRESSURE: 70 MMHG | RESPIRATION RATE: 16 BRPM | HEIGHT: 68 IN | WEIGHT: 190 LBS | BODY MASS INDEX: 28.79 KG/M2 | SYSTOLIC BLOOD PRESSURE: 122 MMHG | HEART RATE: 52 BPM

## 2021-11-02 DIAGNOSIS — E78.5 DYSLIPIDEMIA: Primary | ICD-10-CM

## 2021-11-02 DIAGNOSIS — I35.0 NONRHEUMATIC AORTIC VALVE STENOSIS: ICD-10-CM

## 2021-11-02 DIAGNOSIS — I47.29 NSVT (NONSUSTAINED VENTRICULAR TACHYCARDIA) (H): ICD-10-CM

## 2021-11-02 DIAGNOSIS — I25.10 CORONARY ARTERY DISEASE INVOLVING NATIVE CORONARY ARTERY WITHOUT ANGINA PECTORIS, UNSPECIFIED WHETHER NATIVE OR TRANSPLANTED HEART: ICD-10-CM

## 2021-11-02 DIAGNOSIS — I73.9 PVD (PERIPHERAL VASCULAR DISEASE) (H): ICD-10-CM

## 2021-11-02 LAB — LDLC SERPL CALC-MCNC: 40 MG/DL

## 2021-11-02 PROCEDURE — 99214 OFFICE O/P EST MOD 30 MIN: CPT | Performed by: INTERNAL MEDICINE

## 2021-11-02 PROCEDURE — 83721 ASSAY OF BLOOD LIPOPROTEIN: CPT | Performed by: INTERNAL MEDICINE

## 2021-11-02 PROCEDURE — 36415 COLL VENOUS BLD VENIPUNCTURE: CPT | Performed by: INTERNAL MEDICINE

## 2021-11-02 ASSESSMENT — MIFFLIN-ST. JEOR: SCORE: 1621.33

## 2021-11-02 NOTE — LETTER
11/2/2021    Zachary Lewis MD  [address] [place] [address]    RE: Aayush J Raquel       Dear Colleague,    I had the pleasure of seeing Aayush García in the [facility].      [facility]   [address], [place] [address]   [email or web address]   OFFICE: [phone]          Thank you Zachary Plata for asking the [facility] team to participate in the care of your patient, Aayush García.     Impression and Plan     1.  Cardiomyopathy.  Patient with known cardiomyopathy with moderate depression of left ventricular systolic performance based on echocardiogram 3 December 2019.  Ejection fraction 30% on that study (see Cardiac Diagnostic section below).  Nature of cardiomyopathy felt combined ischemic as well as non-ischemic in part related to prior ethanol use.  ACE inhibitor/ARB has been deferred in this patient vis-à-vis hypotension.  Parenthetically, Aayush has declined prophylactic ICD placement (documented in myriad notes crafted by my colleague, Dr. Terrence Cordova).     This is stable.  Patient states his weights have been stable on his home scale.   He appears volume neutral on clinical exam.  He minimizes any shortness of breath.    Continue carvedilol.    Continued avoidance of ethanol (patient states that he has remained abstemious).     2.  Coronary artery disease.  Aayush has known coronary artery disease.  Specifically, patient underwent angiography 18 March 2019 and found to have significant two-vessel disease involving proximal LAD and first obtuse marginal.  Patient had successful PCI with stent placement to proximal LAD (4.0 x 24 mm Synergy drug-eluting stent).  He also had significant disease involving the first obtuse marginal. Specifically, the OM1 stenosis demonstrated bridging collaterals and was felt to be a chronic total occlusion (). His Iinterventionalist,   Timi Rodriguez, had recommended medical therapy of the OM1 lesion.      More recently, Aayush underwent repeat angiography 29 July 2021 and he had successful PCI of the mid LAD with PTCA, coronary lithotripsy, rotational atherectomy followed by 3.5 x 32 mm Synergy drug-eluting stent with 0% residual stenosis.     This has been stable since his most recent revascularization procedure.  He denies chest pain.     3.  Aortic stenosis.  This was felt severe on most recent echocardiogram 1 July 2021. The aortic valve was described as severely calcified with reduced systolic excursion and findings felt consistent with low flow, low gradient severe aortic stenosis (stroke-volume index 29.8 ml/m2) . Mean gradient measured at 29 mmHg with peak velocity of 3.6 m/s. Calculated valve area 0.6 cm . VRVTI = 0.2. VRvel = 0.2.    Clinical exam is commensurate with advanced aortic stenosis with later peaking systolic murmur and carotid pulses consistent with pulses parvus et tardus.      Aayush has been evaluated by my colleague, Dr. Brianda Avilez, and felt reasonable candidate for TAVR. In advance, however, Aayush requires dental work with teeth extraction. He has been communicating with his dentist and plans for tooth extraction have been put in place.     4.  History of ventricular arrhythmias.  Patient previously had been noted to have evidence of NSVT though this has been quiescent. He has not had any documented recent recurrence.  As aforementioned, patient has declined prophylactic ICD placement (documented in myriad notes crafted by my colleague, Dr. Terrence Cordova).  Continue carvedilol as per problem #1.     5. Peripheral arterial disease.  Severe native peripheral arterial disease with right common femoral artery calcification and left common iliac lesion. Recommend continued medical management/risk factor modification at this time.     6.  Dyslipidemia.  Lipid profile 12 February 2018 revealed LDL 96 mg/dL and HDL 35  mg/dL.    Continue high intensity statin therapy, atorvastatin 80 mg daily.    We will obtain direct LDL today.      35 minutes spent reviewing prior records (including documentation, laboratory studies, cardiac testing/imaging), interview with patient along with physical exam, planning, and subsequent documentation/crafting of note.           History of Present Illness    Once again I would like to thank you again for asking me to participate in the care of your patient, Aayush García.  As you know, but to reiterate for my own records, Aayush García is a 66 year old male with history of cardiomyopathy (combined ischemic and non-ischemic).  Patient also has a history of coronary artery disease with prior intervention.  In addition, he has a history of aortic stenosis.     Again, Aayush has known coronary artery disease.  Specifically, Aayush underwent angiography 18 March 2019 and found to have significant two-vessel disease involving proximal LAD and first obtuse marginal.  Patient had successful PCI with stent placement to proximal LAD (4.0 x 24 mm Synergy drug-eluting stent).  He also had significant disease involving the first obtuse marginal. Specifically, the OM1 stenosis demonstrated bridging collaterals and was felt to be a chronic total occlusion (). His Interventionalist, Dr. Timi Rodriguez, had recommended medical therapy of the OM1 lesion.    More recently, Aayush underwent repeat angiography 29 July 2021 and he had successful PCI of the mid LAD with PTCA, coronary lithotripsy, rotational atherectomy followed by 3.5 x 32 mm Synergy drug-eluting stent with 0% residual stenosis.     On interview, Aayush states that he is pleased with how he is performing from a cardiac standpoint.  He specifically denies chest pain or shortness of breath.  He states his weights have been stable on his home scale.   He is somewhat limited in his exercise tolerance though he feels this is mainly due to some orthopedic  issues.  He reports no palpitations or lightheadedness.  He denies any fevers, chills, or other constitutional symptoms.    Further review of systems is otherwise negative/noncontributory (medical record and 13 point review of systems reviewed as well and pertinent positives noted).         Cardiac Diagnostics      Echocardiogram 1 July 2021:  1. Moderate depression left ventricular systolic performance with ejection fraction 35 to 40%.  2. Moderate global reduction in left ventricular systolic performance.  3. Mild increase in left ventricular wall thickness.  4. Severe aortic stenosis.  o Mean gradient measured at 29 mmHg with peak velocity of 3.6 m/s. Calculated valve area 0.6. Cm . VRVTI = 0.2. VRvel = 0.2.  5. Mild aortic insufficiency.  6. Normal right ventricular size and systolic performance.  7. Moderate left atrial enlargement. Right atrium normal dimension.    Echocardiogram 3 December 2019:  1. Mild left ventricular enlargement with moderate depression left ventricular systolic performance.  Ejection fraction 30%.  2. The following segments are akinetic: basal anteroseptal, basal inferoseptal, mid anteroseptal and mid inferoseptal. The following segments are hypokinetic: basal anterior, basal inferior, basal inferolateral, basal anterolateral, mid anterior, mid inferior, mid inferolateral, mid anterolateral, apical anterior, apical septal, apical inferior, apical lateral and apex.  3. Severe aortic stenosis.  4. Aortic valve is severely calcified with reduced systolic excursion. Low flow, low gradient severe aortic stenosis is present with mild regurgitation (stroke-volume index 30.2 ml/m2) .   o Mean gradient measured at 29 mmHg with peak velocity of 3.3 m/s.  Calculated valve area 0.7 cm .  VRVTI = 0.2. VRvel = 0.2.  5. Mild aortic insufficiency.  6. Normal right ventricular size and systolic performance.  7. Severe left atrial enlargement.  Moderate right atrial enlargement.  8. Mild aortic root  "enlargement.  9. Right ventricular systolic pressure relative to right atrial pressure is mildly increased.  Pulmonary artery pressure estimated at 35 to 40 mmHg plus right atrial pressure.    Coronary angiogram 29 July 2021:  1. Left main coronary artery: Left main not present due to separate ostia of the LAD and circumflex  2. Left anterior descending coronary artery: LAD has a patent proximal stent, with the mid LAD being heavily calcified with eccentric stenoses.  The distal and apical LAD have mild disease.  3. 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  There is a 70 to 75% stenosis in the proximal portion of the vessel which goes on to supply several obtuse marginals to the lateral wall  4. Right coronary artery: RCA is a moderate sized, dominant vessel with mild diffuse disease.  5. Successful PCI of the mid LAD with PTCA, coronary lithotripsy, rotational atherectomy followed by 3.5 x 32 mm Synergy drug-eluting stent with 0% residual stenosis.    Coronary angiogram 18 March 2019:  1. Left anterior descending coronary artery: 95% proximal stenosis.  2. 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  First obtuse marginal with 99% chronic total occlusion ().  There are bridging collaterals to the distal portion of the vessel.  3. Successful PCI with stent placement to proximal LAD (4.0 x 24 mm Synergy drug-eluting stent.  4. Recommendations  5. Medical management for OM1 chronic total occlusion.         Physical Examination       /70 (BP Location: Right arm, Patient Position: Sitting, Cuff Size: Adult Regular)   Pulse 52   Resp 16   Ht 1.727 m (5' 8\")   Wt 86.2 kg (190 lb)   BMI 28.89 kg/m          Wt Readings from Last 3 Encounters:   11/02/21 86.2 kg (190 lb)   09/30/21 84.8 kg (187 lb)   09/27/21 85.7 kg (189 lb)       The patient is alert and oriented times three. Sclerae are anicteric. " Mucosal membranes are moist. Jugular venous pressure is normal. No significant adenopathy/thyromegally appreciated. Lungs are clear with good expansion. On cardiovascular exam, the patient has a regular S1 and S2.  There is a mid peaking 3/6 systolic murmur heard in a sash-like distribution.  Carotid pulses are somewhat delayed and of lower amplitude consistent with pulses parvus a tardus.  Abdomen is soft and non-tender. Extremities reveal no clubbing, cyanosis, or edema.         Imaging     Chest radiograph 16 December 2019:  1. Cardiomegaly.   2. New hazy right perihilar and lower lung opacity may be edema or pneumonia.   3. Bilateral perihilar interstitial markings slightly prominent. CHF is possible.   4. Old bilateral rib fracture deformities.          Medications  Allergies   Current Outpatient Medications   Medication Sig Dispense Refill     acetaminophen (TYLENOL 8 HOUR ARTHRITIS PAIN) 650 MG CR tablet Take 2 tablets (1,300 mg) by mouth every 8 hours as needed for pain       aspirin (ASA) 81 MG EC tablet Take 1 tablet (81 mg) by mouth daily 90 tablet 3     atorvastatin (LIPITOR) 80 MG tablet Take 80 mg by mouth daily  0     B Complex-C-Folic Acid (NEPHRO-FRANCISCO) 0.8 MG TABS Take 1 tablet by mouth daily       carvedilol (COREG) 25 MG tablet Take 25 mg by mouth 2 times daily        Cholecalciferol (VITAMIN D) 50 MCG (2000 UT) CAPS Take 1 capsule by mouth daily       ferrous sulfate (FEROSUL) 325 (65 Fe) MG tablet Take 325 mg by mouth daily  0     folic acid (FOLVITE) 400 MCG tablet Take 1 tablet (400 mcg) by mouth daily       magnesium oxide (MAG-OX) 400 MG tablet Take 1 tablet (400 mg) by mouth 2 times daily 60 tablet 11     Multiple Vitamins-Minerals (ONCOVITE) TABS Take 1 tablet by mouth daily       order for DME Equipment being ordered: Blood pressure instrument and cuff 1 each 0     pantoprazole (PROTONIX) 20 MG EC tablet TAKE 1 TABLET BY MOUTH EVERY DAY 90 tablet 3     potassium chloride ER (KLOR-CON M)  20 MEQ CR tablet Take 1 tablet (20 mEq) by mouth daily 30 tablet 11     ticagrelor (BRILINTA) 90 MG tablet Take 1 tablet (90 mg) by mouth 2 times daily Start tomorrow morning. 180 tablet 3       Allergies   Allergen Reactions     No Known Allergies           Lab Results    Chemistry/lipid CBC Cardiac Enzymes/BNP/TSH/INR   Recent Labs   Lab Test 07/29/18  0431 02/12/18  1635   CHOL  --  146   HDL  --  35*   LDL  --  96   TRIG 126  --      Recent Labs   Lab Test 02/12/18  1635   LDL 96     Recent Labs   Lab Test 09/22/21  0727      POTASSIUM 4.3   CHLORIDE 108*   CO2 23   GLC 91   BUN 10   CR 0.72   GFRESTIMATED >90   SOLO 9.4     Recent Labs   Lab Test 09/22/21  0727 07/29/21  1036 06/09/20  0908   CR 0.72 0.81 0.66*     No results for input(s): A1C in the last 53777 hours.       Recent Labs   Lab Test 09/22/21  0727   WBC 7.5   HGB 13.4   HCT 39.1*   MCV 97        Recent Labs   Lab Test 09/22/21  0727 07/29/21  1036 05/14/20  1944   HGB 13.4 13.7 11.4*    Recent Labs   Lab Test 12/16/19  1701 12/16/19  1042 12/16/19  0352   TROPONINI 0.02 0.02 <0.01     Recent Labs   Lab Test 12/16/19  0352 10/29/19  0440 03/17/19  0309   BNP 1,634* >5,000* 873*     Recent Labs   Lab Test 10/27/19  1648   TSH 1.54     Recent Labs   Lab Test 12/16/19  0352 10/28/19  0506 10/27/19  1648   INR 1.12* 1.36* 1.26*        Medical History  Surgical History Family History Social History   Past Medical History:   Diagnosis Date     Acute liver failure 8/5/2018     Acute on chronic systolic congestive heart failure (H) 1/14/2020     Acute renal failure, unspecified acute renal failure type (H) 08/05/2018     Acute respiratory failure with hypoxia (H) 12/16/2019     Acute respiratory failure with hypoxia (H) 08/05/2018     Alcoholic hepatitis with ascites 08/05/2018     Bacteremia due to Klebsiella pneumoniae      Benign essential hypertension 4/12/2018     Chronic liver disease      Closed fracture of multiple ribs of right side,  initial encounter 11/23/2019     Coronary artery disease involving native coronary artery of native heart without angina pectoris 9/24/2019     Essential hypertension      Gastroesophageal reflux disease without esophagitis 10/31/2018     GI bleeding 10/27/2019     Heart failure with reduced ejection fraction (H) 4/4/2019     Hepatic encephalopathy (H) 08/05/2018     Macrocytic anemia      Non-ST elevation MI (NSTEMI) (H) 3/18/2019    Overview:  Added automatically from request for surgery [id number]     NSTEMI (non-ST elevated myocardial infarction) (H) 03/2019    s/p stent placement     Primary localized osteoarthrosis of left lower leg 4/9/2019     Stress-induced cardiomyopathy 08/05/2018     Past Surgical History:   Procedure Laterality Date     COLONOSCOPY N/A 3/20/2018    Procedure: COLONOSCOPY;  Surgeon: Adam Nicole III, MD;  Location: [facility];  Service:      CV CORONARY ANGIOGRAM N/A 3/18/2019    Procedure: Coronary Angiogram;  Surgeon: Timi Rodriguez MD;  Location: [facility];  Service: Cardiology     CV CORONARY ANGIOGRAM N/A 9/22/2021    Procedure: Coronary Angiogram;  Surgeon: Brianda Avilez MD;  Location: [facility] CV     CV FRACTIONAL FLOW RATIO WIRE N/A 9/22/2021    Procedure: Fractional Flow Ratio Wire;  Surgeon: Brianda Avilez MD;  Location: [facility] CV     CV LEFT HEART CATHETERIZATION WITHOUT LEFT VENTRICULOGRAM Left 3/18/2019    Procedure: Left Heart Catheterization Without Left Ventriculogram;  Surgeon: Timi Rodriguez MD;  Location: [facility] Lab;  Service: Cardiology     CV PCI N/A 9/22/2021    Procedure: Percutaneous Coronary Intervention;  Surgeon: Brianda Avilez MD;  Location: [facility] CV     CV PCI ATHERECTOMY ORBITAL N/A 9/22/2021    Procedure: Percutaneous Coronary Intervention Atherectomy Rotational;  Surgeon: Brianda Avilez MD;  Location: [facility] CV     IR CVC TUNNEL PLACEMENT > 5 YRS OF AGE  8/1/2018     IR  TUNNELED CATHETER REMOVAL  2018     PICC  2018          PICC  3/18/2019           THORACENTESIS  10/28/2019     ZZHC CORONARY STENT PERCUT, INITIAL VESSEL  2019     Family History   Problem Relation Age of Onset     Heart Disease Father 76.00        type unknown to Aayush; his father  at home     Diabetes No family hx of      Coronary Artery Disease Early Onset No family hx of      Cancer No family hx of      Alzheimer Disease Mother 86.00        lives in long term care     No Known Problems Son      No Known Problems Son         Social History     Socioeconomic History     Marital status: Single     Spouse name: Not on file     Number of children: Not on file     Years of education: Not on file     Highest education level: Not on file   Occupational History     Occupation: [employer]   Tobacco Use     Smoking status: Former Smoker     Packs/day: 1.00     Years: 40.00     Pack years: 40.00     Types: Cigarettes, Cigarettes     Quit date: 2019     Years since quittin.5     Smokeless tobacco: Never Used     Tobacco comment: 3 Cig/Week   Substance and Sexual Activity     Alcohol use: Yes     Comment: Alcoholic Drinks/day: 350 ml of peppermint schnapps daily per 2018 H&P per report of Karlie JJ to Dr. Valle     Drug use: No     Sexual activity: Not on file   Other Topics Concern     Parent/sibling w/ CABG, MI or angioplasty before 65F 55M? Not Asked   Social History Narrative    Works in [employer]. Lives with his Riri JJryan Chisholm.     Social Determinants of Health     Financial Resource Strain:      Difficulty of Paying Living Expenses:    Food Insecurity:      Worried About Running Out of Food in the Last Year:      Ran Out of Food in the Last Year:    Transportation Needs:      Lack of Transportation (Medical):      Lack of Transportation (Non-Medical):    Physical Activity:      Days of Exercise per Week:      Minutes of Exercise per Session:    Stress:      Feeling of Stress :     Social Connections:      Frequency of Communication with Friends and Family:      Frequency of Social Gatherings with Friends and Family:      Attends Anabaptism Services:      Active Member of Clubs or Organizations:      Attends Club or Organization Meetings:      Marital Status:    Intimate Partner Violence:      Fear of Current or Ex-Partner:      Emotionally Abused:      Physically Abused:      Sexually Abused:

## 2021-11-02 NOTE — PROGRESS NOTES
[facility] HEART CARE   [address], [place] [address]   [email or web address]   OFFICE: [phone]          Thank you Zachary Plata for asking the [facility] Heart Care team to participate in the care of your patient, Aayush García.     Impression and Plan     1.  Cardiomyopathy.  Patient with known cardiomyopathy with moderate depression of left ventricular systolic performance based on echocardiogram 3 December 2019.  Ejection fraction 30% on that study (see Cardiac Diagnostic section below).  Nature of cardiomyopathy felt combined ischemic as well as non-ischemic in part related to prior ethanol use.  ACE inhibitor/ARB has been deferred in this patient vis-à-vis hypotension.  Parenthetically, Aayush has declined prophylactic ICD placement (documented in myriad notes crafted by my colleague, Dr. Terrence Cordova).     This is stable.  Patient states his weights have been stable on his home scale.   He appears volume neutral on clinical exam.  He minimizes any shortness of breath.    Continue carvedilol.    Continued avoidance of ethanol (patient states that he has remained abstemious).     2.  Coronary artery disease.  Aayush has known coronary artery disease.  Specifically, patient underwent angiography 18 March 2019 and found to have significant two-vessel disease involving proximal LAD and first obtuse marginal.  Patient had successful PCI with stent placement to proximal LAD (4.0 x 24 mm Synergy drug-eluting stent).  He also had significant disease involving the first obtuse marginal. Specifically, the OM1 stenosis demonstrated bridging collaterals and was felt to be a chronic total occlusion (). His Iinterventionalist, Dr. Timi Rodriguez, had recommended medical therapy of the OM1 lesion.      More recently, Aayush underwent repeat angiography 29 July 2021 and he had successful PCI of the mid LAD with PTCA, coronary lithotripsy, rotational atherectomy  followed by 3.5 x 32 mm Synergy drug-eluting stent with 0% residual stenosis.     This has been stable since his most recent revascularization procedure.  He denies chest pain.     3.  Aortic stenosis.  This was felt severe on most recent echocardiogram 1 July 2021. The aortic valve was described as severely calcified with reduced systolic excursion and findings felt consistent with low flow, low gradient severe aortic stenosis (stroke-volume index 29.8 ml/m2) . Mean gradient measured at 29 mmHg with peak velocity of 3.6 m/s. Calculated valve area 0.6 cm . VRVTI = 0.2. VRvel = 0.2.    Clinical exam is commensurate with advanced aortic stenosis with later peaking systolic murmur and carotid pulses consistent with pulses parvus et tardus.      Aayush has been evaluated by my colleague, Dr. Brianda Avilez, and felt reasonable candidate for TAVR. In advance, however, Aayush requires dental work with teeth extraction. He has been communicating with his dentist and plans for tooth extraction have been put in place.     4.  History of ventricular arrhythmias.  Patient previously had been noted to have evidence of NSVT though this has been quiescent. He has not had any documented recent recurrence.  As aforementioned, patient has declined prophylactic ICD placement (documented in myriad notes crafted by my colleague, Dr. Terrence Cordova).  Continue carvedilol as per problem #1.     5. Peripheral arterial disease.  Severe native peripheral arterial disease with right common femoral artery calcification and left common iliac lesion. Recommend continued medical management/risk factor modification at this time.     6.  Dyslipidemia.  Lipid profile 12 February 2018 revealed LDL 96 mg/dL and HDL 35 mg/dL.    Continue high intensity statin therapy, atorvastatin 80 mg daily.    We will obtain direct LDL today.      35 minutes spent reviewing prior records (including documentation, laboratory studies, cardiac testing/imaging),  interview with patient along with physical exam, planning, and subsequent documentation/crafting of note.           History of Present Illness    Once again I would like to thank you again for asking me to participate in the care of your patient, Aayush García.  As you know, but to reiterate for my own records, Aayush García is a 66 year old male with history of cardiomyopathy (combined ischemic and non-ischemic).  Patient also has a history of coronary artery disease with prior intervention.  In addition, he has a history of aortic stenosis.     Again, Aayush has known coronary artery disease.  Specifically, Aayush underwent angiography 18 March 2019 and found to have significant two-vessel disease involving proximal LAD and first obtuse marginal.  Patient had successful PCI with stent placement to proximal LAD (4.0 x 24 mm Synergy drug-eluting stent).  He also had significant disease involving the first obtuse marginal. Specifically, the OM1 stenosis demonstrated bridging collaterals and was felt to be a chronic total occlusion (). His Interventionalist, Dr. Timi Rodriguez, had recommended medical therapy of the OM1 lesion.    More recently, Aayush underwent repeat angiography 29 July 2021 and he had successful PCI of the mid LAD with PTCA, coronary lithotripsy, rotational atherectomy followed by 3.5 x 32 mm Synergy drug-eluting stent with 0% residual stenosis.     On interview, Aayush states that he is pleased with how he is performing from a cardiac standpoint.  He specifically denies chest pain or shortness of breath.  He states his weights have been stable on his home scale.   He is somewhat limited in his exercise tolerance though he feels this is mainly due to some orthopedic issues.  He reports no palpitations or lightheadedness.  He denies any fevers, chills, or other constitutional symptoms.    Further review of systems is otherwise negative/noncontributory (medical record and 13 point review of  systems reviewed as well and pertinent positives noted).         Cardiac Diagnostics      Echocardiogram 1 July 2021:  1. Moderate depression left ventricular systolic performance with ejection fraction 35 to 40%.  2. Moderate global reduction in left ventricular systolic performance.  3. Mild increase in left ventricular wall thickness.  4. Severe aortic stenosis.  o Mean gradient measured at 29 mmHg with peak velocity of 3.6 m/s. Calculated valve area 0.6. Cm . VRVTI = 0.2. VRvel = 0.2.  5. Mild aortic insufficiency.  6. Normal right ventricular size and systolic performance.  7. Moderate left atrial enlargement. Right atrium normal dimension.    Echocardiogram 3 December 2019:  1. Mild left ventricular enlargement with moderate depression left ventricular systolic performance.  Ejection fraction 30%.  2. The following segments are akinetic: basal anteroseptal, basal inferoseptal, mid anteroseptal and mid inferoseptal. The following segments are hypokinetic: basal anterior, basal inferior, basal inferolateral, basal anterolateral, mid anterior, mid inferior, mid inferolateral, mid anterolateral, apical anterior, apical septal, apical inferior, apical lateral and apex.  3. Severe aortic stenosis.  4. Aortic valve is severely calcified with reduced systolic excursion. Low flow, low gradient severe aortic stenosis is present with mild regurgitation (stroke-volume index 30.2 ml/m2) .   o Mean gradient measured at 29 mmHg with peak velocity of 3.3 m/s.  Calculated valve area 0.7 cm .  VRVTI = 0.2. VRvel = 0.2.  5. Mild aortic insufficiency.  6. Normal right ventricular size and systolic performance.  7. Severe left atrial enlargement.  Moderate right atrial enlargement.  8. Mild aortic root enlargement.  9. Right ventricular systolic pressure relative to right atrial pressure is mildly increased.  Pulmonary artery pressure estimated at 35 to 40 mmHg plus right atrial pressure.    Coronary angiogram 29 July  "2021:  1. Left main coronary artery: Left main not present due to separate ostia of the LAD and circumflex  2. Left anterior descending coronary artery: LAD has a patent proximal stent, with the mid LAD being heavily calcified with eccentric stenoses.  The distal and apical LAD have mild disease.  3. 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  There is a 70 to 75% stenosis in the proximal portion of the vessel which goes on to supply several obtuse marginals to the lateral wall  4. Right coronary artery: RCA is a moderate sized, dominant vessel with mild diffuse disease.  5. Successful PCI of the mid LAD with PTCA, coronary lithotripsy, rotational atherectomy followed by 3.5 x 32 mm Synergy drug-eluting stent with 0% residual stenosis.    Coronary angiogram 18 March 2019:  1. Left anterior descending coronary artery: 95% proximal stenosis.  2. 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  First obtuse marginal with 99% chronic total occlusion ().  There are bridging collaterals to the distal portion of the vessel.  3. Successful PCI with stent placement to proximal LAD (4.0 x 24 mm Synergy drug-eluting stent.  4. Recommendations  5. Medical management for OM1 chronic total occlusion.         Physical Examination       /70 (BP Location: Right arm, Patient Position: Sitting, Cuff Size: Adult Regular)   Pulse 52   Resp 16   Ht 1.727 m (5' 8\")   Wt 86.2 kg (190 lb)   BMI 28.89 kg/m          Wt Readings from Last 3 Encounters:   11/02/21 86.2 kg (190 lb)   09/30/21 84.8 kg (187 lb)   09/27/21 85.7 kg (189 lb)       The patient is alert and oriented times three. Sclerae are anicteric. Mucosal membranes are moist. Jugular venous pressure is normal. No significant adenopathy/thyromegally appreciated. Lungs are clear with good expansion. On cardiovascular exam, the patient has a regular S1 and S2.  " There is a mid peaking 3/6 systolic murmur heard in a sash-like distribution.  Carotid pulses are somewhat delayed and of lower amplitude consistent with pulses parvus a tardus.  Abdomen is soft and non-tender. Extremities reveal no clubbing, cyanosis, or edema.         Imaging     Chest radiograph 16 December 2019:  1. Cardiomegaly.   2. New hazy right perihilar and lower lung opacity may be edema or pneumonia.   3. Bilateral perihilar interstitial markings slightly prominent. CHF is possible.   4. Old bilateral rib fracture deformities.          Medications  Allergies   Current Outpatient Medications   Medication Sig Dispense Refill     acetaminophen (TYLENOL 8 HOUR ARTHRITIS PAIN) 650 MG CR tablet Take 2 tablets (1,300 mg) by mouth every 8 hours as needed for pain       aspirin (ASA) 81 MG EC tablet Take 1 tablet (81 mg) by mouth daily 90 tablet 3     atorvastatin (LIPITOR) 80 MG tablet Take 80 mg by mouth daily  0     B Complex-C-Folic Acid (NEPHRO-FRANCISCO) 0.8 MG TABS Take 1 tablet by mouth daily       carvedilol (COREG) 25 MG tablet Take 25 mg by mouth 2 times daily        Cholecalciferol (VITAMIN D) 50 MCG (2000 UT) CAPS Take 1 capsule by mouth daily       ferrous sulfate (FEROSUL) 325 (65 Fe) MG tablet Take 325 mg by mouth daily  0     folic acid (FOLVITE) 400 MCG tablet Take 1 tablet (400 mcg) by mouth daily       magnesium oxide (MAG-OX) 400 MG tablet Take 1 tablet (400 mg) by mouth 2 times daily 60 tablet 11     Multiple Vitamins-Minerals (ONCOVITE) TABS Take 1 tablet by mouth daily       order for DME Equipment being ordered: Blood pressure instrument and cuff 1 each 0     pantoprazole (PROTONIX) 20 MG EC tablet TAKE 1 TABLET BY MOUTH EVERY DAY 90 tablet 3     potassium chloride ER (KLOR-CON M) 20 MEQ CR tablet Take 1 tablet (20 mEq) by mouth daily 30 tablet 11     ticagrelor (BRILINTA) 90 MG tablet Take 1 tablet (90 mg) by mouth 2 times daily Start tomorrow morning. 180 tablet 3       Allergies   Allergen  Reactions     No Known Allergies           Lab Results    Chemistry/lipid CBC Cardiac Enzymes/BNP/TSH/INR   Recent Labs   Lab Test 07/29/18  0431 02/12/18  1635   CHOL  --  146   HDL  --  35*   LDL  --  96   TRIG 126  --      Recent Labs   Lab Test 02/12/18  1635   LDL 96     Recent Labs   Lab Test 09/22/21  0727      POTASSIUM 4.3   CHLORIDE 108*   CO2 23   GLC 91   BUN 10   CR 0.72   GFRESTIMATED >90   SOLO 9.4     Recent Labs   Lab Test 09/22/21  0727 07/29/21  1036 06/09/20  0908   CR 0.72 0.81 0.66*     No results for input(s): A1C in the last 99675 hours.       Recent Labs   Lab Test 09/22/21  0727   WBC 7.5   HGB 13.4   HCT 39.1*   MCV 97        Recent Labs   Lab Test 09/22/21  0727 07/29/21  1036 05/14/20  1944   HGB 13.4 13.7 11.4*    Recent Labs   Lab Test 12/16/19  1701 12/16/19  1042 12/16/19  0352   TROPONINI 0.02 0.02 <0.01     Recent Labs   Lab Test 12/16/19  0352 10/29/19  0440 03/17/19  0309   BNP 1,634* >5,000* 873*     Recent Labs   Lab Test 10/27/19  1648   TSH 1.54     Recent Labs   Lab Test 12/16/19  0352 10/28/19  0506 10/27/19  1648   INR 1.12* 1.36* 1.26*        Medical History  Surgical History Family History Social History   Past Medical History:   Diagnosis Date     Acute liver failure 8/5/2018     Acute on chronic systolic congestive heart failure (H) 1/14/2020     Acute renal failure, unspecified acute renal failure type (H) 08/05/2018     Acute respiratory failure with hypoxia (H) 12/16/2019     Acute respiratory failure with hypoxia (H) 08/05/2018     Alcoholic hepatitis with ascites 08/05/2018     Bacteremia due to Klebsiella pneumoniae      Benign essential hypertension 4/12/2018     Chronic liver disease      Closed fracture of multiple ribs of right side, initial encounter 11/23/2019     Coronary artery disease involving native coronary artery of native heart without angina pectoris 9/24/2019     Essential hypertension      Gastroesophageal reflux disease without  esophagitis 10/31/2018     GI bleeding 10/27/2019     Heart failure with reduced ejection fraction (H) 4/4/2019     Hepatic encephalopathy (H) 08/05/2018     Macrocytic anemia      Non-ST elevation MI (NSTEMI) (H) 3/18/2019    Overview:  Added automatically from request for surgery [id number]     NSTEMI (non-ST elevated myocardial infarction) (H) 03/2019    s/p stent placement     Primary localized osteoarthrosis of left lower leg 4/9/2019     Stress-induced cardiomyopathy 08/05/2018     Past Surgical History:   Procedure Laterality Date     COLONOSCOPY N/A 3/20/2018    Procedure: COLONOSCOPY;  Surgeon: Adam Nicole III, MD;  Location: [place] [facility] OR;  Service:      CV CORONARY ANGIOGRAM N/A 3/18/2019    Procedure: Coronary Angiogram;  Surgeon: Timi Rodriguez MD;  Location: [facility] Cath Lab;  Service: Cardiology     CV CORONARY ANGIOGRAM N/A 9/22/2021    Procedure: Coronary Angiogram;  Surgeon: Brianda Avilez MD;  Location: [facility] CATH LAB CV     CV FRACTIONAL FLOW RATIO WIRE N/A 9/22/2021    Procedure: Fractional Flow Ratio Wire;  Surgeon: Brianda Avilez MD;  Location: [facility] CATH LAB CV     CV LEFT HEART CATHETERIZATION WITHOUT LEFT VENTRICULOGRAM Left 3/18/2019    Procedure: Left Heart Catheterization Without Left Ventriculogram;  Surgeon: Timi Rodriguez MD;  Location: [facility] Cath Lab;  Service: Cardiology     CV PCI N/A 9/22/2021    Procedure: Percutaneous Coronary Intervention;  Surgeon: Brianda Avilez MD;  Location: [facility] CV     CV PCI ATHERECTOMY ORBITAL N/A 9/22/2021    Procedure: Percutaneous Coronary Intervention Atherectomy Rotational;  Surgeon: Brianda Avilez MD;  Location: [facility] CATH LAB CV     IR CVC TUNNEL PLACEMENT > 5 YRS OF AGE  8/1/2018     IR TUNNELED CATHETER REMOVAL  8/31/2018     PICC  7/24/2018          PICC  3/18/2019          US THORACENTESIS  10/28/2019     [facility] CORONARY STENT PERCUT, INITIAL VESSEL  03/2019     Family History   Problem Relation  Age of Onset     Heart Disease Father 76.00        type unknown to Aayush; his father  at home     Diabetes No family hx of      Coronary Artery Disease Early Onset No family hx of      Cancer No family hx of      Alzheimer Disease Mother 86.00        lives in long term care     No Known Problems Son      No Known Problems Son         Social History     Socioeconomic History     Marital status: Single     Spouse name: Not on file     Number of children: Not on file     Years of education: Not on file     Highest education level: Not on file   Occupational History     Occupation: [employer]   Tobacco Use     Smoking status: Former Smoker     Packs/day: 1.00     Years: 40.00     Pack years: 40.00     Types: Cigarettes, Cigarettes     Quit date: 2019     Years since quittin.5     Smokeless tobacco: Never Used     Tobacco comment: 3 Cig/Week   Substance and Sexual Activity     Alcohol use: Yes     Comment: Alcoholic Drinks/day: 350 ml of peppermint schnapps daily per 2018 H&P per report of Karlie JJ to Dr. Valle     Drug use: No     Sexual activity: Not on file   Other Topics Concern     Parent/sibling w/ CABG, MI or angioplasty before 65F 55M? Not Asked   Social History Narrative    Works in [employer]. Lives with his АННАDevonKarliemikaela Chisholm.     Social Determinants of Health     Financial Resource Strain:      Difficulty of Paying Living Expenses:    Food Insecurity:      Worried About Running Out of Food in the Last Year:      Ran Out of Food in the Last Year:    Transportation Needs:      Lack of Transportation (Medical):      Lack of Transportation (Non-Medical):    Physical Activity:      Days of Exercise per Week:      Minutes of Exercise per Session:    Stress:      Feeling of Stress :    Social Connections:      Frequency of Communication with Friends and Family:      Frequency of Social Gatherings with Friends and Family:      Attends [employer] Services:      Active Member of Clubs or  Organizations:      Attends Club or Organization Meetings:      Marital Status:    Intimate Partner Violence:      Fear of Current or Ex-Partner:      Emotionally Abused:      Physically Abused:      Sexually Abused:

## 2021-11-03 DIAGNOSIS — E78.5 DYSLIPIDEMIA: Primary | ICD-10-CM

## 2021-11-14 ENCOUNTER — HEALTH MAINTENANCE LETTER (OUTPATIENT)
Age: 66
End: 2021-11-14

## 2021-11-17 ENCOUNTER — TELEPHONE (OUTPATIENT)
Dept: CARDIOLOGY | Facility: CLINIC | Age: 66
End: 2021-11-17
Payer: COMMERCIAL

## 2021-11-17 NOTE — TELEPHONE ENCOUNTER
I called patient to discuss planning for TAVR and when he thinks he will complete his dental clearance.   His s/o called me back (Karlie) and states that he is still working on dental and should be ready to go in January of next year.     Will plan on TAVR sometime in January.    Chery Roman, RN, BSN  Valve Clinic Coordinator  [facility]  [phone]  11/17/21 3:48 PM

## 2021-12-06 ENCOUNTER — MYC REFILL (OUTPATIENT)
Dept: FAMILY MEDICINE | Facility: CLINIC | Age: 66
End: 2021-12-06
Payer: COMMERCIAL

## 2021-12-06 DIAGNOSIS — I50.22 CHRONIC SYSTOLIC HEART FAILURE (H): Primary | ICD-10-CM

## 2021-12-07 RX ORDER — CARVEDILOL 25 MG/1
25 TABLET ORAL 2 TIMES DAILY
Qty: 60 TABLET | Refills: 11 | Status: SHIPPED | OUTPATIENT
Start: 2021-12-07 | End: 2022-12-13

## 2021-12-13 ENCOUNTER — TELEPHONE (OUTPATIENT)
Dept: CARDIOLOGY | Facility: CLINIC | Age: 66
End: 2021-12-13
Payer: COMMERCIAL

## 2021-12-13 NOTE — TELEPHONE ENCOUNTER
----- Message from Elba Wheat RN sent at 12/9/2021 10:20 AM CST -----  Regarding: FW: [facility] pt    ----- Message -----  From: Ирина Waite  Sent: 12/9/2021  10:10 AM CST  To: Elba Wheat RN  Subject: [facility] pt                                           General phone call:    Caller: spouse - Karlie   Primary cardiologist: [facility]  Detailed reason for call: His dentist would like a current INR reading as he is removing all his teeth end of December   Best phone number: [phone]  Best time to contact: any  Ok to leave a detailedmessage? yes  Device? no    Additional Info:

## 2021-12-13 NOTE — TELEPHONE ENCOUNTER
I spoke to Karlie and told her that we do not check INRs because he is not on warfarin. She states understanding and will call if she has some questions.     Chery Roman, RN, BSN  Valve Clinic Coordinator  [facility]  [phone]  12/13/21 9:47 AM

## 2021-12-21 ENCOUNTER — TELEPHONE (OUTPATIENT)
Dept: CARDIOLOGY | Facility: CLINIC | Age: 66
End: 2021-12-21
Payer: COMMERCIAL

## 2021-12-21 NOTE — TELEPHONE ENCOUNTER
Called patient and LM on vm regarding timing of TAVR. Will await their return call.    Chery Roman, RN, BSN  Valve Clinic Coordinator  [facility]  [phone]  12/21/21 11:13 AM

## 2021-12-23 DIAGNOSIS — K70.9 LIVER DISEASE, CHRONIC, DUE TO ALCOHOL (H): ICD-10-CM

## 2021-12-23 RX ORDER — POTASSIUM CHLORIDE 1500 MG/1
20 TABLET, EXTENDED RELEASE ORAL DAILY
Qty: 30 TABLET | Refills: 11 | Status: SHIPPED | OUTPATIENT
Start: 2021-12-23 | End: 2022-12-13

## 2021-12-27 NOTE — TELEPHONE ENCOUNTER
I spoke to giovanny, patient's s/o today. She states that Aniyah is having all teeth extracted this Thursday, Dec 30th. He is also getting fit for his dentures that same day.   She stated that patient would like to plan for February for this TAVR. I penciled him in for February 8th and she knows that.   I will call patient mid-Jan to follow up and set a date.     Chery Roman, RN, BSN  Valve Clinic Coordinator  [facility]  [phone]  12/27/21 12:38 PM

## 2022-01-31 ENCOUNTER — TELEPHONE (OUTPATIENT)
Dept: CARDIOLOGY | Facility: CLINIC | Age: 67
End: 2022-01-31
Payer: COMMERCIAL

## 2022-01-31 NOTE — TELEPHONE ENCOUNTER
"I called patient to discuss scheduling his valve replacement (TAVR) since last time I spoke with Karlie, she had said February 8th might work for them. He had no idea about this date. He informed me that he would like to get him dentures fit before moving forward with his procedure, he had all of his teeth extracted. He told me that he will call me when he is ready to proceed. I did encourage him not to wait too long because his testing is only good for so long along with his valve appearing tight in the echo report. He understands and would prefer to contact us when he is ready. We will put him on our \"watch\" list for now and hopefully he decides to pursue his valve in the near future.     Chery Roman, RN, BSN  Valve Clinic Coordinator  [facility]  [phone]  01/31/22 1:30 PM    "

## 2022-02-17 PROBLEM — F10.20 ALCOHOL DEPENDENCE, UNCOMPLICATED (H): Status: ACTIVE | Noted: 2019-04-09

## 2022-03-21 DIAGNOSIS — K21.9 GASTROESOPHAGEAL REFLUX DISEASE WITHOUT ESOPHAGITIS: ICD-10-CM

## 2022-03-21 RX ORDER — PANTOPRAZOLE SODIUM 20 MG/1
20 TABLET, DELAYED RELEASE ORAL DAILY
Qty: 90 TABLET | Refills: 3 | Status: SHIPPED | OUTPATIENT
Start: 2022-03-21 | End: 2023-03-21

## 2022-09-12 DIAGNOSIS — I21.4 NON-ST ELEVATION (NSTEMI) MYOCARDIAL INFARCTION (H): ICD-10-CM

## 2022-09-12 RX ORDER — ATORVASTATIN CALCIUM 80 MG/1
TABLET, FILM COATED ORAL
Qty: 90 TABLET | Refills: 0 | Status: SHIPPED | OUTPATIENT
Start: 2022-09-12 | End: 2022-10-02

## 2022-11-21 ENCOUNTER — HEALTH MAINTENANCE LETTER (OUTPATIENT)
Age: 67
End: 2022-11-21

## 2022-12-12 NOTE — Clinical Note
Cora Dodd presents for pulse dye laser treatment.     The location for treatment was identified and confirmed by the patient.  A written consent form was completed.  The patient is aware of the potential adverse reactions including scarring, infection, pain, bruising, eye damage and has consented to proceed with laser therapy.  The patient wore protective glasses during the duration of the procedure while the laser was turned on.     Patient is currently pregnant which was noted, this procedure does not have any contraindications during pregnancy.     Indication:  Spider angioma / perinasal telangiectasia  Location:  Nasal sidewall   Fluence:  8 J, 15 J  Spot size:  7mm 3x10 mm  Pulse duration:  1.5ms /15ms  Pulses:  12    Patient tolerated the procedure well.     Cora was seen today for procedure and office visit.    Diagnoses and all orders for this visit:    Spider angioma  -     COSM DEST TELANGIECTASIAS < 10 CM    Telangiectasia of face  -     COSM DEST TELANGIECTASIAS < 10 CM      On 12/12/2022, Vonnie ELLIS scribed the services personally performed by Richmond Martino MD The documentation recorded by the scribe accurately and completely reflects the service(s) I personally performed and the decisions made by me.          LCA Cine(s)  injected and visualized utilizing power injector system.

## 2022-12-13 DIAGNOSIS — I50.22 CHRONIC SYSTOLIC HEART FAILURE (H): ICD-10-CM

## 2022-12-13 DIAGNOSIS — K70.9 LIVER DISEASE, CHRONIC, DUE TO ALCOHOL (H): ICD-10-CM

## 2022-12-13 NOTE — LETTER
December 16, 2022      Aayush García  [address] [place] [address]      Dear Aayush,    I received refill requests for a couple of your medications. I have refilled these for a total of 3 months. I know you follow regularly with your cardiologist but I have not seen you since we had a virtual visit in June of 2020! You are overdue for labwork and a visit. Please call our office to make an appointment in the next 3 months. If we do not see you before June of 2023, I would no longer be considered your primary care doctor (need to be seen in 3 years) and you would need to be seen to re-establish care and for any refills.        If you have any questions, please call the clinic to make an appointment.    Sincerely,        Zachary Lewis MD  December 16, 2022  12:36 PM

## 2022-12-14 RX ORDER — POTASSIUM CHLORIDE 1500 MG/1
20 TABLET, EXTENDED RELEASE ORAL DAILY
Qty: 30 TABLET | Refills: 2 | Status: SHIPPED | OUTPATIENT
Start: 2022-12-14 | End: 2023-04-17

## 2022-12-14 RX ORDER — CARVEDILOL 25 MG/1
25 TABLET ORAL 2 TIMES DAILY
Qty: 60 TABLET | Refills: 2 | Status: SHIPPED | OUTPATIENT
Start: 2022-12-14 | End: 2023-03-06

## 2022-12-14 NOTE — TELEPHONE ENCOUNTER
Team - please inform Aayush that I sent I med refills for 3 months. We have not seen him in our clinic since a virtual visit in 6/2020!  He needs repeat labwork and an office visit asap! Please help him get scheduled within the 4-6 weeks. Thank you!    Zachary Lewis MD  December 14, 2022  10:44 AM

## 2022-12-19 ENCOUNTER — DOCUMENTATION ONLY (OUTPATIENT)
Dept: FAMILY MEDICINE | Facility: CLINIC | Age: 67
End: 2022-12-19

## 2023-03-21 ENCOUNTER — OFFICE VISIT (OUTPATIENT)
Dept: FAMILY MEDICINE | Facility: CLINIC | Age: 68
End: 2023-03-21
Payer: COMMERCIAL

## 2023-03-21 VITALS
HEART RATE: 50 BPM | OXYGEN SATURATION: 98 % | SYSTOLIC BLOOD PRESSURE: 161 MMHG | DIASTOLIC BLOOD PRESSURE: 93 MMHG | BODY MASS INDEX: 30.58 KG/M2 | WEIGHT: 201.12 LBS

## 2023-03-21 DIAGNOSIS — E66.3 OVERWEIGHT (BMI 25.0-29.9): ICD-10-CM

## 2023-03-21 DIAGNOSIS — I35.0 AORTIC STENOSIS, MODERATE: ICD-10-CM

## 2023-03-21 DIAGNOSIS — Z87.891 PERSONAL HISTORY OF TOBACCO USE: ICD-10-CM

## 2023-03-21 DIAGNOSIS — I50.22 CHRONIC SYSTOLIC HEART FAILURE (H): ICD-10-CM

## 2023-03-21 DIAGNOSIS — I21.4 NON-ST ELEVATION (NSTEMI) MYOCARDIAL INFARCTION (H): Primary | ICD-10-CM

## 2023-03-21 DIAGNOSIS — K70.9 LIVER DISEASE, CHRONIC, DUE TO ALCOHOL (H): ICD-10-CM

## 2023-03-21 DIAGNOSIS — E55.9 VITAMIN D DEFICIENCY: ICD-10-CM

## 2023-03-21 DIAGNOSIS — D50.0 IRON DEFICIENCY ANEMIA DUE TO CHRONIC BLOOD LOSS: ICD-10-CM

## 2023-03-21 LAB
CHOLEST SERPL-MCNC: 111 MG/DL
ERYTHROCYTE [DISTWIDTH] IN BLOOD BY AUTOMATED COUNT: 11.9 % (ref 10–15)
FERRITIN SERPL-MCNC: 66 NG/ML (ref 31–409)
HCT VFR BLD AUTO: 38.4 % (ref 40–53)
HDLC SERPL-MCNC: 42 MG/DL
HGB BLD-MCNC: 13.1 G/DL (ref 13.3–17.7)
LDLC SERPL CALC-MCNC: 34 MG/DL
MAGNESIUM SERPL-MCNC: 1.8 MG/DL (ref 1.7–2.3)
MCH RBC QN AUTO: 33.3 PG (ref 26.5–33)
MCHC RBC AUTO-ENTMCNC: 34.1 G/DL (ref 31.5–36.5)
MCV RBC AUTO: 98 FL (ref 78–100)
NONHDLC SERPL-MCNC: 69 MG/DL
PLATELET # BLD AUTO: 241 10E3/UL (ref 150–450)
RBC # BLD AUTO: 3.93 10E6/UL (ref 4.4–5.9)
TRIGL SERPL-MCNC: 175 MG/DL
WBC # BLD AUTO: 8.2 10E3/UL (ref 4–11)

## 2023-03-21 PROCEDURE — 85027 COMPLETE CBC AUTOMATED: CPT | Performed by: FAMILY MEDICINE

## 2023-03-21 PROCEDURE — G0296 VISIT TO DETERM LDCT ELIG: HCPCS | Performed by: FAMILY MEDICINE

## 2023-03-21 PROCEDURE — 80053 COMPREHEN METABOLIC PANEL: CPT | Performed by: FAMILY MEDICINE

## 2023-03-21 PROCEDURE — 99215 OFFICE O/P EST HI 40 MIN: CPT | Performed by: FAMILY MEDICINE

## 2023-03-21 PROCEDURE — 80061 LIPID PANEL: CPT | Performed by: FAMILY MEDICINE

## 2023-03-21 PROCEDURE — 82728 ASSAY OF FERRITIN: CPT | Performed by: FAMILY MEDICINE

## 2023-03-21 PROCEDURE — 36415 COLL VENOUS BLD VENIPUNCTURE: CPT | Performed by: FAMILY MEDICINE

## 2023-03-21 PROCEDURE — 83735 ASSAY OF MAGNESIUM: CPT | Performed by: FAMILY MEDICINE

## 2023-03-21 RX ORDER — CARVEDILOL 25 MG/1
25 TABLET ORAL 2 TIMES DAILY
Qty: 180 TABLET | Refills: 3 | Status: SHIPPED | OUTPATIENT
Start: 2023-03-21 | End: 2024-05-24

## 2023-03-21 RX ORDER — ATORVASTATIN CALCIUM 80 MG/1
80 TABLET, FILM COATED ORAL DAILY
Qty: 90 TABLET | Refills: 3 | Status: SHIPPED | OUTPATIENT
Start: 2023-03-21 | End: 2024-03-26

## 2023-03-21 NOTE — PROGRESS NOTES
ASSESSMENT/PLAN:    (I21.4) Non-ST elevation (NSTEMI) myocardial infarction (H)  (primary encounter diagnosis)  Comment: pt is stable on BB, statin, ASA; wife will send me home BP's via mychart; will consider starting ACE-I if pressure would allow it  Plan: atorvastatin (LIPITOR) 80 MG tablet          (I50.22) Chronic systolic heart failure (H)  Comment: see above  Plan: carvedilol (COREG) 25 MG tablet    (I35.0) Aortic stenosis, moderate  Comment:   Plan: pt understands that he is a candidate for TAVR; would like to hold off; symptoms rveiewed; precautions given     (K70.9) Liver disease, chronic, due to alcohol (H)  Comment: stable per pt and wife; will check LFTs today; will likely need to re-establish with GI and repeat surveillance imaging; last c-scope was 3/2018  Plan: Vitamin D deficiency screening, US ABDOMEN LIMITED, Adult GI  Referral - Consult Only            (D50.0) Iron deficiency anemia due to chronic blood loss  Comment:   Plan: Ferritin; recheck iron studies today          (E55.9) Vitamin D deficiency  Comment:   Plan: Vitamin D deficiency screening    (Z87.891) Personal history of tobacco use  Comment: pt agreed to lung CA screening   Plan: Prof fee: Shared Decision Making for Lung Cancer Screening, CT Chest Lung Cancer Scrn Low Dose wo    (E66.3) Overweight (BMI 25.0-29.9)  Comment:   Plan: counseled on wt loss w/ goal of wt in 180's in next 6 months    >40 min was spent on the day of visit in review of records, direct patient care, documentation, and care coordination.                 Zachary Lewis MD  March 21, 2023  2:58 PM        Pt is a 67 year old male last seen on 6/2020 here today for:     Re-establish care - needed to come back in because it was nearly 3 years ago that I saw him    1) CAD - is on aspirin and statin (lipitor 80), carvedilol;  S/p stents in 2019 and 7/2021; taken off dual anti-platelet therapy after 1 year and now just on ASA  Previously ACE-I was held due to low  BP; BP elevated today but wife notes BPs in normal range at home  Functionally very active; still works at [email or web address] as ; on his feet all day w/o symptoms    BP Readings from Last 6 Encounters:   03/21/23 (!) 161/93   11/02/21 122/70   09/30/21 130/52   09/27/21 130/62   09/22/21 (!) 154/74   07/29/21 128/62     Recent Labs   Lab Test 11/02/21  1404 07/29/18  0431 02/12/18  1635   CHOL  --   --  146   HDL  --   --  35*   LDL 40  --  96   TRIG  --  126  --        Per last cardiology note in 11/2021:  1.  Cardiomyopathy.  Patient with known cardiomyopathy with moderate depression of left ventricular systolic performance based on echocardiogram 3 December 2019.  Ejection fraction 30% on that study (see Cardiac Diagnostic section below).  Nature of cardiomyopathy felt combined ischemic as well as non-ischemic in part related to prior ethanol use.  ACE inhibitor/ARB has been deferred in this patient vis-à-vis hypotension.  Parenthetically, Aayush has declined prophylactic ICD placement (documented in myriad notes crafted by my colleague, Dr. Terrence Cordova).     This is stable.  Patient states his weights have been stable on his home scale.   He appears volume neutral on clinical exam.  He minimizes any shortness of breath.    Continue carvedilol.    Continued avoidance of ethanol (patient states that he has remained abstemious).     2.  Coronary artery disease.  Aayush has known coronary artery disease.  Specifically, patient underwent angiography 18 March 2019 and found to have significant two-vessel disease involving proximal LAD and first obtuse marginal.  Patient had successful PCI with stent placement to proximal LAD (4.0 x 24 mm Synergy drug-eluting stent).  He also had significant disease involving the first obtuse marginal. Specifically, the OM1 stenosis demonstrated bridging collaterals and was felt to be a chronic total occlusion (). His Iinterventionalist, Dr. Timi Rodriguez, had recommended  medical therapy of the OM1 lesion.     More recently, Aayush underwent repeat angiography 29 July 2021 and he had successful PCI of the mid LAD with PTCA, coronary lithotripsy, rotational atherectomy followed by 3.5 x 32 mm Synergy drug-eluting stent with 0% residual stenosis.   This has been stable since his most recent revascularization procedure.  He denies chest pain.     3.  Aortic stenosis.  This was felt severe on most recent echocardiogram 1 July 2021. The aortic valve was described as severely calcified with reduced systolic excursion and findings felt consistent with low flow, low gradient severe aortic stenosis (stroke-volume index 29.8 ml/m2) . Mean gradient measured at 29 mmHg with peak velocity of 3.6 m/s. Calculated valve area 0.6 cm . VRVTI = 0.2. VRvel = 0.2.   Clinical exam is commensurate with advanced aortic stenosis with later peaking systolic murmur and carotid pulses consistent with pulses parvus et tardus.     Aayush has been evaluated by my colleague, Dr. Brianda Avilez, and felt reasonable candidate for TAVR. In advance, however, Aayush requires dental work with teeth extraction. He has been communicating with his dentist and plans for tooth extraction have been put in place.     4.  History of ventricular arrhythmias.  Patient previously had been noted to have evidence of NSVT though this has been quiescent. He has not had any documented recent recurrence.  As aforementioned, patient has declined prophylactic ICD placement (documented in myriad notes crafted by my colleague, Dr. Terrence Cordova).   Continue carvedilol as per problem #1.     5. Peripheral arterial disease.  Severe native peripheral arterial disease with right common femoral artery calcification and left common iliac lesion. Recommend continued medical management/risk factor modification at this time.     6.  Dyslipidemia.  Lipid profile 12 February 2018 revealed LDL 96 mg/dL and HDL 35 mg/dL.    Continue high intensity statin  "therapy, atorvastatin 80 mg daily.    We will obtain direct LDL today.      2) Tobacco use - quit smoking after angiogram (couple years)  Previous 1/2ppd x \"a lot:\" of years   Is ok getting screened for lung CA    3) Previous hx of EtOH abuse - Couple beers /week now; Per discussion w/ wife who is in room this is no longer a concern and Aayush understands that majority of his health issues stemmed from his previous EtOH abuse    Obesity -   Wt Readings from Last 3 Encounters:   03/21/23 91.2 kg (201 lb 1.9 oz)   11/02/21 86.2 kg (190 lb)   09/30/21 84.8 kg (187 lb)       Med changes:  No longer needs pantoprazole  Using 1/2 tab of K  Still taking iron     Past Medical History:   Diagnosis Date     Acute liver failure 8/5/2018     Acute on chronic systolic congestive heart failure (H) 1/14/2020     Acute renal failure, unspecified acute renal failure type (H) 08/05/2018     Acute respiratory failure with hypoxia (H) 12/16/2019     Acute respiratory failure with hypoxia (H) 08/05/2018     Alcoholic hepatitis with ascites 08/05/2018     Bacteremia due to Klebsiella pneumoniae      Benign essential hypertension 4/12/2018     Chronic liver disease      Closed fracture of multiple ribs of right side, initial encounter 11/23/2019     Coronary artery disease involving native coronary artery of native heart without angina pectoris 9/24/2019     Essential hypertension      Gastroesophageal reflux disease without esophagitis 10/31/2018     GI bleeding 10/27/2019     Heart failure with reduced ejection fraction (H) 4/4/2019     Hepatic encephalopathy (H) 08/05/2018     Macrocytic anemia      Non-ST elevation MI (NSTEMI) (H) 3/18/2019    Overview:  Added automatically from request for surgery [id number]     NSTEMI (non-ST elevated myocardial infarction) (H) 03/2019    s/p stent placement     Primary localized osteoarthrosis of left lower leg 4/9/2019     Stress-induced cardiomyopathy 08/05/2018     Past Surgical History:   Procedure " Laterality Date     COLONOSCOPY N/A 3/20/2018    Procedure: COLONOSCOPY;  Surgeon: Adam Nicole III, MD;  Location: [facility];  Service:      CV CORONARY ANGIOGRAM N/A 3/18/2019    Procedure: Coronary Angiogram;  Surgeon: Timi Rdoriguez MD;  Location: [facility] Cath Lab;  Service: Cardiology     CV CORONARY ANGIOGRAM N/A 9/22/2021    Procedure: Coronary Angiogram;  Surgeon: Brianda Avilez MD;  Location: [facility] CATH LAB CV     CV FRACTIONAL FLOW RATIO WIRE N/A 9/22/2021    Procedure: Fractional Flow Ratio Wire;  Surgeon: Brianda Avilez MD;  Location: [facility] CATH LAB CV     CV LEFT HEART CATHETERIZATION WITHOUT LEFT VENTRICULOGRAM Left 3/18/2019    Procedure: Left Heart Catheterization Without Left Ventriculogram;  Surgeon: Timi Rodriguez MD;  Location: [facility] Cath Lab;  Service: Cardiology     CV PCI N/A 9/22/2021    Procedure: Percutaneous Coronary Intervention;  Surgeon: Brianda Avilez MD;  Location: [facility] CV     CV PCI ATHERECTOMY ORBITAL N/A 9/22/2021    Procedure: Percutaneous Coronary Intervention Atherectomy Rotational;  Surgeon: Brianda Avilez MD;  Location: [facility] LAB CV     IR CVC TUNNEL PLACEMENT > 5 YRS OF AGE  8/1/2018     IR TUNNELED CATHETER REMOVAL  8/31/2018     PICC  7/24/2018          PICC  3/18/2019          US THORACENTESIS  10/28/2019     [facility] CORONARY STENT PERCUT, INITIAL VESSEL  03/2019        Current Outpatient Medications   Medication Sig Dispense Refill     atorvastatin (LIPITOR) 80 MG tablet Take 1 tablet (80 mg) by mouth daily 90 tablet 3     carvedilol (COREG) 25 MG tablet Take 1 tablet (25 mg) by mouth 2 times daily 180 tablet 3     acetaminophen (TYLENOL 8 HOUR ARTHRITIS PAIN) 650 MG CR tablet Take 2 tablets (1,300 mg) by mouth every 8 hours as needed for pain       aspirin (ASA) 81 MG EC tablet Take 1 tablet (81 mg) by mouth daily 90 tablet 3     B Complex-C-Folic Acid (NEPHRO-FRANCISCO) 0.8 MG TABS Take 1 tablet by mouth daily        Cholecalciferol (VITAMIN D) 50 MCG (2000 UT) CAPS Take 1 capsule by mouth daily       ferrous sulfate (FEROSUL) 325 (65 Fe) MG tablet Take 325 mg by mouth daily  0     folic acid (FOLVITE) 400 MCG tablet Take 1 tablet (400 mcg) by mouth daily       magnesium oxide (MAG-OX) 400 MG tablet Take 1 tablet (400 mg) by mouth 2 times daily 60 tablet 11     Multiple Vitamins-Minerals (ONCOVITE) TABS Take 1 tablet by mouth daily       order for DME Equipment being ordered: Blood pressure instrument and cuff 1 each 0     potassium chloride ER (KLOR-CON M) 20 MEQ CR tablet Take 1 tablet (20 mEq) by mouth daily 30 tablet 2           Allergies   Allergen Reactions     No Known Allergies        ROS:   Gen- + weight change, no fevers/chills   Head/ Eyes- no blurred vision, no headaches   ENT- no cough, no congestion, no URI symptoms   Cardiac - no palpitations, no chest pain   Respiratory - no shortness of breath , no wheezing   GI - no nausea, no vomiting, no diarrhea, no constipation   Urinary - no dysuria, no polyuria   Neuro - no numbness, no tingling   Remainder of ROS negative.     Exam:   BP (!) 178/100 (BP Location: Left arm, Patient Position: Sitting, Cuff Size: Adult Large)   Pulse 50   Wt 91.2 kg (201 lb 1.9 oz)   SpO2 98%   BMI 30.58 kg/m       Alert and oriented x 3; No acute distress   Resp: clear to auscultation bilaterally, no wheezing/ronchi   CV: harsh systolic murmur loudest at RSB/ LSB  ABd: soft, obese, ?distended, nontender; no rebound/guarding   Extrem: 1+ edema at bilateral ankles  Neuro: no focal deficits   Derm: no rashes       Lung Cancer Screening Shared Decision Making Visit     Aayush García, a 67 year old male, is eligible for lung cancer screening    History   Smoking Status     Former     Packs/day: 1.00     Years: 40.00     Types: Cigarettes, Cigarettes     Quit date: 4/18/2019   Smokeless Tobacco     Never     Comment: 3 Cig/Week       I have discussed with patient the risks and benefits of  screening for lung cancer with low-dose CT.     The risks include:    radiation exposure: one low dose chest CT has as much ionizing radiation as about 15 chest x-rays, or 6 months of background radiation living in [place]      false positives: most findings/nodules are NOT cancer, but some might still require additional diagnostic evaluation, including biopsy    over-diagnosis: some slow growing cancers that might never have been clinically significant will be detected and treated unnecessarily     The benefit of early detection of lung cancer is contingent upon adherence to annual screening or more frequent follow up if indicated.     Furthermore, to benefit from screening, Aayush must be willing and able to undergo diagnostic procedures, if indicated. Although no specific guide is available for determining severity of comorbidities, it is reasonable to withhold screening in patients who have greater mortality risk from other diseases.     We did discuss that the best way to prevent lung cancer is to not smoke.    Some patients may value a numeric estimation of lung cancer risk when evaluating if lung cancer screening is right for them, here is one calculator:    ShouldIScreen

## 2023-03-21 NOTE — PATIENT INSTRUCTIONS
Lung Cancer Screening   Frequently Asked Questions  If you are at high-risk for lung cancer, getting screened with low-dose computed tomography (LDCT) every year can help save your life. This handout offers answers to some of the most common questions about lung cancer screening. If you have other questions, please call [phone] ([phone]).     What is it?  Lung cancer screening uses special X-ray technology to create an image of your lung tissue. The exam is quick and easy and takes less than 10 seconds. We don t give you any medicine or use any needles. You can eat before and after the exam. You don t need to change your clothes as long as the clothing on your chest doesn t contain metal. But, you do need to be able to hold your breath for at least 6 seconds during the exam.    What is the goal of lung cancer screening?  The goal of lung cancer screening is to save lives. Many times, lung cancer is not found until a person starts having physical symptoms. Lung cancer screening can help detect lung cancer in the earliest stages when it may be easier to treat.    Who should be screened for lung cancer?  We suggest lung cancer screening for anyone who is at high-risk for lung cancer. You are in the high-risk group if you:      are between the ages of 55 and 79, and    have smoked at least 1 pack of cigarettes a day for 20 or more years, and    still smoke or have quit within the past 15 years.    However, if you have a new cough or shortness of breath, you should talk to your doctor before being screened.    Why does it matter if I have symptoms?  Certain symptoms can be a sign that you have a condition in your lungs that should be checked and treated by your doctor. These symptoms include fever, chest pain, a new or changing cough, shortness of breath that you have never felt before, coughing up blood or unexplained weight loss. Having any of these symptoms can greatly affect the results of lung  cancer screening.       Should all smokers get an LDCT lung cancer screening exam?  It depends. Lung cancer screening is for a very specific group of men and women who have a history of heavy smoking over a long period of time (see  Who should be screened for lung cancer  above).  I am in the high-risk group, but have been diagnosed with cancer in the past. Is LDCT lung cancer screening right for me?  In some cases, you should not have LDCT lung screening, such as when your doctor is already following your cancer with CT scan studies. Your doctor will help you decide if LDCT lung screening is right for you.  Do I need to have a screening exam every year?  Yes. If you are in the high-risk group described earlier, you should get an LDCT lung cancer screening exam every year until you are 79, or are no longer willing or able to undergo screening and possible procedures to diagnose and treat lung cancer.  How effective is LDCT at preventing death from lung cancer?  Studies have shown that LDCT lung cancer screening can lower the risk of death from lung cancer by 20 percent in people who are at high-risk.  What are the risks?  There are some risks and limitations of LDCT lung cancer screening. We want to make sure you understand the risks and benefits, so please let us know if you have any questions. Your doctor may want to talk with you more about these risks.    Radiation exposure: As with any exam that uses radiation, there is a very small increased risk of cancer. The amount of radiation in LDCT is small--about the same amount a person would get from a mammogram. Your doctor orders the exam when he or she feels the potential benefits outweigh the risks.    False negatives: No test is perfect, including LDCT. It is possible that you may have a medical condition, including lung cancer, that is not found during your exam. This is called a false negative result.    False positives and more testing: LDCT very often finds  something in the lung that could be cancer, but in fact is not. This is called a false positive result. False positive tests often cause anxiety. To make sure these findings are not cancer, you may need to have more tests. These tests will be done only if you give us permission. Sometimes patients need a treatment that can have side effects, such as a biopsy. For more information on false positives, see  What can I expect from the results?     Findings not related to lung cancer: Your LDCT exam also takes pictures of areas of your body next to your lungs. In a very small number of cases, the CT scan will show an abnormal finding in one of these areas, such as your kidneys, adrenal glands, liver or thyroid. This finding may not be serious, but you may need more tests. Your doctor can help you decide what other tests you may need, if any.  What can I expect from the results?  About 1 out of 4 LDCT exams will find something that may need more tests. Most of the time, these findings are lung nodules. Lung nodules are very small collections of tissue in the lung. These nodules are very common, and the vast majority--more than 97 percent--are not cancer (benign). Most are normal lymph nodes or small areas of scarring from past infections.  But, if a small lung nodule is found to be cancer, the cancer can be cured more than 90 percent of the time. To know if the nodule is cancer, we may need to get more images before your next yearly screening exam. If the nodule has suspicious features (for example, it is large, has an odd shape or grows over time), we will refer you to a specialist for further testing.  Will my doctor also get the results?  Yes. Your doctor will get a copy of your results.  Is it okay to keep smoking now that there s a cancer screening exam?  No. Tobacco is one of the strongest cancer-causing agents. It causes not only lung cancer, but other cancers and cardiovascular (heart) diseases as well. The damage  caused by smoking builds over time. This means that the longer you smoke, the higher your risk of disease. While it is never too late to quit, the sooner you quit, the better.  Where can I find help to quit smoking?  The best way to prevent lung cancer is to stop smoking. If you have already quit smoking, congratulations and keep it up! For help on quitting smoking, please call [employer] at 0-322-QUITNOW ([phone]) or the American [employer] Society at 1-526.889.5626 to find local resources near you.  One-on-one health coaching:  If you d prefer to work individually with a health care provider on tobacco cessation, we offer:      Medication Therapy Management:  Our specially trained pharmacists work closely with you and your doctor to help you quit smoking.  Call [phone] or [phone] (toll free).

## 2023-03-22 LAB
ALBUMIN SERPL BCG-MCNC: 4.1 G/DL (ref 3.5–5.2)
ALP SERPL-CCNC: 59 U/L (ref 40–129)
ALT SERPL W P-5'-P-CCNC: 26 U/L (ref 10–50)
ANION GAP SERPL CALCULATED.3IONS-SCNC: 12 MMOL/L (ref 7–15)
AST SERPL W P-5'-P-CCNC: 33 U/L (ref 10–50)
BILIRUB SERPL-MCNC: 0.8 MG/DL
BUN SERPL-MCNC: 12.9 MG/DL (ref 8–23)
CALCIUM SERPL-MCNC: 9 MG/DL (ref 8.8–10.2)
CHLORIDE SERPL-SCNC: 101 MMOL/L (ref 98–107)
CREAT SERPL-MCNC: 0.72 MG/DL (ref 0.67–1.17)
DEPRECATED HCO3 PLAS-SCNC: 22 MMOL/L (ref 22–29)
GFR SERPL CREATININE-BSD FRML MDRD: >90 ML/MIN/1.73M2
GLUCOSE SERPL-MCNC: 88 MG/DL (ref 70–99)
POTASSIUM SERPL-SCNC: 4.2 MMOL/L (ref 3.4–5.3)
PROT SERPL-MCNC: 7.2 G/DL (ref 6.4–8.3)
SODIUM SERPL-SCNC: 135 MMOL/L (ref 136–145)

## 2023-04-17 DIAGNOSIS — K70.9 LIVER DISEASE, CHRONIC, DUE TO ALCOHOL (H): ICD-10-CM

## 2023-04-17 RX ORDER — INFLUENZA A VIRUS A/MICHIGAN/45/2015 X-275 (H1N1) ANTIGEN (FORMALDEHYDE INACTIVATED), INFLUENZA A VIRUS A/SINGAPORE/INFIMH-16-0019/2016 IVR-186 (H3N2) ANTIGEN (FORMALDEHYDE INACTIVATED), INFLUENZA B VIRUS B/PHUKET/3073/2013 ANTIGEN (FORMALDEHYDE INACTIVATED), AND INFLUENZA B VIRUS B/MARYLAND/15/2016 BX-69A ANTIGEN (FORMALDEHYDE INACTIVATED) 60; 60; 60; 60 UG/.7ML; UG/.7ML; UG/.7ML; UG/.7ML
INJECTION, SUSPENSION INTRAMUSCULAR
COMMUNITY
Start: 2022-11-10 | End: 2023-08-31

## 2023-04-17 RX ORDER — BNT162B2 ORIGINAL AND OMICRON BA.4/BA.5 .1125; .1125 MG/2.25ML; MG/2.25ML
INJECTION, SUSPENSION INTRAMUSCULAR
COMMUNITY
Start: 2022-11-10 | End: 2023-08-31

## 2023-04-18 RX ORDER — POTASSIUM CHLORIDE 1500 MG/1
20 TABLET, EXTENDED RELEASE ORAL DAILY
Qty: 30 TABLET | Refills: 11 | Status: SHIPPED | OUTPATIENT
Start: 2023-04-18 | End: 2024-04-15

## 2023-05-03 ENCOUNTER — OFFICE VISIT (OUTPATIENT)
Dept: FAMILY MEDICINE | Facility: CLINIC | Age: 68
End: 2023-05-03
Payer: COMMERCIAL

## 2023-05-03 VITALS
BODY MASS INDEX: 30.57 KG/M2 | SYSTOLIC BLOOD PRESSURE: 138 MMHG | DIASTOLIC BLOOD PRESSURE: 79 MMHG | TEMPERATURE: 98 F | WEIGHT: 201.08 LBS | HEART RATE: 54 BPM | OXYGEN SATURATION: 97 %

## 2023-05-03 DIAGNOSIS — K70.9 LIVER DISEASE, CHRONIC, DUE TO ALCOHOL (H): ICD-10-CM

## 2023-05-03 DIAGNOSIS — I50.22 CHRONIC SYSTOLIC HEART FAILURE (H): ICD-10-CM

## 2023-05-03 DIAGNOSIS — I35.0 AORTIC STENOSIS, MODERATE: ICD-10-CM

## 2023-05-03 DIAGNOSIS — K21.9 GASTROESOPHAGEAL REFLUX DISEASE WITHOUT ESOPHAGITIS: Primary | ICD-10-CM

## 2023-05-03 LAB
ANION GAP SERPL CALCULATED.3IONS-SCNC: 12 MMOL/L (ref 7–15)
BUN SERPL-MCNC: 18.8 MG/DL (ref 8–23)
CALCIUM SERPL-MCNC: 9.1 MG/DL (ref 8.8–10.2)
CHLORIDE SERPL-SCNC: 101 MMOL/L (ref 98–107)
CREAT SERPL-MCNC: 0.87 MG/DL (ref 0.67–1.17)
DEPRECATED HCO3 PLAS-SCNC: 21 MMOL/L (ref 22–29)
GFR SERPL CREATININE-BSD FRML MDRD: >90 ML/MIN/1.73M2
GLUCOSE SERPL-MCNC: 94 MG/DL (ref 70–99)
POTASSIUM SERPL-SCNC: 4.3 MMOL/L (ref 3.4–5.3)
SODIUM SERPL-SCNC: 134 MMOL/L (ref 136–145)

## 2023-05-03 PROCEDURE — 80048 BASIC METABOLIC PNL TOTAL CA: CPT | Performed by: FAMILY MEDICINE

## 2023-05-03 PROCEDURE — 36415 COLL VENOUS BLD VENIPUNCTURE: CPT | Performed by: FAMILY MEDICINE

## 2023-05-03 PROCEDURE — 99214 OFFICE O/P EST MOD 30 MIN: CPT | Performed by: FAMILY MEDICINE

## 2023-05-03 RX ORDER — PANTOPRAZOLE SODIUM 20 MG/1
20 TABLET, DELAYED RELEASE ORAL DAILY
COMMUNITY
End: 2023-05-03

## 2023-05-03 RX ORDER — PANTOPRAZOLE SODIUM 20 MG/1
20 TABLET, DELAYED RELEASE ORAL DAILY
Qty: 90 TABLET | Refills: 3 | Status: SHIPPED | OUTPATIENT
Start: 2023-05-03 | End: 2024-04-09

## 2023-05-03 NOTE — PROGRESS NOTES
ASSESSMENT/PLAN:    (K21.9) Gastroesophageal reflux disease without esophagitis  (primary encounter diagnosis)  Comment: refill sent  Plan: pantoprazole (PROTONIX) 20 MG EC tablet          (I50.22) Chronic systolic heart failure (H)  Comment: BP improved on ARB; will check labs today  Plan: Basic metabolic panel          (I35.0) Aortic stenosis, moderate  Comment:   Plan: has eval for TAVR pending next month    (K70.9) Liver disease, chronic, due to alcohol (H)  Comment:   Plan: has liver U/S pending; LFTs normal    I will see him back on 6/14/23 as this will be my last day in clinic before extended paternity leave and I want to ensure he is all set before I am away    Zachary Lewis MD  May 3, 2023  4:56 PM          Pt is a 67 year old male last seen on 3/21/23 here today for:     1) CV - started on ARB after last visit   Has appt w/ Dr Goldsmith in 6/2023 -> is considering proceeding w/ a TAVR -> was supposed to have it last yr; no dizziness/ lkightheadedness; no syncope; does note stable fatigue; poor sleep   Home BP's 120/19 today, HR - 53    BP Readings from Last 6 Encounters:   05/03/23 138/79   03/21/23 (!) 161/93   11/02/21 122/70   09/30/21 130/52   09/27/21 130/62   09/22/21 (!) 154/74     Recent Labs   Lab Test 03/21/23  1431 11/02/21  1404 07/29/18  0431 02/12/18  1635   CHOL 111  --   --  146   HDL 42  --   --  35*   LDL 34 40  --  96   TRIG 175*  --  126  --        2) Iron deficiency - taking an OTC vitamin w/ iron; ferritin was 66; previous issue was from blood loss which is resolved  Hgb 13.1    3) low Mg - last check was 1.8; taking supplement daily    4) HM - lung Ca screening CT scheduled for 5/25    5) GI liver U/S scheduled for the same day    6) obesity - gets on his elliptical; lays off the cookies  Wt Readings from Last 3 Encounters:   05/03/23 91.2 kg (201 lb 1.3 oz)   03/21/23 91.2 kg (201 lb 1.9 oz)   11/02/21 86.2 kg (190 lb)       Per my last note:  (I21.4) Non-ST elevation (NSTEMI)  myocardial infarction (H)  (primary encounter diagnosis)  Comment: pt is stable on BB, statin, ASA; wife will send me home BP's via mychart; will consider starting ACE-I if pressure would allow it  Plan: atorvastatin (LIPITOR) 80 MG tablet          (I50.22) Chronic systolic heart failure (H)  Comment: see above  Plan: carvedilol (COREG) 25 MG tablet     (I35.0) Aortic stenosis, moderate  Comment:   Plan: pt understands that he is a candidate for TAVR; would like to hold off; symptoms rveiewed; precautions given     (K70.9) Liver disease, chronic, due to alcohol (H)  Comment: stable per pt and wife; will check LFTs today; will likely need to re-establish with GI and repeat surveillance imaging; last c-scope was 3/2018  Plan: Vitamin D deficiency screening, US ABDOMEN LIMITED, Adult GI  Referral - Consult Only            (D50.0) Iron deficiency anemia due to chronic blood loss  Comment:   Plan: Ferritin; recheck iron studies today          (E55.9) Vitamin D deficiency  Comment:   Plan: Vitamin D deficiency screening     (Z87.891) Personal history of tobacco use  Comment: pt agreed to lung CA screening   Plan: Prof fee: Shared Decision Making for Lung Cancer Screening, CT Chest Lung Cancer Scrn Low Dose wo     (E66.3) Overweight (BMI 25.0-29.9)  Comment:   Plan: counseled on wt loss w/ goal of wt in 180's in next 6 months    Past Medical History:   Diagnosis Date     Acute liver failure 8/5/2018     Acute on chronic systolic congestive heart failure (H) 1/14/2020     Acute renal failure, unspecified acute renal failure type (H) 08/05/2018     Acute respiratory failure with hypoxia (H) 12/16/2019     Acute respiratory failure with hypoxia (H) 08/05/2018     Alcoholic hepatitis with ascites 08/05/2018     Bacteremia due to Klebsiella pneumoniae      Benign essential hypertension 4/12/2018     Chronic liver disease      Closed fracture of multiple ribs of right side, initial encounter 11/23/2019     Coronary  artery disease involving native coronary artery of native heart without angina pectoris 9/24/2019     Essential hypertension      Gastroesophageal reflux disease without esophagitis 10/31/2018     GI bleeding 10/27/2019     Heart failure with reduced ejection fraction (H) 4/4/2019     Hepatic encephalopathy (H) 08/05/2018     Macrocytic anemia      Non-ST elevation MI (NSTEMI) (H) 3/18/2019    Overview:  Added automatically from request for surgery [id number]     NSTEMI (non-ST elevated myocardial infarction) (H) 03/2019    s/p stent placement     Primary localized osteoarthrosis of left lower leg 4/9/2019     Stress-induced cardiomyopathy 08/05/2018      Past Surgical History:   Procedure Laterality Date     COLONOSCOPY N/A 3/20/2018    Procedure: COLONOSCOPY;  Surgeon: Adam Nicole III, MD;  Location: [facility] OR;  Service:      CV CORONARY ANGIOGRAM N/A 3/18/2019    Procedure: Coronary Angiogram;  Surgeon: Timi Rodriguez MD;  Location: [facility] Cath Lab;  Service: Cardiology     CV CORONARY ANGIOGRAM N/A 9/22/2021    Procedure: Coronary Angiogram;  Surgeon: Brianda Avilez MD;  Location: [facility] LAB CV     CV FRACTIONAL FLOW RATIO WIRE N/A 9/22/2021    Procedure: Fractional Flow Ratio Wire;  Surgeon: Brianda Avilez MD;  Location: [facility] LAB CV     CV LEFT HEART CATHETERIZATION WITHOUT LEFT VENTRICULOGRAM Left 3/18/2019    Procedure: Left Heart Catheterization Without Left Ventriculogram;  Surgeon: Timi Rodriguez MD;  Location: [facility] Cath Lab;  Service: Cardiology     CV PCI N/A 9/22/2021    Procedure: Percutaneous Coronary Intervention;  Surgeon: Brianda Avilez MD;  Location: [facility] CV     CV PCI ATHERECTOMY ORBITAL N/A 9/22/2021    Procedure: Percutaneous Coronary Intervention Atherectomy Rotational;  Surgeon: Brianda Avilez MD;  Location: [facility] CV     IR CVC TUNNEL PLACEMENT > 5 YRS OF AGE  8/1/2018     IR TUNNELED CATHETER REMOVAL  8/31/2018      PICC  7/24/2018          PICC  3/18/2019          US THORACENTESIS  10/28/2019     [facility] CORONARY STENT PERCUT, INITIAL VESSEL  03/2019      Current Outpatient Medications   Medication Sig Dispense Refill     pantoprazole (PROTONIX) 20 MG EC tablet Take 20 mg by mouth daily       acetaminophen (TYLENOL 8 HOUR ARTHRITIS PAIN) 650 MG CR tablet Take 2 tablets (1,300 mg) by mouth every 8 hours as needed for pain       aspirin (ASA) 81 MG EC tablet Take 1 tablet (81 mg) by mouth daily 90 tablet 3     atorvastatin (LIPITOR) 80 MG tablet Take 1 tablet (80 mg) by mouth daily 90 tablet 3     B Complex-C-Folic Acid (NEPHRO-FRANCISCO) 0.8 MG TABS Take 1 tablet by mouth daily       carvedilol (COREG) 25 MG tablet Take 1 tablet (25 mg) by mouth 2 times daily 180 tablet 3     Cholecalciferol (VITAMIN D) 50 MCG (2000 UT) CAPS Take 1 capsule by mouth daily       ferrous sulfate (FEROSUL) 325 (65 Fe) MG tablet Take 325 mg by mouth daily  0     FLUZONE HIGH-DOSE QUADRIVALENT 0.7 ML MARQUITA injection        folic acid (FOLVITE) 400 MCG tablet Take 1 tablet (400 mcg) by mouth daily       losartan (COZAAR) 25 MG tablet Take 1 tablet (25 mg) by mouth daily 30 tablet 3     magnesium oxide (MAG-OX) 400 MG tablet Take 1 tablet (400 mg) by mouth 2 times daily 60 tablet 11     Multiple Vitamins-Minerals (ONCOVITE) TABS Take 1 tablet by mouth daily       order for DME Equipment being ordered: Blood pressure instrument and cuff 1 each 0     PFIZER COVID-19 VAC BIVALENT 30 MCG/0.3ML injection        potassium chloride ER (KLOR-CON M) 20 MEQ CR tablet Take 1 tablet (20 mEq) by mouth daily 30 tablet 11      Allergies   Allergen Reactions     No Known Allergies         ROS:   Gen- no weight change, no fevers/chills   Head/ Eyes- no blurred vision, no headaches   ENT- no cough, no congestion, no URI symptoms   Cardiac - no palpitations, no chest pain   Respiratory - + shortness of breath - at baseline , no wheezing   GI - no nausea, no vomiting, no diarrhea,  no constipation   Urinary - no dysuria, no polyuria   Remainder of ROS negative.     Exam:   /79 (BP Location: Right arm, Patient Position: Sitting, Cuff Size: Adult Regular)   Pulse 54   Temp 98  F (36.7  C) (Oral)   Wt 91.2 kg (201 lb 1.3 oz)   SpO2 97%   BMI 30.57 kg/m     Alert and oriented x 3; No acute distress   Neuro: no focal deficits   Derm: no rashes

## 2023-05-25 ENCOUNTER — HOSPITAL ENCOUNTER (OUTPATIENT)
Dept: ULTRASOUND IMAGING | Facility: HOSPITAL | Age: 68
Discharge: HOME | End: 2023-05-25
Attending: FAMILY MEDICINE
Payer: COMMERCIAL

## 2023-05-25 ENCOUNTER — HOSPITAL ENCOUNTER (OUTPATIENT)
Dept: CT IMAGING | Facility: HOSPITAL | Age: 68
Discharge: HOME | End: 2023-05-25
Attending: FAMILY MEDICINE
Payer: COMMERCIAL

## 2023-05-25 DIAGNOSIS — Z87.891 PERSONAL HISTORY OF TOBACCO USE: ICD-10-CM

## 2023-05-25 DIAGNOSIS — K70.9 LIVER DISEASE, CHRONIC, DUE TO ALCOHOL (H): ICD-10-CM

## 2023-05-25 PROCEDURE — 71271 CT THORAX LUNG CANCER SCR C-: CPT

## 2023-05-25 PROCEDURE — 76705 ECHO EXAM OF ABDOMEN: CPT

## 2023-06-08 ENCOUNTER — TELEPHONE (OUTPATIENT)
Dept: CARDIOLOGY | Facility: CLINIC | Age: 68
End: 2023-06-08

## 2023-06-08 ENCOUNTER — OFFICE VISIT (OUTPATIENT)
Dept: CARDIOLOGY | Facility: CLINIC | Age: 68
End: 2023-06-08
Attending: FAMILY MEDICINE
Payer: COMMERCIAL

## 2023-06-08 VITALS
OXYGEN SATURATION: 100 % | HEIGHT: 68 IN | BODY MASS INDEX: 29.99 KG/M2 | WEIGHT: 197.9 LBS | SYSTOLIC BLOOD PRESSURE: 134 MMHG | DIASTOLIC BLOOD PRESSURE: 57 MMHG | HEART RATE: 57 BPM | RESPIRATION RATE: 16 BRPM

## 2023-06-08 DIAGNOSIS — I73.9 PVD (PERIPHERAL VASCULAR DISEASE) (H): ICD-10-CM

## 2023-06-08 DIAGNOSIS — I47.29 NSVT (NONSUSTAINED VENTRICULAR TACHYCARDIA) (H): ICD-10-CM

## 2023-06-08 DIAGNOSIS — I35.0 NONRHEUMATIC AORTIC VALVE STENOSIS: Primary | ICD-10-CM

## 2023-06-08 DIAGNOSIS — I25.10 CORONARY ARTERY DISEASE INVOLVING NATIVE CORONARY ARTERY WITHOUT ANGINA PECTORIS, UNSPECIFIED WHETHER NATIVE OR TRANSPLANTED HEART: ICD-10-CM

## 2023-06-08 DIAGNOSIS — I35.0 AORTIC STENOSIS, MODERATE: ICD-10-CM

## 2023-06-08 DIAGNOSIS — I35.0 SEVERE AORTIC STENOSIS: Primary | ICD-10-CM

## 2023-06-08 DIAGNOSIS — I21.4 NON-ST ELEVATION (NSTEMI) MYOCARDIAL INFARCTION (H): ICD-10-CM

## 2023-06-08 DIAGNOSIS — I42.9 CARDIOMYOPATHY, UNSPECIFIED TYPE (H): ICD-10-CM

## 2023-06-08 DIAGNOSIS — E78.5 DYSLIPIDEMIA: ICD-10-CM

## 2023-06-08 DIAGNOSIS — I50.22 CHRONIC SYSTOLIC HEART FAILURE (H): ICD-10-CM

## 2023-06-08 PROCEDURE — 99215 OFFICE O/P EST HI 40 MIN: CPT | Performed by: INTERNAL MEDICINE

## 2023-06-08 NOTE — TELEPHONE ENCOUNTER
Alexsandra,    Please call the pt and schedule in valve clinic as soon as we can. Can you also try and get him in sooner for an echo and Ill place an order for the CTA.     Pt previously worked up for a TAVR.    Thank you,  Cindy

## 2023-06-08 NOTE — LETTER
6/8/2023    Zachary Lewis MD  [address] [place] [address]    RE: Aayush HARMAN Tracibaldemar       Dear Colleague,     I had the pleasure of seeing Aayush García in the [facility].         [facility]   [address], [place] [address]   [email or web address]   OFFICE: [phone]          Thank you Dr. Lewis for asking the [facility] team to participate in the care of your patient, Aayush García.     Impression and Plan     1.  Cardiomyopathy.  Aayush has known cardiomyopathy with moderate depression of left ventricular systolic performance with last echocardiogram 1 July 2021 revealing ejection fraction of 35-40% (see Cardiac Diagnostic section below).  Nature of cardiomyopathy felt combined ischemic as well as non-ischemic in part related to prior ethanol use.   Parenthetically, Aayush has declined prophylactic ICD placement (documented in myriad notes crafted by my colleague, Dr. Terrence Cordova).     This is stable.  Patient states his weights have been stable on his home scale.   He appears volume neutral on clinical exam.  He reports no shortness of breath at night to suggest PND/orthopnea.  Continue carvedilol 25 mg twice daily.  Continue losartan 25 mg daily.  Continued avoidance of ethanol (Aayush states that he has remained abstemious).     2.  Coronary artery disease.  Aayush has known coronary artery disease.  Specifically, Aayush underwent angiography 18 March 2019 and found to have significant two-vessel disease involving proximal LAD and first obtuse marginal.  Aayush had successful PCI with stent placement to proximal LAD (4.0 x 24 mm Synergy drug-eluting stent).  He also had significant disease involving the first obtuse marginal. Specifically, the OM1 stenosis demonstrated bridging collaterals and was felt to be a chronic total occlusion (). His Iinterventionalist, Dr. Timi Rodriguez, had recommended medical therapy of the  OM1 lesion at that time.       Aayush underwent repeat angiography 29 July 2021 and he had successful PCI of the mid LAD with PTCA, coronary lithotripsy, rotational atherectomy followed by 3.5 x 32 mm Synergy drug-eluting stent with 0% residual stenosis.     Aayush denies any chest pain symptoms concerning for angina.     3.  Aortic stenosis.  This was felt severe on most recent echocardiogram 1 July 2021. The aortic valve was described as severely calcified with reduced systolic excursion and findings felt consistent with low flow, low gradient severe aortic stenosis (stroke-volume index 29.8 ml/m2) . Mean gradient measured at 29 mmHg with peak velocity of 3.6 m/s. Calculated valve area 0.6 cm . VRVTI = 0.2. VRvel = 0.2.     Clinical exam is commensurate with advanced aortic stenosis with later peaking systolic murmur and carotid pulses consistent with pulses parvus et tardus.       Aayush had been evaluated by Dr. Brianda Avilez and felt reasonable candidate for TAVR. In advance, however, Aayush required dental work.  He did undergo extraction and now uses dentures.    Since getting his dental work done, however, he had been somewhat lost to follow-up.  Plan:  Will obtain repeat echocardiogram in part to reassess left ventricular systolic performance.  Will refer once again to the [facility] to get him reestablished at on track once again for TAVR.     4.  History of ventricular arrhythmias.  Patient previously had been noted to have evidence of NSVT though this has been quiescent. He has not had any documented recent recurrence.  As aforementioned, Aayush has declined prophylactic ICD placement (documented in myriad notes crafted by my colleague, Dr. Terrence Cordova).  Continue carvedilol as per problem #1.     5. Peripheral arterial disease.  Severe native peripheral arterial disease with right common femoral artery calcification and left common iliac lesion. Recommend continued medical management/risk factor  modification at this time.     6.  Dyslipidemia.   Lipid profile 21 March 2023 revealed LDL 34 mg/dL and HDL 42 mg/dL.  Continue high intensity statin therapy, atorvastatin 80 mg daily.      50 minutes spent reviewing prior records (including documentation, laboratory studies, cardiac testing/imaging), interview with patient along with physical exam, planning, and subsequent documentation/crafting of note).           History of Present Illness    Once again I would like to thank you again for asking me to participate in the care of your patient, Aayush García.  As you know, but to reiterate for my own records, Aayush García is a 67 year old male with history of cardiomyopathy (combined ischemic and non-ischemic).  Aayush also has a history of coronary artery disease with prior intervention.  In addition, he has a history of aortic stenosis.     Again, Aayush has known coronary artery disease.  Specifically, Aayush underwent angiography 18 March 2019 and found to have significant two-vessel disease involving proximal LAD and first obtuse marginal.  Patient had successful PCI with stent placement to proximal LAD (4.0 x 24 mm Synergy drug-eluting stent).  He also had significant disease involving the first obtuse marginal. Specifically, the OM1 stenosis demonstrated bridging collaterals and was felt to be a chronic total occlusion (). His Interventionalist, Dr. Timi Rodriguez, had recommended medical therapy of the OM1 lesion at that time.     Aayush underwent repeat angiography 29 July 2021 and he had successful PCI of the mid LAD with PTCA, coronary lithotripsy, rotational atherectomy followed by 3.5 x 32 mm Synergy drug-eluting stent with 0% residual stenosis.     On interview, Aayush denies any chest pain symptoms concerning for angina.  He does have shortness of breath with activity though in part related to his orthopedic issues.  Denies any shortness of breath at night to suggest PND/orthopnea.  No  palpitations or lightheadedness.    Further review of systems is otherwise negative/noncontributory (medical record and 13 point review of systems reviewed as well and pertinent positives noted).         Cardiac Diagnostics      Echocardiogram 1 July 2021:  Moderate depression left ventricular systolic performance with ejection fraction 35 to 40%.  Moderate global reduction in left ventricular systolic performance.  Mild increase in left ventricular wall thickness.  Severe aortic stenosis.  Mean gradient measured at 29 mmHg with peak velocity of 3.6 m/s. Calculated valve area 0.6. Cm . VRVTI = 0.2. VRvel = 0.2.  Mild aortic insufficiency.  Normal right ventricular size and systolic performance.  Moderate left atrial enlargement. Right atrium normal dimension.    Echocardiogram 3 December 2019:  Mild left ventricular enlargement with moderate depression left ventricular systolic performance.  Ejection fraction 30%.  The following segments are akinetic: basal anteroseptal, basal inferoseptal, mid anteroseptal and mid inferoseptal. The following segments are hypokinetic: basal anterior, basal inferior, basal inferolateral, basal anterolateral, mid anterior, mid inferior, mid inferolateral, mid anterolateral, apical anterior, apical septal, apical inferior, apical lateral and apex.  Severe aortic stenosis.  Aortic valve is severely calcified with reduced systolic excursion. Low flow, low gradient severe aortic stenosis is present with mild regurgitation (stroke-volume index 30.2 ml/m2) .   Mean gradient measured at 29 mmHg with peak velocity of 3.3 m/s.  Calculated valve area 0.7 cm .  VRVTI = 0.2. VRvel = 0.2.  Mild aortic insufficiency.  Normal right ventricular size and systolic performance.  Severe left atrial enlargement.  Moderate right atrial enlargement.  Mild aortic root enlargement.  Right ventricular systolic pressure relative to right atrial pressure is mildly increased.  Pulmonary artery pressure estimated  "at 35 to 40 mmHg plus right atrial pressure.    Coronary angiogram 29 July 2021:  Left main coronary artery: Left main not present due to separate ostia of the LAD and circumflex  Left anterior descending coronary artery: LAD has a patent proximal stent, with the mid LAD being heavily calcified with eccentric stenoses.  The distal and apical LAD have mild disease.  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  There is a 70 to 75% stenosis in the proximal portion of the vessel which goes on to supply several obtuse marginals to the lateral wall  Right coronary artery: RCA is a moderate sized, dominant vessel with mild diffuse disease.  Successful PCI of the mid LAD with PTCA, coronary lithotripsy, rotational atherectomy followed by 3.5 x 32 mm Synergy drug-eluting stent with 0% residual stenosis.    Coronary angiogram 18 March 2019:  Left anterior descending coronary artery: 95% proximal stenosis.  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  First obtuse marginal with 99% chronic total occlusion ().  There are bridging collaterals to the distal portion of the vessel.  Successful PCI with stent placement to proximal LAD (4.0 x 24 mm Synergy drug-eluting stent.  Recommendations  Medical management for OM1 chronic total occlusion.               Physical Examination       /57 (BP Location: Left arm, Patient Position: Sitting, Cuff Size: Adult Large)   Pulse 57   Resp 16   Ht 1.727 m (5' 8\")   Wt 89.8 kg (197 lb 14.4 oz)   SpO2 100%   BMI 30.09 kg/m          Wt Readings from Last 3 Encounters:   06/08/23 89.8 kg (197 lb 14.4 oz)   05/03/23 91.2 kg (201 lb 1.3 oz)   03/21/23 91.2 kg (201 lb 1.9 oz)       The patient is alert and oriented times three. Sclerae are anicteric. Mucosal membranes are moist. Jugular venous pressure is normal. No significant adenopathy/thyromegally appreciated. Lungs are " diminished bilaterally, but fairly clear.. On cardiovascular exam, the patient has a regular S1 and S2.  There is a 3-4/6 systolic murmur heard in a sash-like distribution which is late peaking and somewhat of a musical quality.  Carotid pulses are consistent with pulses parvus et tardus.  Abdomen is soft and non-tender. Extremities reveal no clubbing, cyanosis, or edema.            Family History/Social History/Risk Factors   Patient does not smoke.  Family history reviewed, and family history includes Alzheimer Disease (age of onset: 86.00) in his mother; Heart Disease (age of onset: 76.00) in his father; No Known Problems in his son and son.          Medical History  Surgical History Family History Social History   Past Medical History:   Diagnosis Date    Acute liver failure 8/5/2018    Acute on chronic systolic congestive heart failure (H) 1/14/2020    Acute renal failure, unspecified acute renal failure type (H) 08/05/2018    Acute respiratory failure with hypoxia (H) 12/16/2019    Acute respiratory failure with hypoxia (H) 08/05/2018    Alcoholic hepatitis with ascites 08/05/2018    Bacteremia due to Klebsiella pneumoniae     Benign essential hypertension 4/12/2018    Chronic liver disease     Closed fracture of multiple ribs of right side, initial encounter 11/23/2019    Coronary artery disease involving native coronary artery of native heart without angina pectoris 9/24/2019    Essential hypertension     Gastroesophageal reflux disease without esophagitis 10/31/2018    GI bleeding 10/27/2019    Heart failure with reduced ejection fraction (H) 4/4/2019    Hepatic encephalopathy (H) 08/05/2018    Macrocytic anemia     Non-ST elevation MI (NSTEMI) (H) 3/18/2019    Overview:  Added automatically from request for surgery [id number]    NSTEMI (non-ST elevated myocardial infarction) (H) 03/2019    s/p stent placement    Primary localized osteoarthrosis of left lower leg 4/9/2019    Stress-induced cardiomyopathy  2018     Past Surgical History:   Procedure Laterality Date    COLONOSCOPY N/A 3/20/2018    Procedure: COLONOSCOPY;  Surgeon: Adam Nicole III, MD;  Location: [place] [facility];  Service:     CV CORONARY ANGIOGRAM N/A 3/18/2019    Procedure: Coronary Angiogram;  Surgeon: Timi Rodriguez MD;  Location: [facility];  Service: Cardiology    CV CORONARY ANGIOGRAM N/A 2021    Procedure: Coronary Angiogram;  Surgeon: Brianda Avilez MD;  Location: [facility] CV    CV FRACTIONAL FLOW RATIO WIRE N/A 2021    Procedure: Fractional Flow Ratio Wire;  Surgeon: Brianda Avilez MD;  Location: [facility] LAB CV    CV LEFT HEART CATHETERIZATION WITHOUT LEFT VENTRICULOGRAM Left 3/18/2019    Procedure: Left Heart Catheterization Without Left Ventriculogram;  Surgeon: Timi Rodriguez MD;  Location: [facility] Lab;  Service: Cardiology    CV PCI N/A 2021    Procedure: Percutaneous Coronary Intervention;  Surgeon: Brianda Avilez MD;  Location: [facility] CV    CV PCI ATHERECTOMY ORBITAL N/A 2021    Procedure: Percutaneous Coronary Intervention Atherectomy Rotational;  Surgeon: Brianda Avilez MD;  Location: [facility] CV    IR CVC TUNNEL PLACEMENT > 5 YRS OF AGE  2018    IR TUNNELED CATHETER REMOVAL  2018    PICC  2018         PICC  3/18/2019         US THORACENTESIS  10/28/2019    [facility] CORONARY STENT PERCUT, INITIAL VESSEL  2019     Family History   Problem Relation Age of Onset    Heart Disease Father 76.00        type unknown to Aayush; his father  at home    Diabetes No family hx of     Coronary Artery Disease Early Onset No family hx of     Cancer No family hx of     Alzheimer Disease Mother 86.00        lives in long term care    No Known Problems Son     No Known Problems Son         Social History     Socioeconomic History    Marital status: Single     Spouse name: Not on file    Number of children: Not on file    Years of education:  Not on file    Highest education level: Not on file   Occupational History    Occupation: [employer]   Tobacco Use    Smoking status: Former     Packs/day: 1.00     Years: 40.00     Pack years: 40.00     Types: Cigarettes     Quit date: 2019     Years since quittin.1     Passive exposure: Never    Smokeless tobacco: Never    Tobacco comments:     3 Cig/Week   Vaping Use    Vaping status: Never Used   Substance and Sexual Activity    Alcohol use: Yes     Comment: Alcoholic Drinks/day: 350 ml of peppermint schnapps daily per [facility] h,  H&P per report of Karlie JJ to Dr. Valle    Drug use: No    Sexual activity: Yes     Partners: Female   Other Topics Concern    Parent/sibling w/ CABG, MI or angioplasty before 65F 55M? Not Asked   Social History Narrative    Works in [employer]. Lives with his Karlie JJon.     Social Determinants of Health     Financial Resource Strain: Not on file   Food Insecurity: Not on file   Transportation Needs: Not on file   Physical Activity: Not on file   Stress: Not on file   Social Connections: Not on file   Intimate Partner Violence: Not on file   Housing Stability: Not on file           Medications  Allergies   Current Outpatient Medications   Medication Sig Dispense Refill    acetaminophen (TYLENOL 8 HOUR ARTHRITIS PAIN) 650 MG CR tablet Take 2 tablets (1,300 mg) by mouth every 8 hours as needed for pain      aspirin (ASA) 81 MG EC tablet Take 1 tablet (81 mg) by mouth daily 90 tablet 3    atorvastatin (LIPITOR) 80 MG tablet Take 1 tablet (80 mg) by mouth daily 90 tablet 3    B Complex-C-Folic Acid (NEPHRO-FRANCISCO) 0.8 MG TABS Take 1 tablet by mouth daily      carvedilol (COREG) 25 MG tablet Take 1 tablet (25 mg) by mouth 2 times daily 180 tablet 3    Cholecalciferol (VITAMIN D) 50 MCG ( UT) CAPS Take 1 capsule by mouth daily      ferrous sulfate (FEROSUL) 325 (65 Fe) MG tablet Take 325 mg by mouth daily  0    FLUZONE HIGH-DOSE QUADRIVALENT 0.7 ML MARQUITA injection        folic acid (FOLVITE) 400 MCG tablet Take 1 tablet (400 mcg) by mouth daily      losartan (COZAAR) 25 MG tablet Take 1 tablet (25 mg) by mouth daily 30 tablet 3    magnesium oxide (MAG-OX) 400 MG tablet Take 1 tablet (400 mg) by mouth 2 times daily 60 tablet 11    Multiple Vitamins-Minerals (ONCOVITE) TABS Take 1 tablet by mouth daily      order for DME Equipment being ordered: Blood pressure instrument and cuff 1 each 0    pantoprazole (PROTONIX) 20 MG EC tablet Take 1 tablet (20 mg) by mouth daily 90 tablet 3    PFIZER COVID-19 VAC BIVALENT 30 MCG/0.3ML injection       potassium chloride ER (KLOR-CON M) 20 MEQ CR tablet Take 1 tablet (20 mEq) by mouth daily 30 tablet 11       Allergies   Allergen Reactions    No Known Allergies           Lab Results    Chemistry/lipid CBC Cardiac Enzymes/BNP/TSH/INR   Recent Labs   Lab Test 03/21/23  1431   CHOL 111   HDL 42   LDL 34   TRIG 175*     Recent Labs   Lab Test 03/21/23  1431 11/02/21  1404 02/12/18  1635   LDL 34 40 96     Recent Labs   Lab Test 05/03/23  1550   *   POTASSIUM 4.3   CHLORIDE 101   CO2 21*   GLC 94   BUN 18.8   CR 0.87   GFRESTIMATED >90   SOLO 9.1     Recent Labs   Lab Test 05/03/23  1550 03/21/23  1431 09/22/21  0727   CR 0.87 0.72 0.72     No results for input(s): A1C in the last 04493 hours.       Recent Labs   Lab Test 03/21/23  1431   WBC 8.2   HGB 13.1*   HCT 38.4*   MCV 98        Recent Labs   Lab Test 03/21/23  1431 09/22/21  0727 07/29/21  1036   HGB 13.1* 13.4 13.7    Recent Labs   Lab Test 12/16/19  1701 12/16/19  1042 12/16/19  0352   TROPONINI 0.02 0.02 <0.01     Recent Labs   Lab Test 12/16/19  0352 10/29/19  0440 03/17/19  0309   BNP 1,634* >5,000* 873*     Recent Labs   Lab Test 10/27/19  1648   TSH 1.54     Recent Labs   Lab Test 12/16/19  0352 10/28/19  0506 10/27/19  1648   INR 1.12* 1.36* 1.26*          Medications  Allergies   Current Outpatient Medications   Medication Sig Dispense Refill    acetaminophen (TYLENOL 8 HOUR  ARTHRITIS PAIN) 650 MG CR tablet Take 2 tablets (1,300 mg) by mouth every 8 hours as needed for pain      aspirin (ASA) 81 MG EC tablet Take 1 tablet (81 mg) by mouth daily 90 tablet 3    atorvastatin (LIPITOR) 80 MG tablet Take 1 tablet (80 mg) by mouth daily 90 tablet 3    B Complex-C-Folic Acid (NEPHRO-FRANCISCO) 0.8 MG TABS Take 1 tablet by mouth daily      carvedilol (COREG) 25 MG tablet Take 1 tablet (25 mg) by mouth 2 times daily 180 tablet 3    Cholecalciferol (VITAMIN D) 50 MCG (2000 UT) CAPS Take 1 capsule by mouth daily      ferrous sulfate (FEROSUL) 325 (65 Fe) MG tablet Take 325 mg by mouth daily  0    FLUZONE HIGH-DOSE QUADRIVALENT 0.7 ML MARQUITA injection       folic acid (FOLVITE) 400 MCG tablet Take 1 tablet (400 mcg) by mouth daily      losartan (COZAAR) 25 MG tablet Take 1 tablet (25 mg) by mouth daily 30 tablet 3    magnesium oxide (MAG-OX) 400 MG tablet Take 1 tablet (400 mg) by mouth 2 times daily 60 tablet 11    Multiple Vitamins-Minerals (ONCOVITE) TABS Take 1 tablet by mouth daily      order for DME Equipment being ordered: Blood pressure instrument and cuff 1 each 0    pantoprazole (PROTONIX) 20 MG EC tablet Take 1 tablet (20 mg) by mouth daily 90 tablet 3    PFIZER COVID-19 VAC BIVALENT 30 MCG/0.3ML injection       potassium chloride ER (KLOR-CON M) 20 MEQ CR tablet Take 1 tablet (20 mEq) by mouth daily 30 tablet 11      Allergies   Allergen Reactions    No Known Allergies           Lab Results   Lab Results   Component Value Date     05/03/2023     06/09/2020    CO2 21 05/03/2023    CO2 23 09/22/2021    CO2 26.0 01/14/2020    BUN 18.8 05/03/2023    BUN 10 09/22/2021    BUN 15 06/09/2020     Lab Results   Component Value Date    WBC 8.2 03/21/2023    HGB 13.1 03/21/2023    HGB 10.5 10/04/2018    HCT 38.4 03/21/2023    HCT 33.7 10/04/2018    MCV 98 03/21/2023    MCV 87.5 10/04/2018     03/21/2023     Lab Results   Component Value Date    CHOL 111 03/21/2023    CHOL 146 02/12/2018     TRIG 175 03/21/2023    HDL 42 03/21/2023    HDL 35 02/12/2018     Lab Results   Component Value Date    INR 1.12 12/16/2019    INR 1.1 06/01/2018     Lab Results   Component Value Date    BNP 1,634 12/16/2019     Lab Results   Component Value Date    CKMB 24 07/25/2018    TROPONINI 0.02 12/16/2019    TROPONINI 0.02 12/16/2019    TROPONINI <0.01 12/16/2019     Lab Results   Component Value Date    TSH 1.54 10/27/2019                    Thank you for allowing me to participate in the care of your patient.      Sincerely,     April Goldsmith MD     [facility]  cc:   Zachary Lewis MD  [address] [place] [address]

## 2023-06-08 NOTE — TELEPHONE ENCOUNTER
----- Message from Melissa Mcintosh sent at 6/8/2023 11:34 AM CDT -----  Regarding: referral from Dr Goldsmith today  Hey Ladies,    He also ordered a TTE, which I scheduled for 6/29/23 @ [facility], by the way.    Liliam

## 2023-06-08 NOTE — PROGRESS NOTES
[facility] HEART CARE   [address], [place] [address]   [email or web address]   OFFICE: [phone]          Thank you Dr. Lewis for asking the [facility] Heart Care team to participate in the care of your patient, Aayush García.     Impression and Plan     1.  Cardiomyopathy.  Aayush has known cardiomyopathy with moderate depression of left ventricular systolic performance with last echocardiogram 1 July 2021 revealing ejection fraction of 35-40% (see Cardiac Diagnostic section below).  Nature of cardiomyopathy felt combined ischemic as well as non-ischemic in part related to prior ethanol use.   Parenthetically, Aayush has declined prophylactic ICD placement (documented in myriad notes crafted by my colleague, Dr. Terrence Cordova).     This is stable.  Patient states his weights have been stable on his home scale.   He appears volume neutral on clinical exam.  He reports no shortness of breath at night to suggest PND/orthopnea.    Continue carvedilol 25 mg twice daily.    Continue losartan 25 mg daily.    Continued avoidance of ethanol (Aayush states that he has remained abstemious).     2.  Coronary artery disease.  Aayush has known coronary artery disease.  Specifically, Aayush underwent angiography 18 March 2019 and found to have significant two-vessel disease involving proximal LAD and first obtuse marginal.  Aayush had successful PCI with stent placement to proximal LAD (4.0 x 24 mm Synergy drug-eluting stent).  He also had significant disease involving the first obtuse marginal. Specifically, the OM1 stenosis demonstrated bridging collaterals and was felt to be a chronic total occlusion (). His Iinterventionalist, Dr. Timi Rodriguez, had recommended medical therapy of the OM1 lesion at that time.       Aayush underwent repeat angiography 29 July 2021 and he had successful PCI of the mid LAD with PTCA, coronary lithotripsy, rotational atherectomy followed by 3.5  x 32 mm Synergy drug-eluting stent with 0% residual stenosis.     Aayush denies any chest pain symptoms concerning for angina.     3.  Aortic stenosis.  This was felt severe on most recent echocardiogram 1 July 2021. The aortic valve was described as severely calcified with reduced systolic excursion and findings felt consistent with low flow, low gradient severe aortic stenosis (stroke-volume index 29.8 ml/m2) . Mean gradient measured at 29 mmHg with peak velocity of 3.6 m/s. Calculated valve area 0.6 cm . VRVTI = 0.2. VRvel = 0.2.     Clinical exam is commensurate with advanced aortic stenosis with later peaking systolic murmur and carotid pulses consistent with pulses parvus et tardus.       Aayush had been evaluated by Dr. Brianda Avilez and felt reasonable candidate for TAVR. In advance, however, Aayush required dental work.  He did undergo extraction and now uses dentures.    Since getting his dental work done, however, he had been somewhat lost to follow-up.  Plan:    Will obtain repeat echocardiogram in part to reassess left ventricular systolic performance.    Will refer once again to the [facility] to get him reestablished at on track once again for TAVR.     4.  History of ventricular arrhythmias.  Patient previously had been noted to have evidence of NSVT though this has been quiescent. He has not had any documented recent recurrence.  As aforementioned, Aayush has declined prophylactic ICD placement (documented in myriad notes crafted by my colleague, Dr. Terrence Cordova).    Continue carvedilol as per problem #1.     5. Peripheral arterial disease.  Severe native peripheral arterial disease with right common femoral artery calcification and left common iliac lesion. Recommend continued medical management/risk factor modification at this time.     6.  Dyslipidemia.   Lipid profile 21 March 2023 revealed LDL 34 mg/dL and HDL 42 mg/dL.    Continue high intensity statin therapy, atorvastatin 80 mg  daily.      50 minutes spent reviewing prior records (including documentation, laboratory studies, cardiac testing/imaging), interview with patient along with physical exam, planning, and subsequent documentation/crafting of note).           History of Present Illness    Once again I would like to thank you again for asking me to participate in the care of your patient, Aayush García.  As you know, but to reiterate for my own records, Aayush García is a 67 year old male with history of cardiomyopathy (combined ischemic and non-ischemic).  Aayush also has a history of coronary artery disease with prior intervention.  In addition, he has a history of aortic stenosis.     Again, Aayush has known coronary artery disease.  Specifically, Aayush underwent angiography 18 March 2019 and found to have significant two-vessel disease involving proximal LAD and first obtuse marginal.  Patient had successful PCI with stent placement to proximal LAD (4.0 x 24 mm Synergy drug-eluting stent).  He also had significant disease involving the first obtuse marginal. Specifically, the OM1 stenosis demonstrated bridging collaterals and was felt to be a chronic total occlusion (). His Interventionalist, Dr. Timi Rodriguez, had recommended medical therapy of the OM1 lesion at that time.     Aayush underwent repeat angiography 29 July 2021 and he had successful PCI of the mid LAD with PTCA, coronary lithotripsy, rotational atherectomy followed by 3.5 x 32 mm Synergy drug-eluting stent with 0% residual stenosis.     On interview, Aayush denies any chest pain symptoms concerning for angina.  He does have shortness of breath with activity though in part related to his orthopedic issues.  Denies any shortness of breath at night to suggest PND/orthopnea.  No palpitations or lightheadedness.    Further review of systems is otherwise negative/noncontributory (medical record and 13 point review of systems reviewed as well and pertinent positives  noted).         Cardiac Diagnostics      Echocardiogram 1 July 2021:  1. Moderate depression left ventricular systolic performance with ejection fraction 35 to 40%.  2. Moderate global reduction in left ventricular systolic performance.  3. Mild increase in left ventricular wall thickness.  4. Severe aortic stenosis.    Mean gradient measured at 29 mmHg with peak velocity of 3.6 m/s. Calculated valve area 0.6. Cm . VRVTI = 0.2. VRvel = 0.2.  5. Mild aortic insufficiency.  6. Normal right ventricular size and systolic performance.  7. Moderate left atrial enlargement. Right atrium normal dimension.    Echocardiogram 3 December 2019:  1. Mild left ventricular enlargement with moderate depression left ventricular systolic performance.  Ejection fraction 30%.  2. The following segments are akinetic: basal anteroseptal, basal inferoseptal, mid anteroseptal and mid inferoseptal. The following segments are hypokinetic: basal anterior, basal inferior, basal inferolateral, basal anterolateral, mid anterior, mid inferior, mid inferolateral, mid anterolateral, apical anterior, apical septal, apical inferior, apical lateral and apex.  3. Severe aortic stenosis.  4. Aortic valve is severely calcified with reduced systolic excursion. Low flow, low gradient severe aortic stenosis is present with mild regurgitation (stroke-volume index 30.2 ml/m2) .     Mean gradient measured at 29 mmHg with peak velocity of 3.3 m/s.  Calculated valve area 0.7 cm .  VRVTI = 0.2. VRvel = 0.2.  5. Mild aortic insufficiency.  6. Normal right ventricular size and systolic performance.  7. Severe left atrial enlargement.  Moderate right atrial enlargement.  8. Mild aortic root enlargement.  9. Right ventricular systolic pressure relative to right atrial pressure is mildly increased.  Pulmonary artery pressure estimated at 35 to 40 mmHg plus right atrial pressure.    Coronary angiogram 29 July 2021:  1. Left main coronary artery: Left main not present  "due to separate ostia of the LAD and circumflex  2. Left anterior descending coronary artery: LAD has a patent proximal stent, with the mid LAD being heavily calcified with eccentric stenoses.  The distal and apical LAD have mild disease.  3. 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  There is a 70 to 75% stenosis in the proximal portion of the vessel which goes on to supply several obtuse marginals to the lateral wall  4. Right coronary artery: RCA is a moderate sized, dominant vessel with mild diffuse disease.  5. Successful PCI of the mid LAD with PTCA, coronary lithotripsy, rotational atherectomy followed by 3.5 x 32 mm Synergy drug-eluting stent with 0% residual stenosis.    Coronary angiogram 18 March 2019:  1. Left anterior descending coronary artery: 95% proximal stenosis.  2. 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  First obtuse marginal with 99% chronic total occlusion ().  There are bridging collaterals to the distal portion of the vessel.  3. Successful PCI with stent placement to proximal LAD (4.0 x 24 mm Synergy drug-eluting stent.  4. Recommendations  5. Medical management for OM1 chronic total occlusion.               Physical Examination       /57 (BP Location: Left arm, Patient Position: Sitting, Cuff Size: Adult Large)   Pulse 57   Resp 16   Ht 1.727 m (5' 8\")   Wt 89.8 kg (197 lb 14.4 oz)   SpO2 100%   BMI 30.09 kg/m          Wt Readings from Last 3 Encounters:   06/08/23 89.8 kg (197 lb 14.4 oz)   05/03/23 91.2 kg (201 lb 1.3 oz)   03/21/23 91.2 kg (201 lb 1.9 oz)       The patient is alert and oriented times three. Sclerae are anicteric. Mucosal membranes are moist. Jugular venous pressure is normal. No significant adenopathy/thyromegally appreciated. Lungs are diminished bilaterally, but fairly clear.. On cardiovascular exam, the patient has a regular S1 and S2.  " There is a 3-4/6 systolic murmur heard in a sash-like distribution which is late peaking and somewhat of a musical quality.  Carotid pulses are consistent with pulses parvus et tardus.  Abdomen is soft and non-tender. Extremities reveal no clubbing, cyanosis, or edema.            Family History/Social History/Risk Factors   Patient does not smoke.  Family history reviewed, and family history includes Alzheimer Disease (age of onset: 86.00) in his mother; Heart Disease (age of onset: 76.00) in his father; No Known Problems in his son and son.          Medical History  Surgical History Family History Social History   Past Medical History:   Diagnosis Date     Acute liver failure 8/5/2018     Acute on chronic systolic congestive heart failure (H) 1/14/2020     Acute renal failure, unspecified acute renal failure type (H) 08/05/2018     Acute respiratory failure with hypoxia (H) 12/16/2019     Acute respiratory failure with hypoxia (H) 08/05/2018     Alcoholic hepatitis with ascites 08/05/2018     Bacteremia due to Klebsiella pneumoniae      Benign essential hypertension 4/12/2018     Chronic liver disease      Closed fracture of multiple ribs of right side, initial encounter 11/23/2019     Coronary artery disease involving native coronary artery of native heart without angina pectoris 9/24/2019     Essential hypertension      Gastroesophageal reflux disease without esophagitis 10/31/2018     GI bleeding 10/27/2019     Heart failure with reduced ejection fraction (H) 4/4/2019     Hepatic encephalopathy (H) 08/05/2018     Macrocytic anemia      Non-ST elevation MI (NSTEMI) (H) 3/18/2019    Overview:  Added automatically from request for surgery [id number]     NSTEMI (non-ST elevated myocardial infarction) (H) 03/2019    s/p stent placement     Primary localized osteoarthrosis of left lower leg 4/9/2019     Stress-induced cardiomyopathy 08/05/2018     Past Surgical History:   Procedure Laterality Date     COLONOSCOPY N/A  3/20/2018    Procedure: COLONOSCOPY;  Surgeon: Adam Nicole III, MD;  Location: [facility];  Service:      CV CORONARY ANGIOGRAM N/A 3/18/2019    Procedure: Coronary Angiogram;  Surgeon: Timi Rodriguez MD;  Location: [facility] Lab;  Service: Cardiology     CV CORONARY ANGIOGRAM N/A 2021    Procedure: Coronary Angiogram;  Surgeon: Brianda Avilez MD;  Location: [facility] LAB CV     CV FRACTIONAL FLOW RATIO WIRE N/A 2021    Procedure: Fractional Flow Ratio Wire;  Surgeon: Brianda Avilez MD;  Location: [facility] LAB CV     CV LEFT HEART CATHETERIZATION WITHOUT LEFT VENTRICULOGRAM Left 3/18/2019    Procedure: Left Heart Catheterization Without Left Ventriculogram;  Surgeon: Timi Rodriguez MD;  Location: [facility] Cath Lab;  Service: Cardiology     CV PCI N/A 2021    Procedure: Percutaneous Coronary Intervention;  Surgeon: Brianda Avilez MD;  Location: [facility] CV     CV PCI ATHERECTOMY ORBITAL N/A 2021    Procedure: Percutaneous Coronary Intervention Atherectomy Rotational;  Surgeon: Brianda Avilez MD;  Location: [facility] LAB CV     IR CVC TUNNEL PLACEMENT > 5 YRS OF AGE  2018     IR TUNNELED CATHETER REMOVAL  2018     PICC  2018          PICC  3/18/2019          US THORACENTESIS  10/28/2019     [facility] CORONARY STENT PERCUT, INITIAL VESSEL  2019     Family History   Problem Relation Age of Onset     Heart Disease Father 76.00        type unknown to Aayush; his father  at home     Diabetes No family hx of      Coronary Artery Disease Early Onset No family hx of      Cancer No family hx of      Alzheimer Disease Mother 86.00        lives in long term care     No Known Problems Son      No Known Problems Son         Social History     Socioeconomic History     Marital status: Single     Spouse name: Not on file     Number of children: Not on file     Years of education: Not on file     Highest education level: Not on file    Occupational History     Occupation: [employer]   Tobacco Use     Smoking status: Former     Packs/day: 1.00     Years: 40.00     Pack years: 40.00     Types: Cigarettes     Quit date: 2019     Years since quittin.1     Passive exposure: Never     Smokeless tobacco: Never     Tobacco comments:     3 Cig/Week   Vaping Use     Vaping status: Never Used   Substance and Sexual Activity     Alcohol use: Yes     Comment: Alcoholic Drinks/day: 350 ml of peppermint schnapps daily per Finesse h,  H&P per report of Karlie JJ to Dr. Valle     Drug use: No     Sexual activity: Yes     Partners: Female   Other Topics Concern     Parent/sibling w/ CABG, MI or angioplasty before 65F 55M? Not Asked   Social History Narrative    Works in [employer]. Lives with his Karlie JJon.     Social Determinants of Health     Financial Resource Strain: Not on file   Food Insecurity: Not on file   Transportation Needs: Not on file   Physical Activity: Not on file   Stress: Not on file   Social Connections: Not on file   Intimate Partner Violence: Not on file   Housing Stability: Not on file           Medications  Allergies   Current Outpatient Medications   Medication Sig Dispense Refill     acetaminophen (TYLENOL 8 HOUR ARTHRITIS PAIN) 650 MG CR tablet Take 2 tablets (1,300 mg) by mouth every 8 hours as needed for pain       aspirin (ASA) 81 MG EC tablet Take 1 tablet (81 mg) by mouth daily 90 tablet 3     atorvastatin (LIPITOR) 80 MG tablet Take 1 tablet (80 mg) by mouth daily 90 tablet 3     B Complex-C-Folic Acid (NEPHRO-FRANCISCO) 0.8 MG TABS Take 1 tablet by mouth daily       carvedilol (COREG) 25 MG tablet Take 1 tablet (25 mg) by mouth 2 times daily 180 tablet 3     Cholecalciferol (VITAMIN D) 50 MCG (2000 UT) CAPS Take 1 capsule by mouth daily       ferrous sulfate (FEROSUL) 325 (65 Fe) MG tablet Take 325 mg by mouth daily  0     FLUZONE HIGH-DOSE QUADRIVALENT 0.7 ML MARQUITA injection        folic acid (FOLVITE) 400 MCG tablet  Take 1 tablet (400 mcg) by mouth daily       losartan (COZAAR) 25 MG tablet Take 1 tablet (25 mg) by mouth daily 30 tablet 3     magnesium oxide (MAG-OX) 400 MG tablet Take 1 tablet (400 mg) by mouth 2 times daily 60 tablet 11     Multiple Vitamins-Minerals (ONCOVITE) TABS Take 1 tablet by mouth daily       order for DME Equipment being ordered: Blood pressure instrument and cuff 1 each 0     pantoprazole (PROTONIX) 20 MG EC tablet Take 1 tablet (20 mg) by mouth daily 90 tablet 3     PFIZER COVID-19 VAC BIVALENT 30 MCG/0.3ML injection        potassium chloride ER (KLOR-CON M) 20 MEQ CR tablet Take 1 tablet (20 mEq) by mouth daily 30 tablet 11       Allergies   Allergen Reactions     No Known Allergies           Lab Results    Chemistry/lipid CBC Cardiac Enzymes/BNP/TSH/INR   Recent Labs   Lab Test 03/21/23  1431   CHOL 111   HDL 42   LDL 34   TRIG 175*     Recent Labs   Lab Test 03/21/23  1431 11/02/21  1404 02/12/18  1635   LDL 34 40 96     Recent Labs   Lab Test 05/03/23  1550   *   POTASSIUM 4.3   CHLORIDE 101   CO2 21*   GLC 94   BUN 18.8   CR 0.87   GFRESTIMATED >90   SOLO 9.1     Recent Labs   Lab Test 05/03/23  1550 03/21/23  1431 09/22/21  0727   CR 0.87 0.72 0.72     No results for input(s): A1C in the last 79489 hours.       Recent Labs   Lab Test 03/21/23  1431   WBC 8.2   HGB 13.1*   HCT 38.4*   MCV 98        Recent Labs   Lab Test 03/21/23  1431 09/22/21  0727 07/29/21  1036   HGB 13.1* 13.4 13.7    Recent Labs   Lab Test 12/16/19  1701 12/16/19  1042 12/16/19  0352   TROPONINI 0.02 0.02 <0.01     Recent Labs   Lab Test 12/16/19  0352 10/29/19  0440 03/17/19  0309   BNP 1,634* >5,000* 873*     Recent Labs   Lab Test 10/27/19  1648   TSH 1.54     Recent Labs   Lab Test 12/16/19  0352 10/28/19  0506 10/27/19  1648   INR 1.12* 1.36* 1.26*          Medications  Allergies   Current Outpatient Medications   Medication Sig Dispense Refill     acetaminophen (TYLENOL 8 HOUR ARTHRITIS PAIN) 650 MG CR  tablet Take 2 tablets (1,300 mg) by mouth every 8 hours as needed for pain       aspirin (ASA) 81 MG EC tablet Take 1 tablet (81 mg) by mouth daily 90 tablet 3     atorvastatin (LIPITOR) 80 MG tablet Take 1 tablet (80 mg) by mouth daily 90 tablet 3     B Complex-C-Folic Acid (NEPHRO-FRANCISCO) 0.8 MG TABS Take 1 tablet by mouth daily       carvedilol (COREG) 25 MG tablet Take 1 tablet (25 mg) by mouth 2 times daily 180 tablet 3     Cholecalciferol (VITAMIN D) 50 MCG (2000 UT) CAPS Take 1 capsule by mouth daily       ferrous sulfate (FEROSUL) 325 (65 Fe) MG tablet Take 325 mg by mouth daily  0     FLUZONE HIGH-DOSE QUADRIVALENT 0.7 ML MARQUITA injection        folic acid (FOLVITE) 400 MCG tablet Take 1 tablet (400 mcg) by mouth daily       losartan (COZAAR) 25 MG tablet Take 1 tablet (25 mg) by mouth daily 30 tablet 3     magnesium oxide (MAG-OX) 400 MG tablet Take 1 tablet (400 mg) by mouth 2 times daily 60 tablet 11     Multiple Vitamins-Minerals (ONCOVITE) TABS Take 1 tablet by mouth daily       order for DME Equipment being ordered: Blood pressure instrument and cuff 1 each 0     pantoprazole (PROTONIX) 20 MG EC tablet Take 1 tablet (20 mg) by mouth daily 90 tablet 3     PFIZER COVID-19 VAC BIVALENT 30 MCG/0.3ML injection        potassium chloride ER (KLOR-CON M) 20 MEQ CR tablet Take 1 tablet (20 mEq) by mouth daily 30 tablet 11      Allergies   Allergen Reactions     No Known Allergies           Lab Results   Lab Results   Component Value Date     05/03/2023     06/09/2020    CO2 21 05/03/2023    CO2 23 09/22/2021    CO2 26.0 01/14/2020    BUN 18.8 05/03/2023    BUN 10 09/22/2021    BUN 15 06/09/2020     Lab Results   Component Value Date    WBC 8.2 03/21/2023    HGB 13.1 03/21/2023    HGB 10.5 10/04/2018    HCT 38.4 03/21/2023    HCT 33.7 10/04/2018    MCV 98 03/21/2023    MCV 87.5 10/04/2018     03/21/2023     Lab Results   Component Value Date    CHOL 111 03/21/2023    CHOL 146 02/12/2018    TRIG  175 03/21/2023    HDL 42 03/21/2023    HDL 35 02/12/2018     Lab Results   Component Value Date    INR 1.12 12/16/2019    INR 1.1 06/01/2018     Lab Results   Component Value Date    BNP 1,634 12/16/2019     Lab Results   Component Value Date    CKMB 24 07/25/2018    TROPONINI 0.02 12/16/2019    TROPONINI 0.02 12/16/2019    TROPONINI <0.01 12/16/2019     Lab Results   Component Value Date    TSH 1.54 10/27/2019

## 2023-06-22 ENCOUNTER — HOSPITAL ENCOUNTER (OUTPATIENT)
Dept: CT IMAGING | Facility: CLINIC | Age: 68
Discharge: HOME | End: 2023-06-22
Attending: INTERNAL MEDICINE
Payer: COMMERCIAL

## 2023-06-22 ENCOUNTER — ANCILLARY ORDERS (OUTPATIENT)
Dept: CARDIOLOGY | Facility: CLINIC | Age: 68
End: 2023-06-22

## 2023-06-22 ENCOUNTER — OFFICE VISIT (OUTPATIENT)
Dept: CARDIOLOGY | Facility: CLINIC | Age: 68
End: 2023-06-22
Payer: COMMERCIAL

## 2023-06-22 ENCOUNTER — APPOINTMENT (OUTPATIENT)
Dept: CARDIOLOGY | Facility: CLINIC | Age: 68
End: 2023-06-22
Payer: COMMERCIAL

## 2023-06-22 ENCOUNTER — HOSPITAL ENCOUNTER (OUTPATIENT)
Dept: CARDIOLOGY | Facility: CLINIC | Age: 68
Discharge: HOME | End: 2023-06-22
Attending: INTERNAL MEDICINE
Payer: COMMERCIAL

## 2023-06-22 ENCOUNTER — ALLIED HEALTH/NURSE VISIT (OUTPATIENT)
Dept: CARDIOLOGY | Facility: CLINIC | Age: 68
End: 2023-06-22
Payer: COMMERCIAL

## 2023-06-22 VITALS
BODY MASS INDEX: 29.86 KG/M2 | WEIGHT: 197 LBS | HEART RATE: 60 BPM | HEIGHT: 68 IN | DIASTOLIC BLOOD PRESSURE: 50 MMHG | SYSTOLIC BLOOD PRESSURE: 120 MMHG | RESPIRATION RATE: 16 BRPM

## 2023-06-22 DIAGNOSIS — I25.10 CORONARY ARTERY DISEASE INVOLVING NATIVE CORONARY ARTERY WITHOUT ANGINA PECTORIS, UNSPECIFIED WHETHER NATIVE OR TRANSPLANTED HEART: ICD-10-CM

## 2023-06-22 DIAGNOSIS — I35.0 AORTIC STENOSIS, MODERATE: Primary | ICD-10-CM

## 2023-06-22 DIAGNOSIS — I35.0 SEVERE AORTIC STENOSIS: ICD-10-CM

## 2023-06-22 DIAGNOSIS — I42.9 CARDIOMYOPATHY, UNSPECIFIED TYPE (H): ICD-10-CM

## 2023-06-22 DIAGNOSIS — I35.0 SEVERE AORTIC STENOSIS: Primary | ICD-10-CM

## 2023-06-22 DIAGNOSIS — I35.0 AORTIC STENOSIS, MODERATE: ICD-10-CM

## 2023-06-22 LAB
ATRIAL RATE - MUSE: 69 BPM
DIASTOLIC BLOOD PRESSURE - MUSE: NORMAL MMHG
INTERPRETATION ECG - MUSE: NORMAL
LVEF ECHO: NORMAL
P AXIS - MUSE: 11 DEGREES
PR INTERVAL - MUSE: 162 MS
QRS DURATION - MUSE: 110 MS
QT - MUSE: 456 MS
QTC - MUSE: 488 MS
R AXIS - MUSE: -49 DEGREES
SYSTOLIC BLOOD PRESSURE - MUSE: NORMAL MMHG
T AXIS - MUSE: 103 DEGREES
VENTRICULAR RATE- MUSE: 69 BPM

## 2023-06-22 PROCEDURE — 74174 CTA ABD&PLVS W/CONTRAST: CPT

## 2023-06-22 PROCEDURE — 74174 CTA ABD&PLVS W/CONTRAST: CPT | Mod: 26 | Performed by: INTERNAL MEDICINE

## 2023-06-22 PROCEDURE — 93000 ELECTROCARDIOGRAM COMPLETE: CPT | Performed by: INTERNAL MEDICINE

## 2023-06-22 PROCEDURE — 99214 OFFICE O/P EST MOD 30 MIN: CPT | Mod: 25 | Performed by: INTERNAL MEDICINE

## 2023-06-22 PROCEDURE — 93306 TTE W/DOPPLER COMPLETE: CPT | Mod: 26 | Performed by: INTERNAL MEDICINE

## 2023-06-22 PROCEDURE — 93306 TTE W/DOPPLER COMPLETE: CPT

## 2023-06-22 PROCEDURE — 250N000011 HC RX IP 250 OP 636: Mod: JZ | Performed by: INTERNAL MEDICINE

## 2023-06-22 PROCEDURE — 71275 CT ANGIOGRAPHY CHEST: CPT | Mod: 26 | Performed by: INTERNAL MEDICINE

## 2023-06-22 PROCEDURE — 99207 PR NO CHARGE LOS: CPT

## 2023-06-22 RX ORDER — IOPAMIDOL 755 MG/ML
100 INJECTION, SOLUTION INTRAVASCULAR ONCE
Status: COMPLETED | OUTPATIENT
Start: 2023-06-22 | End: 2023-06-22

## 2023-06-22 RX ADMIN — IOPAMIDOL 100 ML: 755 INJECTION, SOLUTION INTRAVENOUS at 12:33

## 2023-06-22 NOTE — LETTER
6/22/2023    Zachary Lewis MD  [address] [place] [address]    RE: Aayush García       Dear Colleague,     I had the pleasure of seeing Aayush García in the [facility].    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    # Aortic stenosis - likely paradoxical low flow-low gradient w/ severely restricted Ca2+ valve, P/M 44/32 but unfortunately no SVI or reliable DI/KALE from this study, technically challenging due to sinus arrhythmia. Valve does looks restricted and Ca2+, based on CT can't rule out functionally bicuspid w/ L-R fusion  - will repeat a limited echo AV evaluation but if confirmed to be severe, can proceed w/ remainder of TAVR w/up, which would involve CTS consult, angio +/- PCI, Dental eval; if aortic stenosis is still moderate, would go to Q6 mos TTE surveillance but be prepared to proceed sooner if symptoms appear  - CTA already done - L SCA access, annular area 658 - #29S3, mild ascending aortic aneurtys ~40-41mm SCHULZ 6 Cau 13       History of Present Illness/Subjective    HPI: Aayush García is a 67 year old male w/ aortic stenosis, CAD s/p PCI to pLAD w/ roto/LEYDI '19, then mLAD '21 w/ roto/shock/LEYDI, 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    I have met him in '19 when he required a high risk PCI in the setting of cardiogenic shock. He improved, weaned off pressors, and ended up w/ normalization of LVEF. Then at the time of TAVR w/up he  underwent another angio/complex PCI to mLAD by  in '21. He needed Dental work and it took a while to get that accomplished (currently has dentures), so he did not follow up until now.   He actually has been doing quite well, denies CP/HO/SOB/orthopnea/PND/edema/syncope/NV/D/F/C.   Does use a walker on account of a bad hip and L knee but works full time in [employer] sales, walks several throusand steps a day.    Lives w/ his wife, quit smoking long time ago, EtOH 6 mos ago but hasn't been drinking heavy since '21    Recent Echocardiogram Results:  The left ventricle is normal in size.  There is mild concentric left ventricular hypertrophy.  The visual ejection fraction is 55-60%.  Grade II or moderate diastolic dysfunction.  Normal left ventricular wall motion  Normal right ventricle size and systolic function.  The left atrium is moderately dilated.  Moderate aortic valve calcification is present.  Moderate valvular aortic stenosis.  The mean gradient of aortic valve is 32 mmHg.  There is mild (1+) aortic regurgitation.     No previous study for comparison.    Recent Coronary Angiogram Results '21:  66-year-old male with established coronary artery disease having had emergent PCI of his LAD in 2019 in the setting of cardiogenic shock.  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  More recently, he has noticed increasing fatigue and dyspnea on exertion, and was found to have severe low-flow low gradient aortic valve stenosis.  Due to his comorbidities including cirrhosis, he is being evaluated for transcatheter aortic valve replacement rather than surgery.     Presented today for coronary angiography prior to planned TAVR.     Coronary angiography showed:     Left main not present due to separate ostia of the LAD and circumflex  LAD has a patent proximal stent, with the mid LAD being heavily calcified with eccentric stenoses.  The distal  and apical LAD have mild disease.  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  There is a 70 to 75% stenosis in the proximal portion of the vessel which goes on to supply several obtuse marginals to the lateral wall  RCA is a moderate sized, dominant vessel with mild diffuse disease     Following careful review of the angiographic films, it was decided to evaluate the mid LAD further with fractional flow reserve, given the calcium burden and eccentricity, which prevented a more precise angiographic estimation of degree of stenosis.  FFR across the mid LAD using intravenous adenosine, confirmed the vessel to be obstructive, with an FFR of 0.75 at peak hyperemia, consistent with a 90% stenosis.  It was then decided to proceed with intervention on the mid LAD given the significant stenosis and the impact on any valve replacement procedures.     Successful PCI of the mid LAD with PTCA, coronary lithotripsy, rotational atherectomy followed by 3.5 x 32 mm Synergy drug-eluting stent  0% residual stenosis with KAREN-3 flow pre and post     (Details of PCI: Due to tortuosity in the mid LAD, it was decided to treat the vessel with coronary lithotripsy.  3 5 balloon was able to reach the first part of the mid LAD, but would not traverse into the second calcified lesion after the origin of a large diagonal, despite the use of guide extension catheters.  This lesion was predilated with a 2.5 balloon, despite which lithotripsy balloon catheter would not cross.  The mid LAD was therefore then treated with rotational atherectomy using 1.5 mm bur, following which the lesion became more compliant and was dilated with a 3.0 mm balloon.  The mid LAD was then stented with a 3.5 x 30 mm Synergy drug-eluting stent, the proximal portion of the stent overlapping the prior proximal stent.  Increased complexity of the case due to need for multiple wires, guide extension catheters, heavy calcification and tortuosity)    "    Physical Examination  Review of Systems   Vitals: /50 (BP Location: Right arm, Patient Position: Sitting, Cuff Size: Adult Large)   Pulse 60   Resp 16   Ht 1.727 m (5' 8\")   Wt 89.4 kg (197 lb)   BMI 29.95 kg/m    BMI= Body mass index is 29.95 kg/m .  Wt Readings from Last 3 Encounters:   06/22/23 89.4 kg (197 lb)   06/08/23 89.8 kg (197 lb 14.4 oz)   05/03/23 91.2 kg (201 lb 1.3 oz)       General Appearance:   no distress, normal body habitus   ENT/Mouth: membranes moist, no oral lesions or bleeding gums.      EYES:  no scleral icterus, normal conjunctivae   Neck: no carotid bruits or thyromegaly   Chest/Lungs:   lungs are clear to auscultation, no rales or wheezing, no sternal scar, equal chest wall expansion    Cardiovascular:   Regular. Normal first and second heart sounds with 2/6 systolic murmurs, no rubs, or gallops; the carotid, radial and posterior tibial pulses are intact, Jugular venous pressure 6, no edema bilaterally    Abdomen:  no organomegaly, masses, bruits, or tenderness; bowel sounds are present   Extremities: no cyanosis or clubbing   Skin: no xanthelasma, warm.    Neurologic: normal  bilateral, no tremors     Psychiatric: alert and oriented x3, calm        Please refer above for cardiac ROS details.        Medical History  Surgical History Family History Social History   Past Medical History:   Diagnosis Date    Acute liver failure 8/5/2018    Acute on chronic systolic congestive heart failure (H) 1/14/2020    Acute renal failure, unspecified acute renal failure type (H) 08/05/2018    Acute respiratory failure with hypoxia (H) 12/16/2019    Acute respiratory failure with hypoxia (H) 08/05/2018    Alcoholic hepatitis with ascites 08/05/2018    Bacteremia due to Klebsiella pneumoniae     Benign essential hypertension 4/12/2018    Chronic liver disease     Closed fracture of multiple ribs of right side, initial encounter 11/23/2019    Coronary artery disease involving native " coronary artery of native heart without angina pectoris 9/24/2019    Essential hypertension     Gastroesophageal reflux disease without esophagitis 10/31/2018    GI bleeding 10/27/2019    Heart failure with reduced ejection fraction (H) 4/4/2019    Hepatic encephalopathy (H) 08/05/2018    Macrocytic anemia     Non-ST elevation MI (NSTEMI) (H) 3/18/2019    Overview:  Added automatically from request for surgery [id number]    NSTEMI (non-ST elevated myocardial infarction) (H) 03/2019    s/p stent placement    Primary localized osteoarthrosis of left lower leg 4/9/2019    Stress-induced cardiomyopathy 08/05/2018     Past Surgical History:   Procedure Laterality Date    COLONOSCOPY N/A 3/20/2018    Procedure: COLONOSCOPY;  Surgeon: Adam Nicole III, MD;  Location: [facility] OR;  Service:     CV CORONARY ANGIOGRAM N/A 3/18/2019    Procedure: Coronary Angiogram;  Surgeon: Timi Rodriguez MD;  Location: [facility] Cath Lab;  Service: Cardiology    CV CORONARY ANGIOGRAM N/A 9/22/2021    Procedure: Coronary Angiogram;  Surgeon: Brianda Avilez MD;  Location: [facility] CATH LAB CV    CV FRACTIONAL FLOW RATIO WIRE N/A 9/22/2021    Procedure: Fractional Flow Ratio Wire;  Surgeon: Brianda Avilez MD;  Location: [facility] CATH LAB CV    CV LEFT HEART CATHETERIZATION WITHOUT LEFT VENTRICULOGRAM Left 3/18/2019    Procedure: Left Heart Catheterization Without Left Ventriculogram;  Surgeon: Timi Rodriguez MD;  Location: [facility] Cath Lab;  Service: Cardiology    CV PCI N/A 9/22/2021    Procedure: Percutaneous Coronary Intervention;  Surgeon: Brianda Avilez MD;  Location: [facility] CV    CV PCI ATHERECTOMY ORBITAL N/A 9/22/2021    Procedure: Percutaneous Coronary Intervention Atherectomy Rotational;  Surgeon: Brianda Avilez MD;  Location: [facility] CV    IR CVC TUNNEL PLACEMENT > 5 YRS OF AGE  8/1/2018    IR TUNNELED CATHETER REMOVAL  8/31/2018    PICC  7/24/2018         PICC  3/18/2019           THORACENTESIS  10/28/2019    [facility] CORONARY STENT PERCUT, INITIAL VESSEL  2019     Family History   Problem Relation Age of Onset    Heart Disease Father 76.00        type unknown to Aayush; his father  at home    Diabetes No family hx of     Coronary Artery Disease Early Onset No family hx of     Cancer No family hx of     Alzheimer Disease Mother 86.00        lives in long term care    No Known Problems Son     No Known Problems Son         Social History     Socioeconomic History    Marital status: Single     Spouse name: Not on file    Number of children: Not on file    Years of education: Not on file    Highest education level: Not on file   Occupational History    Occupation: [employer]   Tobacco Use    Smoking status: Former     Packs/day: 1.00     Years: 40.00     Pack years: 40.00     Types: Cigarettes     Quit date: 2019     Years since quittin.1     Passive exposure: Never    Smokeless tobacco: Never    Tobacco comments:     3 Cig/Week   Vaping Use    Vaping Use: Never used   Substance and Sexual Activity    Alcohol use: Yes     Comment: Alcoholic Drinks/day: 350 ml of peppermint schnapps daily per Finesse h,  H&P per report of Karlie JJ to Dr. Valle    Drug use: No    Sexual activity: Yes     Partners: Female   Other Topics Concern    Parent/sibling w/ CABG, MI or angioplasty before 65F 55M? Not Asked   Social History Narrative    Works in [employer]. Lives with his Karlie JJon.     Social Determinants of Health     Financial Resource Strain: Not on file   Food Insecurity: Not on file   Transportation Needs: Not on file   Physical Activity: Not on file   Stress: Not on file   Social Connections: Not on file   Intimate Partner Violence: Not on file   Housing Stability: Not on file           Medications  Allergies   Current Outpatient Medications   Medication Sig Dispense Refill    acetaminophen (TYLENOL 8 HOUR ARTHRITIS PAIN) 650 MG CR tablet Take 2 tablets (1,300 mg) by mouth every 8  hours as needed for pain      aspirin (ASA) 81 MG EC tablet Take 1 tablet (81 mg) by mouth daily 90 tablet 3    atorvastatin (LIPITOR) 80 MG tablet Take 1 tablet (80 mg) by mouth daily 90 tablet 3    B Complex-C-Folic Acid (NEPHRO-FRANCISCO) 0.8 MG TABS Take 1 tablet by mouth daily      carvedilol (COREG) 25 MG tablet Take 1 tablet (25 mg) by mouth 2 times daily 180 tablet 3    Cholecalciferol (VITAMIN D) 50 MCG (2000 UT) CAPS Take 1 capsule by mouth daily      ferrous sulfate (FEROSUL) 325 (65 Fe) MG tablet Take 325 mg by mouth daily  0    FLUZONE HIGH-DOSE QUADRIVALENT 0.7 ML MARQUITA injection       folic acid (FOLVITE) 400 MCG tablet Take 1 tablet (400 mcg) by mouth daily      losartan (COZAAR) 25 MG tablet Take 1 tablet (25 mg) by mouth daily 30 tablet 3    magnesium oxide (MAG-OX) 400 MG tablet Take 1 tablet (400 mg) by mouth 2 times daily 60 tablet 11    Multiple Vitamins-Minerals (ONCOVITE) TABS Take 1 tablet by mouth daily      order for DME Equipment being ordered: Blood pressure instrument and cuff 1 each 0    pantoprazole (PROTONIX) 20 MG EC tablet Take 1 tablet (20 mg) by mouth daily 90 tablet 3    PFIZER COVID-19 VAC BIVALENT 30 MCG/0.3ML injection       potassium chloride ER (KLOR-CON M) 20 MEQ CR tablet Take 1 tablet (20 mEq) by mouth daily 30 tablet 11       Allergies   Allergen Reactions    No Known Allergies           Lab Results    Chemistry/lipid CBC Cardiac Enzymes/BNP/TSH/INR   Recent Labs   Lab Test 03/21/23  1431   CHOL 111   HDL 42   LDL 34   TRIG 175*     Recent Labs   Lab Test 03/21/23  1431 11/02/21  1404 02/12/18  1635   LDL 34 40 96     Recent Labs   Lab Test 05/03/23  1550   *   POTASSIUM 4.3   CHLORIDE 101   CO2 21*   GLC 94   BUN 18.8   CR 0.87   GFRESTIMATED >90   SOLO 9.1     Recent Labs   Lab Test 05/03/23  1550 03/21/23  1431 09/22/21  0727   CR 0.87 0.72 0.72     No results for input(s): A1C in the last 32224 hours.       Recent Labs   Lab Test 03/21/23  1431   WBC 8.2   HGB 13.1*    HCT 38.4*   MCV 98        Recent Labs   Lab Test 03/21/23  1431 09/22/21  0727 07/29/21  1036   HGB 13.1* 13.4 13.7    Recent Labs   Lab Test 12/16/19  1701 12/16/19  1042 12/16/19  0352   TROPONINI 0.02 0.02 <0.01     Recent Labs   Lab Test 12/16/19  0352 10/29/19  0440 03/17/19  0309   BNP 1,634* >5,000* 873*     Recent Labs   Lab Test 10/27/19  1648   TSH 1.54     Recent Labs   Lab Test 12/16/19  0352 10/28/19  0506 10/27/19  1648   INR 1.12* 1.36* 1.26*        Timi Rodriguez MD        Thank you for allowing me to participate in the care of your patient.      Sincerely,     Timi Rodriguez MD     [facility]  cc:   No referring provider defined for this encounter.

## 2023-06-22 NOTE — H&P (VIEW-ONLY)
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    # Aortic stenosis - likely paradoxical low flow-low gradient w/ severely restricted Ca2+ valve, P/M 44/32 but unfortunately no SVI or reliable DI/KALE from this study, technically challenging due to sinus arrhythmia. Valve does looks restricted and Ca2+, based on CT can't rule out functionally bicuspid w/ L-R fusion  - will repeat a limited echo AV evaluation but if confirmed to be severe, can proceed w/ remainder of TAVR w/up, which would involve CTS consult, angio +/- PCI, Dental eval; if aortic stenosis is still moderate, would go to Q6 mos TTE surveillance but be prepared to proceed sooner if symptoms appear  - CTA already done - L SCA access, annular area 658 - #29S3, mild ascending aortic aneurtys ~40-41mm SCHULZ 6 Cau 13       History of Present Illness/Subjective    HPI: Aayush García is a 67 year old male w/ aortic stenosis, CAD s/p PCI to pLAD w/ roto/LEYDI '19, then mLAD '21 w/ roto/shock/LEYDI, 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    I have met him in '19 when he required a high risk PCI in the setting of cardiogenic shock. He improved, weaned off pressors, and ended up w/ normalization of LVEF. Then at the time of TAVR w/up he underwent another angio/complex PCI to mLAD by  in '21. He needed Dental work and it took a while to get that accomplished (currently has dentures), so he did not follow up until now.   He actually  has been doing quite well, denies CP/HO/SOB/orthopnea/PND/edema/syncope/NV/D/F/C.   Does use a walker on account of a bad hip and L knee but works full time in [employer], walks several throusand steps a day.    Lives w/ his wife, quit smoking long time ago, EtOH 6 mos ago but hasn't been drinking heavy since '21    Recent Echocardiogram Results:  The left ventricle is normal in size.  There is mild concentric left ventricular hypertrophy.  The visual ejection fraction is 55-60%.  Grade II or moderate diastolic dysfunction.  Normal left ventricular wall motion  Normal right ventricle size and systolic function.  The left atrium is moderately dilated.  Moderate aortic valve calcification is present.  Moderate valvular aortic stenosis.  The mean gradient of aortic valve is 32 mmHg.  There is mild (1+) aortic regurgitation.     No previous study for comparison.    Recent Coronary Angiogram Results '21:  66-year-old male with established coronary artery disease having had emergent PCI of his LAD in 2019 in the setting of cardiogenic shock.  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  More recently, he has noticed increasing fatigue and dyspnea on exertion, and was found to have severe low-flow low gradient aortic valve stenosis.  Due to his comorbidities including cirrhosis, he is being evaluated for transcatheter aortic valve replacement rather than surgery.     Presented today for coronary angiography prior to planned TAVR.     Coronary angiography showed:     Left main not present due to separate ostia of the LAD and circumflex  LAD has a patent proximal stent, with the mid LAD being heavily calcified with eccentric stenoses.  The distal and apical LAD have mild disease.  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  There is a 70 to 75% stenosis in the proximal portion of the vessel which goes on to supply several obtuse  "marginals to the lateral wall  RCA is a moderate sized, dominant vessel with mild diffuse disease     Following careful review of the angiographic films, it was decided to evaluate the mid LAD further with fractional flow reserve, given the calcium burden and eccentricity, which prevented a more precise angiographic estimation of degree of stenosis.  FFR across the mid LAD using intravenous adenosine, confirmed the vessel to be obstructive, with an FFR of 0.75 at peak hyperemia, consistent with a 90% stenosis.  It was then decided to proceed with intervention on the mid LAD given the significant stenosis and the impact on any valve replacement procedures.     Successful PCI of the mid LAD with PTCA, coronary lithotripsy, rotational atherectomy followed by 3.5 x 32 mm Synergy drug-eluting stent  0% residual stenosis with KAREN-3 flow pre and post     (Details of PCI: Due to tortuosity in the mid LAD, it was decided to treat the vessel with coronary lithotripsy.  3 5 balloon was able to reach the first part of the mid LAD, but would not traverse into the second calcified lesion after the origin of a large diagonal, despite the use of guide extension catheters.  This lesion was predilated with a 2.5 balloon, despite which lithotripsy balloon catheter would not cross.  The mid LAD was therefore then treated with rotational atherectomy using 1.5 mm bur, following which the lesion became more compliant and was dilated with a 3.0 mm balloon.  The mid LAD was then stented with a 3.5 x 30 mm Synergy drug-eluting stent, the proximal portion of the stent overlapping the prior proximal stent.  Increased complexity of the case due to need for multiple wires, guide extension catheters, heavy calcification and tortuosity)       Physical Examination  Review of Systems   Vitals: /50 (BP Location: Right arm, Patient Position: Sitting, Cuff Size: Adult Large)   Pulse 60   Resp 16   Ht 1.727 m (5' 8\")   Wt 89.4 kg (197 lb)   " BMI 29.95 kg/m    BMI= Body mass index is 29.95 kg/m .  Wt Readings from Last 3 Encounters:   06/22/23 89.4 kg (197 lb)   06/08/23 89.8 kg (197 lb 14.4 oz)   05/03/23 91.2 kg (201 lb 1.3 oz)       General Appearance:   no distress, normal body habitus   ENT/Mouth: membranes moist, no oral lesions or bleeding gums.      EYES:  no scleral icterus, normal conjunctivae   Neck: no carotid bruits or thyromegaly   Chest/Lungs:   lungs are clear to auscultation, no rales or wheezing, no sternal scar, equal chest wall expansion    Cardiovascular:   Regular. Normal first and second heart sounds with 2/6 systolic murmurs, no rubs, or gallops; the carotid, radial and posterior tibial pulses are intact, Jugular venous pressure 6, no edema bilaterally    Abdomen:  no organomegaly, masses, bruits, or tenderness; bowel sounds are present   Extremities: no cyanosis or clubbing   Skin: no xanthelasma, warm.    Neurologic: normal  bilateral, no tremors     Psychiatric: alert and oriented x3, calm        Please refer above for cardiac ROS details.        Medical History  Surgical History Family History Social History   Past Medical History:   Diagnosis Date     Acute liver failure 8/5/2018     Acute on chronic systolic congestive heart failure (H) 1/14/2020     Acute renal failure, unspecified acute renal failure type (H) 08/05/2018     Acute respiratory failure with hypoxia (H) 12/16/2019     Acute respiratory failure with hypoxia (H) 08/05/2018     Alcoholic hepatitis with ascites 08/05/2018     Bacteremia due to Klebsiella pneumoniae      Benign essential hypertension 4/12/2018     Chronic liver disease      Closed fracture of multiple ribs of right side, initial encounter 11/23/2019     Coronary artery disease involving native coronary artery of native heart without angina pectoris 9/24/2019     Essential hypertension      Gastroesophageal reflux disease without esophagitis 10/31/2018     GI bleeding 10/27/2019     Heart  failure with reduced ejection fraction (H) 4/4/2019     Hepatic encephalopathy (H) 08/05/2018     Macrocytic anemia      Non-ST elevation MI (NSTEMI) (H) 3/18/2019    Overview:  Added automatically from request for surgery [id number]     NSTEMI (non-ST elevated myocardial infarction) (H) 03/2019    s/p stent placement     Primary localized osteoarthrosis of left lower leg 4/9/2019     Stress-induced cardiomyopathy 08/05/2018     Past Surgical History:   Procedure Laterality Date     COLONOSCOPY N/A 3/20/2018    Procedure: COLONOSCOPY;  Surgeon: Adam Nicole III, MD;  Location: [place] [facility];  Service:      CV CORONARY ANGIOGRAM N/A 3/18/2019    Procedure: Coronary Angiogram;  Surgeon: Timi Rodriguez MD;  Location: [facility];  Service: Cardiology     CV CORONARY ANGIOGRAM N/A 9/22/2021    Procedure: Coronary Angiogram;  Surgeon: Brianda Avilez MD;  Location: [facility] CATH LAB CV     CV FRACTIONAL FLOW RATIO WIRE N/A 9/22/2021    Procedure: Fractional Flow Ratio Wire;  Surgeon: Brianda Avilez MD;  Location: [facility] CATH LAB CV     CV LEFT HEART CATHETERIZATION WITHOUT LEFT VENTRICULOGRAM Left 3/18/2019    Procedure: Left Heart Catheterization Without Left Ventriculogram;  Surgeon: Timi Rodriguez MD;  Location: [facility];  Service: Cardiology     CV PCI N/A 9/22/2021    Procedure: Percutaneous Coronary Intervention;  Surgeon: Brianda Avilez MD;  Location: [facility] CATH [facility] CV     CV PCI ATHERECTOMY ORBITAL N/A 9/22/2021    Procedure: Percutaneous Coronary Intervention Atherectomy Rotational;  Surgeon: Brianda Avilez MD;  Location: [facility] CATH LAB CV     IR CVC TUNNEL PLACEMENT > 5 YRS OF AGE  8/1/2018     IR TUNNELED CATHETER REMOVAL  8/31/2018     PICC  7/24/2018          PICC  3/18/2019          US THORACENTESIS  10/28/2019     [facility] CORONARY STENT PERCUT, INITIAL VESSEL  03/2019     Family History   Problem Relation Age of Onset     Heart Disease Father 76.00        type  unknown to Aayush; his father  at home     Diabetes No family hx of      Coronary Artery Disease Early Onset No family hx of      Cancer No family hx of      Alzheimer Disease Mother 86.00        lives in long term care     No Known Problems Son      No Known Problems Son         Social History     Socioeconomic History     Marital status: Single     Spouse name: Not on file     Number of children: Not on file     Years of education: Not on file     Highest education level: Not on file   Occupational History     Occupation: [employer]   Tobacco Use     Smoking status: Former     Packs/day: 1.00     Years: 40.00     Pack years: 40.00     Types: Cigarettes     Quit date: 2019     Years since quittin.1     Passive exposure: Never     Smokeless tobacco: Never     Tobacco comments:     3 Cig/Week   Vaping Use     Vaping Use: Never used   Substance and Sexual Activity     Alcohol use: Yes     Comment: Alcoholic Drinks/day: 350 ml of peppermint schnapps daily per Finesse h,  H&P per report of Karlie JJ to Dr. Valle     Drug use: No     Sexual activity: Yes     Partners: Female   Other Topics Concern     Parent/sibling w/ CABG, MI or angioplasty before 65F 55M? Not Asked   Social History Narrative    Works in [employer] Lives with his Devon JJmikaela Chisholm.     Social Determinants of Health     Financial Resource Strain: Not on file   Food Insecurity: Not on file   Transportation Needs: Not on file   Physical Activity: Not on file   Stress: Not on file   Social Connections: Not on file   Intimate Partner Violence: Not on file   Housing Stability: Not on file           Medications  Allergies   Current Outpatient Medications   Medication Sig Dispense Refill     acetaminophen (TYLENOL 8 HOUR ARTHRITIS PAIN) 650 MG CR tablet Take 2 tablets (1,300 mg) by mouth every 8 hours as needed for pain       aspirin (ASA) 81 MG EC tablet Take 1 tablet (81 mg) by mouth daily 90 tablet 3     atorvastatin (LIPITOR) 80 MG tablet  Take 1 tablet (80 mg) by mouth daily 90 tablet 3     B Complex-C-Folic Acid (NEPHRO-FRANCISCO) 0.8 MG TABS Take 1 tablet by mouth daily       carvedilol (COREG) 25 MG tablet Take 1 tablet (25 mg) by mouth 2 times daily 180 tablet 3     Cholecalciferol (VITAMIN D) 50 MCG (2000 UT) CAPS Take 1 capsule by mouth daily       ferrous sulfate (FEROSUL) 325 (65 Fe) MG tablet Take 325 mg by mouth daily  0     FLUZONE HIGH-DOSE QUADRIVALENT 0.7 ML MARQUITA injection        folic acid (FOLVITE) 400 MCG tablet Take 1 tablet (400 mcg) by mouth daily       losartan (COZAAR) 25 MG tablet Take 1 tablet (25 mg) by mouth daily 30 tablet 3     magnesium oxide (MAG-OX) 400 MG tablet Take 1 tablet (400 mg) by mouth 2 times daily 60 tablet 11     Multiple Vitamins-Minerals (ONCOVITE) TABS Take 1 tablet by mouth daily       order for DME Equipment being ordered: Blood pressure instrument and cuff 1 each 0     pantoprazole (PROTONIX) 20 MG EC tablet Take 1 tablet (20 mg) by mouth daily 90 tablet 3     PFIZER COVID-19 VAC BIVALENT 30 MCG/0.3ML injection        potassium chloride ER (KLOR-CON M) 20 MEQ CR tablet Take 1 tablet (20 mEq) by mouth daily 30 tablet 11       Allergies   Allergen Reactions     No Known Allergies           Lab Results    Chemistry/lipid CBC Cardiac Enzymes/BNP/TSH/INR   Recent Labs   Lab Test 03/21/23  1431   CHOL 111   HDL 42   LDL 34   TRIG 175*     Recent Labs   Lab Test 03/21/23  1431 11/02/21  1404 02/12/18  1635   LDL 34 40 96     Recent Labs   Lab Test 05/03/23  1550   *   POTASSIUM 4.3   CHLORIDE 101   CO2 21*   GLC 94   BUN 18.8   CR 0.87   GFRESTIMATED >90   SOLO 9.1     Recent Labs   Lab Test 05/03/23  1550 03/21/23  1431 09/22/21  0727   CR 0.87 0.72 0.72     No results for input(s): A1C in the last 96954 hours.       Recent Labs   Lab Test 03/21/23  1431   WBC 8.2   HGB 13.1*   HCT 38.4*   MCV 98        Recent Labs   Lab Test 03/21/23  1431 09/22/21  0727 07/29/21  1036   HGB 13.1* 13.4 13.7    Recent  Labs   Lab Test 12/16/19  1701 12/16/19  1042 12/16/19  0352   TROPONINI 0.02 0.02 <0.01     Recent Labs   Lab Test 12/16/19  0352 10/29/19  0440 03/17/19  0309   BNP 1,634* >5,000* 873*     Recent Labs   Lab Test 10/27/19  1648   TSH 1.54     Recent Labs   Lab Test 12/16/19  0352 10/28/19  0506 10/27/19  1648   INR 1.12* 1.36* 1.26*        Timi Rodriguez MD

## 2023-06-22 NOTE — PROGRESS NOTES
Valve Clinic TAVR - 6/22/23  (See consult note from Dr. Rodriguez)    Referring provider: Dr. Goldsmith     Labs completed at visit today:     Labs reviewed prior to clinic:    Latest Reference Range & Units 05/03/23 15:50   Sodium 136 - 145 mmol/L 134 (L)   Potassium 3.4 - 5.3 mmol/L 4.3   Chloride 98 - 107 mmol/L 101   Carbon Dioxide (CO2) 22 - 29 mmol/L 21 (L)   Urea Nitrogen 8.0 - 23.0 mg/dL 18.8   Creatinine 0.67 - 1.17 mg/dL 0.87   GFR Estimate >60 mL/min/1.73m2 >90   Calcium 8.8 - 10.2 mg/dL 9.1   Anion Gap 7 - 15 mmol/L 12   Glucose 70 - 99 mg/dL 94   (L): Data is abnormally low      Preliminary STS Score: 1.3%      [facility] into [facility]      PMH: GERD, aortic stenosis, ETOH abuse, iron deficiency anemia, osteoarthritis, CHF, CAD, white coat syndrome, ischemic cardiomyopathy, severe PAD.     NSTEMI in March 2019- complex PCI to Lcx with IABP insertion.     Patient was seen in valve clinic 7/29/21 and underwent TAVR work up. September 2021- stent to mid LAD. Patient had extensive dental work that took quite some time to complete. In January 2022 patient decided he wanted to wait to move forward with TAVR procedure. Patient saw Dr. Goldsmith on 6/8/23 and noted he would re-establish care with valve clinic now that his dental work is complete and patient uses dentures.     Symptoms: Patient overall is feeling quite well. Denies increase in SOB/HO or chest pain.     Social: Presents to clinic with his spouse today.       TTE date 6/22/23  EF 55-60 %  AV mean gradient: 32 mmHg  AV Peak Agusto: 331 cm/sec    Comment on valves: mitral valve has mild regurgitation, no stenosis. Tricuspid valve normal. Aortic valve has moderate calcification and regurgitation with moderate stenosis.     Additional measurements not recorded. In 7/2021 echo report KALE was 0.6 cm2, LV SVi 38.6, DI 0.2       Plan: Patient had an echocardiogram done today, per provider it is very difficult to assess aortic  valve. Patient needs to have a no-cost repeat imaging study to confirm if he is moderate or severe aortic stenosis. If moderate we will plan to monitor patient in 6 months with another echo. If severe we will move forward with repeating patients CT surgery visit and coronary angiogram. Per provider no need to repeat CTA TAVR.     Msg sent to  echo team to help arrange this appt for patient.       French Gracia RN BSN- Valve Clinic Coordinator   [facility]  [facility]  Phone: [phone]  Fax: [phone]

## 2023-06-22 NOTE — PROGRESS NOTES
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    # Aortic stenosis - likely paradoxical low flow-low gradient w/ severely restricted Ca2+ valve, P/M 44/32 but unfortunately no SVI or reliable DI/KALE from this study, technically challenging due to sinus arrhythmia. Valve does looks restricted and Ca2+, based on CT can't rule out functionally bicuspid w/ L-R fusion  - will repeat a limited echo AV evaluation but if confirmed to be severe, can proceed w/ remainder of TAVR w/up, which would involve CTS consult, angio +/- PCI, Dental eval; if aortic stenosis is still moderate, would go to Q6 mos TTE surveillance but be prepared to proceed sooner if symptoms appear  - CTA already done - L SCA access, annular area 658 - #29S3, mild ascending aortic aneurtys ~40-41mm SCHULZ 6 Cau 13       History of Present Illness/Subjective    HPI: Aayush García is a 67 year old male w/ aortic stenosis, CAD s/p PCI to pLAD w/ roto/LEYDI '19, then mLAD '21 w/ roto/shock/LEYDI, 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    I have met him in '19 when he required a high risk PCI in the setting of cardiogenic shock. He improved, weaned off pressors, and ended up w/ normalization of LVEF. Then at the time of TAVR w/up he underwent another angio/complex PCI to mLAD by  in '21. He needed Dental work and it took a while to get that accomplished (currently has dentures), so he did not follow up until now.   He actually  has been doing quite well, denies CP/HO/SOB/orthopnea/PND/edema/syncope/NV/D/F/C.   Does use a walker on account of a bad hip and L knee but works full time in [employer] sales, walks several throusand steps a day.    Lives w/ his wife, quit smoking long time ago, EtOH 6 mos ago but hasn't been drinking heavy since '21    Recent Echocardiogram Results:  The left ventricle is normal in size.  There is mild concentric left ventricular hypertrophy.  The visual ejection fraction is 55-60%.  Grade II or moderate diastolic dysfunction.  Normal left ventricular wall motion  Normal right ventricle size and systolic function.  The left atrium is moderately dilated.  Moderate aortic valve calcification is present.  Moderate valvular aortic stenosis.  The mean gradient of aortic valve is 32 mmHg.  There is mild (1+) aortic regurgitation.     No previous study for comparison.    Recent Coronary Angiogram Results '21:  66-year-old male with established coronary artery disease having had emergent PCI of his LAD in 2019 in the setting of cardiogenic shock.  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  More recently, he has noticed increasing fatigue and dyspnea on exertion, and was found to have severe low-flow low gradient aortic valve stenosis.  Due to his comorbidities including cirrhosis, he is being evaluated for transcatheter aortic valve replacement rather than surgery.     Presented today for coronary angiography prior to planned TAVR.     Coronary angiography showed:     Left main not present due to separate ostia of the LAD and circumflex  LAD has a patent proximal stent, with the mid LAD being heavily calcified with eccentric stenoses.  The distal and apical LAD have mild disease.  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  There is a 70 to 75% stenosis in the proximal portion of the vessel which goes on to supply several obtuse  "marginals to the lateral wall  RCA is a moderate sized, dominant vessel with mild diffuse disease     Following careful review of the angiographic films, it was decided to evaluate the mid LAD further with fractional flow reserve, given the calcium burden and eccentricity, which prevented a more precise angiographic estimation of degree of stenosis.  FFR across the mid LAD using intravenous adenosine, confirmed the vessel to be obstructive, with an FFR of 0.75 at peak hyperemia, consistent with a 90% stenosis.  It was then decided to proceed with intervention on the mid LAD given the significant stenosis and the impact on any valve replacement procedures.     Successful PCI of the mid LAD with PTCA, coronary lithotripsy, rotational atherectomy followed by 3.5 x 32 mm Synergy drug-eluting stent  0% residual stenosis with KAREN-3 flow pre and post     (Details of PCI: Due to tortuosity in the mid LAD, it was decided to treat the vessel with coronary lithotripsy.  3 5 balloon was able to reach the first part of the mid LAD, but would not traverse into the second calcified lesion after the origin of a large diagonal, despite the use of guide extension catheters.  This lesion was predilated with a 2.5 balloon, despite which lithotripsy balloon catheter would not cross.  The mid LAD was therefore then treated with rotational atherectomy using 1.5 mm bur, following which the lesion became more compliant and was dilated with a 3.0 mm balloon.  The mid LAD was then stented with a 3.5 x 30 mm Synergy drug-eluting stent, the proximal portion of the stent overlapping the prior proximal stent.  Increased complexity of the case due to need for multiple wires, guide extension catheters, heavy calcification and tortuosity)       Physical Examination  Review of Systems   Vitals: /50 (BP Location: Right arm, Patient Position: Sitting, Cuff Size: Adult Large)   Pulse 60   Resp 16   Ht 1.727 m (5' 8\")   Wt 89.4 kg (197 lb)   " BMI 29.95 kg/m    BMI= Body mass index is 29.95 kg/m .  Wt Readings from Last 3 Encounters:   06/22/23 89.4 kg (197 lb)   06/08/23 89.8 kg (197 lb 14.4 oz)   05/03/23 91.2 kg (201 lb 1.3 oz)       General Appearance:   no distress, normal body habitus   ENT/Mouth: membranes moist, no oral lesions or bleeding gums.      EYES:  no scleral icterus, normal conjunctivae   Neck: no carotid bruits or thyromegaly   Chest/Lungs:   lungs are clear to auscultation, no rales or wheezing, no sternal scar, equal chest wall expansion    Cardiovascular:   Regular. Normal first and second heart sounds with 2/6 systolic murmurs, no rubs, or gallops; the carotid, radial and posterior tibial pulses are intact, Jugular venous pressure 6, no edema bilaterally    Abdomen:  no organomegaly, masses, bruits, or tenderness; bowel sounds are present   Extremities: no cyanosis or clubbing   Skin: no xanthelasma, warm.    Neurologic: normal  bilateral, no tremors     Psychiatric: alert and oriented x3, calm        Please refer above for cardiac ROS details.        Medical History  Surgical History Family History Social History   Past Medical History:   Diagnosis Date     Acute liver failure 8/5/2018     Acute on chronic systolic congestive heart failure (H) 1/14/2020     Acute renal failure, unspecified acute renal failure type (H) 08/05/2018     Acute respiratory failure with hypoxia (H) 12/16/2019     Acute respiratory failure with hypoxia (H) 08/05/2018     Alcoholic hepatitis with ascites 08/05/2018     Bacteremia due to Klebsiella pneumoniae      Benign essential hypertension 4/12/2018     Chronic liver disease      Closed fracture of multiple ribs of right side, initial encounter 11/23/2019     Coronary artery disease involving native coronary artery of native heart without angina pectoris 9/24/2019     Essential hypertension      Gastroesophageal reflux disease without esophagitis 10/31/2018     GI bleeding 10/27/2019     Heart  failure with reduced ejection fraction (H) 4/4/2019     Hepatic encephalopathy (H) 08/05/2018     Macrocytic anemia      Non-ST elevation MI (NSTEMI) (H) 3/18/2019    Overview:  Added automatically from request for surgery [id number]     NSTEMI (non-ST elevated myocardial infarction) (H) 03/2019    s/p stent placement     Primary localized osteoarthrosis of left lower leg 4/9/2019     Stress-induced cardiomyopathy 08/05/2018     Past Surgical History:   Procedure Laterality Date     COLONOSCOPY N/A 3/20/2018    Procedure: COLONOSCOPY;  Surgeon: Adam Nicole III, MD;  Location: [place] [facility] OR;  Service:      CV CORONARY ANGIOGRAM N/A 3/18/2019    Procedure: Coronary Angiogram;  Surgeon: Timi Rodriguez MD;  Location: [facility] Cath Lab;  Service: Cardiology     CV CORONARY ANGIOGRAM N/A 9/22/2021    Procedure: Coronary Angiogram;  Surgeon: Brianda Avilez MD;  Location: [facility] CATH LAB CV     CV FRACTIONAL FLOW RATIO WIRE N/A 9/22/2021    Procedure: Fractional Flow Ratio Wire;  Surgeon: Brianda Avilez MD;  Location: [facility] CATH LAB CV     CV LEFT HEART CATHETERIZATION WITHOUT LEFT VENTRICULOGRAM Left 3/18/2019    Procedure: Left Heart Catheterization Without Left Ventriculogram;  Surgeon: Timi Rodriguez MD;  Location: [facility] Cath Lab;  Service: Cardiology     CV PCI N/A 9/22/2021    Procedure: Percutaneous Coronary Intervention;  Surgeon: Brianda Avilez MD;  Location: [facility] CATH [facility] CV     CV PCI ATHERECTOMY ORBITAL N/A 9/22/2021    Procedure: Percutaneous Coronary Intervention Atherectomy Rotational;  Surgeon: Brianda Avilez MD;  Location: [facility] CATH LAB CV     IR CVC TUNNEL PLACEMENT > 5 YRS OF AGE  8/1/2018     IR TUNNELED CATHETER REMOVAL  8/31/2018     PICC  7/24/2018          PICC  3/18/2019          US THORACENTESIS  10/28/2019     [facility] CORONARY STENT PERCUT, INITIAL VESSEL  03/2019     Family History   Problem Relation Age of Onset     Heart Disease Father 76.00        type  unknown to Aayush; his father  at home     Diabetes No family hx of      Coronary Artery Disease Early Onset No family hx of      Cancer No family hx of      Alzheimer Disease Mother 86.00        lives in long term care     No Known Problems Son      No Known Problems Son         Social History     Socioeconomic History     Marital status: Single     Spouse name: Not on file     Number of children: Not on file     Years of education: Not on file     Highest education level: Not on file   Occupational History     Occupation: [employer]   Tobacco Use     Smoking status: Former     Packs/day: 1.00     Years: 40.00     Pack years: 40.00     Types: Cigarettes     Quit date: 2019     Years since quittin.1     Passive exposure: Never     Smokeless tobacco: Never     Tobacco comments:     3 Cig/Week   Vaping Use     Vaping Use: Never used   Substance and Sexual Activity     Alcohol use: Yes     Comment: Alcoholic Drinks/day: 350 ml of peppermint schnapps daily per Finesse h,  H&P per report of Karlie JJ to Dr. Valle     Drug use: No     Sexual activity: Yes     Partners: Female   Other Topics Concern     Parent/sibling w/ CABG, MI or angioplasty before 65F 55M? Not Asked   Social History Narrative    Works in [employer]. Lives with his Devon JJmikaela Chisholm.     Social Determinants of Health     Financial Resource Strain: Not on file   Food Insecurity: Not on file   Transportation Needs: Not on file   Physical Activity: Not on file   Stress: Not on file   Social Connections: Not on file   Intimate Partner Violence: Not on file   Housing Stability: Not on file           Medications  Allergies   Current Outpatient Medications   Medication Sig Dispense Refill     acetaminophen (TYLENOL 8 HOUR ARTHRITIS PAIN) 650 MG CR tablet Take 2 tablets (1,300 mg) by mouth every 8 hours as needed for pain       aspirin (ASA) 81 MG EC tablet Take 1 tablet (81 mg) by mouth daily 90 tablet 3     atorvastatin (LIPITOR) 80 MG tablet  Take 1 tablet (80 mg) by mouth daily 90 tablet 3     B Complex-C-Folic Acid (NEPHRO-FRANCISCO) 0.8 MG TABS Take 1 tablet by mouth daily       carvedilol (COREG) 25 MG tablet Take 1 tablet (25 mg) by mouth 2 times daily 180 tablet 3     Cholecalciferol (VITAMIN D) 50 MCG (2000 UT) CAPS Take 1 capsule by mouth daily       ferrous sulfate (FEROSUL) 325 (65 Fe) MG tablet Take 325 mg by mouth daily  0     FLUZONE HIGH-DOSE QUADRIVALENT 0.7 ML MARQUITA injection        folic acid (FOLVITE) 400 MCG tablet Take 1 tablet (400 mcg) by mouth daily       losartan (COZAAR) 25 MG tablet Take 1 tablet (25 mg) by mouth daily 30 tablet 3     magnesium oxide (MAG-OX) 400 MG tablet Take 1 tablet (400 mg) by mouth 2 times daily 60 tablet 11     Multiple Vitamins-Minerals (ONCOVITE) TABS Take 1 tablet by mouth daily       order for DME Equipment being ordered: Blood pressure instrument and cuff 1 each 0     pantoprazole (PROTONIX) 20 MG EC tablet Take 1 tablet (20 mg) by mouth daily 90 tablet 3     PFIZER COVID-19 VAC BIVALENT 30 MCG/0.3ML injection        potassium chloride ER (KLOR-CON M) 20 MEQ CR tablet Take 1 tablet (20 mEq) by mouth daily 30 tablet 11       Allergies   Allergen Reactions     No Known Allergies           Lab Results    Chemistry/lipid CBC Cardiac Enzymes/BNP/TSH/INR   Recent Labs   Lab Test 03/21/23  1431   CHOL 111   HDL 42   LDL 34   TRIG 175*     Recent Labs   Lab Test 03/21/23  1431 11/02/21  1404 02/12/18  1635   LDL 34 40 96     Recent Labs   Lab Test 05/03/23  1550   *   POTASSIUM 4.3   CHLORIDE 101   CO2 21*   GLC 94   BUN 18.8   CR 0.87   GFRESTIMATED >90   SOLO 9.1     Recent Labs   Lab Test 05/03/23  1550 03/21/23  1431 09/22/21  0727   CR 0.87 0.72 0.72     No results for input(s): A1C in the last 93899 hours.       Recent Labs   Lab Test 03/21/23  1431   WBC 8.2   HGB 13.1*   HCT 38.4*   MCV 98        Recent Labs   Lab Test 03/21/23  1431 09/22/21  0727 07/29/21  1036   HGB 13.1* 13.4 13.7    Recent  Labs   Lab Test 12/16/19  1701 12/16/19  1042 12/16/19  0352   TROPONINI 0.02 0.02 <0.01     Recent Labs   Lab Test 12/16/19  0352 10/29/19  0440 03/17/19  0309   BNP 1,634* >5,000* 873*     Recent Labs   Lab Test 10/27/19  1648   TSH 1.54     Recent Labs   Lab Test 12/16/19  0352 10/28/19  0506 10/27/19  1648   INR 1.12* 1.36* 1.26*        Timi Rodriguez MD

## 2023-06-27 ENCOUNTER — HOSPITAL ENCOUNTER (OUTPATIENT)
Dept: CARDIOLOGY | Facility: CLINIC | Age: 68
Discharge: HOME | End: 2023-06-27
Attending: INTERNAL MEDICINE
Payer: COMMERCIAL

## 2023-06-27 DIAGNOSIS — I35.0 SEVERE AORTIC STENOSIS: ICD-10-CM

## 2023-06-27 LAB — LVEF ECHO: NORMAL

## 2023-06-27 PROCEDURE — 93308 TTE F-UP OR LMTD: CPT | Mod: 26 | Performed by: INTERNAL MEDICINE

## 2023-06-27 PROCEDURE — 93308 TTE F-UP OR LMTD: CPT

## 2023-06-27 PROCEDURE — 93321 DOPPLER ECHO F-UP/LMTD STD: CPT | Mod: 26 | Performed by: INTERNAL MEDICINE

## 2023-06-28 ENCOUNTER — TELEPHONE (OUTPATIENT)
Dept: CARDIOLOGY | Facility: CLINIC | Age: 68
End: 2023-06-28
Payer: COMMERCIAL

## 2023-06-28 DIAGNOSIS — I35.0 SEVERE AORTIC STENOSIS: ICD-10-CM

## 2023-06-28 DIAGNOSIS — I51.89 OTHER ILL-DEFINED HEART DISEASES: Primary | ICD-10-CM

## 2023-06-28 NOTE — TELEPHONE ENCOUNTER
Called to go over echo results and provider recommendations. Spoke to pts spouse. Pt currently at work. Arranged to have valve team call back later this afternoon.     Sivan Gracia RN on 6/28/2023 at 11:56 AM

## 2023-06-28 NOTE — TELEPHONE ENCOUNTER
Valve Clinic RN Phone Call:  Call made on 6/28/2023 at 3:20 PM by Sivan Gracia RN    Reason for call: discuss echo results and provider recommendations     Symptom or request: Discussed with patient that his EF is mildly reduced and his aortic valve is getting into the category of being severely narrowed. Dr. Rodriguez recommends moving forward with remainder of TAVR work up. Patient has already had CTA TAVR and previously seen CT surgery. He now has full dentures so will not require dental clearance. Patient is comfortable not repeating CT surgery visit if he does not need to. We discussed per insurance requirements he may need to or provider may request he see them again, noted I would send message to discuss. In the mean time we will schedule repeat angio which is definitely needed. Explained patient will have angiogram and we will likely be able to schedule valve procedure about 2 weeks after. All questions answered.     Additional comments/Actions: Msg routed to provider regarding CT surgery visit. Orders placed for angiogram and message sent to .     Instructions given to patient: Anticipate phone call for angiogram. I will call with instructions for pre procedure information and check on if your CT surgery visit is needed or not.     Sivan Gracia RN on 6/28/2023 at 4:06 PM

## 2023-07-03 ENCOUNTER — TELEPHONE (OUTPATIENT)
Dept: CARDIOLOGY | Facility: CLINIC | Age: 68
End: 2023-07-03
Payer: COMMERCIAL

## 2023-07-03 NOTE — TELEPHONE ENCOUNTER
Pre-Procedure Angiogram Education     Aayush García  [address]  [facility] [address]  [phone] (home)     Procedure cardiologist:  Dr. Pinto   PCP:  Zachary Lewis  H&P completed by:  Dr. Rodriguez 6/22/23   Admit date 7/6/23  Arrival time:  0630 AM   Anticoagulation: No   CPAP: No  Previous PCI: YES- PCI to pLAD w/ roto/LEYDI '19, then mLAD '21 w/ roto/shock/LEYDI  Bypass Grafts: No  Renal Issues: No  Diabetic?: No  Device?: No  Type:  N/A    Angiogram Teaching    Reason for Visit:  Telephone call to discuss pre-procedure education in preparation for: coronary angiogram with possible percutaneous coronary intervention.     Pt sent [employer] message prior to phone teach.     Procedure Prep:  Primary Cardiologist note dated: Dr. Goldsmith 6/8/23  EKG results obtained, dated: to be obtained date of procedure   CBC, BMP and T&S- to be done at  [facility] lab on 7/5/23    Patient Education  Patient states understanding of procedure and risks and agrees to proceed.    Pre-procedure instructions  Patient instructed to be NPO after midnight.  Patient instructed to shower the evening before or the morning of the procedure.  Leave all valuables at home (jewlery, rings, watches, large amounts of money).  Patient understands there are two visitors allowed during patients stay. Visitor will need to wear a mask their entire stay and remain in the restricted area per guidelines.   Patient instructed to arrange for transportation home following procedure from a responsible family member of friend. No driving for at least 24 hours post-procedure.  Patient instructed to have a responsible adult with them for 24 hours post-procedure.  Post-procedure follow up process.  Conscious sedation discussed.    Pre-procedure medication instructions  Patient instructed on antiplatelet medication.  Continue medications as scheduled up until the date of procedure unless indicated below.   Patient instructed to take 325 mg of Aspirin am of procedure:  Yes  Other medication: instructed to only take pantoprazole, losartan and carvedilol a.m. of the procedure.  Instructed patient to hold all other prescription medications, OTC meds, supplements and vitamins the day of procedure.     *PATIENTS RECORDS AVAILABLE IN [facility] UNLESS OTHERWISE INDICATED*      Patient Active Problem List   Diagnosis     Overweight (BMI 25.0-29.9)     White coat syndrome with diagnosis of hypertension     Gastroesophageal reflux disease without esophagitis     Vitamin D deficiency     Alcoholism /alcohol abuse     Chronic systolic heart failure (H)     Iron deficiency anemia due to chronic blood loss     Primary localized osteoarthrosis of left lower leg     Aortic stenosis, moderate     Coronary artery disease involving native coronary artery of native heart without angina pectoris     Ischemic cardiomyopathy     Pleural effusion       Current Outpatient Medications   Medication Sig Dispense Refill     acetaminophen (TYLENOL 8 HOUR ARTHRITIS PAIN) 650 MG CR tablet Take 2 tablets (1,300 mg) by mouth every 8 hours as needed for pain       aspirin (ASA) 81 MG EC tablet Take 1 tablet (81 mg) by mouth daily 90 tablet 3     atorvastatin (LIPITOR) 80 MG tablet Take 1 tablet (80 mg) by mouth daily 90 tablet 3     B Complex-C-Folic Acid (NEPHRO-FRANCISCO) 0.8 MG TABS Take 1 tablet by mouth daily       carvedilol (COREG) 25 MG tablet Take 1 tablet (25 mg) by mouth 2 times daily 180 tablet 3     Cholecalciferol (VITAMIN D) 50 MCG (2000 UT) CAPS Take 1 capsule by mouth daily       ferrous sulfate (FEROSUL) 325 (65 Fe) MG tablet Take 325 mg by mouth daily  0     FLUZONE HIGH-DOSE QUADRIVALENT 0.7 ML MARQUITA injection        folic acid (FOLVITE) 400 MCG tablet Take 1 tablet (400 mcg) by mouth daily       losartan (COZAAR) 25 MG tablet Take 1 tablet (25 mg) by mouth daily 30 tablet 3     magnesium oxide (MAG-OX) 400 MG tablet Take 1 tablet (400 mg) by mouth 2 times daily 60 tablet 11     Multiple Vitamins-Minerals  (ONCOVITE) TABS Take 1 tablet by mouth daily       order for DME Equipment being ordered: Blood pressure instrument and cuff 1 each 0     pantoprazole (PROTONIX) 20 MG EC tablet Take 1 tablet (20 mg) by mouth daily 90 tablet 3     PFIZER COVID-19 VAC BIVALENT 30 MCG/0.3ML injection        potassium chloride ER (KLOR-CON M) 20 MEQ CR tablet Take 1 tablet (20 mEq) by mouth daily 30 tablet 11       Allergies   Allergen Reactions     No Known Allergies        Orders placed.     French Gracia RN BSN  Structural Heart Coordinator   [facility]  [phone]

## 2023-07-03 NOTE — TELEPHONE ENCOUNTER
===View-only below this line===  ----- Message -----  From: Romulo Juan  Sent: 7/3/2023   8:56 AM CDT  To: Sivan Gracia RN  Subject: RE: TAVR work up                                 Case type: CA POSS PCI    Procedure Physician(s): PADMAJA    Procedure Date and Patient Arrival Time: Thursday 7/6, with arrival time of 630    H&P: Completed on 6/22 WITH MY    Pre-Procedure Lab Appt: Wednesday 7/5 at  - Please place lab orders if you haven't already!    Alerts/Important Info: None    THANKS!  DEVON

## 2023-07-04 LAB
ABO/RH(D): NORMAL
ANTIBODY SCREEN: NEGATIVE
SPECIMEN EXPIRATION DATE: NORMAL

## 2023-07-05 ENCOUNTER — TELEPHONE (OUTPATIENT)
Dept: CARDIOLOGY | Facility: CLINIC | Age: 68
End: 2023-07-05

## 2023-07-05 ENCOUNTER — LAB (OUTPATIENT)
Dept: CARDIOLOGY | Facility: CLINIC | Age: 68
End: 2023-07-05
Payer: COMMERCIAL

## 2023-07-05 ENCOUNTER — PREP FOR PROCEDURE (OUTPATIENT)
Dept: CARDIOLOGY | Facility: CLINIC | Age: 68
End: 2023-07-05

## 2023-07-05 DIAGNOSIS — I35.0 SEVERE AORTIC STENOSIS: Primary | ICD-10-CM

## 2023-07-05 DIAGNOSIS — I35.0 SEVERE AORTIC STENOSIS: ICD-10-CM

## 2023-07-05 LAB
ANION GAP SERPL CALCULATED.3IONS-SCNC: 9 MMOL/L (ref 5–18)
BUN SERPL-MCNC: 11 MG/DL (ref 8–22)
CALCIUM SERPL-MCNC: 9 MG/DL (ref 8.5–10.5)
CHLORIDE BLD-SCNC: 97 MMOL/L (ref 98–107)
CO2 SERPL-SCNC: 23 MMOL/L (ref 22–31)
CREAT SERPL-MCNC: 0.71 MG/DL (ref 0.7–1.3)
ERYTHROCYTE [DISTWIDTH] IN BLOOD BY AUTOMATED COUNT: 11.9 % (ref 10–15)
GFR SERPL CREATININE-BSD FRML MDRD: >90 ML/MIN/1.73M2
GLUCOSE BLD-MCNC: 76 MG/DL (ref 70–125)
HCT VFR BLD AUTO: 36.8 % (ref 40–53)
HGB BLD-MCNC: 12.7 G/DL (ref 13.3–17.7)
MCH RBC QN AUTO: 32.7 PG (ref 26.5–33)
MCHC RBC AUTO-ENTMCNC: 34.5 G/DL (ref 31.5–36.5)
MCV RBC AUTO: 95 FL (ref 78–100)
PLATELET # BLD AUTO: 254 10E3/UL (ref 150–450)
POTASSIUM BLD-SCNC: 4.2 MMOL/L (ref 3.5–5)
RBC # BLD AUTO: 3.88 10E6/UL (ref 4.4–5.9)
SODIUM SERPL-SCNC: 129 MMOL/L (ref 136–145)
WBC # BLD AUTO: 8.5 10E3/UL (ref 4–11)

## 2023-07-05 PROCEDURE — 86850 RBC ANTIBODY SCREEN: CPT

## 2023-07-05 PROCEDURE — 80048 BASIC METABOLIC PNL TOTAL CA: CPT

## 2023-07-05 PROCEDURE — 36415 COLL VENOUS BLD VENIPUNCTURE: CPT

## 2023-07-05 PROCEDURE — 86900 BLOOD TYPING SEROLOGIC ABO: CPT

## 2023-07-05 PROCEDURE — 85027 COMPLETE CBC AUTOMATED: CPT

## 2023-07-05 PROCEDURE — 86901 BLOOD TYPING SEROLOGIC RH(D): CPT

## 2023-07-05 RX ORDER — LIDOCAINE 40 MG/G
CREAM TOPICAL
Status: CANCELLED | OUTPATIENT
Start: 2023-07-05

## 2023-07-05 RX ORDER — ASPIRIN 325 MG
325 TABLET ORAL ONCE
Status: CANCELLED | OUTPATIENT
Start: 2023-07-06 | End: 2023-07-05

## 2023-07-05 RX ORDER — ASPIRIN 81 MG/1
243 TABLET, CHEWABLE ORAL ONCE
Status: CANCELLED | OUTPATIENT
Start: 2023-07-06

## 2023-07-05 RX ORDER — FENTANYL CITRATE 50 UG/ML
25 INJECTION, SOLUTION INTRAMUSCULAR; INTRAVENOUS
Status: CANCELLED | OUTPATIENT
Start: 2023-07-06

## 2023-07-05 RX ORDER — SODIUM CHLORIDE 9 MG/ML
INJECTION, SOLUTION INTRAVENOUS CONTINUOUS
Status: CANCELLED | OUTPATIENT
Start: 2023-07-06

## 2023-07-05 NOTE — TELEPHONE ENCOUNTER
===View-only below this line===  ----- Message -----  From: Timi Rodriguez MD  Sent: 7/5/2023   7:29 AM CDT  To: Sivan Gracia RN  Subject: RE: TAVR work up                                 I would favor another CTS visit. Thank you French!  Timi    ----- Message -----  From: Sivan Gracia RN  Sent: 6/28/2023   4:01 PM CDT  To: Timi Rodriguez MD  Subject: TAVR work up                                     MY-  This is the patient we saw who had repeat echo to verify severe aortic stenosis-- pt had previous TAVR work up in Sept 2021 and saw Dr. Rodriguez. Patient is comfortable not seeing surgery again. Do you feel strongly or know if there may be an insurance issue with him not repeating that visit?     I will set up his angio (CTA already done) and his dental is completed but told him I'd check on the surgery visit piece.     Thanks,  AV

## 2023-07-05 NOTE — TELEPHONE ENCOUNTER
Called and left VM x 2 for patient instructing pt to read through instructions for angio over my chart. Call back # listed.     Sivan Gracia RN on 7/5/2023 at 11:39 AM

## 2023-07-05 NOTE — TELEPHONE ENCOUNTER
Pt responded to [employer] message with angiogram education information- no questions at this time.     Sivan Gracia RN on 7/5/2023 at 3:38 PM

## 2023-07-05 NOTE — TELEPHONE ENCOUNTER
Pt responded to [employer] message 7/5- pt informed he will be contacted to set up CT surgery appt. Msg sent to scheduling team to arrange.     Sivan Gracia RN on 7/5/2023 at 3:36 PM

## 2023-07-05 NOTE — TELEPHONE ENCOUNTER
Patient will require CT surgery visit since last one was >1 year ago and provider is no longer with [facility]. Patient has upcoming angiogram scheduled and will be notified of this requirement. Scheduling team will reach out to patient to arrange visit.     Sivan Gracia RN on 7/5/2023 at 11:05 AM

## 2023-07-06 ENCOUNTER — HOSPITAL ENCOUNTER (OUTPATIENT)
Facility: HOSPITAL | Age: 68
Discharge: HOME | End: 2023-07-06
Attending: INTERNAL MEDICINE | Admitting: INTERNAL MEDICINE
Payer: COMMERCIAL

## 2023-07-06 VITALS
HEIGHT: 68 IN | BODY MASS INDEX: 29.86 KG/M2 | WEIGHT: 197 LBS | SYSTOLIC BLOOD PRESSURE: 175 MMHG | DIASTOLIC BLOOD PRESSURE: 81 MMHG | TEMPERATURE: 98.5 F | RESPIRATION RATE: 17 BRPM | HEART RATE: 57 BPM | OXYGEN SATURATION: 96 %

## 2023-07-06 DIAGNOSIS — I25.10 CORONARY ARTERY DISEASE INVOLVING NATIVE CORONARY ARTERY OF NATIVE HEART WITHOUT ANGINA PECTORIS: Primary | ICD-10-CM

## 2023-07-06 DIAGNOSIS — I35.0 SEVERE AORTIC STENOSIS: ICD-10-CM

## 2023-07-06 DIAGNOSIS — I35.0 AORTIC STENOSIS, MODERATE: ICD-10-CM

## 2023-07-06 DIAGNOSIS — I51.89 OTHER ILL-DEFINED HEART DISEASES: ICD-10-CM

## 2023-07-06 LAB
ACT BLD: 289 SECONDS (ref 74–150)
ACT BLD: 355 SECONDS (ref 74–150)
ATRIAL RATE - MUSE: 44 BPM
ATRIAL RATE - MUSE: 65 BPM
DIASTOLIC BLOOD PRESSURE - MUSE: NORMAL MMHG
DIASTOLIC BLOOD PRESSURE - MUSE: NORMAL MMHG
INTERPRETATION ECG - MUSE: NORMAL
INTERPRETATION ECG - MUSE: NORMAL
P AXIS - MUSE: 25 DEGREES
P AXIS - MUSE: 7 DEGREES
PR INTERVAL - MUSE: 154 MS
PR INTERVAL - MUSE: 178 MS
QRS DURATION - MUSE: 110 MS
QRS DURATION - MUSE: 114 MS
QT - MUSE: 460 MS
QT - MUSE: 570 MS
QTC - MUSE: 478 MS
QTC - MUSE: 487 MS
R AXIS - MUSE: -34 DEGREES
R AXIS - MUSE: -41 DEGREES
SODIUM SERPL-SCNC: 132 MMOL/L (ref 136–145)
SYSTOLIC BLOOD PRESSURE - MUSE: NORMAL MMHG
SYSTOLIC BLOOD PRESSURE - MUSE: NORMAL MMHG
T AXIS - MUSE: 102 DEGREES
T AXIS - MUSE: 69 DEGREES
VENTRICULAR RATE- MUSE: 44 BPM
VENTRICULAR RATE- MUSE: 65 BPM

## 2023-07-06 PROCEDURE — 84295 ASSAY OF SERUM SODIUM: CPT | Performed by: NURSE PRACTITIONER

## 2023-07-06 PROCEDURE — C1894 INTRO/SHEATH, NON-LASER: HCPCS | Performed by: INTERNAL MEDICINE

## 2023-07-06 PROCEDURE — 250N000013 HC RX MED GY IP 250 OP 250 PS 637: Performed by: NURSE PRACTITIONER

## 2023-07-06 PROCEDURE — 255N000002 HC RX 255 OP 636: Performed by: INTERNAL MEDICINE

## 2023-07-06 PROCEDURE — 99153 MOD SED SAME PHYS/QHP EA: CPT | Performed by: INTERNAL MEDICINE

## 2023-07-06 PROCEDURE — C1725 CATH, TRANSLUMIN NON-LASER: HCPCS | Performed by: INTERNAL MEDICINE

## 2023-07-06 PROCEDURE — 250N000013 HC RX MED GY IP 250 OP 250 PS 637: Performed by: INTERNAL MEDICINE

## 2023-07-06 PROCEDURE — 999N000054 HC STATISTIC EKG NON-CHARGEABLE

## 2023-07-06 PROCEDURE — 93005 ELECTROCARDIOGRAM TRACING: CPT

## 2023-07-06 PROCEDURE — 93458 L HRT ARTERY/VENTRICLE ANGIO: CPT | Performed by: INTERNAL MEDICINE

## 2023-07-06 PROCEDURE — 36415 COLL VENOUS BLD VENIPUNCTURE: CPT | Performed by: NURSE PRACTITIONER

## 2023-07-06 PROCEDURE — 250N000009 HC RX 250: Performed by: INTERNAL MEDICINE

## 2023-07-06 PROCEDURE — 99152 MOD SED SAME PHYS/QHP 5/>YRS: CPT | Performed by: INTERNAL MEDICINE

## 2023-07-06 PROCEDURE — 272N000001 HC OR GENERAL SUPPLY STERILE: Performed by: INTERNAL MEDICINE

## 2023-07-06 PROCEDURE — 93458 L HRT ARTERY/VENTRICLE ANGIO: CPT | Mod: 26 | Performed by: INTERNAL MEDICINE

## 2023-07-06 PROCEDURE — 85347 COAGULATION TIME ACTIVATED: CPT

## 2023-07-06 PROCEDURE — 93010 ELECTROCARDIOGRAM REPORT: CPT | Performed by: INTERNAL MEDICINE

## 2023-07-06 PROCEDURE — C1887 CATHETER, GUIDING: HCPCS | Performed by: INTERNAL MEDICINE

## 2023-07-06 PROCEDURE — C1769 GUIDE WIRE: HCPCS | Performed by: INTERNAL MEDICINE

## 2023-07-06 PROCEDURE — 92920 PRQ TRLUML C ANGIOP 1ART&/BR: CPT | Mod: LD | Performed by: INTERNAL MEDICINE

## 2023-07-06 PROCEDURE — 258N000003 HC RX IP 258 OP 636: Performed by: INTERNAL MEDICINE

## 2023-07-06 PROCEDURE — 92920 PRQ TRLUML C ANGIOP 1ART&/BR: CPT | Mod: LC | Performed by: INTERNAL MEDICINE

## 2023-07-06 PROCEDURE — 250N000011 HC RX IP 250 OP 636: Performed by: INTERNAL MEDICINE

## 2023-07-06 RX ORDER — HEPARIN SODIUM 1000 [USP'U]/ML
INJECTION, SOLUTION INTRAVENOUS; SUBCUTANEOUS
Status: DISCONTINUED | OUTPATIENT
Start: 2023-07-06 | End: 2023-07-06 | Stop reason: HOSPADM

## 2023-07-06 RX ORDER — ACETAMINOPHEN 325 MG/1
650 TABLET ORAL EVERY 4 HOURS PRN
Status: DISCONTINUED | OUTPATIENT
Start: 2023-07-06 | End: 2023-07-06 | Stop reason: HOSPADM

## 2023-07-06 RX ORDER — ATORVASTATIN CALCIUM 40 MG/1
40 TABLET, FILM COATED ORAL DAILY
Status: DISCONTINUED | OUTPATIENT
Start: 2023-07-07 | End: 2023-07-06 | Stop reason: HOSPADM

## 2023-07-06 RX ORDER — NALOXONE HYDROCHLORIDE 0.4 MG/ML
0.4 INJECTION, SOLUTION INTRAMUSCULAR; INTRAVENOUS; SUBCUTANEOUS
Status: DISCONTINUED | OUTPATIENT
Start: 2023-07-06 | End: 2023-07-06 | Stop reason: HOSPADM

## 2023-07-06 RX ORDER — ASPIRIN 81 MG/1
81 TABLET ORAL DAILY
Status: DISCONTINUED | OUTPATIENT
Start: 2023-07-07 | End: 2023-07-06 | Stop reason: HOSPADM

## 2023-07-06 RX ORDER — LIDOCAINE 40 MG/G
CREAM TOPICAL
Status: DISCONTINUED | OUTPATIENT
Start: 2023-07-06 | End: 2023-07-06 | Stop reason: HOSPADM

## 2023-07-06 RX ORDER — DIAZEPAM 5 MG
5 TABLET ORAL ONCE
Status: COMPLETED | OUTPATIENT
Start: 2023-07-06 | End: 2023-07-06

## 2023-07-06 RX ORDER — OXYCODONE HYDROCHLORIDE 5 MG/1
10 TABLET ORAL EVERY 4 HOURS PRN
Status: DISCONTINUED | OUTPATIENT
Start: 2023-07-06 | End: 2023-07-06 | Stop reason: HOSPADM

## 2023-07-06 RX ORDER — FENTANYL CITRATE 50 UG/ML
25 INJECTION, SOLUTION INTRAMUSCULAR; INTRAVENOUS
Status: DISCONTINUED | OUTPATIENT
Start: 2023-07-06 | End: 2023-07-06 | Stop reason: HOSPADM

## 2023-07-06 RX ORDER — ATROPINE SULFATE 0.1 MG/ML
0.5 INJECTION INTRAVENOUS
Status: DISCONTINUED | OUTPATIENT
Start: 2023-07-06 | End: 2023-07-06 | Stop reason: HOSPADM

## 2023-07-06 RX ORDER — ATORVASTATIN CALCIUM 40 MG/1
40 TABLET, FILM COATED ORAL DAILY
Qty: 90 TABLET | Refills: 3 | Status: SHIPPED | OUTPATIENT
Start: 2023-07-06 | End: 2023-07-06

## 2023-07-06 RX ORDER — SODIUM CHLORIDE 9 MG/ML
INJECTION, SOLUTION INTRAVENOUS CONTINUOUS
Status: DISCONTINUED | OUTPATIENT
Start: 2023-07-06 | End: 2023-07-06 | Stop reason: HOSPADM

## 2023-07-06 RX ORDER — ONDANSETRON 2 MG/ML
4 INJECTION INTRAMUSCULAR; INTRAVENOUS EVERY 6 HOURS PRN
Status: DISCONTINUED | OUTPATIENT
Start: 2023-07-06 | End: 2023-07-06 | Stop reason: HOSPADM

## 2023-07-06 RX ORDER — FENTANYL CITRATE 50 UG/ML
INJECTION, SOLUTION INTRAMUSCULAR; INTRAVENOUS
Status: DISCONTINUED | OUTPATIENT
Start: 2023-07-06 | End: 2023-07-06 | Stop reason: HOSPADM

## 2023-07-06 RX ORDER — NALOXONE HYDROCHLORIDE 0.4 MG/ML
0.2 INJECTION, SOLUTION INTRAMUSCULAR; INTRAVENOUS; SUBCUTANEOUS
Status: DISCONTINUED | OUTPATIENT
Start: 2023-07-06 | End: 2023-07-06 | Stop reason: HOSPADM

## 2023-07-06 RX ORDER — METOPROLOL TARTRATE 1 MG/ML
5 INJECTION, SOLUTION INTRAVENOUS
Status: DISCONTINUED | OUTPATIENT
Start: 2023-07-06 | End: 2023-07-06 | Stop reason: HOSPADM

## 2023-07-06 RX ORDER — NITROGLYCERIN 0.4 MG/1
0.4 TABLET SUBLINGUAL EVERY 5 MIN PRN
Status: DISCONTINUED | OUTPATIENT
Start: 2023-07-06 | End: 2023-07-06 | Stop reason: HOSPADM

## 2023-07-06 RX ORDER — ASPIRIN 81 MG/1
81 TABLET ORAL DAILY
Qty: 30 TABLET | Refills: 3 | Status: SHIPPED | OUTPATIENT
Start: 2023-07-07 | End: 2023-07-06

## 2023-07-06 RX ORDER — ASPIRIN 325 MG
325 TABLET ORAL ONCE
Status: COMPLETED | OUTPATIENT
Start: 2023-07-06 | End: 2023-07-06

## 2023-07-06 RX ORDER — ASPIRIN 81 MG/1
243 TABLET, CHEWABLE ORAL ONCE
Status: COMPLETED | OUTPATIENT
Start: 2023-07-06 | End: 2023-07-06

## 2023-07-06 RX ORDER — HYDRALAZINE HYDROCHLORIDE 20 MG/ML
10 INJECTION INTRAMUSCULAR; INTRAVENOUS EVERY 4 HOURS PRN
Status: DISCONTINUED | OUTPATIENT
Start: 2023-07-06 | End: 2023-07-06 | Stop reason: HOSPADM

## 2023-07-06 RX ORDER — FLUMAZENIL 0.1 MG/ML
0.2 INJECTION, SOLUTION INTRAVENOUS
Status: DISCONTINUED | OUTPATIENT
Start: 2023-07-06 | End: 2023-07-06 | Stop reason: HOSPADM

## 2023-07-06 RX ORDER — IODIXANOL 320 MG/ML
INJECTION, SOLUTION INTRAVASCULAR
Status: DISCONTINUED | OUTPATIENT
Start: 2023-07-06 | End: 2023-07-06 | Stop reason: HOSPADM

## 2023-07-06 RX ORDER — ONDANSETRON 4 MG/1
4 TABLET, ORALLY DISINTEGRATING ORAL EVERY 6 HOURS PRN
Status: DISCONTINUED | OUTPATIENT
Start: 2023-07-06 | End: 2023-07-06 | Stop reason: HOSPADM

## 2023-07-06 RX ORDER — OXYCODONE HYDROCHLORIDE 5 MG/1
5 TABLET ORAL EVERY 4 HOURS PRN
Status: DISCONTINUED | OUTPATIENT
Start: 2023-07-06 | End: 2023-07-06 | Stop reason: HOSPADM

## 2023-07-06 RX ADMIN — SODIUM CHLORIDE: 9 INJECTION, SOLUTION INTRAVENOUS at 07:07

## 2023-07-06 RX ADMIN — ASPIRIN 81 MG 243 MG: 81 TABLET ORAL at 07:06

## 2023-07-06 RX ADMIN — DIAZEPAM 5 MG: 5 TABLET ORAL at 08:10

## 2023-07-06 ASSESSMENT — ACTIVITIES OF DAILY LIVING (ADL)
ADLS_ACUITY_SCORE: 35

## 2023-07-06 ASSESSMENT — EJECTION FRACTION: EF_VALUE: .2

## 2023-07-06 NOTE — Clinical Note
The first balloon was inserted into the circumflex and marginal circumflex.Max pressure = 4 ollie. Total duration = 8 seconds.

## 2023-07-06 NOTE — LETTER
[facility] HEART CARE  [address] [place] [id number]  Phone: [phone]  Fax: [phone]    July 6, 2023        Aayush García  [address] [place] [id number]          To whom it may concern:    RE: Aayush Avilesbaldemar    Patient was seen and treated today at our hospital and due to activity restrictions related to his procedure he should remain out of work until 7/12/2023.     Please contact me for questions or concerns.      Sincerely,

## 2023-07-06 NOTE — Clinical Note
The first balloon was inserted into the circumflex and marginal circumflex.Max pressure = 8 ollie. Total duration = 17 seconds.

## 2023-07-06 NOTE — DISCHARGE INSTRUCTIONS

## 2023-07-06 NOTE — PRE-PROCEDURE
GENERAL PRE-PROCEDURE:   Procedure:  Coronary angiogram with possible PCI  Date/Time:  7/6/2023 6:48 AM    Written consent obtained?: Yes    Risks and benefits: Risks, benefits and alternatives were discussed    Consent given by:  Patient  Patient states understanding of procedure being performed: Yes    Patient's understanding of procedure matches consent: Yes    Procedure consent matches procedure scheduled: Yes    Expected level of sedation:  Moderate  Appropriately NPO:  Yes  ASA Class:  4 (LFLG aortic stenosis, CAD; s/p pLAD roto PCI in 2019, s/p mLAD roto/shockwave PCI 2021, OM , HF with recovered EF, HTN, alcoholic hepatitis)  Mallampati  :  Grade 3- soft palate visible, posterior pharyngeal wall not visible  Lungs:  Lungs clear with good breath sounds bilaterally  Heart:  Systolic murmur and other (comment)  Heart exam comment:  Reed, frequent extra systoles  History & Physical reviewed:  History and physical reviewed and no updates needed  Statement of review:  I have reviewed the lab findings, diagnostic data, medications, and the plan for sedation

## 2023-07-06 NOTE — Clinical Note
The first balloon was inserted into the circumflex and marginal circumflex.Max pressure = 10 ollie. Total duration = 23 seconds.

## 2023-07-06 NOTE — Clinical Note
The first balloon was inserted into the circumflex and marginal circumflex.Max pressure = 14 ollie. Total duration = 30 seconds.

## 2023-07-06 NOTE — Clinical Note
The first balloon was inserted into the circumflex.Max pressure = 2 ollie. Total duration = 14 seconds.

## 2023-07-06 NOTE — Clinical Note
The first balloon was inserted into the circumflex and marginal circumflex.Max pressure = 14 ollie. Total duration = 60 seconds.

## 2023-07-06 NOTE — Clinical Note
The first balloon was inserted into the circumflex and marginal circumflex.Max pressure = 10 ollie. Total duration = 33 seconds.

## 2023-07-06 NOTE — Clinical Note
The first balloon was inserted into the circumflex and marginal circumflex.Max pressure = 10 ollie. Total duration = 36 seconds.

## 2023-07-06 NOTE — INTERVAL H&P NOTE
"I have reviewed the surgical (or preoperative) H&P that is linked to this encounter, and examined the patient. There are no significant changes    Clinical Conditions Present on Arrival:  Clinically Significant Risk Factors Present on Admission         # Hyponatremia: Lowest Na = 129 mmol/L in last 2 days, will monitor as appropriate         # Drug Induced Platelet Defect: home medication list includes an antiplatelet medication  # Overweight: Estimated body mass index is 29.95 kg/m  as calculated from the following:    Height as of this encounter: 1.727 m (5' 8\").    Weight as of this encounter: 89.4 kg (197 lb).       "

## 2023-07-06 NOTE — Clinical Note
The first balloon was inserted into the circumflex and marginal circumflex.Max pressure = 10 ollie. Total duration = 30 seconds.

## 2023-07-06 NOTE — PLAN OF CARE
Goal Outcome Evaluation:               Pt admitted for CA/P.PCi due to aortic stenosis and pre TAVR. Pt prepped and ready for procedure.      Simran Caceres RN

## 2023-07-17 DIAGNOSIS — I50.22 CHRONIC SYSTOLIC HEART FAILURE (H): ICD-10-CM

## 2023-07-17 RX ORDER — LOSARTAN POTASSIUM 25 MG/1
25 TABLET ORAL DAILY
Qty: 90 TABLET | Refills: 3 | Status: SHIPPED | OUTPATIENT
Start: 2023-07-17 | End: 2024-04-09

## 2023-07-17 NOTE — PROGRESS NOTES
===View-only below this line===  ----- Message -----  From: Timi Rodriguez MD  Sent: 2023  11:44 AM CDT  To: Sivan Gracia RN  Subject: RE: pre TAVR                                     I talked to him about it and reviewed the CT and echo.  My sense is that invasive gradient is inaccurate.  I would continue w/ the work up, as aplanned.    Thlaron!  Timi  ----- Message -----  From: Sivan Gracia RN  Sent: 2023  11:06 AM CDT  To: Timi Rodriguez MD  Subject: pre TAVR                                         MY-  Can you please review this patient? I was following up on his angio which was done last week with PADMAJA and he documented the followin. Re-evaluate TAVR plans; AORTIC STENOSIS appears only mild to moderate.  2. If patient experiences symptomatic OM1 restenosis, would consider repeat PCI from  femoral access with rotoblator/shockwave pretreatment and then stenting, probable bifurcation technique.    When you saw this pt in [facility] initially you noted poor imaging quality on initial echo- we repeated a limited with specific request to evaluate aortic valve and you sent me a message after review saying ok to move forward with TAVR work up.     How would you like me to proceed?     Thanks,  AV

## 2023-07-25 ENCOUNTER — OFFICE VISIT (OUTPATIENT)
Dept: CARDIOLOGY | Facility: CLINIC | Age: 68
End: 2023-07-25
Payer: COMMERCIAL

## 2023-07-25 VITALS
BODY MASS INDEX: 30.01 KG/M2 | HEART RATE: 73 BPM | DIASTOLIC BLOOD PRESSURE: 67 MMHG | RESPIRATION RATE: 16 BRPM | WEIGHT: 198 LBS | SYSTOLIC BLOOD PRESSURE: 128 MMHG | HEIGHT: 68 IN

## 2023-07-25 DIAGNOSIS — I25.5 ISCHEMIC CARDIOMYOPATHY: Primary | ICD-10-CM

## 2023-07-25 DIAGNOSIS — I35.0 AORTIC STENOSIS, MODERATE: ICD-10-CM

## 2023-07-25 DIAGNOSIS — I25.10 CORONARY ARTERY DISEASE INVOLVING NATIVE CORONARY ARTERY OF NATIVE HEART WITHOUT ANGINA PECTORIS: ICD-10-CM

## 2023-07-25 PROCEDURE — 99215 OFFICE O/P EST HI 40 MIN: CPT | Performed by: SURGERY

## 2023-07-25 NOTE — PROGRESS NOTES
Cardiac and Thoracic Surgery Consultation      Aayush García MRN# [id number]   YOB: 1955 Age: 67 year old        Reason for consult: I was asked by Dr. Rodriguez to evaluate this patient for severe aortic stenosis and ischemic cardiomyopathy status post PIC/LEYDI placement.           Assessment and Plan:   Mr. García is a 67-year-old man with severe aortic stenosis and ischemic cardiomyopathy status post remote as well as recent PCI/LEYDI in the LAD and left circumflex territories. I recommend transcatheter aortic valve replacement given his significant comorbidities. He has high STS risk. I discussed the technical details of the procedure, as well as the risks and benefits. The patient understands the risks which include but are not limited to bleeding, infection, stroke, coronary obstruction, aortic dissection, aortic root rupture, and end-organ injury. We discussed the treatment of such complications. The patient understands that he could choose to have open surgery and cardiopulmonary bypass in the rare event that he would suffer a complication requiring surgery, and he also understands that he could decide in advance that he does not want to have a sternotomy and cardiopulmonary bypass and instead would have comfort care if she had such a complication. He is going to consider all of this before making a decision and he will let the TAVR coordinator know what he would like done when they check with him before the procedure.               Chief Complaint:   Mr. García is a 67-year-old man with severe aortic stenosis and ischemic cardiomyopathy status post remote as well as recent PCI/LEYDI in the LAD and left circumflex territories.    History is obtained from the patient         History of Present Illness:   This patient is a 67 year old male who presents to discuss the treatment of his aortic stenosis with TAVR. He also has ischemic cardiomyopathy status post remote PCI to the proximal LAD with LEYDI  in 2019 in the setting of cardiogenic shock. He then underwent mid LAD PCI/LEYDI in 2021. 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 There is a left circumflex 40% eccentric calcified narrowing proximally and then heavily calcified bifurcation at the first marginal branch,  which has ostial 99% stenosis and slight reduced antegrade filling. On the most recent left heart catheterization, he had successful PTCA of the ostium of the first obtuse marginal branch of anomalous left circumflex.  No stent was deployed.    He also has a history of alcoholic hepatitis with ascites.     With limited activity, he is doing quite well, and he denies CP, HO, SOB, orthopnea, PND, edema, and syncope or presyncope. He does use a walker because he has degenerative joint disease of his hip and left knee.                Past Medical History:   Past Medical History:  8/5/2018: Acute liver failure  1/14/2020: Acute on chronic systolic congestive heart failure (H)  08/05/2018: Acute renal failure, unspecified acute renal failure type   (H)  12/16/2019: Acute respiratory failure with hypoxia (H)  08/05/2018: Acute respiratory failure with hypoxia (H)  08/05/2018: Alcoholic hepatitis with ascites  No date: Bacteremia due to Klebsiella pneumoniae  4/12/2018: Benign essential hypertension  No date: Chronic liver disease  11/23/2019: Closed fracture of multiple ribs of right side, initial   encounter  9/24/2019: Coronary artery disease involving native coronary artery   of native heart without angina pectoris  No date: Essential hypertension  10/31/2018: Gastroesophageal reflux disease without esophagitis  10/27/2019: GI bleeding  4/4/2019: Heart failure with reduced ejection fraction (H)  08/05/2018: Hepatic encephalopathy (H)  No date: Macrocytic anemia  3/18/2019: Non-ST elevation MI (NSTEMI) (H)      Comment:  Overview:  Added automatically from request for surgery                 [id number]  03/2019: NSTEMI (non-ST elevated myocardial infarction) (H)      Comment:  s/p stent placement  4/9/2019: Primary localized osteoarthrosis of left lower leg  08/05/2018: Stress-induced cardiomyopathy Hypertension          Past Surgical History:     Past Surgical History:   Procedure Laterality Date    COLONOSCOPY N/A 3/20/2018    Procedure: COLONOSCOPY;  Surgeon: Adam Nicole III, MD;  Location: [facility];  Service:     CV CORONARY ANGIOGRAM N/A 3/18/2019    Procedure: Coronary Angiogram;  Surgeon: Timi Rodriguez MD;  Location: [facility];  Service: Cardiology    CV CORONARY ANGIOGRAM N/A 9/22/2021    Procedure: Coronary Angiogram;  Surgeon: Brianda Avilez MD;  Location: [facility] CATH [facility] CV    CV CORONARY ANGIOGRAM N/A 7/6/2023    Procedure: Coronary Angiogram;  Surgeon: Rafat Pinto MD;  Location: [facility] CATH LAB CV    CV FRACTIONAL FLOW RATIO WIRE N/A 9/22/2021    Procedure: Fractional Flow Ratio Wire;  Surgeon: Brianda Avilez MD;  Location: [facility] CATH LAB CV    CV LEFT HEART CATH N/A 7/6/2023    Procedure: Left Heart Catheterization;  Surgeon: Rafat Pinto MD;  Location: [facility]ATH [facility] CV    CV LEFT HEART CATHETERIZATION WITHOUT LEFT VENTRICULOGRAM Left 3/18/2019    Procedure: Left Heart Catheterization Without Left Ventriculogram;  Surgeon: Timi Rodriguez MD;  Location: [facility] Cath Lab;  Service: Cardiology    CV PCI N/A 9/22/2021    Procedure: Percutaneous Coronary Intervention;  Surgeon: Brianda Avilez MD;  Location: [facility] CATH LAB CV    CV PCI N/A 7/6/2023    Procedure: Percutaneous Coronary Intervention;  Surgeon: Rafat Pinto MD;  Location: [facility] CV    CV PCI ATHERECTOMY ORBITAL N/A 9/22/2021    Procedure: Percutaneous Coronary Intervention Atherectomy Rotational;  Surgeon: Brianda Avilez MD;  Location: [facility] LAB CV    IR CVC TUNNEL PLACEMENT > 5 YRS OF AGE  8/1/2018    IR TUNNELED CATHETER REMOVAL  8/31/2018     PICC  7/24/2018         PICC  3/18/2019         US THORACENTESIS  10/28/2019    [facility] [place] CORONARY STENT PERCUT, INITIAL VESSEL  03/2019                 Social History:   I have reviewed this patient's social history          Family History:   This patient has no significant family history          Immunizations:   Immunization status is unknown          Allergies:   All allergies reviewed and addressed          Medications:     Current Outpatient Medications Ordered in Epic   Medication    acetaminophen (TYLENOL 8 HOUR ARTHRITIS PAIN) 650 MG CR tablet    aspirin (ASA) 81 MG EC tablet    atorvastatin (LIPITOR) 80 MG tablet    B Complex-C-Folic Acid (NEPHRO-FRANCISCO) 0.8 MG TABS    carvedilol (COREG) 25 MG tablet    Cholecalciferol (VITAMIN D) 50 MCG (2000 UT) CAPS    ferrous sulfate (FEROSUL) 325 (65 Fe) MG tablet    folic acid (FOLVITE) 400 MCG tablet    losartan (COZAAR) 25 MG tablet    magnesium oxide (MAG-OX) 400 MG tablet    Multiple Vitamins-Minerals (ONCOVITE) TABS    order for DME    pantoprazole (PROTONIX) 20 MG EC tablet    potassium chloride ER (KLOR-CON M) 20 MEQ CR tablet    ANTIPLATELET MEDICATION NOT PRESCRIBED, INTENTIONAL,    FLUZONE HIGH-DOSE QUADRIVALENT 0.7 ML MARQUITA injection    PFIZER COVID-19 VAC BIVALENT 30 MCG/0.3ML injection     No current [facility]-ordered facility-administered medications on file.             Review of Systems:     The 10 point Review of Systems is negative other than noted in the HPI            Physical Exam:   Vitals were reviewed      BP: 128/67 Pulse: 73   Resp: 16        Constitutional:   awake, alert, cooperative, no apparent distress, and appears stated age     Eyes:   Lids and lashes normal, pupils equal, round and reactive to light, extra ocular muscles intact, sclera clear, conjunctiva normal     ENT:   normocepalic, without obvious abnormality     Neck:   supple, symmetrical, trachea midline     Lungs:   no increased work of breathing, good air exchange, and no retractions      Cardiovascular:   regular rate and rhythm     Abdomen:   non-distended     Musculoskeletal:   no lower extremity pitting edema present  there is no redness, warmth, or swelling of the joints  full range of motion noted  motor strength is 5 out of 5 all extremities bilaterally     Neurologic:   Mental Status Exam:  Level of Alertness:   awake  Orientation:   person, place, time  Cranial Nerves:  cranial nerves II-XII are grossly intact  Motor Exam:  moves all extremities well and symmetrically     Neuropsychiatric:   General: normal, calm, and normal eye contact  Level of consciousness: alert / normal  Affect: normal     Skin:   no bruising or bleeding, normal skin color, texture, turgor, and no redness, warmth, or swelling          Data:   All laboratory data reviewed  All cardiac studies reviewed by me.  All imaging studies reviewed by me.    CARDIAC CATHETERIZATION:  1.  Proximal LAD stent appears widely patent.  2.  Mid LAD stent is patent.  There is about 30% eccentric calcified narrowing at the distal edge of the stent, unchanged from prior study.  3.  RCA is tortuous, with mild lumen irregularity but no severe stenosis.  4.  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  There is 40% eccentric calcified narrowing proximally and then heavily calcified bifurcation at the first marginal branch,  which has ostial 99% stenosis and slight reduced antegrade filling, noted on previous studies.  5.  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  No stent was deployed.    TRANSTHORACIC ECHOCARDIOGRAPHY:  1.Left ventricular function is decreased. The ejection fraction is 45-50%  (mildly reduced).  2.There is mild concentric left ventricular hypertrophy.  3.Normal right ventricle size and systolic function.  4.Moderate most likely severe stenosis of a trileaflet aortic valve. Appears  to be low-flow low gradient with a  dimensionless index is 0.15, peak velocity  3.9 m/s and mean gradient 35 mmHg in the setting of stroke-volume index of 25  mL/metered squared. Prior was 32 mmHg mean gradient. There is mild aortic  insufficiency with deceleration pressure half-time of 558 ms.  5.The ascending aorta is Mildly dilated.  Compared to the prior study dated 6/22/2023, the LV EF is lower and thus the  AS more significant.    EKG:  Sinus bradycardia Sinus arrhythmia   Left axis deviation   Left ventricular hypertrophy with repolarization abnormality   Prolonged QT   Abnormal ECG   When compared with ECG of 06-JUL-2023 07:14,   Premature atrial complexes are no longer Present   Vent. rate has decreased BY  21 BPM   T wave inversion more evident in Lateral leads

## 2023-07-25 NOTE — LETTER
7/25/2023    Zachary Lewis MD  [address] [place] [address]    RE: Aayush García       Dear Colleague,     I had the pleasure of seeing Aayush García in the [facility].  Cardiac and Thoracic Surgery Consultation      Aayush García MRN# [id number]   YOB: 1955 Age: 67 year old        Reason for consult: I was asked by Dr. Rodriguez to evaluate this patient for severe aortic stenosis and ischemic cardiomyopathy status post PIC/LEYDI placement.           Assessment and Plan:   Mr. García is a 67-year-old man with severe aortic stenosis and ischemic cardiomyopathy status post remote as well as recent PCI/LEYDI in the LAD and left circumflex territories. I recommend transcatheter aortic valve replacement given his significant comorbidities. He has high STS risk. I discussed the technical details of the procedure, as well as the risks and benefits. The patient understands the risks which include but are not limited to bleeding, infection, stroke, coronary obstruction, aortic dissection, aortic root rupture, and end-organ injury. We discussed the treatment of such complications. The patient understands that he could choose to have open surgery and cardiopulmonary bypass in the rare event that he would suffer a complication requiring surgery, and he also understands that he could decide in advance that he does not want to have a sternotomy and cardiopulmonary bypass and instead would have comfort care if she had such a complication. He is going to consider all of this before making a decision and he will let the TAVR coordinator know what he would like done when they check with him before the procedure.               Chief Complaint:   Mr. García is a 67-year-old man with severe aortic stenosis and ischemic cardiomyopathy status post remote as well as recent PCI/LEYDI in the LAD and left circumflex territories.    History is obtained from the patient         History of Present  Illness:   This patient is a 67 year old male who presents to discuss the treatment of his aortic stenosis with TAVR. He also has ischemic cardiomyopathy status post remote PCI to the proximal LAD with LEYDI in 2019 in the setting of cardiogenic shock. He then underwent mid LAD PCI/LEYDI in 2021. 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 There is a left circumflex 40% eccentric calcified narrowing proximally and then heavily calcified bifurcation at the first marginal branch,  which has ostial 99% stenosis and slight reduced antegrade filling. On the most recent left heart catheterization, he had successful PTCA of the ostium of the first obtuse marginal branch of anomalous left circumflex.  No stent was deployed.    He also has a history of alcoholic hepatitis with ascites.     With limited activity, he is doing quite well, and he denies CP, HO, SOB, orthopnea, PND, edema, and syncope or presyncope. He does use a walker because he has degenerative joint disease of his hip and left knee.                Past Medical History:   Past Medical History:  8/5/2018: Acute liver failure  1/14/2020: Acute on chronic systolic congestive heart failure (H)  08/05/2018: Acute renal failure, unspecified acute renal failure type   (H)  12/16/2019: Acute respiratory failure with hypoxia (H)  08/05/2018: Acute respiratory failure with hypoxia (H)  08/05/2018: Alcoholic hepatitis with ascites  No date: Bacteremia due to Klebsiella pneumoniae  4/12/2018: Benign essential hypertension  No date: Chronic liver disease  11/23/2019: Closed fracture of multiple ribs of right side, initial   encounter  9/24/2019: Coronary artery disease involving native coronary artery   of native heart without angina pectoris  No date: Essential hypertension  10/31/2018: Gastroesophageal reflux disease without esophagitis  10/27/2019: GI bleeding  4/4/2019: Heart failure with reduced ejection  fraction (H)  08/05/2018: Hepatic encephalopathy (H)  No date: Macrocytic anemia  3/18/2019: Non-ST elevation MI (NSTEMI) (H)      Comment:  Overview:  Added automatically from request for surgery                [id number]  03/2019: NSTEMI (non-ST elevated myocardial infarction) (H)      Comment:  s/p stent placement  4/9/2019: Primary localized osteoarthrosis of left lower leg  08/05/2018: Stress-induced cardiomyopathy Hypertension          Past Surgical History:     Past Surgical History:   Procedure Laterality Date    COLONOSCOPY N/A 3/20/2018    Procedure: COLONOSCOPY;  Surgeon: Adam Nicole III, MD;  Location: [facility];  Service:     CV CORONARY ANGIOGRAM N/A 3/18/2019    Procedure: Coronary Angiogram;  Surgeon: Timi Rodriguez MD;  Location: [facility] Cath Lab;  Service: Cardiology    CV CORONARY ANGIOGRAM N/A 9/22/2021    Procedure: Coronary Angiogram;  Surgeon: Brianda Avilez MD;  Location: [facility] CATH LAB CV    CV CORONARY ANGIOGRAM N/A 7/6/2023    Procedure: Coronary Angiogram;  Surgeon: Rafat Pinto MD;  Location: [facility] LAB CV    CV FRACTIONAL FLOW RATIO WIRE N/A 9/22/2021    Procedure: Fractional Flow Ratio Wire;  Surgeon: Brianda Avilez MD;  Location: [facility] LAB CV    CV LEFT HEART CATH N/A 7/6/2023    Procedure: Left Heart Catheterization;  Surgeon: Rafat Pinto MD;  Location: [facility] CATH LAB CV    CV LEFT HEART CATHETERIZATION WITHOUT LEFT VENTRICULOGRAM Left 3/18/2019    Procedure: Left Heart Catheterization Without Left Ventriculogram;  Surgeon: Timi Rodriguez MD;  Location: [facility] Cath Lab;  Service: Cardiology    CV PCI N/A 9/22/2021    Procedure: Percutaneous Coronary Intervention;  Surgeon: Brianda Avilez MD;  Location: [facility] CATH LAB CV    CV PCI N/A 7/6/2023    Procedure: Percutaneous Coronary Intervention;  Surgeon: Rafat Pinto MD;  Location: [facility] LAB CV    CV PCI ATHERECTOMY ORBITAL N/A 9/22/2021    Procedure:  Percutaneous Coronary Intervention Atherectomy Rotational;  Surgeon: Brianda Avilez MD;  Location: [facility] CATH LAB CV    IR CVC TUNNEL PLACEMENT > 5 YRS OF AGE  8/1/2018    IR TUNNELED CATHETER REMOVAL  8/31/2018    PICC  7/24/2018         PICC  3/18/2019         US THORACENTESIS  10/28/2019    [facility] CORONARY STENT PERCUT, INITIAL VESSEL  03/2019                 Social History:   I have reviewed this patient's social history          Family History:   This patient has no significant family history          Immunizations:   Immunization status is unknown          Allergies:   All allergies reviewed and addressed          Medications:     Current Outpatient Medications Ordered in Epic   Medication    acetaminophen (TYLENOL 8 HOUR ARTHRITIS PAIN) 650 MG CR tablet    aspirin (ASA) 81 MG EC tablet    atorvastatin (LIPITOR) 80 MG tablet    B Complex-C-Folic Acid (NEPHRO-FRANCISCO) 0.8 MG TABS    carvedilol (COREG) 25 MG tablet    Cholecalciferol (VITAMIN D) 50 MCG (2000 UT) CAPS    ferrous sulfate (FEROSUL) 325 (65 Fe) MG tablet    folic acid (FOLVITE) 400 MCG tablet    losartan (COZAAR) 25 MG tablet    magnesium oxide (MAG-OX) 400 MG tablet    Multiple Vitamins-Minerals (ONCOVITE) TABS    order for DME    pantoprazole (PROTONIX) 20 MG EC tablet    potassium chloride ER (KLOR-CON M) 20 MEQ CR tablet    ANTIPLATELET MEDICATION NOT PRESCRIBED, INTENTIONAL,    FLUZONE HIGH-DOSE QUADRIVALENT 0.7 ML MARQUITA injection    PFIZER COVID-19 VAC BIVALENT 30 MCG/0.3ML injection     No current [facility]-ordered facility-administered medications on file.             Review of Systems:     The 10 point Review of Systems is negative other than noted in the HPI            Physical Exam:   Vitals were reviewed      BP: 128/67 Pulse: 73   Resp: 16        Constitutional:   awake, alert, cooperative, no apparent distress, and appears stated age     Eyes:   Lids and lashes normal, pupils equal, round and reactive to light, extra ocular muscles intact,  sclera clear, conjunctiva normal     ENT:   normocepalic, without obvious abnormality     Neck:   supple, symmetrical, trachea midline     Lungs:   no increased work of breathing, good air exchange, and no retractions     Cardiovascular:   regular rate and rhythm     Abdomen:   non-distended     Musculoskeletal:   no lower extremity pitting edema present  there is no redness, warmth, or swelling of the joints  full range of motion noted  motor strength is 5 out of 5 all extremities bilaterally     Neurologic:   Mental Status Exam:  Level of Alertness:   awake  Orientation:   person, place, time  Cranial Nerves:  cranial nerves II-XII are grossly intact  Motor Exam:  moves all extremities well and symmetrically     Neuropsychiatric:   General: normal, calm, and normal eye contact  Level of consciousness: alert / normal  Affect: normal     Skin:   no bruising or bleeding, normal skin color, texture, turgor, and no redness, warmth, or swelling          Data:   All laboratory data reviewed  All cardiac studies reviewed by me.  All imaging studies reviewed by me.    CARDIAC CATHETERIZATION:  1.  Proximal LAD stent appears widely patent.  2.  Mid LAD stent is patent.  There is about 30% eccentric calcified narrowing at the distal edge of the stent, unchanged from prior study.  3.  RCA is tortuous, with mild lumen irregularity but no severe stenosis.  4.  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  There is 40% eccentric calcified narrowing proximally and then heavily calcified bifurcation at the first marginal branch,  which has ostial 99% stenosis and slight reduced antegrade filling, noted on previous studies.  5.  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  No stent was deployed.    TRANSTHORACIC ECHOCARDIOGRAPHY:  1.Left ventricular function is decreased. The ejection fraction is 45-50%  (mildly reduced).  2.There  is mild concentric left ventricular hypertrophy.  3.Normal right ventricle size and systolic function.  4.Moderate most likely severe stenosis of a trileaflet aortic valve. Appears  to be low-flow low gradient with a dimensionless index is 0.15, peak velocity  3.9 m/s and mean gradient 35 mmHg in the setting of stroke-volume index of 25  mL/metered squared. Prior was 32 mmHg mean gradient. There is mild aortic  insufficiency with deceleration pressure half-time of 558 ms.  5.The ascending aorta is Mildly dilated.  Compared to the prior study dated 6/22/2023, the LV EF is lower and thus the  AS more significant.    EKG:  Sinus bradycardia Sinus arrhythmia   Left axis deviation   Left ventricular hypertrophy with repolarization abnormality   Prolonged QT   Abnormal ECG   When compared with ECG of 06-JUL-2023 07:14,   Premature atrial complexes are no longer Present   Vent. rate has decreased BY  21 BPM   T wave inversion more evident in Lateral leads       Thank you for allowing me to participate in the care of your patient.      Sincerely,     Maico Maza MD     [facility]  cc:   No referring provider defined for this encounter.

## 2023-07-28 ENCOUNTER — TELEPHONE (OUTPATIENT)
Dept: CARDIOLOGY | Facility: CLINIC | Age: 68
End: 2023-07-28
Payer: COMMERCIAL

## 2023-07-28 NOTE — TELEPHONE ENCOUNTER
Valve Clinic RN Phone Call:  Call made on 7/28/2023 at 2:14 PM by Sivan Gracia RN    Reason for call: schedule TAVR     Instructions given to patient: contacted patient to discuss scheduling his TAVR procedure. No answer. Left VM for patient to call valve clinic to discuss. Would like to offer patient date of 8/15/23 for procedure. Otherwise would look at scheduling on 8/22 or 8/28.     Sivan Gracia RN on 7/28/2023 at 2:16 PM

## 2023-07-28 NOTE — TELEPHONE ENCOUNTER
===View-only below this line===  ----- Message -----  From: Timi Rodriguez MD  Sent: 2023   9:57 AM CDT  To: Sivan Gracia RN  Subject: RE: pre TAVR                                     I think no anatomic reason to not do TAVR from my notes.  ----- Message -----  From: Sivan Gracia RN  Sent: 2023   8:57 AM CDT  To: Timi Rodriguez MD  Subject: RE: pre TAVR                                     MY-  I had a note that patient may potentially be a better surgical candidate on my work up list. Patient saw Hudhayde on  and it looks like he is still wanting to move forward with TAVR. Are you okay with me scheduling him for August?     Thanks,  AV  ----- Message -----  From: Timi Rodriguez MD  Sent: 2023  11:44 AM CDT  To: Sivan Gracia RN  Subject: RE: pre TAVR                                     I talked to him about it and reviewed the CT and echo.  My sense is that invasive gradient is inaccurate.  I would continue w/ the work up, as aplanned.    Thx!  Timi  ----- Message -----  From: Sivan Gracia RN  Sent: 2023  11:06 AM CDT  To: Timi Rodriguez MD  Subject: pre TAVR                                         MY-  Can you please review this patient? I was following up on his angio which was done last week with PADMAJA and he documented the followin. Re-evaluate TAVR plans; AORTIC STENOSIS appears only mild to moderate.  2. If patient experiences symptomatic OM1 restenosis, would consider repeat PCI from  femoral access with rotoblator/shockwave pretreatment and then stenting, probable bifurcation technique.    When you saw this pt in [facility] initially you noted poor imaging quality on initial echo- we repeated a limited with specific request to evaluate aortic valve and you sent me a message after review saying ok to move forward with TAVR work up.     How would you like me to proceed?     Thanks,  AV

## 2023-08-01 ENCOUNTER — TELEPHONE (OUTPATIENT)
Dept: CARDIOLOGY | Facility: CLINIC | Age: 68
End: 2023-08-01
Payer: COMMERCIAL

## 2023-08-01 DIAGNOSIS — I35.0 SEVERE AORTIC STENOSIS: ICD-10-CM

## 2023-08-01 DIAGNOSIS — I35.0 AORTIC STENOSIS, MODERATE: Primary | ICD-10-CM

## 2023-08-01 NOTE — TELEPHONE ENCOUNTER
Spoke to patients spouse, patient instructed Karlie to connect with valve team to help with scheduling as he is at work right now. Patient would like to be scheduled for procedure in September. Patient offered date of 9/5. We discussed what to expect for pre op exam and week of procedure. Instructed to expect a call from our scheduling team to arrange for pre op evaluation which will be the week before due to the holiday in early September. Karlie instructed to contact valve team with any questions or concerns.     Sivan Gracia RN on 8/1/2023 at 12:53 PM

## 2023-08-01 NOTE — TELEPHONE ENCOUNTER
LM x 2 with patient cell and spouse cell to schedule TAVR procedure.     Sivan Gracia RN on 8/1/2023 at 9:07 AM

## 2023-08-02 ENCOUNTER — TELEPHONE (OUTPATIENT)
Dept: CARDIOLOGY | Facility: CLINIC | Age: 68
End: 2023-08-02
Payer: COMMERCIAL

## 2023-08-02 NOTE — TELEPHONE ENCOUNTER
[facility] Call Center    Phone Message    May a detailed message be left on voicemail: yes     Reason for Call: Other: Karlie would like call back regarding some questions she has about per-op. Please reach out to Karlie on cell.      Action Taken: Other: Cardiology     Travel Screening: Not Applicable    Thank you!  [facility]

## 2023-08-02 NOTE — TELEPHONE ENCOUNTER
Attempted to call, no answer.  left with direct call back number for valve clinic.     Sivan Gracia RN on 8/2/2023 at 10:17 AM

## 2023-08-16 ENCOUNTER — TELEPHONE (OUTPATIENT)
Dept: CARDIOLOGY | Facility: CLINIC | Age: 68
End: 2023-08-16
Payer: COMMERCIAL

## 2023-08-16 NOTE — TELEPHONE ENCOUNTER
Spouse Karlie dropped off [facility] paperwork for patient for upcoming TAVR procedure. Patient scheduled 9/5/23. Completed paperwork and signed off by Dr. Timi Rodriguez. Contacted Karlie at 1250 PM to notify her that paperwork is completed. Karlie request to  the paperwork, was instructed by patients employer to have them bring the paperwork in. Karlie declined offer for team to fax directly to  and will  paperwork at the [facility] on 8/17/23. Document left in original envelope at  with staff.     Sivan Gracia RN on 8/16/2023 at 12:52 PM

## 2023-08-28 DIAGNOSIS — I50.22 CHRONIC SYSTOLIC HEART FAILURE (H): ICD-10-CM

## 2023-08-28 DIAGNOSIS — I35.0 SEVERE AORTIC STENOSIS: Primary | ICD-10-CM

## 2023-08-30 LAB
ABO/RH(D): NORMAL
ANTIBODY SCREEN: NEGATIVE
SPECIMEN EXPIRATION DATE: NORMAL

## 2023-08-31 ENCOUNTER — ALLIED HEALTH/NURSE VISIT (OUTPATIENT)
Dept: CARDIOLOGY | Facility: CLINIC | Age: 68
End: 2023-08-31
Payer: COMMERCIAL

## 2023-08-31 ENCOUNTER — PREP FOR PROCEDURE (OUTPATIENT)
Dept: CARDIOLOGY | Facility: CLINIC | Age: 68
End: 2023-08-31

## 2023-08-31 ENCOUNTER — OFFICE VISIT (OUTPATIENT)
Dept: CARDIOLOGY | Facility: CLINIC | Age: 68
End: 2023-08-31
Payer: COMMERCIAL

## 2023-08-31 ENCOUNTER — APPOINTMENT (OUTPATIENT)
Dept: CARDIOLOGY | Facility: CLINIC | Age: 68
End: 2023-08-31
Payer: COMMERCIAL

## 2023-08-31 VITALS
HEART RATE: 59 BPM | SYSTOLIC BLOOD PRESSURE: 156 MMHG | WEIGHT: 197.8 LBS | BODY MASS INDEX: 29.98 KG/M2 | DIASTOLIC BLOOD PRESSURE: 67 MMHG | HEIGHT: 68 IN | RESPIRATION RATE: 16 BRPM

## 2023-08-31 VITALS — SYSTOLIC BLOOD PRESSURE: 152 MMHG | DIASTOLIC BLOOD PRESSURE: 68 MMHG

## 2023-08-31 DIAGNOSIS — I35.0 SEVERE AORTIC STENOSIS: ICD-10-CM

## 2023-08-31 DIAGNOSIS — I25.10 CORONARY ARTERY DISEASE INVOLVING NATIVE CORONARY ARTERY OF NATIVE HEART WITHOUT ANGINA PECTORIS: ICD-10-CM

## 2023-08-31 DIAGNOSIS — I35.0 SEVERE AORTIC STENOSIS: Primary | ICD-10-CM

## 2023-08-31 DIAGNOSIS — I50.22 CHRONIC SYSTOLIC HEART FAILURE (H): ICD-10-CM

## 2023-08-31 DIAGNOSIS — F10.20 ALCOHOL DEPENDENCE, UNCOMPLICATED (H): ICD-10-CM

## 2023-08-31 DIAGNOSIS — I25.5 ISCHEMIC CARDIOMYOPATHY: Primary | ICD-10-CM

## 2023-08-31 LAB
ALBUMIN SERPL BCG-MCNC: 4.3 G/DL (ref 3.5–5.2)
ALP SERPL-CCNC: 61 U/L (ref 40–129)
ALT SERPL W P-5'-P-CCNC: 23 U/L (ref 0–70)
ANION GAP SERPL CALCULATED.3IONS-SCNC: 11 MMOL/L (ref 7–15)
AST SERPL W P-5'-P-CCNC: 27 U/L (ref 0–45)
BASOPHILS # BLD AUTO: 0.1 10E3/UL (ref 0–0.2)
BASOPHILS NFR BLD AUTO: 1 %
BILIRUB SERPL-MCNC: 1.1 MG/DL
BUN SERPL-MCNC: 11.6 MG/DL (ref 8–23)
CALCIUM SERPL-MCNC: 9.2 MG/DL (ref 8.8–10.2)
CHLORIDE SERPL-SCNC: 98 MMOL/L (ref 98–107)
CREAT SERPL-MCNC: 0.71 MG/DL (ref 0.67–1.17)
DEPRECATED HCO3 PLAS-SCNC: 24 MMOL/L (ref 22–29)
EOSINOPHIL # BLD AUTO: 0.4 10E3/UL (ref 0–0.7)
EOSINOPHIL NFR BLD AUTO: 6 %
ERYTHROCYTE [DISTWIDTH] IN BLOOD BY AUTOMATED COUNT: 12.1 % (ref 10–15)
GFR SERPL CREATININE-BSD FRML MDRD: >90 ML/MIN/1.73M2
GLUCOSE SERPL-MCNC: 93 MG/DL (ref 70–99)
HCT VFR BLD AUTO: 38.2 % (ref 40–53)
HGB BLD-MCNC: 12.8 G/DL (ref 13.3–17.7)
IMM GRANULOCYTES # BLD: 0 10E3/UL
IMM GRANULOCYTES NFR BLD: 0 %
INR PPP: 1.09 (ref 0.85–1.15)
LYMPHOCYTES # BLD AUTO: 1.8 10E3/UL (ref 0.8–5.3)
LYMPHOCYTES NFR BLD AUTO: 24 %
MAGNESIUM SERPL-MCNC: 1.6 MG/DL (ref 1.7–2.3)
MCH RBC QN AUTO: 32.7 PG (ref 26.5–33)
MCHC RBC AUTO-ENTMCNC: 33.5 G/DL (ref 31.5–36.5)
MCV RBC AUTO: 98 FL (ref 78–100)
MONOCYTES # BLD AUTO: 0.8 10E3/UL (ref 0–1.3)
MONOCYTES NFR BLD AUTO: 11 %
NEUTROPHILS # BLD AUTO: 4.4 10E3/UL (ref 1.6–8.3)
NEUTROPHILS NFR BLD AUTO: 58 %
NRBC # BLD AUTO: 0 10E3/UL
NRBC BLD AUTO-RTO: 0 /100
NT-PROBNP SERPL-MCNC: 2283 PG/ML (ref 0–900)
PLATELET # BLD AUTO: 233 10E3/UL (ref 150–450)
POTASSIUM SERPL-SCNC: 4.3 MMOL/L (ref 3.4–5.3)
PROT SERPL-MCNC: 7.4 G/DL (ref 6.4–8.3)
RBC # BLD AUTO: 3.91 10E6/UL (ref 4.4–5.9)
SODIUM SERPL-SCNC: 133 MMOL/L (ref 136–145)
WBC # BLD AUTO: 7.4 10E3/UL (ref 4–11)

## 2023-08-31 PROCEDURE — 99214 OFFICE O/P EST MOD 30 MIN: CPT | Performed by: INTERNAL MEDICINE

## 2023-08-31 PROCEDURE — 83735 ASSAY OF MAGNESIUM: CPT | Performed by: INTERNAL MEDICINE

## 2023-08-31 PROCEDURE — 86900 BLOOD TYPING SEROLOGIC ABO: CPT | Performed by: INTERNAL MEDICINE

## 2023-08-31 PROCEDURE — 85610 PROTHROMBIN TIME: CPT | Performed by: INTERNAL MEDICINE

## 2023-08-31 PROCEDURE — 85025 COMPLETE CBC W/AUTO DIFF WBC: CPT | Performed by: INTERNAL MEDICINE

## 2023-08-31 PROCEDURE — 93000 ELECTROCARDIOGRAM COMPLETE: CPT | Performed by: INTERNAL MEDICINE

## 2023-08-31 PROCEDURE — 86901 BLOOD TYPING SEROLOGIC RH(D): CPT | Performed by: INTERNAL MEDICINE

## 2023-08-31 PROCEDURE — 86850 RBC ANTIBODY SCREEN: CPT | Performed by: INTERNAL MEDICINE

## 2023-08-31 PROCEDURE — 36415 COLL VENOUS BLD VENIPUNCTURE: CPT | Performed by: INTERNAL MEDICINE

## 2023-08-31 PROCEDURE — 80053 COMPREHEN METABOLIC PANEL: CPT | Performed by: INTERNAL MEDICINE

## 2023-08-31 PROCEDURE — 99207 PR NO CHARGE LOS: CPT

## 2023-08-31 PROCEDURE — 83880 ASSAY OF NATRIURETIC PEPTIDE: CPT | Performed by: INTERNAL MEDICINE

## 2023-08-31 RX ORDER — ASPIRIN 325 MG
325 TABLET ORAL ONCE
Status: CANCELLED | OUTPATIENT
Start: 2023-09-05 | End: 2023-08-31

## 2023-08-31 RX ORDER — SODIUM CHLORIDE 9 MG/ML
INJECTION, SOLUTION INTRAVENOUS CONTINUOUS
Status: CANCELLED | OUTPATIENT
Start: 2023-09-05

## 2023-08-31 RX ORDER — CEFAZOLIN SODIUM/WATER 2 G/20 ML
2 SYRINGE (ML) INTRAVENOUS
Status: CANCELLED | OUTPATIENT
Start: 2023-09-05

## 2023-08-31 RX ORDER — LIDOCAINE 40 MG/G
CREAM TOPICAL
Status: CANCELLED | OUTPATIENT
Start: 2023-08-31

## 2023-08-31 NOTE — PROGRESS NOTES
Patient in to see RN for Pre-TAVR visit on 8/31    All pre-procedure labs drawn: yes   EKG obtained: yes     Final STS score: 3%    5 Meter Walk: Yes see documentation below    Labs reviewed: pending    Patient instructed on medications:   Pt to only take losartan, pantoprazole and (4) 81mg aspirin    Loading dose of ASA: pt to take at home    Patient has advanced directive: no    Education was given to patient regarding what to expect pre-procedure.     Patient was informed procedure will be done at [facility]: Main Entrance at [address] [place] [address]; [place] [address] and their arrival time is at 5    TAVR consent signed at the time of the appt: 530am    All questions were answered to family and patient by RN.    Pt and pts wife were present at the time of appointment.      Cindy Rogers RN BSN  Structural Heart Coordinator  [facility]   [phone]

## 2023-08-31 NOTE — H&P (VIEW-ONLY)
HEART CARE ENCOUNTER NOTE       [facility]  [phone]    Assessment/Recommendations   Problem List Items Addressed This Visit          Circulatory    Chronic systolic heart failure (H)    Severe aortic stenosis    Coronary artery disease involving native coronary artery of native heart without angina pectoris    Ischemic cardiomyopathy - Primary       Other    Alcoholism /alcohol abuse       1.  Severe low-flow low gradient Aortic Stenosis - He has a mildly reduced EF and there is mild LV hypertrophy, although he doesn't endorse significant symptoms. He has been evaluated by a multidisciplinary team and has been deemed a good candidate for transcatheter aortic valve replacement.  He has now undergone all the required workup for TAVR and wishes to proceed.   We discussed the procedure in detail,  including post-procedure care, restrictions and follow up.   He understands that there is a 4-5% risk of bleeding, infection, stroke, heart block requiring permanent pacemaker, cardiac perforation, aortic root rupture, dissection and death.  Patient also understands that if something unexpected happens, the procedure may need to be stopped or changed to help him.     All questions were answered   Consents were signed and witnessed by me.  He does not want cardiopulmonary bypass or urgent sternotomy in the rare event that aortic dissection or aortic rupture occurs.  He has never had trouble with anesthesia in the past.    Labs today reveal Hgb 12.8, normal WBC, Na+ 133 (chronically low) and Mag 1.6 (also chronically low).  Creat is 0.71    The plan will be for general anesthesia with left subclavian access.  We will use the Chowdary Shelley valve. Aayush García will report Tuesday morning for the procedure and all instructions regarding times and medications were given by TAVR coordinator RN.     2.  Ischemic cardiomyopathy, chronic heart failure with mildly reduced patient fraction, NYHA class I -  currently compensated, despite NT proBNP being elevated at 2283.  He has no recent weight gain and denies PND or orthopnea.     3.  Hypertension - BP today is not at goal, even after recheck, but home blood pressures have been stable with systolic numbers ranging 120-130.  No change in current blood pressure medications at this time.  Continue carvedilol 25 mg twice daily and losartan 25 mg daily.  Can increase losartan as needed after TAVR.     4.  CAD with hx of NSTEMI - s/p complex PCI (with Roto/LEYDI) to pLAD and IABP surgeon in the setting of cardiogenic shock.  He then underwent PCI with LEYDI to the mid LAD in September 2021.  Patient is currently angina free.      LDL on lipid profile in March was 34.  He should continue atorvastatin 80 mg daily and aspirin 81 mg daily    5.  Severe PAD - continue ASA and statin.     6.  Iron deficiency anemia - Hgb stable today and on iron supplement    7.  Osteoarthritis - patient is limited b/c of his hip and sometimes uses a cane.      8.  Electrolyte imbalance -with chronically low magnesium, potassium and sodium.  Continue magnesium oxide 400 mg twice daily and potassium chloride 20 mEq daily       History of Present Illness/Subjective    Aayush García is a 68 year old male who comes in today for history and physical prior to TAVR (transcatheter aortic valve replacement).   His significant other accompanies him to the visit.    Aayush has past medical history significant for aortic stenosis, ischemic cardiomyopathy, chronic heart failure with reduced ejection fraction, coronary artery disease multiple interventions in the past, alcoholic hepatitis with ascites, hypertension, anomalous left circumflex    In 2019, patient required a high risk PCI to the pLAD (with rotoblater and LEYDI) in the setting of cardiogenic shock.  He required pressors, but ultimately recovered and ended up having normalization of his LVEF.  He was seen by Dr. Avilez for possible TAVR work-up in 2021  and underwent another coronary angiogram with complex PCI to the mid LAD.  He was then to get dental work done and it took awhile.  Thus he has not followed up with the structural team until now.    He has not had significant progression of his symptoms, but has stopped drinking alcohol.    Aayush García denies chest discomfort, palpitations, shortness of breath, paroxysmal nocturnal dyspnea, orthopnea, lightheadedness, dizziness, pre-syncope, or syncope.  Aayush García also denies any weight loss, changes in appetite, nausea or vomiting.     Medical, surgical, family, social history, and medications were reviewed and updated as necessary.    ECG (personally reviewed & interpreted): 8/31/2023 shows sinus bradycardia.  NE interval 158 ms and QRS duration 108 ms    ECHOCARDIOGRAM done on 6/27/2023:  Interpretation Summary     1.Left ventricular function is decreased. The ejection fraction is 45-50%  (mildly reduced).  2.There is mild concentric left ventricular hypertrophy.  3.Normal right ventricle size and systolic function.  4.Moderate most likely severe stenosis of a trileaflet aortic valve. Appears  to be low-flow low gradient with a dimensionless index is 0.15, peak velocity  3.9 m/s and mean gradient 35 mmHg in the setting of stroke-volume index of 25  mL/metered squared. Prior was 32 mmHg mean gradient. There is mild aortic  insufficiency with deceleration pressure half-time of 558 ms.  5.The ascending aorta is Mildly dilated.  Compared to the prior study dated 6/22/2023, the LV EF is lower and thus the  AS more significant.      CATH done on 7/6/2023:     1st Mrg lesion is 99% stenosed.     Mid LAD lesion is 30% stenosed.     Ost Cx to Prox Cx lesion is 50% stenosed.     Left ventricular filling pressures are normal.     1.  Proximal LAD stent appears widely patent.  2.  Mid LAD stent is patent.  There is about 30% eccentric calcified narrowing at the distal edge of the stent, unchanged from prior  "study.  3.  RCA is tortuous, with mild lumen irregularity but no severe stenosis.  4.  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  There is 40% eccentric calcified narrowing proximally and then heavily calcified bifurcation at the first marginal branch,  which has ostial 99% stenosis and slight reduced antegrade filling, noted on previous studies.  5.  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  No stent was deployed.  6.  Calcified aortic valve leaflets with reduced systolic excursion.  Pullback gradient shows mean LV-Ao gradient 18mmHg peak, 9 mmHg mean, consistent with mild to moderate aortic stenosis.       Physical Examination Review of Systems   Vitals: BP (!) 156/67 (BP Location: Left arm, Patient Position: Sitting, Cuff Size: Adult Large)   Pulse 59   Resp 16   Ht 1.727 m (5' 8\")   Wt 89.7 kg (197 lb 12.8 oz)   BMI 30.08 kg/m    BMI= Body mass index is 30.08 kg/m .  Wt Readings from Last 3 Encounters:   08/31/23 89.7 kg (197 lb 12.8 oz)   07/25/23 89.8 kg (198 lb)   07/06/23 89.4 kg (197 lb)       General Appearance:   Alert, cooperative and in no acute distress   ENT/Mouth: membranes moist, no oral lesions or bleeding gums.      EYES:  no scleral icterus, normal conjunctivae   Neck: no carotid bruits.  Thyroid not visualized   Chest/Lungs:   lungs are clear to auscultation, no rales or wheezing   Cardiovascular:   Bradycardic, but regular. Normal first and second heart sounds with 5/6 systolic murmur.  No rubs or gallops; the carotid, radial and posterior tibial pulses are intact, no edema bilaterally    Abdomen:  Soft and nontender. Bowel sounds are present in all quadrants   Extremities: no cyanosis or clubbing   Skin: no xanthelasma, warm.    Neurologic: normal gait, normal  bilateral, no tremors   Psychiatric: Normal mood and affect       Please refer above for cardiac ROS details.  "     Medical History  Surgical History Family History Social History   Past Medical History:   Diagnosis Date     Acute liver failure 8/5/2018     Acute on chronic systolic congestive heart failure (H) 1/14/2020     Acute renal failure, unspecified acute renal failure type (H) 08/05/2018     Acute respiratory failure with hypoxia (H) 12/16/2019     Acute respiratory failure with hypoxia (H) 08/05/2018     Alcoholic hepatitis with ascites 08/05/2018     Bacteremia due to Klebsiella pneumoniae      Benign essential hypertension 4/12/2018     Chronic liver disease      Closed fracture of multiple ribs of right side, initial encounter 11/23/2019     Coronary artery disease involving native coronary artery of native heart without angina pectoris 9/24/2019     Essential hypertension      Gastroesophageal reflux disease without esophagitis 10/31/2018     GI bleeding 10/27/2019     Heart failure with reduced ejection fraction (H) 4/4/2019     Hepatic encephalopathy (H) 08/05/2018     Macrocytic anemia      Non-ST elevation MI (NSTEMI) (H) 3/18/2019    Overview:  Added automatically from request for surgery [id number]     NSTEMI (non-ST elevated myocardial infarction) (H) 03/2019    s/p stent placement     Primary localized osteoarthrosis of left lower leg 4/9/2019     Stress-induced cardiomyopathy 08/05/2018     Past Surgical History:   Procedure Laterality Date     COLONOSCOPY N/A 3/20/2018    Procedure: COLONOSCOPY;  Surgeon: Adam Nicole III, MD;  Location: [facility] OR;  Service:      CV CORONARY ANGIOGRAM N/A 3/18/2019    Procedure: Coronary Angiogram;  Surgeon: Timi Rodriguez MD;  Location: [facility] Cath Lab;  Service: Cardiology     CV CORONARY ANGIOGRAM N/A 9/22/2021    Procedure: Coronary Angiogram;  Surgeon: Brianda Avilez MD;  Location: [facility] CATH LAB CV     CV CORONARY ANGIOGRAM N/A 7/6/2023    Procedure: Coronary Angiogram;  Surgeon: Rafat Pinto MD;  Location: [facility] CATH LAB CV     CV  FRACTIONAL FLOW RATIO WIRE N/A 2021    Procedure: Fractional Flow Ratio Wire;  Surgeon: Brianda Avilez MD;  Location: [facility] LAB CV     CV LEFT HEART CATH N/A 2023    Procedure: Left Heart Catheterization;  Surgeon: Rafat Pinto MD;  Location: [facility] LAB CV     CV LEFT HEART CATHETERIZATION WITHOUT LEFT VENTRICULOGRAM Left 3/18/2019    Procedure: Left Heart Catheterization Without Left Ventriculogram;  Surgeon: Timi Rodriguez MD;  Location: [facility] Cath Lab;  Service: Cardiology     CV PCI N/A 2021    Procedure: Percutaneous Coronary Intervention;  Surgeon: Brianda Avilez MD;  Location: [facility] LAB CV     CV PCI N/A 2023    Procedure: Percutaneous Coronary Intervention;  Surgeon: Rafat Pinto MD;  Location: [facility] CV     CV PCI ATHERECTOMY ORBITAL N/A 2021    Procedure: Percutaneous Coronary Intervention Atherectomy Rotational;  Surgeon: Brianda Avilez MD;  Location: [facility] CV     IR CVC TUNNEL PLACEMENT > 5 YRS OF AGE  2018     IR TUNNELED CATHETER REMOVAL  2018     PICC  2018          PICC  3/18/2019           THORACENTESIS  10/28/2019     [facility] CORONARY STENT PERCUT, INITIAL VESSEL  2019     Family History   Problem Relation Age of Onset     Heart Disease Father 76.00        type unknown to Aayush; his father  at home     Diabetes No family hx of      Coronary Artery Disease Early Onset No family hx of      Cancer No family hx of      Alzheimer Disease Mother 86.00        lives in long term care     No Known Problems Son      No Known Problems Son     Social History     Socioeconomic History     Marital status: Single     Spouse name: Not on file     Number of children: Not on file     Years of education: Not on file     Highest education level: Not on file   Occupational History     Occupation: [employer]   Tobacco Use     Smoking status: Former     Packs/day: 1.00     Years: 40.00     Pack years: 40.00      Types: Cigarettes     Quit date: 2019     Years since quittin.3     Passive exposure: Never     Smokeless tobacco: Never     Tobacco comments:     3 Cig/Week   Vaping Use     Vaping Use: Never used   Substance and Sexual Activity     Alcohol use: Not Currently     Comment: Alcoholic Drinks/day: 350 ml of peppermint schnapps daily per [facility] h, 2018 H&P per report of АННАKarlie to Dr. Valle     Drug use: No     Sexual activity: Yes     Partners: Female   Other Topics Concern     Parent/sibling w/ CABG, MI or angioplasty before 65F 55M? Not Asked   Social History Narrative    Works in [employer]. Lives with his Karlie JJ.     Social Determinants of Health     Financial Resource Strain: Not on file   Food Insecurity: Not on file   Transportation Needs: Not on file   Physical Activity: Not on file   Stress: Not on file   Social Connections: Not on file   Intimate Partner Violence: Not on file   Housing Stability: Not on file          Medications  Allergies   Current Outpatient Medications   Medication Sig Dispense Refill     acetaminophen (TYLENOL 8 HOUR ARTHRITIS PAIN) 650 MG CR tablet Take 2 tablets (1,300 mg) by mouth every 8 hours as needed for pain       aspirin (ASA) 81 MG EC tablet Take 1 tablet (81 mg) by mouth daily 90 tablet 3     atorvastatin (LIPITOR) 80 MG tablet Take 1 tablet (80 mg) by mouth daily 90 tablet 3     B Complex-C-Folic Acid (NEPHRO-FRANCISCO) 0.8 MG TABS Take 1 tablet by mouth daily       carvedilol (COREG) 25 MG tablet Take 1 tablet (25 mg) by mouth 2 times daily 180 tablet 3     Cholecalciferol (VITAMIN D) 50 MCG (2000 UT) CAPS Take 1 capsule by mouth daily       ferrous sulfate (FEROSUL) 325 (65 Fe) MG tablet Take 325 mg by mouth daily  0     folic acid (FOLVITE) 400 MCG tablet Take 1 tablet (400 mcg) by mouth daily       losartan (COZAAR) 25 MG tablet Take 1 tablet (25 mg) by mouth daily 90 tablet 3     magnesium oxide (MAG-OX) 400 MG tablet Take 1 tablet (400 mg) by mouth 2  times daily 60 tablet 11     Multiple Vitamins-Minerals (ONCOVITE) TABS Take 1 tablet by mouth daily       pantoprazole (PROTONIX) 20 MG EC tablet Take 1 tablet (20 mg) by mouth daily 90 tablet 3     potassium chloride ER (KLOR-CON M) 20 MEQ CR tablet Take 1 tablet (20 mEq) by mouth daily 30 tablet 11    Allergies   Allergen Reactions     No Known Allergies          Lab Results    Chemistry/lipid CBC Cardiac Enzymes/BNP/TSH/INR   Recent Labs   Lab Test 03/21/23  1431   CHOL 111   HDL 42   LDL 34   TRIG 175*     Recent Labs   Lab Test 03/21/23  1431 11/02/21  1404 02/12/18  1635   LDL 34 40 96     Recent Labs   Lab Test 07/06/23  0702 07/05/23  1442   * 129*   POTASSIUM  --  4.2   CHLORIDE  --  97*   CO2  --  23   GLC  --  76   BUN  --  11   CR  --  0.71   GFRESTIMATED  --  >90   SOLO  --  9.0     Recent Labs   Lab Test 07/05/23  1442 05/03/23  1550 03/21/23  1431   CR 0.71 0.87 0.72     No results for input(s): A1C in the last 25487 hours. Recent Labs   Lab Test 07/05/23  1442   WBC 8.5   HGB 12.7*   HCT 36.8*   MCV 95        Recent Labs   Lab Test 07/05/23  1442 03/21/23  1431 09/22/21  0727   HGB 12.7* 13.1* 13.4    Recent Labs   Lab Test 12/16/19  1701 12/16/19  1042 12/16/19  0352   TROPONINI 0.02 0.02 <0.01     Recent Labs   Lab Test 12/16/19  0352 10/29/19  0440 03/17/19  0309   BNP 1,634* >5,000* 873*     Recent Labs   Lab Test 10/27/19  1648   TSH 1.54     Recent Labs   Lab Test 12/16/19  0352 10/28/19  0506 10/27/19  1648   INR 1.12* 1.36* 1.26*          30 minutes spent on the date of encounter doing education, consent signing, chart prep/review, review of test results, interpretation with above tests, patient visit, documentation, and discussion with family.      This note has been dictated using voice recognition software. Any grammatical or context distortions are unintentional and inherent to the software.

## 2023-08-31 NOTE — PATIENT INSTRUCTIONS
Aayush García,    It was a pleasure to see you today in the clinic regarding your upcoming TAVR.     My recommendations after this visit include:     - report to [facility] on September 5 at the instructed time      If you have questions or concerns, please call using the numbers below:    [facility] Phone   [phone]    After Hours/Scheduling  [phone]    Otherwise you can dial the nurse directly at:    Cindy Rogers RN  [phone]    French Gracia RN  [phone]

## 2023-08-31 NOTE — LETTER
8/31/2023    Zachary Lewis MD  [address] [place] [address]    RE: Aayush García       Dear Colleague,     I had the pleasure of seeing Aayush Avilesbaldemar in the [facility].  HEART CARE ENCOUNTER NOTE       [facility]  [phone]    Assessment/Recommendations   Problem List Items Addressed This Visit          Circulatory    Chronic systolic heart failure (H)    Severe aortic stenosis    Coronary artery disease involving native coronary artery of native heart without angina pectoris    Ischemic cardiomyopathy - Primary       Other    Alcoholism /alcohol abuse       1.  Severe low-flow low gradient Aortic Stenosis - He has a mildly reduced EF and there is mild LV hypertrophy, although he doesn't endorse significant symptoms. He has been evaluated by a multidisciplinary team and has been deemed a good candidate for transcatheter aortic valve replacement.  He has now undergone all the required workup for TAVR and wishes to proceed.   We discussed the procedure in detail,  including post-procedure care, restrictions and follow up.   He understands that there is a 4-5% risk of bleeding, infection, stroke, heart block requiring permanent pacemaker, cardiac perforation, aortic root rupture, dissection and death.  Patient also understands that if something unexpected happens, the procedure may need to be stopped or changed to help him.     All questions were answered   Consents were signed and witnessed by me.  He does not want cardiopulmonary bypass or urgent sternotomy in the rare event that aortic dissection or aortic rupture occurs.  He has never had trouble with anesthesia in the past.    Labs today reveal Hgb 12.8, normal WBC, Na+ 133 (chronically low) and Mag 1.6 (also chronically low).  Creat is 0.71    The plan will be for general anesthesia with left subclavian access.  We will use the Chowdary Shelley valve. Aayush García will report tomorrow morning for the procedure  and all instructions regarding times and medications were given by TAVR coordinator RN.     2.  Ischemic cardiomyopathy, chronic heart failure with mildly reduced patient fraction, NYHA class I - currently compensated, despite NT proBNP being elevated at 2283.  He has no recent weight gain and denies PND or orthopnea.     3.  Hypertension - BP today is not at goal, even after recheck, but home blood pressures have been stable with systolic numbers ranging 120-130.  No change in current blood pressure medications at this time.  Continue carvedilol 25 mg twice daily and losartan 25 mg daily.  Can increase losartan as needed after TAVR.     4.  CAD with hx of NSTEMI - s/p complex PCI (with Roto/LEYDI) to pLAD and IABP surgeon in the setting of cardiogenic shock.  He then underwent PCI with LEYDI to the mid LAD in September 2021.  Patient is currently angina free.      LDL on lipid profile in March was 34.  He should continue atorvastatin 80 mg daily and aspirin 81 mg daily    5.  Severe PAD - continue ASA and statin.     6.  Iron deficiency anemia - Hgb stable today and on iron supplement    7.  Osteoarthritis - patient is limited b/c of his hip and sometimes uses a cane.      8.  Electrolyte imbalance -with chronically low magnesium, potassium and sodium.  Continue magnesium oxide 400 mg twice daily and potassium chloride 20 mEq daily       History of Present Illness/Subjective    Aayush García is a 68 year old male who comes in today for history and physical prior to TAVR (transcatheter aortic valve replacement).   His significant other accompanies him to the visit.    Aayush has past medical history significant for aortic stenosis, ischemic cardiomyopathy, chronic heart failure with reduced ejection fraction, coronary artery disease multiple interventions in the past, alcoholic hepatitis with ascites, hypertension, anomalous left circumflex    In 2019, patient required a high risk PCI to the pLAD (with rotoblater and  LEYDI) in the setting of cardiogenic shock.  He required pressors, but ultimately recovered and ended up having normalization of his LVEF.  He was seen by Dr. Avilez for possible TAVR work-up in 2021 and underwent another coronary angiogram with complex PCI to the mid LAD.  He was then to get dental work done and it took awhile.  Thus he has not followed up with the structural team until now.    He has not had significant progression of his symptoms, but has stopped drinking alcohol.    Aayush García denies chest discomfort, palpitations, shortness of breath, paroxysmal nocturnal dyspnea, orthopnea, lightheadedness, dizziness, pre-syncope, or syncope.  Aayush García also denies any weight loss, changes in appetite, nausea or vomiting.     Medical, surgical, family, social history, and medications were reviewed and updated as necessary.    ECG (personally reviewed & interpreted): 8/31/2023 shows sinus bradycardia.  PA interval 158 ms and QRS duration 108 ms    ECHOCARDIOGRAM done on 6/27/2023:  Interpretation Summary     1.Left ventricular function is decreased. The ejection fraction is 45-50%  (mildly reduced).  2.There is mild concentric left ventricular hypertrophy.  3.Normal right ventricle size and systolic function.  4.Moderate most likely severe stenosis of a trileaflet aortic valve. Appears  to be low-flow low gradient with a dimensionless index is 0.15, peak velocity  3.9 m/s and mean gradient 35 mmHg in the setting of stroke-volume index of 25  mL/metered squared. Prior was 32 mmHg mean gradient. There is mild aortic  insufficiency with deceleration pressure half-time of 558 ms.  5.The ascending aorta is Mildly dilated.  Compared to the prior study dated 6/22/2023, the LV EF is lower and thus the  AS more significant.      CATH done on 7/6/2023:    1st Mrg lesion is 99% stenosed.    Mid LAD lesion is 30% stenosed.    Ost Cx to Prox Cx lesion is 50% stenosed.    Left ventricular filling pressures are  "normal.     1.  Proximal LAD stent appears widely patent.  2.  Mid LAD stent is patent.  There is about 30% eccentric calcified narrowing at the distal edge of the stent, unchanged from prior study.  3.  RCA is tortuous, with mild lumen irregularity but no severe stenosis.  4.  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  There is 40% eccentric calcified narrowing proximally and then heavily calcified bifurcation at the first marginal branch,  which has ostial 99% stenosis and slight reduced antegrade filling, noted on previous studies.  5.  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  No stent was deployed.  6.  Calcified aortic valve leaflets with reduced systolic excursion.  Pullback gradient shows mean LV-Ao gradient 18mmHg peak, 9 mmHg mean, consistent with mild to moderate aortic stenosis.       Physical Examination Review of Systems   Vitals: BP (!) 156/67 (BP Location: Left arm, Patient Position: Sitting, Cuff Size: Adult Large)   Pulse 59   Resp 16   Ht 1.727 m (5' 8\")   Wt 89.7 kg (197 lb 12.8 oz)   BMI 30.08 kg/m    BMI= Body mass index is 30.08 kg/m .  Wt Readings from Last 3 Encounters:   08/31/23 89.7 kg (197 lb 12.8 oz)   07/25/23 89.8 kg (198 lb)   07/06/23 89.4 kg (197 lb)       General Appearance:   Alert, cooperative and in no acute distress   ENT/Mouth: membranes moist, no oral lesions or bleeding gums.      EYES:  no scleral icterus, normal conjunctivae   Neck: no carotid bruits.  Thyroid not visualized   Chest/Lungs:   lungs are clear to auscultation, no rales or wheezing   Cardiovascular:   Bradycardic, but regular. Normal first and second heart sounds with 5/6 systolic murmur.  No rubs or gallops; the carotid, radial and posterior tibial pulses are intact, no edema bilaterally    Abdomen:  Soft and nontender. Bowel sounds are present in all quadrants   Extremities: no cyanosis or " clubbing   Skin: no xanthelasma, warm.    Neurologic: normal gait, normal  bilateral, no tremors   Psychiatric: Normal mood and affect       Please refer above for cardiac ROS details.      Medical History  Surgical History Family History Social History   Past Medical History:   Diagnosis Date    Acute liver failure 8/5/2018    Acute on chronic systolic congestive heart failure (H) 1/14/2020    Acute renal failure, unspecified acute renal failure type (H) 08/05/2018    Acute respiratory failure with hypoxia (H) 12/16/2019    Acute respiratory failure with hypoxia (H) 08/05/2018    Alcoholic hepatitis with ascites 08/05/2018    Bacteremia due to Klebsiella pneumoniae     Benign essential hypertension 4/12/2018    Chronic liver disease     Closed fracture of multiple ribs of right side, initial encounter 11/23/2019    Coronary artery disease involving native coronary artery of native heart without angina pectoris 9/24/2019    Essential hypertension     Gastroesophageal reflux disease without esophagitis 10/31/2018    GI bleeding 10/27/2019    Heart failure with reduced ejection fraction (H) 4/4/2019    Hepatic encephalopathy (H) 08/05/2018    Macrocytic anemia     Non-ST elevation MI (NSTEMI) (H) 3/18/2019    Overview:  Added automatically from request for surgery [id number]    NSTEMI (non-ST elevated myocardial infarction) (H) 03/2019    s/p stent placement    Primary localized osteoarthrosis of left lower leg 4/9/2019    Stress-induced cardiomyopathy 08/05/2018     Past Surgical History:   Procedure Laterality Date    COLONOSCOPY N/A 3/20/2018    Procedure: COLONOSCOPY;  Surgeon: Adam Nicole III, MD;  Location: [facility];  Service:     CV CORONARY ANGIOGRAM N/A 3/18/2019    Procedure: Coronary Angiogram;  Surgeon: Timi Rodriguez MD;  Location: [facility] Cath Lab;  Service: Cardiology    CV CORONARY ANGIOGRAM N/A 9/22/2021    Procedure: Coronary Angiogram;  Surgeon: Brianda Avilez MD;  Location:   [facility] CATH LAB CV    CV CORONARY ANGIOGRAM N/A 2023    Procedure: Coronary Angiogram;  Surgeon: Rafat Pinto MD;  Location: [facility] LAB CV    CV FRACTIONAL FLOW RATIO WIRE N/A 2021    Procedure: Fractional Flow Ratio Wire;  Surgeon: Brianda Avilez MD;  Location: [facility] CATH LAB CV    CV LEFT HEART CATH N/A 2023    Procedure: Left Heart Catheterization;  Surgeon: Rafat Pinto MD;  Location: [facility] CATH LAB CV    CV LEFT HEART CATHETERIZATION WITHOUT LEFT VENTRICULOGRAM Left 3/18/2019    Procedure: Left Heart Catheterization Without Left Ventriculogram;  Surgeon: Timi Rodriguez MD;  Location: [facility] Cath Lab;  Service: Cardiology    CV PCI N/A 2021    Procedure: Percutaneous Coronary Intervention;  Surgeon: Brianda Avilez MD;  Location: [facility] LAB CV    CV PCI N/A 2023    Procedure: Percutaneous Coronary Intervention;  Surgeon: Rafat Pinto MD;  Location: [facility] LAB CV    CV PCI ATHERECTOMY ORBITAL N/A 2021    Procedure: Percutaneous Coronary Intervention Atherectomy Rotational;  Surgeon: Brianda Avilez MD;  Location: [facility] CV    IR CVC TUNNEL PLACEMENT > 5 YRS OF AGE  2018    IR TUNNELED CATHETER REMOVAL  2018    PICC  2018         PICC  3/18/2019         US THORACENTESIS  10/28/2019    ZZHC CORONARY STENT PERCUT, INITIAL VESSEL  2019     Family History   Problem Relation Age of Onset    Heart Disease Father 76.00        type unknown to Aayush; his father  at home    Diabetes No family hx of     Coronary Artery Disease Early Onset No family hx of     Cancer No family hx of     Alzheimer Disease Mother 86.00        lives in long term care    No Known Problems Son     No Known Problems Son     Social History     Socioeconomic History    Marital status: Single     Spouse name: Not on file    Number of children: Not on file    Years of education: Not on file    Highest education level: Not on file    Occupational History    Occupation: [employer]   Tobacco Use    Smoking status: Former     Packs/day: 1.00     Years: 40.00     Pack years: 40.00     Types: Cigarettes     Quit date: 2019     Years since quittin.3     Passive exposure: Never    Smokeless tobacco: Never    Tobacco comments:     3 Cig/Week   Vaping Use    Vaping Use: Never used   Substance and Sexual Activity    Alcohol use: Not Currently     Comment: Alcoholic Drinks/day: 350 ml of peppermint schnapps daily per [facility] h,  H&P per report of Karlie JJ to Dr. Valle    Drug use: No    Sexual activity: Yes     Partners: Female   Other Topics Concern    Parent/sibling w/ CABG, MI or angioplasty before 65F 55M? Not Asked   Social History Narrative    Works in [employer]. Lives with his Karlie JJon.     Social Determinants of Health     Financial Resource Strain: Not on file   Food Insecurity: Not on file   Transportation Needs: Not on file   Physical Activity: Not on file   Stress: Not on file   Social Connections: Not on file   Intimate Partner Violence: Not on file   Housing Stability: Not on file          Medications  Allergies   Current Outpatient Medications   Medication Sig Dispense Refill    acetaminophen (TYLENOL 8 HOUR ARTHRITIS PAIN) 650 MG CR tablet Take 2 tablets (1,300 mg) by mouth every 8 hours as needed for pain      aspirin (ASA) 81 MG EC tablet Take 1 tablet (81 mg) by mouth daily 90 tablet 3    atorvastatin (LIPITOR) 80 MG tablet Take 1 tablet (80 mg) by mouth daily 90 tablet 3    B Complex-C-Folic Acid (NEPHRO-FRANCISCO) 0.8 MG TABS Take 1 tablet by mouth daily      carvedilol (COREG) 25 MG tablet Take 1 tablet (25 mg) by mouth 2 times daily 180 tablet 3    Cholecalciferol (VITAMIN D) 50 MCG (2000 UT) CAPS Take 1 capsule by mouth daily      ferrous sulfate (FEROSUL) 325 (65 Fe) MG tablet Take 325 mg by mouth daily  0    folic acid (FOLVITE) 400 MCG tablet Take 1 tablet (400 mcg) by mouth daily      losartan (COZAAR) 25 MG  tablet Take 1 tablet (25 mg) by mouth daily 90 tablet 3    magnesium oxide (MAG-OX) 400 MG tablet Take 1 tablet (400 mg) by mouth 2 times daily 60 tablet 11    Multiple Vitamins-Minerals (ONCOVITE) TABS Take 1 tablet by mouth daily      pantoprazole (PROTONIX) 20 MG EC tablet Take 1 tablet (20 mg) by mouth daily 90 tablet 3    potassium chloride ER (KLOR-CON M) 20 MEQ CR tablet Take 1 tablet (20 mEq) by mouth daily 30 tablet 11    Allergies   Allergen Reactions    No Known Allergies          Lab Results    Chemistry/lipid CBC Cardiac Enzymes/BNP/TSH/INR   Recent Labs   Lab Test 03/21/23  1431   CHOL 111   HDL 42   LDL 34   TRIG 175*     Recent Labs   Lab Test 03/21/23  1431 11/02/21  1404 02/12/18  1635   LDL 34 40 96     Recent Labs   Lab Test 07/06/23  0702 07/05/23  1442   * 129*   POTASSIUM  --  4.2   CHLORIDE  --  97*   CO2  --  23   GLC  --  76   BUN  --  11   CR  --  0.71   GFRESTIMATED  --  >90   SOLO  --  9.0     Recent Labs   Lab Test 07/05/23  1442 05/03/23  1550 03/21/23  1431   CR 0.71 0.87 0.72     No results for input(s): A1C in the last 24891 hours. Recent Labs   Lab Test 07/05/23  1442   WBC 8.5   HGB 12.7*   HCT 36.8*   MCV 95        Recent Labs   Lab Test 07/05/23  1442 03/21/23  1431 09/22/21  0727   HGB 12.7* 13.1* 13.4    Recent Labs   Lab Test 12/16/19  1701 12/16/19  1042 12/16/19  0352   TROPONINI 0.02 0.02 <0.01     Recent Labs   Lab Test 12/16/19  0352 10/29/19  0440 03/17/19  0309   BNP 1,634* >5,000* 873*     Recent Labs   Lab Test 10/27/19  1648   TSH 1.54     Recent Labs   Lab Test 12/16/19  0352 10/28/19  0506 10/27/19  1648   INR 1.12* 1.36* 1.26*          30 minutes spent on the date of encounter doing education, consent signing, chart prep/review, review of test results, interpretation with above tests, patient visit, documentation, and discussion with family.      This note has been dictated using voice recognition software. Any grammatical or context distortions are  unintentional and inherent to the software.                   Thank you for allowing me to participate in the care of your patient.      Sincerely,     PARKER MIJARES PA-C     [facility]  cc:   No referring provider defined for this encounter.

## 2023-08-31 NOTE — PROGRESS NOTES
HEART CARE ENCOUNTER NOTE       [facility]  [phone]    Assessment/Recommendations   Problem List Items Addressed This Visit          Circulatory    Chronic systolic heart failure (H)    Severe aortic stenosis    Coronary artery disease involving native coronary artery of native heart without angina pectoris    Ischemic cardiomyopathy - Primary       Other    Alcoholism /alcohol abuse       1.  Severe low-flow low gradient Aortic Stenosis - He has a mildly reduced EF and there is mild LV hypertrophy, although he doesn't endorse significant symptoms. He has been evaluated by a multidisciplinary team and has been deemed a good candidate for transcatheter aortic valve replacement.  He has now undergone all the required workup for TAVR and wishes to proceed.   We discussed the procedure in detail,  including post-procedure care, restrictions and follow up.   He understands that there is a 4-5% risk of bleeding, infection, stroke, heart block requiring permanent pacemaker, cardiac perforation, aortic root rupture, dissection and death.  Patient also understands that if something unexpected happens, the procedure may need to be stopped or changed to help him.     All questions were answered   Consents were signed and witnessed by me.  He does not want cardiopulmonary bypass or urgent sternotomy in the rare event that aortic dissection or aortic rupture occurs.  He has never had trouble with anesthesia in the past.    Labs today reveal Hgb 12.8, normal WBC, Na+ 133 (chronically low) and Mag 1.6 (also chronically low).  Creat is 0.71    The plan will be for general anesthesia with left subclavian access.  We will use the Chowdary Shelley valve. Aayush García will report Tuesday morning for the procedure and all instructions regarding times and medications were given by TAVR coordinator RN.     2.  Ischemic cardiomyopathy, chronic heart failure with mildly reduced patient fraction, NYHA class I -  currently compensated, despite NT proBNP being elevated at 2283.  He has no recent weight gain and denies PND or orthopnea.     3.  Hypertension - BP today is not at goal, even after recheck, but home blood pressures have been stable with systolic numbers ranging 120-130.  No change in current blood pressure medications at this time.  Continue carvedilol 25 mg twice daily and losartan 25 mg daily.  Can increase losartan as needed after TAVR.     4.  CAD with hx of NSTEMI - s/p complex PCI (with Roto/LEYDI) to pLAD and IABP surgeon in the setting of cardiogenic shock.  He then underwent PCI with LEYDI to the mid LAD in September 2021.  Patient is currently angina free.      LDL on lipid profile in March was 34.  He should continue atorvastatin 80 mg daily and aspirin 81 mg daily    5.  Severe PAD - continue ASA and statin.     6.  Iron deficiency anemia - Hgb stable today and on iron supplement    7.  Osteoarthritis - patient is limited b/c of his hip and sometimes uses a cane.      8.  Electrolyte imbalance -with chronically low magnesium, potassium and sodium.  Continue magnesium oxide 400 mg twice daily and potassium chloride 20 mEq daily       History of Present Illness/Subjective    Aayush García is a 68 year old male who comes in today for history and physical prior to TAVR (transcatheter aortic valve replacement).   His significant other accompanies him to the visit.    Aayush has past medical history significant for aortic stenosis, ischemic cardiomyopathy, chronic heart failure with reduced ejection fraction, coronary artery disease multiple interventions in the past, alcoholic hepatitis with ascites, hypertension, anomalous left circumflex    In 2019, patient required a high risk PCI to the pLAD (with rotoblater and LEYDI) in the setting of cardiogenic shock.  He required pressors, but ultimately recovered and ended up having normalization of his LVEF.  He was seen by Dr. Avilez for possible TAVR work-up in 2021  and underwent another coronary angiogram with complex PCI to the mid LAD.  He was then to get dental work done and it took awhile.  Thus he has not followed up with the structural team until now.    He has not had significant progression of his symptoms, but has stopped drinking alcohol.    Aayush García denies chest discomfort, palpitations, shortness of breath, paroxysmal nocturnal dyspnea, orthopnea, lightheadedness, dizziness, pre-syncope, or syncope.  Aayush García also denies any weight loss, changes in appetite, nausea or vomiting.     Medical, surgical, family, social history, and medications were reviewed and updated as necessary.    ECG (personally reviewed & interpreted): 8/31/2023 shows sinus bradycardia.  ND interval 158 ms and QRS duration 108 ms    ECHOCARDIOGRAM done on 6/27/2023:  Interpretation Summary     1.Left ventricular function is decreased. The ejection fraction is 45-50%  (mildly reduced).  2.There is mild concentric left ventricular hypertrophy.  3.Normal right ventricle size and systolic function.  4.Moderate most likely severe stenosis of a trileaflet aortic valve. Appears  to be low-flow low gradient with a dimensionless index is 0.15, peak velocity  3.9 m/s and mean gradient 35 mmHg in the setting of stroke-volume index of 25  mL/metered squared. Prior was 32 mmHg mean gradient. There is mild aortic  insufficiency with deceleration pressure half-time of 558 ms.  5.The ascending aorta is Mildly dilated.  Compared to the prior study dated 6/22/2023, the LV EF is lower and thus the  AS more significant.      CATH done on 7/6/2023:     1st Mrg lesion is 99% stenosed.     Mid LAD lesion is 30% stenosed.     Ost Cx to Prox Cx lesion is 50% stenosed.     Left ventricular filling pressures are normal.     1.  Proximal LAD stent appears widely patent.  2.  Mid LAD stent is patent.  There is about 30% eccentric calcified narrowing at the distal edge of the stent, unchanged from prior  "study.  3.  RCA is tortuous, with mild lumen irregularity but no severe stenosis.  4.  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  There is 40% eccentric calcified narrowing proximally and then heavily calcified bifurcation at the first marginal branch,  which has ostial 99% stenosis and slight reduced antegrade filling, noted on previous studies.  5.  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  No stent was deployed.  6.  Calcified aortic valve leaflets with reduced systolic excursion.  Pullback gradient shows mean LV-Ao gradient 18mmHg peak, 9 mmHg mean, consistent with mild to moderate aortic stenosis.       Physical Examination Review of Systems   Vitals: BP (!) 156/67 (BP Location: Left arm, Patient Position: Sitting, Cuff Size: Adult Large)   Pulse 59   Resp 16   Ht 1.727 m (5' 8\")   Wt 89.7 kg (197 lb 12.8 oz)   BMI 30.08 kg/m    BMI= Body mass index is 30.08 kg/m .  Wt Readings from Last 3 Encounters:   08/31/23 89.7 kg (197 lb 12.8 oz)   07/25/23 89.8 kg (198 lb)   07/06/23 89.4 kg (197 lb)       General Appearance:   Alert, cooperative and in no acute distress   ENT/Mouth: membranes moist, no oral lesions or bleeding gums.      EYES:  no scleral icterus, normal conjunctivae   Neck: no carotid bruits.  Thyroid not visualized   Chest/Lungs:   lungs are clear to auscultation, no rales or wheezing   Cardiovascular:   Bradycardic, but regular. Normal first and second heart sounds with 5/6 systolic murmur.  No rubs or gallops; the carotid, radial and posterior tibial pulses are intact, no edema bilaterally    Abdomen:  Soft and nontender. Bowel sounds are present in all quadrants   Extremities: no cyanosis or clubbing   Skin: no xanthelasma, warm.    Neurologic: normal gait, normal  bilateral, no tremors   Psychiatric: Normal mood and affect       Please refer above for cardiac ROS details.  "     Medical History  Surgical History Family History Social History   Past Medical History:   Diagnosis Date     Acute liver failure 8/5/2018     Acute on chronic systolic congestive heart failure (H) 1/14/2020     Acute renal failure, unspecified acute renal failure type (H) 08/05/2018     Acute respiratory failure with hypoxia (H) 12/16/2019     Acute respiratory failure with hypoxia (H) 08/05/2018     Alcoholic hepatitis with ascites 08/05/2018     Bacteremia due to Klebsiella pneumoniae      Benign essential hypertension 4/12/2018     Chronic liver disease      Closed fracture of multiple ribs of right side, initial encounter 11/23/2019     Coronary artery disease involving native coronary artery of native heart without angina pectoris 9/24/2019     Essential hypertension      Gastroesophageal reflux disease without esophagitis 10/31/2018     GI bleeding 10/27/2019     Heart failure with reduced ejection fraction (H) 4/4/2019     Hepatic encephalopathy (H) 08/05/2018     Macrocytic anemia      Non-ST elevation MI (NSTEMI) (H) 3/18/2019    Overview:  Added automatically from request for surgery [id number]     NSTEMI (non-ST elevated myocardial infarction) (H) 03/2019    s/p stent placement     Primary localized osteoarthrosis of left lower leg 4/9/2019     Stress-induced cardiomyopathy 08/05/2018     Past Surgical History:   Procedure Laterality Date     COLONOSCOPY N/A 3/20/2018    Procedure: COLONOSCOPY;  Surgeon: Adam Nicole III, MD;  Location: [facility] OR;  Service:      CV CORONARY ANGIOGRAM N/A 3/18/2019    Procedure: Coronary Angiogram;  Surgeon: Timi Rodriguez MD;  Location: [facility] Cath Lab;  Service: Cardiology     CV CORONARY ANGIOGRAM N/A 9/22/2021    Procedure: Coronary Angiogram;  Surgeon: Brianda Avilez MD;  Location: [facility] CATH LAB CV     CV CORONARY ANGIOGRAM N/A 7/6/2023    Procedure: Coronary Angiogram;  Surgeon: Rafat Pinto MD;  Location: [facility] CATH LAB CV     CV  FRACTIONAL FLOW RATIO WIRE N/A 2021    Procedure: Fractional Flow Ratio Wire;  Surgeon: Brianda Avilez MD;  Location: [facility] LAB CV     CV LEFT HEART CATH N/A 2023    Procedure: Left Heart Catheterization;  Surgeon: Rafat Pinto MD;  Location: [facility] LAB CV     CV LEFT HEART CATHETERIZATION WITHOUT LEFT VENTRICULOGRAM Left 3/18/2019    Procedure: Left Heart Catheterization Without Left Ventriculogram;  Surgeon: Timi Rodriguez MD;  Location: [facility] Cath Lab;  Service: Cardiology     CV PCI N/A 2021    Procedure: Percutaneous Coronary Intervention;  Surgeon: Brianda Avilez MD;  Location: [facility] LAB CV     CV PCI N/A 2023    Procedure: Percutaneous Coronary Intervention;  Surgeon: Rafat Pinto MD;  Location: [facility] CV     CV PCI ATHERECTOMY ORBITAL N/A 2021    Procedure: Percutaneous Coronary Intervention Atherectomy Rotational;  Surgeon: Brianda Avilez MD;  Location: [facility] CV     IR CVC TUNNEL PLACEMENT > 5 YRS OF AGE  2018     IR TUNNELED CATHETER REMOVAL  2018     PICC  2018          PICC  3/18/2019           THORACENTESIS  10/28/2019     RUST CORONARY STENT PERCUT, INITIAL VESSEL  2019     Family History   Problem Relation Age of Onset     Heart Disease Father 76.00        type unknown to Aayush; his father  at home     Diabetes No family hx of      Coronary Artery Disease Early Onset No family hx of      Cancer No family hx of      Alzheimer Disease Mother 86.00        lives in long term care     No Known Problems Son      No Known Problems Son     Social History     Socioeconomic History     Marital status: Single     Spouse name: Not on file     Number of children: Not on file     Years of education: Not on file     Highest education level: Not on file   Occupational History     Occupation: [employer]   Tobacco Use     Smoking status: Former     Packs/day: 1.00     Years: 40.00     Pack years: 40.00      Types: Cigarettes     Quit date: 2019     Years since quittin.3     Passive exposure: Never     Smokeless tobacco: Never     Tobacco comments:     3 Cig/Week   Vaping Use     Vaping Use: Never used   Substance and Sexual Activity     Alcohol use: Not Currently     Comment: Alcoholic Drinks/day: 350 ml of peppermint schnapps daily per [facility] h, 2018 H&P per report of АННАKarlie to Dr. Valle     Drug use: No     Sexual activity: Yes     Partners: Female   Other Topics Concern     Parent/sibling w/ CABG, MI or angioplasty before 65F 55M? Not Asked   Social History Narrative    Works in [employer]. Lives with his Karlie JJ.     Social Determinants of Health     Financial Resource Strain: Not on file   Food Insecurity: Not on file   Transportation Needs: Not on file   Physical Activity: Not on file   Stress: Not on file   Social Connections: Not on file   Intimate Partner Violence: Not on file   Housing Stability: Not on file          Medications  Allergies   Current Outpatient Medications   Medication Sig Dispense Refill     acetaminophen (TYLENOL 8 HOUR ARTHRITIS PAIN) 650 MG CR tablet Take 2 tablets (1,300 mg) by mouth every 8 hours as needed for pain       aspirin (ASA) 81 MG EC tablet Take 1 tablet (81 mg) by mouth daily 90 tablet 3     atorvastatin (LIPITOR) 80 MG tablet Take 1 tablet (80 mg) by mouth daily 90 tablet 3     B Complex-C-Folic Acid (NEPHRO-FRANCISCO) 0.8 MG TABS Take 1 tablet by mouth daily       carvedilol (COREG) 25 MG tablet Take 1 tablet (25 mg) by mouth 2 times daily 180 tablet 3     Cholecalciferol (VITAMIN D) 50 MCG (2000 UT) CAPS Take 1 capsule by mouth daily       ferrous sulfate (FEROSUL) 325 (65 Fe) MG tablet Take 325 mg by mouth daily  0     folic acid (FOLVITE) 400 MCG tablet Take 1 tablet (400 mcg) by mouth daily       losartan (COZAAR) 25 MG tablet Take 1 tablet (25 mg) by mouth daily 90 tablet 3     magnesium oxide (MAG-OX) 400 MG tablet Take 1 tablet (400 mg) by mouth 2  times daily 60 tablet 11     Multiple Vitamins-Minerals (ONCOVITE) TABS Take 1 tablet by mouth daily       pantoprazole (PROTONIX) 20 MG EC tablet Take 1 tablet (20 mg) by mouth daily 90 tablet 3     potassium chloride ER (KLOR-CON M) 20 MEQ CR tablet Take 1 tablet (20 mEq) by mouth daily 30 tablet 11    Allergies   Allergen Reactions     No Known Allergies          Lab Results    Chemistry/lipid CBC Cardiac Enzymes/BNP/TSH/INR   Recent Labs   Lab Test 03/21/23  1431   CHOL 111   HDL 42   LDL 34   TRIG 175*     Recent Labs   Lab Test 03/21/23  1431 11/02/21  1404 02/12/18  1635   LDL 34 40 96     Recent Labs   Lab Test 07/06/23  0702 07/05/23  1442   * 129*   POTASSIUM  --  4.2   CHLORIDE  --  97*   CO2  --  23   GLC  --  76   BUN  --  11   CR  --  0.71   GFRESTIMATED  --  >90   SOLO  --  9.0     Recent Labs   Lab Test 07/05/23  1442 05/03/23  1550 03/21/23  1431   CR 0.71 0.87 0.72     No results for input(s): A1C in the last 31544 hours. Recent Labs   Lab Test 07/05/23  1442   WBC 8.5   HGB 12.7*   HCT 36.8*   MCV 95        Recent Labs   Lab Test 07/05/23  1442 03/21/23  1431 09/22/21  0727   HGB 12.7* 13.1* 13.4    Recent Labs   Lab Test 12/16/19  1701 12/16/19  1042 12/16/19  0352   TROPONINI 0.02 0.02 <0.01     Recent Labs   Lab Test 12/16/19  0352 10/29/19  0440 03/17/19  0309   BNP 1,634* >5,000* 873*     Recent Labs   Lab Test 10/27/19  1648   TSH 1.54     Recent Labs   Lab Test 12/16/19  0352 10/28/19  0506 10/27/19  1648   INR 1.12* 1.36* 1.26*          30 minutes spent on the date of encounter doing education, consent signing, chart prep/review, review of test results, interpretation with above tests, patient visit, documentation, and discussion with family.      This note has been dictated using voice recognition software. Any grammatical or context distortions are unintentional and inherent to the software.

## 2023-09-01 LAB
ATRIAL RATE - MUSE: 59 BPM
DIASTOLIC BLOOD PRESSURE - MUSE: NORMAL MMHG
INTERPRETATION ECG - MUSE: NORMAL
P AXIS - MUSE: 14 DEGREES
PR INTERVAL - MUSE: 158 MS
QRS DURATION - MUSE: 108 MS
QT - MUSE: 472 MS
QTC - MUSE: 467 MS
R AXIS - MUSE: -49 DEGREES
SYSTOLIC BLOOD PRESSURE - MUSE: NORMAL MMHG
T AXIS - MUSE: 87 DEGREES
VENTRICULAR RATE- MUSE: 59 BPM

## 2023-09-04 ENCOUNTER — ANESTHESIA EVENT (OUTPATIENT)
Dept: CARDIOLOGY | Facility: HOSPITAL | Age: 68
DRG: 267 | End: 2023-09-04
Payer: COMMERCIAL

## 2023-09-04 ASSESSMENT — LIFESTYLE VARIABLES: TOBACCO_USE: 1

## 2023-09-05 ENCOUNTER — ANESTHESIA (OUTPATIENT)
Dept: CARDIOLOGY | Facility: HOSPITAL | Age: 68
DRG: 267 | End: 2023-09-05
Payer: COMMERCIAL

## 2023-09-05 ENCOUNTER — HOSPITAL ENCOUNTER (INPATIENT)
Facility: HOSPITAL | Age: 68
LOS: 1 days | Discharge: HOME | DRG: 267 | End: 2023-09-06
Attending: INTERNAL MEDICINE | Admitting: INTERNAL MEDICINE
Payer: COMMERCIAL

## 2023-09-05 ENCOUNTER — HOSPITAL ENCOUNTER (OUTPATIENT)
Dept: CARDIOLOGY | Facility: HOSPITAL | Age: 68
Discharge: HOME | DRG: 267 | End: 2023-09-05
Attending: INTERNAL MEDICINE | Admitting: INTERNAL MEDICINE
Payer: COMMERCIAL

## 2023-09-05 DIAGNOSIS — I35.0 SEVERE AORTIC STENOSIS: ICD-10-CM

## 2023-09-05 DIAGNOSIS — Z95.2 S/P TAVR (TRANSCATHETER AORTIC VALVE REPLACEMENT): Primary | ICD-10-CM

## 2023-09-05 LAB
ACT BLD: 363 SECONDS (ref 74–150)
ALBUMIN SERPL BCG-MCNC: 3.9 G/DL (ref 3.5–5.2)
ALP SERPL-CCNC: 57 U/L (ref 40–129)
ALT SERPL W P-5'-P-CCNC: 22 U/L (ref 0–70)
ANION GAP SERPL CALCULATED.3IONS-SCNC: 10 MMOL/L (ref 7–15)
AST SERPL W P-5'-P-CCNC: 26 U/L (ref 0–45)
BILIRUB SERPL-MCNC: 1.2 MG/DL
BLD PROD TYP BPU: NORMAL
BLD PROD TYP BPU: NORMAL
BLOOD COMPONENT TYPE: NORMAL
BLOOD COMPONENT TYPE: NORMAL
BUN SERPL-MCNC: 9.8 MG/DL (ref 8–23)
CALCIUM SERPL-MCNC: 8.3 MG/DL (ref 8.8–10.2)
CHLORIDE SERPL-SCNC: 102 MMOL/L (ref 98–107)
CODING SYSTEM: NORMAL
CODING SYSTEM: NORMAL
CREAT SERPL-MCNC: 0.68 MG/DL (ref 0.67–1.17)
CROSSMATCH: NORMAL
CROSSMATCH: NORMAL
DEPRECATED HCO3 PLAS-SCNC: 23 MMOL/L (ref 22–29)
ERYTHROCYTE [DISTWIDTH] IN BLOOD BY AUTOMATED COUNT: 12.3 % (ref 10–15)
GFR SERPL CREATININE-BSD FRML MDRD: >90 ML/MIN/1.73M2
GLUCOSE SERPL-MCNC: 116 MG/DL (ref 70–99)
HCT VFR BLD AUTO: 36 % (ref 40–53)
HGB BLD-MCNC: 12.5 G/DL (ref 13.3–17.7)
ISSUE DATE AND TIME: NORMAL
ISSUE DATE AND TIME: NORMAL
LVEF ECHO: NORMAL
MAGNESIUM SERPL-MCNC: 1.6 MG/DL (ref 1.7–2.3)
MCH RBC QN AUTO: 33.4 PG (ref 26.5–33)
MCHC RBC AUTO-ENTMCNC: 34.7 G/DL (ref 31.5–36.5)
MCV RBC AUTO: 96 FL (ref 78–100)
PLATELET # BLD AUTO: 174 10E3/UL (ref 150–450)
POTASSIUM SERPL-SCNC: 4.3 MMOL/L (ref 3.4–5.3)
PROT SERPL-MCNC: 6.5 G/DL (ref 6.4–8.3)
RBC # BLD AUTO: 3.74 10E6/UL (ref 4.4–5.9)
SODIUM SERPL-SCNC: 135 MMOL/L (ref 136–145)
UNIT ABO/RH: NORMAL
UNIT ABO/RH: NORMAL
UNIT NUMBER: NORMAL
UNIT NUMBER: NORMAL
UNIT STATUS: NORMAL
UNIT STATUS: NORMAL
UNIT TYPE ISBT: 6200
UNIT TYPE ISBT: 6200
WBC # BLD AUTO: 9.8 10E3/UL (ref 4–11)

## 2023-09-05 PROCEDURE — C1760 CLOSURE DEV, VASC: HCPCS | Performed by: INTERNAL MEDICINE

## 2023-09-05 PROCEDURE — 93325 DOPPLER ECHO COLOR FLOW MAPG: CPT

## 2023-09-05 PROCEDURE — P9016 RBC LEUKOCYTES REDUCED: HCPCS | Performed by: INTERNAL MEDICINE

## 2023-09-05 PROCEDURE — 33363 REPLACE AORTIC VALVE OPEN: CPT | Mod: 62 | Performed by: INTERNAL MEDICINE

## 2023-09-05 PROCEDURE — 370N000017 HC ANESTHESIA TECHNICAL FEE, PER MIN: Performed by: INTERNAL MEDICINE

## 2023-09-05 PROCEDURE — 93308 TTE F-UP OR LMTD: CPT | Mod: 26 | Performed by: INTERNAL MEDICINE

## 2023-09-05 PROCEDURE — 255N000002 HC RX 255 OP 636: Performed by: INTERNAL MEDICINE

## 2023-09-05 PROCEDURE — 80053 COMPREHEN METABOLIC PANEL: CPT | Performed by: INTERNAL MEDICINE

## 2023-09-05 PROCEDURE — C1894 INTRO/SHEATH, NON-LASER: HCPCS | Performed by: INTERNAL MEDICINE

## 2023-09-05 PROCEDURE — 258N000003 HC RX IP 258 OP 636: Performed by: INTERNAL MEDICINE

## 2023-09-05 PROCEDURE — 250N000011 HC RX IP 250 OP 636: Performed by: NURSE ANESTHETIST, CERTIFIED REGISTERED

## 2023-09-05 PROCEDURE — 250N000013 HC RX MED GY IP 250 OP 250 PS 637: Performed by: INTERNAL MEDICINE

## 2023-09-05 PROCEDURE — 93308 TTE F-UP OR LMTD: CPT

## 2023-09-05 PROCEDURE — 272N000001 HC OR GENERAL SUPPLY STERILE: Performed by: INTERNAL MEDICINE

## 2023-09-05 PROCEDURE — 33363 REPLACE AORTIC VALVE OPEN: CPT | Performed by: INTERNAL MEDICINE

## 2023-09-05 PROCEDURE — 99223 1ST HOSP IP/OBS HIGH 75: CPT | Mod: GC | Performed by: FAMILY MEDICINE

## 2023-09-05 PROCEDURE — 86923 COMPATIBILITY TEST ELECTRIC: CPT | Performed by: INTERNAL MEDICINE

## 2023-09-05 PROCEDURE — 93321 DOPPLER ECHO F-UP/LMTD STD: CPT | Mod: 26 | Performed by: INTERNAL MEDICINE

## 2023-09-05 PROCEDURE — C1769 GUIDE WIRE: HCPCS | Performed by: INTERNAL MEDICINE

## 2023-09-05 PROCEDURE — 85027 COMPLETE CBC AUTOMATED: CPT | Performed by: INTERNAL MEDICINE

## 2023-09-05 PROCEDURE — C1733 CATH, EP, OTHR THAN COOL-TIP: HCPCS | Performed by: INTERNAL MEDICINE

## 2023-09-05 PROCEDURE — 250N000009 HC RX 250: Performed by: ANESTHESIOLOGY

## 2023-09-05 PROCEDURE — 36415 COLL VENOUS BLD VENIPUNCTURE: CPT | Performed by: INTERNAL MEDICINE

## 2023-09-05 PROCEDURE — 02RF38Z REPLACEMENT OF AORTIC VALVE WITH ZOOPLASTIC TISSUE, PERCUTANEOUS APPROACH: ICD-10-PCS | Performed by: INTERNAL MEDICINE

## 2023-09-05 PROCEDURE — 258N000003 HC RX IP 258 OP 636: Performed by: NURSE ANESTHETIST, CERTIFIED REGISTERED

## 2023-09-05 PROCEDURE — 83735 ASSAY OF MAGNESIUM: CPT | Performed by: INTERNAL MEDICINE

## 2023-09-05 PROCEDURE — 278N000051 HC OR IMPLANT GENERAL: Performed by: INTERNAL MEDICINE

## 2023-09-05 PROCEDURE — 210N000001 HC R&B IMCU HEART CARE

## 2023-09-05 PROCEDURE — 93010 ELECTROCARDIOGRAM REPORT: CPT | Performed by: GENERAL ACUTE CARE HOSPITAL

## 2023-09-05 PROCEDURE — 250N000011 HC RX IP 250 OP 636: Mod: JZ | Performed by: INTERNAL MEDICINE

## 2023-09-05 PROCEDURE — 250N000009 HC RX 250: Performed by: NURSE ANESTHETIST, CERTIFIED REGISTERED

## 2023-09-05 PROCEDURE — 93325 DOPPLER ECHO COLOR FLOW MAPG: CPT | Mod: 26 | Performed by: INTERNAL MEDICINE

## 2023-09-05 PROCEDURE — 93005 ELECTROCARDIOGRAM TRACING: CPT

## 2023-09-05 PROCEDURE — 85347 COAGULATION TIME ACTIVATED: CPT

## 2023-09-05 DEVICE — VALVE AORTIC TRANSCATHETER HEART 29MM SAPIEN 3 ULTRA RESILIA: Type: IMPLANTABLE DEVICE | Site: HEART | Status: FUNCTIONAL

## 2023-09-05 RX ORDER — HYDROMORPHONE HYDROCHLORIDE 1 MG/ML
0.2 INJECTION, SOLUTION INTRAMUSCULAR; INTRAVENOUS; SUBCUTANEOUS EVERY 5 MIN PRN
Status: DISCONTINUED | OUTPATIENT
Start: 2023-09-05 | End: 2023-09-05 | Stop reason: HOSPADM

## 2023-09-05 RX ORDER — PROTAMINE SULFATE 10 MG/ML
INJECTION, SOLUTION INTRAVENOUS PRN
Status: DISCONTINUED | OUTPATIENT
Start: 2023-09-05 | End: 2023-09-05

## 2023-09-05 RX ORDER — OXYCODONE HYDROCHLORIDE 5 MG/1
5 TABLET ORAL EVERY 4 HOURS PRN
Status: DISCONTINUED | OUTPATIENT
Start: 2023-09-05 | End: 2023-09-06 | Stop reason: HOSPADM

## 2023-09-05 RX ORDER — MAGNESIUM OXIDE 400 MG/1
400 TABLET ORAL DAILY
Status: DISCONTINUED | OUTPATIENT
Start: 2023-09-06 | End: 2023-09-06 | Stop reason: HOSPADM

## 2023-09-05 RX ORDER — HEPARIN SODIUM 1000 [USP'U]/ML
INJECTION, SOLUTION INTRAVENOUS; SUBCUTANEOUS PRN
Status: DISCONTINUED | OUTPATIENT
Start: 2023-09-05 | End: 2023-09-05

## 2023-09-05 RX ORDER — NALOXONE HYDROCHLORIDE 0.4 MG/ML
0.4 INJECTION, SOLUTION INTRAMUSCULAR; INTRAVENOUS; SUBCUTANEOUS
Status: DISCONTINUED | OUTPATIENT
Start: 2023-09-05 | End: 2023-09-06 | Stop reason: HOSPADM

## 2023-09-05 RX ORDER — IODIXANOL 320 MG/ML
INJECTION, SOLUTION INTRAVASCULAR
Status: DISCONTINUED | OUTPATIENT
Start: 2023-09-05 | End: 2023-09-05 | Stop reason: HOSPADM

## 2023-09-05 RX ORDER — CEFAZOLIN SODIUM 2 G/100ML
2 INJECTION, SOLUTION INTRAVENOUS EVERY 8 HOURS
Status: COMPLETED | OUTPATIENT
Start: 2023-09-05 | End: 2023-09-06

## 2023-09-05 RX ORDER — CEFAZOLIN SODIUM/WATER 2 G/20 ML
SYRINGE (ML) INTRAVENOUS PRN
Status: DISCONTINUED | OUTPATIENT
Start: 2023-09-05 | End: 2023-09-05

## 2023-09-05 RX ORDER — NICOTINE POLACRILEX 4 MG
15-30 LOZENGE BUCCAL
Status: DISCONTINUED | OUTPATIENT
Start: 2023-09-05 | End: 2023-09-06 | Stop reason: HOSPADM

## 2023-09-05 RX ORDER — ONDANSETRON 4 MG/1
4 TABLET, ORALLY DISINTEGRATING ORAL EVERY 6 HOURS PRN
Status: DISCONTINUED | OUTPATIENT
Start: 2023-09-05 | End: 2023-09-06 | Stop reason: HOSPADM

## 2023-09-05 RX ORDER — ASPIRIN 325 MG
325 TABLET ORAL ONCE
Status: COMPLETED | OUTPATIENT
Start: 2023-09-05 | End: 2023-09-05

## 2023-09-05 RX ORDER — ETOMIDATE 2 MG/ML
INJECTION INTRAVENOUS PRN
Status: DISCONTINUED | OUTPATIENT
Start: 2023-09-05 | End: 2023-09-05

## 2023-09-05 RX ORDER — FENTANYL CITRATE 50 UG/ML
25 INJECTION, SOLUTION INTRAMUSCULAR; INTRAVENOUS EVERY 5 MIN PRN
Status: DISCONTINUED | OUTPATIENT
Start: 2023-09-05 | End: 2023-09-05 | Stop reason: HOSPADM

## 2023-09-05 RX ORDER — ACETAMINOPHEN 325 MG/1
650 TABLET ORAL EVERY 4 HOURS PRN
Status: DISCONTINUED | OUTPATIENT
Start: 2023-09-05 | End: 2023-09-06 | Stop reason: HOSPADM

## 2023-09-05 RX ORDER — ONDANSETRON 4 MG/1
4 TABLET, ORALLY DISINTEGRATING ORAL EVERY 30 MIN PRN
Status: DISCONTINUED | OUTPATIENT
Start: 2023-09-05 | End: 2023-09-05 | Stop reason: HOSPADM

## 2023-09-05 RX ORDER — FERROUS SULFATE 325(65) MG
325 TABLET ORAL AT BEDTIME
Status: DISCONTINUED | OUTPATIENT
Start: 2023-09-05 | End: 2023-09-06 | Stop reason: HOSPADM

## 2023-09-05 RX ORDER — HYDROMORPHONE HYDROCHLORIDE 1 MG/ML
0.4 INJECTION, SOLUTION INTRAMUSCULAR; INTRAVENOUS; SUBCUTANEOUS EVERY 5 MIN PRN
Status: DISCONTINUED | OUTPATIENT
Start: 2023-09-05 | End: 2023-09-05 | Stop reason: HOSPADM

## 2023-09-05 RX ORDER — ONDANSETRON 2 MG/ML
4 INJECTION INTRAMUSCULAR; INTRAVENOUS EVERY 30 MIN PRN
Status: DISCONTINUED | OUTPATIENT
Start: 2023-09-05 | End: 2023-09-05 | Stop reason: HOSPADM

## 2023-09-05 RX ORDER — LOSARTAN POTASSIUM 25 MG/1
25 TABLET ORAL DAILY
Status: DISCONTINUED | OUTPATIENT
Start: 2023-09-06 | End: 2023-09-06 | Stop reason: HOSPADM

## 2023-09-05 RX ORDER — SODIUM CHLORIDE, SODIUM LACTATE, POTASSIUM CHLORIDE, CALCIUM CHLORIDE 600; 310; 30; 20 MG/100ML; MG/100ML; MG/100ML; MG/100ML
INJECTION, SOLUTION INTRAVENOUS CONTINUOUS PRN
Status: DISCONTINUED | OUTPATIENT
Start: 2023-09-05 | End: 2023-09-05

## 2023-09-05 RX ORDER — POTASSIUM CHLORIDE 1500 MG/1
20 TABLET, EXTENDED RELEASE ORAL DAILY
Status: DISCONTINUED | OUTPATIENT
Start: 2023-09-06 | End: 2023-09-06 | Stop reason: HOSPADM

## 2023-09-05 RX ORDER — LANOLIN ALCOHOL/MO/W.PET/CERES
400 CREAM (GRAM) TOPICAL DAILY
Status: DISCONTINUED | OUTPATIENT
Start: 2023-09-06 | End: 2023-09-06 | Stop reason: HOSPADM

## 2023-09-05 RX ORDER — CEFAZOLIN SODIUM/WATER 2 G/20 ML
2 SYRINGE (ML) INTRAVENOUS
Status: DISCONTINUED | OUTPATIENT
Start: 2023-09-05 | End: 2023-09-05 | Stop reason: HOSPADM

## 2023-09-05 RX ORDER — DEXTROSE MONOHYDRATE 25 G/50ML
25-50 INJECTION, SOLUTION INTRAVENOUS
Status: DISCONTINUED | OUTPATIENT
Start: 2023-09-05 | End: 2023-09-06 | Stop reason: HOSPADM

## 2023-09-05 RX ORDER — LIDOCAINE HYDROCHLORIDE 10 MG/ML
INJECTION, SOLUTION INFILTRATION; PERINEURAL
Status: COMPLETED | OUTPATIENT
Start: 2023-09-05 | End: 2023-09-05

## 2023-09-05 RX ORDER — SODIUM CHLORIDE 9 MG/ML
INJECTION, SOLUTION INTRAVENOUS CONTINUOUS
Status: ACTIVE | OUTPATIENT
Start: 2023-09-05 | End: 2023-09-05

## 2023-09-05 RX ORDER — ASPIRIN 81 MG/1
81 TABLET ORAL DAILY
Status: DISCONTINUED | OUTPATIENT
Start: 2023-09-06 | End: 2023-09-06 | Stop reason: HOSPADM

## 2023-09-05 RX ORDER — HYDRALAZINE HYDROCHLORIDE 20 MG/ML
10 INJECTION INTRAMUSCULAR; INTRAVENOUS
Status: DISCONTINUED | OUTPATIENT
Start: 2023-09-05 | End: 2023-09-06 | Stop reason: HOSPADM

## 2023-09-05 RX ORDER — PROPOFOL 10 MG/ML
INJECTION, EMULSION INTRAVENOUS PRN
Status: DISCONTINUED | OUTPATIENT
Start: 2023-09-05 | End: 2023-09-05

## 2023-09-05 RX ORDER — SODIUM CHLORIDE 9 MG/ML
INJECTION, SOLUTION INTRAVENOUS CONTINUOUS
Status: DISCONTINUED | OUTPATIENT
Start: 2023-09-05 | End: 2023-09-05 | Stop reason: HOSPADM

## 2023-09-05 RX ORDER — PANTOPRAZOLE SODIUM 20 MG/1
20 TABLET, DELAYED RELEASE ORAL DAILY
Status: DISCONTINUED | OUTPATIENT
Start: 2023-09-06 | End: 2023-09-06 | Stop reason: HOSPADM

## 2023-09-05 RX ORDER — LIDOCAINE 40 MG/G
CREAM TOPICAL
Status: DISCONTINUED | OUTPATIENT
Start: 2023-09-05 | End: 2023-09-05 | Stop reason: HOSPADM

## 2023-09-05 RX ORDER — ATORVASTATIN CALCIUM 40 MG/1
80 TABLET, FILM COATED ORAL EVERY EVENING
Status: DISCONTINUED | OUTPATIENT
Start: 2023-09-05 | End: 2023-09-06 | Stop reason: HOSPADM

## 2023-09-05 RX ORDER — NALOXONE HYDROCHLORIDE 0.4 MG/ML
0.2 INJECTION, SOLUTION INTRAMUSCULAR; INTRAVENOUS; SUBCUTANEOUS
Status: DISCONTINUED | OUTPATIENT
Start: 2023-09-05 | End: 2023-09-06 | Stop reason: HOSPADM

## 2023-09-05 RX ORDER — MULTIPLE VITAMINS W/ MINERALS TAB 9MG-400MCG
1 TAB ORAL DAILY
COMMUNITY

## 2023-09-05 RX ORDER — FENTANYL CITRATE 50 UG/ML
INJECTION, SOLUTION INTRAMUSCULAR; INTRAVENOUS PRN
Status: DISCONTINUED | OUTPATIENT
Start: 2023-09-05 | End: 2023-09-05

## 2023-09-05 RX ORDER — FENTANYL CITRATE 50 UG/ML
50 INJECTION, SOLUTION INTRAMUSCULAR; INTRAVENOUS EVERY 5 MIN PRN
Status: DISCONTINUED | OUTPATIENT
Start: 2023-09-05 | End: 2023-09-05 | Stop reason: HOSPADM

## 2023-09-05 RX ORDER — NITROGLYCERIN 0.4 MG/1
0.4 TABLET SUBLINGUAL EVERY 5 MIN PRN
Status: DISCONTINUED | OUTPATIENT
Start: 2023-09-05 | End: 2023-09-06 | Stop reason: HOSPADM

## 2023-09-05 RX ORDER — ONDANSETRON 2 MG/ML
4 INJECTION INTRAMUSCULAR; INTRAVENOUS EVERY 6 HOURS PRN
Status: DISCONTINUED | OUTPATIENT
Start: 2023-09-05 | End: 2023-09-06 | Stop reason: HOSPADM

## 2023-09-05 RX ORDER — GLYCOPYRROLATE 0.2 MG/ML
INJECTION, SOLUTION INTRAMUSCULAR; INTRAVENOUS PRN
Status: DISCONTINUED | OUTPATIENT
Start: 2023-09-05 | End: 2023-09-05

## 2023-09-05 RX ORDER — DEXAMETHASONE SODIUM PHOSPHATE 10 MG/ML
INJECTION, SOLUTION INTRAMUSCULAR; INTRAVENOUS PRN
Status: DISCONTINUED | OUTPATIENT
Start: 2023-09-05 | End: 2023-09-05

## 2023-09-05 RX ORDER — ONDANSETRON 2 MG/ML
INJECTION INTRAMUSCULAR; INTRAVENOUS PRN
Status: DISCONTINUED | OUTPATIENT
Start: 2023-09-05 | End: 2023-09-05

## 2023-09-05 RX ORDER — OXYCODONE HYDROCHLORIDE 5 MG/1
10 TABLET ORAL EVERY 4 HOURS PRN
Status: DISCONTINUED | OUTPATIENT
Start: 2023-09-05 | End: 2023-09-06 | Stop reason: HOSPADM

## 2023-09-05 RX ORDER — CARVEDILOL 25 MG/1
25 TABLET ORAL 2 TIMES DAILY
Status: DISCONTINUED | OUTPATIENT
Start: 2023-09-05 | End: 2023-09-06 | Stop reason: HOSPADM

## 2023-09-05 RX ADMIN — HEPARIN SODIUM 9000 UNITS: 1000 INJECTION INTRAVENOUS; SUBCUTANEOUS at 08:48

## 2023-09-05 RX ADMIN — CARVEDILOL 25 MG: 25 TABLET, FILM COATED ORAL at 19:26

## 2023-09-05 RX ADMIN — FENTANYL CITRATE 50 MCG: 50 INJECTION, SOLUTION INTRAMUSCULAR; INTRAVENOUS at 08:35

## 2023-09-05 RX ADMIN — PROTAMINE SULFATE 70 MG: 10 INJECTION, SOLUTION INTRAVENOUS at 09:25

## 2023-09-05 RX ADMIN — PROPOFOL 50 MG: 10 INJECTION, EMULSION INTRAVENOUS at 08:11

## 2023-09-05 RX ADMIN — PHENYLEPHRINE HYDROCHLORIDE 0.3 MCG/KG/MIN: 10 INJECTION INTRAVENOUS at 07:51

## 2023-09-05 RX ADMIN — PHENYLEPHRINE HYDROCHLORIDE 50 MCG: 10 INJECTION INTRAVENOUS at 08:38

## 2023-09-05 RX ADMIN — FENTANYL CITRATE 50 MCG: 50 INJECTION, SOLUTION INTRAMUSCULAR; INTRAVENOUS at 08:14

## 2023-09-05 RX ADMIN — ROCURONIUM BROMIDE 40 MG: 50 INJECTION, SOLUTION INTRAVENOUS at 08:32

## 2023-09-05 RX ADMIN — PHENYLEPHRINE HYDROCHLORIDE 50 MCG: 10 INJECTION INTRAVENOUS at 08:29

## 2023-09-05 RX ADMIN — SODIUM CHLORIDE, POTASSIUM CHLORIDE, SODIUM LACTATE AND CALCIUM CHLORIDE: 600; 310; 30; 20 INJECTION, SOLUTION INTRAVENOUS at 07:30

## 2023-09-05 RX ADMIN — ROCURONIUM BROMIDE 10 MG: 50 INJECTION, SOLUTION INTRAVENOUS at 08:14

## 2023-09-05 RX ADMIN — PHENYLEPHRINE HYDROCHLORIDE 75 MCG: 10 INJECTION INTRAVENOUS at 09:04

## 2023-09-05 RX ADMIN — PHENYLEPHRINE HYDROCHLORIDE 50 MCG: 10 INJECTION INTRAVENOUS at 08:52

## 2023-09-05 RX ADMIN — ROCURONIUM BROMIDE 20 MG: 50 INJECTION, SOLUTION INTRAVENOUS at 08:09

## 2023-09-05 RX ADMIN — ROCURONIUM BROMIDE 30 MG: 50 INJECTION, SOLUTION INTRAVENOUS at 07:37

## 2023-09-05 RX ADMIN — CEFAZOLIN SODIUM 2 G: 2 INJECTION, SOLUTION INTRAVENOUS at 16:09

## 2023-09-05 RX ADMIN — MIDAZOLAM 2 MG: 1 INJECTION INTRAMUSCULAR; INTRAVENOUS at 07:25

## 2023-09-05 RX ADMIN — LIDOCAINE HYDROCHLORIDE 2 ML: 10 INJECTION, SOLUTION INFILTRATION; PERINEURAL at 07:22

## 2023-09-05 RX ADMIN — ETOMIDATE 12 MG: 2 INJECTION, SOLUTION INTRAVENOUS at 07:35

## 2023-09-05 RX ADMIN — DEXAMETHASONE SODIUM PHOSPHATE 6 MG: 10 INJECTION, SOLUTION INTRAMUSCULAR; INTRAVENOUS at 07:35

## 2023-09-05 RX ADMIN — FENTANYL CITRATE 25 MCG: 50 INJECTION, SOLUTION INTRAMUSCULAR; INTRAVENOUS at 07:30

## 2023-09-05 RX ADMIN — ONDANSETRON 4 MG: 2 INJECTION INTRAMUSCULAR; INTRAVENOUS at 09:38

## 2023-09-05 RX ADMIN — GLYCOPYRROLATE 0.2 MG: 0.2 INJECTION INTRAMUSCULAR; INTRAVENOUS at 07:36

## 2023-09-05 RX ADMIN — ACETAMINOPHEN 650 MG: 325 TABLET ORAL at 14:15

## 2023-09-05 RX ADMIN — SUGAMMADEX 200 MG: 100 INJECTION, SOLUTION INTRAVENOUS at 09:37

## 2023-09-05 RX ADMIN — HYDRALAZINE HYDROCHLORIDE 10 MG: 20 INJECTION INTRAMUSCULAR; INTRAVENOUS at 12:34

## 2023-09-05 RX ADMIN — ATORVASTATIN CALCIUM 80 MG: 40 TABLET, FILM COATED ORAL at 19:26

## 2023-09-05 RX ADMIN — SODIUM CHLORIDE: 9 INJECTION, SOLUTION INTRAVENOUS at 12:28

## 2023-09-05 RX ADMIN — SODIUM CHLORIDE: 9 INJECTION, SOLUTION INTRAVENOUS at 05:55

## 2023-09-05 RX ADMIN — PHENYLEPHRINE HYDROCHLORIDE 50 MCG: 10 INJECTION INTRAVENOUS at 07:51

## 2023-09-05 RX ADMIN — PHENYLEPHRINE HYDROCHLORIDE 100 MCG: 10 INJECTION INTRAVENOUS at 08:55

## 2023-09-05 RX ADMIN — FERROUS SULFATE TAB 325 MG (65 MG ELEMENTAL FE) 325 MG: 325 (65 FE) TAB at 21:19

## 2023-09-05 RX ADMIN — FENTANYL CITRATE 75 MCG: 50 INJECTION, SOLUTION INTRAMUSCULAR; INTRAVENOUS at 07:35

## 2023-09-05 RX ADMIN — ETOMIDATE 4 MG: 2 INJECTION, SOLUTION INTRAVENOUS at 07:37

## 2023-09-05 RX ADMIN — Medication 2 G: at 07:22

## 2023-09-05 RX ADMIN — ACETAMINOPHEN 650 MG: 325 TABLET ORAL at 21:19

## 2023-09-05 RX ADMIN — PHENYLEPHRINE HYDROCHLORIDE 50 MCG: 10 INJECTION INTRAVENOUS at 07:36

## 2023-09-05 ASSESSMENT — ACTIVITIES OF DAILY LIVING (ADL)
ADLS_ACUITY_SCORE: 35
ADLS_ACUITY_SCORE: 20
FALL_HISTORY_WITHIN_LAST_SIX_MONTHS: NO
ADLS_ACUITY_SCORE: 35
DOING_ERRANDS_INDEPENDENTLY_DIFFICULTY: NO
CHANGE_IN_FUNCTIONAL_STATUS_SINCE_ONSET_OF_CURRENT_ILLNESS/INJURY: NO
ADLS_ACUITY_SCORE: 20
ADLS_ACUITY_SCORE: 20
VISION_MANAGEMENT: GLASSES
DIFFICULTY_EATING/SWALLOWING: NO
TOILETING_ISSUES: NO
DRESSING/BATHING_DIFFICULTY: NO
WALKING_OR_CLIMBING_STAIRS_DIFFICULTY: NO
ADLS_ACUITY_SCORE: 20
WEAR_GLASSES_OR_BLIND: YES
CONCENTRATING,_REMEMBERING_OR_MAKING_DECISIONS_DIFFICULTY: NO
ADLS_ACUITY_SCORE: 35
ADLS_ACUITY_SCORE: 20
ADLS_ACUITY_SCORE: 35

## 2023-09-05 NOTE — DISCHARGE INSTRUCTIONS
Patient Instructions - Going Home after TAVR:    Going Home: It s normal to feel a little anxious after leaving the hospital and when fewer people are nearby. People do much better when they feel as though they have support.  If you live alone we suggest you arrange to have someone stay with you for a day or two to help you recover.     Medications:  Take all of your medications as directed in your discharge summary. Do not stop taking these medications without speaking with your cardiologist. If you have any questions about any of your medications, speak to your pharmacist or provider.     Follow up Appointments  You will follow up with Isabella Herrmann PA-C on September 20 at 12:50 pm.  You will have a repeat echocardiogram on October 16 at 8 am.  You will have a follow up with Isabella Herrmann PA-C on October 19 at 2 pm, for labs and visit (you DON'T have to fast for these labs).      If you need to reschedule any of these appointments, please call:  [phone]    See your regular cardiologist in 6 months.  Your regular heart doctor will continue to be your heart specialist.   See your family doctor in 2 weeks.  Make an appointment once you get home.  You will receive a temporary  heart valve  wallet card. We recommend that you keep it in your wallet or purse at all times. You will be receiving a permanent wallet card from the  within 6 months.   Before an MRI (magnetic resonance imaging) procedure, always notify the doctor (or medical technician) that you have an implanted heart valve.     Site Care: Shower every day.  Let the soap and water run over your incision or access site, but do not rub the area. No tub baths, pools or hot-tubs for the 1st week you are at home. Do not put ointments, powders, or lotions on your access site and/or incision.  It is normal to feel a small lump the size of a marble in the groin access site.  That is the closure device used to close the  vein/artery.  If you are experiencing discomfort, bleeding, drainage, redness or increasing swelling, please call us.    Dental Work: Avoid major dental work for the next 6 months if possible. You will need antibiotics before any dental cleanings, work or surgery. This will decrease the risk of infection.     Driving:  You should not drive for the first 2 weeks after your procedure. The first time you drive, you must have someone with you. You may ride in a car immediately after your procedure.      Activity: People recover at different rates depending on their general health and the type of heart valve procedure. Most people take about 4-10 weeks to feel fully recovered. Daily activity and exercise are an important part of your recovery.  Do not lift, push or pull anything > 10 lb. for the first 2 weeks.        CALL THE VALVE CLINIC IF YOU HAVE ANY QUESTIONS OR CONCERNS:  [phone]  For the first 2 weeks after TAVR     Do Not:   Lift, push, or pull more than ten pounds (including pets, laundry, groceries, etc)  Garden, including lawn mowing and raking  Excessively bear down or strain when having a bowel movement   Ride a bike   Do:  Weigh yourself every day in the morning after using the bathroom.  If you notice weight gain of more than 3 pounds in 1 day or more than 5 pounds in 1 week, call the cardiology clinic.   Get up and get dressed every day. Do not stay in bed.  Set a daily routine.   Walk as much as you can. You may walk up and down stairs. When you are tired, rest.   Cough and deep breathe. Use your incentive spirometer 5-10 times each hour when you are awake.   We strongly recommend you participate in a cardiac rehabilitation program. This type of program will help you learn about your heart health, prevent more heart problems, participate in safe and heart-healthy activities, and learn how to return to your activities of daily living and hobbies.   Let the cardiology clinic know if you need to leave  town before your first follow-up visit.     When to call the Cardiology Clinic to speak with a nurse?   Swelling of the legs, ankles, or feet  Increasing shortness of breath  Change in the color or temperature of your lower legs and feet  Abdominal pain or unrelieved nausea and/or vomiting  Redness and warmth around your access site and/or incision that does not go away  Yellow or green drainage from your access site and/or incision   Weight gain of 3 pounds in one day or 5 pounds in one week  Develop any numbness, tingling, or limited movement of your arms or legs.   Chest pain that does not go away  New confusion or you cannot think clearly  Fever and chills         [facility] [place] Clinic:  [phone]  If you are calling after hours, please listen to the entire voicemail, a live  will answer at the end of the message

## 2023-09-05 NOTE — H&P
[facility]    History and Physical - Hospitalist Service       Date of Admission:  9/5/2023    Assessment & Plan      Aayush García is a 68 year old male admitted on 9/5/2023. He has history of severe aortic stenosis, CAD status post PCI (2019 and 2021), hypertension who presents for for scheduled TAVR.    Severe aortic stenosis status post TAVR  Patient presented this morning for scheduled aortic valve replacement.  Seen in immediate postop setting.  Currently doing well since procedure.  No chest pain, shortness of breath.  -Cardiology managing   -ASA indefinitely    -Echo in a.m.   -Tylenol and oxycodone   -Hydralazine 10 mg as needed   -Nitro as needed   -Zofran as needed   -Maintenance IV fluids   -Telemetry   -endocarditis prophylaxis indefinitely     HFmrEF  CAD s/p PCI pLAD (2019) and mLAD (2021)  Patient has history of NSTEMI in 2019.  Had stent placed to proximal LAD in 2019 and mid LAD in 2021.  At that time EF down to 35%.  Has mostly recovered now with most recent EF of 45 to 50%.  Follows closely with cardiology.  Per patient plan for echo in a.m. prior to discharge.  -Cardiology managing   -Aspirin 81 mg   -PTA Lipitor 80 mg   -PTA Coreg 25 mg   -Echo in a.m.    Hypertension  Patient with history of hypertension.  Follows closely with cardiology.  Chart review appears possible plan to increase home losartan following recent procedure.  Defer to cardiology.  -Cardiology managing    -Continue PTA losartan     History of macrocytic anemia  Hemoglobin on admission is 12.5.  It appears baseline is upper 12-13's.  -Continue PTA iron and folic acid supplements    Hyponatremia  Sodium of 135 on admission.  Likely in setting of being n.p.o. for planned procedure.  -BMP in a.m.    Hypokalemia  Hypomagnesemia  -RN replacement protocol    GERD  History of GERD.  Continue PTA Protonix.    Remote alcohol history  Previous history of alcohol induced hepatitis in 2018. No significant  "drinking history since 2021. Denies recent use today. LFTs normal on admission.  -continue to monitor      Diet: Regular Diet Adult    DVT Prophylaxis: Aspirin -- anticoagulation per cardiology  Rahman Catheter: Not present  Lines: None     Cardiac Monitoring: ACTIVE order. Indication: Transcatheter structural interventions (24 hours)  Code Status: Full Code      Clinically Significant Risk Factors Present on Admission          # Hypocalcemia: Lowest Ca = 8.3 mg/dL in last 2 days, will monitor and replace as appropriate   # Hypomagnesemia: Lowest Mg = 1.6 mg/dL in last 2 days, will replace as needed     # Drug Induced Platelet Defect: home medication list includes an antiplatelet medication   # Hypertension: Noted on problem list      # Obesity: Estimated body mass index is 30 kg/m  as calculated from the following:    Height as of this encounter: 1.727 m (5' 8\").    Weight as of this encounter: 89.5 kg (197 lb 5 oz).              Disposition Plan      Expected Discharge Date: 09/06/2023                The patient's care was discussed with the Attending Physician, Dr. Sytles .    Adelita aPtiño MD  Hospitalist Service  [facility]  Securely message with bizsol (more info)  Text page via [facility] Paging/Directory     ______________________________________________________________________    Chief Complaint   Planned TAVR    History is obtained from the patient and chart review    History of Present Illness   Aayush García is a 68 year old male who history of severe aortic stenosis, CAD status post PCI (2019 and 2021), hypertension who presents for scheduled TAVR.  Patient has aortic stenosis and came in this morning for TAVR.  He was seen in the postop period.  Patient is doing well postop.  He denies any chest pain shortness of breath or breathing troubles.  He just finished lunch and tolerated this well.  Notes mild headache.  Patient follows closely with cardiology in the outpatient " setting.    Patient also has history of CAD and had a NSTEMI in 2019.  He has subsequently had 2 stents placed 1 in 2019 to proximal LAD and 1 to mid LAD in 2021.  He he follows closely with cardiology for this.  He has nonischemic cardiomyopathy with recent EF of 45 to 50%.  BMP 5 days ago was 2200 but patient denied any orthopnea, shortness of breath or chest pain at that time.  No weight gain or swelling.    Chart review shows alcohol use with alcohol induced hepatitis in 2018.  He has not been drinking since 2021.  Denies any tobacco use or other recreational drug use.       Past Medical History    Past Medical History:   Diagnosis Date    Acute liver failure 8/5/2018    Acute on chronic systolic congestive heart failure (H) 1/14/2020    Acute renal failure, unspecified acute renal failure type (H) 08/05/2018    Acute respiratory failure with hypoxia (H) 12/16/2019    Acute respiratory failure with hypoxia (H) 08/05/2018    Alcoholic hepatitis with ascites 08/05/2018    Bacteremia due to Klebsiella pneumoniae     Benign essential hypertension 4/12/2018    Chronic liver disease     Closed fracture of multiple ribs of right side, initial encounter 11/23/2019    Coronary artery disease involving native coronary artery of native heart without angina pectoris 9/24/2019    Essential hypertension     Gastroesophageal reflux disease without esophagitis 10/31/2018    GI bleeding 10/27/2019    Heart failure with reduced ejection fraction (H) 4/4/2019    Hepatic encephalopathy (H) 08/05/2018    Macrocytic anemia     Non-ST elevation MI (NSTEMI) (H) 3/18/2019    Overview:  Added automatically from request for surgery [id number]    NSTEMI (non-ST elevated myocardial infarction) (H) 03/2019    s/p stent placement    Primary localized osteoarthrosis of left lower leg 4/9/2019    Stress-induced cardiomyopathy 08/05/2018       Past Surgical History   Past Surgical History:   Procedure Laterality Date    COLONOSCOPY N/A 3/20/2018     Procedure: COLONOSCOPY;  Surgeon: Adam Nicole III, MD;  Location: [facility] OR;  Service:     CV CORONARY ANGIOGRAM N/A 3/18/2019    Procedure: Coronary Angiogram;  Surgeon: Timi Rodriguez MD;  Location: [facility] Cath Lab;  Service: Cardiology    CV CORONARY ANGIOGRAM N/A 9/22/2021    Procedure: Coronary Angiogram;  Surgeon: Brianda Avilez MD;  Location: [facility] CATH LAB CV    CV CORONARY ANGIOGRAM N/A 7/6/2023    Procedure: Coronary Angiogram;  Surgeon: Rafat Pinto MD;  Location: [facility] CATH LAB CV    CV FRACTIONAL FLOW RATIO WIRE N/A 9/22/2021    Procedure: Fractional Flow Ratio Wire;  Surgeon: Brianda Avilez MD;  Location: [facility] LAB CV    CV LEFT HEART CATH N/A 7/6/2023    Procedure: Left Heart Catheterization;  Surgeon: Rafat Pinto MD;  Location: [facility] LAB CV    CV LEFT HEART CATHETERIZATION WITHOUT LEFT VENTRICULOGRAM Left 3/18/2019    Procedure: Left Heart Catheterization Without Left Ventriculogram;  Surgeon: Timi Rodriguez MD;  Location: [facility] Cath Lab;  Service: Cardiology    CV PCI N/A 9/22/2021    Procedure: Percutaneous Coronary Intervention;  Surgeon: Brianda Avilez MD;  Location: [facility] LAB CV    CV PCI N/A 7/6/2023    Procedure: Percutaneous Coronary Intervention;  Surgeon: Rafat Pinto MD;  Location: [facility] CV    CV PCI ATHERECTOMY ORBITAL N/A 9/22/2021    Procedure: Percutaneous Coronary Intervention Atherectomy Rotational;  Surgeon: Brianda Avilez MD;  Location: [facility] LAB CV    IR CVC TUNNEL PLACEMENT > 5 YRS OF AGE  8/1/2018    IR TUNNELED CATHETER REMOVAL  8/31/2018    PICC  7/24/2018         PICC  3/18/2019         US THORACENTESIS  10/28/2019    [facility] CORONARY STENT PERCUT, INITIAL VESSEL  03/2019       Prior to Admission Medications   Prior to Admission Medications   Prescriptions Last Dose Informant Patient Reported? Taking?   Cholecalciferol (VITAMIN D) 50 MCG (2000 UT) CAPS 9/4/2023 at HS  Yes Yes    Sig: Take 1 capsule by mouth daily   acetaminophen (TYLENOL 8 HOUR ARTHRITIS PAIN) 650 MG CR tablet 2023  Yes Yes   Sig: Take 650 mg by mouth every 8 hours as needed for pain   aspirin (ASA) 81 MG EC tablet 9/5/2023 x 4 tabs  Yes Yes   Sig: Take 1 tablet (81 mg) by mouth daily   atorvastatin (LIPITOR) 80 MG tablet 2023 at HS  No Yes   Sig: Take 1 tablet (80 mg) by mouth daily   carvedilol (COREG) 25 MG tablet 2023  No Yes   Sig: Take 1 tablet (25 mg) by mouth 2 times daily   ferrous sulfate (FEROSUL) 325 (65 Fe) MG tablet 2023 at HS  Yes Yes   Sig: Take 325 mg by mouth daily   folic acid (FOLVITE) 400 MCG tablet 2023  Yes Yes   Sig: Take 1 tablet (400 mcg) by mouth daily   losartan (COZAAR) 25 MG tablet 2023  No Yes   Sig: Take 1 tablet (25 mg) by mouth daily   magnesium oxide (MAG-OX) 400 MG tablet 2023  Yes Yes   Sig: Take 400 mg by mouth daily   multivitamin w/minerals (THERA-VIT-M) tablet 2023  Yes Yes   Sig: Take 1 tablet by mouth daily   pantoprazole (PROTONIX) 20 MG EC tablet 2023  No Yes   Sig: Take 1 tablet (20 mg) by mouth daily   potassium chloride ER (KLOR-CON M) 20 MEQ CR tablet 2023  No Yes   Sig: Take 1 tablet (20 mEq) by mouth daily   vitamin B complex with vitamin C (VITAMIN  B COMPLEX) tablet 2023  Yes Yes   Sig: Take 1 tablet by mouth daily      Facility-Administered Medications: None        Social History   I have reviewed this patient's social history and updated it with pertinent information if needed.  Social History     Tobacco Use    Smoking status: Former     Packs/day: 1.00     Years: 40.00     Pack years: 40.00     Types: Cigarettes     Quit date: 2019     Years since quittin.3     Passive exposure: Never    Smokeless tobacco: Never    Tobacco comments:     3 Cig/Week   Vaping Use    Vaping Use: Never used   Substance Use Topics    Alcohol use: Not Currently     Comment: Alcoholic Drinks/day: 350 ml of peppermint schnapps daily per  [facility], 2018 H&P per report of Karlie JJ to Dr. Valle    Drug use: No        Physical Exam   Vital Signs: Temp: 97.7  F (36.5  C) Temp src: Oral BP: 121/58 Pulse: 61   Resp: 16 SpO2: 100 % O2 Device: None (Room air) Oxygen Delivery: 2 LPM  Weight: 197 lbs 4.99 oz  Constitutional: awake, alert, cooperative, no apparent distress, and appears stated age  Eyes: Lids and lashes normal, pupils equal, round and reactive to light, extra ocular muscles intact, sclera clear, conjunctiva normal  ENT: Normocephalic, without obvious abnormality, atraumatic, external ears without lesions, oral pharynx with moist mucous membranes, tonsils without erythema or exudates, gums normal and good dentition.  Hematologic / Lymphatic: no cervical lymphadenopathy  Respiratory: No increased work of breathing, good air exchange, clear to auscultation bilaterally, no crackles or wheezing  Cardiovascular:  regular rate and rhythm, normal S1 and S2, no S3 or S4, and quiet murmur noted  GI: No scars, normal bowel sounds, soft, non-distended, non-tender, no masses palpated  Skin: dressings to left upper chest and groin C/D/I. no rashes, erythema or lesions  Musculoskeletal: There is no redness, warmth, or swelling of the joints.  Full range of motion noted.  Tone is normal.  Neurologic: Awake, alert, oriented to name, place and time.  Cranial nerves II-XII are grossly intact.   Neuropsychiatric: General: normal, calm and normal eye contact  Level of consciousness: alert / normal  Affect: normal and pleasant      Data     I have personally reviewed the following data over the past 24 hrs:    9.8  \   12.5 (L)   / 174     135 (L) 102 9.8 /  116 (H)   4.3 23 0.68 \     ALT: 22 AST: 26 AP: 57 TBILI: 1.2   ALB: 3.9 TOT PROTEIN: 6.5 LIPASE: N/A       Imaging results reviewed over the past 24 hrs:   Recent Results (from the past 24 hour(s))   Cardiac Catheterization    Narrative    TRANSCATHETER AORTIC VALVE REPLACEMENT REPORT     PATIENT NAME:  Aayush HARMAN  Raquel          MEDICAL RECORD NUMBER: [id number]  : 1955       DATE: 2023  PROCEDURE DATE: 2023    PROCEDURE PERFORMED:   1. Transcatheter aortic valve replacement  2. Temporary pacemaker insertion  3. Supravalvular aortography  4. Ultrasound guided vascular access    PRE-OP DIAGNOSIS:  1. Severe symptomatic aortic stenosis    POST-OP DIAGNOSIS:  1. Same as above    PROCEDURALISTS:   1. INTERVENTIONAL CARDIOLOGIST: Bret Quezada MD, Timi Rodriguez MD   2. SURGEON: Yony Caraballo MD     INDICATIONS: 68 year old male with severe aortic stenosis who is referred   for elective transcatheter aortic valve replacement (TAVR).     PROCEDURAL TECHNIQUE:   Informed consent was obtained prior to patient arrival. Case was reviewed   pre-operatively with the entire Multi-disciplinary Valve Team. Patient was   placed supine on the  table. Preprocedure pause was performed.General   anesthesia was preformed by Anesthesia service, please see separate note   for details. . Patient was prepped and draped in the usual sterile fashion   for the selected method of valve delivery. Using micropuncture kit under   ultrasound guidance a 6 Prydeinig sheath was introduced into the left femoral   artery and a 7 Prydeinig sheath into the left femoral vein. Next, left   subclavian artery cutdown was preformed by our surgical colleagues (please   see separate note). Placement of the 16 Fr delivery sheath  was performed   following usual staged dilatation. A 5 Prydeinig balloon tipped pacer was   then inserted via the venous sheath into the RV and pacing thresholds were   excellent. Ascending aorta and aortic root aortography was performed to   determine appropriate deployment angles. A 6 Prydeinig AL-1 catheter was   advanced into the aortic root and a straight tipped wire advanced into the   left ventricle. The AL-1 catheter was then advanced into the ventricle. An   small curve Safari wire with a ventricular curve was then  advanced into   the left ventricle. A 29 Shelley Ultra Resilia was then advanced to the   aortic annulus under direct fluoroscopic visualization. Repeat aortography   was performed to confirm appropriate device  placement. Rapid pacing was   then performed with adequate loss of pulse pressure and the valve was   deployed during simultaneous supra-valvular aortography. Transthoracic   echocardiogram and repeat supravalvular  aortography confirmed stable   device placement and quantified aortic insufficiency as none. Hemostasis   of the left femoral artery was then obtained with deployment of the   Perclose Proglide sutures following removal of the sheath. The left   femoral venous sheath was removed and manual pressure was applied until   excellent hemostasis was achieved.     PROCEDURAL DETAILS:  Access for Valve Delivery: Left subclavian   Sheath site: 16 Niuean E-sheath  Valve: 29 Shelley Ultra Resilia   Fluoroscopy time: Please see procedure log   Contrast total (mL): 100 cc Omnipaque  Estimated blood loss (mL): 25 cc  Specimen Removed: None  Fluids given: Please see procedure log   Urine output: not recorded     CONCLUSIONS:  1. Successful transcatheter aortic valve replacement using a 29 Shelley   Ultra Resilia via left subclavian approach.    RECOMMENDATIONS  1. Standard post cardiac catheterization care.  2. Aspirin indefinitely.   3. Endocarditis prophylaxis for life.    Please do not hesitate to contact me if you have any questions or   concerns.     Bret Quezada MD  Interventional Cardiology

## 2023-09-05 NOTE — ANESTHESIA PREPROCEDURE EVALUATION
Anesthesia Pre-Procedure Evaluation    Patient: Aayush García   MRN: [id number] : 1955        Procedure : Procedure(s):  Transcatheter Aortic Valve Replacement - Subclavian Approach  OR TRANSCATHETER AORTIC VALVE REPLACEMENT, SUBCLAVIAN PERCUTANEOUS APPROACH (STANDBY)          Past Medical History:   Diagnosis Date    Acute liver failure 2018    Acute on chronic systolic congestive heart failure (H) 2020    Acute renal failure, unspecified acute renal failure type (H) 2018    Acute respiratory failure with hypoxia (H) 2019    Acute respiratory failure with hypoxia (H) 2018    Alcoholic hepatitis with ascites 2018    Bacteremia due to Klebsiella pneumoniae     Benign essential hypertension 2018    Chronic liver disease     Closed fracture of multiple ribs of right side, initial encounter 2019    Coronary artery disease involving native coronary artery of native heart without angina pectoris 2019    Essential hypertension     Gastroesophageal reflux disease without esophagitis 10/31/2018    GI bleeding 10/27/2019    Heart failure with reduced ejection fraction (H) 2019    Hepatic encephalopathy (H) 2018    Macrocytic anemia     Non-ST elevation MI (NSTEMI) (H) 3/18/2019    Overview:  Added automatically from request for surgery [id number]    NSTEMI (non-ST elevated myocardial infarction) (H) 2019    s/p stent placement    Primary localized osteoarthrosis of left lower leg 2019    Stress-induced cardiomyopathy 2018      Past Surgical History:   Procedure Laterality Date    COLONOSCOPY N/A 3/20/2018    Procedure: COLONOSCOPY;  Surgeon: Adam Nicole III, MD;  Location: [facility] OR;  Service:     CV CORONARY ANGIOGRAM N/A 3/18/2019    Procedure: Coronary Angiogram;  Surgeon: Timi Rodriguez MD;  Location: [facility] Cath Lab;  Service: Cardiology    CV CORONARY ANGIOGRAM N/A 2021    Procedure: Coronary Angiogram;  Surgeon: Raghav  MD Brianda;  Location: [facility] LAB CV    CV CORONARY ANGIOGRAM N/A 2023    Procedure: Coronary Angiogram;  Surgeon: Rafat Pinto MD;  Location: [facility] LAB CV    CV FRACTIONAL FLOW RATIO WIRE N/A 2021    Procedure: Fractional Flow Ratio Wire;  Surgeon: Brianda Avilez MD;  Location: [facility] LAB CV    CV LEFT HEART CATH N/A 2023    Procedure: Left Heart Catheterization;  Surgeon: Rafat Pinto MD;  Location: [facility] LAB CV    CV LEFT HEART CATHETERIZATION WITHOUT LEFT VENTRICULOGRAM Left 3/18/2019    Procedure: Left Heart Catheterization Without Left Ventriculogram;  Surgeon: Timi Rodriguez MD;  Location: [facility] Cath Lab;  Service: Cardiology    CV PCI N/A 2021    Procedure: Percutaneous Coronary Intervention;  Surgeon: Brianda Avilez MD;  Location: [facility] CV    CV PCI N/A 2023    Procedure: Percutaneous Coronary Intervention;  Surgeon: Rafat Pinto MD;  Location: [facility] CV    CV PCI ATHERECTOMY ORBITAL N/A 2021    Procedure: Percutaneous Coronary Intervention Atherectomy Rotational;  Surgeon: Brianda Avilez MD;  Location: [facility] CV    IR CVC TUNNEL PLACEMENT > 5 YRS OF AGE  2018    IR TUNNELED CATHETER REMOVAL  2018    PICC  2018         PICC  3/18/2019         US THORACENTESIS  10/28/2019    [facility] CORONARY STENT PERCUT, INITIAL VESSEL  2019      Allergies   Allergen Reactions    No Known Allergies       Social History     Tobacco Use    Smoking status: Former     Packs/day: 1.00     Years: 40.00     Pack years: 40.00     Types: Cigarettes     Quit date: 2019     Years since quittin.3     Passive exposure: Never    Smokeless tobacco: Never    Tobacco comments:     3 Cig/Week   Substance Use Topics    Alcohol use: Not Currently     Comment: Alcoholic Drinks/day: 350 ml of peppermint schnapps daily per [facility] 2018 H&P per report of Karlie JJ to Dr. Valle      Wt Readings from  Last 1 Encounters:   08/31/23 89.7 kg (197 lb 12.8 oz)        Anesthesia Evaluation   Pt has had prior anesthetic.     No history of anesthetic complications       ROS/MED HX  ENT/Pulmonary:     (+)                tobacco use, Past use,                      Neurologic:  - neg neurologic ROS     Cardiovascular: Comment: ECHOCARDIOGRAM done on 6/27/2023:  Interpretation Summary     1.Left ventricular function is decreased. The ejection fraction is 45-50%  (mildly reduced).  2.There is mild concentric left ventricular hypertrophy.  3.Normal right ventricle size and systolic function.  4.Moderate most likely severe stenosis of a trileaflet aortic valve. Appears  to be low-flow low gradient with a dimensionless index is 0.15, peak velocity  3.9 m/s and mean gradient 35 mmHg in the setting of stroke-volume index of 25  mL/metered squared. Prior was 32 mmHg mean gradient. There is mild aortic  insufficiency with deceleration pressure half-time of 558 ms.  5.The ascending aorta is Mildly dilated.  Compared to the prior study dated 6/22/2023, the LV EF is lower and thus the  AS more significant.        CATH done on 7/6/2023:     1st Mrg lesion is 99% stenosed.     Mid LAD lesion is 30% stenosed.     Ost Cx to Prox Cx lesion is 50% stenosed.     Left ventricular filling pressures are normal.      (+)  hypertension- Peripheral Vascular Disease-  CAD - past MI - stent-      CHF etiology: Ischemic cardiomyopathy                    valvular problems/murmurs (Severe low-flow low gradient Aortic Stenosis) type: AS          METS/Exercise Tolerance:     Hematologic:     (+)      anemia,          Musculoskeletal:  - neg musculoskeletal ROS     GI/Hepatic:     (+) GERD, Asymptomatic on medication,         hepatitis type Alcoholic, liver disease (EtOH cirrhosis),       Renal/Genitourinary: Comment: H/o ARF resolved      Endo:     (+)               Obesity (BMOI 30),       Psychiatric/Substance Use:       Infectious Disease:        Malignancy:       Other:            Physical Exam    Airway        Mallampati: II   TM distance: > 3 FB   Neck ROM: full   Mouth opening: > 3 cm    Respiratory Devices and Support         Dental       (+) Edentulous      Cardiovascular          Rhythm and rate: regular and normal   (+) murmur       Pulmonary   pulmonary exam normal            OUTSIDE LABS:  CBC:   Lab Results   Component Value Date    WBC 7.4 08/31/2023    WBC 8.5 07/05/2023    HGB 12.8 (L) 08/31/2023    HGB 12.7 (L) 07/05/2023    HCT 38.2 (L) 08/31/2023    HCT 36.8 (L) 07/05/2023     08/31/2023     07/05/2023     BMP:   Lab Results   Component Value Date     (L) 08/31/2023     (L) 07/06/2023    POTASSIUM 4.3 08/31/2023    POTASSIUM 4.2 07/05/2023    CHLORIDE 98 08/31/2023    CHLORIDE 97 (L) 07/05/2023    CO2 24 08/31/2023    CO2 23 07/05/2023    BUN 11.6 08/31/2023    BUN 11 07/05/2023    CR 0.71 08/31/2023    CR 0.71 07/05/2023    GLC 93 08/31/2023    GLC 76 07/05/2023     COAGS:   Lab Results   Component Value Date    PTT 42 (H) 09/22/2021    INR 1.09 08/31/2023     POC: No results found for: BGM, HCG, HCGS  HEPATIC:   Lab Results   Component Value Date    ALBUMIN 4.3 08/31/2023    PROTTOTAL 7.4 08/31/2023    ALT 23 08/31/2023    AST 27 08/31/2023     (H) 08/14/2018    ALKPHOS 61 08/31/2023    BILITOTAL 1.1 08/31/2023    PARAM 44 (H) 05/15/2020     OTHER:   Lab Results   Component Value Date    PH 7.36 08/05/2018    LACT 0.8 12/17/2019    SOLO 9.2 08/31/2023    PHOS 3.6 10/28/2019    MAG 1.6 (L) 08/31/2023    LIPASE 256 (H) 07/25/2018    TSH 1.54 10/27/2019       Anesthesia Plan    ASA Status:  4    NPO Status:  NPO Appropriate    Anesthesia Type: General.     - Airway: ETT   Induction: Intravenous, Etomidate.   Maintenance: Balanced.   Techniques and Equipment:     - Lines/Monitors: 2nd IV, Arterial Line     Consents    Anesthesia Plan(s) and associated risks, benefits, and realistic alternatives discussed.  Questions answered and patient/representative(s) expressed understanding.     - Discussed: Risks, Benefits and Alternatives for BOTH SEDATION and the PROCEDURE were discussed     - Discussed with:  Patient      - Extended Intubation/Ventilatory Support Discussed: No.      - Patient is DNR/DNI Status: No     Use of blood products discussed: Yes.     - Discussed with: Patient.     - Consented: consented to blood products            Reason for refusal: other.     Postoperative Care    Pain management: Multi-modal analgesia.   PONV prophylaxis: Ondansetron (or other 5HT-3), Dexamethasone or Solumedrol     Comments:    Other Comments: Left radial arterial line for induction    Prn glidescope    Slave off femoral arterial line for the procedure due to left subclavian approach    Prn CVC  Possible CARO for cardiology    Reverse with Sugammadex            Carmelina Giron MD

## 2023-09-05 NOTE — Clinical Note
Dr Caraballo present as CV surgeon for case, not Dr Pabon.  Dr Pabon in case only as place uribe for Epic charting in surgical panel.

## 2023-09-05 NOTE — ANESTHESIA PROCEDURE NOTES
Airway       Patient location during procedure: OR       Procedure Start/Stop Times: 9/5/2023 7:40 AM  Staff -        CRNA: Amos Bello       Performed By: SRNA  Consent for Airway        Urgency: elective  Indications and Patient Condition       Indications for airway management: blake-procedural       Induction type:intravenous      Final Airway Details       Final airway type: endotracheal airway       Successful airway: ETT - single  Endotracheal Airway Details        ETT size (mm): 8.0       Cuffed: yes       Successful intubation technique: video laryngoscopy       VL Blade Size: Glidescope 4       Grade View of Cords: 1       Adjucts: stylet       Position: Right       Measured from: lips       Secured at (cm): 22       Bite block used: None    Post intubation assessment        Placement verified by: capnometry, equal breath sounds and chest rise        Number of attempts at approach: 1       Number of other approaches attempted: 0       Secured with: silk tape       Ease of procedure: easy       Dentition: Intact and Unchanged    Medication(s) Administered   Medication Administration Time: 9/5/2023 7:40 AM

## 2023-09-05 NOTE — ANESTHESIA POSTPROCEDURE EVALUATION
Patient: Aayush García    Procedure: Procedure(s):  Transcatheter Aortic Valve Replacement - Subclavian Approach  OR TRANSCATHETER AORTIC VALVE REPLACEMENT, SUBCLAVIAN PERCUTANEOUS APPROACH (STANDBY)       Anesthesia Type:  General    Note:  Disposition: Inpatient   Postop Pain Control: Uneventful            Sign Out: Well controlled pain   PONV: No   Neuro/Psych: Uneventful            Sign Out: Acceptable/Baseline neuro status   Airway/Respiratory: Uneventful            Sign Out: Acceptable/Baseline resp. status   CV/Hemodynamics: Uneventful            Sign Out: Acceptable CV status; No obvious hypovolemia; No obvious fluid overload   Other NRE: NONE   DID A NON-ROUTINE EVENT OCCUR? No           Last vitals:  Vitals Value Taken Time   /72 09/05/23 1045   Temp 36.4  C (97.6  F) 09/05/23 1000   Pulse 54 09/05/23 1113   Resp 14 09/05/23 1113   SpO2 100 % 09/05/23 1113   Vitals shown include unvalidated device data.    Electronically Signed By: Carmelina Giron MD  September 5, 2023  1:10 PM

## 2023-09-05 NOTE — Clinical Note
Prepped: groin, brachials, chest, abdomen and neck. Prepped with: ChloraPrep. The patient was draped. .Pre-procedure site marking:Insertion site not predetermined

## 2023-09-05 NOTE — PHARMACY-ADMISSION MEDICATION HISTORY
Pharmacist Admission Medication History    Admission medication history is complete. The information provided in this note is only as accurate as the sources available at the time of the update.    Medication reconciliation/reorder completed by provider prior to medication history? No    Information Source(s): Patient, Family member, Clinic records, and CareEverywhere/SureScripts via in-person    Pertinent Information: none    Medication Affordability:  Not including over the counter (OTC) medications, was there a time in the past 3 months when you did not take your medications as prescribed because of cost?: No    Allergies reviewed with patient and updates made in EHR: yes    Medication History Completed By: Favio Reynoso [facility] 9/5/2023 6:34 AM    Prior to Admission medications    Medication Sig Last Dose Taking? Auth Provider Long Term End Date   acetaminophen (TYLENOL 8 HOUR ARTHRITIS PAIN) 650 MG CR tablet Take 650 mg by mouth every 8 hours as needed for pain 9/4/2023 Yes Zachary Lewis MD     aspirin (ASA) 81 MG EC tablet Take 1 tablet (81 mg) by mouth daily 9/5/2023 x 4 tabs Yes Zachary Lewis MD     atorvastatin (LIPITOR) 80 MG tablet Take 1 tablet (80 mg) by mouth daily 9/4/2023 at HS Yes Zachary Lewis MD Yes    carvedilol (COREG) 25 MG tablet Take 1 tablet (25 mg) by mouth 2 times daily 9/4/2023 Yes Zachary Lewis MD Yes    Cholecalciferol (VITAMIN D) 50 MCG (2000 UT) CAPS Take 1 capsule by mouth daily 9/4/2023 at HS Yes Zachary Lewis MD     ferrous sulfate (FEROSUL) 325 (65 Fe) MG tablet Take 325 mg by mouth daily 9/4/2023 at HS Yes Reported, Patient     folic acid (FOLVITE) 400 MCG tablet Take 1 tablet (400 mcg) by mouth daily 9/4/2023 Yes Zachary Lewis MD     losartan (COZAAR) 25 MG tablet Take 1 tablet (25 mg) by mouth daily 9/5/2023 Yes Ebonie Martines MD Yes    magnesium oxide (MAG-OX) 400 MG tablet Take 400 mg by mouth daily 9/4/2023 Yes Zachary Lewis MD     multivitamin w/minerals (THERA-VIT-M)  tablet Take 1 tablet by mouth daily 9/4/2023 Yes Unknown, Entered By History     pantoprazole (PROTONIX) 20 MG EC tablet Take 1 tablet (20 mg) by mouth daily 9/5/2023 Yes Zachary Lewis MD No    potassium chloride ER (KLOR-CON M) 20 MEQ CR tablet Take 1 tablet (20 mEq) by mouth daily 9/4/2023 Yes Zachary Lewis MD     vitamin B complex with vitamin C (VITAMIN  B COMPLEX) tablet Take 1 tablet by mouth daily 9/4/2023 Yes Unknown, Entered By History

## 2023-09-05 NOTE — ANESTHESIA CARE TRANSFER NOTE
Patient: Aayush García    Procedure: Procedure(s):  Transcatheter Aortic Valve Replacement - Subclavian Approach  OR TRANSCATHETER AORTIC VALVE REPLACEMENT, SUBCLAVIAN PERCUTANEOUS APPROACH (STANDBY)       Diagnosis: valvular disease  Diagnosis Additional Information: No value filed.    Anesthesia Type:   General     Note:    Oropharynx: oropharynx clear of all foreign objects and spontaneously breathing  Level of Consciousness: drowsy  Oxygen Supplementation: face mask  Level of Supplemental Oxygen (L/min / FiO2): 8  Independent Airway: airway patency satisfactory and stable  Dentition: dentition unchanged  Vital Signs Stable: post-procedure vital signs reviewed and stable  Report to RN Given: handoff report given  Patient transferred to: Cardiac Special Care          Vitals:  Vitals Value Taken Time   BP     Temp     Pulse 74 09/05/23 0950   Resp 13 09/05/23 0950   SpO2 99 % 09/05/23 0950   Vitals shown include unvalidated device data.    Electronically Signed By: MAGY Zepeda CRNA  September 5, 2023  9:51 AM

## 2023-09-05 NOTE — PLAN OF CARE
Problem: Cardiovascular Surgery  Goal: Absence of Bleeding  Outcome: Progressing     Problem: Cardiovascular Surgery  Goal: Effective Cardiac Function  Outcome: Progressing     Problem: Cardiovascular Surgery  Goal: Optimal Cerebral Tissue Perfusion  Outcome: Progressing     Problem: Cardiovascular Surgery  Goal: Acceptable Pain Control  Outcome: Progressing     Patient arrived to unit ~1215 from his TAVR procedure. He denies pain. All sites CDI without hematomas. 's. Received hydralazine which was successful. Neuros intact. He will remain bedrest until 1400.

## 2023-09-05 NOTE — PROGRESS NOTES
Procedure: TAVR     Anesthesia type: General     Valve type: [place] FREDDIE S3-Resilia    Valve size: 29mm    Access used:   1) Valve Delivery Site: Left subclavian   2) Pigtail site: Left groin   3) Temporary pacing wire: Right groin     Implanting physician: Dr. Damien ruiz,  assist     Surgeon who assisted in case: Dr. Caraballo     Family updated: Spouse Karlie, provided with temporary valve card     French Gracia RN BSN  Structural Heart Coordinator   [facility]  [phone]

## 2023-09-05 NOTE — PLAN OF CARE
Goal Outcome Evaluation:             Pt admitted for TAVR due to aortic stenosis. Pt took 4 81mg ASA at home. Pt prepped and ready for procedure. Karlie pt's wife here for support.    Simran Caceres RN

## 2023-09-05 NOTE — Clinical Note
The DP pulses are detected w/ doppler bilaterally. The PT pulses are detected w/ doppler bilaterally.

## 2023-09-05 NOTE — ANESTHESIA PROCEDURE NOTES
Arterial Line Procedure Note    Pre-Procedure   Staff -        Anesthesiologist:  Carmelina Giron MD       Performed By: anesthesiologist       Location: OR       Pre-Anesthestic Checklist: patient identified, IV checked, risks and benefits discussed, informed consent, monitors and equipment checked, pre-op evaluation and at physician/surgeon's request  Timeout:       Correct Patient: Yes        Correct Procedure: Yes        Correct Site: Yes        Correct Position: Yes   Line Placement:   This line was placed Pre Induction starting at 9/5/2023 7:22 AM and ending at 9/5/2023 7:27 AM  Procedure   Procedure: arterial line, new line and elective       Laterality: right       Insertion Site: radial.  Sterile Prep        Standard elements of sterile barrier followed       Skin prep: Chloraprep  Insertion/Injection        Technique: ultrasound guided, Seldinger Technique and Oniel's test completed        1. Ultrasound was used to evaluate the access site.       2. Artery evaluated via ultrasound for patency/adequacy.       3. Using real-time ultrasound the needle/catheter was observed entering the artery/vein.       4. Permanent image was captured and entered into the patient's record.       5. The visualized structures were anatomically normal.       6. There were no apparent abnormal pathologic findings.       Catheter Type/Size: 20 G, 12 cm  Narrative         Secured by: suture       Tegaderm and Biopatch dressing used.       Complications: None apparent,        Arterial waveform: Yes        IBP within 10% of NIBP: Yes

## 2023-09-05 NOTE — INTERVAL H&P NOTE
"I have reviewed the surgical (or preoperative) H&P that is linked to this encounter, and examined the patient. There are no significant changes    Clinical Conditions Present on Arrival:  Clinically Significant Risk Factors Present on Admission         # Hyponatremia: Lowest Na = 133 mmol/L in last 30 days, will monitor as appropriate    # Hypomagnesemia: Lowest Mg = 1.6 mg/dL in last 30 days, will replace as needed      # Drug Induced Platelet Defect: home medication list includes an antiplatelet medication  # Obesity: Estimated body mass index is 30.08 kg/m  as calculated from the following:    Height as of this encounter: 1.727 m (5' 8\").    Weight as of this encounter: 89.7 kg (197 lb 12.8 oz).       "

## 2023-09-06 ENCOUNTER — APPOINTMENT (OUTPATIENT)
Dept: CARDIOLOGY | Facility: HOSPITAL | Age: 68
DRG: 267 | End: 2023-09-06
Attending: INTERNAL MEDICINE
Payer: COMMERCIAL

## 2023-09-06 ENCOUNTER — APPOINTMENT (OUTPATIENT)
Dept: RADIOLOGY | Facility: HOSPITAL | Age: 68
DRG: 267 | End: 2023-09-06
Attending: INTERNAL MEDICINE
Payer: COMMERCIAL

## 2023-09-06 ENCOUNTER — APPOINTMENT (OUTPATIENT)
Dept: OCCUPATIONAL THERAPY | Facility: HOSPITAL | Age: 68
DRG: 267 | End: 2023-09-06
Attending: INTERNAL MEDICINE
Payer: COMMERCIAL

## 2023-09-06 VITALS
BODY MASS INDEX: 29.6 KG/M2 | SYSTOLIC BLOOD PRESSURE: 171 MMHG | TEMPERATURE: 98 F | WEIGHT: 195.3 LBS | DIASTOLIC BLOOD PRESSURE: 75 MMHG | HEIGHT: 68 IN | RESPIRATION RATE: 18 BRPM | OXYGEN SATURATION: 96 % | HEART RATE: 65 BPM

## 2023-09-06 PROBLEM — I25.5 ISCHEMIC CARDIOMYOPATHY: Status: ACTIVE | Noted: 2019-04-18

## 2023-09-06 PROBLEM — K21.9 GASTROESOPHAGEAL REFLUX DISEASE WITHOUT ESOPHAGITIS: Status: ACTIVE | Noted: 2018-10-31

## 2023-09-06 PROBLEM — E66.3 OVERWEIGHT (BMI 25.0-29.9): Status: ACTIVE | Noted: 2018-04-12

## 2023-09-06 PROBLEM — I25.10 CORONARY ARTERY DISEASE INVOLVING NATIVE CORONARY ARTERY OF NATIVE HEART WITHOUT ANGINA PECTORIS: Status: ACTIVE | Noted: 2019-09-24

## 2023-09-06 LAB
ANION GAP SERPL CALCULATED.3IONS-SCNC: 10 MMOL/L (ref 7–15)
ATRIAL RATE - MUSE: 55 BPM
ATRIAL RATE - MUSE: 57 BPM
BUN SERPL-MCNC: 10.4 MG/DL (ref 8–23)
CALCIUM SERPL-MCNC: 8.4 MG/DL (ref 8.8–10.2)
CHLORIDE SERPL-SCNC: 101 MMOL/L (ref 98–107)
CREAT SERPL-MCNC: 0.65 MG/DL (ref 0.67–1.17)
DEPRECATED HCO3 PLAS-SCNC: 22 MMOL/L (ref 22–29)
DIASTOLIC BLOOD PRESSURE - MUSE: NORMAL MMHG
DIASTOLIC BLOOD PRESSURE - MUSE: NORMAL MMHG
ERYTHROCYTE [DISTWIDTH] IN BLOOD BY AUTOMATED COUNT: 12.1 % (ref 10–15)
GFR SERPL CREATININE-BSD FRML MDRD: >90 ML/MIN/1.73M2
GLUCOSE SERPL-MCNC: 125 MG/DL (ref 70–99)
HCT VFR BLD AUTO: 33.2 % (ref 40–53)
HGB BLD-MCNC: 11.2 G/DL (ref 13.3–17.7)
INTERPRETATION ECG - MUSE: NORMAL
INTERPRETATION ECG - MUSE: NORMAL
LVEF ECHO: NORMAL
MAGNESIUM SERPL-MCNC: 1.6 MG/DL (ref 1.7–2.3)
MCH RBC QN AUTO: 32.7 PG (ref 26.5–33)
MCHC RBC AUTO-ENTMCNC: 33.7 G/DL (ref 31.5–36.5)
MCV RBC AUTO: 97 FL (ref 78–100)
P AXIS - MUSE: 30 DEGREES
P AXIS - MUSE: 34 DEGREES
PLATELET # BLD AUTO: 152 10E3/UL (ref 150–450)
POTASSIUM SERPL-SCNC: 3.7 MMOL/L (ref 3.4–5.3)
PR INTERVAL - MUSE: 144 MS
PR INTERVAL - MUSE: 186 MS
QRS DURATION - MUSE: 148 MS
QRS DURATION - MUSE: 168 MS
QT - MUSE: 538 MS
QT - MUSE: 564 MS
QTC - MUSE: 523 MS
QTC - MUSE: 539 MS
R AXIS - MUSE: -13 DEGREES
R AXIS - MUSE: -83 DEGREES
RBC # BLD AUTO: 3.43 10E6/UL (ref 4.4–5.9)
SODIUM SERPL-SCNC: 133 MMOL/L (ref 136–145)
SYSTOLIC BLOOD PRESSURE - MUSE: NORMAL MMHG
SYSTOLIC BLOOD PRESSURE - MUSE: NORMAL MMHG
T AXIS - MUSE: -3 DEGREES
T AXIS - MUSE: 159 DEGREES
VENTRICULAR RATE- MUSE: 55 BPM
VENTRICULAR RATE- MUSE: 57 BPM
WBC # BLD AUTO: 11.3 10E3/UL (ref 4–11)

## 2023-09-06 PROCEDURE — 80048 BASIC METABOLIC PNL TOTAL CA: CPT | Performed by: INTERNAL MEDICINE

## 2023-09-06 PROCEDURE — 83735 ASSAY OF MAGNESIUM: CPT | Performed by: INTERNAL MEDICINE

## 2023-09-06 PROCEDURE — 93306 TTE W/DOPPLER COMPLETE: CPT

## 2023-09-06 PROCEDURE — 97535 SELF CARE MNGMENT TRAINING: CPT | Mod: GO

## 2023-09-06 PROCEDURE — 250N000011 HC RX IP 250 OP 636: Mod: JZ | Performed by: INTERNAL MEDICINE

## 2023-09-06 PROCEDURE — 85027 COMPLETE CBC AUTOMATED: CPT | Performed by: INTERNAL MEDICINE

## 2023-09-06 PROCEDURE — 250N000013 HC RX MED GY IP 250 OP 250 PS 637: Performed by: INTERNAL MEDICINE

## 2023-09-06 PROCEDURE — 97110 THERAPEUTIC EXERCISES: CPT | Mod: GO

## 2023-09-06 PROCEDURE — 93005 ELECTROCARDIOGRAM TRACING: CPT

## 2023-09-06 PROCEDURE — 999N000096 CARDIAC MOBILE TELEMETRY MONITOR

## 2023-09-06 PROCEDURE — 97165 OT EVAL LOW COMPLEX 30 MIN: CPT | Mod: GO

## 2023-09-06 PROCEDURE — 36415 COLL VENOUS BLD VENIPUNCTURE: CPT | Performed by: INTERNAL MEDICINE

## 2023-09-06 PROCEDURE — 99232 SBSQ HOSP IP/OBS MODERATE 35: CPT | Mod: GC | Performed by: FAMILY MEDICINE

## 2023-09-06 PROCEDURE — 71045 X-RAY EXAM CHEST 1 VIEW: CPT

## 2023-09-06 PROCEDURE — 93010 ELECTROCARDIOGRAM REPORT: CPT | Performed by: INTERNAL MEDICINE

## 2023-09-06 PROCEDURE — 99232 SBSQ HOSP IP/OBS MODERATE 35: CPT | Mod: 25 | Performed by: INTERNAL MEDICINE

## 2023-09-06 PROCEDURE — 93306 TTE W/DOPPLER COMPLETE: CPT | Mod: 26 | Performed by: INTERNAL MEDICINE

## 2023-09-06 RX ADMIN — CEFAZOLIN SODIUM 2 G: 2 INJECTION, SOLUTION INTRAVENOUS at 00:13

## 2023-09-06 RX ADMIN — POTASSIUM CHLORIDE 20 MEQ: 1500 TABLET, EXTENDED RELEASE ORAL at 07:44

## 2023-09-06 RX ADMIN — LOSARTAN POTASSIUM 25 MG: 25 TABLET, FILM COATED ORAL at 07:44

## 2023-09-06 RX ADMIN — ACETAMINOPHEN 650 MG: 325 TABLET ORAL at 10:24

## 2023-09-06 RX ADMIN — HYDRALAZINE HYDROCHLORIDE 10 MG: 20 INJECTION INTRAMUSCULAR; INTRAVENOUS at 00:18

## 2023-09-06 RX ADMIN — PANTOPRAZOLE SODIUM 20 MG: 20 TABLET, DELAYED RELEASE ORAL at 07:43

## 2023-09-06 RX ADMIN — ASPIRIN 81 MG: 81 TABLET ORAL at 07:44

## 2023-09-06 RX ADMIN — MAGNESIUM OXIDE TAB 400 MG (241.3 MG ELEMENTAL MG) 400 MG: 400 (241.3 MG) TAB at 07:45

## 2023-09-06 RX ADMIN — Medication 400 MCG: at 07:39

## 2023-09-06 RX ADMIN — B-COMPLEX W/ C & FOLIC ACID TAB 1 TABLET: TAB at 07:43

## 2023-09-06 RX ADMIN — CARVEDILOL 25 MG: 25 TABLET, FILM COATED ORAL at 08:50

## 2023-09-06 RX ADMIN — ACETAMINOPHEN 650 MG: 325 TABLET ORAL at 04:50

## 2023-09-06 RX ADMIN — CEFAZOLIN SODIUM 2 G: 2 INJECTION, SOLUTION INTRAVENOUS at 07:51

## 2023-09-06 ASSESSMENT — VISUAL ACUITY: OS: OTHER (SEE COMMENTS)

## 2023-09-06 ASSESSMENT — ACTIVITIES OF DAILY LIVING (ADL)
ADLS_ACUITY_SCORE: 20

## 2023-09-06 NOTE — DISCHARGE SUMMARY
[facility]  CARDIOLOGY DISCHARGE SUMMARY     Primary Care Physician: Zachary Lewis  Admission Date: 9/5/2023   Discharge Provider: PARKER MIJARES PA-C Discharge Date: 9/6/2023   Diet: resume previous home diet   Code Status: Full Code   Activity: No lifting > 10 lb with left arm for 2 weeks; no driving for 7 days; No lifting left arm above shoulder level for 3 weeks.        Condition at Discharge: Stable      BRIEF HPI:   Aayush has past medical history significant for aortic stenosis, ischemic cardiomyopathy, chronic heart failure with reduced ejection fraction, coronary artery disease multiple interventions in the past, alcoholic hepatitis with ascites, hypertension, anomalous left circumflex.      He has hx of high risk PCI in 2019 at time of NSTEMI and then underwent another complex PCI in 2021.  He was seen at that time for consideration for TAVR, but needed dental work and finally completed it.      It was recommended that he undergo TAVR now, despite no changes in symptoms.  However, he did have a drop in LVEF, albeit small.     He presented for the elective, planned procedure yesterday.     PRINCIPAL & ACTIVE DISCHARGE DIAGNOSES     Principal Problem:    Aortic stenosis, severe  Active Problems:    Overweight (BMI 25.0-29.9)    White coat syndrome with diagnosis of hypertension    Gastroesophageal reflux disease without esophagitis    Alcoholism /alcohol abuse    Chronic systolic heart failure (H)    Iron deficiency anemia due to chronic blood loss    Coronary artery disease involving native coronary artery of native heart without angina pectoris    Ischemic cardiomyopathy    S/P TAVR (transcatheter aortic valve replacement)      SIGNIFICANT FINDINGS (Imaging, labs):   ECHO 9/6/23  Interpretation Summary     There is a bioprosthetic aortic valve.  The gradient is normal for this prosthetic aortic valve.  There is no paravalvular regurgitation present.  Left ventricular function is  decreased. The ejection fraction is 45-50%  (mildly reduced).  There is no pericardial effusion.  MG 7 mmHg    CXR (personally reviewed and interpreted):  EXAM: XR CHEST PORT 1 VIEW  LOCATION: [facility]  DATE: 9/6/2023     INDICATION: S P Transcatheter Aortic Valve Replacement  COMPARISON: CT 05/25/2023                                                                      IMPRESSION: No acute cardiopulmonary abnormality. No pneumothorax or pleural effusion.     Unchanged borderline enlarged cardiomediastinal silhouette with TAVR.       EKG (personally reviewed and interpreted):  Sinus bradycardia with LBBB.  KS interval has increased and is now 186 ms    LABS or IMAGING REQUIRING FOLLOW-UP AFTER DISCHARGE:     Echocardiogram on October 16 at 8 am    PROCEDURES ( this hospitalization only)      Procedure(s):  Transcatheter Aortic Valve Replacement - Subclavian Approach  OR TRANSCATHETER AORTIC VALVE REPLACEMENT, SUBCLAVIAN PERCUTANEOUS APPROACH (STANDBY)    RECOMMENDATIONS TO OUTPATIENT PROVIDER FOR F/U VISIT     With Isabella Herrmann PA-C on September 20 at 12:50 pm    With Isabella Herrmann PA-C for 1 month visit on October 19 at 2 pm    DISPOSITION     Home    HOSPITAL COURSE BY DIAGNOSIS:      #Severe aortic stenosis, now s/p TAVR using a 29  #Ischemic cardiomyopathy  #Chronic heart failure with mildly reduced ejection fraction  #New RBBB/LBBB  He tolerated the procedure well yesterday.  He had evidence of new RBBB after valve deployment and on EKG this morning has a LBBB.  His KS interval also increased from 158 pre-TAVR to 186 today.  On tele overnight he had no evidence of high degree AVB or pauses.      Sheath sites soft without hematoma.  Stitch was removed without difficulty.  Neuro's intact. Pt reports feeling well  today and denies any SOB or CP.  He walked with cardiac rehab and thinks his breathing feels better.  He has been voiding without difficulty.  He denies dizziness.     -  "MCOT monitor x 14 days     - ASA for anti-platelet therapy  - OP Cardiac rehab  - Daily weights  - Lifelong antibiotic prophylaxis prior to all dental procedures.  - follow ups have been arranged as listed above    #CAD  #Hx of NSTEMI in 2019  #Dyslipidemia, goal LDL < 70  #Obesity  He is s/p complex PCI to proximal LAD in 2019 and PCI to the mid LAD in 2021.  Pre-TAVR coronary angiogram showed both stents patent.  There was some calcified narrowing in the left circumflex with a heavily calcified bifurcation at the first marginal branch (99%) and that underwent successful PTCA.    He is currently angina-free.  LDL on lipid profile in March was 34.    - continue aspirin 81 mg daily  - continue PTA atorvastatin 80 mg daily    #HTN  BP has been slightly high since procedure.  He did received one dose of IV hydralazine.      - Continue PTA losartan 25 mg daily  - Continue PTA carvedilol 25 mg twice daily    #Chronic electrolyte disturbance  #Alcohol abuse  Including hypomagnesemia, hypokalemia and hyponatremia    -Continue Mag-Ox 400 mg daily  - Continue potassium chloride 20 mEq daily  - Continue folic acid and B complex vitamin daily    #Severe PAD  Continue aspirin and statin    #Iron deficiency anemia  Hemoglobin dropped slightly, but still stable at 11.2.  Patient will continue iron supplementation at discharge    Clinically Significant Risk Factors        # Hypertension: Noted on problem list        # Overweight: Estimated body mass index is 29.7 kg/m  as calculated from the following:    Height as of this encounter: 1.727 m (5' 8\").    Weight as of this encounter: 88.6 kg (195 lb 4.8 oz)., PRESENT ON ADMISSION                   Discharge Medications with Med changes:        Review of your medicines        CONTINUE these medicines which have NOT CHANGED        Dose / Directions   aspirin 81 MG EC tablet  Commonly known as: ASA  Used for: Benign essential hypertension      Dose: 81 mg  Take 1 tablet (81 mg) by " mouth daily  Quantity: 90 tablet  Refills: 3     atorvastatin 80 MG tablet  Commonly known as: LIPITOR  Used for: Non-ST elevation (NSTEMI) myocardial infarction (H), Chronic systolic heart failure (H)      Dose: 80 mg  Take 1 tablet (80 mg) by mouth daily  Quantity: 90 tablet  Refills: 3     carvedilol 25 MG tablet  Commonly known as: COREG  Used for: Chronic systolic heart failure (H)      Dose: 25 mg  Take 1 tablet (25 mg) by mouth 2 times daily  Quantity: 180 tablet  Refills: 3     ferrous sulfate 325 (65 Fe) MG tablet  Commonly known as: FEROSUL      Dose: 325 mg  Take 325 mg by mouth daily  Refills: 0     folic acid 400 MCG tablet  Commonly known as: FOLVITE      Dose: 400 mcg  Take 1 tablet (400 mcg) by mouth daily  Quantity:    Refills: 0     losartan 25 MG tablet  Commonly known as: COZAAR  Used for: Chronic systolic heart failure (H)      Dose: 25 mg  Take 1 tablet (25 mg) by mouth daily  Quantity: 90 tablet  Refills: 3     magnesium oxide 400 MG tablet  Commonly known as: MAG-OX  Used for: Gastroesophageal reflux disease without esophagitis      Dose: 400 mg  Take 400 mg by mouth daily  Quantity: 60 tablet  Refills: 11     multivitamin w/minerals tablet      Dose: 1 tablet  Take 1 tablet by mouth daily  Refills: 0     pantoprazole 20 MG EC tablet  Commonly known as: PROTONIX  Used for: Gastroesophageal reflux disease without esophagitis      Dose: 20 mg  Take 1 tablet (20 mg) by mouth daily  Quantity: 90 tablet  Refills: 3     potassium chloride ER 20 MEQ CR tablet  Commonly known as: KLOR-CON M  Used for: Liver disease, chronic, due to alcohol (H)      Dose: 20 mEq  Take 1 tablet (20 mEq) by mouth daily  Quantity: 30 tablet  Refills: 11     Tylenol 8 Hour Arthritis Pain 650 MG CR tablet  Generic drug: acetaminophen      Dose: 650 mg  Take 650 mg by mouth every 8 hours as needed for pain  Refills: 0     vitamin B complex with vitamin C tablet      Dose: 1 tablet  Take 1 tablet by mouth daily  Refills: 0      vitamin D 50 MCG (2000 UT) Caps      Dose: 1 capsule  Take 1 capsule by mouth daily  Refills: 0                    Rationale for medication changes:      No changes        Consults   Hospitalist  Cardiac rehab       Pertinent Labs     Lab Results   Component Value Date    NTBNP 2,283 (H) 08/31/2023     Lab Results   Component Value Date    WBC 11.3 (H) 09/06/2023    HGB 11.2 (L) 09/06/2023    HCT 33.2 (L) 09/06/2023    MCV 97 09/06/2023     09/06/2023     Lab Results   Component Value Date    CR 0.65 (L) 09/06/2023     Lab Results   Component Value Date     (L) 09/06/2023    POTASSIUM 3.7 09/06/2023    CHLORIDE 101 09/06/2023    CO2 22 09/06/2023     (H) 09/06/2023     Lab Results   Component Value Date    GFRESTIMATED >90 09/06/2023    GFRESTBLACK >60 06/09/2020     Lab Results   Component Value Date     (H) 09/06/2023     Lab Results   Component Value Date    BUN 10.4 09/06/2023    CR 0.65 (L) 09/06/2023           Discharge Orders     Discharge Procedure Orders   CARDIAC REHAB REFERRAL   Standing Status: Future   Referral Priority: Routine Referral Type: Rehab Therapy Cardiac Therapy   Number of Visits Requested: 1     Reason for your hospital stay   Order Comments: New valve     Follow-up and recommended labs and tests    Order Comments: Follow ups listed on AVS     Activity   Order Comments: Activity restrictions as per AVS     Order Specific Question Answer Comments   Is discharge order? Yes      Diet   Order Comments: Resume previous home diet     Order Specific Question Answer Comments   Is discharge order? Yes      Examination     Vital Signs in last 24 hours:   Temp:  [97.7  F (36.5  C)-98.8  F (37.1  C)] 98  F (36.7  C)  Pulse:  [50-74] 65  Resp:  [12-28] 18  BP: (121-188)/(58-88) 171/75  SpO2:  [96 %-100 %] 96 %        General Appearance:   Alert, cooperative and in no acute distress   ENT/Mouth: membranes moist, no oral lesions or bleeding gums.      EYES:  no scleral  icterus, normal conjunctivae   Neck: Supple without lymphadenopathy.  Thyroid not visualized   Chest/Lungs:   lungs are clear to auscultation, no rales or wheezing   Cardiovascular:   Regular. Normal first and second heart sounds with no murmurs, rubs, or gallops; the carotid, radial and posterior tibial pulses are intact, no edema bilaterally    Abdomen:  Soft and nontender. Bowel sounds are present in all quadrants   Extremities: no cyanosis or clubbing.  Puncture site is soft and nontender.  Left subclavian incision is C/D/I with significant amount of ecchymosis and some minor skin tears surrounding the incision   Skin: no xanthelasma, warm.    Neurologic: normal gait, normal  bilateral, no tremors   Psychiatric: Normal mood and affect         Please see EMR for more detailed significant labs, imaging, consultant notes etc.    45 MINUTES SPENT BY ME on the date of service doing chart review, history, exam, documentation & further activities per the note.

## 2023-09-06 NOTE — CONSULTS
Care Management Initial Consult    General Information  Assessment completed with: Patient, Spouse or significant other, Patient, spouse  Type of CM/SW Visit: Initial Assessment    Primary Care Provider verified and updated as needed: Yes   Readmission within the last 30 days: no previous admission in last 30 days      Reason for Consult: discharge planning  Advance Care Planning: Advance Care Planning Reviewed: verified with patient (Has HCD, but not on file with hospital)          Communication Assessment  Patient's communication style: spoken language (English or Bilingual)    Hearing Difficulty or Deaf: no   Wear Glasses or Blind: yes    Cognitive  Cognitive/Neuro/Behavioral: WDL  Level of Consciousness: alert  Arousal Level: opens eyes spontaneously  Orientation: oriented x 4     Best Language: 0 - No aphasia  Speech: clear    Living Environment:   People in home: spouse  Karlie  Current living Arrangements: other (see comments) ([facility])      Able to return to prior arrangements: yes       Family/Social Support:  Care provided by: self, spouse/significant other  Provides care for: no one  Marital Status:   Wife  Karlie       Description of Support System: Supportive         Current Resources:   Patient receiving home care services:  No     Community Resources:  None  Equipment currently used at home: none  Supplies currently used at home:  None    Employment/Financial:  Employment Status: employed full-time        Financial Concerns:   No          Does the patient's insurance plan have a 3 day qualifying hospital stay waiver?  Yes   Will the waiver be used for post-acute placement? No    Lifestyle & Psychosocial Needs:  Social Determinants of Health     Tobacco Use: Medium Risk (9/5/2023)    Patient History     Smoking Tobacco Use: Former     Smokeless Tobacco Use: Never     Passive Exposure: Never   Alcohol Use: Not At Risk (3/28/2019)    AUDIT-C     Frequency of Alcohol Consumption: Never     Average  Number of Drinks: Not on file     Frequency of Binge Drinking: Not on file   Financial Resource Strain: Not on file   Food Insecurity: Not on file   Transportation Needs: Not on file   Physical Activity: Not on file   Stress: Not on file   Social Connections: Not on file   Intimate Partner Violence: Not on file   Depression: Not at risk (3/21/2023)    PHQ-2     PHQ-2 Score: 0   Housing Stability: Not on file       Functional Status:  Prior to admission patient needed assistance:         Mental Health Status:          Chemical Dependency Status:              Values/Beliefs:  Spiritual, Cultural Beliefs, Jew Practices, Values that affect care:                 Additional Information:  Assessed patient at bedside with his spouse, Karlie. Introduced self and CM role. Patient has HCD, but he has not provided copy to [facility]. Patient lives at home with spouse in a townhouse. Independent with ADLs/IADLs. Walk with a walker prn. Has walker in the room. Patient still drives and work full time at [employer]. Goal is to return home. Spouse will transport. No CM needs at this time.    RNCM to follow for medical progression, recommendations, and final discharge plan.     Paz Kilpatrick RN

## 2023-09-06 NOTE — CONSULTS
Brief CV Surgery Note        Patient POD#1 from left subclavian TAVR. He was seen and examined by our team this morning.     Pressure dressing removed; incision C/DI. There is moderate soft tissue bruising (marked last evening and has not increased in size), but no appreciable swelling or erythema.    Left hand warm and with good pulses. Neurovascularly intact.  Our team will sign off at this point; please feel free to reach out if any concerns or questions.        _______  Silvina Stuart PA-C  Cardiothoracic Surgery  [phone]

## 2023-09-06 NOTE — PLAN OF CARE
"  Problem: Plan of Care - These are the overarching goals to be used throughout the patient stay.    Goal: Plan of Care Review  Description: The Plan of Care Review/Shift note should be completed every shift.  The Outcome Evaluation is a brief statement about your assessment that the patient is improving, declining, or no change.  This information will be displayed automatically on your shift note.  Outcome: Progressing  Flowsheets (Taken 9/5/2023 2203)  Plan of Care Reviewed With: patient  Goal: Patient-Specific Goal (Individualized)  Description: You can add care plan individualizations to a care plan. Examples of Individualization might be:  \"Parent requests to be called daily at 9am for status\", \"I have a hard time hearing out of my right ear\", or \"Do not touch me to wake me up as it startles me\".  Outcome: Progressing  Goal: Absence of Hospital-Acquired Illness or Injury  Outcome: Progressing  Goal: Optimal Comfort and Wellbeing  Outcome: Progressing  Goal: Readiness for Transition of Care  Outcome: Progressing     Problem: Cardiovascular Surgery  Goal: Improved Activity Tolerance  Outcome: Progressing  Goal: Optimal Coping with Heart Surgery  Outcome: Progressing  Goal: Absence of Bleeding  Outcome: Progressing  Goal: Effective Bowel Elimination  Outcome: Progressing  Goal: Effective Cardiac Function  Outcome: Progressing  Goal: Optimal Cerebral Tissue Perfusion  Outcome: Progressing  Goal: Fluid and Electrolyte Balance  Outcome: Progressing  Goal: Blood Glucose Level Within Targeted Range  Outcome: Progressing  Goal: Absence of Infection Signs and Symptoms  Outcome: Progressing  Goal: Anesthesia/Sedation Recovery  Outcome: Progressing  Goal: Acceptable Pain Control  Outcome: Progressing  Goal: Nausea and Vomiting Relief  Outcome: Progressing  Goal: Effective Urinary Elimination  Outcome: Progressing  Goal: Effective Oxygenation and Ventilation  Outcome: Progressing  Intervention: Promote Airway Secretion " Clearance  Recent Flowsheet Documentation  Taken 9/5/2023 2000 by El Herron RN  Cough And Deep Breathing: done independently per patient     Goal Outcome Evaluation:      Plan of Care Reviewed With: patient    Pt A/O x 4, pleasant. Vitally stable & saturating well on RA. Tele has been sinus rhythm with BBB. Pt had TAVR this AM with R-radial, L-groin, & subclavian access sites. R-radial & L-groin sites have been soft, non-tender, & free of hematoma formation. Subclavian site with stable hematoma formation - bruising outlined with marker & no longer appears to be spreading. Pt has been neurologically consistent with CMS of RUE & LUE both intact. Pt has denied dyspnea or cp, only reports minor headache - PRN tylenol provided. Will continue to monitor.     El Herron RN on 9/5/2023 at 10:49 PM

## 2023-09-06 NOTE — PLAN OF CARE
Problem: Cardiovascular Surgery  Goal: Improved Activity Tolerance  Outcome: Met  Goal: Optimal Coping with Heart Surgery  Outcome: Met  Goal: Absence of Bleeding  Outcome: Met  Goal: Effective Bowel Elimination  Outcome: Met  Goal: Effective Cardiac Function  Outcome: Met  Goal: Optimal Cerebral Tissue Perfusion  Outcome: Met  Goal: Fluid and Electrolyte Balance  Outcome: Met  Goal: Blood Glucose Level Within Targeted Range  Outcome: Met  Goal: Absence of Infection Signs and Symptoms  Outcome: Met  Goal: Anesthesia/Sedation Recovery  Outcome: Met  Goal: Acceptable Pain Control  Outcome: Met  Intervention: Prevent or Manage Pain  Recent Flowsheet Documentation  Taken 9/6/2023 6164 by Dawit Barnes RN  Pain Management Interventions: medication (see MAR)  Goal: Nausea and Vomiting Relief  Outcome: Met  Goal: Effective Urinary Elimination  Outcome: Met  Goal: Effective Oxygenation and Ventilation  Outcome: Met   Goal Outcome Evaluation:    Pt was discharged to home with wife.    Pt states he understands poc and has no questions.  Pt was given mobile cardiac monitor before discharge.    Pt reports slight ha for which apap was given.  Pt denies pain at procedure sites.   Groin side is CDI AIDA.  Procedure site has some bruising for which ice was applied.  Pt was instructed on activity restriction per AVS instructions.

## 2023-09-06 NOTE — PROGRESS NOTES
Care Management Discharge Note    Discharge Date: 09/06/2023       Discharge Disposition: Home, Outpatient Rehab (PT, OT, SLP, Cardiac or Pulmonary)    Discharge Services: Outpatient Rehab (PT, OT, SLP, Cardiac or Pulmonary)    Discharge DME: None    Discharge Transportation:  Spouse    Private pay costs discussed: Not applicable    Does the patient's insurance plan have a 3 day qualifying hospital stay waiver?  Yes   Will the waiver be used for post-acute placement? No    PAS Confirmation Code:  NA  Patient/family educated on Medicare website which has current facility and service quality ratings:      Education Provided on the Discharge Plan:  Per team  Persons Notified of Discharge Plans: Patient per team  Patient/Family in Agreement with the Plan: yes    Handoff Referral Completed: Yes    Additional Information:  Patient discharge to home with outpatient cardiac rehab. Spouse to transport.     Paz Kilpatrick RN

## 2023-09-06 NOTE — PLAN OF CARE
Problem: Cardiovascular Surgery  Goal: Improved Activity Tolerance  Outcome: Progressing  Goal: Absence of Bleeding  Outcome: Progressing  Goal: Effective Cardiac Function  Outcome: Progressing  Goal: Optimal Cerebral Tissue Perfusion  Outcome: Progressing  Goal: Anesthesia/Sedation Recovery  Intervention: Optimize Anesthesia Recovery  Recent Flowsheet Documentation  Taken 9/6/2023 0443 by Dorothy Conley RN  Safety Promotion/Fall Prevention: activity supervised  Taken 9/6/2023 0011 by Dorothy Conley, RN  Safety Promotion/Fall Prevention: activity supervised  Goal: Acceptable Pain Control  Outcome: Progressing  Goal: Effective Urinary Elimination  Outcome: Progressing     Goal Outcome Evaluation:         Had TAVR yesterday. Left subclavian has foam dressing, area looked bruised. Left groin has primapore in place, old drainage. Right radial, no complication. Neuro intact. Tylenol given for headache. Good urine output. PRN hydralazine given once. For ECHO and xray. Anticipated discharge today.

## 2023-09-06 NOTE — PROGRESS NOTES
[facility]    Medicine Progress Note - Hospitalist Service    Date of Admission:  9/5/2023    Assessment & Plan      Aayush García is a 68 year old male admitted on 9/5/2023. He has history of severe aortic stenosis, CAD status post PCI (2019 and 2021), hypertension who presents for for scheduled TAVR. Likely discharge today per interventional cardiology.    Severe aortic stenosis status post TAVR  Patient presented on 9/5 for scheduled aortic valve replacement.  Seen in immediate postop setting.  Currently doing well since procedure.  No chest pain, shortness of breath. POD1 now and doing well. Ambulating to bathroom.   -Cardiology managing   -ASA indefinitely    -Echo completed and pending   -Tylenol and oxycodone   -Hydralazine 10 mg as needed   -Nitro as needed   -Zofran as needed   -Maintenance IV fluids   -Telemetry   -endocarditis prophylaxis indefinitely     HFmrEF  CAD s/p PCI pLAD (2019) and mLAD (2021)  Patient has history of NSTEMI in 2019.  Had stent placed to proximal LAD in 2019 and mid LAD in 2021.  At that time EF down to 35%.  Has mostly recovered now with most recent EF of 45 to 50%.  Follows closely with cardiology.  Echo pending.   -Cardiology managing   -Aspirin 81 mg   -PTA Lipitor 80 mg   -PTA Coreg 25 mg   -Echo in a.m.    Hypertension  Patient with history of hypertension.  Follows closely with cardiology.  Chart review appears possible plan to increase home losartan following recent procedure.  Defer to cardiology.  -Cardiology managing    -Continue PTA losartan     History of macrocytic anemia  Hemoglobin on admission is 12.5.  It appears baseline is upper 12-13's. Decrease to 11.2 on 9/6. Follow up outpatient for resolution back to baseline  -Continue PTA iron and folic acid supplements  -CBC outpatient    Hyponatremia  Sodium of 135 on admission.  Appears to have chronic Na issues. Recheck on 9/6 was 133. Plan for outpatient follow-up for possible workup given  "chronicity.     Hypokalemia  Hypomagnesemia  -RN replacement protocol    GERD  History of GERD.  Continue PTA Protonix.    Remote alcohol history  Previous history of alcohol induced hepatitis in 2018. No significant drinking history since 2021. Denies recent use today. LFTs normal on admission.  -continue to monitor        Diet: Regular Diet Adult    DVT Prophylaxis: aspirin  Rahman Catheter: Not present  Lines: None     Cardiac Monitoring: ACTIVE order. Indication: Transcatheter structural interventions (24 hours)  Code Status: Full Code      Clinically Significant Risk Factors          # Hypocalcemia: Lowest Ca = 8.3 mg/dL in last 2 days, will monitor and replace as appropriate   # Hypomagnesemia: Lowest Mg = 1.6 mg/dL in last 2 days, will replace as needed       # Hypertension: Noted on problem list        # Overweight: Estimated body mass index is 29.7 kg/m  as calculated from the following:    Height as of this encounter: 1.727 m (5' 8\").    Weight as of this encounter: 88.6 kg (195 lb 4.8 oz)., PRESENT ON ADMISSION            Disposition Plan     Expected Discharge Date: 09/06/2023                The patient's care was discussed with the Attending Physician, Dr. Styles .    Adelita Patiño MD  Hospitalist Service  [facility]  Securely message with [employer] (more info)  Text page via [employer] Paging/Directory   ______________________________________________________________________    Interval History   Patient doing well this morning. Had dressings removed. Denies chest pain or shortness of breath. Ambulating to the bathroom and tolerated well. Has completed Echo and seen by CV surg and interventional cardiology.     Physical Exam   Vital Signs: Temp: 98  F (36.7  C) Temp src: Oral BP: (!) 171/75 Pulse: 65   Resp: 18 SpO2: 96 % O2 Device: None (Room air) Oxygen Delivery: 2 LPM  Weight: 195 lbs 4.8 oz  GENERAL: Healthy, alert and no distress  RESP:  Lungs clear throughout. No wheeze or " crackles.   CV: Heart RRR. No murmur  Abdomen: Soft, nontender, nondistended.  MSK: No gross deformity. Normal tone.  SKIN: some bruising to sternum and left chest wall. Left upper sternal border incision clean dry and intact. No significant rash, abnormal pigmentation or lesions.  NEURO: Cranial nerves grossly intact.  Mentation and speech appropriate for age.  PSYCH: Mentation appears normal.        Data     I have personally reviewed the following data over the past 24 hrs:    11.3 (H)  \   11.2 (L)   / 152     133 (L) 101 10.4 /  125 (H)   3.7 22 0.65 (L) \     ALT: N/A AST: N/A AP: N/A TBILI: N/A   ALB: N/A TOT PROTEIN: N/A LIPASE: N/A       Imaging results reviewed over the past 24 hrs:   Recent Results (from the past 24 hour(s))   XR Chest Port 1 View    Narrative    EXAM: XR CHEST PORT 1 VIEW  LOCATION: [facility]  DATE: 2023    INDICATION: S P Transcatheter Aortic Valve Replacement  COMPARISON: CT 2023      Impression    IMPRESSION: No acute cardiopulmonary abnormality. No pneumothorax or pleural effusion.    Unchanged borderline enlarged cardiomediastinal silhouette with TAVR.    Echocardiogram Complete   Result Value    LVEF  45-50% (mildly reduced)    Narrative    [id number]  [id number]  [id number]  [id number]^MARYANA^[place] [address]     Name: DUDLEY FOX  MRN: [id number]  : 1955  Study Date: 2023 09:24 AM  Age: 68 yrs  Gender: Male  Patient Location: [facility]  Reason For Study: Aortic Valve Replacement  Ordering Physician: PARKER MIJARES  Referring Physician: NADYA STEEL  Performed By: PHIL     BSA: 2.0 m2  Height: 68 in  Weight: 195 lb  HR: 52  ______________________________________________________________________________  Procedure  Complete Portable Echo Adult.  ______________________________________________________________________________  Interpretation Summary     There is a bioprosthetic aortic  valve.  The gradient is normal for this prosthetic aortic valve.  There is no paravalvular regurgitation present.  Left ventricular function is decreased. The ejection fraction is 45-50%  (mildly reduced).  There is no pericardial effusion.  ______________________________________________________________________________  Left Ventricle  The left ventricle is normal in size. Left ventricular function is decreased.  The ejection fraction is 45-50% (mildly reduced). Septal motion is consistent  with conduction abnormality.     Atria  The left atrium is mildly dilated. Right atrial size is normal.     Mitral Valve  Mitral valve leaflets appear normal. There is trace to mild mitral  regurgitation.     Tricuspid Valve  Tricuspid valve leaflets appear normal.     Aortic Valve  There is a bioprosthetic aortic valve. The gradient is normal for this  prosthetic aortic valve. There is no paravalvular regurgitation present.     Pulmonic Valve  The pulmonic valve is not well visualized.     Pericardium  There is no pericardial effusion.  ______________________________________________________________________________  MMode/2D Measurements & Calculations  IVSd: 1.3 cm  LVIDd: 6.1 cm  LVIDs: 4.7 cm  LVPWd: 1.4 cm  FS: 22.6 %  LV mass(C)d: 381.5 grams  LV mass(C)dI: 188.7 grams/m2  LA dimension: 4.3 cm  asc Aorta Diam: 4.2 cm  LVOT diam: 2.4 cm  LVOT area: 4.5 cm2  asc Aorta Diam Index (cm/m2): 2.1  LA Volume (BP): 62.3 ml     LA Volume Index (BP): 30.8 ml/m2  LA Volume Indexed (AL/bp): 32.1 ml/m2  RV Base: 3.9 cm     RWT: 0.46  TAPSE: 2.0 cm     Doppler Measurements & Calculations  MV E max shari: 84.4 cm/sec  MV A max shari: 107.0 cm/sec  MV E/A: 0.79  MV dec slope: 227.0 cm/sec2  MV dec time: 0.37 sec  Ao V2 max: 178.0 cm/sec  Ao max P.0 mmHg  Ao V2 mean: 121.0 cm/sec  Ao mean P.0 mmHg  Ao V2 VTI: 42.8 cm  KALE(I,D): 3.1 cm2  KALE(V,D): 3.5 cm2  Ao acc time: 0.09 sec  LV V1 max P.5 mmHg  LV V1 max: 137.0 cm/sec  LV V1 VTI:  29.4 cm  SV(LVOT): 133.0 ml  SI(LVOT): 65.8 ml/m2  PA acc time: 0.11 sec  AV Agusto Ratio (DI): 0.77  KALE Index (cm2/m2): 1.5  E/E' av.0  Lateral E/e': 16.2  Medial E/e': 15.8     RV S Agusto: 12.4 cm/sec     ______________________________________________________________________________  Report approved by: Noman Ji 2023 10:20 AM

## 2023-09-07 ENCOUNTER — PATIENT OUTREACH (OUTPATIENT)
Dept: CARE COORDINATION | Facility: CLINIC | Age: 68
End: 2023-09-07
Payer: COMMERCIAL

## 2023-09-07 NOTE — PROGRESS NOTES
Clinic Care Coordination Contact  [facility]/Voicemail       Clinical Data: Care Coordinator Outreach  Outreach attempted x 1.  Left message on patient's voicemail with call back information and requested return call.  Plan: Care Coordinator  via . Care Coordinator will try to reach patient again in 1-2 business days.

## 2023-09-07 NOTE — PROGRESS NOTES
[facility]    Background: Transitional Care Management program identified per system criteria and reviewed by [facility] team for possible outreach.    Assessment: Upon chart review, CCR Team member will not proceed with patient outreach related to this episode of Transitional Care Management program due to reason below:    Patient has active communication with a nurse, provider or care team for reason of post-hospital follow up plan.  Outreach call by CCR team not indicated to minimize duplicative efforts.     Plan: Transitional Care Management episode addressed appropriately per reason noted above.      Eva   [facility]    *Connected Care Resource Team does NOT follow patient ongoing. Referrals are identified based on internal discharge reports and the outreach is to ensure patient has an understanding of their discharge instructions.

## 2023-09-08 ENCOUNTER — PATIENT OUTREACH (OUTPATIENT)
Dept: CARE COORDINATION | Facility: CLINIC | Age: 68
End: 2023-09-08
Payer: COMMERCIAL

## 2023-09-08 NOTE — PROGRESS NOTES
Clinic Care Coordination Contact  [facility]/Voicemail       Clinical Data: Care Coordinator Outreach  Outreach attempted x 2.  Left message on patient's voicemail with call back information and requested return call.  Plan: Care Coordinator  via . Care Coordinator will try to reach patient again in 1-2 business days.

## 2023-09-08 NOTE — PROGRESS NOTES
[facility]    Background: Transitional Care Management program identified per system criteria and reviewed by [facility] [place] team for possible outreach.    Assessment: Upon chart review, [facility] Team member will not proceed with patient outreach related to this episode of Transitional Care Management program due to reason below:    Patient has a follow up appointment with an appropriate provider today for hospital discharge    Plan: Transitional Care Management episode addressed appropriately per reason noted above.      Kelly Gutierrez MA  [facility]    *Connected Care Resource Team does NOT follow patient ongoing. Referrals are identified based on internal discharge reports and the outreach is to ensure patient has an understanding of their discharge instructions.

## 2023-09-12 ENCOUNTER — HOSPITAL ENCOUNTER (OUTPATIENT)
Dept: CARDIAC REHAB | Facility: HOSPITAL | Age: 68
Discharge: HOME | End: 2023-09-12
Attending: INTERNAL MEDICINE
Payer: COMMERCIAL

## 2023-09-12 DIAGNOSIS — Z95.2 S/P TAVR (TRANSCATHETER AORTIC VALVE REPLACEMENT): ICD-10-CM

## 2023-09-12 PROCEDURE — 93797 PHYS/QHP OP CAR RHAB WO ECG: CPT

## 2023-09-12 PROCEDURE — 93798 PHYS/QHP OP CAR RHAB W/ECG: CPT

## 2023-09-19 ENCOUNTER — HOSPITAL ENCOUNTER (OUTPATIENT)
Dept: CARDIAC REHAB | Facility: HOSPITAL | Age: 68
Discharge: HOME | End: 2023-09-19
Attending: INTERNAL MEDICINE
Payer: COMMERCIAL

## 2023-09-19 PROCEDURE — 93798 PHYS/QHP OP CAR RHAB W/ECG: CPT

## 2023-09-19 NOTE — PROGRESS NOTES
Assessment & Plan     S/P TAVR (transcatheter aortic valve replacement)  Doing well and saw cardiology yesterday. Review plan with patient and he is in agreement and understands  - repeat echo in 1 month with cardiology follow up afterwards    Difficulty walking  Long standing history of difficulty walking for which he uses a walker. He requests an updated work note to reflect this. He is hoping to look at his knee in the future after he has things more settled from his heart.   - work note given    Vaccination Discussion  Discussed optimal timing of COVID and influenza vaccinations. I encouraged him to get a COVID vaccination in the near future as the number of cases are increasing and he is high risk with exposure to many people at work. We also discussed timing of the influenza vaccination and he may wait till later in the fall to get optimal protection during influenza season, however I stressed that vaccination period is more important than perfect timing.   - patient will schedule COVID vaccination at a local pharmacy    Ebonie Martines MD  [facility]    Brandon Looney is a 68 year old, presenting for the following health issues:  Surgical Followup (2 week follow-up, no concerns. Pt saw cardiologist)        9/21/2023     8:38 AM   Additional Questions   Roomed by Ety   Accompanied by Self       HPI     Patient presents for follow up after his TAVR on 9/5/23. He was seen by cardiology yesterday and he was told that things are going well. From his perspective he is sleeping better, his energy level is better and he hasn't had any issues. He feels that he is doing very well.     He did get a return to work letter from cardiology clearing him for full duty after Oct 19th as they have a lifting restriction, however for a long time he has been unable to go up and down ladders and he uses a walker so he would like a new letter to replace this clarifying these restrictions which he  has previously had.     He also would like to talk about optimal timing of COVID and influenza vaccination. He has previously been up to date on his COVID vaccinations and gets the influenza shot as well.       Review of Systems   Constitutional, HEENT, cardiovascular, pulmonary, gi and gu systems are negative, except as otherwise noted.      Objective    /71 (BP Location: Right arm, Patient Position: Sitting, Cuff Size: Adult Regular)   Pulse 63   Wt 88.9 kg (196 lb 0.6 oz)   SpO2 98%   BMI 29.81 kg/m    Body mass index is 29.81 kg/m .  Physical Exam   GENERAL: healthy, alert and no distress  RESP: lungs clear to auscultation - no rales, rhonchi or wheezes  CV: regular rate and rhythm, normal S1 S2, no S3 or S4, no murmur, click or rub, no peripheral edema and peripheral pulses strong  MS: no gross musculoskeletal defects noted, no edema

## 2023-09-20 ENCOUNTER — OFFICE VISIT (OUTPATIENT)
Dept: CARDIOLOGY | Facility: CLINIC | Age: 68
End: 2023-09-20
Payer: COMMERCIAL

## 2023-09-20 VITALS
SYSTOLIC BLOOD PRESSURE: 115 MMHG | DIASTOLIC BLOOD PRESSURE: 69 MMHG | WEIGHT: 196 LBS | BODY MASS INDEX: 29.8 KG/M2 | RESPIRATION RATE: 16 BRPM | HEART RATE: 57 BPM

## 2023-09-20 DIAGNOSIS — I35.0 SEVERE AORTIC STENOSIS: Primary | ICD-10-CM

## 2023-09-20 DIAGNOSIS — I25.5 ISCHEMIC CARDIOMYOPATHY: ICD-10-CM

## 2023-09-20 DIAGNOSIS — I50.22 CHRONIC SYSTOLIC HEART FAILURE (H): ICD-10-CM

## 2023-09-20 DIAGNOSIS — Z95.2 S/P TAVR (TRANSCATHETER AORTIC VALVE REPLACEMENT): ICD-10-CM

## 2023-09-20 DIAGNOSIS — D50.0 IRON DEFICIENCY ANEMIA DUE TO CHRONIC BLOOD LOSS: ICD-10-CM

## 2023-09-20 DIAGNOSIS — I25.10 CORONARY ARTERY DISEASE INVOLVING NATIVE CORONARY ARTERY OF NATIVE HEART WITHOUT ANGINA PECTORIS: ICD-10-CM

## 2023-09-20 PROCEDURE — 99214 OFFICE O/P EST MOD 30 MIN: CPT | Performed by: INTERNAL MEDICINE

## 2023-09-20 NOTE — LETTER
9/20/2023    Zachary Lewis MD  [address] [place] [address]    RE: Aayush García       Dear Colleague,     I had the pleasure of seeing Aayush García in the [facility].  HEART CARE ENCOUNTER NOTE       [facility]  [phone]    Assessment/Recommendations   Assessment:  Severe aortic stenosis -  s/p TAVR on 9/5/2023 using a 29 mm Resilia valve  New LBBB after valve deployment  Ischemic cardiomyopathy, chronic systolic heart failure  CAD with history of NSTEMI in 2019 - s/p complex PCI to proximal LAD in 2019 and PCI to the mid LAD in 2021.  Pre-TAVR coronary angiogram showed both stents patent.  There was some calcified narrowing in the left circumflex with a heavily calcified bifurcation at the first marginal branch (99%) and that underwent successful PTCA.  Dyslipidemia with goal LDL less than 70 (was 34 in March)  Obesity  Hypertension -blood pressure today is at goal  Chronic electrolyte disturbance -including hypomagnesemia, hypokalemia and hyponatremia  Alcohol abuse  Severe PAD  Iron deficiency anemia, on iron supplementation    Plan:  Activity as tolerated at this time.  Patient can drive.  Okay to return to work next week with lifting restrictions through October 19  Continue cardiac rehab  Continue aspirin 81 mg daily indefinitely  Prophylactic antibiotics prior to all dental visits in the future  Repeat echocardiogram on October 16 and 1 month follow-up with me on October 19  He should see Dr. Goldsmith in about 6 months    Thank you for the opportunity to participate in the care of Aayush García. It's been a pleasure working with him.  Please do not hesitate to call with any questions or concerns.       History of Present Illness/Subjective    I had the opportunity to see Aayush García at the [facility] for his 1 week follow-up visit after transcatheter aortic valve replacement.  His wife accompanies him to the visit today.    Aayush has  past medical history significant for aortic stenosis, ischemic cardiomyopathy, chronic heart failure with reduced ejection fraction, coronary artery disease multiple interventions in the past, alcoholic hepatitis with ascites, hypertension, anomalous left circumflex.       He has hx of high risk PCI in 2019 at time of NSTEMI and then underwent another complex PCI in 2021.  He was seen at that time for consideration for TAVR, but needed dental work and finally completed it.      He underwent TAVR on September 5, via the left subclavian approach and using a 29 mm Shelley Resilia valve.  He has done well since the procedure.  He finished wearing his monitor and turned it in yesterday.  We still do not have results.  At home, he is exercising.  He started cardiac rehab and this is going well.  He says he is sleeping better.    Aayush García denies exertional chest discomfort, palpitations, shortness of breath at rest, PND, orthopnea, lightheadedness, dizziness, pre-syncope or syncope.  Aayush García also denies any recent weight loss, changes in appetite, nausea or vomiting.   ____________________________________________________________  Echo 9/6/23  Interpretation Summary     There is a bioprosthetic aortic valve.  The gradient is normal for this prosthetic aortic valve.  There is no paravalvular regurgitation present.  Left ventricular function is decreased. The ejection fraction is 45-50%  (mildly reduced).  There is no pericardial effusion.  MG 7 mmHg         Physical Examination Review of Systems   Vitals: /69 (BP Location: Right arm, Patient Position: Sitting, Cuff Size: Adult Large)   Pulse 57   Resp 16   Wt 88.9 kg (196 lb)   BMI 29.80 kg/m    BMI= Body mass index is 29.8 kg/m .  Wt Readings from Last 3 Encounters:   09/20/23 88.9 kg (196 lb)   09/06/23 88.6 kg (195 lb 4.8 oz)   08/31/23 89.7 kg (197 lb 12.8 oz)       General Appearance:   Alert, cooperative and in no acute distress   ENT/Mouth:  membranes moist, no oral lesions or bleeding gums.      EYES:  no scleral icterus, normal conjunctivae   Neck: Supple without lymphadenopathy.  Thyroid not visualized   Chest/Lungs:   lungs are clear to auscultation, no rales or wheezing   Cardiovascular:   Regular. Normal first and second heart sounds with no murmurs, rubs, or gallops; the carotid, radial and posterior tibial pulses are intact, no edema bilaterally    Abdomen:  Soft and nontender. Bowel sounds are present in all quadrants   Extremities: no cyanosis or clubbing.  Puncture site is soft and nontender.  Left subclavian incision in C/D/I with mild bruising and small hematoma   Skin: no xanthelasma, warm.    Neurologic: normal gait, normal  bilateral, no tremors   Psychiatric: Normal mood and affect       Please refer above for cardiac ROS details.      Medical History  Surgical History Family History Social History   Past Medical History:   Diagnosis Date    Acute liver failure 8/5/2018    Acute on chronic systolic congestive heart failure (H) 1/14/2020    Acute renal failure, unspecified acute renal failure type (H) 08/05/2018    Acute respiratory failure with hypoxia (H) 12/16/2019    Acute respiratory failure with hypoxia (H) 08/05/2018    Alcoholic hepatitis with ascites 08/05/2018    Bacteremia due to Klebsiella pneumoniae     Benign essential hypertension 4/12/2018    Chronic liver disease     Closed fracture of multiple ribs of right side, initial encounter 11/23/2019    Coronary artery disease involving native coronary artery of native heart without angina pectoris 9/24/2019    Essential hypertension     Gastroesophageal reflux disease without esophagitis 10/31/2018    GI bleeding 10/27/2019    Heart failure with reduced ejection fraction (H) 4/4/2019    Hepatic encephalopathy (H) 08/05/2018    Macrocytic anemia     Non-ST elevation MI (NSTEMI) (H) 3/18/2019    Overview:  Added automatically from request for surgery [id number]    NSTEMI (non-ST  elevated myocardial infarction) (H) 03/2019    s/p stent placement    Primary localized osteoarthrosis of left lower leg 4/9/2019    Stress-induced cardiomyopathy 08/05/2018     Past Surgical History:   Procedure Laterality Date    COLONOSCOPY N/A 3/20/2018    Procedure: COLONOSCOPY;  Surgeon: Adam Nicole III, MD;  Location: [facility];  Service:     CV CORONARY ANGIOGRAM N/A 3/18/2019    Procedure: Coronary Angiogram;  Surgeon: Timi Rodriguez MD;  Location: [facility] Lab;  Service: Cardiology    CV CORONARY ANGIOGRAM N/A 9/22/2021    Procedure: Coronary Angiogram;  Surgeon: Brianda Avilez MD;  Location: [facility] CATH LAB CV    CV CORONARY ANGIOGRAM N/A 7/6/2023    Procedure: Coronary Angiogram;  Surgeon: Rafat Pinto MD;  Location: [facility] CATH LAB CV    CV FRACTIONAL FLOW RATIO WIRE N/A 9/22/2021    Procedure: Fractional Flow Ratio Wire;  Surgeon: Brianda Avilez MD;  Location: [facility] LAB CV    CV LEFT HEART CATH N/A 7/6/2023    Procedure: Left Heart Catheterization;  Surgeon: Rafat Pinto MD;  Location: [facility] LAB CV    CV LEFT HEART CATHETERIZATION WITHOUT LEFT VENTRICULOGRAM Left 3/18/2019    Procedure: Left Heart Catheterization Without Left Ventriculogram;  Surgeon: Timi Rodriguez MD;  Location: [facility] Cath Lab;  Service: Cardiology    CV PCI N/A 9/22/2021    Procedure: Percutaneous Coronary Intervention;  Surgeon: Brianda Avilez MD;  Location: [facility] LAB CV    CV PCI N/A 7/6/2023    Procedure: Percutaneous Coronary Intervention;  Surgeon: Rafat Pinto MD;  Location: [facility] LAB CV    CV PCI ATHERECTOMY ORBITAL N/A 9/22/2021    Procedure: Percutaneous Coronary Intervention Atherectomy Rotational;  Surgeon: Brianda Avilez MD;  Location: [facility] LAB CV    IR CVC TUNNEL PLACEMENT > 5 YRS OF AGE  8/1/2018    IR TUNNELED CATHETER REMOVAL  8/31/2018    OR TRANSCATHETER AORTIC VALVE REPLACEMENT, SUBCLAVIAN PERCUTANEOUS APPROACH  (STANDBY) N/A 2023    Procedure: OR TRANSCATHETER AORTIC VALVE REPLACEMENT, SUBCLAVIAN PERCUTANEOUS APPROACH (STANDBY);  Surgeon: Andra Pabon MD;  Location: [facility] CATH LAB CV    PICC  2018         PICC  3/18/2019         TRANSCATHETER AORTIC VALVE REPLACEMENT - SUBCLAVIAN APPROACH N/A 2023    Procedure: Transcatheter Aortic Valve Replacement - Subclavian Approach;  Surgeon: Bret Quezada MD;  Location: [facility] CATH LAB CV    US THORACENTESIS  10/28/2019    ZZHC CORONARY STENT PERCUT, INITIAL VESSEL  2019     Family History   Problem Relation Age of Onset    Heart Disease Father 76.00        type unknown to Aayush; his father  at home    Diabetes No family hx of     Coronary Artery Disease Early Onset No family hx of     Cancer No family hx of     Alzheimer Disease Mother 86.00        lives in long term care    No Known Problems Son     No Known Problems Son     Social History     Socioeconomic History    Marital status: Single     Spouse name: Not on file    Number of children: Not on file    Years of education: Not on file    Highest education level: Not on file   Occupational History    Occupation: [employer]   Tobacco Use    Smoking status: Former     Packs/day: 1.00     Years: 40.00     Pack years: 40.00     Types: Cigarettes     Quit date: 2019     Years since quittin.4     Passive exposure: Never    Smokeless tobacco: Never    Tobacco comments:     3 Cig/Week   Vaping Use    Vaping Use: Never used   Substance and Sexual Activity    Alcohol use: Not Currently     Comment: Alcoholic Drinks/day: 350 ml of peppermint schnapps daily per [facility] h, 2018 H&P per report of Karlie JJ to Dr. Valle    Drug use: No    Sexual activity: Yes     Partners: Female   Other Topics Concern    Parent/sibling w/ CABG, MI or angioplasty before 65F 55M? Not Asked   Social History Narrative    Works in [employer]. Lives with his Karlie JJon.     Social Determinants of Health     Financial  Resource Strain: Not on file   Food Insecurity: Not on file   Transportation Needs: Not on file   Physical Activity: Not on file   Stress: Not on file   Social Connections: Not on file   Interpersonal Safety: Not on file   Housing Stability: Not on file          Medications  Allergies   Current Outpatient Medications   Medication Sig Dispense Refill    acetaminophen (TYLENOL 8 HOUR ARTHRITIS PAIN) 650 MG CR tablet Take 650 mg by mouth every 8 hours as needed for pain      aspirin (ASA) 81 MG EC tablet Take 1 tablet (81 mg) by mouth daily 90 tablet 3    atorvastatin (LIPITOR) 80 MG tablet Take 1 tablet (80 mg) by mouth daily 90 tablet 3    carvedilol (COREG) 25 MG tablet Take 1 tablet (25 mg) by mouth 2 times daily 180 tablet 3    Cholecalciferol (VITAMIN D) 50 MCG (2000 UT) CAPS Take 1 capsule by mouth daily      ferrous sulfate (FEROSUL) 325 (65 Fe) MG tablet Take 325 mg by mouth daily  0    folic acid (FOLVITE) 400 MCG tablet Take 1 tablet (400 mcg) by mouth daily      losartan (COZAAR) 25 MG tablet Take 1 tablet (25 mg) by mouth daily 90 tablet 3    magnesium oxide (MAG-OX) 400 MG tablet Take 400 mg by mouth daily 60 tablet 11    multivitamin w/minerals (THERA-VIT-M) tablet Take 1 tablet by mouth daily      pantoprazole (PROTONIX) 20 MG EC tablet Take 1 tablet (20 mg) by mouth daily 90 tablet 3    potassium chloride ER (KLOR-CON M) 20 MEQ CR tablet Take 1 tablet (20 mEq) by mouth daily 30 tablet 11    vitamin B complex with vitamin C (VITAMIN  B COMPLEX) tablet Take 1 tablet by mouth daily      Allergies   Allergen Reactions    No Known Allergies          Lab Results    Chemistry/lipid CBC Cardiac Enzymes/BNP/TSH/INR   Recent Labs   Lab Test 03/21/23  1431   CHOL 111   HDL 42   LDL 34   TRIG 175*     Recent Labs   Lab Test 03/21/23  1431 11/02/21  1404 02/12/18  1635   LDL 34 40 96     Recent Labs   Lab Test 09/06/23  0511   *   POTASSIUM 3.7   CHLORIDE 101   CO2 22   *   BUN 10.4   CR 0.65*    GFRESTIMATED >90   SOLO 8.4*     Recent Labs   Lab Test 09/06/23  0511 09/05/23  1016 08/31/23  1301   CR 0.65* 0.68 0.71     No results for input(s): A1C in the last 69906 hours. Recent Labs   Lab Test 09/06/23  0511   WBC 11.3*   HGB 11.2*   HCT 33.2*   MCV 97        Recent Labs   Lab Test 09/06/23  0511 09/05/23  1016 08/31/23  1301   HGB 11.2* 12.5* 12.8*    Recent Labs   Lab Test 12/16/19  1701 12/16/19  1042 12/16/19  0352   TROPONINI 0.02 0.02 <0.01     Recent Labs   Lab Test 08/31/23  1301 12/16/19  0352 10/29/19  0440 03/17/19  0309   BNP  --  1,634* >5,000* 873*   NTBNP 2,283*  --   --   --      Recent Labs   Lab Test 10/27/19  1648   TSH 1.54     Recent Labs   Lab Test 08/31/23  1301 12/16/19  0352 10/28/19  0506   INR 1.09 1.12* 1.36*            This note has been dictated using voice recognition software. Any grammatical or context distortions are unintentional and inherent to the software.          Thank you for allowing me to participate in the care of your patient.    Sincerely,   PARKER MIJARES PA-C   [facility]

## 2023-09-20 NOTE — PROGRESS NOTES
HEART CARE ENCOUNTER NOTE       [facility]  [phone]    Assessment/Recommendations   Assessment:  Severe aortic stenosis -  s/p TAVR on 9/5/2023 using a 29 mm Resilia valve  New LBBB after valve deployment  Ischemic cardiomyopathy, chronic systolic heart failure  CAD with history of NSTEMI in 2019 - s/p complex PCI to proximal LAD in 2019 and PCI to the mid LAD in 2021.  Pre-TAVR coronary angiogram showed both stents patent.  There was some calcified narrowing in the left circumflex with a heavily calcified bifurcation at the first marginal branch (99%) and that underwent successful PTCA.  Dyslipidemia with goal LDL less than 70 (was 34 in March)  Obesity  Hypertension -blood pressure today is at goal  Chronic electrolyte disturbance -including hypomagnesemia, hypokalemia and hyponatremia  Alcohol abuse  Severe PAD  Iron deficiency anemia, on iron supplementation    Plan:  Activity as tolerated at this time.  Patient can drive.  Okay to return to work next week with lifting restrictions through October 19  Continue cardiac rehab  Continue aspirin 81 mg daily indefinitely  Prophylactic antibiotics prior to all dental visits in the future  Repeat echocardiogram on October 16 and 1 month follow-up with me on October 19  He should see Dr. Goldsmith in about 6 months    Thank you for the opportunity to participate in the care of Aayush García. It's been a pleasure working with him.  Please do not hesitate to call with any questions or concerns.       History of Present Illness/Subjective    I had the opportunity to see Aayush García at the [facility] for his 1 week follow-up visit after transcatheter aortic valve replacement.  His wife accompanies him to the visit today.    Aayush has past medical history significant for aortic stenosis, ischemic cardiomyopathy, chronic heart failure with reduced ejection fraction, coronary artery disease multiple interventions in the past,  alcoholic hepatitis with ascites, hypertension, anomalous left circumflex.       He has hx of high risk PCI in 2019 at time of NSTEMI and then underwent another complex PCI in 2021.  He was seen at that time for consideration for TAVR, but needed dental work and finally completed it.      He underwent TAVR on September 5, via the left subclavian approach and using a 29 mm Shelley Resilia valve.  He has done well since the procedure.  He finished wearing his monitor and turned it in yesterday.  We still do not have results.  At home, he is exercising.  He started cardiac rehab and this is going well.  He says he is sleeping better.    Aayush García denies exertional chest discomfort, palpitations, shortness of breath at rest, PND, orthopnea, lightheadedness, dizziness, pre-syncope or syncope.  Aayush J Pallavipaulina also denies any recent weight loss, changes in appetite, nausea or vomiting.   ____________________________________________________________  Echo 9/6/23  Interpretation Summary     There is a bioprosthetic aortic valve.  The gradient is normal for this prosthetic aortic valve.  There is no paravalvular regurgitation present.  Left ventricular function is decreased. The ejection fraction is 45-50%  (mildly reduced).  There is no pericardial effusion.  MG 7 mmHg         Physical Examination Review of Systems   Vitals: /69 (BP Location: Right arm, Patient Position: Sitting, Cuff Size: Adult Large)   Pulse 57   Resp 16   Wt 88.9 kg (196 lb)   BMI 29.80 kg/m    BMI= Body mass index is 29.8 kg/m .  Wt Readings from Last 3 Encounters:   09/20/23 88.9 kg (196 lb)   09/06/23 88.6 kg (195 lb 4.8 oz)   08/31/23 89.7 kg (197 lb 12.8 oz)       General Appearance:   Alert, cooperative and in no acute distress   ENT/Mouth: membranes moist, no oral lesions or bleeding gums.      EYES:  no scleral icterus, normal conjunctivae   Neck: Supple without lymphadenopathy.  Thyroid not visualized   Chest/Lungs:   lungs are  clear to auscultation, no rales or wheezing   Cardiovascular:   Regular. Normal first and second heart sounds with no murmurs, rubs, or gallops; the carotid, radial and posterior tibial pulses are intact, no edema bilaterally    Abdomen:  Soft and nontender. Bowel sounds are present in all quadrants   Extremities: no cyanosis or clubbing.  Puncture site is soft and nontender.  Left subclavian incision in C/D/I with mild bruising and small hematoma   Skin: no xanthelasma, warm.    Neurologic: normal gait, normal  bilateral, no tremors   Psychiatric: Normal mood and affect       Please refer above for cardiac ROS details.      Medical History  Surgical History Family History Social History   Past Medical History:   Diagnosis Date     Acute liver failure 8/5/2018     Acute on chronic systolic congestive heart failure (H) 1/14/2020     Acute renal failure, unspecified acute renal failure type (H) 08/05/2018     Acute respiratory failure with hypoxia (H) 12/16/2019     Acute respiratory failure with hypoxia (H) 08/05/2018     Alcoholic hepatitis with ascites 08/05/2018     Bacteremia due to Klebsiella pneumoniae      Benign essential hypertension 4/12/2018     Chronic liver disease      Closed fracture of multiple ribs of right side, initial encounter 11/23/2019     Coronary artery disease involving native coronary artery of native heart without angina pectoris 9/24/2019     Essential hypertension      Gastroesophageal reflux disease without esophagitis 10/31/2018     GI bleeding 10/27/2019     Heart failure with reduced ejection fraction (H) 4/4/2019     Hepatic encephalopathy (H) 08/05/2018     Macrocytic anemia      Non-ST elevation MI (NSTEMI) (H) 3/18/2019    Overview:  Added automatically from request for surgery [id number]     NSTEMI (non-ST elevated myocardial infarction) (H) 03/2019    s/p stent placement     Primary localized osteoarthrosis of left lower leg 4/9/2019     Stress-induced cardiomyopathy 08/05/2018      Past Surgical History:   Procedure Laterality Date     COLONOSCOPY N/A 3/20/2018    Procedure: COLONOSCOPY;  Surgeon: Adam Nicole III, MD;  Location: [facility];  Service:      CV CORONARY ANGIOGRAM N/A 3/18/2019    Procedure: Coronary Angiogram;  Surgeon: Timi Rodriguez MD;  Location: [facility] Cath Lab;  Service: Cardiology     CV CORONARY ANGIOGRAM N/A 9/22/2021    Procedure: Coronary Angiogram;  Surgeon: Brianda Avilez MD;  Location: [facility] CV     CV CORONARY ANGIOGRAM N/A 7/6/2023    Procedure: Coronary Angiogram;  Surgeon: Rafat Pinto MD;  Location: [facility] LAB CV     CV FRACTIONAL FLOW RATIO WIRE N/A 9/22/2021    Procedure: Fractional Flow Ratio Wire;  Surgeon: Brianda Avilez MD;  Location: [facility] LAB CV     CV LEFT HEART CATH N/A 7/6/2023    Procedure: Left Heart Catheterization;  Surgeon: Rafat Pinto MD;  Location: [facility] LAB CV     CV LEFT HEART CATHETERIZATION WITHOUT LEFT VENTRICULOGRAM Left 3/18/2019    Procedure: Left Heart Catheterization Without Left Ventriculogram;  Surgeon: Timi Rodriguez MD;  Location: [facility] Cath Lab;  Service: Cardiology     CV PCI N/A 9/22/2021    Procedure: Percutaneous Coronary Intervention;  Surgeon: Brianda Avilez MD;  Location: [facility] LAB CV     CV PCI N/A 7/6/2023    Procedure: Percutaneous Coronary Intervention;  Surgeon: Rafat Pinto MD;  Location: [facility] CV     CV PCI ATHERECTOMY ORBITAL N/A 9/22/2021    Procedure: Percutaneous Coronary Intervention Atherectomy Rotational;  Surgeon: Brianda Avilez MD;  Location: [facility] CV     IR CVC TUNNEL PLACEMENT > 5 YRS OF AGE  8/1/2018     IR TUNNELED CATHETER REMOVAL  8/31/2018     OR TRANSCATHETER AORTIC VALVE REPLACEMENT, SUBCLAVIAN PERCUTANEOUS APPROACH (STANDBY) N/A 9/5/2023    Procedure: OR TRANSCATHETER AORTIC VALVE REPLACEMENT, SUBCLAVIAN PERCUTANEOUS APPROACH (STANDBY);  Surgeon: Andra Pabon MD;   Location: [facility] CATH LAB CV     PICC  2018          PICC  3/18/2019          TRANSCATHETER AORTIC VALVE REPLACEMENT - SUBCLAVIAN APPROACH N/A 2023    Procedure: Transcatheter Aortic Valve Replacement - Subclavian Approach;  Surgeon: Bret Quezada MD;  Location: [facility] CATH LAB CV     US THORACENTESIS  10/28/2019     ZZHC CORONARY STENT PERCUT, INITIAL VESSEL  2019     Family History   Problem Relation Age of Onset     Heart Disease Father 76.00        type unknown to Aayush; his father  at home     Diabetes No family hx of      Coronary Artery Disease Early Onset No family hx of      Cancer No family hx of      Alzheimer Disease Mother 86.00        lives in long term care     No Known Problems Son      No Known Problems Son     Social History     Socioeconomic History     Marital status: Single     Spouse name: Not on file     Number of children: Not on file     Years of education: Not on file     Highest education level: Not on file   Occupational History     Occupation: [employer]   Tobacco Use     Smoking status: Former     Packs/day: 1.00     Years: 40.00     Pack years: 40.00     Types: Cigarettes     Quit date: 2019     Years since quittin.4     Passive exposure: Never     Smokeless tobacco: Never     Tobacco comments:     3 Cig/Week   Vaping Use     Vaping Use: Never used   Substance and Sexual Activity     Alcohol use: Not Currently     Comment: Alcoholic Drinks/day: 350 ml of peppermint schnapps daily per 2018 H&P per report of Karlie JJ to Dr. Valle     Drug use: No     Sexual activity: Yes     Partners: Female   Other Topics Concern     Parent/sibling w/ CABG, MI or angioplasty before 65F 55M? Not Asked   Social History Narrative    Works in [employer] Lives with his Karlie JJon.     Social Determinants of Health     Financial Resource Strain: Not on file   Food Insecurity: Not on file   Transportation Needs: Not on file   Physical Activity: Not on file    Stress: Not on file   Social Connections: Not on file   Interpersonal Safety: Not on file   Housing Stability: Not on file          Medications  Allergies   Current Outpatient Medications   Medication Sig Dispense Refill     acetaminophen (TYLENOL 8 HOUR ARTHRITIS PAIN) 650 MG CR tablet Take 650 mg by mouth every 8 hours as needed for pain       aspirin (ASA) 81 MG EC tablet Take 1 tablet (81 mg) by mouth daily 90 tablet 3     atorvastatin (LIPITOR) 80 MG tablet Take 1 tablet (80 mg) by mouth daily 90 tablet 3     carvedilol (COREG) 25 MG tablet Take 1 tablet (25 mg) by mouth 2 times daily 180 tablet 3     Cholecalciferol (VITAMIN D) 50 MCG (2000 UT) CAPS Take 1 capsule by mouth daily       ferrous sulfate (FEROSUL) 325 (65 Fe) MG tablet Take 325 mg by mouth daily  0     folic acid (FOLVITE) 400 MCG tablet Take 1 tablet (400 mcg) by mouth daily       losartan (COZAAR) 25 MG tablet Take 1 tablet (25 mg) by mouth daily 90 tablet 3     magnesium oxide (MAG-OX) 400 MG tablet Take 400 mg by mouth daily 60 tablet 11     multivitamin w/minerals (THERA-VIT-M) tablet Take 1 tablet by mouth daily       pantoprazole (PROTONIX) 20 MG EC tablet Take 1 tablet (20 mg) by mouth daily 90 tablet 3     potassium chloride ER (KLOR-CON M) 20 MEQ CR tablet Take 1 tablet (20 mEq) by mouth daily 30 tablet 11     vitamin B complex with vitamin C (VITAMIN  B COMPLEX) tablet Take 1 tablet by mouth daily      Allergies   Allergen Reactions     No Known Allergies          Lab Results    Chemistry/lipid CBC Cardiac Enzymes/BNP/TSH/INR   Recent Labs   Lab Test 03/21/23  1431   CHOL 111   HDL 42   LDL 34   TRIG 175*     Recent Labs   Lab Test 03/21/23  1431 11/02/21  1404 02/12/18  1635   LDL 34 40 96     Recent Labs   Lab Test 09/06/23  0511   *   POTASSIUM 3.7   CHLORIDE 101   CO2 22   *   BUN 10.4   CR 0.65*   GFRESTIMATED >90   SOLO 8.4*     Recent Labs   Lab Test 09/06/23  0511 09/05/23  1016 08/31/23  1301   CR 0.65* 0.68 0.71      No results for input(s): A1C in the last 19463 hours. Recent Labs   Lab Test 09/06/23  0511   WBC 11.3*   HGB 11.2*   HCT 33.2*   MCV 97        Recent Labs   Lab Test 09/06/23  0511 09/05/23  1016 08/31/23  1301   HGB 11.2* 12.5* 12.8*    Recent Labs   Lab Test 12/16/19  1701 12/16/19  1042 12/16/19  0352   TROPONINI 0.02 0.02 <0.01     Recent Labs   Lab Test 08/31/23  1301 12/16/19  0352 10/29/19  0440 03/17/19  0309   BNP  --  1,634* >5,000* 873*   NTBNP 2,283*  --   --   --      Recent Labs   Lab Test 10/27/19  1648   TSH 1.54     Recent Labs   Lab Test 08/31/23  1301 12/16/19  0352 10/28/19  0506   INR 1.09 1.12* 1.36*            This note has been dictated using voice recognition software. Any grammatical or context distortions are unintentional and inherent to the software.

## 2023-09-20 NOTE — PATIENT INSTRUCTIONS
Aayush García,    It was a pleasure to see you today in the clinic regarding your recent TAVR.     My recommendations after this visit include:     - no changes at this time  - ok to resume all activities as tolerated.  No lifting > 10 lb with left arm for 1 more week  - continue cardiac rehab    You should followup with again on October 19    **Remember you will need prophylactic antibiotics for all dental visits in the future.  You can get the Rx from your dentist, your PCP or from us.  If possible, still refrain from going to the dentist until 6 months or more after your valve replacement      If you have questions or concerns, please call using the numbers below:    Valve Clinic Phone   [phone]    After Hours/Scheduling  [phone]    Otherwise you can dial the nurse directly at:    Cindy Rogers RN  [phone]    French Gracia RN  [phone]

## 2023-09-20 NOTE — COMMUNITY RESOURCES LIST (ENGLISH)
09/20/2023   [facility]  N/A  For questions about this resource list or additional care needs, please contact your primary care clinic or care manager.  Phone: [phone]   Email: N/A   Address: [address] [place] [address]   Hours: N/A        Hotlines and Helplines       Hotline - Housing crisis  1  [facility] Distance: 15.12 miles      Phone/Virtual   [address] [place] [address]  Language: English  Hours: Mon - Sun Open 24 Hours   Phone: (539) 839-6917 Email: communications@[facility].org Website: https://[facility]-[place].org/[facility]aviour[facility]/     2  [facility] Distance: 16.7 miles      Phone/Virtual   [address] [place] [address]  Language: English  Hours: Mon - Sun Open 24 Hours   Phone: (656) 389-1812 Email: info@[facility].org Website: http://www.[facility].org          Housing       Coordinated Entry access point  3  [facility] Distance: 8.35 miles      In-Person, Phone/Virtual   [address] [place] [address]  Language: English, Jamaican  Hours: Mon - Fri 8:30 AM - 4:30 PM  Fees: Free   Phone: (357) 773-1063 Email: info@[facility].org Website: https://www.[facility].org/locations/[facility]-clinic/     4  [facility] ([facility] - Housing Services Distance: 15.32 miles      In-Person   [address] [place] [address]  Language: English  Hours: Mon - Fri 9:00 AM - 5:00 PM  Fees: Free   Phone: (886) 265-4778 Email: housing@[facility].org Website: http://www.[facility].org/housing     Drop-in center or day shelter  5  [facility] Distance: 6.8 miles      In-Person   [address] [place] [address]  Language: English  Hours: Mon - Fri 9:00 AM - 4:00 PM  Fees: Free   Phone: (513) 717-9380 Email: terrell@[employer].org Website: http://[facility]ening[employer].org     6  [facility] [place] [facility] [place]  [place] [facility] Distance: 8.39 miles      In-Person   [address] [place] [address]  Language: English  Hours: Mon - Sun 9:00 AM - 5:30 PM  Fees: Free, Self Pay   Phone: (148) 128-2122 Email: info@[employer] Website: https://www.[employer]/locations/higher-[facility]/     Housing search assistance  7  [facility] Distance: 6.65 miles      In-Person, Phone/Virtual   [address] [place] [address]  Language: English  Hours: Mon - Fri 8:00 AM - 4:30 PM  Fees: Free   Phone: (216) 258-1221 Email: humanresources@[employer] Website: http://[employer]     8  [facility] - Economic Support Distance: 7.96 miles      In-Person   [address] [place] [address]  Language: English  Hours: Mon - Fri 8:00 AM - 4:30 PM  Fees: Free   Phone: (327) 362-5593 Email: Teralynk@[facility]. Website: https://www.[facility]./787/Economic-Support     Shelter for families  9  [facility] Distance: 9.69 miles      In-Person   [address] [place] [address]  Language: English  Hours: Mon - Fri 3:00 PM - 9:00 AM , Sat - Sun Open 24 Hours  Fees: Free   Phone: (640) 746-4090 Ext.1 Website: https://www.[facility].org/2020/07/03/emergency-family-shelter/     10  [facility] Distance: 21.54 miles      In-Person   [address] [place] [address]  Language: English  Hours: Mon - Sun Open 24 Hours  Fees: Free, Self Pay   Phone: (510) 997-9196 Email: Ishan@[facility].org Website: http://www.[facility].org/     Shelter for individuals  11  [facility] [address] [facility] [place] [facility] Distance: 8.39 miles      In-Person   [address] [place] [address]  Language: English  Hours: Mon - Sun 5:00 PM - 10:00 AM  Fees: Free, Self Pay   Phone: (644) 229-6891 Email:  info@[employer].org Website: https://www.[employer].org/locations/higher-[facility]-[place]/     12  [facility] Distance: 15.12 miles      In-Person   [address] [place] [address]  Language: English  Hours: Mon - Sun Open 24 Hours  Fees: Free   Phone: (414) 313-1772 Email: communications@[facility].org Website: https://[facility].org/oursaviourshousing/          Important Numbers & Websites       Emergency Services   911  [facility]   311  Poison Control   (759) 347-6768  Suicide Prevention Lifeline   (404) 950-5060 (TALK)  Child Abuse Hotline   (112) 230-1312 (4-A-Child)  Sexual Assault Hotline   (410) 618-6498 (HOPE)  National Runaway Safeline   (421) 955-5471 (RUNAWAY)  All-Options Talkline   (416) 463-7082  Substance Abuse Referral   (454) 277-4224 (HELP)

## 2023-09-20 NOTE — LETTER
September 20, 2023      Aayush García  [address] [place] [address]        To Whom It May Concern:    Aayush García was seen in our clinic. He may return to work with the following: limited to light duty - lifting no greater than 30 pounds through October 19, then can return to full duty with no restrictions.      Sincerely,        PARKER MIJARES PA-C

## 2023-09-21 ENCOUNTER — OFFICE VISIT (OUTPATIENT)
Dept: FAMILY MEDICINE | Facility: CLINIC | Age: 68
End: 2023-09-21
Payer: COMMERCIAL

## 2023-09-21 VITALS
WEIGHT: 196.04 LBS | OXYGEN SATURATION: 98 % | DIASTOLIC BLOOD PRESSURE: 71 MMHG | SYSTOLIC BLOOD PRESSURE: 115 MMHG | BODY MASS INDEX: 29.81 KG/M2 | HEART RATE: 63 BPM

## 2023-09-21 DIAGNOSIS — Z95.2 S/P TAVR (TRANSCATHETER AORTIC VALVE REPLACEMENT): Primary | ICD-10-CM

## 2023-09-21 DIAGNOSIS — R26.2 DIFFICULTY WALKING: ICD-10-CM

## 2023-09-21 PROCEDURE — 99213 OFFICE O/P EST LOW 20 MIN: CPT | Performed by: STUDENT IN AN ORGANIZED HEALTH CARE EDUCATION/TRAINING PROGRAM

## 2023-09-21 NOTE — LETTER
[facility] [address] [place] [address]  Phone: [phone]  Fax: [phone]    September 21, 2023        Aayush García  [address] [place] [address]          To whom it may concern:    RE: Aayush García    Patient was seen and treated today at our clinic.    Patient may return to work 9/26/23 with the following:      Light duty-unable to lift more than 30 pounds until October 19th.     No climbing, no ladders and allow use of a walker indefinitely.     Please contact me for questions or concerns.      Sincerely,        Ebonie Martines MD

## 2023-09-21 NOTE — COMMUNITY RESOURCES LIST (ENGLISH)
09/21/2023   [facility] - Outpatient Clinics  N/A  For additional resource needs, please contact your health insurance member services or your primary care team.  Phone: [phone]   Email: N/A   Address: [facility] [place] [address]   Hours: N/A        Hotlines and Helplines       Hotline - Housing crisis  1  [facility] Distance: 15.12 miles      Phone/Virtual   [address] [place] [address]  Language: English  Hours: Mon - Sun Open 24 Hours   Phone: (533) 494-5071 Email: communications@[facility].org Website: https://[facility].org/oursaviourshousing/     2  [facility] Distance: 16.7 miles      Phone/Virtual   [address] [place] [address]  Language: English  Hours: Mon - Sun Open 24 Hours   Phone: (646) 899-8498 Email: info@[facility].[employer] Website: http://www.[facility].org          Housing       Coordinated Entry access point  3  [facility] Distance: 8.35 miles      In-Person, Phone/Virtual   [address] [place] [address]  Language: English, North Korean  Hours: Mon - Fri 8:30 AM - 4:30 PM  Fees: Free   Phone: (632) 402-6772 Email: info@[facility].org Website: https://www.[facility].org/locations/downReading Hospital-clinic/     4  [facility] ([facility] - Housing Services Distance: 15.32 miles      In-Person   [address] [place] [address]  Language: English  Hours: Mon - Fri 9:00 AM - 5:00 PM  Fees: Free   Phone: (917) 276-9672 Email: housing@[facility].org Website: http://www.[facility].org/housing     Drop-in center or day shelter  5  [facility] Distance: 6.8 miles      In-Person   [address] [place] [address]  Language: English  Hours: Mon - Fri 9:00 AM - 4:00 PM  Fees: Free   Phone: (238) 441-8240 Email: frontlynettek@[employer].org Website: http://[employer].org     6  [facility] [place] [facility] [place]  Shelter Distance: 8.39 miles      In-Person   [address] [place] [address]  Language: English  Hours: Mon - Sun 9:00 AM - 5:30 PM  Fees: Free, Self Pay   Phone: (165) 457-5256 Email: info@[employer] Website: https://www.[employer]/locations/[facility]/     Housing search assistance  7  [employer] - https://[employer]/ Distance: 14.99 miles      Phone/Virtual   [address] [place] [address]  Language: English  Hours: Mon - Sun Open 24 Hours   Email: info@[employer] Website: https://[employer]     8  [employer] - Online housing search assistance Distance: 16.09 miles      Phone/Virtual   [address] [address]  Language: English, Hmong, Paraguayan, Iraqi  Hours: Mon - Sun Open 24 Hours   Phone: (899) 726-5287 Email: info@[employer].org Website: http://www.housinglink.org/     Shelter for families  9  [facility] Distance: 9.69 miles      In-Person   [address] [place] [address]  Language: English  Hours: Mon - Fri 3:00 PM - 9:00 AM , Sat - Sun Open 24 Hours  Fees: Free   Phone: (734) 347-3151 Ext.1 Website: https://www.[facility].org/2020/07/03/emergency-family-shelter/     10  [facility] Distance: 21.54 miles      In-Person   [address] [place] [address]  Language: English  Hours: Mon - Sun Open 24 Hours  Fees: Free, Self Pay   Phone: (377) 931-3422 Email: Ishan@[facility].org Website: http://www.[facility].org/     Shelter for individuals  11  [facility] [place] [facility] Distance: 8.39 miles      In-Person   [address] [place] [address]  Language: English  Hours: Mon - Sun 5:00 PM - 10:00 AM  Fees: Free, Self Pay   Phone: (148) 141-1339 Email: info@[employer].org Website:  https://www.[facility].org/locations/[facility]/     12  [facility] Distance: 15.12 miles      In-Person   [address] [place] [address]  Language: English  Hours: Mon - Sun Open 24 Hours  Fees: Free   Phone: (158) 436-5918 Email: communications@[facility].org Website: https://[facility].org/oursaviourshousing/          Important Numbers & Websites       [facility]   [address] 211itedway.org  Poison Control   (714) 801-1125 [place]  Suicide and Crisis Lifeline   [address] [place], [place] [address]line.org  Childhelp [place] Child Abuse Hotline   561.622.2575 Childhelphotline.org  National Sexual Assault Hotline   (826) 785-1305 (HOPE) Rainn.org  [place] Runaway Safeline   (830) 593-7538 (RUNAWAY) [facility].org  Pregnancy & Postpartum Support [place]   Call/text 042-715-2605 Ppsupport[place].org  Substance Abuse National Helpline ([facility]   827-496-HELP (1554) Findtreatment.gov  Emergency Services   911

## 2023-09-25 PROCEDURE — 93228 REMOTE 30 DAY ECG REV/REPORT: CPT | Performed by: INTERNAL MEDICINE

## 2023-09-26 ENCOUNTER — MEDICAL CORRESPONDENCE (OUTPATIENT)
Dept: CARDIAC REHAB | Facility: HOSPITAL | Age: 68
End: 2023-09-26
Payer: COMMERCIAL

## 2023-10-06 DIAGNOSIS — Z95.2 S/P TAVR (TRANSCATHETER AORTIC VALVE REPLACEMENT): Primary | ICD-10-CM

## 2023-10-16 ENCOUNTER — HOSPITAL ENCOUNTER (OUTPATIENT)
Dept: CARDIOLOGY | Facility: HOSPITAL | Age: 68
Discharge: HOME | End: 2023-10-16
Attending: INTERNAL MEDICINE | Admitting: INTERNAL MEDICINE
Payer: COMMERCIAL

## 2023-10-16 DIAGNOSIS — I35.0 SEVERE AORTIC STENOSIS: ICD-10-CM

## 2023-10-16 LAB — LVEF ECHO: NORMAL

## 2023-10-16 PROCEDURE — 93306 TTE W/DOPPLER COMPLETE: CPT | Mod: 26 | Performed by: INTERNAL MEDICINE

## 2023-10-16 PROCEDURE — 93306 TTE W/DOPPLER COMPLETE: CPT

## 2023-10-19 ENCOUNTER — OFFICE VISIT (OUTPATIENT)
Dept: CARDIOLOGY | Facility: CLINIC | Age: 68
End: 2023-10-19
Payer: COMMERCIAL

## 2023-10-19 ENCOUNTER — LAB (OUTPATIENT)
Dept: CARDIOLOGY | Facility: CLINIC | Age: 68
End: 2023-10-19
Payer: COMMERCIAL

## 2023-10-19 VITALS
DIASTOLIC BLOOD PRESSURE: 60 MMHG | RESPIRATION RATE: 16 BRPM | SYSTOLIC BLOOD PRESSURE: 120 MMHG | HEART RATE: 58 BPM | WEIGHT: 199 LBS | BODY MASS INDEX: 30.26 KG/M2

## 2023-10-19 DIAGNOSIS — Z95.2 S/P TAVR (TRANSCATHETER AORTIC VALVE REPLACEMENT): ICD-10-CM

## 2023-10-19 DIAGNOSIS — Z95.2 S/P TAVR (TRANSCATHETER AORTIC VALVE REPLACEMENT): Primary | ICD-10-CM

## 2023-10-19 LAB
ANION GAP SERPL CALCULATED.3IONS-SCNC: 12 MMOL/L (ref 7–15)
ATRIAL RATE - MUSE: 58 BPM
BUN SERPL-MCNC: 17.5 MG/DL (ref 8–23)
CALCIUM SERPL-MCNC: 9.3 MG/DL (ref 8.8–10.2)
CHLORIDE SERPL-SCNC: 99 MMOL/L (ref 98–107)
CREAT SERPL-MCNC: 0.81 MG/DL (ref 0.67–1.17)
DEPRECATED HCO3 PLAS-SCNC: 23 MMOL/L (ref 22–29)
DIASTOLIC BLOOD PRESSURE - MUSE: NORMAL MMHG
EGFRCR SERPLBLD CKD-EPI 2021: >90 ML/MIN/1.73M2
ERYTHROCYTE [DISTWIDTH] IN BLOOD BY AUTOMATED COUNT: 11.8 % (ref 10–15)
GLUCOSE SERPL-MCNC: 95 MG/DL (ref 70–99)
HCT VFR BLD AUTO: 36 % (ref 40–53)
HGB BLD-MCNC: 12.3 G/DL (ref 13.3–17.7)
INTERPRETATION ECG - MUSE: NORMAL
MCH RBC QN AUTO: 32.8 PG (ref 26.5–33)
MCHC RBC AUTO-ENTMCNC: 34.2 G/DL (ref 31.5–36.5)
MCV RBC AUTO: 96 FL (ref 78–100)
P AXIS - MUSE: 12 DEGREES
PLATELET # BLD AUTO: 188 10E3/UL (ref 150–450)
POTASSIUM SERPL-SCNC: 4.3 MMOL/L (ref 3.4–5.3)
PR INTERVAL - MUSE: 182 MS
QRS DURATION - MUSE: 160 MS
QT - MUSE: 522 MS
QTC - MUSE: 512 MS
R AXIS - MUSE: -50 DEGREES
RBC # BLD AUTO: 3.75 10E6/UL (ref 4.4–5.9)
SODIUM SERPL-SCNC: 134 MMOL/L (ref 135–145)
SYSTOLIC BLOOD PRESSURE - MUSE: NORMAL MMHG
T AXIS - MUSE: 100 DEGREES
VENTRICULAR RATE- MUSE: 58 BPM
WBC # BLD AUTO: 7.4 10E3/UL (ref 4–11)

## 2023-10-19 PROCEDURE — 93000 ELECTROCARDIOGRAM COMPLETE: CPT | Performed by: INTERNAL MEDICINE

## 2023-10-19 PROCEDURE — 36415 COLL VENOUS BLD VENIPUNCTURE: CPT

## 2023-10-19 PROCEDURE — 85027 COMPLETE CBC AUTOMATED: CPT

## 2023-10-19 PROCEDURE — 99214 OFFICE O/P EST MOD 30 MIN: CPT | Mod: 25 | Performed by: INTERNAL MEDICINE

## 2023-10-19 PROCEDURE — 80048 BASIC METABOLIC PNL TOTAL CA: CPT

## 2023-10-19 NOTE — LETTER
10/19/2023    Zachary Lewis MD  [address] [place] [address]    RE: Aayush KIMANI García       Dear Colleague,     I had the pleasure of seeing Aayush García in the [facility].  [facility]       [facility]   [address], [place] [address]   [email or web address]   OFFICE: [phone]     IMPRESSION:  Severe, symptomatic aortic valve stenosis post transcatheter aortic valve replacement with a 29 mm Chowdary FREDDIE Ultra Resilia  Functional Capacity:  NYHA class 1, CCS class 0   Heart failure with mildly reduced EF, well compensated  Coronary artery disease post prior PCI, asymptomatic  Left bundle branch block, recent monitor with no tachyarrhythmias or pauses, frequent supraventricular and ventricular beats  Metabolic syndrome with hypertension and hyperlipidemia  Peripheral vascular disease  Alcohol abuse        SUMMARY OF RECOMMENDATIONS: Mr. García is doing well post transcatheter aortic valve replacement with significant improvement in symptoms.  His bioprosthetic valve is functioning well without issues.  Is appropriately participating in cardiac rehabilitation.  He is on aspirin and high intensity statin given his concomitant coronary artery disease in addition to beta-blocker and ARB due to his cardiomyopathy.  We will plan to see him back in 1 years time with repeat echocardiogram to assess valve function.      I sincerely appreciate the opportunity to participate in this patient's care.  Please do not hesitate to contact me if any questions or concerns arise.       Dear Viviane Lewis and Rupal,     I had the pleasure of seeing Aayush García today at the [facility] for routine 1 month follow-up post transcatheter aortic valve replacement. As you know, Mr. García is a 68-year-old gentleman with complex past medical history who recently underwent transcatheter aortic  valve replacement for severe symptomatic aortic valve stenosis.  Today, he reports overall has been doing well.  Symptoms have significantly improved.The patient denies symptoms of chest pain, shortness of breath, palpitations, dizziness, lightheadedness, presyncope, syncope, orthopnea, PND, early satiety/abdominal bloating, lower extremity edema, stroke like symptoms, or claudication.      ROS:  A 14 point review of symptoms was reviewed.      Past Medical History: as above     Social and family history reviewed.    Allergies: reviewed   Allergies   Allergen Reactions    No Known Allergies        Pertinent Medications:    Current Outpatient Medications   Medication    acetaminophen (TYLENOL 8 HOUR ARTHRITIS PAIN) 650 MG CR tablet    aspirin (ASA) 81 MG EC tablet    atorvastatin (LIPITOR) 80 MG tablet    carvedilol (COREG) 25 MG tablet    Cholecalciferol (VITAMIN D) 50 MCG (2000 UT) CAPS    ferrous sulfate (FEROSUL) 325 (65 Fe) MG tablet    folic acid (FOLVITE) 400 MCG tablet    losartan (COZAAR) 25 MG tablet    magnesium oxide (MAG-OX) 400 MG tablet    multivitamin w/minerals (THERA-VIT-M) tablet    pantoprazole (PROTONIX) 20 MG EC tablet    potassium chloride ER (KLOR-CON M) 20 MEQ CR tablet    vitamin B complex with vitamin C (VITAMIN  B COMPLEX) tablet     No current facility-administered medications for this visit.       OBJECTIVE:  Physical Examination   /60 (BP Location: Right arm, Patient Position: Sitting, Cuff Size: Adult Large)   Pulse 58   Resp 16   Wt 90.3 kg (199 lb)   BMI 30.26 kg/m     Constitutional: Not in acute distress  Cognitive: Alert, oriented x 3, Appropriate speech  Chest:  Clear bilaterally   Cardiac: regular rhythm, normal S1/S2, no murmurs.  JVP not elevated.  Abdoment:  Soft, non-tender  Skin and Integument:  No peripheral edema, extremities warm, access sites well-healed  Neuro:  Grossly normal for motor, sensory.      Studies:     Laboratory performed today and reviewed by me  personally showed: Serum creatinine of 0.81, hemoglobin 12.3, platelet count of 188.    Electrocardiogram performed today and reviewed by me personally showed: Sinus bradycardia, left bundle branch block    TTE performed 10/2023 and reviewed by me personally showed: Normal LV size with mildly reduced LV function, estimated EF of 40 to 45%, normal RV size and function, normal right-sided filling pressures, mild MR, normally functioning 29 mm Chowdary FREDDIE Ultra Resilia bioprosthetic valve (PV 1.8 m/s, MG 7 mmHg)     Cardiac telemetry (2 weeks) from September 2023 -sinus, no tachyarrhythmias or atrial fibrillation, no pauses, frequent supraventricular (12%) and ventricular (9%) beats    I reviewed the TAVR procedure, risk and expected outcomes with the patient and family members in detail.  I explained that the aortic valve is very significantly impaired, and strenuous exercise should be avoided until after the valve is repaired. Risks associated with transcatheter valve therapy were reviewed. Risks including death, MI, stroke, permanent disability, neurologic injury, renal failure, major bleeding requiring blood transfusion, emergency thoracotomy for catastrophic complications, vascular injury requiring endovascular or surgical repair, need for permanent pacemaker and anesthesia related complications were fully explained.  Alternative approaches including surgical aortic valve replacement and palliative care were discussed.  My own experience with these procedures were reviewed, and the patient and family appeared to understand the information I presented.  Questions were asked and answered to their apparent satisfaction.    Thank you for allowing me to participate in the care of your patient.      Sincerely,     Bret Quezada MD     [facility]  cc:   No referring provider defined for this encounter.

## 2023-10-20 NOTE — PROGRESS NOTES
[facility]       [facility] CARE   [address], [place] [address]   [email or web address]   OFFICE: [phone]     IMPRESSION:  Severe, symptomatic aortic valve stenosis post transcatheter aortic valve replacement with a 29 mm Chowdary FREDDIE Ultra Resilia  Functional Capacity:  NYHA class 1, CCS class 0   Heart failure with mildly reduced EF, well compensated  Coronary artery disease post prior PCI, asymptomatic  Left bundle branch block, recent monitor with no tachyarrhythmias or pauses, frequent supraventricular and ventricular beats  Metabolic syndrome with hypertension and hyperlipidemia  Peripheral vascular disease  Alcohol abuse        SUMMARY OF RECOMMENDATIONS: Mr. García is doing well post transcatheter aortic valve replacement with significant improvement in symptoms.  His bioprosthetic valve is functioning well without issues.  Is appropriately participating in cardiac rehabilitation.  He is on aspirin and high intensity statin given his concomitant coronary artery disease in addition to beta-blocker and ARB due to his cardiomyopathy.  We will plan to see him back in 1 years time with repeat echocardiogram to assess valve function.      I sincerely appreciate the opportunity to participate in this patient's care.  Please do not hesitate to contact me if any questions or concerns arise.       Dear Viviane Lewis and Rupal,     I had the pleasure of seeing Aayush García today at the [facility] for routine 1 month follow-up post transcatheter aortic valve replacement. As you know, Mr. García is a 68-year-old gentleman with complex past medical history who recently underwent transcatheter aortic valve replacement for severe symptomatic aortic valve stenosis.  Today, he reports overall has been doing well.  Symptoms have significantly improved.The patient denies symptoms of chest pain, shortness  of breath, palpitations, dizziness, lightheadedness, presyncope, syncope, orthopnea, PND, early satiety/abdominal bloating, lower extremity edema, stroke like symptoms, or claudication.      ROS:  A 14 point review of symptoms was reviewed.      Past Medical History: as above     Social and family history reviewed.    Allergies: reviewed   Allergies   Allergen Reactions    No Known Allergies        Pertinent Medications:    Current Outpatient Medications   Medication    acetaminophen (TYLENOL 8 HOUR ARTHRITIS PAIN) 650 MG CR tablet    aspirin (ASA) 81 MG EC tablet    atorvastatin (LIPITOR) 80 MG tablet    carvedilol (COREG) 25 MG tablet    Cholecalciferol (VITAMIN D) 50 MCG (2000 UT) CAPS    ferrous sulfate (FEROSUL) 325 (65 Fe) MG tablet    folic acid (FOLVITE) 400 MCG tablet    losartan (COZAAR) 25 MG tablet    magnesium oxide (MAG-OX) 400 MG tablet    multivitamin w/minerals (THERA-VIT-M) tablet    pantoprazole (PROTONIX) 20 MG EC tablet    potassium chloride ER (KLOR-CON M) 20 MEQ CR tablet    vitamin B complex with vitamin C (VITAMIN  B COMPLEX) tablet     No current facility-administered medications for this visit.       OBJECTIVE:  Physical Examination   /60 (BP Location: Right arm, Patient Position: Sitting, Cuff Size: Adult Large)   Pulse 58   Resp 16   Wt 90.3 kg (199 lb)   BMI 30.26 kg/m     Constitutional: Not in acute distress  Cognitive: Alert, oriented x 3, Appropriate speech  Chest:  Clear bilaterally   Cardiac: regular rhythm, normal S1/S2, no murmurs.  JVP not elevated.  Abdoment:  Soft, non-tender  Skin and Integument:  No peripheral edema, extremities warm, access sites well-healed  Neuro:  Grossly normal for motor, sensory.      Studies:     Laboratory performed today and reviewed by me personally showed: Serum creatinine of 0.81, hemoglobin 12.3, platelet count of 188.    Electrocardiogram performed today and reviewed by me personally showed: Sinus bradycardia, left bundle branch  block    TTE performed 10/2023 and reviewed by me personally showed: Normal LV size with mildly reduced LV function, estimated EF of 40 to 45%, normal RV size and function, normal right-sided filling pressures, mild MR, normally functioning 29 mm Chowdary FREDDIE Ultra Resilia bioprosthetic valve (PV 1.8 m/s, MG 7 mmHg)     Cardiac telemetry (2 weeks) from September 2023 -sinus, no tachyarrhythmias or atrial fibrillation, no pauses, frequent supraventricular (12%) and ventricular (9%) beats    I reviewed the TAVR procedure, risk and expected outcomes with the patient and family members in detail.  I explained that the aortic valve is very significantly impaired, and strenuous exercise should be avoided until after the valve is repaired. Risks associated with transcatheter valve therapy were reviewed. Risks including death, MI, stroke, permanent disability, neurologic injury, renal failure, major bleeding requiring blood transfusion, emergency thoracotomy for catastrophic complications, vascular injury requiring endovascular or surgical repair, need for permanent pacemaker and anesthesia related complications were fully explained.  Alternative approaches including surgical aortic valve replacement and palliative care were discussed.  My own experience with these procedures were reviewed, and the patient and family appeared to understand the information I presented.  Questions were asked and answered to their apparent satisfaction.        Bret Quezada MD  Interventional Cardiology

## 2023-11-01 ENCOUNTER — TELEPHONE (OUTPATIENT)
Dept: CARDIOLOGY | Facility: CLINIC | Age: 68
End: 2023-11-01
Payer: COMMERCIAL

## 2023-11-01 NOTE — TELEPHONE ENCOUNTER
Talked to patient's spouse and will let him know to call back to schedule a follow up with Dr Goldsmith in regards to his TAVR done 9/5/23.

## 2023-11-16 ENCOUNTER — TELEPHONE (OUTPATIENT)
Dept: CARDIOLOGY | Facility: CLINIC | Age: 68
End: 2023-11-16
Payer: COMMERCIAL

## 2023-11-16 NOTE — TELEPHONE ENCOUNTER
[facility] Call Center    Phone Message    May a detailed message be left on voicemail: yes     Reason for Call: Other: Please call Karlie to schedule 6 month follow up.     Action Taken: Other: cardio    Travel Screening: Not Applicable    Thank you!  Specialty [facility] Center

## 2023-11-26 ENCOUNTER — HEALTH MAINTENANCE LETTER (OUTPATIENT)
Age: 68
End: 2023-11-26

## 2024-03-25 ENCOUNTER — OFFICE VISIT (OUTPATIENT)
Dept: CARDIOLOGY | Facility: CLINIC | Age: 69
End: 2024-03-25
Payer: COMMERCIAL

## 2024-03-25 VITALS
BODY MASS INDEX: 30.5 KG/M2 | OXYGEN SATURATION: 95 % | WEIGHT: 200.6 LBS | HEART RATE: 53 BPM | RESPIRATION RATE: 16 BRPM | DIASTOLIC BLOOD PRESSURE: 72 MMHG | SYSTOLIC BLOOD PRESSURE: 130 MMHG

## 2024-03-25 DIAGNOSIS — E78.5 DYSLIPIDEMIA: ICD-10-CM

## 2024-03-25 DIAGNOSIS — Z95.2 STATUS POST AORTIC VALVE REPLACEMENT: Primary | ICD-10-CM

## 2024-03-25 DIAGNOSIS — I10 HYPERTENSION, UNSPECIFIED TYPE: ICD-10-CM

## 2024-03-25 DIAGNOSIS — I25.10 CORONARY ARTERY DISEASE INVOLVING NATIVE CORONARY ARTERY OF NATIVE HEART WITHOUT ANGINA PECTORIS: ICD-10-CM

## 2024-03-25 DIAGNOSIS — I42.9 CARDIOMYOPATHY, UNSPECIFIED TYPE (H): ICD-10-CM

## 2024-03-25 PROCEDURE — 99214 OFFICE O/P EST MOD 30 MIN: CPT | Performed by: INTERNAL MEDICINE

## 2024-03-25 NOTE — LETTER
3/25/2024    Zachary eLwis MD  [address] [place] [address]    RE: Aayush HARMAN Raquel       Dear Colleague,     I had the pleasure of seeing Aayush García in the [facility].         [facility]   [address], [place] [address]   [email or web address]   OFFICE: [phone]            Impression and Plan     1.  Cardiomyopathy.  Most recent send echocardiogram 16 October 2023 did reveal some improvement in left ventricular systolic performance with ejection fraction of 40-45%.      Nature of cardiomyopathy felt combined ischemic as well as non-ischemic in part related to prior ethanol use.   Parenthetically, Aayush has declined prophylactic ICD placement (documented in myriad notes crafted by my colleague, Dr. Terrence Cordova).     This is stable.  Patient states his weights have been stable on his home scale.   He appears volume neutral on clinical exam.  He reports no shortness of breath at night to suggest PND/orthopnea.  Continue carvedilol 25 mg twice daily.  Continue losartan 25 mg daily.  Continued avoidance of ethanol (Aayush states that he has remained abstemious).     2.  Coronary artery disease.  Aayush has known coronary artery disease.  Specifically, Aayush underwent angiography 18 March 2019 and found to have significant two-vessel disease involving proximal LAD and first obtuse marginal.  Aayush had successful PCI with stent placement to proximal LAD (4.0 x 24 mm Synergy drug-eluting stent).  He also had significant disease involving the first obtuse marginal. Specifically, the OM1 stenosis demonstrated bridging collaterals and was felt to be a chronic total occlusion (). His Iinterventionalist, Dr. Timi Rodriguez, had recommended medical therapy of the OM1 lesion at that time.       Aayush underwent repeat angiography 29 July 2021 and he had successful PCI of the mid LAD with PTCA, coronary lithotripsy, rotational atherectomy  followed by 3.5 x 32 mm Synergy drug-eluting stent with 0% residual stenosis.    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  No stent was deployed.     Aayush denies any chest pain symptoms concerning for angina.     3.  Status post aortic valve replacement. Aayush underwent TAVR 5 September 2023 (documented a 9 mm Shelley Ultra Resilia valve).  Echocardiogram 16 October 2023 revealed normal spectral Doppler metrics with mean gradient of 7 mmHg and peak velocity 1.9 m/s.  As noted below, he reports subjective improvement in his breathing and exercise tolerance post AVR.     4.  History of ventricular arrhythmias.  Aayush previously had been noted to have evidence of NSVT though this has been quiescent. He has not had any documented recent recurrence.  As aforementioned, Aayush has declined prophylactic ICD placement (documented in myriad notes crafted by my colleague, Dr. Terrence Cordova).  Continue carvedilol as per problem #1.     5. Peripheral arterial disease.  Severe native peripheral arterial disease with right common femoral artery calcification and left common iliac lesion. Recommend continued medical management/risk factor modification at this time.     6.  Dyslipidemia.   Lipid profile 21 March 2023 revealed LDL 34 mg/dL and HDL 42 mg/dL.  Continue high intensity statin therapy, atorvastatin 80 mg daily.      Follow-up in 1 year.      35 minutes spent reviewing prior records (including documentation, laboratory studies, cardiac testing/imaging), interview with patient along with physical exam, planning, and subsequent documentation/crafting of note).           History of Present Illness    Once again I would like to thank you again for asking me to participate in the care of your patient, Aayush García.  As you know, but to reiterate for my own records, Aayush García is a 68 year old male with history of  cardiomyopathy (combined ischemic and non-ischemic).  Aayush also has a history of coronary artery disease with prior intervention.  In addition, he has a history of aortic stenosis.     Again, Aayush has known coronary artery disease.  Specifically, Aayush underwent angiography 18 March 2019 and found to have significant two-vessel disease involving proximal LAD and first obtuse marginal.  Patient had successful PCI with stent placement to proximal LAD (4.0 x 24 mm Synergy drug-eluting stent).  He also had significant disease involving the first obtuse marginal. Specifically, the OM1 stenosis demonstrated bridging collaterals and was felt to be a chronic total occlusion (). His Interventionalist, Dr. Timi Rodriguez, had recommended medical therapy of the OM1 lesion at that time.     Aayush underwent repeat angiography 29 July 2021 and he had successful PCI of the mid LAD with PTCA, coronary lithotripsy, rotational atherectomy followed by 3.5 x 32 mm Synergy drug-eluting stent with 0% residual stenosis.    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  No stent was deployed.    In addition to the aforementioned, Aayush underwent TAVR 5 September 2023 (documented a 9 mm Shelley Ultra Resilia valve).     On interview, Aayush denies any chest pain symptoms concerning for angina.  States his exercise stamina has improved since his valve replacement.  He is pleased with how he is performing.  He is using a stationary bicycle and this is going well for him.  This is confirmed by his spouse.    Further review of systems is otherwise negative/noncontributory (medical record and 13 point review of systems reviewed as well and pertinent positives noted).         Cardiac Diagnostics      Echocardiogram 16 October 2023:  Normal left ventricular size with mild-moderate reduction left ventricular systolic performance.  Ejection fraction  40 to 45%.  Abnormal septal motion consistent with conduction abnormality.  There is a prosthetic aortic valve (documented 29 mm Shelley Ultra Resilia valve).  Normal spectral Doppler metrics with mean gradient of 7 mmHg and peak velocity of 1.9 m/s.  No aortic insufficiency identified.  Normal right ventricular size and systolic performance.  Moderate left atrial enlargement.  Right atrium of normal dimension.    Echocardiogram 1 July 2021:  Moderate depression left ventricular systolic performance with ejection fraction 35 to 40%.  Moderate global reduction in left ventricular systolic performance.  Mild increase in left ventricular wall thickness.  Severe aortic stenosis.  Mean gradient measured at 29 mmHg with peak velocity of 3.6 m/s. Calculated valve area 0.6. Cm . VRVTI = 0.2. VRvel = 0.2.  Mild aortic insufficiency.  Normal right ventricular size and systolic performance.  Moderate left atrial enlargement. Right atrium normal dimension.    Echocardiogram 3 December 2019:  Mild left ventricular enlargement with moderate depression left ventricular systolic performance.  Ejection fraction 30%.  The following segments are akinetic: basal anteroseptal, basal inferoseptal, mid anteroseptal and mid inferoseptal. The following segments are hypokinetic: basal anterior, basal inferior, basal inferolateral, basal anterolateral, mid anterior, mid inferior, mid inferolateral, mid anterolateral, apical anterior, apical septal, apical inferior, apical lateral and apex.  Severe aortic stenosis.  Aortic valve is severely calcified with reduced systolic excursion. Low flow, low gradient severe aortic stenosis is present with mild regurgitation (stroke-volume index 30.2 ml/m2) .   Mean gradient measured at 29 mmHg with peak velocity of 3.3 m/s.  Calculated valve area 0.7 cm .  VRVTI = 0.2. VRvel = 0.2.  Mild aortic insufficiency.  Normal right ventricular size and systolic performance.  Severe left atrial enlargement.   Moderate right atrial enlargement.  Mild aortic root enlargement.  Right ventricular systolic pressure relative to right atrial pressure is mildly increased.  Pulmonary artery pressure estimated at 35 to 40 mmHg plus right atrial pressure.    Coronary angiogram 6 July 2023:  Left anterior descending coronary: Proximal LAD stent appears widely patent. Mid LAD stent is patent.  There is about 30% eccentric calcified narrowing at the distal edge of the stent, unchanged from prior study.  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  There is 40% eccentric calcified narrowing proximally and then heavily calcified bifurcation at the first marginal branch,  which has ostial 99% stenosis and slight reduced antegrade filling, noted on previous studies.  Right coronary artery: The RCA is tortuous, with mild lumen irregularity but no severe stenosis.  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  No stent was deployed.    Coronary angiogram 29 July 2021:  Left main coronary artery: Left main not present due to separate ostia of the LAD and circumflex  Left anterior descending coronary artery: LAD has a patent proximal stent, with the mid LAD being heavily calcified with eccentric stenoses.  The distal and apical LAD have mild disease.  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  There is a 70 to 75% stenosis in the proximal portion of the vessel which goes on to supply several obtuse marginals to the lateral wall  Right coronary artery: RCA is a moderate sized, dominant vessel with mild diffuse disease.  Successful PCI of the mid LAD with PTCA, coronary lithotripsy, rotational atherectomy followed by 3.5 x 32 mm Synergy drug-eluting stent with 0% residual stenosis.    Coronary angiogram 18 March 2019:  Left anterior descending coronary artery: 95%  proximal stenosis.  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  First obtuse marginal with 99% chronic total occlusion ().  There are bridging collaterals to the distal portion of the vessel.  Successful PCI with stent placement to proximal LAD (4.0 x 24 mm Synergy drug-eluting stent.  Recommendations  Medical management for OM1 chronic total occlusion.              Physical Examination       /72 (BP Location: Right arm, Patient Position: Sitting, Cuff Size: Adult Regular)   Pulse 53   Resp 16   Wt 91 kg (200 lb 9.6 oz)   SpO2 95%   BMI 30.50 kg/m          Wt Readings from Last 3 Encounters:   03/25/24 91 kg (200 lb 9.6 oz)   10/19/23 90.3 kg (199 lb)   09/21/23 88.9 kg (196 lb 0.6 oz)       The patient is alert and oriented times three. Sclerae are anicteric. Mucosal membranes are moist. Jugular venous pressure is normal. No significant adenopathy/thyromegally appreciated. Lungs are clear with good expansion. On cardiovascular exam, the patient has a regular S1 and S2. Abdomen is soft and non-tender. Extremities reveal no clubbing, cyanosis.       Family History/Social History/Risk Factors   Patient does not smoke.  Family history reviewed, and family history includes Alzheimer Disease (age of onset: 86.00) in his mother; Heart Disease (age of onset: 76.00) in his father; No Known Problems in his son and son.          Medical History  Surgical History Family History Social History   Past Medical History:   Diagnosis Date    Acute liver failure 8/5/2018    Acute on chronic systolic congestive heart failure (H) 1/14/2020    Acute renal failure, unspecified acute renal failure type (H24) 08/05/2018    Acute respiratory failure with hypoxia (H) 12/16/2019    Acute respiratory failure with hypoxia (H) 08/05/2018    Alcoholic hepatitis with ascites (H28) 08/05/2018    Bacteremia due to Klebsiella pneumoniae     Benign essential hypertension  4/12/2018    Chronic liver disease     Closed fracture of multiple ribs of right side, initial encounter 11/23/2019    Coronary artery disease involving native coronary artery of native heart without angina pectoris 9/24/2019    Essential hypertension     Gastroesophageal reflux disease without esophagitis 10/31/2018    GI bleeding 10/27/2019    Heart failure with reduced ejection fraction (H) 4/4/2019    Hepatic encephalopathy (H) 08/05/2018    Macrocytic anemia     Non-ST elevation MI (NSTEMI) (H) 3/18/2019    Overview:  Added automatically from request for surgery [id number]    NSTEMI (non-ST elevated myocardial infarction) (H) 03/2019    s/p stent placement    Primary localized osteoarthrosis of left lower leg 4/9/2019    Stress-induced cardiomyopathy 08/05/2018     Past Surgical History:   Procedure Laterality Date    COLONOSCOPY N/A 3/20/2018    Procedure: COLONOSCOPY;  Surgeon: Adam Nicole III, MD;  Location: [facility] OR;  Service:     CV CORONARY ANGIOGRAM N/A 3/18/2019    Procedure: Coronary Angiogram;  Surgeon: Timi Rodriguez MD;  Location: [facility] Cath Lab;  Service: Cardiology    CV CORONARY ANGIOGRAM N/A 9/22/2021    Procedure: Coronary Angiogram;  Surgeon: Brianda Avilez MD;  Location: [facility] LAB CV    CV CORONARY ANGIOGRAM N/A 7/6/2023    Procedure: Coronary Angiogram;  Surgeon: Rafat Pinto MD;  Location: [facility] LAB CV    CV FRACTIONAL FLOW RATIO WIRE N/A 9/22/2021    Procedure: Fractional Flow Ratio Wire;  Surgeon: Brianda Avilez MD;  Location: [facility] CATH LAB CV    CV LEFT HEART CATH N/A 7/6/2023    Procedure: Left Heart Catheterization;  Surgeon: Rafat Pinto MD;  Location: [facility] CATH LAB CV    CV LEFT HEART CATHETERIZATION WITHOUT LEFT VENTRICULOGRAM Left 3/18/2019    Procedure: Left Heart Catheterization Without Left Ventriculogram;  Surgeon: Timi Rodriguez MD;  Location: [facility] Cath Lab;  Service: Cardiology    CV PCI N/A 9/22/2021     Procedure: Percutaneous Coronary Intervention;  Surgeon: Brianda Avilez MD;  Location: [facility] CATH LAB CV    CV PCI N/A 2023    Procedure: Percutaneous Coronary Intervention;  Surgeon: Rafat Pinto MD;  Location: [facility] CATH LAB CV    CV PCI ATHERECTOMY ORBITAL N/A 2021    Procedure: Percutaneous Coronary Intervention Atherectomy Rotational;  Surgeon: Brianda Avilez MD;  Location: [facility] CV    IR CVC TUNNEL PLACEMENT > 5 YRS OF AGE  2018    IR TUNNELED CATHETER REMOVAL  2018    OR TRANSCATHETER AORTIC VALVE REPLACEMENT, SUBCLAVIAN PERCUTANEOUS APPROACH (STANDBY) N/A 2023    Procedure: OR TRANSCATHETER AORTIC VALVE REPLACEMENT, SUBCLAVIAN PERCUTANEOUS APPROACH (STANDBY);  Surgeon: Andra Pabon MD;  Location: [facility] CV    PICC  2018         PICC  3/18/2019         TRANSCATHETER AORTIC VALVE REPLACEMENT - SUBCLAVIAN APPROACH N/A 2023    Procedure: Transcatheter Aortic Valve Replacement - Subclavian Approach;  Surgeon: Bret Quezada MD;  Location: [facility] CATH LAB CV    US THORACENTESIS  10/28/2019    ZZHC CORONARY STENT PERCUT, INITIAL VESSEL  2019     Family History   Problem Relation Age of Onset    Heart Disease Father 76.00        type unknown to Aayush; his father  at home    Diabetes No family hx of     Coronary Artery Disease Early Onset No family hx of     Cancer No family hx of     Alzheimer Disease Mother 86.00        lives in long term care    No Known Problems Son     No Known Problems Son         Social History     Socioeconomic History    Marital status: Single     Spouse name: Not on file    Number of children: Not on file    Years of education: Not on file    Highest education level: Not on file   Occupational History    Occupation: [employer]   Tobacco Use    Smoking status: Former     Packs/day: 1.00     Years: 40.00     Additional pack years: 0.00     Total pack years: 40.00     Types: Cigarettes     Quit date: 2019      Years since quittin.9     Passive exposure: Never    Smokeless tobacco: Never    Tobacco comments:     3 Cig/Week   Vaping Use    Vaping Use: Never used   Substance and Sexual Activity    Alcohol use: Not Currently     Comment: Alcoholic Drinks/day: 350 ml of peppermint schnapps daily per [facility] h2018 H&P per report of Karlie JJ to Dr. Valle    Drug use: No    Sexual activity: Yes     Partners: Female   Other Topics Concern    Parent/sibling w/ CABG, MI or angioplasty before 65F 55M? Not Asked   Social History Narrative    Works in [employer]. Lives with his Karlie JJ.     Social Determinants of Health     Financial Resource Strain: Low Risk  (2023)    Financial Resource Strain     Within the past 12 months, have you or your family members you live with been unable to get utilities (heat, electricity) when it was really needed?: No   Food Insecurity: Low Risk  (2023)    Food Insecurity     Within the past 12 months, did you worry that your food would run out before you got money to buy more?: No     Within the past 12 months, did the food you bought just not last and you didn t have money to get more?: No   Transportation Needs: Low Risk  (2023)    Transportation Needs     Within the past 12 months, has lack of transportation kept you from medical appointments, getting your medicines, non-medical meetings or appointments, work, or from getting things that you need?: No   Physical Activity: Not on file   Stress: Not on file   Social Connections: Not on file   Interpersonal Safety: Low Risk  (2023)    Interpersonal Safety     Do you feel physically and emotionally safe where you currently live?: Yes     Within the past 12 months, have you been hit, slapped, kicked or otherwise physically hurt by someone?: No     Within the past 12 months, have you been humiliated or emotionally abused in other ways by your partner or ex-partner?: No   Housing Stability: High Risk (2023)     "Housing Stability     Do you have housing? : No     Are you worried about losing your housing?: No           Medications  Allergies   Current Outpatient Medications   Medication Sig Dispense Refill    acetaminophen (TYLENOL 8 HOUR ARTHRITIS PAIN) 650 MG CR tablet Take 650 mg by mouth every 8 hours as needed for pain      aspirin (ASA) 81 MG EC tablet Take 1 tablet (81 mg) by mouth daily 90 tablet 3    atorvastatin (LIPITOR) 80 MG tablet Take 1 tablet (80 mg) by mouth daily 90 tablet 3    carvedilol (COREG) 25 MG tablet Take 1 tablet (25 mg) by mouth 2 times daily 180 tablet 3    Cholecalciferol (VITAMIN D) 50 MCG (2000 UT) CAPS Take 1 capsule by mouth daily      ferrous sulfate (FEROSUL) 325 (65 Fe) MG tablet Take 325 mg by mouth daily  0    folic acid (FOLVITE) 400 MCG tablet Take 1 tablet (400 mcg) by mouth daily      losartan (COZAAR) 25 MG tablet Take 1 tablet (25 mg) by mouth daily 90 tablet 3    magnesium oxide (MAG-OX) 400 MG tablet Take 400 mg by mouth daily 60 tablet 11    multivitamin w/minerals (THERA-VIT-M) tablet Take 1 tablet by mouth daily      pantoprazole (PROTONIX) 20 MG EC tablet Take 1 tablet (20 mg) by mouth daily 90 tablet 3    potassium chloride ER (KLOR-CON M) 20 MEQ CR tablet Take 1 tablet (20 mEq) by mouth daily 30 tablet 11    vitamin B complex with vitamin C (VITAMIN  B COMPLEX) tablet Take 1 tablet by mouth daily         Allergies   Allergen Reactions    No Known Allergies           Lab Results    Chemistry/lipid CBC Cardiac Enzymes/BNP/TSH/INR   Recent Labs   Lab Test 03/21/23  1431   CHOL 111   HDL 42   LDL 34   TRIG 175*     Recent Labs   Lab Test 03/21/23  1431 11/02/21  1404 02/12/18  1635   LDL 34 40 96     Recent Labs   Lab Test 10/19/23  1421   *   POTASSIUM 4.3   CHLORIDE 99   CO2 23   GLC 95   BUN 17.5   CR 0.81   GFRESTIMATED >90   SOLO 9.3     Recent Labs   Lab Test 10/19/23  1421 09/06/23  0511 09/05/23  1016   CR 0.81 0.65* 0.68     No results for input(s): \"A1C\" in " the last 49171 hours.       Recent Labs   Lab Test 10/19/23  1421   WBC 7.4   HGB 12.3*   HCT 36.0*   MCV 96        Recent Labs   Lab Test 10/19/23  1421 09/06/23  0511 09/05/23  1016   HGB 12.3* 11.2* 12.5*    Recent Labs   Lab Test 12/16/19  1701 12/16/19  1042 12/16/19  0352   TROPONINI 0.02 0.02 <0.01     Recent Labs   Lab Test 08/31/23  1301 12/16/19  0352 10/29/19  0440 03/17/19  0309   BNP  --  1,634* >5,000* 873*   NTBNP 2,283*  --   --   --      Recent Labs   Lab Test 10/27/19  1648   TSH 1.54     Recent Labs   Lab Test 08/31/23  1301 12/16/19  0352 10/28/19  0506   INR 1.09 1.12* 1.36*          Medications  Allergies   Current Outpatient Medications   Medication Sig Dispense Refill    acetaminophen (TYLENOL 8 HOUR ARTHRITIS PAIN) 650 MG CR tablet Take 650 mg by mouth every 8 hours as needed for pain      aspirin (ASA) 81 MG EC tablet Take 1 tablet (81 mg) by mouth daily 90 tablet 3    atorvastatin (LIPITOR) 80 MG tablet Take 1 tablet (80 mg) by mouth daily 90 tablet 3    carvedilol (COREG) 25 MG tablet Take 1 tablet (25 mg) by mouth 2 times daily 180 tablet 3    Cholecalciferol (VITAMIN D) 50 MCG (2000 UT) CAPS Take 1 capsule by mouth daily      ferrous sulfate (FEROSUL) 325 (65 Fe) MG tablet Take 325 mg by mouth daily  0    folic acid (FOLVITE) 400 MCG tablet Take 1 tablet (400 mcg) by mouth daily      losartan (COZAAR) 25 MG tablet Take 1 tablet (25 mg) by mouth daily 90 tablet 3    magnesium oxide (MAG-OX) 400 MG tablet Take 400 mg by mouth daily 60 tablet 11    multivitamin w/minerals (THERA-VIT-M) tablet Take 1 tablet by mouth daily      pantoprazole (PROTONIX) 20 MG EC tablet Take 1 tablet (20 mg) by mouth daily 90 tablet 3    potassium chloride ER (KLOR-CON M) 20 MEQ CR tablet Take 1 tablet (20 mEq) by mouth daily 30 tablet 11    vitamin B complex with vitamin C (VITAMIN  B COMPLEX) tablet Take 1 tablet by mouth daily        Allergies   Allergen Reactions    No Known Allergies           Lab  Results   Lab Results   Component Value Date     10/19/2023     06/09/2020    CO2 23 10/19/2023    CO2 23 07/05/2023    CO2 26.0 01/14/2020    BUN 17.5 10/19/2023    BUN 11 07/05/2023    BUN 15 06/09/2020     Lab Results   Component Value Date    WBC 7.4 10/19/2023    HGB 12.3 10/19/2023    HGB 10.5 10/04/2018    HCT 36.0 10/19/2023    HCT 33.7 10/04/2018    MCV 96 10/19/2023    MCV 87.5 10/04/2018     10/19/2023     Lab Results   Component Value Date    CHOL 111 03/21/2023    CHOL 146 02/12/2018    TRIG 175 03/21/2023    HDL 42 03/21/2023    HDL 35 02/12/2018     Lab Results   Component Value Date    INR 1.09 08/31/2023    INR 1.1 06/01/2018     Lab Results   Component Value Date    BNP 1,634 12/16/2019     Lab Results   Component Value Date    CKMB 24 07/25/2018    TROPONINI 0.02 12/16/2019    TROPONINI 0.02 12/16/2019    TROPONINI <0.01 12/16/2019     Lab Results   Component Value Date    TSH 1.54 10/27/2019                  Thank you for allowing me to participate in the care of your patient.      Sincerely,     April Goldsmith MD     [facility]  cc:   April Goldsmith MD  [address]  [place] [address]

## 2024-03-25 NOTE — PROGRESS NOTES
[facility] [address], [place] [address]   [email or web address]   OFFICE: [phone]            Impression and Plan     1.  Cardiomyopathy.  Most recent send echocardiogram 16 October 2023 did reveal some improvement in left ventricular systolic performance with ejection fraction of 40-45%.      Nature of cardiomyopathy felt combined ischemic as well as non-ischemic in part related to prior ethanol use.   Parenthetically, Aayush has declined prophylactic ICD placement (documented in myriad notes crafted by my colleague, Dr. Terrence Cordova).     This is stable.  Patient states his weights have been stable on his home scale.   He appears volume neutral on clinical exam.  He reports no shortness of breath at night to suggest PND/orthopnea.  Continue carvedilol 25 mg twice daily.  Continue losartan 25 mg daily.  Continued avoidance of ethanol (Aayush states that he has remained abstemious).     2.  Coronary artery disease.  Aayush has known coronary artery disease.  Specifically, Aayush underwent angiography 18 March 2019 and found to have significant two-vessel disease involving proximal LAD and first obtuse marginal.  Aayush had successful PCI with stent placement to proximal LAD (4.0 x 24 mm Synergy drug-eluting stent).  He also had significant disease involving the first obtuse marginal. Specifically, the OM1 stenosis demonstrated bridging collaterals and was felt to be a chronic total occlusion (). His Iinterventionalist, Dr. Timi Rodriguez, had recommended medical therapy of the OM1 lesion at that time.       Aayush underwent repeat angiography 29 July 2021 and he had successful PCI of the mid LAD with PTCA, coronary lithotripsy, rotational atherectomy followed by 3.5 x 32 mm Synergy drug-eluting stent with 0% residual stenosis.    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  No stent was deployed.     Aayush denies any chest pain symptoms concerning for angina.     3.  Status post aortic valve replacement. Aayush underwent TAVR 5 September 2023 (documented a 9 mm Shelley Ultra Resilia valve).  Echocardiogram 16 October 2023 revealed normal spectral Doppler metrics with mean gradient of 7 mmHg and peak velocity 1.9 m/s.  As noted below, he reports subjective improvement in his breathing and exercise tolerance post AVR.     4.  History of ventricular arrhythmias.  Aayush previously had been noted to have evidence of NSVT though this has been quiescent. He has not had any documented recent recurrence.  As aforementioned, Aayush has declined prophylactic ICD placement (documented in myriad notes crafted by my colleague, Dr. Terrence Cordova).  Continue carvedilol as per problem #1.     5. Peripheral arterial disease.  Severe native peripheral arterial disease with right common femoral artery calcification and left common iliac lesion. Recommend continued medical management/risk factor modification at this time.     6.  Dyslipidemia.   Lipid profile 21 March 2023 revealed LDL 34 mg/dL and HDL 42 mg/dL.  Continue high intensity statin therapy, atorvastatin 80 mg daily.      Follow-up in 1 year.      35 minutes spent reviewing prior records (including documentation, laboratory studies, cardiac testing/imaging), interview with patient along with physical exam, planning, and subsequent documentation/crafting of note).           History of Present Illness    Once again I would like to thank you again for asking me to participate in the care of your patient, Aayush García.  As you know, but to reiterate for my own records, Aayush García is a 68 year old male with history of cardiomyopathy (combined ischemic and non-ischemic).  Aayush also has a history of coronary artery disease with prior intervention.  In addition, he has a history of aortic stenosis.     Again, Aayush has  known coronary artery disease.  Specifically, Aayush underwent angiography 18 March 2019 and found to have significant two-vessel disease involving proximal LAD and first obtuse marginal.  Patient had successful PCI with stent placement to proximal LAD (4.0 x 24 mm Synergy drug-eluting stent).  He also had significant disease involving the first obtuse marginal. Specifically, the OM1 stenosis demonstrated bridging collaterals and was felt to be a chronic total occlusion (). His Interventionalist, Dr. Timi Rodriguez, had recommended medical therapy of the OM1 lesion at that time.     Aayush underwent repeat angiography 29 July 2021 and he had successful PCI of the mid LAD with PTCA, coronary lithotripsy, rotational atherectomy followed by 3.5 x 32 mm Synergy drug-eluting stent with 0% residual stenosis.    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  No stent was deployed.    In addition to the aforementioned, Aayush underwent TAVR 5 September 2023 (documented a 9 mm Shelley Ultra Resilia valve).     On interview, Aayush denies any chest pain symptoms concerning for angina.  States his exercise stamina has improved since his valve replacement.  He is pleased with how he is performing.  He is using a stationary bicycle and this is going well for him.  This is confirmed by his spouse.    Further review of systems is otherwise negative/noncontributory (medical record and 13 point review of systems reviewed as well and pertinent positives noted).         Cardiac Diagnostics      Echocardiogram 16 October 2023:  Normal left ventricular size with mild-moderate reduction left ventricular systolic performance.  Ejection fraction 40 to 45%.  Abnormal septal motion consistent with conduction abnormality.  There is a prosthetic aortic valve (documented 29 mm Shelley Ultra Resilia valve).  Normal spectral Doppler metrics with mean  gradient of 7 mmHg and peak velocity of 1.9 m/s.  No aortic insufficiency identified.  Normal right ventricular size and systolic performance.  Moderate left atrial enlargement.  Right atrium of normal dimension.    Echocardiogram 1 July 2021:  Moderate depression left ventricular systolic performance with ejection fraction 35 to 40%.  Moderate global reduction in left ventricular systolic performance.  Mild increase in left ventricular wall thickness.  Severe aortic stenosis.  Mean gradient measured at 29 mmHg with peak velocity of 3.6 m/s. Calculated valve area 0.6. Cm . VRVTI = 0.2. VRvel = 0.2.  Mild aortic insufficiency.  Normal right ventricular size and systolic performance.  Moderate left atrial enlargement. Right atrium normal dimension.    Echocardiogram 3 December 2019:  Mild left ventricular enlargement with moderate depression left ventricular systolic performance.  Ejection fraction 30%.  The following segments are akinetic: basal anteroseptal, basal inferoseptal, mid anteroseptal and mid inferoseptal. The following segments are hypokinetic: basal anterior, basal inferior, basal inferolateral, basal anterolateral, mid anterior, mid inferior, mid inferolateral, mid anterolateral, apical anterior, apical septal, apical inferior, apical lateral and apex.  Severe aortic stenosis.  Aortic valve is severely calcified with reduced systolic excursion. Low flow, low gradient severe aortic stenosis is present with mild regurgitation (stroke-volume index 30.2 ml/m2) .   Mean gradient measured at 29 mmHg with peak velocity of 3.3 m/s.  Calculated valve area 0.7 cm .  VRVTI = 0.2. VRvel = 0.2.  Mild aortic insufficiency.  Normal right ventricular size and systolic performance.  Severe left atrial enlargement.  Moderate right atrial enlargement.  Mild aortic root enlargement.  Right ventricular systolic pressure relative to right atrial pressure is mildly increased.  Pulmonary artery pressure estimated at 35 to 40  mmHg plus right atrial pressure.    Coronary angiogram 6 July 2023:  Left anterior descending coronary: Proximal LAD stent appears widely patent. Mid LAD stent is patent.  There is about 30% eccentric calcified narrowing at the distal edge of the stent, unchanged from prior study.  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  There is 40% eccentric calcified narrowing proximally and then heavily calcified bifurcation at the first marginal branch,  which has ostial 99% stenosis and slight reduced antegrade filling, noted on previous studies.  Right coronary artery: The RCA is tortuous, with mild lumen irregularity but no severe stenosis.  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  No stent was deployed.    Coronary angiogram 29 July 2021:  Left main coronary artery: Left main not present due to separate ostia of the LAD and circumflex  Left anterior descending coronary artery: LAD has a patent proximal stent, with the mid LAD being heavily calcified with eccentric stenoses.  The distal and apical LAD have mild disease.  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  There is a 70 to 75% stenosis in the proximal portion of the vessel which goes on to supply several obtuse marginals to the lateral wall  Right coronary artery: RCA is a moderate sized, dominant vessel with mild diffuse disease.  Successful PCI of the mid LAD with PTCA, coronary lithotripsy, rotational atherectomy followed by 3.5 x 32 mm Synergy drug-eluting stent with 0% residual stenosis.    Coronary angiogram 18 March 2019:  Left anterior descending coronary artery: 95% proximal stenosis.  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  First obtuse marginal with 99% chronic total occlusion ().  There are bridging  collaterals to the distal portion of the vessel.  Successful PCI with stent placement to proximal LAD (4.0 x 24 mm Synergy drug-eluting stent.  Recommendations  Medical management for OM1 chronic total occlusion.              Physical Examination       /72 (BP Location: Right arm, Patient Position: Sitting, Cuff Size: Adult Regular)   Pulse 53   Resp 16   Wt 91 kg (200 lb 9.6 oz)   SpO2 95%   BMI 30.50 kg/m          Wt Readings from Last 3 Encounters:   03/25/24 91 kg (200 lb 9.6 oz)   10/19/23 90.3 kg (199 lb)   09/21/23 88.9 kg (196 lb 0.6 oz)       The patient is alert and oriented times three. Sclerae are anicteric. Mucosal membranes are moist. Jugular venous pressure is normal. No significant adenopathy/thyromegally appreciated. Lungs are clear with good expansion. On cardiovascular exam, the patient has a regular S1 and S2. Abdomen is soft and non-tender. Extremities reveal no clubbing, cyanosis.       Family History/Social History/Risk Factors   Patient does not smoke.  Family history reviewed, and family history includes Alzheimer Disease (age of onset: 86.00) in his mother; Heart Disease (age of onset: 76.00) in his father; No Known Problems in his son and son.          Medical History  Surgical History Family History Social History   Past Medical History:   Diagnosis Date    Acute liver failure 8/5/2018    Acute on chronic systolic congestive heart failure (H) 1/14/2020    Acute renal failure, unspecified acute renal failure type (H24) 08/05/2018    Acute respiratory failure with hypoxia (H) 12/16/2019    Acute respiratory failure with hypoxia (H) 08/05/2018    Alcoholic hepatitis with ascites (H28) 08/05/2018    Bacteremia due to Klebsiella pneumoniae     Benign essential hypertension 4/12/2018    Chronic liver disease     Closed fracture of multiple ribs of right side, initial encounter 11/23/2019    Coronary artery disease involving native coronary artery of native heart without angina  pectoris 9/24/2019    Essential hypertension     Gastroesophageal reflux disease without esophagitis 10/31/2018    GI bleeding 10/27/2019    Heart failure with reduced ejection fraction (H) 4/4/2019    Hepatic encephalopathy (H) 08/05/2018    Macrocytic anemia     Non-ST elevation MI (NSTEMI) (H) 3/18/2019    Overview:  Added automatically from request for surgery [id number]    NSTEMI (non-ST elevated myocardial infarction) (H) 03/2019    s/p stent placement    Primary localized osteoarthrosis of left lower leg 4/9/2019    Stress-induced cardiomyopathy 08/05/2018     Past Surgical History:   Procedure Laterality Date    COLONOSCOPY N/A 3/20/2018    Procedure: COLONOSCOPY;  Surgeon: Adam Nicole III, MD;  Location: [facility] OR;  Service:     CV CORONARY ANGIOGRAM N/A 3/18/2019    Procedure: Coronary Angiogram;  Surgeon: Timi Rodriguez MD;  Location: [facility] Cath Lab;  Service: Cardiology    CV CORONARY ANGIOGRAM N/A 9/22/2021    Procedure: Coronary Angiogram;  Surgeon: Brianda Avilez MD;  Location: [facility] CATH LAB CV    CV CORONARY ANGIOGRAM N/A 7/6/2023    Procedure: Coronary Angiogram;  Surgeon: Rafat Pinto MD;  Location: [facility] CATH LAB CV    CV FRACTIONAL FLOW RATIO WIRE N/A 9/22/2021    Procedure: Fractional Flow Ratio Wire;  Surgeon: Brianda Avilez MD;  Location: [facility] CATH LAB CV    CV LEFT HEART CATH N/A 7/6/2023    Procedure: Left Heart Catheterization;  Surgeon: Rafat Pinto MD;  Location: [facility] CATH LAB CV    CV LEFT HEART CATHETERIZATION WITHOUT LEFT VENTRICULOGRAM Left 3/18/2019    Procedure: Left Heart Catheterization Without Left Ventriculogram;  Surgeon: Timi Rodriguez MD;  Location: [facility] Cath Lab;  Service: Cardiology    CV PCI N/A 9/22/2021    Procedure: Percutaneous Coronary Intervention;  Surgeon: Brianda Avilez MD;  Location: [facility] CATH LAB CV    CV PCI N/A 7/6/2023    Procedure: Percutaneous Coronary Intervention;  Surgeon: Rafat Pinto MD;   Location: [facility] CATH LAB CV    CV PCI ATHERECTOMY ORBITAL N/A 2021    Procedure: Percutaneous Coronary Intervention Atherectomy Rotational;  Surgeon: Brianda Avilez MD;  Location: [facility] CATH [facility] CV    IR CVC TUNNEL PLACEMENT > 5 YRS OF AGE  2018    IR TUNNELED CATHETER REMOVAL  2018    OR TRANSCATHETER AORTIC VALVE REPLACEMENT, SUBCLAVIAN PERCUTANEOUS APPROACH (STANDBY) N/A 2023    Procedure: OR TRANSCATHETER AORTIC VALVE REPLACEMENT, SUBCLAVIAN PERCUTANEOUS APPROACH (STANDBY);  Surgeon: Andra Pabon MD;  Location: [facility] LAB CV    PICC  2018         PICC  3/18/2019         TRANSCATHETER AORTIC VALVE REPLACEMENT - SUBCLAVIAN APPROACH N/A 2023    Procedure: Transcatheter Aortic Valve Replacement - Subclavian Approach;  Surgeon: Bret Quezada MD;  Location: [facility] CV    US THORACENTESIS  10/28/2019    ZZHC CORONARY STENT PERCUT, INITIAL VESSEL  2019     Family History   Problem Relation Age of Onset    Heart Disease Father 76.00        type unknown to Aayush; his father  at home    Diabetes No family hx of     Coronary Artery Disease Early Onset No family hx of     Cancer No family hx of     Alzheimer Disease Mother 86.00        lives in long term care    No Known Problems Son     No Known Problems Son         Social History     Socioeconomic History    Marital status: Single     Spouse name: Not on file    Number of children: Not on file    Years of education: Not on file    Highest education level: Not on file   Occupational History    Occupation: [employer]   Tobacco Use    Smoking status: Former     Packs/day: 1.00     Years: 40.00     Additional pack years: 0.00     Total pack years: 40.00     Types: Cigarettes     Quit date: 2019     Years since quittin.9     Passive exposure: Never    Smokeless tobacco: Never    Tobacco comments:     3 Cig/Week   Vaping Use    Vaping Use: Never used   Substance and Sexual Activity    Alcohol use: Not  Currently     Comment: Alcoholic Drinks/day: 350 ml of peppermint schnapps daily per [facility] h, 2018 H&P per report of Karlie JJ to Dr. Valle    Drug use: No    Sexual activity: Yes     Partners: Female   Other Topics Concern    Parent/sibling w/ CABG, MI or angioplasty before 65F 55M? Not Asked   Social History Narrative    Works in [employer]. Lives with his Karlie JJ.     Social Determinants of Health     Financial Resource Strain: Low Risk  (9/20/2023)    Financial Resource Strain     Within the past 12 months, have you or your family members you live with been unable to get utilities (heat, electricity) when it was really needed?: No   Food Insecurity: Low Risk  (9/20/2023)    Food Insecurity     Within the past 12 months, did you worry that your food would run out before you got money to buy more?: No     Within the past 12 months, did the food you bought just not last and you didn t have money to get more?: No   Transportation Needs: Low Risk  (9/20/2023)    Transportation Needs     Within the past 12 months, has lack of transportation kept you from medical appointments, getting your medicines, non-medical meetings or appointments, work, or from getting things that you need?: No   Physical Activity: Not on file   Stress: Not on file   Social Connections: Not on file   Interpersonal Safety: Low Risk  (9/21/2023)    Interpersonal Safety     Do you feel physically and emotionally safe where you currently live?: Yes     Within the past 12 months, have you been hit, slapped, kicked or otherwise physically hurt by someone?: No     Within the past 12 months, have you been humiliated or emotionally abused in other ways by your partner or ex-partner?: No   Housing Stability: High Risk (9/20/2023)    Housing Stability     Do you have housing? : No     Are you worried about losing your housing?: No           Medications  Allergies   Current Outpatient Medications   Medication Sig Dispense Refill    acetaminophen  "(TYLENOL 8 HOUR ARTHRITIS PAIN) 650 MG CR tablet Take 650 mg by mouth every 8 hours as needed for pain      aspirin (ASA) 81 MG EC tablet Take 1 tablet (81 mg) by mouth daily 90 tablet 3    atorvastatin (LIPITOR) 80 MG tablet Take 1 tablet (80 mg) by mouth daily 90 tablet 3    carvedilol (COREG) 25 MG tablet Take 1 tablet (25 mg) by mouth 2 times daily 180 tablet 3    Cholecalciferol (VITAMIN D) 50 MCG (2000 UT) CAPS Take 1 capsule by mouth daily      ferrous sulfate (FEROSUL) 325 (65 Fe) MG tablet Take 325 mg by mouth daily  0    folic acid (FOLVITE) 400 MCG tablet Take 1 tablet (400 mcg) by mouth daily      losartan (COZAAR) 25 MG tablet Take 1 tablet (25 mg) by mouth daily 90 tablet 3    magnesium oxide (MAG-OX) 400 MG tablet Take 400 mg by mouth daily 60 tablet 11    multivitamin w/minerals (THERA-VIT-M) tablet Take 1 tablet by mouth daily      pantoprazole (PROTONIX) 20 MG EC tablet Take 1 tablet (20 mg) by mouth daily 90 tablet 3    potassium chloride ER (KLOR-CON M) 20 MEQ CR tablet Take 1 tablet (20 mEq) by mouth daily 30 tablet 11    vitamin B complex with vitamin C (VITAMIN  B COMPLEX) tablet Take 1 tablet by mouth daily         Allergies   Allergen Reactions    No Known Allergies           Lab Results    Chemistry/lipid CBC Cardiac Enzymes/BNP/TSH/INR   Recent Labs   Lab Test 03/21/23  1431   CHOL 111   HDL 42   LDL 34   TRIG 175*     Recent Labs   Lab Test 03/21/23  1431 11/02/21  1404 02/12/18  1635   LDL 34 40 96     Recent Labs   Lab Test 10/19/23  1421   *   POTASSIUM 4.3   CHLORIDE 99   CO2 23   GLC 95   BUN 17.5   CR 0.81   GFRESTIMATED >90   SOLO 9.3     Recent Labs   Lab Test 10/19/23  1421 09/06/23  0511 09/05/23  1016   CR 0.81 0.65* 0.68     No results for input(s): \"A1C\" in the last 80470 hours.       Recent Labs   Lab Test 10/19/23  1421   WBC 7.4   HGB 12.3*   HCT 36.0*   MCV 96        Recent Labs   Lab Test 10/19/23  1421 09/06/23  0511 09/05/23  1016   HGB 12.3* 11.2* 12.5*    " Recent Labs   Lab Test 12/16/19  1701 12/16/19  1042 12/16/19  0352   TROPONINI 0.02 0.02 <0.01     Recent Labs   Lab Test 08/31/23  1301 12/16/19  0352 10/29/19  0440 03/17/19  0309   BNP  --  1,634* >5,000* 873*   NTBNP 2,283*  --   --   --      Recent Labs   Lab Test 10/27/19  1648   TSH 1.54     Recent Labs   Lab Test 08/31/23  1301 12/16/19  0352 10/28/19  0506   INR 1.09 1.12* 1.36*          Medications  Allergies   Current Outpatient Medications   Medication Sig Dispense Refill    acetaminophen (TYLENOL 8 HOUR ARTHRITIS PAIN) 650 MG CR tablet Take 650 mg by mouth every 8 hours as needed for pain      aspirin (ASA) 81 MG EC tablet Take 1 tablet (81 mg) by mouth daily 90 tablet 3    atorvastatin (LIPITOR) 80 MG tablet Take 1 tablet (80 mg) by mouth daily 90 tablet 3    carvedilol (COREG) 25 MG tablet Take 1 tablet (25 mg) by mouth 2 times daily 180 tablet 3    Cholecalciferol (VITAMIN D) 50 MCG (2000 UT) CAPS Take 1 capsule by mouth daily      ferrous sulfate (FEROSUL) 325 (65 Fe) MG tablet Take 325 mg by mouth daily  0    folic acid (FOLVITE) 400 MCG tablet Take 1 tablet (400 mcg) by mouth daily      losartan (COZAAR) 25 MG tablet Take 1 tablet (25 mg) by mouth daily 90 tablet 3    magnesium oxide (MAG-OX) 400 MG tablet Take 400 mg by mouth daily 60 tablet 11    multivitamin w/minerals (THERA-VIT-M) tablet Take 1 tablet by mouth daily      pantoprazole (PROTONIX) 20 MG EC tablet Take 1 tablet (20 mg) by mouth daily 90 tablet 3    potassium chloride ER (KLOR-CON M) 20 MEQ CR tablet Take 1 tablet (20 mEq) by mouth daily 30 tablet 11    vitamin B complex with vitamin C (VITAMIN  B COMPLEX) tablet Take 1 tablet by mouth daily        Allergies   Allergen Reactions    No Known Allergies           Lab Results   Lab Results   Component Value Date     10/19/2023     06/09/2020    CO2 23 10/19/2023    CO2 23 07/05/2023    CO2 26.0 01/14/2020    BUN 17.5 10/19/2023    BUN 11 07/05/2023    BUN 15 06/09/2020      Lab Results   Component Value Date    WBC 7.4 10/19/2023    HGB 12.3 10/19/2023    HGB 10.5 10/04/2018    HCT 36.0 10/19/2023    HCT 33.7 10/04/2018    MCV 96 10/19/2023    MCV 87.5 10/04/2018     10/19/2023     Lab Results   Component Value Date    CHOL 111 03/21/2023    CHOL 146 02/12/2018    TRIG 175 03/21/2023    HDL 42 03/21/2023    HDL 35 02/12/2018     Lab Results   Component Value Date    INR 1.09 08/31/2023    INR 1.1 06/01/2018     Lab Results   Component Value Date    BNP 1,634 12/16/2019     Lab Results   Component Value Date    CKMB 24 07/25/2018    TROPONINI 0.02 12/16/2019    TROPONINI 0.02 12/16/2019    TROPONINI <0.01 12/16/2019     Lab Results   Component Value Date    TSH 1.54 10/27/2019

## 2024-03-26 DIAGNOSIS — I50.22 CHRONIC SYSTOLIC HEART FAILURE (H): ICD-10-CM

## 2024-03-26 DIAGNOSIS — I21.4 NON-ST ELEVATION (NSTEMI) MYOCARDIAL INFARCTION (H): ICD-10-CM

## 2024-03-26 RX ORDER — ATORVASTATIN CALCIUM 80 MG/1
80 TABLET, FILM COATED ORAL DAILY
Qty: 90 TABLET | Refills: 3 | Status: SHIPPED | OUTPATIENT
Start: 2024-03-26

## 2024-03-26 NOTE — TELEPHONE ENCOUNTER
"Message to physician:     Date of last visit: 9/21/2023    Date of next visit if scheduled: None    Potassium   Date Value Ref Range Status   10/19/2023 4.3 3.4 - 5.3 mmol/L Final   07/05/2023 4.2 3.5 - 5.0 mmol/L Final   06/09/2020 4.1 3.5 - 5.0 mmol/L Final     Creatinine   Date Value Ref Range Status   10/19/2023 0.81 0.67 - 1.17 mg/dL Final   06/09/2020 0.66 (L) 0.70 - 1.30 mg/dL Final     GFR Estimate   Date Value Ref Range Status   10/19/2023 >90 >60 mL/min/1.73m2 Final   06/09/2020 >60 >60 mL/min/1.73m2 Final       BP Readings from Last 3 Encounters:   03/25/24 130/72   10/19/23 120/60   09/21/23 115/71       No results found for: \"A1C\"    Please complete refill and CLOSE ENCOUNTER.  Closing the encounter signifies the refill is complete.   "

## 2024-04-09 ENCOUNTER — MYC REFILL (OUTPATIENT)
Dept: FAMILY MEDICINE | Facility: CLINIC | Age: 69
End: 2024-04-09
Payer: COMMERCIAL

## 2024-04-09 DIAGNOSIS — K21.9 GASTROESOPHAGEAL REFLUX DISEASE WITHOUT ESOPHAGITIS: ICD-10-CM

## 2024-04-09 DIAGNOSIS — I50.22 CHRONIC SYSTOLIC HEART FAILURE (H): ICD-10-CM

## 2024-04-09 RX ORDER — PANTOPRAZOLE SODIUM 20 MG/1
20 TABLET, DELAYED RELEASE ORAL DAILY
Qty: 90 TABLET | Refills: 3 | Status: SHIPPED | OUTPATIENT
Start: 2024-04-09

## 2024-04-09 RX ORDER — LOSARTAN POTASSIUM 25 MG/1
25 TABLET ORAL DAILY
Qty: 90 TABLET | Refills: 3 | Status: SHIPPED | OUTPATIENT
Start: 2024-04-09

## 2024-04-15 DIAGNOSIS — K70.9 LIVER DISEASE, CHRONIC, DUE TO ALCOHOL (H): ICD-10-CM

## 2024-04-15 RX ORDER — POTASSIUM CHLORIDE 1500 MG/1
20 TABLET, EXTENDED RELEASE ORAL DAILY
Qty: 30 TABLET | Refills: 1 | Status: SHIPPED | OUTPATIENT
Start: 2024-04-15 | End: 2024-05-27

## 2024-04-15 NOTE — TELEPHONE ENCOUNTER
"Message to physician:     Date of last visit: 9/21/2023    Date of next visit if scheduled:     Potassium   Date Value Ref Range Status   10/19/2023 4.3 3.4 - 5.3 mmol/L Final   07/05/2023 4.2 3.5 - 5.0 mmol/L Final   06/09/2020 4.1 3.5 - 5.0 mmol/L Final     Creatinine   Date Value Ref Range Status   10/19/2023 0.81 0.67 - 1.17 mg/dL Final   06/09/2020 0.66 (L) 0.70 - 1.30 mg/dL Final     GFR Estimate   Date Value Ref Range Status   10/19/2023 >90 >60 mL/min/1.73m2 Final   06/09/2020 >60 >60 mL/min/1.73m2 Final       BP Readings from Last 3 Encounters:   03/25/24 130/72   10/19/23 120/60   09/21/23 115/71       No results found for: \"A1C\"    Please complete refill and CLOSE ENCOUNTER.  Closing the encounter signifies the refill is complete.    "

## 2024-05-24 DIAGNOSIS — I50.22 CHRONIC SYSTOLIC HEART FAILURE (H): ICD-10-CM

## 2024-05-27 ENCOUNTER — MYC REFILL (OUTPATIENT)
Dept: FAMILY MEDICINE | Facility: CLINIC | Age: 69
End: 2024-05-27
Payer: COMMERCIAL

## 2024-05-27 DIAGNOSIS — K70.9 LIVER DISEASE, CHRONIC, DUE TO ALCOHOL (H): ICD-10-CM

## 2024-05-28 RX ORDER — POTASSIUM CHLORIDE 1500 MG/1
20 TABLET, EXTENDED RELEASE ORAL DAILY
Qty: 90 TABLET | Refills: 3 | Status: SHIPPED | OUTPATIENT
Start: 2024-05-28

## 2024-05-28 RX ORDER — CARVEDILOL 25 MG/1
25 TABLET ORAL 2 TIMES DAILY
Qty: 180 TABLET | Refills: 3 | Status: SHIPPED | OUTPATIENT
Start: 2024-05-28

## 2024-07-11 DIAGNOSIS — Z95.2 S/P TAVR (TRANSCATHETER AORTIC VALVE REPLACEMENT): Primary | ICD-10-CM

## 2024-08-13 DIAGNOSIS — Z95.2 S/P TAVR (TRANSCATHETER AORTIC VALVE REPLACEMENT): Primary | ICD-10-CM

## 2025-01-04 ENCOUNTER — HEALTH MAINTENANCE LETTER (OUTPATIENT)
Age: 70
End: 2025-01-04

## 2025-02-19 DIAGNOSIS — I50.22 CHRONIC SYSTOLIC HEART FAILURE (H): ICD-10-CM

## 2025-02-19 DIAGNOSIS — I21.4 NON-ST ELEVATION (NSTEMI) MYOCARDIAL INFARCTION (H): ICD-10-CM

## 2025-02-19 RX ORDER — ATORVASTATIN CALCIUM 80 MG/1
80 TABLET, FILM COATED ORAL DAILY
Qty: 90 TABLET | Refills: 3 | Status: SHIPPED | OUTPATIENT
Start: 2025-02-19

## 2025-02-25 ENCOUNTER — TELEPHONE (OUTPATIENT)
Dept: FAMILY MEDICINE | Facility: CLINIC | Age: 70
End: 2025-02-25
Payer: COMMERCIAL

## 2025-02-25 NOTE — TELEPHONE ENCOUNTER
Patient Quality Outreach    Patient is due for the following:   Physical Annual Wellness Visit    Action(s) Taken:   Schedule a Annual Wellness Visit    Type of outreach:    Phone, left message for patient/parent to call back.[phone]     Questions for provider review:    None           Herminia Monroe

## 2025-03-31 DIAGNOSIS — I50.22 CHRONIC SYSTOLIC HEART FAILURE (H): ICD-10-CM

## 2025-04-01 RX ORDER — LOSARTAN POTASSIUM 25 MG/1
25 TABLET ORAL DAILY
Qty: 90 TABLET | Refills: 0 | Status: SHIPPED | OUTPATIENT
Start: 2025-04-01

## 2025-04-03 DIAGNOSIS — K21.9 GASTROESOPHAGEAL REFLUX DISEASE WITHOUT ESOPHAGITIS: ICD-10-CM

## 2025-04-03 RX ORDER — PANTOPRAZOLE SODIUM 20 MG/1
20 TABLET, DELAYED RELEASE ORAL DAILY
Qty: 90 TABLET | Refills: 3 | Status: SHIPPED | OUTPATIENT
Start: 2025-04-03

## 2025-04-29 DIAGNOSIS — K70.9 LIVER DISEASE, CHRONIC, DUE TO ALCOHOL: ICD-10-CM

## 2025-04-29 RX ORDER — POTASSIUM CHLORIDE 1500 MG/1
20 TABLET, EXTENDED RELEASE ORAL DAILY
Qty: 90 TABLET | Refills: 0 | Status: SHIPPED | OUTPATIENT
Start: 2025-04-29

## 2025-04-29 NOTE — TELEPHONE ENCOUNTER
Team - pt has not been seen since 9/2023 and no labs since 10/2023. Needs visit/ labwork for additional refills. 90 day supply sent.      Zachary Lewis MD  April 29, 2025  3:49 PM

## 2025-04-30 ENCOUNTER — TELEPHONE (OUTPATIENT)
Dept: FAMILY MEDICINE | Facility: CLINIC | Age: 70
End: 2025-04-30
Payer: COMMERCIAL

## 2025-05-22 NOTE — TELEPHONE ENCOUNTER
I called to check up on the pt and help the pt setup a hospital follow up.  The pt did not answer his phone, so I left a vm for the pt to give me a call back.     No

## 2025-06-30 DIAGNOSIS — I50.22 CHRONIC SYSTOLIC HEART FAILURE (H): ICD-10-CM

## 2025-06-30 RX ORDER — LOSARTAN POTASSIUM 25 MG/1
25 TABLET ORAL DAILY
Qty: 90 TABLET | Refills: 3 | Status: SHIPPED | OUTPATIENT
Start: 2025-06-30

## 2025-07-29 DIAGNOSIS — K70.9 LIVER DISEASE, CHRONIC, DUE TO ALCOHOL: ICD-10-CM

## 2025-07-29 RX ORDER — POTASSIUM CHLORIDE 1500 MG/1
20 TABLET, EXTENDED RELEASE ORAL DAILY
Qty: 90 TABLET | Refills: 0 | Status: SHIPPED | OUTPATIENT
Start: 2025-07-29

## 2025-08-23 SDOH — HEALTH STABILITY: PHYSICAL HEALTH: ON AVERAGE, HOW MANY MINUTES DO YOU ENGAGE IN EXERCISE AT THIS LEVEL?: 30 MIN

## 2025-08-23 SDOH — HEALTH STABILITY: PHYSICAL HEALTH: ON AVERAGE, HOW MANY DAYS PER WEEK DO YOU ENGAGE IN MODERATE TO STRENUOUS EXERCISE (LIKE A BRISK WALK)?: 3 DAYS

## 2025-08-27 ENCOUNTER — OFFICE VISIT (OUTPATIENT)
Dept: FAMILY MEDICINE | Facility: CLINIC | Age: 70
End: 2025-08-27
Payer: COMMERCIAL

## 2025-08-27 VITALS
OXYGEN SATURATION: 97 % | TEMPERATURE: 97.9 F | DIASTOLIC BLOOD PRESSURE: 93 MMHG | BODY MASS INDEX: 31.1 KG/M2 | WEIGHT: 210 LBS | HEIGHT: 69 IN | HEART RATE: 83 BPM | RESPIRATION RATE: 20 BRPM | SYSTOLIC BLOOD PRESSURE: 154 MMHG

## 2025-08-27 DIAGNOSIS — Z13.6 SCREENING FOR CARDIOVASCULAR CONDITION: Primary | ICD-10-CM

## 2025-08-27 DIAGNOSIS — I25.10 CORONARY ARTERY DISEASE INVOLVING NATIVE CORONARY ARTERY OF NATIVE HEART WITHOUT ANGINA PECTORIS: ICD-10-CM

## 2025-08-27 DIAGNOSIS — Z00.00 ENCOUNTER FOR MEDICARE ANNUAL WELLNESS EXAM: ICD-10-CM

## 2025-08-27 DIAGNOSIS — Z12.5 SCREENING FOR PROSTATE CANCER: ICD-10-CM

## 2025-08-27 DIAGNOSIS — Z11.59 NEED FOR HEPATITIS C SCREENING TEST: ICD-10-CM

## 2025-08-27 DIAGNOSIS — I10 WHITE COAT SYNDROME WITH DIAGNOSIS OF HYPERTENSION: ICD-10-CM

## 2025-08-27 DIAGNOSIS — Z87.891 HISTORY OF TOBACCO USE, PRESENTING HAZARDS TO HEALTH: ICD-10-CM

## 2025-08-27 DIAGNOSIS — Z12.11 SCREEN FOR COLON CANCER: ICD-10-CM

## 2025-08-27 DIAGNOSIS — Z23 NEED FOR VACCINATION: ICD-10-CM

## 2025-08-27 LAB
ERYTHROCYTE [DISTWIDTH] IN BLOOD BY AUTOMATED COUNT: 13.4 % (ref 10–15)
HCT VFR BLD AUTO: 37.2 % (ref 40–53)
HGB BLD-MCNC: 12.2 G/DL (ref 13.3–17.7)
MCH RBC QN AUTO: 33.9 PG (ref 26.5–33)
MCHC RBC AUTO-ENTMCNC: 32.8 G/DL (ref 31.5–36.5)
MCV RBC AUTO: 103.3 FL (ref 78–100)
PLATELET # BLD AUTO: 183 10E3/UL (ref 150–450)
RBC # BLD AUTO: 3.6 10E6/UL (ref 4.4–5.9)
WBC # BLD AUTO: 6.05 10E3/UL (ref 4–11)

## 2025-08-27 PROCEDURE — 36415 COLL VENOUS BLD VENIPUNCTURE: CPT

## 2025-08-27 PROCEDURE — G0103 PSA SCREENING: HCPCS

## 2025-08-27 PROCEDURE — 85027 COMPLETE CBC AUTOMATED: CPT

## 2025-08-27 PROCEDURE — 80048 BASIC METABOLIC PNL TOTAL CA: CPT

## 2025-08-27 PROCEDURE — 84460 ALANINE AMINO (ALT) (SGPT): CPT

## 2025-08-27 PROCEDURE — 80061 LIPID PANEL: CPT

## 2025-08-27 PROCEDURE — 86803 HEPATITIS C AB TEST: CPT

## 2025-08-28 ENCOUNTER — ORDERS ONLY (AUTO-RELEASED) (OUTPATIENT)
Dept: FAMILY MEDICINE | Facility: CLINIC | Age: 70
End: 2025-08-28
Payer: COMMERCIAL

## 2025-08-28 DIAGNOSIS — Z12.11 SCREEN FOR COLON CANCER: ICD-10-CM

## 2025-08-28 LAB
ALT SERPL W P-5'-P-CCNC: 141 U/L (ref 0–70)
ANION GAP SERPL CALCULATED.3IONS-SCNC: 17 MMOL/L (ref 7–15)
BUN SERPL-MCNC: 11.8 MG/DL (ref 8–23)
CALCIUM SERPL-MCNC: 8.5 MG/DL (ref 8.8–10.4)
CHLORIDE SERPL-SCNC: 104 MMOL/L (ref 98–107)
CHOLEST SERPL-MCNC: 107 MG/DL
CREAT SERPL-MCNC: 0.79 MG/DL (ref 0.67–1.17)
EGFRCR SERPLBLD CKD-EPI 2021: >90 ML/MIN/1.73M2
FASTING STATUS PATIENT QL REPORTED: NO
FASTING STATUS PATIENT QL REPORTED: NO
GLUCOSE SERPL-MCNC: 121 MG/DL (ref 70–99)
HCO3 SERPL-SCNC: 20 MMOL/L (ref 22–29)
HCV AB SERPL QL IA: NONREACTIVE
HDLC SERPL-MCNC: 51 MG/DL
LDLC SERPL CALC-MCNC: <4 MG/DL
NONHDLC SERPL-MCNC: 56 MG/DL
POTASSIUM SERPL-SCNC: 4.5 MMOL/L (ref 3.4–5.3)
PSA SERPL DL<=0.01 NG/ML-MCNC: 2.78 NG/ML (ref 0–4.5)
PSA SERPL DL<=0.01 NG/ML-MCNC: 2.79 NG/ML (ref 0–4.5)
SODIUM SERPL-SCNC: 141 MMOL/L (ref 135–145)
TRIGL SERPL-MCNC: 357 MG/DL

## (undated) DEVICE — DRSG ADAPTIC 3X3" 6112

## (undated) DEVICE — DRAPE CV SPLIT TIBURON 87"X136.5" 9155

## (undated) DEVICE — CATHETER BURR ADVANCER DEVICE ROTAPRO 1.50MM X 135CM

## (undated) DEVICE — INTRO MICRO MINI STICK 4FR STD NITINOL

## (undated) DEVICE — CUSTOM PACK CORONARY SAN5BCRHEA

## (undated) DEVICE — SU MONOCRYL+ 4-0 18IN PS2 UND MCP496G

## (undated) DEVICE — CATH BALLOON NC EMERGE 3.00X15MM H7493926715300

## (undated) DEVICE — Device

## (undated) DEVICE — WIRE GUIDE HI-TRQ  WHISPER MS JTIP 0.014"X190CM 1005357HJ

## (undated) DEVICE — MANIFOLD KIT ANGIO AUTOMATED 014613

## (undated) DEVICE — GUIDEWIRE FORTE FLOPPY J TOP 34949-05J

## (undated) DEVICE — SET CANNULATION TOUNIQUET TS-10061

## (undated) DEVICE — DEVICE INFLATION W/KIT & 6IN TUBING

## (undated) DEVICE — CUTTING BALLOON WOLVERINE 3X10MM

## (undated) DEVICE — GUIDEWIRE FIGHTER STRAIGHT 190

## (undated) DEVICE — PLATE GROUNDING ADULT W/CORD 9165L

## (undated) DEVICE — EXCHANGE WIRE .035 260 STAR/JFC/035/260/ M001491681

## (undated) DEVICE — 0.035IN X 145CM STRAIGHT FIXED CORE GUIDEWIRE, TFE COATED (TSF-35-145)

## (undated) DEVICE — CATH CORONARY LITHOTRIPSY SHOCKWAVE 3.5X12MM C2IVL3512

## (undated) DEVICE — INTRO TERUMO 6FRX25CM W/MARKER RSB603

## (undated) DEVICE — CATH GUIDELINER 6FR 5571

## (undated) DEVICE — CATH SPRINTER 2.0 X 12MM RX SPL20012X

## (undated) DEVICE — PREP CHLORAPREP 26ML TINTED HI-LITE ORANGE 930815

## (undated) DEVICE — INTRO MICRO MINI STICK 4FR STIFF NITINOL 45-753

## (undated) DEVICE — SU PROLENE 6-0 C-1DA 30" M8706

## (undated) DEVICE — SOL NACL 0.9% IRRIG 1000ML BOTTLE 2F7124

## (undated) DEVICE — TRANSDUCER W/MONITORING TRAY 42632-05

## (undated) DEVICE — SYR ANGIOGRAPHY MULTIUSE KIT ACIST 014612

## (undated) DEVICE — TAPE MICROFOAM 4" 1528-4

## (undated) DEVICE — CATH RX TAKERU PTCA BALLOON 1.5X6MM DC-RY1506UA1

## (undated) DEVICE — ELECTRODE DEFIB CADENCE 22550R

## (undated) DEVICE — CATH CORONARY LITHOTRIPSY SHOCKWAVE 3.0X12MM C2IVL3012

## (undated) DEVICE — GW .035IN X 260CM WHOLEY FLOPPY TIP WWFS35260

## (undated) DEVICE — CATH LAUNCHER 6FR MB 1.0 LA6MB1

## (undated) DEVICE — SUCTION CANISTER MEDIVAC LINER 3000ML W/LID 65651-530

## (undated) DEVICE — CATH ANGIO JUDKINS R4 6FRX100CM INFINITI 534621T

## (undated) DEVICE — SHEATH SUPERFLEX 8FR CL-07835

## (undated) DEVICE — SYR LOCKING ATRION QL38

## (undated) DEVICE — GLOVE GAMMEX NEOPRENE ULTRA SZ 7 LF 8514

## (undated) DEVICE — PITCHER STERILE 1000ML  SSK9004A

## (undated) DEVICE — SHTH INTRO 0.021IN ID 6FR DIA

## (undated) DEVICE — GUIDEWIRE ROTAWIRE .014 325CM 228240022

## (undated) DEVICE — ELECTRODE ADULT PACING MULTI P-211-M1

## (undated) DEVICE — GUIDEWIRE VASC SAFARI2 0.035X275CM H74939406XS1

## (undated) DEVICE — CATH DIAG RADIAL 5FR TIG 4.5 100CM

## (undated) DEVICE — CLOSURE DEVICE 6FR VASC PROGLIDE MEDICATED SUTURE 12673-03

## (undated) DEVICE — CATH ANGIO JUDKINS JL4 6FRX100CM INFINITI 534620T

## (undated) DEVICE — DEVICE INFLATION SYR W/ HEMOSTASIS VALVE 12IN EXT IN4904

## (undated) DEVICE — CATH GUIDING 6FR AL .75 LA6AL75

## (undated) DEVICE — KIT HAND CONTROL ACIST 014644 AR-P54

## (undated) DEVICE — CUSTOM PACK PERIPH VASCULAR SCV5BPVHEA

## (undated) DEVICE — CATH ANGIO INFINITI JL3.5 4FRX100CM 538418

## (undated) DEVICE — DRSG KERLIX FLUFFS X5

## (undated) DEVICE — INTRO SHEATH 6FRX10CM PINNACLE RSS602

## (undated) DEVICE — CATH DIAGNOSTIC RADIAL 5FR TIG 4.0

## (undated) DEVICE — DELIVERY SYSTEM VALVE AORTIC 29MM RESILIA COMMANDER 9750CM29

## (undated) DEVICE — SLEEVE TR BAND RADIAL COMPRESSION DEVICE 24CM TRB24-REG

## (undated) DEVICE — CATH BALLOON EMERGE 2.5X12MM H7493918912250

## (undated) DEVICE — ESU PENCIL W/HOLSTER E2350H

## (undated) DEVICE — CATH BALLOON EMERGE 3.0X15MM H7493918915300

## (undated) DEVICE — CATH ANGIO 6FR X 100CM INFINIT

## (undated) DEVICE — SOL WATER IRRIG 1000ML BOTTLE 2F7114

## (undated) DEVICE — SHEATH GLIDE RADIAL 4FR 25CM 0.021

## (undated) DEVICE — CLIP HORIZON MULTI SM YELLOW 001204

## (undated) DEVICE — GUIDEWIRE STARTER .035INX150CM

## (undated) DEVICE — SU DERMABOND ADVANCED .7ML DNX12

## (undated) DEVICE — SHEATH 4FR ULTIMUM 407840

## (undated) DEVICE — CATH LAUNCHER 6FR LA6EBU375

## (undated) DEVICE — KIT VALVE AORTIC SAPIEN 3 ULTRA RESILIA OD29 MM S3URCM29A

## (undated) DEVICE — CATH GUIDING 5FR X 100CM LA5PAPA1

## (undated) DEVICE — VALVE HEMOSTASIS .096" COPILOT MECH 1003331

## (undated) DEVICE — CUTTING BALLOON WOLVERINE 3X6MM

## (undated) DEVICE — CATH ANGIO SUPERTORQUE AL1 6FRX100CM 532-645

## (undated) DEVICE — GUIDEWIRE VASCULAR 0.035IN DIA 145CML 15CML/6CML STAINLESS

## (undated) DEVICE — GUIDEWIRE VASCULAR COMET II 185CML PRESSURE H74939359110

## (undated) DEVICE — CATH DIAG 4FR JR 5.0 538423

## (undated) DEVICE — VESSEL LOOP WHITE MAXI 30-721

## (undated) RX ORDER — ADENOSINE 3 MG/ML
INJECTION, SOLUTION INTRAVENOUS
Status: DISPENSED
Start: 2021-09-22

## (undated) RX ORDER — DIAZEPAM 5 MG
TABLET ORAL
Status: DISPENSED
Start: 2023-07-06

## (undated) RX ORDER — FENTANYL CITRATE 50 UG/ML
INJECTION, SOLUTION INTRAMUSCULAR; INTRAVENOUS
Status: DISPENSED
Start: 2023-07-06

## (undated) RX ORDER — DIAZEPAM 5 MG
TABLET ORAL
Status: DISPENSED
Start: 2021-09-22

## (undated) RX ORDER — FENTANYL CITRATE 50 UG/ML
INJECTION, SOLUTION INTRAMUSCULAR; INTRAVENOUS
Status: DISPENSED
Start: 2021-09-22

## (undated) RX ORDER — ASPIRIN 81 MG/1
TABLET, CHEWABLE ORAL
Status: DISPENSED
Start: 2021-09-22

## (undated) RX ORDER — GLYCOPYRROLATE 0.2 MG/ML
INJECTION, SOLUTION INTRAMUSCULAR; INTRAVENOUS
Status: DISPENSED
Start: 2023-09-05

## (undated) RX ORDER — HEPARIN SODIUM 1000 [USP'U]/ML
INJECTION, SOLUTION INTRAVENOUS; SUBCUTANEOUS
Status: DISPENSED
Start: 2021-09-22

## (undated) RX ORDER — HEPARIN SODIUM 1000 [USP'U]/ML
INJECTION, SOLUTION INTRAVENOUS; SUBCUTANEOUS
Status: DISPENSED
Start: 2023-09-05

## (undated) RX ORDER — DEXAMETHASONE SODIUM PHOSPHATE 10 MG/ML
INJECTION, SOLUTION INTRAMUSCULAR; INTRAVENOUS
Status: DISPENSED
Start: 2023-09-05

## (undated) RX ORDER — NICARDIPINE HYDROCHLORIDE 2.5 MG/ML
INJECTION INTRAVENOUS
Status: DISPENSED
Start: 2021-09-22

## (undated) RX ORDER — LIDOCAINE HYDROCHLORIDE 10 MG/ML
INJECTION, SOLUTION EPIDURAL; INFILTRATION; INTRACAUDAL; PERINEURAL
Status: DISPENSED
Start: 2023-09-05

## (undated) RX ORDER — FENTANYL CITRATE 50 UG/ML
INJECTION, SOLUTION INTRAMUSCULAR; INTRAVENOUS
Status: DISPENSED
Start: 2023-09-05

## (undated) RX ORDER — PROTAMINE SULFATE 10 MG/ML
INJECTION, SOLUTION INTRAVENOUS
Status: DISPENSED
Start: 2023-09-05

## (undated) RX ORDER — ASPIRIN 81 MG/1
TABLET, CHEWABLE ORAL
Status: DISPENSED
Start: 2023-07-06

## (undated) RX ORDER — CEFAZOLIN SODIUM/WATER 2 G/20 ML
SYRINGE (ML) INTRAVENOUS
Status: DISPENSED
Start: 2023-09-05

## (undated) RX ORDER — AMINOPHYLLINE 25 MG/ML
INJECTION, SOLUTION INTRAVENOUS
Status: DISPENSED
Start: 2021-09-22

## (undated) RX ORDER — NITROGLYCERIN 5 MG/ML
INJECTION, SOLUTION INTRAVENOUS
Status: DISPENSED
Start: 2021-09-22

## (undated) RX ORDER — PROPOFOL 10 MG/ML
INJECTION, EMULSION INTRAVENOUS
Status: DISPENSED
Start: 2023-09-05